# Patient Record
Sex: FEMALE | Race: WHITE | NOT HISPANIC OR LATINO | Employment: OTHER | URBAN - NONMETROPOLITAN AREA
[De-identification: names, ages, dates, MRNs, and addresses within clinical notes are randomized per-mention and may not be internally consistent; named-entity substitution may affect disease eponyms.]

---

## 2017-07-07 ENCOUNTER — HOSPITAL ENCOUNTER (OUTPATIENT)
Dept: MAMMOGRAPHY | Facility: HOSPITAL | Age: 57
Discharge: HOME | End: 2017-07-07
Admitting: PHYSICIAN ASSISTANT

## 2017-07-07 DIAGNOSIS — Z13.9 SCREENING: ICD-10-CM

## 2017-07-07 PROCEDURE — 77067 SCR MAMMO BI INCL CAD: CPT | Performed by: RADIOLOGY

## 2017-07-07 PROCEDURE — 77063 BREAST TOMOSYNTHESIS BI: CPT | Performed by: RADIOLOGY

## 2017-07-07 PROCEDURE — 77063 BREAST TOMOSYNTHESIS BI: CPT

## 2017-07-07 PROCEDURE — G0202 SCR MAMMO BI INCL CAD: HCPCS

## 2017-08-23 ENCOUNTER — TRANSCRIBE ORDERS (OUTPATIENT)
Dept: ADMINISTRATIVE | Facility: HOSPITAL | Age: 57
End: 2017-08-23

## 2017-08-23 ENCOUNTER — APPOINTMENT (OUTPATIENT)
Dept: LAB | Facility: HOSPITAL | Age: 57
End: 2017-08-23

## 2017-08-23 DIAGNOSIS — E78.5 HYPERLIPIDEMIA, UNSPECIFIED HYPERLIPIDEMIA TYPE: ICD-10-CM

## 2017-08-23 DIAGNOSIS — R53.83 TIRED: Primary | ICD-10-CM

## 2017-08-23 LAB
25(OH)D3 SERPL-MCNC: 23 NG/ML
ALBUMIN SERPL-MCNC: 4.3 G/DL (ref 3.5–5)
ALBUMIN/GLOB SERPL: 1.3 G/DL (ref 1.5–2.5)
ALP SERPL-CCNC: 94 U/L (ref 35–104)
ALT SERPL W P-5'-P-CCNC: 16 U/L (ref 10–36)
ANION GAP SERPL CALCULATED.3IONS-SCNC: 5.2 MMOL/L (ref 3.6–11.2)
AST SERPL-CCNC: 19 U/L (ref 10–30)
BASOPHILS # BLD AUTO: 0.02 10*3/MM3 (ref 0–0.3)
BASOPHILS NFR BLD AUTO: 0.3 % (ref 0–2)
BILIRUB SERPL-MCNC: 0.4 MG/DL (ref 0.2–1.8)
BUN BLD-MCNC: 8 MG/DL (ref 7–21)
BUN/CREAT SERPL: 11.9 (ref 7–25)
CALCIUM SPEC-SCNC: 9.4 MG/DL (ref 7.7–10)
CHLORIDE SERPL-SCNC: 108 MMOL/L (ref 99–112)
CHOLEST SERPL-MCNC: 204 MG/DL (ref 0–200)
CO2 SERPL-SCNC: 26.8 MMOL/L (ref 24.3–31.9)
CREAT BLD-MCNC: 0.67 MG/DL (ref 0.43–1.29)
DEPRECATED RDW RBC AUTO: 40.9 FL (ref 37–54)
EOSINOPHIL # BLD AUTO: 0.29 10*3/MM3 (ref 0–0.7)
EOSINOPHIL NFR BLD AUTO: 4.2 % (ref 0–5)
ERYTHROCYTE [DISTWIDTH] IN BLOOD BY AUTOMATED COUNT: 12.7 % (ref 11.5–14.5)
FOLATE SERPL-MCNC: 15.1 NG/ML (ref 5.4–20)
GFR SERPL CREATININE-BSD FRML MDRD: 90 ML/MIN/1.73
GLOBULIN UR ELPH-MCNC: 3.4 GM/DL
GLUCOSE BLD-MCNC: 105 MG/DL (ref 70–110)
HCT VFR BLD AUTO: 39.9 % (ref 37–47)
HDLC SERPL-MCNC: 60 MG/DL (ref 60–100)
HGB BLD-MCNC: 13 G/DL (ref 12–16)
IMM GRANULOCYTES # BLD: 0.01 10*3/MM3 (ref 0–0.03)
IMM GRANULOCYTES NFR BLD: 0.1 % (ref 0–0.5)
LDLC SERPL CALC-MCNC: 126 MG/DL (ref 0–100)
LDLC/HDLC SERPL: 2.1 {RATIO}
LYMPHOCYTES # BLD AUTO: 2.67 10*3/MM3 (ref 1–3)
LYMPHOCYTES NFR BLD AUTO: 38.8 % (ref 21–51)
MCH RBC QN AUTO: 29.1 PG (ref 27–33)
MCHC RBC AUTO-ENTMCNC: 32.6 G/DL (ref 33–37)
MCV RBC AUTO: 89.3 FL (ref 80–94)
MONOCYTES # BLD AUTO: 0.57 10*3/MM3 (ref 0.1–0.9)
MONOCYTES NFR BLD AUTO: 8.3 % (ref 0–10)
NEUTROPHILS # BLD AUTO: 3.33 10*3/MM3 (ref 1.4–6.5)
NEUTROPHILS NFR BLD AUTO: 48.3 % (ref 30–70)
OSMOLALITY SERPL CALC.SUM OF ELEC: 278.1 MOSM/KG (ref 273–305)
PLATELET # BLD AUTO: 211 10*3/MM3 (ref 130–400)
PMV BLD AUTO: 10.7 FL (ref 6–10)
POTASSIUM BLD-SCNC: 4 MMOL/L (ref 3.5–5.3)
PROT SERPL-MCNC: 7.7 G/DL (ref 6–8)
RBC # BLD AUTO: 4.47 10*6/MM3 (ref 4.2–5.4)
SODIUM BLD-SCNC: 140 MMOL/L (ref 135–153)
TRIGL SERPL-MCNC: 90 MG/DL (ref 0–150)
TSH SERPL DL<=0.05 MIU/L-ACNC: 2.75 MIU/ML (ref 0.55–4.78)
VIT B12 BLD-MCNC: 472 PG/ML (ref 211–911)
VLDLC SERPL-MCNC: 18 MG/DL
WBC NRBC COR # BLD: 6.89 10*3/MM3 (ref 4.5–12.5)

## 2017-08-23 PROCEDURE — 82607 VITAMIN B-12: CPT | Performed by: PHYSICIAN ASSISTANT

## 2017-08-23 PROCEDURE — 82306 VITAMIN D 25 HYDROXY: CPT | Performed by: PHYSICIAN ASSISTANT

## 2017-08-23 PROCEDURE — 36415 COLL VENOUS BLD VENIPUNCTURE: CPT | Performed by: PHYSICIAN ASSISTANT

## 2017-08-23 PROCEDURE — 82746 ASSAY OF FOLIC ACID SERUM: CPT | Performed by: PHYSICIAN ASSISTANT

## 2017-08-23 PROCEDURE — 84443 ASSAY THYROID STIM HORMONE: CPT | Performed by: PHYSICIAN ASSISTANT

## 2017-08-23 PROCEDURE — 80053 COMPREHEN METABOLIC PANEL: CPT | Performed by: PHYSICIAN ASSISTANT

## 2017-08-23 PROCEDURE — 80061 LIPID PANEL: CPT | Performed by: PHYSICIAN ASSISTANT

## 2017-08-23 PROCEDURE — 85025 COMPLETE CBC W/AUTO DIFF WBC: CPT | Performed by: PHYSICIAN ASSISTANT

## 2017-11-14 ENCOUNTER — OFFICE VISIT (OUTPATIENT)
Dept: PSYCHIATRY | Facility: CLINIC | Age: 57
End: 2017-11-14

## 2017-11-14 VITALS
SYSTOLIC BLOOD PRESSURE: 135 MMHG | DIASTOLIC BLOOD PRESSURE: 62 MMHG | HEART RATE: 64 BPM | BODY MASS INDEX: 31.76 KG/M2 | HEIGHT: 64 IN | WEIGHT: 186 LBS

## 2017-11-14 DIAGNOSIS — F41.3 OTHER MIXED ANXIETY DISORDERS: Primary | ICD-10-CM

## 2017-11-14 PROCEDURE — 99214 OFFICE O/P EST MOD 30 MIN: CPT | Performed by: NURSE PRACTITIONER

## 2017-11-14 RX ORDER — HYDROXYZINE HYDROCHLORIDE 10 MG/1
10 TABLET, FILM COATED ORAL 3 TIMES DAILY PRN
Qty: 90 TABLET | Refills: 0 | Status: SHIPPED | OUTPATIENT
Start: 2017-11-14 | End: 2018-01-11 | Stop reason: SDUPTHER

## 2017-11-14 RX ORDER — FLUOXETINE HYDROCHLORIDE 20 MG/1
CAPSULE ORAL
Qty: 45 CAPSULE | Refills: 0 | Status: SHIPPED | OUTPATIENT
Start: 2017-11-14 | End: 2018-01-11 | Stop reason: SDUPTHER

## 2017-11-14 RX ORDER — LORAZEPAM 0.5 MG/1
0.5 TABLET ORAL 2 TIMES DAILY
COMMUNITY
End: 2018-02-15 | Stop reason: SDUPTHER

## 2017-11-14 NOTE — PROGRESS NOTES
Lalita Valdez is a 57 y.o. female who is here today for initial appointment.     Chief Complaint:  No chief complaint on file.      HPI:  History of Present Illness    Past Psych History: ***    Substance Abuse: ***    Past Social History: ***    Family History:  family history includes Alcohol abuse in her father; Alzheimer's disease in her father; COPD in her mother; Panic disorder in her mother.    Medical/Surgical History:  Past Medical History:   Diagnosis Date   • Anxiety    • Arthritis    • Depression      Past Surgical History:   Procedure Laterality Date   • HYSTERECTOMY      age 37       Allergies   Allergen Reactions   • Codeine    • Sulfa Antibiotics        Current Medications:   Current Outpatient Prescriptions   Medication Sig Dispense Refill   • citalopram (CeleXA) 20 MG tablet Take  by mouth Daily.     • hydrOXYzine (ATARAX) 10 MG tablet Take 1 tablet by mouth 3 (Three) Times a Day As Needed for itching. 90 tablet 0   • LORazepam (ATIVAN) 0.5 MG tablet Take 0.5 mg by mouth 2 (Two) Times a Day.     • Multiple Vitamins-Minerals (PX MENS MULTIVITAMINS) tablet Take  by mouth Daily.     • phentermine (ADIPEX-P) 37.5 MG tablet Take 1 tablet by mouth Daily. 30 tablet 0     No current facility-administered medications for this visit.        Review of Systems    Review of Systems - General ROS: negative for - chills, fever or malaise  Ophthalmic ROS: negative for - loss of vision  ENT ROS: negative for - hearing change  Allergy and Immunology ROS: negative for - hives  Hematological and Lymphatic ROS: negative for - bleeding problems  Endocrine ROS: negative for - skin changes  Respiratory ROS: no cough, shortness of breath, or wheezing  Cardiovascular ROS: no chest pain or dyspnea on exertion  Gastrointestinal ROS: no abdominal pain, change in bowel habits, or black or bloody stools  Genito-Urinary ROS: no dysuria, trouble voiding, or hematuria  Musculoskeletal ROS: negative for - gait  "disturbance  Neurological ROS: no TIA or stroke symptoms  Dermatological ROS: negative for rash    Objective   Physical Exam  Blood pressure 135/62, pulse 64, height 64\" (162.6 cm), weight 186 lb (84.4 kg).    Mental Status Exam:   Hygiene:   {good/fair/poor:115413186}  Cooperation:  {Cooperation:562682928}  Eye Contact:  {Eye Contact:157252768}  Psychomotor Behavior:  {Psychomotor Behavior:80299}  Affect:  {Affect:212968166}  Hopelessness: {Hoplessness:413602326}  Speech:  {Speech:128966831}  Thought Process:  {Thought Process:653293204}  Thought Content:  {Thought Content:523326300}  Suicidal:  {Suicidal:179537013}  Homicidal:  {Homidical:358004403}  Hallucinations:  {Hallucinations:884396632}  Delusion:  {Delusion:489306150}  Memory:  {Memory:105596247}  Orientation:  {Orientation:213203815}  Reliability:  {good/fair/poor:025125730}  Insight:  {good/fair/poor/none:760273420}  Judgement:  {good/fair/poor/impaired:520342296}  Impulse Control:  {good/fair/poor/impaired:744755596}  Physical/Medical Issues:  {No/Yes:558233193}        Assessment/Plan   {Assess/PlanSmartLinks:57381}        We discussed risks, benefits, and side effects of the above medication and the patient was agreeable with the plan.     No Follow-up on file.       Problem List:    Short Term Goals:      Long Term Goals:    "

## 2017-11-14 NOTE — PROGRESS NOTES
"Lalita Valdez is a 57 y.o. female who is here today for medication management follow up.    Chief Complaint:  No chief complaint on file.      History of Present Illness   Has been seeing primary physician for pat year on Celexa. Says the Celexa was not working.  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 Believes more anxiety vs depression. Mainly more agitation both at work and at home.  Rates anxiety a 7/10. Denies panic attacks.  Pt cannot really explain what makes her anxious.  Denies excessive worry.  Feels sad at times but denies crying daily.  No SI, HI, No A/V hallucinations.  Rates depression a 4/10.  Sleeping about 8 hours nightly but it is interrupted mainly due to having to urinate.  Feels like work was the largest area of stress and pt retired this coming Friday is her last day.  No OCD behaviors.  No periods of excessive energy and insomnia.  C/O fatigue for past 4 months.  Says she thinks her PCP has checked thyroid and other blood work which was normal. No agoraphobia.  Just does not like dealing with the public, \"makes her nervous\".   The following portions of the patient's history were reviewed and updated as appropriate: current medications, past family history, past medical history, past social history, past surgical history and problem list.  Maximus    Review of Systems   Constitutional: Positive for fatigue.   Psychiatric/Behavioral: Positive for agitation.       Objective   Physical Exam   Constitutional: She appears well-developed and well-nourished. No distress.   Neurological: She is alert. Coordination and gait normal.   Vitals reviewed.    Blood pressure 135/62, pulse 64, height 64\" (162.6 cm), weight 186 lb (84.4 kg).    Allergies   Allergen Reactions   • Codeine    • Sulfa Antibiotics        Current Medications:   Current Outpatient Prescriptions   Medication Sig Dispense Refill   • hydrOXYzine (ATARAX) 10 MG tablet Take 1 tablet by mouth 3 (Three) Times a Day As " Needed for Itching. 90 tablet 0   • LORazepam (ATIVAN) 0.5 MG tablet Take 0.5 mg by mouth 2 (Two) Times a Day.     • Multiple Vitamins-Minerals (PX MENS MULTIVITAMINS) tablet Take  by mouth Daily.     • FLUoxetine (PROzac) 20 MG capsule Take one tablet daily for 2 weeks then increase to 2 tablets daily 45 capsule 0     No current facility-administered medications for this visit.        Mental Status Exam:   Hygiene:   good  Cooperation:  Cooperative  Eye Contact:  Good  Psychomotor Behavior:  Appropriate  Affect:  Full range  Hopelessness: Denies  Speech:  Normal  Thought Process:  Linear  Thought Content:  Normal  Suicidal:  None  Homicidal:  None  Hallucinations:  None  Delusion:  None  Memory:  Intact  Orientation:  Person, Place, Time and Situation  Reliability:  good  Insight:  Good  Judgement:  Good  Impulse Control:  Good  Physical/Medical Issues:  No     Assessment/Plan   Diagnoses and all orders for this visit:    Other mixed anxiety disorders    Other orders  -     FLUoxetine (PROzac) 20 MG capsule; Take one tablet daily for 2 weeks then increase to 2 tablets daily  -     hydrOXYzine (ATARAX) 10 MG tablet; Take 1 tablet by mouth 3 (Three) Times a Day As Needed for Itching.      Discused medications options.  Discussed with patient the importance of compliance with appointments.  Will set her up for psychotherapy.  Long discussion about Adipex and that it may be worsening her anxiety.  It is not helping her with weight loss anyway so she is to discontinue that.  Will change to Prozac and get the dose up to 40mg.    Discussed the risks, beneefits, and side effects of the medications; patient ackowledged and verbally consentedd.  Patient is aware to call the [facility] with any worsening of symptoms.  Patient is agreeable to call 911 or go to the nearest ER should he/she begin having SI/HI. Patient is being prescribed a controlled substance as part of treatment plan. Patient has been educated of  appropriate use of the medications, including risk of somnolence, limited ability to drive and/or work safely, and potential for dependence, respiratory depression and overdose. Patient is also informed that the medication are to be used by the patient only- avoid any combined use of ETOH or other substances unless prescribed. Pt was advised to discontinue Lorazepam over the next 2 months and to utilize Ativan as a rescue antianxiety only. Pt will return to clinic in 4 weeks or sooner if needed        Errors in dictation may reflect use of voice recognition software and not all errors in transcription may have been detected prior to signing.

## 2017-11-20 ENCOUNTER — OFFICE VISIT (OUTPATIENT)
Dept: PSYCHIATRY | Facility: CLINIC | Age: 57
End: 2017-11-20

## 2017-11-20 DIAGNOSIS — F43.25 ADJUSTMENT DISORDER WITH MIXED DISTURBANCE OF EMOTIONS AND CONDUCT: ICD-10-CM

## 2017-11-20 DIAGNOSIS — F41.3 OTHER MIXED ANXIETY DISORDERS: Primary | ICD-10-CM

## 2017-11-20 PROCEDURE — 90834 PSYTX W PT 45 MINUTES: CPT | Performed by: COUNSELOR

## 2017-11-20 NOTE — PROGRESS NOTES
Date of Service: November 20, 2017  Time In: 8:45 AM  Time Out: 9:30 AM      PROGRESS NOTE  Data:  Lexie Valdez is a 57 y.o. female who met with the undersigned for a regularly scheduled individual outpatient psychotherapy session at the [facility].  She rated her anxiety and depression levels as a 6 on a scale from 1-10 where 10 is the most.  She started taking Prozac last week and hopes that it helps with anxiety and irritability.     HPI: Patient reports that she retired from her job last Friday.  She plans to help her mother-in-law all raise her 5-year-old great nephew because his parents are on drugs.  She also hopes to spend more time with her 5 grandchildren.  She thinks that her anxiety level will be better now that she is no longer working in a stressful job.  She still reports some frustration with her 's procrastination about home repairs.    Clinical Maneuvering/Intervention:  Assisted patient in processing above session content; acknowledged and normalized patient’s thoughts, feelings, and concerns.  The importance of communication with her  was stressed.  Instead of stuffing her feelings until she is about to explode, she could talk to him calmly about what she would like done.  If he is too busy or not willing to do the household repairs in a timely manner she could hire someone or do it herself.  It was suggested that she add some social activities with friends to structure her time in [employer] so she won't isolate and become depressed.    Allowed patient to freely discuss issues without interruption or judgment. Provided safe, confidential environment to facilitate the development of positive therapeutic relationship and encourage open, honest communication. Assisted patient in identifying risk factors which would indicate the need for higher level of care including thoughts to harm self or others and/or self-harming behavior and encouraged patient to contact this office, call  911, or present to the nearest emergency room should any of these events occur. Discussed crisis intervention services and means to access.  Patient adamantly and convincingly denies current suicidal or homicidal ideation or perceptual disturbance.    Assessment     Diagnoses and all orders for this visit:    Other mixed anxiety disorders    Adjustment disorder with mixed disturbance of emotions and conduct         Mental Status Exam  Hygiene:  good  Dress:  casual  Attitude:  Cooperative  Motor Activity:  Appropriate  Speech:  Normal  Mood:  within normal limits  Affect:  calm and pleasant  Thought Processes:  Goal directed  Thought Content:  normal  Suicidal Thoughts:  denies  Homicidal Thoughts:  denies  Crisis Safety Plan: yes, to come to the emergency room.  Hallucinations:  denies    Patient's Support Network Includes:  , son and extended family    Progress toward goal: At goal    Functional Status: Mild impairment     Prognosis: Good with Ongoing Treatment     Plan     Patient will adhere to medication regimen as prescribed and report any side effects. Patient will contact this office, call 911 or present to the nearest emergency room should suicidal or homicidal ideations occur. Provide Cognitive Behavioral Therapy and Integrative Therapy to improve functioning, maintain stability, and avoid decompensation and the need for higher level of care.          Return in about 3 months (around 02/20/2018).      This document signed by Angela Chisholm [facility], November 20, 2017 9:42 AM

## 2018-01-11 RX ORDER — HYDROXYZINE HYDROCHLORIDE 10 MG/1
10 TABLET, FILM COATED ORAL 3 TIMES DAILY PRN
Qty: 90 TABLET | Refills: 0 | Status: SHIPPED | OUTPATIENT
Start: 2018-01-11 | End: 2018-02-15

## 2018-01-11 RX ORDER — FLUOXETINE HYDROCHLORIDE 20 MG/1
CAPSULE ORAL
Qty: 45 CAPSULE | Refills: 0 | Status: SHIPPED | OUTPATIENT
Start: 2018-01-11 | End: 2018-02-15

## 2018-02-15 ENCOUNTER — OFFICE VISIT (OUTPATIENT)
Dept: PSYCHIATRY | Facility: CLINIC | Age: 58
End: 2018-02-15

## 2018-02-15 VITALS
HEART RATE: 62 BPM | WEIGHT: 194 LBS | SYSTOLIC BLOOD PRESSURE: 136 MMHG | DIASTOLIC BLOOD PRESSURE: 81 MMHG | HEIGHT: 64 IN | BODY MASS INDEX: 33.12 KG/M2

## 2018-02-15 DIAGNOSIS — F41.3 OTHER MIXED ANXIETY DISORDERS: Primary | ICD-10-CM

## 2018-02-15 PROCEDURE — 99214 OFFICE O/P EST MOD 30 MIN: CPT | Performed by: NURSE PRACTITIONER

## 2018-02-15 RX ORDER — LORAZEPAM 0.5 MG/1
0.5 TABLET ORAL 2 TIMES DAILY PRN
Qty: 60 TABLET | Refills: 0 | Status: SHIPPED | OUTPATIENT
Start: 2018-02-15 | End: 2018-03-27 | Stop reason: SDUPTHER

## 2018-02-15 NOTE — PROGRESS NOTES
"Lalita Valdez is a 57 y.o. female who is here today for medication management follow up.    Chief Complaint:  No chief complaint on file.      History of Present Illness   Patient was here after a long hiatus due to lack of insurance.  Patient reports that she has been unable to take her medication due to running out.  She reports that prozac didn't help really.   more agitation both at work and at home.  Rates anxiety a 7/10. Denies panic attacks.  She reports that yesterday was \"anniversary of her daughters death and it was a bad day\" Feels sad at times but denies crying daily.  No SI, HI, No A/V hallucinations.  Rates depression a 4/10.  Sleeping about 8 hours nightly but it is interrupted mainly due to having to urinate.  .  No OCD behaviors.  No periods of excessive energy and insomnia.  C/O fatigue for past 4 months.        The following portions of the patient's history were reviewed and updated as appropriate: current medications, past family history, past medical history, past social history, past surgical history and problem list.  Maximus    Review of Systems   Constitutional: Positive for fatigue.   Psychiatric/Behavioral: Positive for agitation.       Objective   Physical Exam   Constitutional: She appears well-developed and well-nourished. No distress.   Neurological: She is alert. Coordination and gait normal.   Vitals reviewed.    Blood pressure 136/81, pulse 62, height 162.6 cm (64.02\"), weight 88 kg (194 lb).    Allergies   Allergen Reactions   • Codeine    • Sulfa Antibiotics        Current Medications:   Current Outpatient Prescriptions   Medication Sig Dispense Refill   • FLUoxetine (PROzac) 20 MG capsule Take one tablet daily for 2 weeks then increase to 2 tablets daily 45 capsule 0   • hydrOXYzine (ATARAX) 10 MG tablet Take 1 tablet by mouth 3 (Three) Times a Day As Needed for Itching. 90 tablet 0   • LORazepam (ATIVAN) 0.5 MG tablet Take 0.5 mg by mouth 2 (Two) Times a Day.     • " Multiple Vitamins-Minerals (PX MENS MULTIVITAMINS) tablet Take  by mouth Daily.       No current facility-administered medications for this visit.        Mental Status Exam:   Hygiene:   good  Cooperation:  Cooperative  Eye Contact:  Good  Psychomotor Behavior:  Appropriate  Affect:  Full range  Hopelessness: Denies  Speech:  Normal  Thought Process:  Linear  Thought Content:  Normal  Suicidal:  None  Homicidal:  None  Hallucinations:  None  Delusion:  None  Memory:  Intact  Orientation:  Person, Place, Time and Situation  Reliability:  good  Insight:  Good  Judgement:  Good  Impulse Control:  Good  Physical/Medical Issues:  No     Assessment/Plan   Diagnoses and all orders for this visit:    Other mixed anxiety disorders  -     Discontinue: sertraline (ZOLOFT) 50 MG tablet; Take 1 tablet by mouth Daily. Take 1/2 tablet for 1 week then increase to whole tablet for 1 week then increase to 2 tablets.  -     LORazepam (ATIVAN) 0.5 MG tablet; Take 1 tablet by mouth 2 (Two) Times a Day As Needed for Anxiety.  -     sertraline (ZOLOFT) 50 MG tablet; Take 1 tablet by mouth Every Night. Take 1/2 tablet for 1 week then increase to whole tablet for 1 week then increase to 2 tablets.        Discused medications options.  Discussed with patient the importance of compliance with appointments.  Will set her up for psychotherapy.     Discussed the risks, beneefits, and side effects of the medications; patient ackowledged and verbally consentedd.  Patient is aware to call the [facility] with any worsening of symptoms.  Patient is agreeable to call 911 or go to the nearest ER should he/she begin having SI/HI. Patient is being prescribed a controlled substance as part of treatment plan. Patient has been educated of appropriate use of the medications, including risk of somnolence, limited ability to drive and/or work safely, and potential for dependence, respiratory depression and overdose. Patient is also informed that the  medication are to be used by the patient only- avoid any combined use of ETOH or other substances unless prescribed. Pt was advised to discontinue Lorazepam over the next 2 months and to utilize Ativan as a rescue antianxiety only. Pt will return to clinic in 4 weeks or sooner if needed        Errors in dictation may reflect use of voice recognition software and not all errors in transcription may have been detected prior to signing.

## 2018-02-22 ENCOUNTER — OFFICE VISIT (OUTPATIENT)
Dept: PSYCHIATRY | Facility: CLINIC | Age: 58
End: 2018-02-22

## 2018-02-22 DIAGNOSIS — F41.3 OTHER MIXED ANXIETY DISORDERS: Primary | ICD-10-CM

## 2018-02-22 DIAGNOSIS — F34.1 DYSTHYMIA: ICD-10-CM

## 2018-02-22 PROCEDURE — 90832 PSYTX W PT 30 MINUTES: CPT | Performed by: COUNSELOR

## 2018-02-22 NOTE — PROGRESS NOTES
Date of Service: February 22, 2018  Time In: 8:00 AM  Time Out: 8:30 AM      PROGRESS NOTE  Data:  Lexie Valdez is a 57 y.o. female who met with the undersigned for a regularly scheduled individual outpatient psychotherapy session at the [facility].  She rated her anxiety level as a 6 and depression as a 5 on a scale from 1-10 where 10 is the most.  She displayed constant tremors in both hands and had difficulty holding her coffee cup.      HPI: Patient reports that she is enjoying prison but she is a little stressed about trying to apply for Medicaid and SS benefits.  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  His mother and her 5-year-old great grandson were living with them until recently.  She feels relieved since they moved out because the 5-year-old is disrespectful and poorly behaved.  Having him around made her feel anxious.  She enjoys spending time with his mother on the weekends.  She visits her 4 grandchildren frequently.  She prefers solitude and tends to avoid crowds due to anxiety.  Her tearful breakdowns are less frequent.    Clinical Maneuvering/Intervention:  Assisted patient in processing above session content; acknowledged and normalized patient’s thoughts, feelings, and concerns.  It was suggested that she speak to a hospital financial staff member for assistance with getting the medical card.  She was praised for meeting with the nurse practitioner in restarting psychotropic medications.    Allowed patient to freely discuss issues without interruption or judgment. Provided safe, confidential environment to facilitate the development of positive therapeutic relationship and encourage open, honest communication. Assisted patient in identifying risk factors which would indicate the need for higher level of care including thoughts to harm self or others and/or self-harming behavior and encouraged patient to contact this  office, call 911, or present to the nearest emergency room should any of these events occur. Discussed crisis intervention services and means to access.  Patient adamantly and convincingly denies current suicidal or homicidal ideation or perceptual disturbance.    Assessment     Diagnoses and all orders for this visit:    Other mixed anxiety disorders    Dysthymia           Mental Status Exam  Hygiene:  good  Dress:  casual  Attitude:  Cooperative  Motor Activity:  Appropriate  Speech:  Normal  Mood:  within normal limits  Affect:  anxious  Thought Processes:  Goal directed  Thought Content:  normal  Suicidal Thoughts:  denies  Homicidal Thoughts:  denies  Crisis Safety Plan: yes, to come to the emergency room.  Hallucinations:  denies    Patient's Support Network Includes:  , children and extended family    Progress toward goal: At goal    Functional Status: Mild impairment     Prognosis: Good with Ongoing Treatment     Plan     Patient will adhere to medication regimen as prescribed and report any side effects. Patient will contact this office, call 911 or present to the nearest emergency room should suicidal or homicidal ideations occur. Provide Cognitive Behavioral Therapy and Integrative Therapy to improve functioning, maintain stability, and avoid decompensation and the need for higher level of care.          Return in about 1 month (around 03/22/2018).      This document signed by PABLO Burnett, [facility] February 22, 2018 8:57 AM

## 2018-03-08 ENCOUNTER — HOSPITAL ENCOUNTER (EMERGENCY)
Facility: HOSPITAL | Age: 58
Discharge: HOME | End: 2018-03-08
Admitting: EMERGENCY MEDICINE

## 2018-03-08 VITALS
DIASTOLIC BLOOD PRESSURE: 77 MMHG | SYSTOLIC BLOOD PRESSURE: 147 MMHG | TEMPERATURE: 98 F | HEART RATE: 68 BPM | HEIGHT: 64 IN | BODY MASS INDEX: 33.29 KG/M2 | RESPIRATION RATE: 17 BRPM | WEIGHT: 195 LBS | OXYGEN SATURATION: 99 %

## 2018-03-08 DIAGNOSIS — R03.0 TRANSIENT ELEVATED BLOOD PRESSURE: Primary | ICD-10-CM

## 2018-03-08 LAB
ALBUMIN SERPL-MCNC: 4.2 G/DL (ref 3.5–5)
ALBUMIN/GLOB SERPL: 1.4 G/DL (ref 1.5–2.5)
ALP SERPL-CCNC: 95 U/L (ref 35–104)
ALT SERPL W P-5'-P-CCNC: 16 U/L (ref 10–36)
ANION GAP SERPL CALCULATED.3IONS-SCNC: 6.4 MMOL/L (ref 3.6–11.2)
AST SERPL-CCNC: 19 U/L (ref 10–30)
BACTERIA UR QL AUTO: ABNORMAL /HPF
BASOPHILS # BLD AUTO: 0.03 10*3/MM3 (ref 0–0.3)
BASOPHILS NFR BLD AUTO: 0.3 % (ref 0–2)
BILIRUB SERPL-MCNC: 0.3 MG/DL (ref 0.2–1.8)
BILIRUB UR QL STRIP: NEGATIVE
BUN BLD-MCNC: 8 MG/DL (ref 7–21)
BUN/CREAT SERPL: 12.9 (ref 7–25)
CALCIUM SPEC-SCNC: 8.6 MG/DL (ref 7.7–10)
CHLORIDE SERPL-SCNC: 109 MMOL/L (ref 99–112)
CLARITY UR: CLEAR
CO2 SERPL-SCNC: 24.6 MMOL/L (ref 24.3–31.9)
COLOR UR: YELLOW
CREAT BLD-MCNC: 0.62 MG/DL (ref 0.43–1.29)
DEPRECATED RDW RBC AUTO: 40.3 FL (ref 37–54)
EOSINOPHIL # BLD AUTO: 0.29 10*3/MM3 (ref 0–0.7)
EOSINOPHIL NFR BLD AUTO: 3.1 % (ref 0–5)
ERYTHROCYTE [DISTWIDTH] IN BLOOD BY AUTOMATED COUNT: 13 % (ref 11.5–14.5)
GFR SERPL CREATININE-BSD FRML MDRD: 99 ML/MIN/1.73
GLOBULIN UR ELPH-MCNC: 3.1 GM/DL
GLUCOSE BLD-MCNC: 111 MG/DL (ref 70–110)
GLUCOSE UR STRIP-MCNC: NEGATIVE MG/DL
HCT VFR BLD AUTO: 38.5 % (ref 37–47)
HGB BLD-MCNC: 12.5 G/DL (ref 12–16)
HGB UR QL STRIP.AUTO: NEGATIVE
HYALINE CASTS UR QL AUTO: ABNORMAL /LPF
IMM GRANULOCYTES # BLD: 0.04 10*3/MM3 (ref 0–0.03)
IMM GRANULOCYTES NFR BLD: 0.4 % (ref 0–0.5)
KETONES UR QL STRIP: NEGATIVE
LEUKOCYTE ESTERASE UR QL STRIP.AUTO: ABNORMAL
LYMPHOCYTES # BLD AUTO: 3.13 10*3/MM3 (ref 1–3)
LYMPHOCYTES NFR BLD AUTO: 33.8 % (ref 21–51)
MCH RBC QN AUTO: 28.1 PG (ref 27–33)
MCHC RBC AUTO-ENTMCNC: 32.5 G/DL (ref 33–37)
MCV RBC AUTO: 86.5 FL (ref 80–94)
MONOCYTES # BLD AUTO: 0.45 10*3/MM3 (ref 0.1–0.9)
MONOCYTES NFR BLD AUTO: 4.9 % (ref 0–10)
NEUTROPHILS # BLD AUTO: 5.31 10*3/MM3 (ref 1.4–6.5)
NEUTROPHILS NFR BLD AUTO: 57.5 % (ref 30–70)
NITRITE UR QL STRIP: POSITIVE
OSMOLALITY SERPL CALC.SUM OF ELEC: 278.4 MOSM/KG (ref 273–305)
PH UR STRIP.AUTO: 7 [PH] (ref 5–8)
PLATELET # BLD AUTO: 230 10*3/MM3 (ref 130–400)
PMV BLD AUTO: 10.5 FL (ref 6–10)
POTASSIUM BLD-SCNC: 3.7 MMOL/L (ref 3.5–5.3)
PROT SERPL-MCNC: 7.3 G/DL (ref 6–8)
PROT UR QL STRIP: NEGATIVE
RBC # BLD AUTO: 4.45 10*6/MM3 (ref 4.2–5.4)
RBC # UR: ABNORMAL /HPF
REF LAB TEST METHOD: ABNORMAL
SODIUM BLD-SCNC: 140 MMOL/L (ref 135–153)
SP GR UR STRIP: 1.01 (ref 1–1.03)
SQUAMOUS #/AREA URNS HPF: ABNORMAL /HPF
TROPONIN I SERPL-MCNC: <0.006 NG/ML
UROBILINOGEN UR QL STRIP: ABNORMAL
WBC NRBC COR # BLD: 9.25 10*3/MM3 (ref 4.5–12.5)
WBC UR QL AUTO: ABNORMAL /HPF

## 2018-03-08 PROCEDURE — 87086 URINE CULTURE/COLONY COUNT: CPT | Performed by: NURSE PRACTITIONER

## 2018-03-08 PROCEDURE — 99283 EMERGENCY DEPT VISIT LOW MDM: CPT

## 2018-03-08 PROCEDURE — 93010 ELECTROCARDIOGRAM REPORT: CPT | Performed by: INTERNAL MEDICINE

## 2018-03-08 PROCEDURE — 84484 ASSAY OF TROPONIN QUANT: CPT | Performed by: NURSE PRACTITIONER

## 2018-03-08 PROCEDURE — 87186 SC STD MICRODIL/AGAR DIL: CPT | Performed by: NURSE PRACTITIONER

## 2018-03-08 PROCEDURE — 80053 COMPREHEN METABOLIC PANEL: CPT | Performed by: NURSE PRACTITIONER

## 2018-03-08 PROCEDURE — 87077 CULTURE AEROBIC IDENTIFY: CPT | Performed by: NURSE PRACTITIONER

## 2018-03-08 PROCEDURE — 81003 URINALYSIS AUTO W/O SCOPE: CPT | Performed by: NURSE PRACTITIONER

## 2018-03-08 PROCEDURE — 81001 URINALYSIS AUTO W/SCOPE: CPT | Performed by: NURSE PRACTITIONER

## 2018-03-08 PROCEDURE — 85025 COMPLETE CBC W/AUTO DIFF WBC: CPT | Performed by: NURSE PRACTITIONER

## 2018-03-08 PROCEDURE — 93005 ELECTROCARDIOGRAM TRACING: CPT | Performed by: NURSE PRACTITIONER

## 2018-03-08 NOTE — ED PROVIDER NOTES
Subjective   Patient is a 57 y.o. female presenting with hypertension.   History provided by:  Patient   used: No    Hypertension   Severity:  Moderate  Onset quality:  Sudden  Chronicity:  New  Notable PTA blood pressures:  Patient reports 180s over 90s (unsure of exact measurement)  Context: normal sodium, not caffeine, not drug abuse, not medication change, not noncompliance and not OTC medications used    Relieved by:  None tried  Worsened by:  Nothing  Ineffective treatments:  None tried  Associated symptoms: anxiety    Associated symptoms: no abdominal pain, no chest pain, no confusion, no dizziness, no fatigue, no fever, no hematuria, no palpitations, no peripheral edema, no syncope, no tinnitus and not vomiting    Risk factors: family hx of HTN    Risk factors: no alcohol use, no cardiac disease, no cocaine use, no diabetes, no kidney disease, no prior aneurysm, no prior stroke and no tobacco use        Review of Systems   Constitutional: Negative.  Negative for fatigue and fever.   HENT: Negative.  Negative for tinnitus.    Eyes: Negative.    Respiratory: Negative.    Cardiovascular: Negative.  Negative for chest pain, palpitations and syncope.   Gastrointestinal: Negative.  Negative for abdominal pain and vomiting.   Endocrine: Negative.    Genitourinary: Negative.  Negative for hematuria.   Musculoskeletal: Negative.    Skin: Negative.    Allergic/Immunologic: Negative.    Neurological: Negative.  Negative for dizziness.   Hematological: Negative.    Psychiatric/Behavioral: Negative for confusion. The patient is nervous/anxious.        Past Medical History:   Diagnosis Date   • Anxiety    • Arthritis    • Depression        Allergies   Allergen Reactions   • Codeine    • Sulfa Antibiotics        Past Surgical History:   Procedure Laterality Date   • HYSTERECTOMY      age 37       Family History   Problem Relation Age of Onset   • Panic disorder Mother    • COPD Mother    • Alcohol abuse  Father    • Alzheimer's disease Father        Social History     Social History   • Marital status:      Occupational History   •       Social History Main Topics   • Smoking status: Former Smoker     Start date: 6/18/1971     Quit date: 10/18/2005   • Smokeless tobacco: Never Used   • Alcohol use No   • Drug use: No   • Sexual activity: Yes     Partners: Male           Objective   Physical Exam   Constitutional: She is oriented to person, place, and time. She appears well-developed and well-nourished.   HENT:   Head: Normocephalic.   Right Ear: External ear normal.   Left Ear: External ear normal.   Mouth/Throat: Oropharynx is clear and moist.   Eyes: EOM are normal. Pupils are equal, round, and reactive to light.   Neck: Normal range of motion. Neck supple.   Cardiovascular: Normal rate and regular rhythm.    Pulmonary/Chest: Effort normal and breath sounds normal.   Abdominal: Soft. Bowel sounds are normal.   Musculoskeletal: Normal range of motion.   Neurological: She is alert and oriented to person, place, and time.   Skin: Skin is warm and dry.   Psychiatric: She has a normal mood and affect. Her behavior is normal.   Nursing note and vitals reviewed.      Procedures         ED Course  ED Course                  MDM    Final diagnoses:   Transient elevated blood pressure            Blake Banuelos, APRN  03/08/18 [id number]

## 2018-03-08 NOTE — DISCHARGE INSTRUCTIONS
Hypertension  Hypertension is another name for high blood pressure. High blood pressure forces your heart to work harder to pump blood. This can cause problems over time.  There are two numbers in a blood pressure reading. There is a top number (systolic) over a bottom number (diastolic). It is best to have a blood pressure below 120/80. Healthy choices can help lower your blood pressure. You may need medicine to help lower your blood pressure if:  · Your blood pressure cannot be lowered with healthy choices.  · Your blood pressure is higher than 130/80.  Follow these instructions at home:  Eating and drinking   · If directed, follow the DASH eating plan. This diet includes:  ¨ Filling half of your plate at each meal with fruits and vegetables.  ¨ Filling one quarter of your plate at each meal with whole grains. Whole grains include whole wheat pasta, brown rice, and whole grain bread.  ¨ Eating or drinking low-fat dairy products, such as skim milk or low-fat yogurt.  ¨ Filling one quarter of your plate at each meal with low-fat (lean) proteins. Low-fat proteins include fish, skinless chicken, eggs, beans, and tofu.  ¨ Avoiding fatty meat, cured and processed meat, or chicken with skin.  ¨ Avoiding premade or processed food.  · Eat less than 1,500 mg of salt (sodium) a day.  · Limit alcohol use to no more than 1 drink a day for nonpregnant women and 2 drinks a day for men. One drink equals 12 oz of beer, 5 oz of wine, or 1½ oz of hard liquor.  Lifestyle   · Work with your doctor to stay at a healthy weight or to lose weight. Ask your doctor what the best weight is for you.  · Get at least 30 minutes of exercise that causes your heart to beat faster (aerobic exercise) most days of the week. This may include walking, swimming, or biking.  · Get at least 30 minutes of exercise that strengthens your muscles (resistance exercise) at least 3 days a week. This may include lifting weights or pilates.  · Do not use any  products that contain nicotine or tobacco. This includes cigarettes and e-cigarettes. If you need help quitting, ask your doctor.  · Check your blood pressure at home as told by your doctor.  · Keep all follow-up visits as told by your doctor. This is important.  Medicines   · Take over-the-counter and prescription medicines only as told by your doctor. Follow directions carefully.  · Do not skip doses of blood pressure medicine. The medicine does not work as well if you skip doses. Skipping doses also puts you at risk for problems.  · Ask your doctor about side effects or reactions to medicines that you should watch for.  Contact a doctor if:  · You think you are having a reaction to the medicine you are taking.  · You have headaches that keep coming back (recurring).  · You feel dizzy.  · You have swelling in your ankles.  · You have trouble with your vision.  Get help right away if:  · You get a very bad headache.  · You start to feel confused.  · You feel weak or numb.  · You feel faint.  · You get very bad pain in your:  ¨ Chest.  ¨ Belly (abdomen).  · You throw up (vomit) more than once.  · You have trouble breathing.  Summary  · Hypertension is another name for high blood pressure.  · Making healthy choices can help lower blood pressure. If your blood pressure cannot be controlled with healthy choices, you may need to take medicine.  This information is not intended to replace advice given to you by your health care provider. Make sure you discuss any questions you have with your health care provider.  Document Released: 06/05/2009 Document Revised: 11/15/2017 Document Reviewed: 11/15/2017  [employer] Interactive Patient Education © 2017 [employer]

## 2018-03-10 ENCOUNTER — TELEPHONE (OUTPATIENT)
Dept: EMERGENCY DEPT | Facility: HOSPITAL | Age: 58
End: 2018-03-10

## 2018-03-10 LAB
BACTERIA SPEC AEROBE CULT: ABNORMAL
BACTERIA SPEC AEROBE CULT: ABNORMAL

## 2018-03-22 ENCOUNTER — OFFICE VISIT (OUTPATIENT)
Dept: PSYCHIATRY | Facility: CLINIC | Age: 58
End: 2018-03-22

## 2018-03-22 DIAGNOSIS — F41.3 OTHER MIXED ANXIETY DISORDERS: ICD-10-CM

## 2018-03-22 DIAGNOSIS — F34.1 DYSTHYMIA: Primary | ICD-10-CM

## 2018-03-22 PROCEDURE — 90832 PSYTX W PT 30 MINUTES: CPT | Performed by: COUNSELOR

## 2018-03-22 NOTE — PROGRESS NOTES
Date of Service: March 22, 2018  Time In: 9:05 AM  Time Out: 9:35 AM      PROGRESS NOTE  Data:  Lexie Valdez is a 57 y.o. female who met with the undersigned for a regularly scheduled individual outpatient psychotherapy session at the [facility].  She rated her anxiety depression levels as a 5 on a scale from 1-10 where 10 is the most.  She sleeps about 7 hours a night but it is interrupted and she doesn't feel rested.      HPI: She reports feeling a little bored and jail.  He is looking forward to working on her flowers when the weather warms up.  She also feels bad that her  has to work so hard to support them financially when she is not bringing in income.  She met with the medical doctor and psychiatrist from [employer] and is hopeful that her disability will be approved so she can help out.  She stays busy with housework, cooking, computer games, television and playing with the dog.  She joined a gym but hasn't been going because of the weather.    Clinical Maneuvering/Intervention:  Assisted patient in processing above session content; acknowledged and normalized patient’s thoughts, feelings, and concerns.  Cognitive behavioral techniques were used to reframe distorted thoughts contributing to depressive and anxious symptoms.  She was encouraged to spend more time outside and to exercise at least 1 time weekly.    Allowed patient to freely discuss issues without interruption or judgment. Provided safe, confidential environment to facilitate the development of positive therapeutic relationship and encourage open, honest communication. Assisted patient in identifying risk factors which would indicate the need for higher level of care including thoughts to harm self or others and/or self-harming behavior and encouraged patient to contact this office, call 911, or present to the nearest emergency room should any of these events occur. Discussed crisis intervention services and means to access.   Patient adamantly and convincingly denies current suicidal or homicidal ideation or perceptual disturbance.    Assessment     Diagnoses and all orders for this visit:    Dysthymia    Other mixed anxiety disorders             Mental Status Exam  Hygiene:  good  Dress:  casual  Attitude:  Cooperative  Motor Activity:  Appropriate  Speech:  Normal  Mood:  decreased range  Affect:  calm and pleasant  Thought Processes:  Circum  Thought Content:  normal  Suicidal Thoughts:  denies  Homicidal Thoughts:  denies  Crisis Safety Plan: yes, to come to the emergency room.  Hallucinations:  denies    Patient's Support Network Includes:  , children and extended family    Progress toward goal: At goal    Functional Status: Mild impairment     Prognosis: Good with Ongoing Treatment     Plan     Patient will adhere to medication regimen as prescribed and report any side effects. Patient will contact this office, call 911 or present to the nearest emergency room should suicidal or homicidal ideations occur. Provide Cognitive Behavioral Therapy and Integrative Therapy to improve functioning, maintain stability, and avoid decompensation and the need for higher level of care.          Return in about 2 months (around 5/22/2018).      This document signed by Angela Chisholm, PABLO, [facility] March 22, 2018 10:47 AM

## 2018-03-27 DIAGNOSIS — F41.3 OTHER MIXED ANXIETY DISORDERS: ICD-10-CM

## 2018-03-27 RX ORDER — LORAZEPAM 0.5 MG/1
0.5 TABLET ORAL 2 TIMES DAILY PRN
Qty: 60 TABLET | Refills: 0 | Status: SHIPPED | OUTPATIENT
Start: 2018-03-27 | End: 2018-04-03 | Stop reason: SDUPTHER

## 2018-04-03 ENCOUNTER — OFFICE VISIT (OUTPATIENT)
Dept: PSYCHIATRY | Facility: CLINIC | Age: 58
End: 2018-04-03

## 2018-04-03 VITALS
BODY MASS INDEX: 34.31 KG/M2 | WEIGHT: 201 LBS | SYSTOLIC BLOOD PRESSURE: 132 MMHG | HEART RATE: 63 BPM | HEIGHT: 64 IN | DIASTOLIC BLOOD PRESSURE: 75 MMHG

## 2018-04-03 DIAGNOSIS — F41.3 OTHER MIXED ANXIETY DISORDERS: Primary | ICD-10-CM

## 2018-04-03 PROCEDURE — 99213 OFFICE O/P EST LOW 20 MIN: CPT | Performed by: NURSE PRACTITIONER

## 2018-04-03 RX ORDER — CITALOPRAM 20 MG/1
20 TABLET ORAL 2 TIMES DAILY
COMMUNITY
End: 2018-04-03

## 2018-04-03 RX ORDER — LORAZEPAM 0.5 MG/1
0.5 TABLET ORAL 2 TIMES DAILY PRN
Qty: 60 TABLET | Refills: 0 | Status: SHIPPED | OUTPATIENT
Start: 2018-04-03 | End: 2018-06-11 | Stop reason: SDUPTHER

## 2018-04-03 RX ORDER — FLUOXETINE 20 MG/1
20 TABLET, FILM COATED ORAL 2 TIMES DAILY
COMMUNITY
End: 2018-04-03

## 2018-04-03 NOTE — PROGRESS NOTES
"Lalita Valdez is a 57 y.o. female who is here today for medication management follow up.    Chief Complaint:  No chief complaint on file.      History of Present Illness   Patient presents appropriately dressed/groomed.  Patient has been medication compliant-denies any medication related side effects.  She reports lessening agitation and anger. Rates anxiety a 0/10. Denies panic attacks.  No SI, HI, No A/V hallucinations.  Rates depression a 0/10.  Sleeping about 8 hours nightly but it is interrupted mainly due to having to urinate.  .  No OCD behaviors.  No periods of excessive energy and insomnia.  C/O fatigue for past 4 months.        The following portions of the patient's history were reviewed and updated as appropriate: current medications, past family history, past medical history, past social history, past surgical history and problem list.  Maximus    Review of Systems   Constitutional: Positive for fatigue.   Psychiatric/Behavioral: Positive for agitation.       Objective   Physical Exam   Constitutional: She appears well-developed and well-nourished. No distress.   Neurological: She is alert. Coordination and gait normal.   Vitals reviewed.    Blood pressure 132/75, pulse 63, height 162.6 cm (64\"), weight 91.2 kg (201 lb).    Allergies   Allergen Reactions   • Codeine    • Sulfa Antibiotics        Current Medications:   Current Outpatient Prescriptions   Medication Sig Dispense Refill   • citalopram (CeleXA) 20 MG tablet Take 20 mg by mouth 2 (Two) Times a Day.     • FLUoxetine (PROzac) 20 MG tablet Take 20 mg by mouth 2 (Two) Times a Day.     • LORazepam (ATIVAN) 0.5 MG tablet Take 1 tablet by mouth 2 (Two) Times a Day As Needed for Anxiety. 60 tablet 0   • Multiple Vitamins-Minerals (PX MENS MULTIVITAMINS) tablet Take  by mouth Daily.     • sertraline (ZOLOFT) 50 MG tablet Take 1 tablet by mouth Every Night. Take 1/2 tablet for 1 week then increase to whole tablet for 1 week then increase to 2 " tablets. 30 tablet 2     No current facility-administered medications for this visit.        Mental Status Exam:   Hygiene:   good  Cooperation:  Cooperative  Eye Contact:  Good  Psychomotor Behavior:  Appropriate  Affect:  Full range  Hopelessness: Denies  Speech:  Normal  Thought Process:  Linear  Thought Content:  Normal  Suicidal:  None  Homicidal:  None  Hallucinations:  None  Delusion:  None  Memory:  Intact  Orientation:  Person, Place, Time and Situation  Reliability:  good  Insight:  Good  Judgement:  Good  Impulse Control:  Good  Physical/Medical Issues:  No     Assessment/Plan   Diagnoses and all orders for this visit:    Other mixed anxiety disorders  -     LORazepam (ATIVAN) 0.5 MG tablet; Take 1 tablet by mouth 2 (Two) Times a Day As Needed for Anxiety.  -     sertraline (ZOLOFT) 50 MG tablet; Take 2 tablets by mouth Every Night.        Discused medications options.  Discussed with patient increasing weight-encouraged healthly eating/exercise.     Discussed the risks, beneefits, and side effects of the medications; patient ackowledged and verbally consentedd.  Patient is aware to call the [facility] with any worsening of symptoms.  Patient is agreeable to call 911 or go to the nearest ER should he/she begin having SI/HI. Patient is being prescribed a controlled substance as part of treatment plan. Patient has been educated of appropriate use of the medications, including risk of somnolence, limited ability to drive and/or work safely, and potential for dependence, respiratory depression and overdose. Patient is also informed that the medication are to be used by the patient only- avoid any combined use of ETOH or other substances unless prescribed. Continues to use Ativan as a rescue antianxiety only. Pt will return to clinic i [id number] sooner if needed        Errors in dictation may reflect use of voice recognition software and not all errors in transcription may have been detected prior to  signing.

## 2018-05-07 RX ORDER — HYDROXYZINE HYDROCHLORIDE 10 MG/1
TABLET, FILM COATED ORAL
Qty: 90 TABLET | Refills: 0 | OUTPATIENT
Start: 2018-05-07

## 2018-05-21 RX ORDER — HYDROXYZINE HYDROCHLORIDE 10 MG/1
TABLET, FILM COATED ORAL
Qty: 90 TABLET | Refills: 0 | OUTPATIENT
Start: 2018-05-21

## 2018-05-22 ENCOUNTER — OFFICE VISIT (OUTPATIENT)
Dept: PSYCHIATRY | Facility: CLINIC | Age: 58
End: 2018-05-22

## 2018-05-22 DIAGNOSIS — F34.1 DYSTHYMIA: Primary | ICD-10-CM

## 2018-05-22 DIAGNOSIS — F41.3 OTHER MIXED ANXIETY DISORDERS: ICD-10-CM

## 2018-05-22 PROCEDURE — 90834 PSYTX W PT 45 MINUTES: CPT | Performed by: COUNSELOR

## 2018-05-22 RX ORDER — HYDROXYZINE HYDROCHLORIDE 10 MG/1
10 TABLET, FILM COATED ORAL 3 TIMES DAILY PRN
Qty: 45 TABLET | Refills: 1 | Status: SHIPPED | OUTPATIENT
Start: 2018-05-22 | End: 2018-07-16 | Stop reason: SDUPTHER

## 2018-05-22 RX ORDER — HYDROXYZINE HYDROCHLORIDE 10 MG/1
TABLET, FILM COATED ORAL EVERY 8 HOURS SCHEDULED
COMMUNITY
End: 2018-05-22

## 2018-05-22 NOTE — PROGRESS NOTES
Date of Service: May 22, 2018  Time In: 9:35 AM  Time Out: 10:10 AM      PROGRESS NOTE  Data:  Lexie Valdez is a 58 y.o. female who met with the undersigned for a regularly scheduled individual outpatient therapy session at [facility].  She rated her anxiety and depression levels as an 8 on a scale from 1-10 where 10 is the most.  She states that she has no energy or interest in things.  She was often tearful during the session.  She requested a prescription refill of hydroxyzine.     HPI: Patient reports that May is a difficult month because her parents were born in May and are both .  She was born on her mother's 25th birthday.  Her daughter Chip was born on May 20[birth year] 1985 and  when she was 1 years old.  She also feels guilty because her  is working 7 days a week to support them since she has no income.  She was turned down for SSI benefits but has hired someone to appeal.  She has been going with her  on jobs to help out.  He is afraid to leave her alone because she is depressed but she would enjoy some time alone.  She struggles with back, shoulder hand and right hip pain due to arthritis.  Even with medication the pain level remains at 5 on 1-10 scale where 10 is the most.      Clinical Maneuvering/Intervention:  Assisted patient in processing above session content; acknowledged and normalized patient’s thoughts, feelings, and concerns.  Therapist notified SG Syed about patient's medication request.  Cognitive behavioral techniques were used to reframe distorted thoughts contribute to her distress.  She was encouraged to ask her  to allow her to have some time alone to do things she enjoys.  It was also suggested that she ask him to schedule his jobs so that they have one day off a week to spend time together fishing.    Allowed patient to freely discuss issues without interruption or judgment. Provided safe, confidential environment to facilitate the development  of positive therapeutic relationship and encourage open, honest communication. Assisted patient in identifying risk factors which would indicate the need for higher level of care including thoughts to harm self or others and/or self-harming behavior and encouraged patient to contact this office, call 911, or present to the nearest emergency room should any of these events occur. Discussed crisis intervention services and means to access.  Patient adamantly and convincingly denies current suicidal or homicidal ideation or perceptual disturbance.    Assessment     Diagnoses and all orders for this visit:    Dysthymia    Other mixed anxiety disorders             Mental Status Exam  Hygiene:  good  Dress:  casual  Attitude:  Cooperative  Motor Activity:  Slow  Speech:  Monotone  Mood:  dysthymic  Affect:  flat  Thought Processes:  Circum  Thought Content:  normal  Suicidal Thoughts:  denies  Homicidal Thoughts:  denies  Crisis Safety Plan: yes, to come to the emergency room.  Hallucinations:  denies    Patient's Support Network Includes:  , son and extended family    Progress toward goal: Not at goal    Functional Status: Moderate impairment     Prognosis: Good with Ongoing Treatment     Plan         Patient will adhere to medication regimen as prescribed and report any side effects. Patient will contact this office, call 911 or present to the nearest emergency room should suicidal or homicidal ideations occur. Provide Cognitive Behavioral Therapy and Integrative Therapy to improve functioning, maintain stability, and avoid decompensation and the need for higher level of care.          Return in about 1 month (around 6/22/2018).      This document signed by PABLO Burnett, [facility] May 22, 2018 1:35 PM

## 2018-05-24 ENCOUNTER — HOSPITAL ENCOUNTER (OUTPATIENT)
Dept: BONE DENSITY | Facility: HOSPITAL | Age: 58
Discharge: HOME | End: 2018-05-24
Admitting: PHYSICIAN ASSISTANT

## 2018-05-24 DIAGNOSIS — E89.41 SYMPTOMATIC STATES ASSOCIATED WITH ARTIFICIAL MENOPAUSE: ICD-10-CM

## 2018-05-24 PROCEDURE — 77080 DXA BONE DENSITY AXIAL: CPT

## 2018-05-24 PROCEDURE — 77080 DXA BONE DENSITY AXIAL: CPT | Performed by: RADIOLOGY

## 2018-06-08 ENCOUNTER — TRANSCRIBE ORDERS (OUTPATIENT)
Dept: ADMINISTRATIVE | Facility: HOSPITAL | Age: 58
End: 2018-06-08

## 2018-06-08 DIAGNOSIS — M54.5 LOW BACK PAIN, UNSPECIFIED BACK PAIN LATERALITY, UNSPECIFIED CHRONICITY, WITH SCIATICA PRESENCE UNSPECIFIED: Primary | ICD-10-CM

## 2018-06-09 ENCOUNTER — HOSPITAL ENCOUNTER (OUTPATIENT)
Dept: MRI IMAGING | Facility: HOSPITAL | Age: 58
Discharge: HOME | End: 2018-06-09
Admitting: PHYSICIAN ASSISTANT

## 2018-06-09 DIAGNOSIS — M54.5 LOW BACK PAIN, UNSPECIFIED BACK PAIN LATERALITY, UNSPECIFIED CHRONICITY, WITH SCIATICA PRESENCE UNSPECIFIED: ICD-10-CM

## 2018-06-09 PROCEDURE — 72148 MRI LUMBAR SPINE W/O DYE: CPT

## 2018-06-09 PROCEDURE — 72148 MRI LUMBAR SPINE W/O DYE: CPT | Performed by: RADIOLOGY

## 2018-06-11 DIAGNOSIS — F41.3 OTHER MIXED ANXIETY DISORDERS: ICD-10-CM

## 2018-06-11 RX ORDER — LORAZEPAM 0.5 MG/1
0.5 TABLET ORAL 2 TIMES DAILY PRN
Qty: 60 TABLET | Refills: 0 | Status: SHIPPED | OUTPATIENT
Start: 2018-06-11 | End: 2018-07-16 | Stop reason: SDUPTHER

## 2018-06-26 ENCOUNTER — OFFICE VISIT (OUTPATIENT)
Dept: PSYCHIATRY | Facility: CLINIC | Age: 58
End: 2018-06-26

## 2018-06-26 DIAGNOSIS — F34.1 DYSTHYMIA: ICD-10-CM

## 2018-06-26 DIAGNOSIS — F41.3 OTHER MIXED ANXIETY DISORDERS: Primary | ICD-10-CM

## 2018-06-26 PROCEDURE — 90834 PSYTX W PT 45 MINUTES: CPT | Performed by: COUNSELOR

## 2018-06-26 NOTE — PROGRESS NOTES
"Date of Service: June 26, 2018  Time In: 10:20 AM  Time Out: 11:00 AM      PROGRESS NOTE  Data:  Lexie Valdez is a 58 y.o. female who met with the undersigned for a regularly scheduled individual outpatient therapy session at the [facility].  She rated her anxiety and depression levels as a 6 on a scale from 1-10 where 10 is the most.  She reports medication compliance but wonders if her medications need to be adjusted.  She was out of her hormone medication for one month because of problems with getting an insurance [place].     HPI: Patient reports that she still feels depressed \"for no reason\".  She is getting along well with her family and they are financially stable even though she is still waiting for her SSI to be approved.  She can't find anything that she is interested in doing.  She frequently becomes tearful, has problems with concentration and wonders whether she is \"going crazy\".  Her sister would like her to exercise with her but she feels guilty for \"wasting gas\" doing something for herself.  She has been busy going to several doctors appointments.  She verbalized pride in her grandson's archery accomplishments.    Clinical Maneuvering/Intervention:  Assisted patient in processing above session content; acknowledged and normalized patient’s thoughts, feelings, and concerns.  Cognitive behavioral techniques were used to reframe distorted thoughts contributing to her distress.  The importance of self-care was stressed and the therapist insisted on a commitment to do one enjoyable thing each week.  She was told to discuss her medication concerns with the nurse practitioner during their next session.    Allowed patient to freely discuss issues without interruption or judgment. Provided safe, confidential environment to facilitate the development of positive therapeutic relationship and encourage open, honest communication. Assisted patient in identifying risk factors which would indicate the need for higher " level of care including thoughts to harm self or others and/or self-harming behavior and encouraged patient to contact this office, call 911, or present to the nearest emergency room should any of these events occur. Discussed crisis intervention services and means to access.  Patient adamantly and convincingly denies current suicidal or homicidal ideation or perceptual disturbance.    Assessment     Diagnoses and all orders for this visit:    Other mixed anxiety disorders    Dysthymia               Mental Status Exam  Hygiene:  good  Dress:  casual  Attitude:  Cooperative  Motor Activity:  Appropriate  Speech:  Normal  Mood:  within normal limits  Affect:  calm and pleasant  Thought Processes:  Goal directed  Thought Content:  normal  Suicidal Thoughts:  denies  Homicidal Thoughts:  denies  Crisis Safety Plan: yes, to come to the emergency room.  Hallucinations:  denies    Patient's Support Network Includes:  , son and extended family    Progress toward goal: Not at goal    Functional Status: Moderate impairment     Prognosis: Good with Ongoing Treatment     Plan         Patient will adhere to medication regimen as prescribed and report any side effects. Patient will contact this office, call 911 or present to the nearest emergency room should suicidal or homicidal ideations occur. Provide Cognitive Behavioral Therapy and Integrative Therapy to improve functioning, maintain stability, and avoid decompensation and the need for higher level of care.          Return in about 1 month (around 7/26/2018).      This document signed by PABLO Burnett, [facility] June 26, 2018 11:26 AM

## 2018-07-09 ENCOUNTER — HOSPITAL ENCOUNTER (OUTPATIENT)
Dept: MAMMOGRAPHY | Facility: HOSPITAL | Age: 58
Discharge: HOME | End: 2018-07-09
Admitting: PHYSICIAN ASSISTANT

## 2018-07-09 DIAGNOSIS — Z12.39 BREAST CANCER SCREENING: ICD-10-CM

## 2018-07-09 PROCEDURE — 77067 SCR MAMMO BI INCL CAD: CPT

## 2018-07-09 PROCEDURE — 77067 SCR MAMMO BI INCL CAD: CPT | Performed by: RADIOLOGY

## 2018-07-09 PROCEDURE — 77063 BREAST TOMOSYNTHESIS BI: CPT | Performed by: RADIOLOGY

## 2018-07-09 PROCEDURE — 77063 BREAST TOMOSYNTHESIS BI: CPT

## 2018-07-16 DIAGNOSIS — F41.3 OTHER MIXED ANXIETY DISORDERS: ICD-10-CM

## 2018-07-17 RX ORDER — LORAZEPAM 0.5 MG/1
0.5 TABLET ORAL 2 TIMES DAILY PRN
Qty: 60 TABLET | Refills: 0 | Status: SHIPPED | OUTPATIENT
Start: 2018-07-17 | End: 2018-08-20 | Stop reason: SDUPTHER

## 2018-07-17 RX ORDER — HYDROXYZINE HYDROCHLORIDE 10 MG/1
10 TABLET, FILM COATED ORAL 3 TIMES DAILY PRN
Qty: 45 TABLET | Refills: 1 | Status: SHIPPED | OUTPATIENT
Start: 2018-07-17 | End: 2018-08-20 | Stop reason: SDUPTHER

## 2018-07-23 ENCOUNTER — APPOINTMENT (OUTPATIENT)
Dept: PHYSICAL THERAPY | Facility: HOSPITAL | Age: 58
End: 2018-07-23

## 2018-07-23 ENCOUNTER — HOSPITAL ENCOUNTER (EMERGENCY)
Facility: HOSPITAL | Age: 58
Discharge: HOME | End: 2018-07-23
Attending: INTERNAL MEDICINE | Admitting: INTERNAL MEDICINE

## 2018-07-23 ENCOUNTER — APPOINTMENT (OUTPATIENT)
Dept: GENERAL RADIOLOGY | Facility: HOSPITAL | Age: 58
End: 2018-07-23

## 2018-07-23 VITALS
BODY MASS INDEX: 33.63 KG/M2 | DIASTOLIC BLOOD PRESSURE: 74 MMHG | HEIGHT: 64 IN | RESPIRATION RATE: 18 BRPM | TEMPERATURE: 98 F | SYSTOLIC BLOOD PRESSURE: 128 MMHG | HEART RATE: 90 BPM | WEIGHT: 197 LBS | OXYGEN SATURATION: 99 %

## 2018-07-23 DIAGNOSIS — S20.212A RIB CONTUSION, LEFT, INITIAL ENCOUNTER: Primary | ICD-10-CM

## 2018-07-23 PROCEDURE — 71101 X-RAY EXAM UNILAT RIBS/CHEST: CPT

## 2018-07-23 PROCEDURE — 99283 EMERGENCY DEPT VISIT LOW MDM: CPT

## 2018-07-23 PROCEDURE — 71101 X-RAY EXAM UNILAT RIBS/CHEST: CPT | Performed by: RADIOLOGY

## 2018-07-23 RX ORDER — HYDROCODONE BITARTRATE AND ACETAMINOPHEN 5; 325 MG/1; MG/1
1 TABLET ORAL ONCE
Status: COMPLETED | OUTPATIENT
Start: 2018-07-23 | End: 2018-07-23

## 2018-07-23 RX ORDER — IBUPROFEN 600 MG/1
600 TABLET ORAL ONCE
Status: COMPLETED | OUTPATIENT
Start: 2018-07-23 | End: 2018-07-23

## 2018-07-23 RX ORDER — HYDROCODONE BITARTRATE AND ACETAMINOPHEN 5; 325 MG/1; MG/1
1 TABLET ORAL 4 TIMES DAILY PRN
Qty: 12 TABLET | Refills: 0 | Status: SHIPPED | OUTPATIENT
Start: 2018-07-23 | End: 2018-08-20

## 2018-07-23 RX ADMIN — HYDROCODONE BITARTRATE AND ACETAMINOPHEN 1 TABLET: 5; 325 TABLET ORAL at 13:28

## 2018-07-23 RX ADMIN — IBUPROFEN 600 MG: 600 TABLET ORAL at 13:28

## 2018-07-31 ENCOUNTER — TRANSCRIBE ORDERS (OUTPATIENT)
Dept: PHYSICAL THERAPY | Facility: HOSPITAL | Age: 58
End: 2018-07-31

## 2018-07-31 ENCOUNTER — HOSPITAL ENCOUNTER (OUTPATIENT)
Dept: PHYSICAL THERAPY | Facility: HOSPITAL | Age: 58
Setting detail: THERAPIES SERIES
Discharge: HOME | End: 2018-07-31

## 2018-07-31 DIAGNOSIS — M54.5 LOW BACK PAIN, UNSPECIFIED BACK PAIN LATERALITY, UNSPECIFIED CHRONICITY, WITH SCIATICA PRESENCE UNSPECIFIED: Primary | ICD-10-CM

## 2018-07-31 DIAGNOSIS — M79.18 RIGHT BUTTOCK PAIN: ICD-10-CM

## 2018-07-31 PROCEDURE — 97161 PT EVAL LOW COMPLEX 20 MIN: CPT | Performed by: PHYSICAL THERAPIST

## 2018-08-02 ENCOUNTER — HOSPITAL ENCOUNTER (OUTPATIENT)
Dept: PHYSICAL THERAPY | Facility: HOSPITAL | Age: 58
Setting detail: THERAPIES SERIES
Discharge: HOME | End: 2018-08-02

## 2018-08-02 DIAGNOSIS — M79.18 RIGHT BUTTOCK PAIN: Primary | ICD-10-CM

## 2018-08-02 DIAGNOSIS — M54.5 LOW BACK PAIN, UNSPECIFIED BACK PAIN LATERALITY, UNSPECIFIED CHRONICITY, WITH SCIATICA PRESENCE UNSPECIFIED: ICD-10-CM

## 2018-08-02 PROCEDURE — G0283 ELEC STIM OTHER THAN WOUND: HCPCS | Performed by: PHYSICAL THERAPIST

## 2018-08-02 PROCEDURE — 97110 THERAPEUTIC EXERCISES: CPT | Performed by: PHYSICAL THERAPIST

## 2018-08-02 PROCEDURE — 97035 APP MDLTY 1+ULTRASOUND EA 15: CPT | Performed by: PHYSICAL THERAPIST

## 2018-08-02 NOTE — THERAPY TREATMENT NOTE
Outpatient Physical Therapy Ortho Treatment Note   Alex     Patient Name: Lexie Valdez  : 1960  MRN: [id number]  Today's Date: 2018      Visit Date: 2018    Visit Dx:    ICD-10-CM ICD-9-CM   1. Right buttock pain M79.1 729.1   2. Low back pain, unspecified back pain laterality, unspecified chronicity, with sciatica presence unspecified M54.5 724.2       There is no problem list on file for this patient.       Past Medical History:   Diagnosis Date   • Anxiety    • Arthritis    • Depression         Past Surgical History:   Procedure Laterality Date   • GALLBLADDER SURGERY     • HYSTERECTOMY      age 37             PT Ortho     Row Name 18 0900       Subjective Comments    Subjective Comments The patient reported moderate low back and right leg pain prior to today's treatment session.  -AD       Subjective Pain    Able to rate subjective pain? yes  -AD    Pre-Treatment Pain Level 5  -AD    Post-Treatment Pain Level 0  -AD    Row Name 18 1300       Subjective Pain    Subjective Pain Comment Pt denies any recent weight loss/gain and denies any changes with bowel or bladder.  Pt reports last Thursday had steriod injections in both hips at her doctor's appt.  Reports believes it is helping.   -CC       Posture/Observations    Posture/Observations Comments In standing demonstrates forward head, rounded shoulder posture, lists to her left side.   -CC       Quarter Clearing    Quarter Clearing Lower Quarter Clearing  -CC       DTR- Lower Quarter Clearing    Patellar tendon (L2-4) Bilateral:;3- Slightly hyperactive response  -CC    Achilles tendon (S1-2) Bilateral:;2- Normal response  -CC       Neural Tension Signs- Lower Quarter Clearing    Slump Bilateral:;Positive  -CC    Well Slump Bilateral:;Negative  -CC    SLR Bilateral:;Negative  -CC       Sensory Screen for Light Touch- Lower Quarter Clearing    L3 (distal anterior thigh) Bilateral:;Intact  -CC    L4 (medial lower leg/foot)  Bilateral:;Intact  -CC    L5 (lateral lower leg/great toe) Bilateral:;Intact  -CC    S1 (bottom of foot) Bilateral:;Intact  -CC       Lumbar ROM Screen- Lower Quarter Clearing    Lumbar Flexion Impaired   mild L) rib hump,  limited by grossly 50%  -CC    Lumbar Extension Impaired   c/o LBP, limited by 50%  -CC    Lumbar Lateral Flexion Impaired   c/o LBP,  limited grossly by 75%  -CC    Lumbar Rotation Impaired   increase c/o LBP, limited grossly by 50%  -CC       SI/Hip Screen- Lower Quarter Clearing    Conor's/Storm's test Bilateral:;Positive  -CC       Special Tests/Palpation    Special Tests/Palpation Lumbar/SI;Hip  -CC       Lumbosacral Palpation    SI Bilateral:;Tender  -CC    Spinous Process Tender   2+ palpable tenderness L-spine spinous processes  -CC    Erector Spinae (Paraspinals) Bilateral:;Tender;Guarded/taut  -CC    Lumbosacral Palpation? Yes   2+ palpable tenderness B) greater trochanter  -CC       Hip/Thigh Palpation    Greater Trochanter Bilateral:;Tender  -CC    ITB Bilateral:;Tender  -CC    Hip/Thigh Palpation? Yes  -CC       Hip Special Tests    CONOR (hip vs SI pathology) Bilateral:;Positive   increase c/o pain with special tests  -CC    Hip scour test (labral vs hip pathology) Bilateral:;Positive  -CC       General ROM    GENERAL ROM COMMENTS B) LE knee and ankle WNL,  Hip flexion AROM  R) 0 to 75 degrees c/o pain  L) 0 to 80 degrees with c/o pain.   -CC       MMT (Manual Muscle Testing)    Additional Documentation General Assessment (Manual Muscle Testing) (Group)  -       General Assessment (Manual Muscle Testing)    Comment, General Manual Muscle Testing (MMT) Assessment B) hip flexors: 4-/5 with c/o LBP with testing   B) hip adduction and abduction (sitting) 4+/5 with c/o pain,    Quad: R) 4+/5   L) 4-/5 with c/o pain  B) hamstrings: 4/5 with c/o pain, B) ankle DF/PF: 5/5   B) Great toe ext: 5/5  -      User Key  (r) = Recorded By, (t) = Taken By, (c) = Cosigned By    Initials Name  Provider Type    Tabitha Maya, PT Physical Therapist    AD Dalton, Ashley Claudene, PT Physical Therapist                            PT Assessment/Plan     Row Name 08/02/18 0944          PT Assessment    Assessment Comments Today's session was initiated with moist heat combined with IFC to the lumbar region in the seated position. Therapeutic exercises addressed lumbar ROM, strength, and core stability. Tactile and verbal cues were required for proper form. The session concluded with therapeutic ultrasound to the right paraspinals. No skin irritation was observed following modalities. The patient tolerated the session well, with reports of 0/10 low back pain following the session. She will be progressed as tolerated.  -AD        PT Plan    PT Plan Comments Progress as tolerated per POC.  -AD       User Key  (r) = Recorded By, (t) = Taken By, (c) = Cosigned By    Initials Name Provider Type    AD Dalton, Ashley Claudene, PT Physical Therapist                Modalities     Row Name 08/02/18 0900             Moist Heat    MH Applied Yes   With IFC, no skin irritation observed.  -AD      Location lumbar  -AD      Rx Minutes 15 mins  -AD      MH Prior to Rx Yes  -AD         Ultrasound 20861    Location Right lumbar paraspinals   No skin irritation observed.  -AD      Duty Cycle 50  -AD      Frequency --   3.3 MHz  -AD      Intensity - Wts/cm 1.2  -AD      60200 - PT Ultrasound Minutes 8  -AD         ELECTRICAL STIMULATION    Attended/Unattended Unattended  -AD      Stimulation Type IFC  -AD      Max mAmp --   Per pt tolerance.  -AD      Location/Electrode Placement/Other Lumbar  -AD      08855 - PT Electrical Stimulation (Manual) Minutes --   Unattended with moist heat for 15 minutes.  -AD        User Key  (r) = Recorded By, (t) = Taken By, (c) = Cosigned By    Initials Name Provider Type    AD Dalton, Ashley Claudene, PT Physical Therapist                Exercises     Row Name 08/02/18 0906              Subjective Comments    Subjective Comments The patient reported moderate low back and right leg pain prior to today's treatment session.  -AD         Subjective Pain    Able to rate subjective pain? yes  -AD      Pre-Treatment Pain Level 5  -AD      Post-Treatment Pain Level 0  -AD         Total Minutes    44449 - PT Therapeutic Exercise Minutes 31  -AD         Exercise 1    Exercise Name 1 PPT, GS, SKTC, LTR, ball squeeze, SAQ, LAQ, seated march, RTB knee flexion, RTB hip abduction.  -AD      Cueing 1 Verbal;Tactile;Demo  -AD      Sets 1 2  -AD      Reps 1 10  -AD      Time 1 31 minutes  -AD        User Key  (r) = Recorded By, (t) = Taken By, (c) = Cosigned By    Initials Name Provider Type    AD Dalton, Ashley Claudene, PT Physical Therapist                             Therapy Education  Given: HEP  Program: Reinforced  How Provided: Verbal, Demonstration  Provided to: Patient  Level of Understanding: Verbalized              Time Calculation:   Start Time: 0854  Stop Time: 0952  Time Calculation (min): 58 min  Therapy Suggested Charges     Code   Minutes Charges    03091 (CPT®) Hc Pt Neuromusc Re Education Ea 15 Min      75020 (CPT®) Hc Pt Ther Proc Ea 15 Min 31 2    62462 (CPT®) Hc Gait Training Ea 15 Min      74959 (CPT®) Hc Pt Therapeutic Act Ea 15 Min      01929 (CPT®) Hc Pt Manual Therapy Ea 15 Min      90143 (CPT®) Hc Pt Ther Massage- Per 15 Min      92342 (CPT®) Hc Pt Iontophoresis Ea 15 Min      50704 (CPT®) Hc Pt Elec Stim Ea-Per 15 Min      68776 (CPT®) Hc Pt Ultrasound Ea 15 Min 8 1    31235 (CPT®) Hc Pt Self Care/Mgmt/Train Ea 15 Min      Total  39 3        Therapy Charges for Today     Code Description Service Date Service Provider Modifiers Qty    07831268585 HC PT THER PROC EA 15 MIN 8/2/2018 Dalton, Ashley Claudene, PT GP 2    36995389454 HC PT ULTRASOUND EA 15 MIN 8/2/2018 Dalton, Ashley Claudene, PT GP 1    14068489936 HC PT ELECTRICAL STIM UNATTENDED 8/2/2018 Dalton, Ashley Claudene, PT  1                     Ashley Claudene Dalton, PT  8/2/2018

## 2018-08-07 ENCOUNTER — HOSPITAL ENCOUNTER (OUTPATIENT)
Dept: PHYSICAL THERAPY | Facility: HOSPITAL | Age: 58
Setting detail: THERAPIES SERIES
Discharge: HOME | End: 2018-08-07

## 2018-08-07 DIAGNOSIS — M79.18 RIGHT BUTTOCK PAIN: Primary | ICD-10-CM

## 2018-08-07 DIAGNOSIS — M54.5 LOW BACK PAIN, UNSPECIFIED BACK PAIN LATERALITY, UNSPECIFIED CHRONICITY, WITH SCIATICA PRESENCE UNSPECIFIED: ICD-10-CM

## 2018-08-07 PROCEDURE — 97110 THERAPEUTIC EXERCISES: CPT

## 2018-08-07 PROCEDURE — G0283 ELEC STIM OTHER THAN WOUND: HCPCS

## 2018-08-07 PROCEDURE — 97035 APP MDLTY 1+ULTRASOUND EA 15: CPT

## 2018-08-07 NOTE — THERAPY TREATMENT NOTE
Outpatient Physical Therapy Ortho Treatment Note   Alex     Patient Name: Lexie Valdez  : 1960  MRN: [id number]  Today's Date: 2018      Visit Date: 2018    Visit Dx:    ICD-10-CM ICD-9-CM   1. Right buttock pain M79.1 729.1   2. Low back pain, unspecified back pain laterality, unspecified chronicity, with sciatica presence unspecified M54.5 724.2       There is no problem list on file for this patient.       Past Medical History:   Diagnosis Date   • Anxiety    • Arthritis    • Depression         Past Surgical History:   Procedure Laterality Date   • GALLBLADDER SURGERY     • HYSTERECTOMY      age 37             PT Ortho     Row Name 18 1000       Subjective Comments    Subjective Comments Patient states that she is having soreness on both side of her back. Patient reports of having weakness in both of her legs. Patient states that she has a f/u appt with her referring MD on .  -AC       Subjective Pain    Able to rate subjective pain? yes  -AC    Pre-Treatment Pain Level 4  -AC      User Key  (r) = Recorded By, (t) = Taken By, (c) = Cosigned By    Initials Name Provider Type    Brianna Amaya PTA Physical Therapy Assistant                            PT Assessment/Plan     Row Name 18 1034          PT Assessment    Assessment Comments Patient tolerated treatment session well with rest breaks taken as needed by the patient. Educated patient to perform ther-ex per her tolerance, patient verbalized understanding. No adverse reactions with modalities or treatment session. Decrease in pain norted following treatment session. Ultrasound performed on the B) lumbar musculature to help decrease tightness and pain.  -AC        PT Plan    PT Plan Comments Continue per PT's POC, progress per the patient's tolerance.  -AC       User Key  (r) = Recorded By, (t) = Taken By, (c) = Cosigned By    Initials Name Provider Type    Brianna Amaya PTA Physical Therapy  Assistant                Modalities     Row Name 08/07/18 1000             Moist Heat    MH Applied Yes   No redness noted following moist heat  -AC      Location lumbar  -AC      Rx Minutes 15 mins  -AC      MH Prior to Rx Yes  -AC         Ultrasound 48344    Location B) lumbar paraspinals  -AC      Duty Cycle 50  -AC      Frequency --   3.3MHz  -AC      Intensity - Wts/cm 1.2  -AC      07627 - PT Ultrasound Minutes 8  -AC         ELECTRICAL STIMULATION    Attended/Unattended Unattended   No irritation noted following estim  -AC      Stimulation Type IFC  -AC      Max mAmp --   per the patient's tolerance, 15 minutes with MH  -AC      Location/Electrode Placement/Other Lumbar  -AC        User Key  (r) = Recorded By, (t) = Taken By, (c) = Cosigned By    Initials Name Provider Type    Brianna Amaya PTA Physical Therapy Assistant                Exercises     Row Name 08/07/18 1000             Subjective Comments    Subjective Comments Patient states that she is having soreness on both side of her back. Patient reports of having weakness in both of her legs. Patient states that she has a f/u appt with her referring MD on 8/23.  -AC         Subjective Pain    Able to rate subjective pain? yes  -AC      Pre-Treatment Pain Level 4  -AC         Total Minutes    03987 - PT Therapeutic Exercise Minutes 30  -AC         Exercise 1    Exercise Name 1 PPT 10x2, GS 20x2, SKTC 20 sec hold x3, LTR 15x2, supine ball squeeze 10x2, SAQ 10x2, supine clams 10x2, seated march 10x2, piriformis stretch 20 sec hold x3,   -AC      Cueing 1 Verbal;Tactile;Demo  -AC      Time 1 30 minutes  -AC        User Key  (r) = Recorded By, (t) = Taken By, (c) = Cosigned By    Initials Name Provider Type    Brianna Amaya PTA Physical Therapy Assistant                             Therapy Education  Given: HEP, Symptoms/condition management, Pain management, Posture/body mechanics  Program: New  How Provided: Demonstration,  Verbal  Provided to: Patient  Level of Understanding: Verbalized, Demonstrated              Time Calculation:   Start Time: 0945  Stop Time: 1040  Time Calculation (min): 55 min  Therapy Suggested Charges     Code   Minutes Charges    88504 (CPT®) Hc Pt Neuromusc Re Education Ea 15 Min      93820 (CPT®) Hc Pt Ther Proc Ea 15 Min 30 2    97718 (CPT®) Hc Gait Training Ea 15 Min      20535 (CPT®) Hc Pt Therapeutic Act Ea 15 Min      61172 (CPT®) Hc Pt Manual Therapy Ea 15 Min      97661 (CPT®) Hc Pt Ther Massage- Per 15 Min      18181 (CPT®) Hc Pt Iontophoresis Ea 15 Min      66918 (CPT®) Hc Pt Elec Stim Ea-Per 15 Min      21067 (CPT®) Hc Pt Ultrasound Ea 15 Min 8 1    89079 (CPT®) Hc Pt Self Care/Mgmt/Train Ea 15 Min      Total  38 3        Therapy Charges for Today     Code Description Service Date Service Provider Modifiers Qty    93557128579 HC PT THER PROC EA 15 MIN 8/7/2018 Brianna Cevallos, PTA GP 2    92370810730 HC PT ULTRASOUND EA 15 MIN 8/7/2018 Brianna Cevallos, PTA GP 1    63443838060 HC PT ELECTRICAL STIM UNATTENDED 8/7/2018 Brianna Cevallos, PTA  1                    Brianna Cevallos PTA  8/7/2018

## 2018-08-09 ENCOUNTER — HOSPITAL ENCOUNTER (OUTPATIENT)
Dept: PHYSICAL THERAPY | Facility: HOSPITAL | Age: 58
Setting detail: THERAPIES SERIES
Discharge: HOME | End: 2018-08-09

## 2018-08-09 DIAGNOSIS — M79.18 RIGHT BUTTOCK PAIN: Primary | ICD-10-CM

## 2018-08-09 DIAGNOSIS — M54.5 LOW BACK PAIN, UNSPECIFIED BACK PAIN LATERALITY, UNSPECIFIED CHRONICITY, WITH SCIATICA PRESENCE UNSPECIFIED: ICD-10-CM

## 2018-08-09 PROCEDURE — 97140 MANUAL THERAPY 1/> REGIONS: CPT

## 2018-08-09 PROCEDURE — 97110 THERAPEUTIC EXERCISES: CPT

## 2018-08-09 PROCEDURE — G0283 ELEC STIM OTHER THAN WOUND: HCPCS

## 2018-08-13 ENCOUNTER — OFFICE VISIT (OUTPATIENT)
Dept: PSYCHIATRY | Facility: CLINIC | Age: 58
End: 2018-08-13

## 2018-08-13 DIAGNOSIS — F41.1 GENERALIZED ANXIETY DISORDER: ICD-10-CM

## 2018-08-13 DIAGNOSIS — F34.1 DYSTHYMIA: Primary | ICD-10-CM

## 2018-08-13 PROCEDURE — 90832 PSYTX W PT 30 MINUTES: CPT | Performed by: COUNSELOR

## 2018-08-13 NOTE — TREATMENT PLAN
Multi-Disciplinary Problems (from Behavioral Health Treatment Plan)    Active Problems     Problem: Anxiety  Start Date: 08/13/18    Problem Details:  The patient self-scales this problem as a 10 with 10 being the worst.       Goal Priority Start Date Expected End Date End Date    Patient will develop and implement behavioral and cognitive strategies to reduce anxiety and irrational fears. -- 08/13/18 08/13/19 --    Goal Details:  Progress toward goal:  The patient self-scales their progress related to this goal as a 6 with 10 being the worst.       Goal Intervention Frequency Start Date End Date    Help patient explore past emotional issues in relation to present anxiety. PRN 08/13/18 08/13/19    Intervention Details:  Duration of treatment until until remission of symptoms.       Goal Intervention Frequency Start Date End Date    Help patient develop an awareness of their cognitive and physical responses to anxiety. PRN 08/13/18 08/13/19    Intervention Details:  Duration of treatment until until remission of symptoms.             Problem: Depression  Start Date: 08/13/18    Problem Details:  The patient self-scales this problem as a 8 with 10 being the worst.       Goal Priority Start Date Expected End Date End Date    Patient will demonstrate the ability to initiate new constructive life skills outside of sessions on a consistent basis. -- 08/13/18 08/13/19 --    Goal Details:  Progress toward goal:  The patient self-scales their progress related to this goal as a 8 with 10 being the worst.       Goal Intervention Frequency Start Date End Date    Assist patient in setting attainable activities of daily living goals. PRN 08/13/18 08/13/19    Goal Intervention Frequency Start Date End Date    Provide education about depression PRN 08/13/18 08/13/19    Intervention Details:  Duration of treatment until until discharged.       Goal Intervention Frequency Start Date End Date    Assist patient in developing healthy  coping strategies. PRN 08/13/18 08/13/19    Intervention Details:  Duration of treatment until until discharged.             Problem: Medical Issue  Start Date: 08/13/18    Problem Details:  The patient self-scales this problem as a 8 with 10 being the worst.       Goal Priority Start Date Expected End Date End Date    Patient will verbalize emotional, cognitive and behavioral changes needed to address health issues. -- 08/13/18 08/13/19 --    Goal Details:  Progress toward goal:  The patient self-scales their progress related to this goal as a 6 with 10 being the worst.       Goal Intervention Frequency Start Date End Date    Reinforce emotional stability, behavioral responsibility and positive self talk that reduces risk to health. PRN 08/13/18 08/13/19    Intervention Details:  Duration of treatment until until discharged.                   Reviewed By     Angela Chisholm, [facility] 08/13/18 1003                 I have discussed and reviewed this treatment plan with the patient.  It has been printed for signatures.

## 2018-08-13 NOTE — PROGRESS NOTES
Date of Service: August 13, 2018  Time In: 9:15 AM  Time Out: 9:45 AM      PROGRESS NOTE  Data:  Lexie Valdez is a 58 y.o. female who met with the undersigned for a regularly scheduled individual outpatient therapy session at the [facility].  She rated her anxiety, depression and pain levels as a 5 on a scale from 1-10 where 10 is most.  She reports medication compliance.     HPI: Patient reports that she is feeling much better.  The medications are working well to treat her anxiety and depression.  They are still experiencing financial stress and her SSI has been denied 2 times.  She is excited about getting a new grandchild in April, 2019.  She has been going to physical therapy 2 times a week for her back and there has been some improvement.  She socializes with her sister and mother and has been busy at home cleaning out closets.  Her  schedule has slowed down so they have had more time to spend together.  She verbalized concern about a 50 pound weight gain since bariatric surgery but states that she can't get motivated to watch her diet or exercise.    Clinical Maneuvering/Intervention:  Assisted patient in processing above session content; acknowledged and normalized patient’s thoughts, feelings, and concerns.  She was praised for medication compliance and for using positive coping skills to manage her mood.  She was encouraged to begin exercising with a focus on relaxation and health instead of weight loss.    Allowed patient to freely discuss issues without interruption or judgment. Provided safe, confidential environment to facilitate the development of positive therapeutic relationship and encourage open, honest communication. Assisted patient in identifying risk factors which would indicate the need for higher level of care including thoughts to harm self or others and/or self-harming behavior and encouraged patient to contact this office, call 911, or present to the nearest emergency room  should any of these events occur. Discussed crisis intervention services and means to access.  Patient adamantly and convincingly denies current suicidal or homicidal ideation or perceptual disturbance.    Assessment     Diagnoses and all orders for this visit:    Dysthymia    Generalized anxiety disorder               Mental Status Exam  Hygiene:  good  Dress:  casual  Attitude:  Cooperative  Motor Activity:  Appropriate  Speech:  Normal  Mood:  within normal limits  Affect:  calm and pleasant  Thought Processes:  Goal directed  Thought Content:  normal  Suicidal Thoughts:  denies  Homicidal Thoughts:  denies  Crisis Safety Plan: yes, to come to the emergency room.  Hallucinations:  denies    Patient's Support Network Includes:  , son and extended family    Progress toward goal: At goal    Functional Status: Mild impairment     Prognosis: Good with Ongoing Treatment     Plan     Patient will adhere to medication regimen as prescribed and report any side effects. Patient will contact this office, call 911 or present to the nearest emergency room should suicidal or homicidal ideations occur. Provide Cognitive Behavioral Therapy and Integrative Therapy to improve functioning, maintain stability, and avoid decompensation and the need for higher level of care.          Return in about 3 months (around 11/13/2018).      This document signed by PABLO Burnett, [facility] August 13, 2018 9:55 AM

## 2018-08-14 ENCOUNTER — HOSPITAL ENCOUNTER (OUTPATIENT)
Dept: PHYSICAL THERAPY | Facility: HOSPITAL | Age: 58
Setting detail: THERAPIES SERIES
Discharge: HOME | End: 2018-08-14

## 2018-08-14 DIAGNOSIS — M79.18 RIGHT BUTTOCK PAIN: Primary | ICD-10-CM

## 2018-08-14 DIAGNOSIS — M54.5 LOW BACK PAIN, UNSPECIFIED BACK PAIN LATERALITY, UNSPECIFIED CHRONICITY, WITH SCIATICA PRESENCE UNSPECIFIED: ICD-10-CM

## 2018-08-14 PROCEDURE — 97140 MANUAL THERAPY 1/> REGIONS: CPT | Performed by: PHYSICAL THERAPIST

## 2018-08-14 PROCEDURE — G0283 ELEC STIM OTHER THAN WOUND: HCPCS | Performed by: PHYSICAL THERAPIST

## 2018-08-14 PROCEDURE — 97110 THERAPEUTIC EXERCISES: CPT | Performed by: PHYSICAL THERAPIST

## 2018-08-14 NOTE — THERAPY TREATMENT NOTE
Outpatient Physical Therapy Ortho Treatment Note   Alex     Patient Name: Lexie Valdez  : 1960  MRN: [id number]  Today's Date: 2018      Visit Date: 2018    Visit Dx:    ICD-10-CM ICD-9-CM   1. Right buttock pain M79.1 729.1   2. Low back pain, unspecified back pain laterality, unspecified chronicity, with sciatica presence unspecified M54.5 724.2       There is no problem list on file for this patient.       Past Medical History:   Diagnosis Date   • Anxiety    • Arthritis    • Depression         Past Surgical History:   Procedure Laterality Date   • GALLBLADDER SURGERY     • HYSTERECTOMY      age 37             PT Ortho     Row Name 18 0800       Subjective Comments    Subjective Comments Patient reports 7/10 pain today.  Patient reports that she has started experiencing tingling in the right LE, which extends to the ankle.  -BE       Subjective Pain    Able to rate subjective pain? yes  -BE    Pre-Treatment Pain Level 7  -BE    Post-Treatment Pain Level 4  -BE      User Key  (r) = Recorded By, (t) = Taken By, (c) = Cosigned By    Initials Name Provider Type    BE Bonita Browne, PT Physical Therapist                            PT Assessment/Plan     Row Name 18 0958          PT Assessment    Assessment Comments Patient session initiated with MH/ESTIM to assist with pain control; no adverse reactions noted with modalities at today's session.  Ther ex performed per flow sheet, with exercises focusing on stretching, core strengthening, and LE strengthening.  Soft tissue mobilization performed to the lumbar and hip musculature, with patient reporting tenderness at right lumbar paraspinals and right piriformis.  Patient reported decreased pain following therapy session, noting 7/10 pain pre-tx and 4/10 pain post-tx.  She will continue to be progressed per her tolerance and POC.  -BE        PT Plan    PT Plan Comments Progress per patient's tolerance and POC.  -BE       User  "Key  (r) = Recorded By, (t) = Taken By, (c) = Cosigned By    Initials Name Provider Type    BE Bonita Browne PT Physical Therapist                Modalities     Row Name 08/14/18 0800             Moist Heat    MH Applied Yes   no redness following MH  -BE      Location lumbar  -BE      Rx Minutes 15 mins  -BE      MH Prior to Rx Yes   w/ estim in seated position  -BE         ELECTRICAL STIMULATION    Attended/Unattended Unattended   no skin irritation observed following  -BE      Stimulation Type IFC  -BE      Max mAmp --   as to pt's tolerance  -BE      Location/Electrode Placement/Other Lumbar   15 min  -BE        User Key  (r) = Recorded By, (t) = Taken By, (c) = Cosigned By    Initials Name Provider Type    BE Bonita Browne PT Physical Therapist                Exercises     Row Name 08/14/18 0900 08/14/18 0800          Subjective Comments    Subjective Comments  -- Patient reports 7/10 pain today.  Patient reports that she has started experiencing tingling in the right LE, which extends to the ankle.  -BE        Subjective Pain    Able to rate subjective pain?  -- yes  -BE     Pre-Treatment Pain Level  -- 7  -BE     Post-Treatment Pain Level  -- 4  -BE        Total Minutes    92018 - PT Therapeutic Exercise Minutes  -- 26  -BE     12326 - PT Manual Therapy Minutes 15  -BE  --        Exercise 1    Exercise Name 1  -- LTR 15x2, SKTC 3x20\", piriformis stretch (not crossing leg over) 3x20\", PPT 10x2, GS 15x2, supine clams 2x10, SAQ 10x3, ball squeeze 2x10  -BE     Cueing 1  -- Verbal;Tactile;Demo  -BE     Time 1  -- 26 min  -BE       User Key  (r) = Recorded By, (t) = Taken By, (c) = Cosigned By    Initials Name Provider Type    BE Bonita Browne PT Physical Therapist                        Manual Rx (last 36 hours)      Manual Treatments     Row Name 08/14/18 0900             Total Minutes    74793 - PT Manual Therapy Minutes 15  -BE         Manual Rx 1    Manual Rx 1 Location Lumbar, hip  -BE   "    Manual Rx 1 Type STM  -BE      Manual Rx 1 Grade gentle, as to pt's tolerance  -BE      Manual Rx 1 Duration 15 min  -BE        User Key  (r) = Recorded By, (t) = Taken By, (c) = Cosigned By    Initials Name Provider Type    BE Bonita Browne PT Physical Therapist              Therapy Education  Given: HEP, Symptoms/condition management, Pain management, Posture/body mechanics  Program: Reinforced  How Provided: Verbal, Demonstration  Provided to: Patient  Level of Understanding: Verbalized, Demonstrated              Time Calculation:   Start Time: 0852  Stop Time: 0951  Time Calculation (min): 59 min  Therapy Suggested Charges     Code   Minutes Charges    89143 (CPT®) Hc Pt Neuromusc Re Education Ea 15 Min      65375 (CPT®) Hc Pt Ther Proc Ea 15 Min 26 2    91950 (CPT®) Hc Gait Training Ea 15 Min      47659 (CPT®) Hc Pt Therapeutic Act Ea 15 Min      46163 (CPT®) Hc Pt Manual Therapy Ea 15 Min 15 1    99968 (CPT®) Hc Pt Ther Massage- Per 15 Min      03335 (CPT®) Hc Pt Iontophoresis Ea 15 Min      13468 (CPT®) Hc Pt Elec Stim Ea-Per 15 Min      45518 (CPT®) Hc Pt Ultrasound Ea 15 Min      64227 (CPT®) Hc Pt Self Care/Mgmt/Train Ea 15 Min      89603 (CPT®) Hc Pt Prosthetic (S) Train Initial Encounter, Each 15 Min      09619 (CPT®) Hc Orthotic(S) Mgmt/Train Initial Encounter, Each 15min      36981 (CPT®) Hc Pt Aquatic Therapy Ea 15 Min      85300 (CPT®) Hc Pt Orthotic(S)/Prosthetic(S) Encounter, Each 15 Min      Total  41 3        Therapy Charges for Today     Code Description Service Date Service Provider Modifiers Qty    45574777805 HC PT THER PROC EA 15 MIN 8/14/2018 Bonita Browne, PT GP 2    13592897825 HC PT MANUAL THERAPY EA 15 MIN 8/14/2018 Bonita Browne, PT GP 1    52045833188 HC PT ELECTRICAL STIM UNATTENDED 8/14/2018 Bonita Browne, PT  1                    Bonita Browne PT  8/14/2018

## 2018-08-16 ENCOUNTER — HOSPITAL ENCOUNTER (OUTPATIENT)
Dept: PHYSICAL THERAPY | Facility: HOSPITAL | Age: 58
Setting detail: THERAPIES SERIES
Discharge: HOME | End: 2018-08-16

## 2018-08-16 DIAGNOSIS — M79.18 RIGHT BUTTOCK PAIN: Primary | ICD-10-CM

## 2018-08-16 DIAGNOSIS — M54.5 LOW BACK PAIN, UNSPECIFIED BACK PAIN LATERALITY, UNSPECIFIED CHRONICITY, WITH SCIATICA PRESENCE UNSPECIFIED: ICD-10-CM

## 2018-08-16 PROCEDURE — G0283 ELEC STIM OTHER THAN WOUND: HCPCS | Performed by: PHYSICAL THERAPIST

## 2018-08-16 PROCEDURE — 97110 THERAPEUTIC EXERCISES: CPT | Performed by: PHYSICAL THERAPIST

## 2018-08-16 PROCEDURE — 97140 MANUAL THERAPY 1/> REGIONS: CPT | Performed by: PHYSICAL THERAPIST

## 2018-08-16 NOTE — THERAPY TREATMENT NOTE
Outpatient Physical Therapy Ortho Treatment Note   Alex     Patient Name: Lexie Valdez  : 1960  MRN: [id number]  Today's Date: 2018      Visit Date: 2018    Visit Dx:    ICD-10-CM ICD-9-CM   1. Right buttock pain M79.1 729.1   2. Low back pain, unspecified back pain laterality, unspecified chronicity, with sciatica presence unspecified M54.5 724.2       There is no problem list on file for this patient.       Past Medical History:   Diagnosis Date   • Anxiety    • Arthritis    • Depression         Past Surgical History:   Procedure Laterality Date   • GALLBLADDER SURGERY     • HYSTERECTOMY      age 37             PT Ortho     Row Name 18 0900       Subjective Comments    Subjective Comments Patient reports that she does not feel well today.  She notes 5/10 pain today.  -BE       Subjective Pain    Able to rate subjective pain? yes  -BE    Pre-Treatment Pain Level 5  -BE    Post-Treatment Pain Level 4  -BE    Row Name 18 0800       Subjective Comments    Subjective Comments Patient reports 7/10 pain today.  Patient reports that she has started experiencing tingling in the right LE, which extends to the ankle.  -BE       Subjective Pain    Able to rate subjective pain? yes  -BE    Pre-Treatment Pain Level 7  -BE    Post-Treatment Pain Level 4  -BE      User Key  (r) = Recorded By, (t) = Taken By, (c) = Cosigned By    Initials Name Provider Type    BE Bonita Browne, PT Physical Therapist                            PT Assessment/Plan     Row Name 18 1140          PT Assessment    Assessment Comments Patient tolerated today's session well, with reports of decreased pain following therapy.  Patient reported 5/10 pain pre-tx and 4/10 post-tx.  Session consisted of MH, ESTIM, ther ex, and manual therapy.  No adverse reactions were noted with modalities at today's session.  Patient progressed in ther ex to include increased  repetitions of ball squeeze and supine clams;  "supine march also added to ther ex program.  Patient will continue to be progressed per her tolerance and POC.  -BE        PT Plan    PT Plan Comments Progress per patient's tolerance and POC.  -BE       User Key  (r) = Recorded By, (t) = Taken By, (c) = Cosigned By    Initials Name Provider Type    BE Bonita Browne, PT Physical Therapist                Modalities     Row Name 08/16/18 0900             Moist Heat    MH Applied Yes   no redness following MH  -BE      Location lumbar  -BE      Rx Minutes 15 mins  -BE      MH Prior to Rx Yes   w/ estim in seated position  -BE         ELECTRICAL STIMULATION    Attended/Unattended Unattended   no skin irritation observed following  -BE      Stimulation Type IFC  -BE      Max mAmp --   as to pt's tolerance  -BE      Location/Electrode Placement/Other Lumbar   15 min  -BE        User Key  (r) = Recorded By, (t) = Taken By, (c) = Cosigned By    Initials Name Provider Type    BE Bonita Browne, PT Physical Therapist                Exercises     Row Name 08/16/18 1100 08/16/18 0900          Subjective Comments    Subjective Comments  -- Patient reports that she does not feel well today.  She notes 5/10 pain today.  -BE        Subjective Pain    Able to rate subjective pain?  -- yes  -BE     Pre-Treatment Pain Level  -- 5  -BE     Post-Treatment Pain Level  -- 4  -BE        Total Minutes    25245 - PT Therapeutic Exercise Minutes  -- 30  -BE     51026 - PT Manual Therapy Minutes 12  -BE  --        Exercise 1    Exercise Name 1  -- LTR 15x2, SKTC 3x20\", piriformis stretch (not crossing leg over) 3x20\", PPT 10x2, GS 15x2, supine clams 2x15, SAQ 10x3, ball squeeze 25x, LAQ 2x10, Supine march 2x10  -BE     Cueing 1  -- Verbal;Tactile;Demo  -BE     Time 1  -- 30 min  -BE       User Key  (r) = Recorded By, (t) = Taken By, (c) = Cosigned By    Initials Name Provider Type    BE Bonita Browne, PT Physical Therapist                        Manual Rx (last 36 hours)    "   Manual Treatments     Row Name 08/16/18 1100             Total Minutes    49784 - PT Manual Therapy Minutes 12  -BE         Manual Rx 1    Manual Rx 1 Location Lumbar, hip  -BE      Manual Rx 1 Type STM  -BE      Manual Rx 1 Grade gentle, as to pt's tolerance  -BE      Manual Rx 1 Duration 12 min  -BE        User Key  (r) = Recorded By, (t) = Taken By, (c) = Cosigned By    Initials Name Provider Type    BE Bonita Browne PT Physical Therapist              Therapy Education  Given: HEP, Symptoms/condition management, Pain management, Posture/body mechanics  Program: Reinforced  How Provided: Verbal, Demonstration  Provided to: Patient  Level of Understanding: Verbalized, Demonstrated              Time Calculation:   Start Time: 0857  Stop Time: 0957  Time Calculation (min): 60 min  Therapy Suggested Charges     Code   Minutes Charges    33948 (CPT®) Hc Pt Neuromusc Re Education Ea 15 Min      62039 (CPT®) Hc Pt Ther Proc Ea 15 Min 30 2    96614 (CPT®) Hc Gait Training Ea 15 Min      76807 (CPT®) Hc Pt Therapeutic Act Ea 15 Min      80009 (CPT®) Hc Pt Manual Therapy Ea 15 Min 12 1    88026 (CPT®) Hc Pt Ther Massage- Per 15 Min      15483 (CPT®) Hc Pt Iontophoresis Ea 15 Min      45863 (CPT®) Hc Pt Elec Stim Ea-Per 15 Min      54704 (CPT®) Hc Pt Ultrasound Ea 15 Min      55934 (CPT®) Hc Pt Self Care/Mgmt/Train Ea 15 Min      62663 (CPT®) Hc Pt Prosthetic (S) Train Initial Encounter, Each 15 Min      87852 (CPT®) Hc Orthotic(S) Mgmt/Train Initial Encounter, Each 15min      98009 (CPT®) Hc Pt Aquatic Therapy Ea 15 Min      23988 (CPT®) Hc Pt Orthotic(S)/Prosthetic(S) Encounter, Each 15 Min      Total  42 3        Therapy Charges for Today     Code Description Service Date Service Provider Modifiers Qty    68532485705 HC PT THER PROC EA 15 MIN 8/16/2018 Bonita Browne, PT GP 2    55279261382 HC PT MANUAL THERAPY EA 15 MIN 8/16/2018 Bonita Browne, PT GP 1    11149389344 HC PT ELECTRICAL STIM UNATTENDED  8/16/2018 Bonita Browne, PT  1                    Bonita Browne, PT  8/16/2018

## 2018-08-20 ENCOUNTER — OFFICE VISIT (OUTPATIENT)
Dept: PSYCHIATRY | Facility: CLINIC | Age: 58
End: 2018-08-20

## 2018-08-20 VITALS
DIASTOLIC BLOOD PRESSURE: 78 MMHG | WEIGHT: 202 LBS | HEIGHT: 64 IN | BODY MASS INDEX: 34.49 KG/M2 | SYSTOLIC BLOOD PRESSURE: 124 MMHG | HEART RATE: 66 BPM

## 2018-08-20 DIAGNOSIS — F34.1 DYSTHYMIA: ICD-10-CM

## 2018-08-20 DIAGNOSIS — E66.9 OBESITY (BMI 30.0-34.9): ICD-10-CM

## 2018-08-20 DIAGNOSIS — F41.1 GENERALIZED ANXIETY DISORDER: Primary | ICD-10-CM

## 2018-08-20 PROCEDURE — 99213 OFFICE O/P EST LOW 20 MIN: CPT | Performed by: NURSE PRACTITIONER

## 2018-08-20 RX ORDER — HYDROXYZINE HYDROCHLORIDE 10 MG/1
10 TABLET, FILM COATED ORAL 3 TIMES DAILY PRN
Qty: 45 TABLET | Refills: 2 | Status: SHIPPED | OUTPATIENT
Start: 2018-08-20 | End: 2018-11-19

## 2018-08-20 RX ORDER — CITALOPRAM 20 MG/1
20 TABLET ORAL DAILY
COMMUNITY
End: 2018-08-20

## 2018-08-20 RX ORDER — LORAZEPAM 0.5 MG/1
0.5 TABLET ORAL 2 TIMES DAILY PRN
Qty: 60 TABLET | Refills: 0 | Status: SHIPPED | OUTPATIENT
Start: 2018-08-20 | End: 2018-09-08 | Stop reason: SDUPTHER

## 2018-08-20 RX ORDER — AMLODIPINE BESYLATE 5 MG/1
5 TABLET ORAL DAILY
COMMUNITY
End: 2023-03-21

## 2018-08-20 NOTE — PROGRESS NOTES
"Lalita Valdez is a 58 y.o. female who is here today for medication management follow up.    Chief Complaint:  \"I'm doing good\"    History of Present Illness   Patient presents appropriately dressed/groomed.  Patient has been medication compliant-denies any medication related side effects.  She reports mood has been stable - \"happy\".  She reports she is expecting a new grandchild she is very excited about that.  She denies any significant anxiety or stress.Rates anxiety a 0/10. Denies panic attacks.  No SI, HI, No A/V hallucinations.  Rates depression a 0/10.  Sleeping about 8 hours nightly but it is interrupted mainly due to having to urinate.  .  No OCD behaviors.  No periods of excessive energy and insomnia.    The following portions of the patient's history were reviewed and updated as appropriate: current medications, past family history, past medical history, past social history, past surgical history and problem list.  Maximus    Review of Systems   Constitutional: Positive for appetite change. Negative for fatigue.   Psychiatric/Behavioral: Negative for agitation. The patient is nervous/anxious.        Objective   Physical Exam   Constitutional: She appears well-developed and well-nourished. No distress.   Neurological: She is alert. Coordination and gait normal.   Vitals reviewed.    Blood pressure 124/78, pulse 66, height 162.6 cm (64.02\"), weight 91.6 kg (202 lb).    Allergies   Allergen Reactions   • Codeine    • Sulfa Antibiotics        Current Medications:   Current Outpatient Prescriptions   Medication Sig Dispense Refill   • amLODIPine (NORVASC) 5 MG tablet Take 5 mg by mouth Daily.     • citalopram (CeleXA) 20 MG tablet Take 20 mg by mouth Daily.     • diclofenac (VOLTAREN) 50 MG EC tablet Take 50 mg by mouth 2 (Two) Times a Day As Needed.     • hydrOXYzine (ATARAX) 10 MG tablet Take 1 tablet by mouth 3 (Three) Times a Day As Needed for Anxiety. 45 tablet 1   • LORazepam (ATIVAN) 0.5 MG " tablet Take 1 tablet by mouth 2 (Two) Times a Day As Needed for Anxiety. 60 tablet 0   • Multiple Vitamins-Minerals (PX MENS MULTIVITAMINS) tablet Take  by mouth Daily.     • sertraline (ZOLOFT) 50 MG tablet Take 2 tablets by mouth Every Night. 30 tablet 2     No current facility-administered medications for this visit.        Mental Status Exam:   Hygiene:   good  Cooperation:  Cooperative  Eye Contact:  Good  Psychomotor Behavior:  Appropriate  Affect:  Full range  Hopelessness: Denies  Speech:  Normal  Thought Process:  Linear  Thought Content:  Normal  Suicidal:  None  Homicidal:  None  Hallucinations:  None  Delusion:  None  Memory:  Intact  Orientation:  Person, Place, Time and Situation  Reliability:  good  Insight:  Good  Judgement:  Good  Impulse Control:  Good  Physical/Medical Issues:  No     Assessment/Plan   Diagnoses and all orders for this visit:    Generalized anxiety disorder  -     sertraline (ZOLOFT) 50 MG tablet; Take 2 tablets by mouth Every Night.  -     LORazepam (ATIVAN) 0.5 MG tablet; Take 1 tablet by mouth 2 (Two) Times a Day As Needed for Anxiety.  -     hydrOXYzine (ATARAX) 10 MG tablet; Take 1 tablet by mouth 3 (Three) Times a Day As Needed for Anxiety.    Obesity (BMI 30.0-34.9)    Dysthymia      SUPPORTIVE PSYCHOTHERAPY: continuing efforts to promote the therapeutic alliance, address the patient’s issues, and strengthen self awareness, insights, and coping skills.    Discused medications options.Again encouraged patient to monitor weighd intake.Discussed with patient increasing weight-encouraged healthly eating/exercise.     Discussed the risks, beneefits, and side effects of the medications; patient ackowledged and verbally consentedd.  Patient is aware to call the [facility] with any worsening of symptoms.  Patient is agreeable to call 911 or go to the nearest ER should he/she begin having SI/HI. Patient is being prescribed a controlled substance as part of treatment plan.  Patient has been educated of appropriate use of the medications, including risk of somnolence, limited ability to drive and/or work safely, and potential for dependence, respiratory depression and overdose. Patient is also informed that the medication are to be used by the patient only- avoid any combined use of ETOH or other substances unless prescribed. Continues to use Ativan as a rescue antianxiety only. Pt will return to clinic i [id number] sooner if needed        Errors in dictation may reflect use of voice recognition software and not all errors in transcription may have been detected prior to signing.

## 2018-08-21 ENCOUNTER — HOSPITAL ENCOUNTER (OUTPATIENT)
Dept: PHYSICAL THERAPY | Facility: HOSPITAL | Age: 58
Setting detail: THERAPIES SERIES
Discharge: HOME | End: 2018-08-21

## 2018-08-21 DIAGNOSIS — M79.18 RIGHT BUTTOCK PAIN: Primary | ICD-10-CM

## 2018-08-21 DIAGNOSIS — M54.5 LOW BACK PAIN, UNSPECIFIED BACK PAIN LATERALITY, UNSPECIFIED CHRONICITY, WITH SCIATICA PRESENCE UNSPECIFIED: ICD-10-CM

## 2018-08-21 PROCEDURE — 97110 THERAPEUTIC EXERCISES: CPT | Performed by: PHYSICAL THERAPIST

## 2018-08-21 PROCEDURE — G0283 ELEC STIM OTHER THAN WOUND: HCPCS | Performed by: PHYSICAL THERAPIST

## 2018-08-21 PROCEDURE — 97140 MANUAL THERAPY 1/> REGIONS: CPT | Performed by: PHYSICAL THERAPIST

## 2018-08-21 NOTE — THERAPY TREATMENT NOTE
Outpatient Physical Therapy Ortho Treatment Note   Alex     Patient Name: Lexie Valdez  : 1960  MRN: [id number]  Today's Date: 2018      Visit Date: 2018    Visit Dx:    ICD-10-CM ICD-9-CM   1. Right buttock pain M79.1 729.1   2. Low back pain, unspecified back pain laterality, unspecified chronicity, with sciatica presence unspecified M54.5 724.2       There is no problem list on file for this patient.       Past Medical History:   Diagnosis Date   • Anxiety    • Arthritis    • Depression         Past Surgical History:   Procedure Laterality Date   • GALLBLADDER SURGERY     • HYSTERECTOMY      age 37             PT Ortho     Row Name 18 0900       Subjective Comments    Subjective Comments Patient reports that she is going to MD on Thursday.  She notes that she has 6/10 pain today.  -BE       Subjective Pain    Able to rate subjective pain? yes  -BE    Pre-Treatment Pain Level 6  -BE    Post-Treatment Pain Level 4  -BE      User Key  (r) = Recorded By, (t) = Taken By, (c) = Cosigned By    Initials Name Provider Type    BE Bonita Browne, PT Physical Therapist                            PT Assessment/Plan     Row Name 18 0956          PT Assessment    Assessment Comments Therapy session consisted of MH, ESTIM, ther ex, and manual therapy; no adverse reactions were noted with modalities at today's session.  Patient progressed in ther ex to include increased repetitions; verbal and tactile cues provided to ensure correct form with exercises.  Patient continues to report tenderness at right piriformis during soft tissue mobilization.  Patient will continue to be progressed per her tolerance and POC.  -BE        PT Plan    PT Plan Comments Progress per patient's tolerance and POC.  -BE       User Key  (r) = Recorded By, (t) = Taken By, (c) = Cosigned By    Initials Name Provider Type    BE Bonita Browne, PT Physical Therapist                Modalities     Row Name  "08/21/18 0900             Moist Heat    MH Applied Yes   no redness following MH  -BE      Location lumbar  -BE      Rx Minutes 15 mins  -BE      MH Prior to Rx Yes   w/ estim in seated position  -BE         ELECTRICAL STIMULATION    Attended/Unattended Unattended   no skin irritation observed following  -BE      Stimulation Type IFC  -BE      Max mAmp --   as to pt's tolerance  -BE      Location/Electrode Placement/Other Lumbar   15 min  -BE        User Key  (r) = Recorded By, (t) = Taken By, (c) = Cosigned By    Initials Name Provider Type    BE Bonita Browne PT Physical Therapist                Exercises     Row Name 08/21/18 0900             Subjective Comments    Subjective Comments Patient reports that she is going to MD on Thursday.  She notes that she has 6/10 pain today.  -BE         Subjective Pain    Able to rate subjective pain? yes  -BE      Pre-Treatment Pain Level 6  -BE      Post-Treatment Pain Level 4  -BE         Total Minutes    69492 - PT Therapeutic Exercise Minutes 30  -BE      12230 - PT Manual Therapy Minutes 10  -BE         Exercise 1    Exercise Name 1 LTR 15x2, SKTC 3x20\", piriformis stretch (not crossing leg over) 3x20\", PPT 15x2, GS 15x2, supine clams 2x15, SAQ 10x3, ball squeeze 15x2, LAQ 3x10, Supine march 2x15, scap squeeze  -BE      Cueing 1 Verbal;Tactile;Demo  -BE      Time 1 30 min  -BE        User Key  (r) = Recorded By, (t) = Taken By, (c) = Cosigned By    Initials Name Provider Type    BE Bonita Browne PT Physical Therapist                        Manual Rx (last 36 hours)      Manual Treatments     Row Name 08/21/18 0900             Total Minutes    56743 - PT Manual Therapy Minutes 10  -BE         Manual Rx 1    Manual Rx 1 Location Lumbar, hip  -BE      Manual Rx 1 Type STM  -BE      Manual Rx 1 Grade gentle, as to pt's tolerance  -BE      Manual Rx 1 Duration 10 min  -BE        User Key  (r) = Recorded By, (t) = Taken By, (c) = Cosigned By    Initials Name " Provider Type    BE Bonita Browne, PT Physical Therapist              Therapy Education  Given: HEP, Symptoms/condition management, Pain management, Posture/body mechanics  Program: Reinforced  How Provided: Verbal, Demonstration  Provided to: Patient  Level of Understanding: Verbalized, Demonstrated              Time Calculation:   Start Time: 0853  Stop Time: 0951  Time Calculation (min): 58 min  Therapy Suggested Charges     Code   Minutes Charges    08307 (CPT®) Hc Pt Neuromusc Re Education Ea 15 Min      59765 (CPT®) Hc Pt Ther Proc Ea 15 Min 30 2    72523 (CPT®) Hc Gait Training Ea 15 Min      42047 (CPT®) Hc Pt Therapeutic Act Ea 15 Min      58755 (CPT®) Hc Pt Manual Therapy Ea 15 Min 10 1    34210 (CPT®) Hc Pt Ther Massage- Per 15 Min      32748 (CPT®) Hc Pt Iontophoresis Ea 15 Min      83430 (CPT®) Hc Pt Elec Stim Ea-Per 15 Min      81536 (CPT®) Hc Pt Ultrasound Ea 15 Min      31464 (CPT®) Hc Pt Self Care/Mgmt/Train Ea 15 Min      06805 (CPT®) Hc Pt Prosthetic (S) Train Initial Encounter, Each 15 Min      19400 (CPT®) Hc Orthotic(S) Mgmt/Train Initial Encounter, Each 15min      23087 (CPT®) Hc Pt Aquatic Therapy Ea 15 Min      02434 (CPT®) Hc Pt Orthotic(S)/Prosthetic(S) Encounter, Each 15 Min      Total  40 3        Therapy Charges for Today     Code Description Service Date Service Provider Modifiers Qty    40429339483 HC PT THER PROC EA 15 MIN 8/21/2018 Bonita Browne, PT GP 2    31430340803 HC PT MANUAL THERAPY EA 15 MIN 8/21/2018 Bonita Browne, PT GP 1    08467534659 HC PT ELECTRICAL STIM UNATTENDED 8/21/2018 Bonita Browne, PT  1                    Bonita Browne, PT  8/21/2018

## 2018-08-23 ENCOUNTER — APPOINTMENT (OUTPATIENT)
Dept: PHYSICAL THERAPY | Facility: HOSPITAL | Age: 58
End: 2018-08-23

## 2018-08-24 ENCOUNTER — HOSPITAL ENCOUNTER (OUTPATIENT)
Dept: PHYSICAL THERAPY | Facility: HOSPITAL | Age: 58
Setting detail: THERAPIES SERIES
Discharge: HOME | End: 2018-08-24

## 2018-08-24 DIAGNOSIS — M79.18 RIGHT BUTTOCK PAIN: Primary | ICD-10-CM

## 2018-08-24 DIAGNOSIS — M54.5 LOW BACK PAIN, UNSPECIFIED BACK PAIN LATERALITY, UNSPECIFIED CHRONICITY, WITH SCIATICA PRESENCE UNSPECIFIED: ICD-10-CM

## 2018-08-24 PROCEDURE — 97110 THERAPEUTIC EXERCISES: CPT

## 2018-08-24 PROCEDURE — 97140 MANUAL THERAPY 1/> REGIONS: CPT

## 2018-08-24 PROCEDURE — G0283 ELEC STIM OTHER THAN WOUND: HCPCS

## 2018-08-24 NOTE — THERAPY RE-EVALUATION
Outpatient Physical Therapy Ortho Re-Evaluation   Alex     Patient Name: Lexie Valdez  : 1960  MRN: [id number]  Today's Date: 2018      Visit Date: 2018    There is no problem list on file for this patient.       Past Medical History:   Diagnosis Date   • Anxiety    • Arthritis    • Depression         Past Surgical History:   Procedure Laterality Date   • GALLBLADDER SURGERY     • HYSTERECTOMY      age 37       Visit Dx:     ICD-10-CM ICD-9-CM   1. Right buttock pain M79.1 729.1   2. Low back pain, unspecified back pain laterality, unspecified chronicity, with sciatica presence unspecified M54.5 724.2                 PT Ortho     Row Name 18 0900       Subjective Comments    Subjective Comments Pt reports her MD has ordered six additional weeks of therapy.  -RT       Subjective Pain    Able to rate subjective pain? yes  -RT    Pre-Treatment Pain Level 6  -RT    Post-Treatment Pain Level 2  -RT       Sensory Screen for Light Touch- Lower Quarter Clearing    L3 (distal anterior thigh) Bilateral:;Intact  -RT    L4 (medial lower leg/foot) Bilateral:;Intact  -RT    L5 (lateral lower leg/great toe) Bilateral:;Intact  -RT    S1 (bottom of foot) Bilateral:;Intact  -RT       Myotomal Screen- Lower Quarter Clearing    Hip flexion (L2) Bilateral:;4 (Good)  -RT    Knee extension (L3) Bilateral:;4+ (Good +)  -RT    Knee flexion (S2) Bilateral:;4 (Good)  -RT       Lumbar ROM Screen- Lower Quarter Clearing    Lumbar Flexion Impaired   50%  -RT    Lumbar Extension Impaired  -RT    Lumbar Rotation Impaired   25% each  -RT      User Key  (r) = Recorded By, (t) = Taken By, (c) = Cosigned By    Initials Name Provider Type    RT Teodoro Clemons, PT Physical Therapist                      Therapy Education  Given: HEP, Symptoms/condition management, Pain management, Posture/body mechanics  Program: Reinforced  How Provided: Verbal, Demonstration  Provided to: Patient  Level of Understanding: Verbalized,  Demonstrated           PT OP Goals     Row Name 08/24/18 1200          PT Short Term Goals    STG Date to Achieve 09/07/18  -RT     STG 1 Pt will report a decrease in c/o pain at worse to 3/10 with standing/functional activities of 30 mins or greater.  -RT     STG 1 Progress Not Met  -RT     STG 2 Pt will demonstrate improved lumbar AROM by 25% to promote improved functional mobility with daily activities.  -RT     STG 2 Progress Partially Met  -RT     STG 3 Pt will demonstrate improved B) hip flexion AROM to 0 to 90 degrees with no c/o pain.  -RT     STG 3 Progress Progressing  -RT     STG 4 Pt will be instructed with HEP and report daily performance of HEP.   -RT     STG 4 Progress Met  -RT        Long Term Goals    LTG Date to Achieve 09/21/18  -RT     LTG 1 Pt will report a decrease in c/o pain at worse to 1/10 with standing/functional activities of 60 mins or greater.  -RT     LTG 1 Progress Not Met  -RT     LTG 2 Pt will demonstrate normalized lumbar AROM to promote ablility to participated with household work and daily activities.   -RT     LTG 2 Progress Progressing  -RT     LTG 3 Pt will demosntrates B) LE strength of 5/5 via MMT with no c/o pain with testing.   -RT     LTG 3 Progress Progressing  -RT     LTG 4 Pt will demonstrate trace to no palpable tenderness to R) hip area and lumbar area.   -RT     LTG 4 Progress Progressing  -RT     LTG 5 Pt will demonstrate improved score on the Modified Oswestry LBP Disability Questionaire to 30%.  -RT     LTG 5 Progress Not Met  -RT        Time Calculation    PT Goal Re-Cert Due Date 09/21/18  -RT       User Key  (r) = Recorded By, (t) = Taken By, (c) = Cosigned By    Initials Name Provider Type    RT Teodoro Clemons, PT Physical Therapist                PT Assessment/Plan     Row Name 08/24/18 1214          PT Assessment    Functional Limitations Performance in self-care ADL;Performance in leisure activities;Limitation in home management  -RT     Impairments  Pain;Range of motion;Muscle strength;Posture;Poor body mechanics;Other (comment)  -RT     Assessment Comments Pt is a 59 y/o female referredt to therapy for treatment of back pain.  Pt has demonstrated good effort with therapy and reports having decreased pain and improved endurance.  Pt is motivated to cont and would benefit from continued tx for improved functional mobility.  -RT     Please refer to paper survey for additional self-reported information Yes  -RT     Rehab Potential Good  -RT     Patient/caregiver participated in establishment of treatment plan and goals Yes  -RT     Patient would benefit from skilled therapy intervention Yes  -RT        PT Plan    PT Frequency 2x/week  -RT     Predicted Duration of Therapy Intervention (Therapy Eval) 4 weeks  -RT     Planned CPT's? PT RE-EVAL: 94927;PT THER PROC EA 15 MIN: 84480;PT THER ACT EA 15 MIN: 18787;PT MANUAL THERAPY EA 15 MIN: 25050;PT NEUROMUSC RE-EDUCATION EA 15 MIN: 42239;PT ELECTRICAL STIM UNATTEND: ;PT ULTRASOUND EA 15 MIN: 63027;PT HOT/COLD PACK WC NONMCARE: 45858;PT EVAL HIGH COMPLEXITY: 87655  -RT     Physical Therapy Interventions (Optional Details) gross motor skills;home exercise program;joint mobilization;lumbar stabilization;manual therapy techniques;modalities;motor coordination training;neuromuscular re-education;patient/family education;postural re-education;ROM (Range of Motion);strengthening;stretching  -RT     PT Plan Comments Will folow for optimal gains.  -RT       User Key  (r) = Recorded By, (t) = Taken By, (c) = Cosigned By    Initials Name Provider Type    RT Teodoro Clemons, PT Physical Therapist                  Exercises     Row Name 08/24/18 1100 08/24/18 0900          Subjective Comments    Subjective Comments  -- Pt reports her MD has ordered six additional weeks of therapy.  -RT        Subjective Pain    Able to rate subjective pain?  -- yes  -RT     Pre-Treatment Pain Level  -- 6  -RT     Post-Treatment Pain Level  --  "2  -RT        Total Minutes    87576 - PT Therapeutic Exercise Minutes  -- 30  -RT     06097 - PT Manual Therapy Minutes 10  -RT  --        Exercise 1    Exercise Name 1  -- LTR 15x2, SKTC 3x20\", piriformis stretch (not crossing leg over) 3x20\", PPT 15x2, GS 15x2, supine clams 2x15, SAQ 10x3, ball squeeze 15x2, LAQ 3x10, Supine march 2x15, scap squeeze  -RT     Cueing 1  -- Verbal;Tactile;Demo  -RT     Time 1  -- 30 min  -RT       User Key  (r) = Recorded By, (t) = Taken By, (c) = Cosigned By    Initials Name Provider Type    RT Teodoro Clemons, PT Physical Therapist           Manual Rx (last 36 hours)      Manual Treatments     Row Name 08/24/18 1100             Total Minutes    37259 - PT Manual Therapy Minutes 10  -RT         Manual Rx 1    Manual Rx 1 Location Lumbar, hip  -RT      Manual Rx 1 Type STM  -RT      Manual Rx 1 Grade gentle, as to pt's tolerance  -RT      Manual Rx 1 Duration 10 min  -RT        User Key  (r) = Recorded By, (t) = Taken By, (c) = Cosigned By    Initials Name Provider Type    RT Teodoro Clemons, PT Physical Therapist                      Outcome Measure Options: Modifed Owestry  Modified Oswestry  Modified Oswestry Score/Comments: 42%      Time Calculation:     Therapy Suggested Charges     Code   Minutes Charges    03006 (CPT®) Hc Pt Neuromusc Re Education Ea 15 Min      29540 (CPT®) Hc Pt Ther Proc Ea 15 Min 30 2    11038 (CPT®) Hc Gait Training Ea 15 Min      72012 (CPT®) Hc Pt Therapeutic Act Ea 15 Min      82464 (CPT®) Hc Pt Manual Therapy Ea 15 Min 10 1    18010 (CPT®) Hc Pt Ther Massage- Per 15 Min      71285 (CPT®) Hc Pt Iontophoresis Ea 15 Min      68415 (CPT®) Hc Pt Elec Stim Ea-Per 15 Min      51052 (CPT®) Hc Pt Ultrasound Ea 15 Min      84346 (CPT®) Hc Pt Self Care/Mgmt/Train Ea 15 Min      46172 (CPT®) Hc Pt Prosthetic (S) Train Initial Encounter, Each 15 Min      51811 (CPT®) Hc Orthotic(S) Mgmt/Train Initial Encounter, Each 15min      54012 (CPT®) Hc Pt Aquatic Therapy " Ea 15 Min      36525 (CPT®) Hc Pt Orthotic(S)/Prosthetic(S) Encounter, Each 15 Min      Total  40 3          Start Time: 0900  Stop Time: 1005  Time Calculation (min): 65 min     Therapy Charges for Today     Code Description Service Date Service Provider Modifiers Qty    47550690445 HC PT MANUAL THERAPY EA 15 MIN 8/24/2018 Teodoro Clemons, PT GP 1    10587013981 HC PT ELECTRICAL STIM UNATTENDED 8/24/2018 Teodoro Clemons, PT  1    12168235022 HC PT THER PROC EA 15 MIN 8/24/2018 Teodoro Clemons, PT GP 2          PT G-Codes  Outcome Measure Options: Carmel Clemons, PT  8/24/2018

## 2018-08-28 ENCOUNTER — HOSPITAL ENCOUNTER (OUTPATIENT)
Dept: PHYSICAL THERAPY | Facility: HOSPITAL | Age: 58
Setting detail: THERAPIES SERIES
Discharge: HOME | End: 2018-08-28

## 2018-08-28 DIAGNOSIS — M54.5 LOW BACK PAIN, UNSPECIFIED BACK PAIN LATERALITY, UNSPECIFIED CHRONICITY, WITH SCIATICA PRESENCE UNSPECIFIED: ICD-10-CM

## 2018-08-28 DIAGNOSIS — M79.18 RIGHT BUTTOCK PAIN: Primary | ICD-10-CM

## 2018-08-28 PROCEDURE — G0283 ELEC STIM OTHER THAN WOUND: HCPCS

## 2018-08-28 PROCEDURE — 97010 HOT OR COLD PACKS THERAPY: CPT

## 2018-08-28 PROCEDURE — 97140 MANUAL THERAPY 1/> REGIONS: CPT

## 2018-08-28 PROCEDURE — 97110 THERAPEUTIC EXERCISES: CPT

## 2018-08-28 NOTE — THERAPY TREATMENT NOTE
Outpatient Physical Therapy Ortho Treatment Note   Alex     Patient Name: Lexie Valdez  : 1960  MRN: [id number]  Today's Date: 2018      Visit Date: 2018    Visit Dx:    ICD-10-CM ICD-9-CM   1. Right buttock pain M79.1 729.1   2. Low back pain, unspecified back pain laterality, unspecified chronicity, with sciatica presence unspecified M54.5 724.2       There is no problem list on file for this patient.       Past Medical History:   Diagnosis Date   • Anxiety    • Arthritis    • Depression         Past Surgical History:   Procedure Laterality Date   • GALLBLADDER SURGERY     • HYSTERECTOMY      age 37             PT Ortho     Row Name 18 1000       Subjective Comments    Subjective Comments Patient arrives to therapy w/ reports of 6/10 low back pain.  Otherwise pt states of no new complaints.   -LONNY       Subjective Pain    Able to rate subjective pain? yes  -LONNY    Pre-Treatment Pain Level 6  -LONNY    Post-Treatment Pain Level 2  -LONNY      User Key  (r) = Recorded By, (t) = Taken By, (c) = Cosigned By    Initials Name Provider Type    Rose Mcmahon PTA Physical Therapy Assistant                            PT Assessment/Plan     Row Name 18 1100          PT Assessment    Assessment Comments Patient tolerated tx well today w/ reports of decreased following, 2/10.  Pt received MH and Estim to low back region in seated position f/b therex as listed.  Pt received verbal and tactile cues throughout session for improved feedback and for max benefit w/ activities.  Treatment concluded with manual STM.  Pt continues to prgress w/ therapy as tolerated.  No adverse reactions observed following modalities.   -LONNY        PT Plan    PT Plan Comments Continue with PT's POC and progress tx as tolerated by patient.   -LONNY       User Key  (r) = Recorded By, (t) = Taken By, (c) = Cosigned By    Initials Name Provider Type    Rose Mcmahon PTA Physical Therapy Assistant     "            Modalities     Row Name 08/28/18 1000             Moist Heat    MH Applied Yes   no redness observed following MH  -LONNY      Location lumbar  -LONNY      Rx Minutes 15 mins  -LONNY      MH Prior to Rx Yes   w/ estim in seated position  -LONNY         ELECTRICAL STIMULATION    Attended/Unattended Unattended   no skin irritation observed following  -LONNY      Stimulation Type IFC  -LONNY      Max mAmp --   as to pt's tolerance  -LONNY      Location/Electrode Placement/Other Lumbar  -LONNY        User Key  (r) = Recorded By, (t) = Taken By, (c) = Cosigned By    Initials Name Provider Type    Rose Mcmahon PTA Physical Therapy Assistant                Exercises     Row Name 08/28/18 1000 08/28/18 0900          Subjective Comments    Subjective Comments Patient arrives to therapy w/ reports of 6/10 low back pain.  Otherwise pt states of no new complaints.   -LONNY  --        Subjective Pain    Able to rate subjective pain? yes  -LONNY  --     Pre-Treatment Pain Level 6  -LONNY  --     Post-Treatment Pain Level 2  -LONNY  --        Total Minutes    98477 - PT Therapeutic Exercise Minutes 30  -LONNY  --     29400 - PT Manual Therapy Minutes  -- 10  -LONNY        Exercise 1    Exercise Name 1 LTR 15x2, PPT 15x2, SKTC 3x20\", piriformis stretch 3x20\", supine clams 15x2, supine march 15x2, SAQ 15x2, hip add isometric 15x2, scap squeeze 15x2  -LONNY  --     Cueing 1 Verbal;Tactile;Demo  -LONNY  --     Time 1 30 min  -LONNY  --       User Key  (r) = Recorded By, (t) = Taken By, (c) = Cosigned By    Initials Name Provider Type    Rose Mcmahon PTA Physical Therapy Assistant                        Manual Rx (last 36 hours)      Manual Treatments     Row Name 08/28/18 0900             Total Minutes    76373 - PT Manual Therapy Minutes 10  -LONNY         Manual Rx 1    Manual Rx 1 Location Lumbar, hip   pt in prone position  -LONNY      Manual Rx 1 Type STM  -LONNY      Manual Rx 1 Grade gentle, as to pt's tolerance  -LONNY      Manual Rx 1 Duration 10 " min  -LONNY        User Key  (r) = Recorded By, (t) = Taken By, (c) = Cosigned By    Initials Name Provider Type    Rose Mcmahon PTA Physical Therapy Assistant                             Time Calculation:   Start Time: 0857  Stop Time: 1000  Time Calculation (min): 63 min  Therapy Suggested Charges     Code   Minutes Charges    96121 (CPT®) Hc Pt Neuromusc Re Education Ea 15 Min      42021 (CPT®) Hc Pt Ther Proc Ea 15 Min 30 2    66511 (CPT®) Hc Gait Training Ea 15 Min      02741 (CPT®) Hc Pt Therapeutic Act Ea 15 Min      04863 (CPT®) Hc Pt Manual Therapy Ea 15 Min 10 1    16956 (CPT®) Hc Pt Ther Massage- Per 15 Min      75390 (CPT®) Hc Pt Iontophoresis Ea 15 Min      98607 (CPT®) Hc Pt Elec Stim Ea-Per 15 Min      01630 (CPT®) Hc Pt Ultrasound Ea 15 Min      28863 (CPT®) Hc Pt Self Care/Mgmt/Train Ea 15 Min      61632 (CPT®) Hc Pt Prosthetic (S) Train Initial Encounter, Each 15 Min      21873 (CPT®) Hc Orthotic(S) Mgmt/Train Initial Encounter, Each 15min      27902 (CPT®) Hc Pt Aquatic Therapy Ea 15 Min      91823 (CPT®) Hc Pt Orthotic(S)/Prosthetic(S) Encounter, Each 15 Min      Total  40 3        Therapy Charges for Today     Code Description Service Date Service Provider Modifiers Qty    95947270874 HC PT THER PROC EA 15 MIN 8/28/2018 Rose Valenzuela, HANNA GP 2    85034751973 HC PT MANUAL THERAPY EA 15 MIN 8/28/2018 Rsoe Valenzuela, PTA GP 1    32837147244 HC PT HOT/COLD PACK WC NONMCARE 8/28/2018 Rose Valenzuela, HANNA GP 1    81628091037 HC PT ELECTRICAL STIM UNATTENDED 8/28/2018 Rose Valenzuela, HANNA  1                    Rose Mcdowell. HANNA Valenzuela  8/28/2018

## 2018-08-30 ENCOUNTER — HOSPITAL ENCOUNTER (OUTPATIENT)
Dept: PHYSICAL THERAPY | Facility: HOSPITAL | Age: 58
Setting detail: THERAPIES SERIES
Discharge: HOME | End: 2018-08-30

## 2018-08-30 DIAGNOSIS — M79.18 RIGHT BUTTOCK PAIN: Primary | ICD-10-CM

## 2018-08-30 DIAGNOSIS — M54.5 LOW BACK PAIN, UNSPECIFIED BACK PAIN LATERALITY, UNSPECIFIED CHRONICITY, WITH SCIATICA PRESENCE UNSPECIFIED: ICD-10-CM

## 2018-08-30 PROCEDURE — 97110 THERAPEUTIC EXERCISES: CPT

## 2018-08-30 PROCEDURE — 97140 MANUAL THERAPY 1/> REGIONS: CPT

## 2018-08-30 PROCEDURE — G0283 ELEC STIM OTHER THAN WOUND: HCPCS

## 2018-09-04 ENCOUNTER — HOSPITAL ENCOUNTER (OUTPATIENT)
Dept: PHYSICAL THERAPY | Facility: HOSPITAL | Age: 58
Setting detail: THERAPIES SERIES
Discharge: HOME | End: 2018-09-04

## 2018-09-04 DIAGNOSIS — M54.5 LOW BACK PAIN, UNSPECIFIED BACK PAIN LATERALITY, UNSPECIFIED CHRONICITY, WITH SCIATICA PRESENCE UNSPECIFIED: ICD-10-CM

## 2018-09-04 DIAGNOSIS — M79.18 RIGHT BUTTOCK PAIN: Primary | ICD-10-CM

## 2018-09-04 PROCEDURE — G0283 ELEC STIM OTHER THAN WOUND: HCPCS

## 2018-09-04 PROCEDURE — 97110 THERAPEUTIC EXERCISES: CPT

## 2018-09-04 PROCEDURE — 97140 MANUAL THERAPY 1/> REGIONS: CPT

## 2018-09-04 NOTE — THERAPY TREATMENT NOTE
Outpatient Physical Therapy Ortho Treatment Note   Alex     Patient Name: Lexie Valdez  : 1960  MRN: [id number]  Today's Date: 2018      Visit Date: 2018    Visit Dx:    ICD-10-CM ICD-9-CM   1. Right buttock pain M79.1 729.1   2. Low back pain, unspecified back pain laterality, unspecified chronicity, with sciatica presence unspecified M54.5 724.2       There is no problem list on file for this patient.       Past Medical History:   Diagnosis Date   • Anxiety    • Arthritis    • Depression         Past Surgical History:   Procedure Laterality Date   • GALLBLADDER SURGERY     • HYSTERECTOMY      age 37             PT Ortho     Row Name 18 0900       Subjective Comments    Subjective Comments Patient states of increased back pain this morning, however states of no change in activity.  Pt reports 8/10 pre tx.   -LONNY       Subjective Pain    Able to rate subjective pain? yes  -LONNY    Pre-Treatment Pain Level 8  -LONNY    Post-Treatment Pain Level 5  -LONNY      User Key  (r) = Recorded By, (t) = Taken By, (c) = Cosigned By    Initials Name Provider Type    Rose Mcmahon, PTA Physical Therapy Assistant                            PT Assessment/Plan     Row Name 18 1015          PT Assessment    Assessment Comments Patient tolerated tx well today w/ reports of decreased pain following session, 5/10.  Repetitions of LE strengthening activities increased w/ good response.  Pt received MH and Estim to low back for pain control f/b therex as listed.  Treatment concluded with manual STM to lumbar paraspinals to address muscular tightness noted.  Pt continues to require cues throughout session for improved feedback and for max benefit w/ activities.  No adverse reactions observed following modalities.   -LONNY        PT Plan    PT Plan Comments Continue wiht PT's POC and progress tx as tolerated by patient.   -LONNY       User Key  (r) = Recorded By, (t) = Taken By, (c) = Cosigned By     "Initials Name Provider Type    Rose Mcmahon PTA Physical Therapy Assistant                Modalities     Row Name 09/04/18 0900             Moist Heat    MH Applied Yes   no redness observed following MH  -LONNY      Location lumbar  -LONNY      Rx Minutes 15 mins  -LONNY      MH Prior to Rx Yes   w/ estim in seated position  -LONNY         ELECTRICAL STIMULATION    Attended/Unattended Unattended   no skin irritation observed following  -LONNY      Stimulation Type IFC  -LONNY      Max mAmp --   as to pt's tolerance  -LONNY      Location/Electrode Placement/Other Lumbar  -LONNY        User Key  (r) = Recorded By, (t) = Taken By, (c) = Cosigned By    Initials Name Provider Type    Rose Mcmahon PTA Physical Therapy Assistant                Exercises     Row Name 09/04/18 0900             Subjective Comments    Subjective Comments Patient states of increased back pain this morning, however states of no change in activity.  Pt reports 8/10 pre tx.   -LONNY         Subjective Pain    Able to rate subjective pain? yes  -LONNY      Pre-Treatment Pain Level 8  -LONNY      Post-Treatment Pain Level 5  -LONNY         Total Minutes    91655 - PT Therapeutic Exercise Minutes 30  -LONNY      60314 - PT Manual Therapy Minutes 15  -LONNY         Exercise 1    Exercise Name 1 LTR 15x2, PPT 15x2, SKTC 3x20\", piriformis stretch 3x20\", GS/QS 15x2, clams (sidelying) 10x2, SAQ 15x2 (2#), ball squeeze 15x2, LAQ 10x2  -LONNY      Cueing 1 Verbal;Tactile;Demo  -LONNY      Time 1 30 min  -LONNY        User Key  (r) = Recorded By, (t) = Taken By, (c) = Cosigned By    Initials Name Provider Type    Rose Mcmahon PTA Physical Therapy Assistant                        Manual Rx (last 36 hours)      Manual Treatments     Row Name 09/04/18 0900             Total Minutes    50960 - PT Manual Therapy Minutes 15  -LONNY         Manual Rx 1    Manual Rx 1 Location Lumbar, hip   pt L) sidelying w/ pillow between knees   -LONNY      Manual Rx 1 Type STM  -LONNY      " Manual Rx 1 Grade gentle, as to pt's tolerance  -LONNY      Manual Rx 1 Duration 15 min  -LONNY        User Key  (r) = Recorded By, (t) = Taken By, (c) = Cosigned By    Initials Name Provider Type    Roes Mcmahon PTA Physical Therapy Assistant              Therapy Education  Given: HEP, Symptoms/condition management, Pain management, Posture/body mechanics  Program: Reinforced  How Provided: Verbal, Demonstration  Provided to: Patient  Level of Understanding: Verbalized, Demonstrated              Time Calculation:   Start Time: 0855  Stop Time: 1003  Time Calculation (min): 68 min  Therapy Suggested Charges     Code   Minutes Charges    44694 (CPT®) Hc Pt Neuromusc Re Education Ea 15 Min      47380 (CPT®) Hc Pt Ther Proc Ea 15 Min 30 2    69111 (CPT®) Hc Gait Training Ea 15 Min      52016 (CPT®) Hc Pt Therapeutic Act Ea 15 Min      56949 (CPT®) Hc Pt Manual Therapy Ea 15 Min 15 1    71986 (CPT®) Hc Pt Ther Massage- Per 15 Min      90680 (CPT®) Hc Pt Iontophoresis Ea 15 Min      33903 (CPT®) Hc Pt Elec Stim Ea-Per 15 Min      49517 (CPT®) Hc Pt Ultrasound Ea 15 Min      17383 (CPT®) Hc Pt Self Care/Mgmt/Train Ea 15 Min      87693 (CPT®) Hc Pt Prosthetic (S) Train Initial Encounter, Each 15 Min      47021 (CPT®) Hc Orthotic(S) Mgmt/Train Initial Encounter, Each 15min      13746 (CPT®) Hc Pt Aquatic Therapy Ea 15 Min      21294 (CPT®) Hc Pt Orthotic(S)/Prosthetic(S) Encounter, Each 15 Min      Total  45 3        Therapy Charges for Today     Code Description Service Date Service Provider Modifiers Qty    68521858650 HC PT THER PROC EA 15 MIN 9/4/2018 Rsoe Valenzuela, HANNA GP 2    44339306594 HC PT MANUAL THERAPY EA 15 MIN 9/4/2018 Rose Valenzuela, HANNA GP 1    32635352475 HC PT ELECTRICAL STIM UNATTENDED 9/4/2018 Rose Valenzuela, PTA  1                    Rose Mcdowell. HANNA Valenzuela  9/4/2018

## 2018-09-07 ENCOUNTER — HOSPITAL ENCOUNTER (OUTPATIENT)
Dept: PHYSICAL THERAPY | Facility: HOSPITAL | Age: 58
Setting detail: THERAPIES SERIES
Discharge: HOME | End: 2018-09-07

## 2018-09-07 DIAGNOSIS — M54.5 LOW BACK PAIN, UNSPECIFIED BACK PAIN LATERALITY, UNSPECIFIED CHRONICITY, WITH SCIATICA PRESENCE UNSPECIFIED: ICD-10-CM

## 2018-09-07 DIAGNOSIS — M79.18 RIGHT BUTTOCK PAIN: Primary | ICD-10-CM

## 2018-09-07 PROCEDURE — 97110 THERAPEUTIC EXERCISES: CPT

## 2018-09-07 PROCEDURE — G0283 ELEC STIM OTHER THAN WOUND: HCPCS

## 2018-09-07 PROCEDURE — 97140 MANUAL THERAPY 1/> REGIONS: CPT

## 2018-09-07 NOTE — THERAPY TREATMENT NOTE
Outpatient Physical Therapy Ortho Treatment Note   Alex     Patient Name: Lexie Valdez  : 1960  MRN: [id number]  Today's Date: 2018      Visit Date: 2018    Visit Dx:    ICD-10-CM ICD-9-CM   1. Right buttock pain M79.1 729.1   2. Low back pain, unspecified back pain laterality, unspecified chronicity, with sciatica presence unspecified M54.5 724.2       There is no problem list on file for this patient.       Past Medical History:   Diagnosis Date   • Anxiety    • Arthritis    • Depression         Past Surgical History:   Procedure Laterality Date   • GALLBLADDER SURGERY     • HYSTERECTOMY      age 37             PT Ortho     Row Name 18 0800       Subjective Comments    Subjective Comments Patient reports feeling some better today, w/ reports of 7/10 pain.   -LONNY       Subjective Pain    Able to rate subjective pain? yes  -LONNY    Pre-Treatment Pain Level 7  -LONNY    Post-Treatment Pain Level 4  -LONNY      User Key  (r) = Recorded By, (t) = Taken By, (c) = Cosigned By    Initials Name Provider Type    Rose Mcmahon PTA Physical Therapy Assistant                            PT Assessment/Plan     Row Name 18 0908          PT Assessment    Assessment Comments Patient completed today's session w/ decreased pain following, 4/10.  Pt continues to progress w/ therex to address lumbar ROM, lumbar stability, and strength deficits.  Lumbar stabilization activities progressed today w/ good response.  Pt received verbal and tactile cues as needed for improved feedback and for max benefit w/activities.  No complaints or adverse reactions observed during and/ or following session.   -LONNY        PT Plan    PT Plan Comments Continue with PT's POC and will progress tx as tolerated by patient.   -LONNY       User Key  (r) = Recorded By, (t) = Taken By, (c) = Cosigned By    Initials Name Provider Type    Rose Mcmahon PTA Physical Therapy Assistant                [facility]     Row  "Name 09/07/18 0800             Moist Heat    MH Applied Yes   no redness observed following MH  -LONNY      Location lumbar  -LONNY      Rx Minutes 15 mins  -LONNY      MH Prior to Rx Yes  -LONNY         ELECTRICAL STIMULATION    Attended/Unattended Unattended   no skin irritation observed following  -LONNY      Stimulation Type IFC  -LONNY      Max mAmp --   as to pt's tolerance  -LONNY      Location/Electrode Placement/Other Lumbar  -LONNY        User Key  (r) = Recorded By, (t) = Taken By, (c) = Cosigned By    Initials Name Provider Type    Rose Mcmahon PTA Physical Therapy Assistant                Exercises     Row Name 09/07/18 0900 09/07/18 0800          Subjective Comments    Subjective Comments  -- Patient reports feeling some better today, w/ reports of 7/10 pain.   -LONNY        Subjective Pain    Able to rate subjective pain?  -- yes  -LONNY     Pre-Treatment Pain Level  -- 7  -LONNY     Post-Treatment Pain Level  -- 4  -LONNY        Total Minutes    62397 - PT Therapeutic Exercise Minutes  -- 30  -LONNY     52532 - PT Manual Therapy Minutes 10  -LONNY  --        Exercise 1    Exercise Name 1  -- PPT w/ LE march 10x2, LTR 15x2, SKTC 3x20\", piriformis stretch 3x20\", GS x30, clams (sidelying) 10x2, SAQ 15x2 (2#), ball squeeze 15x2, scap squeeze 10x2, LAQ 10x2  -LONNY     Cueing 1  -- Verbal;Demo;Tactile  -LONNY     Time 1  -- 30 min  -LONNY       User Key  (r) = Recorded By, (t) = Taken By, (c) = Cosigned By    Initials Name Provider Type    Rose Mcmahon PTA Physical Therapy Assistant                        Manual Rx (last 36 hours)      Manual Treatments     Row Name 09/07/18 0900             Total Minutes    49292 - PT Manual Therapy Minutes 10  -LONNY         Manual Rx 1    Manual Rx 1 Location Lumbar, hip  -LONNY      Manual Rx 1 Type STM  -LONNY      Manual Rx 1 Grade as to pt's tolerance  -LONNY      Manual Rx 1 Duration 10 min  -LONNY        User Key  (r) = Recorded By, (t) = Taken By, (c) = Cosigned By    Initials Name Provider Type    " Rose Mcmahon PTA Physical Therapy Assistant              Therapy Education  Given: HEP, Symptoms/condition management, Pain management, Posture/body mechanics  Program: Reinforced  How Provided: Verbal, Demonstration  Provided to: Patient  Level of Understanding: Verbalized, Demonstrated              Time Calculation:   Start Time: 0800  Stop Time: 0900  Time Calculation (min): 60 min  Therapy Suggested Charges     Code   Minutes Charges    47615 (CPT®) Hc Pt Neuromusc Re Education Ea 15 Min      96594 (CPT®) Hc Pt Ther Proc Ea 15 Min 30 2    00675 (CPT®) Hc Gait Training Ea 15 Min      71640 (CPT®) Hc Pt Therapeutic Act Ea 15 Min      20157 (CPT®) Hc Pt Manual Therapy Ea 15 Min 10 1    28873 (CPT®) Hc Pt Ther Massage- Per 15 Min      82854 (CPT®) Hc Pt Iontophoresis Ea 15 Min      71498 (CPT®) Hc Pt Elec Stim Ea-Per 15 Min      84177 (CPT®) Hc Pt Ultrasound Ea 15 Min      94301 (CPT®) Hc Pt Self Care/Mgmt/Train Ea 15 Min      77427 (CPT®) Hc Pt Prosthetic (S) Train Initial Encounter, Each 15 Min      74798 (CPT®) Hc Orthotic(S) Mgmt/Train Initial Encounter, Each 15min      44045 (CPT®) Hc Pt Aquatic Therapy Ea 15 Min      19537 (CPT®) Hc Pt Orthotic(S)/Prosthetic(S) Encounter, Each 15 Min      Total  40 3        Therapy Charges for Today     Code Description Service Date Service Provider Modifiers Qty    27945692982 HC PT THER PROC EA 15 MIN 9/7/2018 Rose Valenzuela PTA GP 2    37659678941 HC PT MANUAL THERAPY EA 15 MIN 9/7/2018 Rose Valenzuela PTA GP 1    88069327346 HC PT ELECTRICAL STIM UNATTENDED 9/7/2018 Rose Valenzuela, HANNA  1                    Rose Mcdowell. HANNA Valenzuela  9/7/2018

## 2018-09-08 DIAGNOSIS — F41.1 GENERALIZED ANXIETY DISORDER: ICD-10-CM

## 2018-09-08 RX ORDER — LORAZEPAM 0.5 MG/1
TABLET ORAL
Qty: 60 TABLET | Refills: 0 | Status: SHIPPED | OUTPATIENT
Start: 2018-09-08 | End: 2018-10-17 | Stop reason: SDUPTHER

## 2018-09-11 ENCOUNTER — APPOINTMENT (OUTPATIENT)
Dept: PHYSICAL THERAPY | Facility: HOSPITAL | Age: 58
End: 2018-09-11

## 2018-09-13 ENCOUNTER — HOSPITAL ENCOUNTER (OUTPATIENT)
Dept: PHYSICAL THERAPY | Facility: HOSPITAL | Age: 58
Setting detail: THERAPIES SERIES
Discharge: HOME | End: 2018-09-13

## 2018-09-13 DIAGNOSIS — M79.18 RIGHT BUTTOCK PAIN: Primary | ICD-10-CM

## 2018-09-13 DIAGNOSIS — M54.5 LOW BACK PAIN, UNSPECIFIED BACK PAIN LATERALITY, UNSPECIFIED CHRONICITY, WITH SCIATICA PRESENCE UNSPECIFIED: ICD-10-CM

## 2018-09-13 PROCEDURE — 97110 THERAPEUTIC EXERCISES: CPT

## 2018-09-13 PROCEDURE — 97140 MANUAL THERAPY 1/> REGIONS: CPT

## 2018-09-13 PROCEDURE — G0283 ELEC STIM OTHER THAN WOUND: HCPCS

## 2018-09-13 NOTE — THERAPY TREATMENT NOTE
Outpatient Physical Therapy Ortho Treatment Note   Alex     Patient Name: Lexie Valdez  : 1960  MRN: [id number]  Today's Date: 2018      Visit Date: 2018    Visit Dx:    ICD-10-CM ICD-9-CM   1. Right buttock pain M79.1 729.1   2. Low back pain, unspecified back pain laterality, unspecified chronicity, with sciatica presence unspecified M54.5 724.2       There is no problem list on file for this patient.       Past Medical History:   Diagnosis Date   • Anxiety    • Arthritis    • Depression         Past Surgical History:   Procedure Laterality Date   • GALLBLADDER SURGERY     • HYSTERECTOMY      age 37             PT Ortho     Row Name 18 1000       Subjective Comments    Subjective Comments Patient arrives to therapy w/ reports of increased back pain; 7/10 pre tx.  Pt states she belives her pain is increased due to riding in the vehicle to [place] yesterday.  Pt also notes she plans to contact referring MD to question possibilty of discharging therapy to conserve remaining visits.   -LONNY       Subjective Pain    Able to rate subjective pain? yes  -LONNY    Pre-Treatment Pain Level 7  -LONNY    Post-Treatment Pain Level 4  -LONNY      User Key  (r) = Recorded By, (t) = Taken By, (c) = Cosigned By    Initials Name Provider Type    Rose Mcmahon, PTA Physical Therapy Assistant                            PT Assessment/Plan     Row Name 18 1152          PT Assessment    Assessment Comments Patient arrived to therapy visit w/ reports of increased back pain, possibly secondary to riding in vehicle to [place] yesterday.  Pt received MH and Estim to involved area to assist w/ pain control f/b therex as listed.  Treatment concluded w/ manual STM.  Pt displayed good mechanics throughout session w/ minimal cueing required.  Pt continues to progress w/ therapy as tolerated.  No adverse reactions observed following modalities.   -LONNY        PT Plan    PT Plan Comments Continue with PT's  "POC and progress tx as tolerated by patient.   -LONNY       User Key  (r) = Recorded By, (t) = Taken By, (c) = Cosigned By    Initials Name Provider Type    Rose Mcmahon PTA Physical Therapy Assistant                Modalities     Row Name 09/13/18 1000             Moist Heat    MH Applied Yes   no redness observed following MH  -LONNY      Location lumbar  -LONNY      Rx Minutes 15 mins  -LONNY      MH Prior to Rx Yes  -LONNY         ELECTRICAL STIMULATION    Attended/Unattended Unattended   no skin irritation observed following  -LONNY      Stimulation Type IFC  -LONNY      Max mAmp --   as to pt's tolerance  -LONNY      Location/Electrode Placement/Other Lumbar  -LONNY        User Key  (r) = Recorded By, (t) = Taken By, (c) = Cosigned By    Initials Name Provider Type    Rose Mcmahon PTA Physical Therapy Assistant                Exercises     Row Name 09/13/18 1100 09/13/18 1000          Subjective Comments    Subjective Comments  -- Patient arrives to therapy w/ reports of increased back pain; 7/10 pre tx.  Pt states she belives her pain is increased due to riding in the vehicle to [employer] yesterday.  Pt also notes she plans to contact referring MD to question possibilty of discharging therapy to conserve remaining visits.   -LONNY        Subjective Pain    Able to rate subjective pain?  -- yes  -LONNY     Pre-Treatment Pain Level  -- 7  -LONNY     Post-Treatment Pain Level  -- 4  -LONNY        Total Minutes    78847 - PT Therapeutic Exercise Minutes  -- 30  -LONNY     89427 - PT Manual Therapy Minutes 10  -LONNY  --        Exercise 1    Exercise Name 1  -- LTR 15x2, SKTC 3x20\", piriformis stretch 3x20\", PPT 15x2, SAQ 10x2 (2.5#), clams (sidelying) 10x2, LAQ 15x2  -LONNY     Cueing 1  -- Verbal;Tactile;Demo  -LONNY     Time 1  -- 30 min  -LONNY       User Key  (r) = Recorded By, (t) = Taken By, (c) = Cosigned By    Initials Name Provider Type    Rose Mcmahon PTA Physical Therapy Assistant                        Manual Rx " (last 36 hours)      Manual Treatments     Row Name 09/13/18 1100             Total Minutes    13048 - PT Manual Therapy Minutes 10  -LONNY         Manual Rx 1    Manual Rx 1 Location Lumbar, hip  -LONNY      Manual Rx 1 Type STM  -LONNY      Manual Rx 1 Grade as to pt's tolerance  -LONNY      Manual Rx 1 Duration 10 min  -LONNY        User Key  (r) = Recorded By, (t) = Taken By, (c) = Cosigned By    Initials Name Provider Type    Rose Mcmahon PTA Physical Therapy Assistant              Therapy Education  Given: HEP, Symptoms/condition management, Pain management, Posture/body mechanics  Program: Reinforced  How Provided: Verbal, Demonstration  Provided to: Patient  Level of Understanding: Verbalized, Demonstrated              Time Calculation:   Start Time: 0845  Stop Time: 1000  Time Calculation (min): 75 min  Therapy Suggested Charges     Code   Minutes Charges    08236 (CPT®) Hc Pt Neuromusc Re Education Ea 15 Min      85834 (CPT®) Hc Pt Ther Proc Ea 15 Min 30 2    91730 (CPT®) Hc Gait Training Ea 15 Min      29511 (CPT®) Hc Pt Therapeutic Act Ea 15 Min      98480 (CPT®) Hc Pt Manual Therapy Ea 15 Min 10 1    77153 (CPT®) Hc Pt Ther Massage- Per 15 Min      35163 (CPT®) Hc Pt Iontophoresis Ea 15 Min      14157 (CPT®) Hc Pt Elec Stim Ea-Per 15 Min      25294 (CPT®) Hc Pt Ultrasound Ea 15 Min      56507 (CPT®) Hc Pt Self Care/Mgmt/Train Ea 15 Min      38573 (CPT®) Hc Pt Prosthetic (S) Train Initial Encounter, Each 15 Min      82170 (CPT®) Hc Orthotic(S) Mgmt/Train Initial Encounter, Each 15min      78289 (CPT®) Hc Pt Aquatic Therapy Ea 15 Min      80666 (CPT®) Hc Pt Orthotic(S)/Prosthetic(S) Encounter, Each 15 Min      Total  40 3        Therapy Charges for Today     Code Description Service Date Service Provider Modifiers Qty    38853887414 HC PT THER PROC EA 15 MIN 9/13/2018 Rose Valenzuela PTA GP 2    45696806355 HC PT MANUAL THERAPY EA 15 MIN 9/13/2018 Rose Valenzuela PTA GP 1    72903952580 HC  PT ELECTRICAL STIM UNATTENDED 9/13/2018 Rose Valenzuela, PTA  1                    Rose Mcdowell. Bianca, HANNA  9/13/2018

## 2018-09-14 ENCOUNTER — APPOINTMENT (OUTPATIENT)
Dept: PHYSICAL THERAPY | Facility: HOSPITAL | Age: 58
End: 2018-09-14

## 2018-09-18 ENCOUNTER — HOSPITAL ENCOUNTER (OUTPATIENT)
Dept: PHYSICAL THERAPY | Facility: HOSPITAL | Age: 58
Setting detail: THERAPIES SERIES
Discharge: HOME | End: 2018-09-18

## 2018-09-18 DIAGNOSIS — M54.5 LOW BACK PAIN, UNSPECIFIED BACK PAIN LATERALITY, UNSPECIFIED CHRONICITY, WITH SCIATICA PRESENCE UNSPECIFIED: ICD-10-CM

## 2018-09-18 DIAGNOSIS — M79.18 RIGHT BUTTOCK PAIN: Primary | ICD-10-CM

## 2018-09-18 PROCEDURE — 97140 MANUAL THERAPY 1/> REGIONS: CPT

## 2018-09-18 PROCEDURE — 97110 THERAPEUTIC EXERCISES: CPT

## 2018-09-18 PROCEDURE — G0283 ELEC STIM OTHER THAN WOUND: HCPCS

## 2018-09-18 NOTE — THERAPY TREATMENT NOTE
Outpatient Physical Therapy Ortho Treatment Note   Alex     Patient Name: Lexie Valdez  : 1960  MRN: [id number]  Today's Date: 2018      Visit Date: 2018    Visit Dx:    ICD-10-CM ICD-9-CM   1. Right buttock pain M79.1 729.1   2. Low back pain, unspecified back pain laterality, unspecified chronicity, with sciatica presence unspecified M54.5 724.2       There is no problem list on file for this patient.       Past Medical History:   Diagnosis Date   • Anxiety    • Arthritis    • Depression         Past Surgical History:   Procedure Laterality Date   • GALLBLADDER SURGERY     • HYSTERECTOMY      age 37             PT Ortho     Row Name 18 0800       Subjective Comments    Subjective Comments Patient states she is scheduled to f/u with referring MD on 10/04/2018.  Pt states she wishes to continue w/ therapy until f/u appointment.  Pt reports 8/10 pain prior to tx.   -LONNY       Subjective Pain    Able to rate subjective pain? yes  -LONNY    Pre-Treatment Pain Level 8  -LONNY      User Key  (r) = Recorded By, (t) = Taken By, (c) = Cosigned By    Initials Name Provider Type    Rose Mcmahon PTA Physical Therapy Assistant                            PT Assessment/Plan     Row Name 18 0918          PT Assessment    Assessment Comments Patient tolerated treatment well today w/ additional LE strengthening activities added to program.  Pt initiated LE bike w/ good response.  Pt received MH and Estim to low back region for pain control f/b therex w/ focus on improved lumbar ROM, lumbar stability, and LE strength.  Treatment concluded with manual STM.  No adverse reactions observed following modalities.   -LONNY        PT Plan    PT Plan Comments Continue with PT's POC and will progress tx as tolerated by patient.   -LONNY       User Key  (r) = Recorded By, (t) = Taken By, (c) = Cosigned By    Initials Name Provider Type    Rose Mcmahon PTA Physical Therapy Assistant            "     Modalities     Row Name 09/18/18 0800             Subjective Pain    Post-Treatment Pain Level 4  -LONNY         Moist Heat    MH Applied Yes   no redness observed following MH  -LONNY      Location lumbar  -LONNY      Rx Minutes 15 mins  -LONNY      MH Prior to Rx Yes   w/ estim in seated position  -LONNY         ELECTRICAL STIMULATION    Attended/Unattended Unattended   no skin irritation observed following  -LONNY      Stimulation Type IFC  -LONNY      Max mAmp --   as to pt's tolerance  -LONNY      Location/Electrode Placement/Other Lumbar  -LONNY        User Key  (r) = Recorded By, (t) = Taken By, (c) = Cosigned By    Initials Name Provider Type    Rose Mcmahon PTA Physical Therapy Assistant                Exercises     Row Name 09/18/18 0900 09/18/18 0800          Subjective Comments    Subjective Comments  -- Patient states she is scheduled to f/u with referring MD on 10/04/2018.  Pt states she wishes to continue w/ therapy until f/u appointment.  Pt reports 8/10 pain prior to tx.   -LONNY        Subjective Pain    Able to rate subjective pain?  -- yes  -LONNY     Pre-Treatment Pain Level  -- 8  -LONNY     Post-Treatment Pain Level  -- 4  -LONNY        Total Minutes    73663 - PT Therapeutic Exercise Minutes  -- 35  -LONNY     64993 - PT Manual Therapy Minutes 10  -LONNY  --        Exercise 1    Exercise Name 1  -- LTR 15x2, SKTC 3x20\", piriformis stretch 3x20\", PPT 15x2, clams (sidelying) 15x2, St. hip ext x10 B), SLR x10 w/assist, LE bike LV 1 x 3min  -LONNY     Cueing 1  -- Verbal;Tactile;Demo  -LONNY     Time 1  -- 35 min  -LONNY       User Key  (r) = Recorded By, (t) = Taken By, (c) = Cosigned By    Initials Name Provider Type    Rose Mcmahon, HANNA Physical Therapy Assistant                        Manual Rx (last 36 hours)      Manual Treatments     Row Name 09/18/18 0900             Total Minutes    05093 - PT Manual Therapy Minutes 10  -LONNY         Manual Rx 1    Manual Rx 1 Location Lumbar, hip  -LONNY      Manual Rx 1 Type STM "  -LONNY      Manual Rx 1 Grade as to pt's tolerance  -LONNY      Manual Rx 1 Duration 10 min  -LONNY        User Key  (r) = Recorded By, (t) = Taken By, (c) = Cosigned By    Initials Name Provider Type    Rose Mcmahon PTA Physical Therapy Assistant              Therapy Education  Given: HEP, Symptoms/condition management, Pain management, Posture/body mechanics  Program: Reinforced  How Provided: Verbal, Demonstration  Provided to: Patient  Level of Understanding: Verbalized, Demonstrated              Time Calculation:   Start Time: 0845  Stop Time: 0947  Time Calculation (min): 62 min  Therapy Suggested Charges     Code   Minutes Charges    48686 (CPT®) Hc Pt Neuromusc Re Education Ea 15 Min      27599 (CPT®) Hc Pt Ther Proc Ea 15 Min 35 2    66194 (CPT®) Hc Gait Training Ea 15 Min      41953 (CPT®) Hc Pt Therapeutic Act Ea 15 Min      75010 (CPT®) Hc Pt Manual Therapy Ea 15 Min 10 1    12512 (CPT®) Hc Pt Ther Massage- Per 15 Min      04504 (CPT®) Hc Pt Iontophoresis Ea 15 Min      76570 (CPT®) Hc Pt Elec Stim Ea-Per 15 Min      00138 (CPT®) Hc Pt Ultrasound Ea 15 Min      76425 (CPT®) Hc Pt Self Care/Mgmt/Train Ea 15 Min      31797 (CPT®) Hc Pt Prosthetic (S) Train Initial Encounter, Each 15 Min      69229 (CPT®) Hc Orthotic(S) Mgmt/Train Initial Encounter, Each 15min      44249 (CPT®) Hc Pt Aquatic Therapy Ea 15 Min      25705 (CPT®) Hc Pt Orthotic(S)/Prosthetic(S) Encounter, Each 15 Min      Total  45 3        Therapy Charges for Today     Code Description Service Date Service Provider Modifiers Qty    00827805797 HC PT THER PROC EA 15 MIN 9/18/2018 Rose Valenzuela PTA GP 2    27738618427 HC PT MANUAL THERAPY EA 15 MIN 9/18/2018 Rose Valenzeula, HANNA GP 1    98418748011 HC PT ELECTRICAL STIM UNATTENDED 9/18/2018 Rose Valenzuela, HANNA  1                    Rose Mcdowell. HANNA Valenzuela  9/18/2018

## 2018-09-28 ENCOUNTER — HOSPITAL ENCOUNTER (OUTPATIENT)
Dept: PHYSICAL THERAPY | Facility: HOSPITAL | Age: 58
Setting detail: THERAPIES SERIES
Discharge: HOME | End: 2018-09-28

## 2018-09-28 DIAGNOSIS — M54.5 LOW BACK PAIN, UNSPECIFIED BACK PAIN LATERALITY, UNSPECIFIED CHRONICITY, WITH SCIATICA PRESENCE UNSPECIFIED: ICD-10-CM

## 2018-09-28 DIAGNOSIS — M79.18 RIGHT BUTTOCK PAIN: Primary | ICD-10-CM

## 2018-09-28 PROCEDURE — G0283 ELEC STIM OTHER THAN WOUND: HCPCS | Performed by: PHYSICAL THERAPIST

## 2018-10-17 DIAGNOSIS — F41.1 GENERALIZED ANXIETY DISORDER: ICD-10-CM

## 2018-10-17 NOTE — TELEPHONE ENCOUNTER
RX REQUEST NEXT APPT 11/19/18 PLEASE COVER FOR SAMRA      Called and let patient know med refill was sent in

## 2018-10-18 RX ORDER — LORAZEPAM 0.5 MG/1
0.5 TABLET ORAL 2 TIMES DAILY PRN
Qty: 60 TABLET | Refills: 0 | Status: SHIPPED | OUTPATIENT
Start: 2018-10-18 | End: 2018-11-19

## 2018-10-31 ENCOUNTER — TRANSCRIBE ORDERS (OUTPATIENT)
Dept: ADMINISTRATIVE | Facility: HOSPITAL | Age: 58
End: 2018-10-31

## 2018-10-31 DIAGNOSIS — J44.9 OBSTRUCTIVE CHRONIC BRONCHITIS WITHOUT EXACERBATION (HCC): Primary | ICD-10-CM

## 2018-11-05 ENCOUNTER — HOSPITAL ENCOUNTER (OUTPATIENT)
Dept: RESPIRATORY THERAPY | Facility: HOSPITAL | Age: 58
Discharge: HOME | End: 2018-11-05
Admitting: PHYSICIAN ASSISTANT

## 2018-11-05 ENCOUNTER — HOSPITAL ENCOUNTER (OUTPATIENT)
Dept: CT IMAGING | Facility: HOSPITAL | Age: 58
Discharge: HOME | End: 2018-11-05

## 2018-11-05 DIAGNOSIS — J44.9 OBSTRUCTIVE CHRONIC BRONCHITIS WITHOUT EXACERBATION (HCC): ICD-10-CM

## 2018-11-05 PROCEDURE — 94010 BREATHING CAPACITY TEST: CPT | Performed by: INTERNAL MEDICINE

## 2018-11-05 PROCEDURE — 94729 DIFFUSING CAPACITY: CPT | Performed by: INTERNAL MEDICINE

## 2018-11-05 PROCEDURE — 71250 CT THORAX DX C-: CPT | Performed by: RADIOLOGY

## 2018-11-05 PROCEDURE — G0297 LDCT FOR LUNG CA SCREEN: HCPCS

## 2018-11-05 PROCEDURE — 94010 BREATHING CAPACITY TEST: CPT

## 2018-11-05 PROCEDURE — 94727 GAS DIL/WSHOT DETER LNG VOL: CPT

## 2018-11-05 PROCEDURE — 94727 GAS DIL/WSHOT DETER LNG VOL: CPT | Performed by: INTERNAL MEDICINE

## 2018-11-05 PROCEDURE — 94729 DIFFUSING CAPACITY: CPT

## 2018-11-06 ENCOUNTER — OFFICE VISIT (OUTPATIENT)
Dept: PSYCHIATRY | Facility: CLINIC | Age: 58
End: 2018-11-06

## 2018-11-06 DIAGNOSIS — F41.1 GENERALIZED ANXIETY DISORDER: Primary | ICD-10-CM

## 2018-11-06 DIAGNOSIS — F34.1 DYSTHYMIA: ICD-10-CM

## 2018-11-06 PROCEDURE — 90832 PSYTX W PT 30 MINUTES: CPT | Performed by: COUNSELOR

## 2018-11-06 NOTE — PROGRESS NOTES
Date of Service: November 6, 2018  Time In: 9:30 AM  Time Out: 10:00 AM      PROGRESS NOTE  Data:  Lexie Valdez is a 58 y.o. female who met with the undersigned for a regularly scheduled individual outpatient therapy session at the [facility].  She rated her anxiety and depression levels as a 2 on a scale from 1-10 where 10 is the most.  She reports medication compliance and denies side effects.  She has been sleeping well.     HPI: She said that she has been struggling with health problems.  She was diagnosed with pneumonia 6 weeks ago.  She was referred for a CT scan and a pulmonary function test yesterday and is awaiting results.  Her  has been having chest pain but his stress test was normal.  She went to physical therapy for her back and shoulder and is now receiving injections which are helping with chronic pain.  Things are going well with her family and she will find out the gender of her new grandchild today.  She is not as excited about the holidays as in past years.  Her disability claim was denied and she is awaiting a court date with .    Clinical Maneuvering/Intervention:  Assisted patient in processing above session content; acknowledged and normalized patient’s thoughts, feelings, and concerns. Allowed patient to freely discuss issues without interruption or judgment. Provided safe, confidential environment to facilitate the development of positive therapeutic relationship and encourage open, honest communication. Assisted patient in identifying risk factors which would indicate the need for higher level of care including thoughts to harm self or others and/or self-harming behavior and encouraged patient to contact this office, call 911, or present to the nearest emergency room should any of these events occur. Discussed crisis intervention services and means to access.  Patient adamantly and convincingly denies current suicidal or homicidal ideation or perceptual  disturbance.    Assessment     Diagnoses and all orders for this visit:    Generalized anxiety disorder    Dysthymia             Mental Status Exam  Hygiene:  good  Dress:  casual  Attitude:  Cooperative  Motor Activity:  Appropriate  Speech:  Normal  Mood:  within normal limits  Affect:  calm and pleasant  Thought Processes:  Goal directed  Thought Content:  normal  Suicidal Thoughts:  denies  Homicidal Thoughts:  denies  Crisis Safety Plan: yes, to come to the emergency room.  Hallucinations:  denies    Patient's Support Network Includes:  , son and extended family    Progress toward goal: At goal    Functional Status: Mild impairment     Prognosis: Good with Ongoing Treatment       Plan     Patient will adhere to medication regimen as prescribed and report any side effects. Patient will contact this office, call 911 or present to the nearest emergency room should suicidal or homicidal ideations occur. Provide Cognitive Behavioral Therapy and Integrative Therapy to improve functioning, maintain stability, and avoid decompensation and the need for higher level of care.          Return in about 2 months (around 1/6/2019).      This document signed by PABLO Burnett, [facility] Center[facility]Y November 6, 2018 10:40 AM

## 2018-11-19 ENCOUNTER — OFFICE VISIT (OUTPATIENT)
Dept: PSYCHIATRY | Facility: CLINIC | Age: 58
End: 2018-11-19

## 2018-11-19 VITALS
BODY MASS INDEX: 35.17 KG/M2 | WEIGHT: 206 LBS | DIASTOLIC BLOOD PRESSURE: 73 MMHG | HEIGHT: 64 IN | SYSTOLIC BLOOD PRESSURE: 125 MMHG | HEART RATE: 69 BPM

## 2018-11-19 DIAGNOSIS — E66.9 OBESITY (BMI 30.0-34.9): ICD-10-CM

## 2018-11-19 DIAGNOSIS — F34.1 DYSTHYMIA: Primary | ICD-10-CM

## 2018-11-19 DIAGNOSIS — F41.1 GENERALIZED ANXIETY DISORDER: ICD-10-CM

## 2018-11-19 PROCEDURE — 99214 OFFICE O/P EST MOD 30 MIN: CPT | Performed by: NURSE PRACTITIONER

## 2018-11-19 RX ORDER — HYDROXYZINE HYDROCHLORIDE 10 MG/1
10 TABLET, FILM COATED ORAL 3 TIMES DAILY PRN
Qty: 45 TABLET | Refills: 2 | Status: SHIPPED | OUTPATIENT
Start: 2018-11-19 | End: 2019-02-11 | Stop reason: SDUPTHER

## 2018-11-19 RX ORDER — LORAZEPAM 0.5 MG/1
0.5 TABLET ORAL 2 TIMES DAILY PRN
Qty: 60 TABLET | Refills: 0 | Status: SHIPPED | OUTPATIENT
Start: 2018-11-19 | End: 2018-12-17 | Stop reason: SDUPTHER

## 2018-11-19 RX ORDER — ALBUTEROL SULFATE 90 UG/1
AEROSOL, METERED RESPIRATORY (INHALATION)
COMMUNITY
Start: 2018-10-02 | End: 2020-11-10 | Stop reason: SDUPTHER

## 2018-11-19 NOTE — PROGRESS NOTES
"Lalita Valdez is a 58 y.o. female who is here today for medication management follow up.    Chief Complaint: Depression anxiety      History of Present Illness   Patient presents appropriately dressed/groomed. Patient has been doing 'well'.   Patient has been medication compliant-denies any medication related side effects.  She reports mood has been stable\".  She reports she is expecting a new grandchild she is very excited about that.  She denies any significant anxiety or stress.Rates anxiety a 5/10. Denies panic attacks.  No SI, HI, No A/V hallucinations.  Rates depression a 4/10.  Sleeping is not as good as it was because of pnuemonia.    she reports that this has slightly worsened her sleep patient adamantly consistently denied any thoughts to harm herself or others.  Patient reports that she has reapplied for disability \"this is a long process\".      .The following portions of the patient's history were reviewed and updated as appropriate: current medications, past family history, past medical history, past social history, past surgical history and problem list.  Maximus    Review of Systems   Constitutional: Positive for appetite change and fatigue.   HENT: Positive for congestion.    Respiratory: Positive for cough, chest tightness and shortness of breath.    Musculoskeletal: Positive for arthralgias, back pain, gait problem, joint swelling and myalgias.   Neurological: Positive for dizziness.   Psychiatric/Behavioral: Negative for agitation. The patient is nervous/anxious.        Objective   Physical Exam   Constitutional: She appears well-developed and well-nourished. No distress.   Neurological: She is alert. Coordination and gait normal.   Vitals reviewed.    Blood pressure 125/73, pulse 69, height 162.6 cm (64.02\"), weight 93.4 kg (206 lb).    Allergies   Allergen Reactions   • Codeine    • Sulfa Antibiotics        Current Medications:   Current Outpatient Medications   Medication Sig Dispense " Refill   • amLODIPine (NORVASC) 5 MG tablet Take 5 mg by mouth Daily.     • BREO ELLIPTA 100-25 MCG/INH aerosol powder       • diclofenac (VOLTAREN) 50 MG EC tablet Take 50 mg by mouth 2 (Two) Times a Day As Needed.     • hydrOXYzine (ATARAX) 10 MG tablet Take 1 tablet by mouth 3 (Three) Times a Day As Needed for Anxiety. 45 tablet 2   • LORazepam (ATIVAN) 0.5 MG tablet Take 1 tablet by mouth 2 (Two) Times a Day As Needed for Anxiety. 60 tablet 0   • Multiple Vitamins-Minerals (PX MENS MULTIVITAMINS) tablet Take  by mouth Daily.     • sertraline (ZOLOFT) 50 MG tablet Take 2 tablets by mouth Every Night. 30 tablet 2   • VENTOLIN  (90 Base) MCG/ACT inhaler        No current facility-administered medications for this visit.        Mental Status Exam:   Hygiene:   good  Cooperation:  Cooperative  Eye Contact:  Good  Psychomotor Behavior:  Appropriate  Affect:  Full range  Hopelessness: Denies  Speech:  Normal  Thought Process:  Linear  Thought Content:  Normal  Suicidal:  None  Homicidal:  None  Hallucinations:  None  Delusion:  None  Memory:  Intact  Orientation:  Person, Place, Time and Situation  Reliability:  good  Insight:  Good  Judgement:  Good  Impulse Control:  Good  Physical/Medical Issues:  No     Assessment/Plan   Diagnoses and all orders for this visit:    Generalized anxiety disorder  -     sertraline (ZOLOFT) 50 MG tablet; Take 2 tablets by mouth Every Night.  -     LORazepam (ATIVAN) 0.5 MG tablet; Take 1 tablet by mouth 2 (Two) Times a Day As Needed for Anxiety.  -     hydrOXYzine (ATARAX) 10 MG tablet; Take 1 tablet by mouth 3 (Three) Times a Day As Needed for Anxiety.      SUPPORTIVE PSYCHOTHERAPY: continuing efforts to promote the therapeutic alliance, address the patient’s issues, and strengthen self awareness, insights, and coping skills.    Discused medications options.Again encouraged patient to monitor weighd intake.Discussed with patient increasing weight-encouraged healthly  eating/exercise.     Discussed the risks, beneefits, and side effects of the medications; patient ackowledged and verbally consentedd.  Patient is aware to call the [facility] with any worsening of symptoms.  Patient is agreeable to call 911 or go to the nearest ER should he/she begin having SI/HI. Patient is being prescribed a controlled substance as part of treatment plan. Patient has been educated of appropriate use of the medications, including risk of somnolence, limited ability to drive and/or work safely, and potential for dependence, respiratory depression and overdose. Patient is also informed that the medication are to be used by the patient only- avoid any combined use of ETOH or other substances unless prescribed. Continues to use Ativan as a rescue antianxiety only. Pt will return to clinic i [id number] sooner if needed        Errors in dictation may reflect use of voice recognition software and not all errors in transcription may have been detected prior to signing.

## 2018-12-17 DIAGNOSIS — F41.1 GENERALIZED ANXIETY DISORDER: ICD-10-CM

## 2018-12-17 RX ORDER — LORAZEPAM 0.5 MG/1
0.5 TABLET ORAL 2 TIMES DAILY PRN
Qty: 60 TABLET | Refills: 0 | Status: SHIPPED | OUTPATIENT
Start: 2018-12-17 | End: 2019-01-28 | Stop reason: SDUPTHER

## 2019-01-07 ENCOUNTER — OFFICE VISIT (OUTPATIENT)
Dept: PSYCHIATRY | Facility: CLINIC | Age: 59
End: 2019-01-07

## 2019-01-07 DIAGNOSIS — F34.1 DYSTHYMIA: ICD-10-CM

## 2019-01-07 DIAGNOSIS — F41.1 GENERALIZED ANXIETY DISORDER: Primary | ICD-10-CM

## 2019-01-07 PROCEDURE — 90832 PSYTX W PT 30 MINUTES: CPT | Performed by: COUNSELOR

## 2019-01-07 NOTE — PROGRESS NOTES
Date of Service: January 7, 2019  Time In: 9:45 AM  Time Out: 10:15 AM      PROGRESS NOTE  Data:  Lexie Valdez is a 58 y.o. female who met with the undersigned for a regularly scheduled individual outpatient therapy session at the [facility].  She rated her anxiety depression as a 4 on a scale from 1-10 where 10 is the most.  She verbalized concern about an increase in hand tremors.  She reported medication compliance and denied side effects.     HPI: She said that the CT scan of her chest ruled out cancer and that her pneumonia finally cleared.  She reports continued problems with breathlessness.  It is difficult to complete household chores without taking several breaks.  She is looking forward to the birth of her new granddaughter on April 5, 2019.  The holidays went well and she enjoyed spending time with family.  She verbalized concern about her brother, Lanny estevez.  She and her  are barely making it financially while they are waiting for her Social Security disability benefits.  She is still waiting for a court date.  She experiences chronic pain related to arthritis in her shoulders, back and hips.  She has been using an adult coloring application on her iPad for relaxation.    Clinical Maneuvering/Intervention:  Assisted patient in processing above session content; acknowledged and normalized patient’s thoughts, feelings, and concerns.  It was suggested that she speak to her PCP about the hand tremors.  She was praised for using positive coping skills to manage anxiety and depression.    Allowed patient to freely discuss issues without interruption or judgment. Provided safe, confidential environment to facilitate the development of positive therapeutic relationship and encourage open, honest communication. Assisted patient in identifying risk factors which would indicate the need for higher level of care including thoughts to harm self or others and/or self-harming behavior and encouraged  patient to contact this office, call 911, or present to the nearest emergency room should any of these events occur. Discussed crisis intervention services and means to access.  Patient adamantly and convincingly denies current suicidal or homicidal ideation or perceptual disturbance.    Assessment     Diagnoses and all orders for this visit:    Generalized anxiety disorder    Dysthymia             Mental Status Exam  Hygiene:  good  Dress:  casual  Attitude:  Cooperative  Motor Activity:  Appropriate  Speech:  Normal  Mood:  within normal limits  Affect:  calm and pleasant  Thought Processes:  Goal directed  Thought Content:  normal  Suicidal Thoughts:  denies  Homicidal Thoughts:  denies  Crisis Safety Plan: yes, to come to the emergency room.  Hallucinations:  denies    Patient's Support Network Includes:  , son and extended family    Progress toward goal: At goal    Functional Status: Moderate impairment     Prognosis: Good with Ongoing Treatment       Plan         Patient will adhere to medication regimen as prescribed and report any side effects. Patient will contact this office, call 911 or present to the nearest emergency room should suicidal or homicidal ideations occur. Provide Cognitive Behavioral Therapy and Integrative Therapy to improve functioning, maintain stability, and avoid decompensation and the need for higher level of care.          Return in about 3 months (around 4/7/2019).      This document signed by Angela Chisholm, PABLO, [facility] January 7, 2019 10:34 AM

## 2019-01-28 DIAGNOSIS — F41.1 GENERALIZED ANXIETY DISORDER: ICD-10-CM

## 2019-01-28 RX ORDER — LORAZEPAM 0.5 MG/1
0.5 TABLET ORAL 2 TIMES DAILY PRN
Qty: 60 TABLET | Refills: 0 | Status: SHIPPED | OUTPATIENT
Start: 2019-01-28 | End: 2023-03-21

## 2019-02-09 DIAGNOSIS — F41.1 GENERALIZED ANXIETY DISORDER: ICD-10-CM

## 2019-02-09 RX ORDER — HYDROXYZINE HYDROCHLORIDE 10 MG/1
TABLET, FILM COATED ORAL
Qty: 45 TABLET | Refills: 1 | Status: CANCELLED | OUTPATIENT
Start: 2019-02-09

## 2019-02-11 DIAGNOSIS — F41.1 GENERALIZED ANXIETY DISORDER: ICD-10-CM

## 2019-02-11 RX ORDER — HYDROXYZINE HYDROCHLORIDE 10 MG/1
10 TABLET, FILM COATED ORAL 3 TIMES DAILY PRN
Qty: 45 TABLET | Refills: 2 | Status: SHIPPED | OUTPATIENT
Start: 2019-02-11 | End: 2019-05-08 | Stop reason: SDUPTHER

## 2019-04-01 ENCOUNTER — TRANSCRIBE ORDERS (OUTPATIENT)
Dept: ADMINISTRATIVE | Facility: HOSPITAL | Age: 59
End: 2019-04-01

## 2019-04-01 ENCOUNTER — LAB (OUTPATIENT)
Dept: LAB | Facility: HOSPITAL | Age: 59
End: 2019-04-01

## 2019-04-01 DIAGNOSIS — I10 BENIGN HYPERTENSION: ICD-10-CM

## 2019-04-01 DIAGNOSIS — E78.5 HYPERLIPIDEMIA, UNSPECIFIED HYPERLIPIDEMIA TYPE: ICD-10-CM

## 2019-04-01 DIAGNOSIS — R73.09 IMPAIRED GLUCOSE TOLERANCE TEST: ICD-10-CM

## 2019-04-01 DIAGNOSIS — I10 BENIGN HYPERTENSION: Primary | ICD-10-CM

## 2019-04-01 LAB
25(OH)D3 SERPL-MCNC: 17.9 NG/ML (ref 30–100)
ALBUMIN SERPL-MCNC: 4.2 G/DL (ref 3.5–5.2)
ALBUMIN/GLOB SERPL: 1.1 G/DL
ALP SERPL-CCNC: 94 U/L (ref 39–117)
ALT SERPL W P-5'-P-CCNC: 14 U/L (ref 1–33)
ANION GAP SERPL CALCULATED.3IONS-SCNC: 14.5 MMOL/L
AST SERPL-CCNC: 18 U/L (ref 1–32)
BASOPHILS # BLD AUTO: 0.04 10*3/MM3 (ref 0–0.2)
BASOPHILS NFR BLD AUTO: 0.4 % (ref 0–1.5)
BILIRUB SERPL-MCNC: 0.3 MG/DL (ref 0.2–1.2)
BUN BLD-MCNC: 10 MG/DL (ref 6–20)
BUN/CREAT SERPL: 14.7 (ref 7–25)
CALCIUM SPEC-SCNC: 9.4 MG/DL (ref 8.6–10.5)
CHLORIDE SERPL-SCNC: 106 MMOL/L (ref 98–107)
CHOLEST SERPL-MCNC: 184 MG/DL (ref 0–200)
CO2 SERPL-SCNC: 24.5 MMOL/L (ref 22–29)
CREAT BLD-MCNC: 0.68 MG/DL (ref 0.57–1)
DEPRECATED RDW RBC AUTO: 47.2 FL (ref 37–54)
EOSINOPHIL # BLD AUTO: 0.2 10*3/MM3 (ref 0–0.4)
EOSINOPHIL NFR BLD AUTO: 2.2 % (ref 0.3–6.2)
ERYTHROCYTE [DISTWIDTH] IN BLOOD BY AUTOMATED COUNT: 15.8 % (ref 12.3–15.4)
FOLATE SERPL-MCNC: >20 NG/ML (ref 4.78–24.2)
GFR SERPL CREATININE-BSD FRML MDRD: 89 ML/MIN/1.73
GLOBULIN UR ELPH-MCNC: 3.7 GM/DL
GLUCOSE BLD-MCNC: 112 MG/DL (ref 65–99)
HBA1C MFR BLD: 5.74 % (ref 4.8–5.6)
HCT VFR BLD AUTO: 42.1 % (ref 34–46.6)
HDLC SERPL-MCNC: 44 MG/DL (ref 40–60)
HGB BLD-MCNC: 13.1 G/DL (ref 12–15.9)
IMM GRANULOCYTES # BLD AUTO: 0.02 10*3/MM3 (ref 0–0.05)
IMM GRANULOCYTES NFR BLD AUTO: 0.2 % (ref 0–0.5)
LDLC SERPL CALC-MCNC: 115 MG/DL (ref 0–100)
LDLC/HDLC SERPL: 2.62 {RATIO}
LYMPHOCYTES # BLD AUTO: 3.32 10*3/MM3 (ref 0.7–3.1)
LYMPHOCYTES NFR BLD AUTO: 36.4 % (ref 19.6–45.3)
MCH RBC QN AUTO: 25.5 PG (ref 26.6–33)
MCHC RBC AUTO-ENTMCNC: 31.1 G/DL (ref 31.5–35.7)
MCV RBC AUTO: 82.1 FL (ref 79–97)
MONOCYTES # BLD AUTO: 0.55 10*3/MM3 (ref 0.1–0.9)
MONOCYTES NFR BLD AUTO: 6 % (ref 5–12)
NEUTROPHILS # BLD AUTO: 5 10*3/MM3 (ref 1.4–7)
NEUTROPHILS NFR BLD AUTO: 54.8 % (ref 42.7–76)
PLATELET # BLD AUTO: 272 10*3/MM3 (ref 140–450)
PMV BLD AUTO: 10.6 FL (ref 6–12)
POTASSIUM BLD-SCNC: 4.3 MMOL/L (ref 3.5–5.2)
PROT SERPL-MCNC: 7.9 G/DL (ref 6–8.5)
RBC # BLD AUTO: 5.13 10*6/MM3 (ref 3.77–5.28)
SODIUM BLD-SCNC: 145 MMOL/L (ref 136–145)
TRIGL SERPL-MCNC: 123 MG/DL (ref 0–150)
VIT B12 BLD-MCNC: 356 PG/ML (ref 211–946)
VLDLC SERPL-MCNC: 24.6 MG/DL (ref 5–40)
WBC NRBC COR # BLD: 9.13 10*3/MM3 (ref 3.4–10.8)

## 2019-04-01 PROCEDURE — 80061 LIPID PANEL: CPT

## 2019-04-01 PROCEDURE — 83036 HEMOGLOBIN GLYCOSYLATED A1C: CPT

## 2019-04-01 PROCEDURE — 82746 ASSAY OF FOLIC ACID SERUM: CPT

## 2019-04-01 PROCEDURE — 80053 COMPREHEN METABOLIC PANEL: CPT

## 2019-04-01 PROCEDURE — 85025 COMPLETE CBC W/AUTO DIFF WBC: CPT

## 2019-04-01 PROCEDURE — 36415 COLL VENOUS BLD VENIPUNCTURE: CPT

## 2019-04-01 PROCEDURE — 82306 VITAMIN D 25 HYDROXY: CPT

## 2019-04-01 PROCEDURE — 82607 VITAMIN B-12: CPT

## 2019-04-11 ENCOUNTER — OFFICE VISIT (OUTPATIENT)
Dept: PSYCHIATRY | Facility: CLINIC | Age: 59
End: 2019-04-11

## 2019-04-11 DIAGNOSIS — F41.1 GENERALIZED ANXIETY DISORDER: Primary | ICD-10-CM

## 2019-04-11 DIAGNOSIS — F43.21 GRIEF: ICD-10-CM

## 2019-04-11 PROCEDURE — 90832 PSYTX W PT 30 MINUTES: CPT | Performed by: COUNSELOR

## 2019-04-11 NOTE — PROGRESS NOTES
Date of Service: 2019  Time In: 9:00 AM  Time Out: 9:30 AM      PROGRESS NOTE  Data:  Lexie Valdez is a 58 y.o. female who met with the undersigned for a regularly scheduled individual outpatient therapy session at the [facility].  She rated her anxiety and depression levels as a 5 on a scale from 1-10 where 10 is the most.     HPI: She said that it has been a difficult month because her brother, Nadir  suddenly from a massive heart attack on 3/14/2019.  She was tearful throughout the session as she discussed her grief.  There is some animosity between her family and his  and her son.  They are still having financial stress because her Social Security disability benefits have not been approved yet.  They cashed in her 401K and her  has been working a lot of hours.  She is very proud of her baby granddaughter who was born on 2019.  She shared pictures of the therapist and said that she plans to babysit a couple of days each week.  She and her  are getting along well and she interacts socially with her sister each week.  She has a strong spiritual foundation which is helping her cope.    Clinical Maneuvering/Intervention:  Assisted patient in processing above session content; acknowledged and normalized patient’s thoughts, feelings, and concerns.  Therapist provided emotional support and reminded her about the importance of self-care during this difficult time.    Allowed patient to freely discuss issues without interruption or judgment. Provided safe, confidential environment to facilitate the development of positive therapeutic relationship and encourage open, honest communication. Assisted patient in identifying risk factors which would indicate the need for higher level of care including thoughts to harm self or others and/or self-harming behavior and encouraged patient to contact this office, call 911, or present to the nearest emergency room should any of these events  occur. Discussed crisis intervention services and means to access.  Patient adamantly and convincingly denies current suicidal or homicidal ideation or perceptual disturbance.    Assessment     Diagnoses and all orders for this visit:    Generalized anxiety disorder    Grief               Mental Status Exam  Hygiene:  good  Dress:  casual  Attitude:  Cooperative  Motor Activity:  Appropriate  Speech:  Normal  Mood:  within normal limits  Affect:  labile  Thought Processes:  Circum  Thought Content:  normal  Suicidal Thoughts:  denies  Homicidal Thoughts:  denies  Crisis Safety Plan: yes, to come to the emergency room.  Hallucinations:  denies    Patient's Support Network Includes:  , son and extended family    Progress toward goal: At goal    Functional Status: No impairment    Prognosis: Good with Ongoing Treatment       Plan         Patient will adhere to medication regimen as prescribed and report any side effects. Patient will contact this office, call 911 or present to the nearest emergency room should suicidal or homicidal ideations occur. Provide Cognitive Behavioral Therapy and Integrative Therapy to improve functioning, maintain stability, and avoid decompensation and the need for higher level of care.          Return in about 1 month (around 5/11/2019).      This document signed by PABLO Burnett, [facility] April 11, 2019 9:52 AM

## 2019-05-08 DIAGNOSIS — F41.1 GENERALIZED ANXIETY DISORDER: ICD-10-CM

## 2019-05-09 ENCOUNTER — OFFICE VISIT (OUTPATIENT)
Dept: PSYCHIATRY | Facility: CLINIC | Age: 59
End: 2019-05-09

## 2019-05-09 DIAGNOSIS — F41.1 GENERALIZED ANXIETY DISORDER: ICD-10-CM

## 2019-05-09 DIAGNOSIS — F43.21 GRIEF: ICD-10-CM

## 2019-05-09 DIAGNOSIS — F33.1 MAJOR DEPRESSIVE DISORDER, RECURRENT EPISODE, MODERATE (HCC): Primary | ICD-10-CM

## 2019-05-09 PROCEDURE — 90834 PSYTX W PT 45 MINUTES: CPT | Performed by: COUNSELOR

## 2019-05-09 NOTE — TREATMENT PLAN
Multi-Disciplinary Problems (from Behavioral Health Treatment Plan)    Active Problems     Problem: Anxiety  Start Date: 05/09/19    Problem Details:  The patient self-scales this problem as a 10 with 10 being the worst.       Goal Priority Start Date Expected End Date End Date    Patient will develop and implement behavioral and cognitive strategies to reduce anxiety and irrational fears. -- 05/09/19 05/09/20 --    Goal Details:  Progress toward goal:  The patient self-scales their progress related to this goal as a 10 with 10 being the worst.       Goal Intervention Frequency Start Date End Date    Help patient explore past emotional issues in relation to present anxiety. PRN 05/09/19 05/09/20    Intervention Details:  Duration of treatment until until remission of symptoms.       Goal Intervention Frequency Start Date End Date    Help patient develop an awareness of their cognitive and physical responses to anxiety. PRN 05/09/19 05/09/20    Intervention Details:  Duration of treatment until until discharged.             Problem: Depression  Start Date: 05/09/19    Problem Details:  The patient self-scales this problem as a 10 with 10 being the worst.       Goal Priority Start Date Expected End Date End Date    Patient will demonstrate the ability to initiate new constructive life skills outside of sessions on a consistent basis. -- 05/09/19 05/09/20 --    Goal Details:  Progress toward goal:  The patient self-scales their progress related to this goal as a 10 with 10 being the worst.       Goal Intervention Frequency Start Date End Date    Assist patient in setting attainable activities of daily living goals. PRN 05/09/19 05/09/20    Goal Intervention Frequency Start Date End Date    Provide education about depression PRN 05/09/19 05/09/20    Intervention Details:  Duration of treatment until until discharged.       Goal Intervention Frequency Start Date End Date    Assist patient in developing healthy coping  strategies. PRN 05/09/19 05/09/20    Intervention Details:  Duration of treatment until until discharged.             Problem: Grief and Loss  Start Date: 05/09/19    Problem Details:  The patient self-scales this problem as a 10 with 10 being the worst.       Goal Priority Start Date Expected End Date End Date    Patient will process feelings and identify and implement specific ways to facilitate the grieving process. -- 05/09/19 05/09/20 --    Goal Details:  Progress toward goal:  The patient self-scales their progress related to this goal as a 10 with 10 being the worst.       Goal Intervention Frequency Start Date End Date    Assist patient in identifying and processing feelings about losses. PRN 05/09/19 05/09/20    Intervention Details:  Duration of treatment until until discharged.       Goal Intervention Frequency Start Date End Date    Identify ways to grieve effectively and utilize healthy support systems PRN 05/09/19 05/09/20    Intervention Details:  Duration of treatment until until remission of symptoms.             Problem: Medical Issue  Start Date: 05/09/19    Problem Details:  The patient self-scales this problem as a 9 with 10 being the worst.       Goal Priority Start Date Expected End Date End Date    Patient will verbalize emotional, cognitive and behavioral changes needed to address health issues. -- 05/09/19 05/09/20 --    Goal Details:  Progress toward goal:  The patient self-scales their progress related to this goal as a 9 with 10 being the worst.       Goal Intervention Frequency Start Date End Date    Reinforce emotional stability, behavioral responsibility and positive self talk that reduces risk to health. PRN 05/09/19 05/09/20    Intervention Details:  Duration of treatment until until discharged.                   Reviewed By     Angela Chisholm, [facility] 05/09/19 1045                 I have discussed and reviewed this treatment plan with the patient.  It has been printed for  signatures.

## 2019-05-09 NOTE — PROGRESS NOTES
"Date of Service: May 9, 2019  Time In: 9:35 AM  Time Out: 10:20 AM      PROGRESS NOTE  Data:  Lexie Valdez is a 58 y.o. female who met with the undersigned for a regularly scheduled individual outpatient therapy session at the [facility].  She rated her anxiety and depression levels as a 10 on a scale from 1-10 where 10 is the most.  She denied suicidal ideation but was tearful throughout the session and verbalized feelings of hopelessness and helplessness.  She said, \"I do not know how much more I can take.\"  She is out of hydroxyzine and lorazepam.     HPI: She said that her 39-year-old nephew  yesterday.  She is extremely worried about her brother, Jake because this is the second son he has lost and she is afraid he will commit suicide.  It has only been 2 months since their brother  and they are still grieving for him.  It rekindled her own grief from the death of her 3-year-old daughter several years ago and she is concerned that will something will happen to her son, Faith.  She said that she could not go on if she lost him.  She has been calling him up to 5 times daily to reassure herself that he is okay.  She feels exhausted and has been struggling with a cough for couple of months.  She aches all over from arthritis and has severe burning pain in her back due to degenerative disc disease.  She reports a worsening of her depressive symptoms since .  She said she feels like she wants to go into a deep cave and lock the door.    Clinical Maneuvering/Intervention:  Assisted patient in processing above session content; acknowledged and normalized patient’s thoughts, feelings, and concerns.  Therapist provided emotional support and allowed her to share her distress.  Cognitive behavioral techniques were used to reframe distorted thoughts contributing to her distress.  She was encouraged to focus on her 1-month-old granddaughter and to enjoy spending time with her son on Mother's Day which " is also her birthday.  Since SG Syed is no longer meeting with adult patients in the clinic she was scheduled with a new APRN for medication management.    Allowed patient to freely discuss issues without interruption or judgment. Provided safe, confidential environment to facilitate the development of positive therapeutic relationship and encourage open, honest communication. Assisted patient in identifying risk factors which would indicate the need for higher level of care including thoughts to harm self or others and/or self-harming behavior and encouraged patient to contact this office, call 911, or present to the nearest emergency room should any of these events occur. Discussed crisis intervention services and means to access.  Patient adamantly and convincingly denies current suicidal or homicidal ideation or perceptual disturbance.    Assessment     Diagnoses and all orders for this visit:    Major depressive disorder, recurrent episode, moderate (CMS/HCC)    Generalized anxiety disorder    Grief               Mental Status Exam  Hygiene:  good  Dress:  casual  Attitude:  Cooperative  Motor Activity:  Appropriate  Speech:  Normal  Mood:  depressed  Affect:  depressed and tearful  Thought Processes:  Circum  Thought Content: Grieving  Suicidal Thoughts:  denies  Homicidal Thoughts:  denies  Crisis Safety Plan: yes, to come to the emergency room.  Hallucinations:  denies    Patient's Support Network Includes:  , son and extended family    Progress toward goal: Not at goal    Functional Status: Severe impairment    Prognosis: Good with Ongoing Treatment       Plan         Patient will adhere to medication regimen as prescribed and report any side effects. Patient will contact this office, call 911 or present to the nearest emergency room should suicidal or homicidal ideations occur. Provide Cognitive Behavioral Therapy and Integrative Therapy to improve functioning, maintain stability, and  avoid decompensation and the need for higher level of care.          Return in about 2 weeks (around 5/23/2019).      This document signed by PABLO Burnett, [facility] May 9, 2019 10:43 AM

## 2019-05-23 ENCOUNTER — OFFICE VISIT (OUTPATIENT)
Dept: PSYCHIATRY | Facility: CLINIC | Age: 59
End: 2019-05-23

## 2019-05-23 DIAGNOSIS — F34.1 DYSTHYMIA: Primary | ICD-10-CM

## 2019-05-23 DIAGNOSIS — F41.1 GENERALIZED ANXIETY DISORDER: ICD-10-CM

## 2019-05-23 PROCEDURE — 90832 PSYTX W PT 30 MINUTES: CPT | Performed by: COUNSELOR

## 2019-05-23 NOTE — PROGRESS NOTES
Date of Service: May 23, 2019  Time In: 10:00 AM  Time Out: 10:30 AM      PROGRESS NOTE  Data:  Lexie Valdez is a 59 y.o. female who met with the undersigned for a regularly scheduled individual outpatient therapy session at the [facility].  She rated her anxiety and depression levels as a 5 on a scale from 1-10 where 10 is the most.     HPI: She said that she has been sick with COPD, bronchitis and a sinus infection for the past 10 days.  She has been unable to sleep due to coughing and feels exhausted.  She had several coughing spells during our session.  She is scheduled to meet with the pulmonologist on 6/28/2019.  She is coping much better with grief and is focusing on her son and 2-month-old granddaughter.  Is looking forward to helping with babysitting when her daughter-in-law returns to work.  Her back doctor wanted to burn the nerves in her back but the procedure was refused by the insurance company and is on appeal.  She received a court date for her Social Security hearing on 8/27/2019.    Clinical Maneuvering/Intervention:  Assisted patient in processing above session content; acknowledged and normalized patient’s thoughts, feelings, and concerns.  She was praised for using positive coping skills to manage grief and anxiety.  She was reminded about the therapist's upcoming [facility] and the need to transition to a new therapist.    Allowed patient to freely discuss issues without interruption or judgment. Provided safe, confidential environment to facilitate the development of positive therapeutic relationship and encourage open, honest communication. Assisted patient in identifying risk factors which would indicate the need for higher level of care including thoughts to harm self or others and/or self-harming behavior and encouraged patient to contact this office, call 911, or present to the nearest emergency room should any of these events occur. Discussed crisis intervention services and means to  access.  Patient adamantly and convincingly denies current suicidal or homicidal ideation or perceptual disturbance.    Assessment     Diagnoses and all orders for this visit:    Dysthymia    Generalized anxiety disorder               Mental Status Exam  Hygiene:  good  Dress:  casual  Attitude:  Cooperative  Motor Activity:  Appropriate  Speech:  Normal  Mood:  within normal limits  Affect:  calm and pleasant  Thought Processes:  Goal directed  Thought Content:  normal  Suicidal Thoughts:  denies  Homicidal Thoughts:  denies  Crisis Safety Plan: yes, to come to the emergency room.  Hallucinations:  denies    Patient's Support Network Includes:  , son and extended family    Progress toward goal: At goal    Functional Status: Mild impairment     Prognosis: Good with Ongoing Treatment       Plan         Patient will adhere to medication regimen as prescribed and report any side effects. Patient will contact this office, call 911 or present to the nearest emergency room should suicidal or homicidal ideations occur. Provide Cognitive Behavioral Therapy and Integrative Therapy to improve functioning, maintain stability, and avoid decompensation and the need for higher level of care.          Return if symptoms worsen or fail to improve.      This document signed by PABLO Burnett, [facility] May 23, 2019 10:43 AM

## 2019-06-24 RX ORDER — HYDROXYZINE HYDROCHLORIDE 10 MG/1
TABLET, FILM COATED ORAL
Qty: 45 TABLET | Refills: 1 | Status: SHIPPED | OUTPATIENT
Start: 2019-06-24 | End: 2023-03-21

## 2019-06-25 ENCOUNTER — OFFICE VISIT (OUTPATIENT)
Dept: PSYCHIATRY | Facility: CLINIC | Age: 59
End: 2019-06-25

## 2019-06-25 DIAGNOSIS — F43.25 ADJUSTMENT DISORDER WITH MIXED DISTURBANCE OF EMOTIONS AND CONDUCT: Primary | ICD-10-CM

## 2019-06-25 DIAGNOSIS — F43.21 GRIEF: ICD-10-CM

## 2019-06-25 PROCEDURE — 90834 PSYTX W PT 45 MINUTES: CPT | Performed by: COUNSELOR

## 2019-06-26 ENCOUNTER — OFFICE VISIT (OUTPATIENT)
Dept: PULMONOLOGY | Facility: CLINIC | Age: 59
End: 2019-06-26

## 2019-06-26 ENCOUNTER — HOSPITAL ENCOUNTER (OUTPATIENT)
Dept: GENERAL RADIOLOGY | Facility: HOSPITAL | Age: 59
Discharge: HOME | End: 2019-06-26
Admitting: INTERNAL MEDICINE

## 2019-06-26 VITALS
DIASTOLIC BLOOD PRESSURE: 92 MMHG | HEART RATE: 67 BPM | WEIGHT: 199.8 LBS | OXYGEN SATURATION: 95 % | BODY MASS INDEX: 34.11 KG/M2 | HEIGHT: 64 IN | SYSTOLIC BLOOD PRESSURE: 151 MMHG | TEMPERATURE: 98 F

## 2019-06-26 DIAGNOSIS — J84.10 PULMONARY FIBROSIS (HCC): Primary | ICD-10-CM

## 2019-06-26 DIAGNOSIS — J44.9 CHRONIC OBSTRUCTIVE PULMONARY DISEASE, UNSPECIFIED COPD TYPE (HCC): ICD-10-CM

## 2019-06-26 DIAGNOSIS — J44.9 CHRONIC OBSTRUCTIVE PULMONARY DISEASE, UNSPECIFIED COPD TYPE (HCC): Primary | ICD-10-CM

## 2019-06-26 LAB
FEV1: 1.38 LITERS
FVC VOL RESPIRATORY: 1.48 LITERS

## 2019-06-26 PROCEDURE — 99203 OFFICE O/P NEW LOW 30 MIN: CPT | Performed by: INTERNAL MEDICINE

## 2019-06-26 PROCEDURE — 71046 X-RAY EXAM CHEST 2 VIEWS: CPT

## 2019-06-26 PROCEDURE — 71046 X-RAY EXAM CHEST 2 VIEWS: CPT | Performed by: RADIOLOGY

## 2019-06-26 PROCEDURE — 94010 BREATHING CAPACITY TEST: CPT | Performed by: INTERNAL MEDICINE

## 2019-06-26 ASSESSMENT — PULMONARY FUNCTION TESTS
FEV1: 1.38
FVC: 1.48

## 2019-06-26 NOTE — PROGRESS NOTES
Subjective   Chief Complaint   Patient presents with   • COPD       Lexie Valdez is a 59 y.o. 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 and has hypertension for which she is taking Norvasc 5 mg daily cough has been worse for the last six 8-month it is nonproductive with minimal shortness of breath on exertion rare wheezing no history of asthma in herself or the family however she has arthritis in the fingers and her back has some bulging disc that required some attention she has had sinusitis and had sinus x-rays in the past has been on lisinopril was discontinued because of a cough of her cough is continuing    Review of Systems dry hacking cough mostly during the day no expectoration chest pain or weight loss no difficulty swallowing choking    Family History   Problem Relation Age of Onset   • Panic disorder Mother    • COPD Mother    • Alcohol abuse Father    • Alzheimer's disease Father    • Breast cancer Neg Hx        Past Medical History:   Diagnosis Date   • Anxiety    • Arthritis    • Depression        Past Surgical History:   Procedure Laterality Date   • GALLBLADDER SURGERY     • HYSTERECTOMY      age 37       Social History     Socioeconomic History   • Marital status:      Spouse name: Not on file   • Number of children: Not on file   • Years of education: Not on file   • Highest education level: Not on file   Occupational History   • Occupation:    Tobacco Use   • Smoking status: Former Smoker     Start date: 1971     Last attempt to quit: 10/18/2005     Years since quittin.6   • Smokeless tobacco: Never Used   Substance and Sexual Activity   • Alcohol use: No   • Drug use: No   • Sexual activity: Yes     Partners: Male        Physical Exam: No acute distress vital signs are  stable no cough during the interview joint deformity to suggest rheumatoid arthritis lungs clear heart regular no clubbing cyanosis or edema    PFT: Question of full effort with FVC of 32 FEV1 of 40% suggesting some restrictive impairment    Imaging: Chest x-ray of today shows no cardiomegaly with increased lung marking CT chest November 5, 2018 that definitely showed some interstitial infiltrate bilaterally    Other Labs:       ASSESSMENT1-COPD seemingly mild2-question of interstitial lung disease question of pulmonary fibrosis this may be idiopathic or related to rheumatoid arthritis that she may have        Recommendations: We will get some labs including CBC sed rate rheumatoid factor and MARZENA and get a high-resolution CT chest and complete PFT with diffusion capacity    Follow up: Return in 2 weeks to review the results she may need a lung biopsy for definite diagnosis and appropriate treatment

## 2019-06-27 NOTE — PROGRESS NOTES
"    PROGRESS NOTE  Data:  Lexie Valdez came in 2019 for her regularly scheduled therapy session starting at 3:15 pm and ending at 4:00 pm, with Leanne Espinal [facility].     (Scales based on 0 - 10 with 10 being the worst)  Depression: 5 Anxiety: 8     HPI:   Patient reported wanting to sit and cry frequently.  She realizes that she may be grieving as her brother's son  recently and she is very close to her brother.  She said she cries even when she is holding her 1 y/o granddaughter.  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  She was  in  after her  disclosed on New Year's [place] that he was having a baby with another woman.  She experienced much anger in her grieving.  She reported having two children but their daughter  at age 3.  She suffered spina bifida and was paralyzed from the chest down.  She grieved heavily for 3 months after her death but kept going knowing that her 6 y/o son needed her. She has asked God why He took her. She was  for 20 years before marrying Khris 3 years ago, who she describes as trustworthy and \"treats me good.\"  She admits that he sometimes uses sharp works that hurt her feelings but she believes he doesn't mean to hurt her yet he never apologizes.  Patient loves Khris's mother, who moved out of her home to live with her daughter and they live in her house and Khris has a large variety of farm animals.  After completing her house work and caring for her dogs and helping Khris she spends her time doing paint by number with an loly on her phone for 5-6 hours/day.  She is very close to her 4 surviving siblings and they are planning a family cookout in July. Her son and daughter-in-law adopted a boy who is 15 y/o and have their own 2 mo old baby girl. \"I need to go back to Confucianist.\"  She shared how her father had been transformed into a loving " man.    Clinical Maneuvering/Intervention:  Assisted patient in processing above session content; acknowledged and normalized patient’s thoughts, feelings, and concerns.  Discussed the grief process. Patient was encouraged to reach out to reconnect with family and friends, return to [employer] to enlarge her support network and search for additional ways to find purpose in life.    Allowed patient to freely discuss issues without interruption or judgment. Provided safe, confidential environment to facilitate the development of positive therapeutic relationship and encourage open, honest communication. Assisted patient in identifying risk factors which would indicate the need for higher level of care including thoughts to harm self or others and/or self-harming behavior and encouraged patient to contact this office, call 911, or present to the nearest emergency room should any of these events occur. Discussed crisis intervention services and means to access.  Patient adamantly and convincingly denies current suicidal or homicidal ideation or perceptual disturbance.        Assessment   Patient is grieving and experiencing depression due to recent death in the family.  She has good family connections but is still lonely and bored and would benefit from finding a way to volunteer to create more meaning in her life.    Diagnosis:   Encounter Diagnoses   Name Primary?   • Adjustment disorder with mixed disturbance of emotions and conduct Yes   • Grief        Adjustment disorder with mixed disturbance of emotions and conduct [F43.25]    Mental Status Exam  Hygiene:  good  Dress:  casual  Attitude:  Cooperative  Motor Activity:  Appropriate  Speech:  Normal  Mood:  sad  Affect:  depressed and anxious  Thought Processes:  Linear  Thought Content:  normal  Suicidal Thoughts:  denies  Homicidal Thoughts:  denies  Crisis Safety Plan: yes, to come to the emergency room.  Hallucinations:  denies          Patient's Support Network  Includes:  , son and extended family    Progress toward goal: Not at goal    Functional Status: Mild impairment     Prognosis: Good with Ongoing Treatment       Plan         Patient will adhere to medication regimen as prescribed and report any side effects. Patient will contact this office, call 911 or present to the nearest emergency room should suicidal or homicidal ideations occur. Provide Cognitive Behavioral Therapy and Integrative Therapy to improve functioning, maintain stability, and avoid decompensation and the need for higher level of care.            Return in about 2 weeks (around 7/9/2019).            This document electronically signed by Leanne Espinal, PABLO, NCC, CSAT  June 27, 2019 2:53 PM    Plan

## 2019-07-11 ENCOUNTER — LAB (OUTPATIENT)
Dept: LAB | Facility: HOSPITAL | Age: 59
End: 2019-07-11

## 2019-07-11 ENCOUNTER — TRANSCRIBE ORDERS (OUTPATIENT)
Dept: ADMINISTRATIVE | Facility: HOSPITAL | Age: 59
End: 2019-07-11

## 2019-07-11 DIAGNOSIS — J84.10 PULMONARY FIBROSIS (HCC): ICD-10-CM

## 2019-07-11 DIAGNOSIS — M25.50 PAIN IN JOINT, MULTIPLE SITES: ICD-10-CM

## 2019-07-11 DIAGNOSIS — M25.50 PAIN IN JOINT, MULTIPLE SITES: Primary | ICD-10-CM

## 2019-07-11 LAB
BASOPHILS # BLD AUTO: 0.04 10*3/MM3 (ref 0–0.2)
BASOPHILS NFR BLD AUTO: 0.4 % (ref 0–1.5)
CHROMATIN AB SERPL-ACNC: 33.1 IU/ML (ref 0–14)
CRP SERPL-MCNC: 0.73 MG/DL (ref 0–0.5)
DEPRECATED RDW RBC AUTO: 50.6 FL (ref 37–54)
EOSINOPHIL # BLD AUTO: 0.48 10*3/MM3 (ref 0–0.4)
EOSINOPHIL NFR BLD AUTO: 4.7 % (ref 0.3–6.2)
ERYTHROCYTE [DISTWIDTH] IN BLOOD BY AUTOMATED COUNT: 15.9 % (ref 12.3–15.4)
ERYTHROCYTE [SEDIMENTATION RATE] IN BLOOD: 24 MM/HR (ref 0–30)
HCT VFR BLD AUTO: 42.1 % (ref 34–46.6)
HGB BLD-MCNC: 12.2 G/DL (ref 12–15.9)
IMM GRANULOCYTES # BLD AUTO: 0.02 10*3/MM3 (ref 0–0.05)
IMM GRANULOCYTES NFR BLD AUTO: 0.2 % (ref 0–0.5)
LYMPHOCYTES # BLD AUTO: 4.21 10*3/MM3 (ref 0.7–3.1)
LYMPHOCYTES NFR BLD AUTO: 41.1 % (ref 19.6–45.3)
MCH RBC QN AUTO: 25.3 PG (ref 26.6–33)
MCHC RBC AUTO-ENTMCNC: 29 G/DL (ref 31.5–35.7)
MCV RBC AUTO: 87.2 FL (ref 79–97)
MONOCYTES # BLD AUTO: 0.6 10*3/MM3 (ref 0.1–0.9)
MONOCYTES NFR BLD AUTO: 5.9 % (ref 5–12)
NEUTROPHILS # BLD AUTO: 4.9 10*3/MM3 (ref 1.7–7)
NEUTROPHILS NFR BLD AUTO: 47.7 % (ref 42.7–76)
NRBC BLD AUTO-RTO: 0 /100 WBC (ref 0–0.2)
PLATELET # BLD AUTO: 301 10*3/MM3 (ref 140–450)
PMV BLD AUTO: 10.9 FL (ref 6–12)
RBC # BLD AUTO: 4.83 10*6/MM3 (ref 3.77–5.28)
WBC NRBC COR # BLD: 10.25 10*3/MM3 (ref 3.4–10.8)

## 2019-07-11 PROCEDURE — 85025 COMPLETE CBC W/AUTO DIFF WBC: CPT

## 2019-07-11 PROCEDURE — 86038 ANTINUCLEAR ANTIBODIES: CPT

## 2019-07-11 PROCEDURE — 86140 C-REACTIVE PROTEIN: CPT

## 2019-07-11 PROCEDURE — 36415 COLL VENOUS BLD VENIPUNCTURE: CPT

## 2019-07-11 PROCEDURE — 86431 RHEUMATOID FACTOR QUANT: CPT

## 2019-07-11 PROCEDURE — 85652 RBC SED RATE AUTOMATED: CPT

## 2019-07-12 ENCOUNTER — HOSPITAL ENCOUNTER (OUTPATIENT)
Dept: CT IMAGING | Facility: HOSPITAL | Age: 59
Discharge: HOME | End: 2019-07-12
Admitting: INTERNAL MEDICINE

## 2019-07-12 DIAGNOSIS — J84.10 PULMONARY FIBROSIS (HCC): ICD-10-CM

## 2019-07-12 LAB — ANA SER QL: NEGATIVE

## 2019-07-12 PROCEDURE — 71250 CT THORAX DX C-: CPT | Performed by: RADIOLOGY

## 2019-07-12 PROCEDURE — 71250 CT THORAX DX C-: CPT

## 2019-07-15 ENCOUNTER — HOSPITAL ENCOUNTER (OUTPATIENT)
Dept: MAMMOGRAPHY | Facility: HOSPITAL | Age: 59
Discharge: HOME | End: 2019-07-15
Admitting: NURSE PRACTITIONER

## 2019-07-15 DIAGNOSIS — Z12.39 SCREENING BREAST EXAMINATION: ICD-10-CM

## 2019-07-15 PROCEDURE — 77067 SCR MAMMO BI INCL CAD: CPT

## 2019-07-15 PROCEDURE — 77067 SCR MAMMO BI INCL CAD: CPT | Performed by: RADIOLOGY

## 2019-07-15 PROCEDURE — 77063 BREAST TOMOSYNTHESIS BI: CPT | Performed by: RADIOLOGY

## 2019-07-15 PROCEDURE — 77063 BREAST TOMOSYNTHESIS BI: CPT

## 2019-07-17 DIAGNOSIS — R06.02 SOB (SHORTNESS OF BREATH): Primary | ICD-10-CM

## 2019-07-23 ENCOUNTER — OFFICE VISIT (OUTPATIENT)
Dept: PSYCHIATRY | Facility: CLINIC | Age: 59
End: 2019-07-23

## 2019-07-23 DIAGNOSIS — F43.21 GRIEF: ICD-10-CM

## 2019-07-23 DIAGNOSIS — F43.25 ADJUSTMENT DISORDER WITH MIXED DISTURBANCE OF EMOTIONS AND CONDUCT: Primary | ICD-10-CM

## 2019-07-23 DIAGNOSIS — E66.9 OBESITY (BMI 30.0-34.9): ICD-10-CM

## 2019-07-23 DIAGNOSIS — F41.1 GENERALIZED ANXIETY DISORDER: ICD-10-CM

## 2019-07-23 DIAGNOSIS — F33.1 MAJOR DEPRESSIVE DISORDER, RECURRENT EPISODE, MODERATE (HCC): ICD-10-CM

## 2019-07-23 PROCEDURE — 90837 PSYTX W PT 60 MINUTES: CPT | Performed by: COUNSELOR

## 2019-07-23 NOTE — PROGRESS NOTES
"    PROGRESS NOTE  Data:  Lexie Valdez came in 2019 for her regularly scheduled therapy session starting at 9:00 am and ending at 10:00 am, with Leanne Espinal [facility].     (Scales based on 0 - 10 with 10 being the worst)  Depression: 5 Anxiety: 5     HPI:   Patient is worried about her brother, the one who lost 2 sons recently.  She saw him last weekend at a family gathering and he was suffering so much he could barely sit in a chair.  He just had his nerves burnt.  She admitted she is still grieving the loss of her parents and the way things used to be in the family.  Another brother  4 months ago.  In two days Patient will  Undergo the same nerve burning procedure that her brother had, and she has had.  She is dreading it because the procedure was painful but she has benefited from the last one so it is worth it. Patient reported being able to do more housework without having to take so many breaks after the last procedure.  Next month she will have her disability hearing due to severe back pain.  She has hoped having some disability income would make it easier for Khris and he could work less but he has assured her that he will continue to \"work 8 days/week.\"  She is being referred to a rheumatologist for her arthritis pain.  She will take another breathing test, failed the first one, has difficulty getting out of breath if she walks a distance. She used to smoke but quit 12-13 years ago.  Patient admitted she has gained 50 pounds since her lap-band surgery and her doctor is telling her she cannot eat any fruit at all and to eat her protein first to fill up on that and eat less carbs. She reported eating the wrong things and eating too much.  Patient compared her first marriage where she was \"miserable for 20 years, the only thing I got out of it was my two kids,\" with having a , Khris, \"who loves me and will do anything for me.\"  She listed many things she is grateful for, Her son and his wife and " "granddaughter, Khris is always with her, a wonderful  and her son and family \"love him to death.\"  She respects Khris for being honest and doing quality repair work, \"I'm proud of him.\"  Patient hasn't gotten around to returning to Confucianism since she lives a distance from her Confucianism family. \"I thank God every day for how He spared my life many times, and for my family.\"  Patient does spend quality time with her family and enjoys doing what she can to be helpful.  \"I have been very blessed.\"    Clinical Maneuvering/Intervention:  Assisted patient in processing above session content; acknowledged and normalized patient’s thoughts, feelings, and concerns. Discussed the concepts of the Serenity Prayer, accepting what we cannot change, that we sometimes believe we need to help when we also know we are powerless.  Discussed admitting powerlessness and focusing on what we are able to accomplish and not believing we are supposed to manage the unmanageable.  Discussed re-framing grief over the loss of family members to her hope that she will be reunited with them for eternity. Patient was able to recognize that she has been too focused on the past.  She was praised for all she does to find purpose and meaning in life despite losing her former level of functioning.  She was encouraged to search for a Confucianism family and get involved as much as possible in the ministry of the Confucianism.    Allowed patient to freely discuss issues without interruption or judgment. Provided safe, confidential environment to facilitate the development of positive therapeutic relationship and encourage open, honest communication. Assisted patient in identifying risk factors which would indicate the need for higher level of care including thoughts to harm self or others and/or self-harming behavior and encouraged patient to contact this office, call 911, or present to the nearest emergency room should any of these events occur. Discussed crisis " intervention services and means to access.  Patient adamantly and convincingly denies current suicidal or homicidal ideation or perceptual disturbance.    Assessment   Patient is still grieving the loss of family members and her former level of functioning.  She is adjusting to a new way of living and searching to find meaning and purpose.    Diagnosis:   Encounter Diagnoses   Name Primary?   • Adjustment disorder with mixed disturbance of emotions and conduct Yes   • Grief    • Major depressive disorder, recurrent episode, moderate (CMS/HCC)    • Generalized anxiety disorder    • Obesity (BMI 30.0-34.9)        Adjustment disorder with mixed disturbance of emotions and conduct [F43.25]    Mental Status Exam  Hygiene:  good  Dress:  casual  Attitude:  Cooperative  Motor Activity:  Appropriate  Speech:  Normal  Mood:  within normal limits  Affect:  calm and pleasant  Thought Processes:  Goal directed  Thought Content:  normal  Suicidal Thoughts:  denies  Homicidal Thoughts:  denies  Crisis Safety Plan: yes, to come to the emergency room.  Hallucinations:  denies          Patient's Support Network Includes:  , children, siblings    Progress toward goal: Not at goal    Functional Status: Moderate impairment     Prognosis: Good with Ongoing Treatment       Plan         Patient will adhere to medication regimen as prescribed and report any side effects. Patient will contact this office, call 911 or present to the nearest emergency room should suicidal or homicidal ideations occur. Provide Cognitive Behavioral Therapy and Integrative Therapy to improve functioning, maintain stability, and avoid decompensation and the need for higher level of care.            Return in about 1 month (around 8/23/2019).            This document electronically signed by Leanne Espinal, PABLO, NCC, CSAT  July 23, 2019 11:13 AM    Plan

## 2019-07-25 ENCOUNTER — HOSPITAL ENCOUNTER (OUTPATIENT)
Dept: RESPIRATORY THERAPY | Facility: HOSPITAL | Age: 59
Discharge: HOME | End: 2019-07-25
Admitting: INTERNAL MEDICINE

## 2019-07-25 DIAGNOSIS — R06.02 SOB (SHORTNESS OF BREATH): ICD-10-CM

## 2019-07-25 PROCEDURE — 94060 EVALUATION OF WHEEZING: CPT

## 2019-07-25 PROCEDURE — 94729 DIFFUSING CAPACITY: CPT

## 2019-07-25 PROCEDURE — 94729 DIFFUSING CAPACITY: CPT | Performed by: INTERNAL MEDICINE

## 2019-07-25 PROCEDURE — 94727 GAS DIL/WSHOT DETER LNG VOL: CPT

## 2019-07-25 PROCEDURE — 94727 GAS DIL/WSHOT DETER LNG VOL: CPT | Performed by: INTERNAL MEDICINE

## 2019-07-25 PROCEDURE — 94010 BREATHING CAPACITY TEST: CPT | Performed by: INTERNAL MEDICINE

## 2019-08-08 ENCOUNTER — OFFICE VISIT (OUTPATIENT)
Dept: PULMONOLOGY | Facility: CLINIC | Age: 59
End: 2019-08-08

## 2019-08-08 VITALS
BODY MASS INDEX: 34.31 KG/M2 | DIASTOLIC BLOOD PRESSURE: 97 MMHG | HEIGHT: 64 IN | HEART RATE: 58 BPM | WEIGHT: 201 LBS | SYSTOLIC BLOOD PRESSURE: 155 MMHG | OXYGEN SATURATION: 94 %

## 2019-08-08 DIAGNOSIS — J84.10 PULMONARY FIBROSIS (HCC): Primary | ICD-10-CM

## 2019-08-08 DIAGNOSIS — J44.9 CHRONIC OBSTRUCTIVE PULMONARY DISEASE, UNSPECIFIED COPD TYPE (HCC): ICD-10-CM

## 2019-08-08 PROCEDURE — 99213 OFFICE O/P EST LOW 20 MIN: CPT | Performed by: INTERNAL MEDICINE

## 2019-08-08 NOTE — PROGRESS NOTES
Subjective   Chief Complaint   Patient presents with   • COPD       Lexie Valdez is a 59 y.o. female     History of Present Illness 2-year-old female returning for follow-up of interstitial lung disease question of rheumatoid lung disease she was seen  for evaluation of COPD as she gives history of smoking all her adult life until about 13 years ago cough and shortness of breath also gave history of having rheumatoid arthritis for over 10 years not seen by rheumatologist was has an appointment to see a rheumatologist by the name of signs were in [place] in 2 weeks.  She worked in [employer] for 7 years otherwise no chemical exposure    Review of Systems minimal cough and shortness of breath taking Breo and albuterol rescue inhaler continued hurting all the joints for which has been taking Voltaren only    Family History   Problem Relation Age of Onset   • Panic disorder Mother    • COPD Mother    • Alcohol abuse Father    • Alzheimer's disease Father    • Breast cancer Neg Hx        Past Medical History:   Diagnosis Date   • Anxiety    • Arthritis    • Depression        Past Surgical History:   Procedure Laterality Date   • BREAST BIOPSY Left     benign   • GALLBLADDER SURGERY     • HYSTERECTOMY      age 37       Social History     Socioeconomic History   • Marital status:      Spouse name: Not on file   • Number of children: Not on file   • Years of education: Not on file   • Highest education level: Not on file   Occupational History   • Occupation:    Tobacco Use   • Smoking status: Former Smoker     Start date: 1971     Last attempt to quit: 10/18/2005     Years since quittin.8   • Smokeless tobacco: Never Used   Substance and Sexual Activity   • Alcohol use: No   • Drug use: No   • Sexual activity: Yes     Partners: Male        Physical Exam: No acute distress vital signs are stable O2 sat 94% on room air no joint deformity of rheumatoid arthritis lungs clear  heart regular no clubbing cyanosis or edema    PFT: Complete PFT in the department last week showed FVC of 51 FEV1 of 59 total lung capacity of 50 and diffusion capacity of 49% corrected to lung volume at 104 indicating moderate restrictive impairment     Imaging: High-resolution CT shows diffuse interstitial disease in both lungs similar to what she had on her previous CT of November 2018    Other Labs:       ASSESSMENT1-interstitial lung disease may be idiopathic pulmonary fibrosis or could be rheumatoid as she has rheumatoid arthritis with positive RA factor2-COPD mild        Recommendations: Gave her a copy of her PFT and report of CT to take to her rheumatologist explained to her that she may have rheumatoid lung disease or something else that seems to be relatively stable may need a lung biopsy for definite diagnosis and appropriate treatment is not urgent reminded her of getting her flu vaccine annually    Follow up: Return in 3 months that is November 12 chest x-ray PFT or earlier as needed in case of deterioration of PFT or progress of the interstitial lung disease on chest x-ray she may need further intervention

## 2019-08-12 ENCOUNTER — TRANSCRIBE ORDERS (OUTPATIENT)
Dept: ADMINISTRATIVE | Facility: HOSPITAL | Age: 59
End: 2019-08-12

## 2019-08-12 ENCOUNTER — LAB (OUTPATIENT)
Dept: LAB | Facility: HOSPITAL | Age: 59
End: 2019-08-12

## 2019-08-12 DIAGNOSIS — N39.0 RECURRENT URINARY TRACT INFECTION: Primary | ICD-10-CM

## 2019-08-12 DIAGNOSIS — N39.0 RECURRENT URINARY TRACT INFECTION: ICD-10-CM

## 2019-08-12 PROCEDURE — 87088 URINE BACTERIA CULTURE: CPT

## 2019-08-12 PROCEDURE — 81003 URINALYSIS AUTO W/O SCOPE: CPT

## 2019-08-12 PROCEDURE — 87186 SC STD MICRODIL/AGAR DIL: CPT

## 2019-08-12 PROCEDURE — 87086 URINE CULTURE/COLONY COUNT: CPT

## 2019-08-13 LAB
BILIRUB UR QL STRIP: NEGATIVE
CLARITY UR: ABNORMAL
COLOR UR: YELLOW
GLUCOSE UR STRIP-MCNC: NEGATIVE MG/DL
HGB UR QL STRIP.AUTO: ABNORMAL
KETONES UR QL STRIP: ABNORMAL
LEUKOCYTE ESTERASE UR QL STRIP.AUTO: ABNORMAL
NITRITE UR QL STRIP: POSITIVE
PH UR STRIP.AUTO: 6 [PH] (ref 5–8)
PROT UR QL STRIP: ABNORMAL
SP GR UR STRIP: 1.02 (ref 1–1.03)
UROBILINOGEN UR QL STRIP: ABNORMAL

## 2019-08-15 LAB — BACTERIA SPEC AEROBE CULT: ABNORMAL

## 2019-08-19 ENCOUNTER — APPOINTMENT (OUTPATIENT)
Dept: RESPIRATORY THERAPY | Facility: HOSPITAL | Age: 59
End: 2019-08-19

## 2019-09-02 ENCOUNTER — TRANSCRIBE ORDERS (OUTPATIENT)
Dept: ADMINISTRATIVE | Facility: HOSPITAL | Age: 59
End: 2019-09-02

## 2019-09-02 ENCOUNTER — APPOINTMENT (OUTPATIENT)
Dept: LAB | Facility: HOSPITAL | Age: 59
End: 2019-09-02

## 2019-09-02 ENCOUNTER — HOSPITAL ENCOUNTER (OUTPATIENT)
Dept: GENERAL RADIOLOGY | Facility: HOSPITAL | Age: 59
Discharge: HOME | End: 2019-09-02
Admitting: INTERNAL MEDICINE

## 2019-09-02 DIAGNOSIS — M79.642 BILATERAL HAND PAIN: Primary | ICD-10-CM

## 2019-09-02 DIAGNOSIS — M79.641 BILATERAL HAND PAIN: Primary | ICD-10-CM

## 2019-09-02 DIAGNOSIS — G89.29 CHRONIC HAND PAIN, UNSPECIFIED LATERALITY: Primary | ICD-10-CM

## 2019-09-02 DIAGNOSIS — Z79.899 HIGH RISK MEDICATION USE: Primary | ICD-10-CM

## 2019-09-02 DIAGNOSIS — M79.643 CHRONIC HAND PAIN, UNSPECIFIED LATERALITY: Primary | ICD-10-CM

## 2019-09-02 LAB
C3 SERPL-MCNC: 157 MG/DL (ref 82–167)
C4 SERPL-MCNC: 20 MG/DL (ref 14–44)
HBV SURFACE AG SERPL QL IA: NORMAL

## 2019-09-02 PROCEDURE — 86160 COMPLEMENT ANTIGEN: CPT | Performed by: INTERNAL MEDICINE

## 2019-09-02 PROCEDURE — 86038 ANTINUCLEAR ANTIBODIES: CPT | Performed by: INTERNAL MEDICINE

## 2019-09-02 PROCEDURE — 86235 NUCLEAR ANTIGEN ANTIBODY: CPT | Performed by: INTERNAL MEDICINE

## 2019-09-02 PROCEDURE — 86200 CCP ANTIBODY: CPT | Performed by: INTERNAL MEDICINE

## 2019-09-02 PROCEDURE — 86480 TB TEST CELL IMMUN MEASURE: CPT | Performed by: INTERNAL MEDICINE

## 2019-09-02 PROCEDURE — 36415 COLL VENOUS BLD VENIPUNCTURE: CPT | Performed by: INTERNAL MEDICINE

## 2019-09-02 PROCEDURE — 87521 HEPATITIS C PROBE&RVRS TRNSC: CPT | Performed by: INTERNAL MEDICINE

## 2019-09-02 PROCEDURE — 87340 HEPATITIS B SURFACE AG IA: CPT | Performed by: INTERNAL MEDICINE

## 2019-09-02 PROCEDURE — 73130 X-RAY EXAM OF HAND: CPT | Performed by: RADIOLOGY

## 2019-09-02 PROCEDURE — 86704 HEP B CORE ANTIBODY TOTAL: CPT | Performed by: INTERNAL MEDICINE

## 2019-09-02 PROCEDURE — 73130 X-RAY EXAM OF HAND: CPT

## 2019-09-03 LAB
ANA SER QL: NEGATIVE
CENTROMERE B AB SER-ACNC: <0.2 AI (ref 0–0.9)
ENA SCL70 AB SER-ACNC: <0.2 AI (ref 0–0.9)
ENA SM AB SER-ACNC: <0.2 AI (ref 0–0.9)
ENA SS-A AB SER-ACNC: <0.2 AI (ref 0–0.9)
ENA SS-B AB SER-ACNC: <0.2 AI (ref 0–0.9)

## 2019-09-04 LAB
CCP IGA+IGG SERPL IA-ACNC: 5 UNITS (ref 0–19)
HBV CORE AB SER DONR QL IA: NEGATIVE

## 2019-09-05 LAB
HCV RNA SERPL QL NAA+PROBE: NEGATIVE
QUANTIFERON CRITERIA: NORMAL
QUANTIFERON MITOGEN VALUE: >10 IU/ML
QUANTIFERON NIL VALUE: 0.16 IU/ML
QUANTIFERON TB1 AG VALUE: 0.1 IU/ML
QUANTIFERON TB2 AG VALUE: 0.09 IU/ML
QUANTIFERON-TB GOLD PLUS: NEGATIVE

## 2019-09-09 ENCOUNTER — OFFICE VISIT (OUTPATIENT)
Dept: UROLOGY | Facility: CLINIC | Age: 59
End: 2019-09-09

## 2019-09-09 VITALS
HEIGHT: 64 IN | DIASTOLIC BLOOD PRESSURE: 82 MMHG | BODY MASS INDEX: 35.58 KG/M2 | WEIGHT: 208.4 LBS | SYSTOLIC BLOOD PRESSURE: 160 MMHG

## 2019-09-09 DIAGNOSIS — R35.0 FREQUENCY OF URINATION: Primary | ICD-10-CM

## 2019-09-09 DIAGNOSIS — N39.3 SUI (STRESS URINARY INCONTINENCE, FEMALE): ICD-10-CM

## 2019-09-09 DIAGNOSIS — N30.20 CHRONIC CYSTITIS: ICD-10-CM

## 2019-09-09 LAB
BILIRUB BLD-MCNC: NEGATIVE MG/DL
CLARITY, POC: CLEAR
COLOR UR: YELLOW
GLUCOSE UR STRIP-MCNC: NEGATIVE MG/DL
KETONES UR QL: NEGATIVE
LEUKOCYTE EST, POC: NEGATIVE
NITRITE UR-MCNC: NEGATIVE MG/ML
PH UR: 6 [PH] (ref 5–8)
PROT UR STRIP-MCNC: NEGATIVE MG/DL
RBC # UR STRIP: NEGATIVE /UL
SP GR UR: 1.02 (ref 1–1.03)
UROBILINOGEN UR QL: NORMAL

## 2019-09-09 PROCEDURE — 99203 OFFICE O/P NEW LOW 30 MIN: CPT | Performed by: UROLOGY

## 2019-09-09 RX ORDER — NITROFURANTOIN 25; 75 MG/1; MG/1
100 CAPSULE ORAL DAILY
Qty: 56 CAPSULE | Refills: 3 | Status: SHIPPED | OUTPATIENT
Start: 2019-09-09 | End: 2020-02-17

## 2019-09-09 NOTE — PROGRESS NOTES
Chief Complaint:          Chief Complaint   Patient presents with   • Recurrent Uti     New Patient        HPI:   59 y.o. female referred with recurrent urinary tract infections.  She has dysuria, nocturia in the range of 4 times, hesitancy, incontinence.  She is had a cholecystectomy hysterectomy.  She has a negligible residual negative urinalysis she is obese with stress incontinence.  I will set her up for cystoscopy in preparation for a work-up      Past Medical History:        Past Medical History:   Diagnosis Date   • Anxiety    • Arthritis    • Depression          Current Meds:     Current Outpatient Medications   Medication Sig Dispense Refill   • amLODIPine (NORVASC) 5 MG tablet Take 5 mg by mouth Daily.     • BREO ELLIPTA 100-25 MCG/INH aerosol powder       • diclofenac (VOLTAREN) 50 MG EC tablet Take 50 mg by mouth 2 (Two) Times a Day As Needed.     • hydrOXYzine (ATARAX) 10 MG tablet TAKE ONE TABLET BY MOUTH THREE TIMES A DAY AS NEEDED FOR ANXIETY 45 tablet 1   • LORazepam (ATIVAN) 0.5 MG tablet Take 1 tablet by mouth 2 (Two) Times a Day As Needed for Anxiety. 60 tablet 0   • Multiple Vitamins-Minerals (PX MENS MULTIVITAMINS) tablet Take  by mouth Daily.     • sertraline (ZOLOFT) 50 MG tablet Take 2 tablets by mouth Every Night. 30 tablet 2   • VENTOLIN  (90 Base) MCG/ACT inhaler        No current facility-administered medications for this visit.         Allergies:      Allergies   Allergen Reactions   • Codeine    • Sulfa Antibiotics         Past Surgical History:     Past Surgical History:   Procedure Laterality Date   • BREAST BIOPSY Left 2000    benign   • GALLBLADDER SURGERY     • GASTRIC BANDING     • HYSTERECTOMY      age 37         Social History:     Social History     Socioeconomic History   • Marital status:      Spouse name: Not on file   • Number of children: Not on file   • Years of education: Not on file   • Highest education level: Not on file   Occupational History   •  Occupation: [employer]   Tobacco Use   • Smoking status: Former Smoker     Start date: 1971     Last attempt to quit: 10/18/2005     Years since quittin.9   • Smokeless tobacco: Never Used   Substance and Sexual Activity   • Alcohol use: No   • Drug use: No   • Sexual activity: Yes     Partners: Male       Family History:     Family History   Problem Relation Age of Onset   • Panic disorder Mother    • COPD Mother    • Alcohol abuse Father    • Alzheimer's disease Father    • Breast cancer Neg Hx        Review of Systems:     Review of Systems   Constitutional: Negative.  Negative for activity change, appetite change, chills, diaphoresis, fatigue and unexpected weight change.   HENT: Negative for congestion, dental problem, drooling, ear discharge, ear pain, facial swelling, hearing loss, mouth sores, nosebleeds, postnasal drip, rhinorrhea, sinus pressure, sneezing, sore throat, tinnitus, trouble swallowing and voice change.    Eyes: Negative.  Negative for photophobia, pain, discharge, redness, itching and visual disturbance.   Respiratory: Negative.  Negative for apnea, cough, choking, chest tightness, shortness of breath, wheezing and stridor.    Cardiovascular: Negative.  Negative for chest pain, palpitations and leg swelling.   Gastrointestinal: Negative.  Negative for abdominal distention, abdominal pain, anal bleeding, blood in stool, constipation, diarrhea, nausea, rectal pain and vomiting.   Endocrine: Negative.  Negative for cold intolerance, heat intolerance, polydipsia, polyphagia and polyuria.   Genitourinary: Positive for difficulty urinating.   Musculoskeletal: Negative.  Negative for arthralgias, back pain, gait problem, joint swelling, myalgias, neck pain and neck stiffness.   Skin: Negative.  Negative for color change, pallor, rash and wound.   Allergic/Immunologic: Negative.  Negative for environmental allergies, food allergies and immunocompromised state.   Neurological: Negative.   Negative for dizziness, tremors, seizures, syncope, facial asymmetry, speech difficulty, weakness, light-headedness, numbness and headaches.   Hematological: Negative.  Negative for adenopathy. Does not bruise/bleed easily.   Psychiatric/Behavioral: Negative for agitation, behavioral problems, confusion, decreased concentration, dysphoric mood, hallucinations, self-injury, sleep disturbance and suicidal ideas. The patient is not nervous/anxious and is not hyperactive.    All other systems reviewed and are negative.      Physical Exam:     Physical Exam   Constitutional: She appears well-developed and well-nourished.   HENT:   Head: Normocephalic and atraumatic.   Right Ear: External ear normal.   Left Ear: External ear normal.   Mouth/Throat: Oropharynx is clear and moist.   Eyes: Conjunctivae are normal. Pupils are equal, round, and reactive to light.   Cardiovascular: Normal rate, regular rhythm, normal heart sounds and intact distal pulses.   Pulmonary/Chest: Effort normal and breath sounds normal.   Abdominal: Soft. Bowel sounds are normal. She exhibits no distension and no mass. There is no tenderness. There is no rebound and no guarding.   Genitourinary: No vaginal discharge found.   Musculoskeletal: Normal range of motion.   Neurological: She is alert. She has normal reflexes.   Skin: Skin is warm and dry.   Psychiatric: She has a normal mood and affect. Her behavior is normal. Judgment and thought content normal.       I have reviewed the following portions of the patient's history: allergies, current medications, past family history, past medical history, past social history, past surgical history, problem list and ROS and confirm it's accurate.      Procedure:       Assessment/Plan:   Urinary incontinence:  Patient was diagnosed with urinary incontinence.  We discussed treatable and non-treatable causes of both stress and urge urinary incontinence.  With regards to stress urinary incontinence we discussed  its relationship to childbirth and pelvic health.  We discussed the grading of stress incontinence with trying to quantitate the number of pads used.  We talked about leaking urine with laughing, lifting, coughing, and sexual intercourse.  Talked about the urge component and the concept of mixed incontinence where upon the stress treatable at the urge may exist and that 50% of the time the urge will resolve with treatment of the stress incontinence.  We talked with the diagnostic workup including a postvoid residual urine, urine and even a simple cystometrogram.  I discussed the findings that may be neurologically related including commonly seen with multiple sclerosis, Parkinson's disease, and stroke.  I talked about the various therapeutic options including anticholinergics, beta 3 agonists, and alpha blockade if there is a component of obstruction.  I discussed the side effects of anti-cholinergic including dry mouth, double vision etc.  Recurrent urinary tract infections-patient has been referred and diagnosed with recurrent urinary tract infections.  We discussed the types of organisms that are found in the urinary tract indicating that the vast majority are results of the patient's own gastrointestinal ammon.  We discussed how many of the antibiotics that are utilized can actually exacerbate these infections by creating resistant organisms and there is only a very few antibiotics that are concentrated in the urine and do not affect the rectal reservoir nor cause recurrent yeast vaginitis.  We discussed the risk factors for recurrent infections being intercourse in younger patients and atrophic changes in older patients.  We discussed the symptoms that are found including pain, pressure, burning, frequency, urgency suprapubic pain and painful intercourse.  I discussed upper tract symptoms including fevers, chills, and indicated the workup would be much more aggressive if the patient were to present with  recurrent infections in the face of upper tract symptomatology such as fever.  I discussed the history of vesicoureteral reflux in young patients and finally chronic renal scarring as a result of such.  I recommend concomitant probiotics with treatment with antibiotics to protect the rectal reservoir including over-the-counter yogurt preparations to veronique oral pills containing the appropriate probiotics.            Patient's Body mass index is 35.77 kg/m². BMI is above normal parameters. Recommendations include: educational material.              This document has been electronically signed by WILBER COPELAND MD September 9, 2019 10:01 AM

## 2019-09-11 PROBLEM — N39.3 SUI (STRESS URINARY INCONTINENCE, FEMALE): Status: ACTIVE | Noted: 2019-09-11

## 2019-09-11 PROBLEM — N30.20 CHRONIC CYSTITIS: Status: ACTIVE | Noted: 2019-09-11

## 2019-09-24 ENCOUNTER — OFFICE VISIT (OUTPATIENT)
Dept: PSYCHIATRY | Facility: CLINIC | Age: 59
End: 2019-09-24

## 2019-09-24 DIAGNOSIS — F33.1 MAJOR DEPRESSIVE DISORDER, RECURRENT EPISODE, MODERATE (HCC): Primary | ICD-10-CM

## 2019-09-24 DIAGNOSIS — F41.1 GENERALIZED ANXIETY DISORDER: ICD-10-CM

## 2019-09-24 DIAGNOSIS — F43.21 GRIEF: ICD-10-CM

## 2019-09-24 PROCEDURE — 90837 PSYTX W PT 60 MINUTES: CPT | Performed by: COUNSELOR

## 2019-09-24 NOTE — PROGRESS NOTES
"    PROGRESS NOTE  Data:  Lexie Valdez came in 2019 for her regularly scheduled therapy session starting at 9:15 am and ending at 10:15 am, with Leanne Espinal [facility].  \"Life is good.\"     (Scales based on 0 - 10 with 10 being the worst)  Depression: 1 Anxiety: 1     HPI:   Patient reported her biggest relief was to get approved for her disability. \"It took a big load off my mind not to have to worry about my bills.\"  She also received 14 months back pay.  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  He is planning to go fishing this weekend, she is unsure if she will go with him but has enjoyed fishing in the past. Patient also had the nerve burning procedure which was painful but she has had good results and enjoys relief from constant pain which could last anywhere from 9 - 15 months or longer.  Patient joins Khris when he goes out to feed all his animals and was thrilled to see the new bull calf.  Patient is also relieved that her brother appears to be stable after both sons and his brother  within the past 3-4 years.  She enjoys spending time with her granddaughter every 2 weeks.  Her friends tease her that she is \"a spoiled brat\" because Khris is so good to her, \"You get whatever you want.\"  Patient rated the quality of their relationship as 9 /10 (with 10 being the closest connection).  Patient is spending time with her sister and mother-in-law, enjoys shopping and playing bingo.  \"I hope I make it to 69.\" Patient has a number of serious health concerns and her doctor fears she may have Rheumatoid Arthritis as well as COPD. Patient processed the experience of having a daughter with Spina Bifida, being paralyzed and dying from pneumonia just before she turne age 2.  She has attended Baptism with her son's family and admitted that she has made excuses not to attend regularly.    Clinical Maneuvering/Intervention:  Assisted patient in " processing above session content; acknowledged and normalized patient’s thoughts, feelings, and concerns. Patient was affirmed for her progress and celebrated getting her disability and positive family connections.  Processed her grief at the death of her daughter in 1888.  Patient was encouraged to continue to socialize and keep moving for the best quality of life.    Allowed patient to freely discuss issues without interruption or judgment. Provided safe, confidential environment to facilitate the development of positive therapeutic relationship and encourage open, honest communication. Assisted patient in identifying risk factors which would indicate the need for higher level of care including thoughts to harm self or others and/or self-harming behavior and encouraged patient to contact this office, call 911, or present to the nearest emergency room should any of these events occur. Discussed crisis intervention services and means to access.  Patient adamantly and convincingly denies current suicidal or homicidal ideation or perceptual disturbance.    Assessment     Patient has made excellent progress and relief now that she has been approved for disability.    Diagnosis:   Encounter Diagnoses   Name Primary?   • Major depressive disorder, recurrent episode, moderate (CMS/HCC) Yes   • Generalized anxiety disorder    • Grief        Major depressive disorder, recurrent episode, moderate (CMS/HCC) [F33.1]    Mental Status Exam  Hygiene:  good  Dress:  casual  Attitude:  Cooperative  Motor Activity:  Appropriate  Speech:  Normal  Mood:  within normal limits  Affect:  calm and pleasant  Thought Processes:  Goal directed  Thought Content:  normal  Suicidal Thoughts:  denies  Homicidal Thoughts:  denies  Crisis Safety Plan: yes, to come to the emergency room.  Hallucinations:  denies          Patient's Support Network Includes:  , extended family and friends    Progress toward goal: At goal    Functional Status:  Mild impairment     Prognosis: Patient is stablized and will return as needed      Plan         Patient will adhere to medication regimen as prescribed and report any side effects. Patient will contact this office, call 911 or present to the nearest emergency room should suicidal or homicidal ideations occur. Provide Cognitive Behavioral Therapy and Integrative Therapy to improve functioning, maintain stability, and avoid decompensation and the need for higher level of care.            Return if symptoms worsen or fail to improve.            This document electronically signed by Leanne Espinal, PABLO, NCC, CSAT  September 24, 2019 12:18 PM    Plan

## 2019-11-01 ENCOUNTER — PROCEDURE VISIT (OUTPATIENT)
Dept: UROLOGY | Facility: CLINIC | Age: 59
End: 2019-11-01

## 2019-11-01 VITALS — WEIGHT: 215 LBS | HEIGHT: 64 IN | BODY MASS INDEX: 36.7 KG/M2

## 2019-11-01 DIAGNOSIS — Z48.816 AFTERCARE FOLLOWING SURGERY OF THE GENITOURINARY SYSTEM: Primary | ICD-10-CM

## 2019-11-01 DIAGNOSIS — N30.20 CHRONIC CYSTITIS: ICD-10-CM

## 2019-11-01 DIAGNOSIS — N39.3 SUI (STRESS URINARY INCONTINENCE, FEMALE): ICD-10-CM

## 2019-11-01 PROCEDURE — 52000 CYSTOURETHROSCOPY: CPT | Performed by: UROLOGY

## 2019-11-01 PROCEDURE — 51725 SIMPLE CYSTOMETROGRAM: CPT | Performed by: UROLOGY

## 2019-11-01 PROCEDURE — 96372 THER/PROPH/DIAG INJ SC/IM: CPT | Performed by: UROLOGY

## 2019-11-01 RX ORDER — GENTAMICIN SULFATE 40 MG/ML
80 INJECTION, SOLUTION INTRAMUSCULAR; INTRAVENOUS ONCE
Status: COMPLETED | OUTPATIENT
Start: 2019-11-01 | End: 2019-11-01

## 2019-11-01 RX ORDER — CLOBETASOL PROPIONATE 0.5 MG/G
OINTMENT TOPICAL 2 TIMES DAILY
Qty: 30 G | Refills: 2 | Status: SHIPPED | OUTPATIENT
Start: 2019-11-01 | End: 2021-11-24

## 2019-11-01 RX ORDER — DEXTROMETHORPHAN HYDROBROMIDE AND PROMETHAZINE HYDROCHLORIDE 15; 6.25 MG/5ML; MG/5ML
SYRUP ORAL NIGHTLY
COMMUNITY
Start: 2019-10-28 | End: 2023-03-21

## 2019-11-01 RX ORDER — OXYBUTYNIN CHLORIDE 5 MG/1
5 TABLET ORAL DAILY
Qty: 30 TABLET | Refills: 3 | Status: SHIPPED | OUTPATIENT
Start: 2019-11-01 | End: 2019-12-01

## 2019-11-01 RX ORDER — HYDROXYCHLOROQUINE SULFATE 200 MG/1
1 TABLET, FILM COATED ORAL 2 TIMES DAILY
COMMUNITY
Start: 2019-10-22

## 2019-11-01 RX ORDER — ATORVASTATIN CALCIUM 20 MG/1
1 TABLET, FILM COATED ORAL DAILY
COMMUNITY
Start: 2019-10-22 | End: 2023-03-21

## 2019-11-01 RX ORDER — MELOXICAM 15 MG/1
1 TABLET ORAL DAILY
COMMUNITY
Start: 2019-10-20 | End: 2021-11-24

## 2019-11-01 RX ORDER — SERTRALINE HYDROCHLORIDE 100 MG/1
1 TABLET, FILM COATED ORAL DAILY
COMMUNITY
Start: 2019-10-27 | End: 2021-11-24

## 2019-11-01 RX ADMIN — GENTAMICIN SULFATE 80 MG: 40 INJECTION, SOLUTION INTRAMUSCULAR; INTRAVENOUS at 14:38

## 2019-11-01 NOTE — PROGRESS NOTES
Chief Complaint:          Chief Complaint   Patient presents with   • Cystoscopy     Frequency       HPI:   59 y.o. female here for evaluation of recurrent infections and stress urinary incontinence.  She has a negative urine she is obese with stress incontinence she is here for a work-up today.  She is seeing Dr. Alfredo and was given Osphena and that is helped somewhat she has had a collagen injection as evidenced on her cystoscopy.  She has severe lichen sclerosis at atrophicus.      Past Medical History:        Past Medical History:   Diagnosis Date   • Anxiety    • Arthritis    • Depression    • MARU (stress urinary incontinence, female) 9/11/2019         Current Meds:     Current Outpatient Medications   Medication Sig Dispense Refill   • amLODIPine (NORVASC) 5 MG tablet Take 5 mg by mouth Daily.     • atorvastatin (LIPITOR) 20 MG tablet Take 1 tablet by mouth Daily.     • BREO ELLIPTA 100-25 MCG/INH aerosol powder       • diclofenac (VOLTAREN) 50 MG EC tablet Take 50 mg by mouth 2 (Two) Times a Day As Needed.     • hydroxychloroquine (PLAQUENIL) 200 MG tablet Take 1 tablet by mouth 2 (Two) Times a Day.     • hydrOXYzine (ATARAX) 10 MG tablet TAKE ONE TABLET BY MOUTH THREE TIMES A DAY AS NEEDED FOR ANXIETY 45 tablet 1   • LORazepam (ATIVAN) 0.5 MG tablet Take 1 tablet by mouth 2 (Two) Times a Day As Needed for Anxiety. 60 tablet 0   • meloxicam (MOBIC) 15 MG tablet Take 1 tablet by mouth Daily.     • Multiple Vitamins-Minerals (PX MENS MULTIVITAMINS) tablet Take  by mouth Daily.     • nitrofurantoin, macrocrystal-monohydrate, (MACROBID) 100 MG capsule Take 1 capsule by mouth Daily. 56 capsule 3   • promethazine-dextromethorphan (PROMETHAZINE-DM) 6.25-15 MG/5ML syrup Every Night.     • sertraline (ZOLOFT) 100 MG tablet Take 1 tablet by mouth Daily.     • VENTOLIN  (90 Base) MCG/ACT inhaler        No current facility-administered medications for this visit.         Allergies:      Allergies   Allergen  Reactions   • Codeine    • Sulfa Antibiotics         Past Surgical History:     Past Surgical History:   Procedure Laterality Date   • BREAST BIOPSY Left     benign   • GALLBLADDER SURGERY     • GASTRIC BANDING     • HYSTERECTOMY      age 37         Social History:     Social History     Socioeconomic History   • Marital status:      Spouse name: Not on file   • Number of children: Not on file   • Years of education: Not on file   • Highest education level: Not on file   Occupational History   • Occupation:    Tobacco Use   • Smoking status: Former Smoker     Start date: 1971     Last attempt to quit: 10/18/2005     Years since quittin.0   • Smokeless tobacco: Never Used   Substance and Sexual Activity   • Alcohol use: No   • Drug use: No   • Sexual activity: Yes     Partners: Male       Family History:     Family History   Problem Relation Age of Onset   • Panic disorder Mother    • COPD Mother    • Alcohol abuse Father    • Alzheimer's disease Father    • Breast cancer Neg Hx        Review of Systems:     Review of Systems   Constitutional: Negative.  Negative for activity change, appetite change, chills, diaphoresis, fatigue and unexpected weight change.   HENT: Negative for congestion, dental problem, drooling, ear discharge, ear pain, facial swelling, hearing loss, mouth sores, nosebleeds, postnasal drip, rhinorrhea, sinus pressure, sneezing, sore throat, tinnitus, trouble swallowing and voice change.    Eyes: Negative.  Negative for photophobia, pain, discharge, redness, itching and visual disturbance.   Respiratory: Negative.  Negative for apnea, cough, choking, chest tightness, shortness of breath, wheezing and stridor.    Cardiovascular: Negative.  Negative for chest pain, palpitations and leg swelling.   Gastrointestinal: Negative.  Negative for abdominal distention, abdominal pain, anal bleeding, blood in stool, constipation, diarrhea, nausea, rectal pain and vomiting.    Endocrine: Negative.  Negative for cold intolerance, heat intolerance, polydipsia, polyphagia and polyuria.   Genitourinary: Positive for difficulty urinating, dyspareunia, frequency, urgency and vaginal pain.   Musculoskeletal: Negative.  Negative for arthralgias, back pain, gait problem, joint swelling, myalgias, neck pain and neck stiffness.   Skin: Negative.  Negative for color change, pallor, rash and wound.   Allergic/Immunologic: Negative.  Negative for environmental allergies, food allergies and immunocompromised state.   Neurological: Negative.  Negative for dizziness, tremors, seizures, syncope, facial asymmetry, speech difficulty, weakness, light-headedness, numbness and headaches.   Hematological: Negative.  Negative for adenopathy. Does not bruise/bleed easily.   Psychiatric/Behavioral: Negative for agitation, behavioral problems, confusion, decreased concentration, dysphoric mood, hallucinations, self-injury, sleep disturbance and suicidal ideas. The patient is not nervous/anxious and is not hyperactive.    All other systems reviewed and are negative.      Physical Exam:     Physical Exam   Constitutional: She appears well-developed and well-nourished.   HENT:   Head: Normocephalic and atraumatic.   Right Ear: External ear normal.   Left Ear: External ear normal.   Mouth/Throat: Oropharynx is clear and moist.   Eyes: Conjunctivae are normal. Pupils are equal, round, and reactive to light.   Cardiovascular: Normal rate, regular rhythm, normal heart sounds and intact distal pulses.   Pulmonary/Chest: Effort normal and breath sounds normal.   Abdominal: Soft. Bowel sounds are normal. She exhibits no distension and no mass. There is no tenderness. There is no rebound and no guarding.   Genitourinary: No vaginal discharge found.   Genitourinary Comments: Soft abdomen.  Severe lichen sclerosus.  Hypermobile urethra with minimal stress incontinence.   Musculoskeletal: Normal range of motion.   Neurological:  She is alert. She has normal reflexes.   Skin: Skin is warm and dry.   Psychiatric: She has a normal mood and affect. Her behavior is normal. Judgment and thought content normal.       I have reviewed the following portions of the patient's history: allergies, current medications, past family history, past medical history, past social history, past surgical history, problem list and ROS and confirm it's accurate.      Procedure:   Cystoscopy:  Patient presents today for cystourethroscopy.  I went ahead and obtained an informed consent including the risk of anesthesia, bleeding, infection, etc.  After prep and drape in a sterile fashion in the low dorsal lithotomy position the urethra was gently anesthetized with 10 cc of 2% viscous Xylocaine jelly.  After an appropriate period of topical anesthesia I used the Olympus digital 14 Syriac flexible cystoscope to examine the anterior urethra which was completely normal, the ureteral orifices were visualized and normal in position and configuration there were no stones, tumors or foreign bodies.  There is evidence of bilateral bladder neck collagen injection with some inclusion cyst formation. The patient was given 80 mg of gentamicin in an intramuscular fashion  as prophylaxis for the cystoscopy and released from the clinic.  Simple urodynamics-following a careful discussion of the risks and benefits of simple urodynamics.  This includes a 2% risk of a urinary tract infection.  The patient's brought the operative suite after appropriate endoscopy and prep and drape in a sterile fashion using the spinal manometric technique for simple cystometrogram at 50 cc/m first sensation was noted at 25  cc.  Patient developed fullness at 250  cc.  There were no  detrusor contractions.  There was minimal leakage with Valsalva most of her problem was pain there is no evidence of detrusor hyperreflexia or any other abnormalities.    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  The mainstay of treatment has been tight cholinergics which basically caused the bladder to have decreased contractility.  We have discussed the side effects of these treatments including dry mouth, double vision, and increasing constipation.  Severe lichen sclerosis at atrophicus-start topical clobetasol propionate in ointment form.  Anticholinergic medication-we are recommending the use of an anticholinergic.  We discussed the class of drugs.  We discussed the older drug such as oxybutynin with his increased spectrum of side effects such as dry mouth, dry eyes constipation versus the newer medications with lower side effects but more difficult to obtain via insurance and much more expensive finally the newest class of drugs which is Myrbetriq which has a very favorable side effect profile but unfortunately is prohibitively expensive and oftentimes requires precertification of which may be unsuccessful in many other cases to start her on Ditropan generically.  I will see him back in 2 months      Patient reports that she is currently experiencing any symptoms of urinary incontinence.      Patient's Body mass index is 35.77 kg/m². BMI is above normal parameters. Recommendations include: educational material.              This document has been electronically signed by WILBER COPELAND MD November 1, 2019 2:24 PM

## 2019-11-13 ENCOUNTER — OFFICE VISIT (OUTPATIENT)
Dept: PULMONOLOGY | Facility: CLINIC | Age: 59
End: 2019-11-13

## 2019-11-13 ENCOUNTER — HOSPITAL ENCOUNTER (OUTPATIENT)
Dept: GENERAL RADIOLOGY | Facility: HOSPITAL | Age: 59
Discharge: HOME | End: 2019-11-13
Admitting: INTERNAL MEDICINE

## 2019-11-13 VITALS
OXYGEN SATURATION: 99 % | WEIGHT: 209 LBS | BODY MASS INDEX: 35.68 KG/M2 | DIASTOLIC BLOOD PRESSURE: 73 MMHG | SYSTOLIC BLOOD PRESSURE: 161 MMHG | HEIGHT: 64 IN | HEART RATE: 69 BPM

## 2019-11-13 DIAGNOSIS — J44.9 CHRONIC OBSTRUCTIVE PULMONARY DISEASE, UNSPECIFIED COPD TYPE (HCC): ICD-10-CM

## 2019-11-13 DIAGNOSIS — R06.02 SOB (SHORTNESS OF BREATH): Primary | ICD-10-CM

## 2019-11-13 DIAGNOSIS — J84.10 PULMONARY FIBROSIS (HCC): Primary | ICD-10-CM

## 2019-11-13 PROCEDURE — 71046 X-RAY EXAM CHEST 2 VIEWS: CPT

## 2019-11-13 PROCEDURE — 99212 OFFICE O/P EST SF 10 MIN: CPT | Performed by: INTERNAL MEDICINE

## 2019-11-13 PROCEDURE — 71046 X-RAY EXAM CHEST 2 VIEWS: CPT | Performed by: RADIOLOGY

## 2019-11-13 PROCEDURE — 94010 BREATHING CAPACITY TEST: CPT | Performed by: INTERNAL MEDICINE

## 2019-11-13 NOTE — PROGRESS NOTES
Subjective   Chief Complaint   Patient presents with   • pulmonary fibrosis       Lexie Valdez is a 59 y.o. female     History of Present Illness 59-year-old female returning for follow-up of interstitial lung disease question of pulmonary fibrosis versus rheumatoid lung disease past history being significant for smoking off-and-on for 20years none for 12 years with rheumatoid arthritis of about 10 years currently on meloxicam and hydroxychloroquine followed by a rheumatologist and not aware of taking methotrexate in the past she had some cough and some dyspnea on exertion and some restrictive pulmonary impairment decided to observe and has seen her rheumatologist a month ago and gave a copy of CT results from high-resolution CT in July and copy of her PFT however did not receive any feedback from rheumatology    Review of Systems minimal cough mild shortness of breath and told her arthritis is relatively under control    Family History   Problem Relation Age of Onset   • Panic disorder Mother    • COPD Mother    • Alcohol abuse Father    • Alzheimer's disease Father    • Breast cancer Neg Hx        Past Medical History:   Diagnosis Date   • Anxiety    • Arthritis    • Depression    • MARU (stress urinary incontinence, female) 2019       Past Surgical History:   Procedure Laterality Date   • BREAST BIOPSY Left     benign   • GALLBLADDER SURGERY     • GASTRIC BANDING     • HYSTERECTOMY      age 37       Social History     Socioeconomic History   • Marital status:      Spouse name: Not on file   • Number of children: Not on file   • Years of education: Not on file   • Highest education level: Not on file   Occupational History   • Occupation:    Tobacco Use   • Smoking status: Former Smoker     Start date: 1971     Last attempt to quit: 10/18/2005     Years since quittin.0   • Smokeless tobacco: Never Used   Substance and Sexual Activity   • Alcohol use: No   • Drug use: No   • Sexual  activity: Yes     Partners: Male        Physical Exam: No acute distress no joint deformity of rheumatoid arthritis lungs clear heart regular no clubbing cyanosis or edema    PFT: FVC 43 FEV1 52% moderate restrictive impairment for complete PFT August 19 FVC of 51 FEV1 of 59% total lung capacity was 50% and diffusion capacity was 100% correct    Imaging: Repeat chest x-ray shows minimal increase in lung marking unchanged compared with her chest x-ray of July and high-resolution CT of July 12 showed some diffuse interstitial infiltrate in both lungs    Other Labs:       ASSESSMENT interstitial lung disease seemingly mild and stable explained to the patient again that it may be rheumatoid or something else and in case of deterioration such as more symptoms increase involvement on x-ray or CT or deterioration of PFT and may need a lung biopsy for definite diagnosis and appropriate treatment        Recommendations:     Follow up: Return December 11 with a complete PFT and bring the  so we can discuss it further

## 2019-12-02 ENCOUNTER — HOSPITAL ENCOUNTER (OUTPATIENT)
Dept: RESPIRATORY THERAPY | Facility: HOSPITAL | Age: 59
Discharge: HOME | End: 2019-12-02
Admitting: INTERNAL MEDICINE

## 2019-12-02 VITALS — OXYGEN SATURATION: 97 % | HEART RATE: 81 BPM | RESPIRATION RATE: 18 BRPM

## 2019-12-02 DIAGNOSIS — R06.02 SOB (SHORTNESS OF BREATH): ICD-10-CM

## 2019-12-02 DIAGNOSIS — J44.9 CHRONIC OBSTRUCTIVE PULMONARY DISEASE, UNSPECIFIED COPD TYPE (HCC): ICD-10-CM

## 2019-12-02 PROCEDURE — 94060 EVALUATION OF WHEEZING: CPT

## 2019-12-02 PROCEDURE — 94727 GAS DIL/WSHOT DETER LNG VOL: CPT | Performed by: INTERNAL MEDICINE

## 2019-12-02 PROCEDURE — 94799 UNLISTED PULMONARY SVC/PX: CPT

## 2019-12-02 PROCEDURE — 94729 DIFFUSING CAPACITY: CPT

## 2019-12-02 PROCEDURE — 94060 EVALUATION OF WHEEZING: CPT | Performed by: INTERNAL MEDICINE

## 2019-12-02 PROCEDURE — 94640 AIRWAY INHALATION TREATMENT: CPT

## 2019-12-02 PROCEDURE — 94727 GAS DIL/WSHOT DETER LNG VOL: CPT

## 2019-12-02 PROCEDURE — 94729 DIFFUSING CAPACITY: CPT | Performed by: INTERNAL MEDICINE

## 2019-12-02 RX ORDER — ALBUTEROL SULFATE 2.5 MG/3ML
2.5 SOLUTION RESPIRATORY (INHALATION) ONCE
Status: COMPLETED | OUTPATIENT
Start: 2019-12-02 | End: 2019-12-02

## 2019-12-02 RX ADMIN — ALBUTEROL SULFATE 2.5 MG: 2.5 SOLUTION RESPIRATORY (INHALATION) at 09:53

## 2019-12-19 ENCOUNTER — OFFICE VISIT (OUTPATIENT)
Dept: PULMONOLOGY | Facility: CLINIC | Age: 59
End: 2019-12-19

## 2019-12-19 VITALS
SYSTOLIC BLOOD PRESSURE: 86 MMHG | BODY MASS INDEX: 35.68 KG/M2 | OXYGEN SATURATION: 98 % | WEIGHT: 209 LBS | DIASTOLIC BLOOD PRESSURE: 59 MMHG | TEMPERATURE: 98 F | HEIGHT: 64 IN | HEART RATE: 66 BPM

## 2019-12-19 DIAGNOSIS — J84.10 PULMONARY FIBROSIS (HCC): ICD-10-CM

## 2019-12-19 DIAGNOSIS — J44.9 CHRONIC OBSTRUCTIVE PULMONARY DISEASE, UNSPECIFIED COPD TYPE (HCC): Primary | ICD-10-CM

## 2019-12-19 PROCEDURE — 99212 OFFICE O/P EST SF 10 MIN: CPT | Performed by: INTERNAL MEDICINE

## 2019-12-19 NOTE — PROGRESS NOTES
Subjective   Chief Complaint   Patient presents with   • PULMONARY FIBROSIS       Lexie Valdez is a 59 y.o. female     History of Present Illness 59-year-old female returning for follow-up of COPD question of interstitial lung disease question of rheumatoid lung disease etc. she was seen for the first time in  as a referral for COPD with history of smoking for 20 years but none for 20 years she also had a chest x-ray that showed some increased interstitial marking and CT chest in 2018 that showed definite interstitial lung disease.  She was complaining of some dry hacking cough and some shortness of breath and was taking Breo Symbicort and occasional albuterol inhaler and had a complete PFT in August that showed some mild restrictive impairment and decreased diffusion capacity and decided to follow her clinically arthritis is under control by rheumatologist    Review of Systems minimal cough no expectoration mild shortness of breath on exertion taking Plaquenil and Mobic for arthritis        for arthritis    Family History   Problem Relation Age of Onset   • Panic disorder Mother    • COPD Mother    • Alcohol abuse Father    • Alzheimer's disease Father    • Breast cancer Neg Hx        Past Medical History:   Diagnosis Date   • Anxiety    • Arthritis    • Depression    • MARU (stress urinary incontinence, female) 2019       Past Surgical History:   Procedure Laterality Date   • BREAST BIOPSY Left     benign   • GALLBLADDER SURGERY     • GASTRIC BANDING     • HYSTERECTOMY      age 37       Social History     Socioeconomic History   • Marital status:      Spouse name: Not on file   • Number of children: Not on file   • Years of education: Not on file   • Highest education level: Not on file   Occupational History   • Occupation:    Tobacco Use   • Smoking status: Former Smoker     Start date: 1971     Last attempt to quit: 10/18/2005     Years since quittin.1   • Smokeless  tobacco: Never Used   Substance and Sexual Activity   • Alcohol use: No   • Drug use: No   • Sexual activity: Yes     Partners: Male        Physical Exam: No acute distress vital signs are stable no rheumatoid changes of the fingers    PFT: Complete PFT in the hospital December 2 FVC 50 FEV1 61% total lung capacity 56 and diffusion capacity 49% similar to what she had August 1, 2019    Imaging: Chest x-ray of November unremarkable slightly increased basilar lung marking    Other Labs:       ASSESSMENT1-COPD mild and stable2-interstitial infiltrate minimal on chest x-ray documented in CT with restrictive impairment and low diffusion capacity that has remained unchanged since August and relatively asymptomatic does not seem to be related to rheumatoid arthritis that she has and being followed by rheumatologist        Recommendations: Continue current medications and follow-up with primary care and rheumatologist    Follow up: In case of increase in cough or shortness of breath may call the office to make a follow-up appointment in a couple of months as I may not be working any longer and my partner may see her for appropriate follow-up

## 2020-01-14 ENCOUNTER — TRANSCRIBE ORDERS (OUTPATIENT)
Dept: ADMINISTRATIVE | Facility: HOSPITAL | Age: 60
End: 2020-01-14

## 2020-01-14 DIAGNOSIS — R42 DIZZINESS AND GIDDINESS: Primary | ICD-10-CM

## 2020-01-16 ENCOUNTER — TRANSCRIBE ORDERS (OUTPATIENT)
Dept: ADMINISTRATIVE | Facility: HOSPITAL | Age: 60
End: 2020-01-16

## 2020-01-16 ENCOUNTER — LAB (OUTPATIENT)
Dept: LAB | Facility: HOSPITAL | Age: 60
End: 2020-01-16

## 2020-01-16 ENCOUNTER — HOSPITAL ENCOUNTER (OUTPATIENT)
Dept: CARDIOLOGY | Facility: HOSPITAL | Age: 60
Discharge: HOME | End: 2020-01-16
Admitting: PHYSICIAN ASSISTANT

## 2020-01-16 DIAGNOSIS — R73.03 PREDIABETES: ICD-10-CM

## 2020-01-16 DIAGNOSIS — R42 DIZZINESS AND GIDDINESS: ICD-10-CM

## 2020-01-16 DIAGNOSIS — R53.83 OTHER FATIGUE: Primary | ICD-10-CM

## 2020-01-16 DIAGNOSIS — E55.9 VITAMIN D DEFICIENCY: ICD-10-CM

## 2020-01-16 DIAGNOSIS — R53.83 OTHER FATIGUE: ICD-10-CM

## 2020-01-16 LAB
25(OH)D3 SERPL-MCNC: 19.9 NG/ML (ref 30–100)
ALBUMIN SERPL-MCNC: 4 G/DL (ref 3.5–5.2)
ALBUMIN/GLOB SERPL: 1.2 G/DL
ALP SERPL-CCNC: 80 U/L (ref 39–117)
ALT SERPL W P-5'-P-CCNC: 12 U/L (ref 1–33)
ANION GAP SERPL CALCULATED.3IONS-SCNC: 13.9 MMOL/L (ref 5–15)
AST SERPL-CCNC: 19 U/L (ref 1–32)
BILIRUB SERPL-MCNC: 0.3 MG/DL (ref 0.2–1.2)
BUN BLD-MCNC: 9 MG/DL (ref 8–23)
BUN/CREAT SERPL: 16.7 (ref 7–25)
BURR CELLS BLD QL SMEAR: ABNORMAL
CALCIUM SPEC-SCNC: 9.3 MG/DL (ref 8.6–10.5)
CHLORIDE SERPL-SCNC: 103 MMOL/L (ref 98–107)
CHOLEST SERPL-MCNC: 216 MG/DL (ref 0–200)
CO2 SERPL-SCNC: 24.1 MMOL/L (ref 22–29)
CREAT BLD-MCNC: 0.54 MG/DL (ref 0.57–1)
DEPRECATED RDW RBC AUTO: 40.8 FL (ref 37–54)
EOSINOPHIL # BLD MANUAL: 0.33 10*3/MM3 (ref 0–0.4)
EOSINOPHIL NFR BLD MANUAL: 3 % (ref 0.3–6.2)
ERYTHROCYTE [DISTWIDTH] IN BLOOD BY AUTOMATED COUNT: 14.2 % (ref 12.3–15.4)
FOLATE SERPL-MCNC: 16.1 NG/ML (ref 4.78–24.2)
GFR SERPL CREATININE-BSD FRML MDRD: 115 ML/MIN/1.73
GLOBULIN UR ELPH-MCNC: 3.3 GM/DL
GLUCOSE BLD-MCNC: 93 MG/DL (ref 65–99)
HBA1C MFR BLD: 5.6 % (ref 4.8–5.6)
HCT VFR BLD AUTO: 37 % (ref 34–46.6)
HDLC SERPL-MCNC: 60 MG/DL (ref 40–60)
HGB BLD-MCNC: 12.2 G/DL (ref 12–15.9)
LDLC SERPL CALC-MCNC: 138 MG/DL (ref 0–100)
LDLC/HDLC SERPL: 2.3 {RATIO}
LYMPHOCYTES # BLD MANUAL: 3.86 10*3/MM3 (ref 0.7–3.1)
LYMPHOCYTES NFR BLD MANUAL: 3 % (ref 5–12)
LYMPHOCYTES NFR BLD MANUAL: 35.4 % (ref 19.6–45.3)
MCH RBC QN AUTO: 26.4 PG (ref 26.6–33)
MCHC RBC AUTO-ENTMCNC: 33 G/DL (ref 31.5–35.7)
MCV RBC AUTO: 80.1 FL (ref 79–97)
MONOCYTES # BLD AUTO: 0.33 10*3/MM3 (ref 0.1–0.9)
NEUTROPHILS # BLD AUTO: 6.18 10*3/MM3 (ref 1.7–7)
NEUTROPHILS NFR BLD MANUAL: 56.6 % (ref 42.7–76)
PLAT MORPH BLD: NORMAL
PLATELET # BLD AUTO: 272 10*3/MM3 (ref 140–450)
PMV BLD AUTO: 10.8 FL (ref 6–12)
POIKILOCYTOSIS BLD QL SMEAR: ABNORMAL
POTASSIUM BLD-SCNC: 4.3 MMOL/L (ref 3.5–5.2)
PROT SERPL-MCNC: 7.3 G/DL (ref 6–8.5)
RBC # BLD AUTO: 4.62 10*6/MM3 (ref 3.77–5.28)
SODIUM BLD-SCNC: 141 MMOL/L (ref 136–145)
TRIGL SERPL-MCNC: 91 MG/DL (ref 0–150)
TSH SERPL DL<=0.05 MIU/L-ACNC: 4.25 UIU/ML (ref 0.27–4.2)
VARIANT LYMPHS NFR BLD MANUAL: 2 % (ref 0–5)
VIT B12 BLD-MCNC: 264 PG/ML (ref 211–946)
VLDLC SERPL-MCNC: 18.2 MG/DL (ref 5–40)
WBC MORPH BLD: NORMAL
WBC NRBC COR # BLD: 10.91 10*3/MM3 (ref 3.4–10.8)

## 2020-01-16 PROCEDURE — 85007 BL SMEAR W/DIFF WBC COUNT: CPT

## 2020-01-16 PROCEDURE — 80061 LIPID PANEL: CPT

## 2020-01-16 PROCEDURE — 80053 COMPREHEN METABOLIC PANEL: CPT

## 2020-01-16 PROCEDURE — 82306 VITAMIN D 25 HYDROXY: CPT

## 2020-01-16 PROCEDURE — 85025 COMPLETE CBC W/AUTO DIFF WBC: CPT

## 2020-01-16 PROCEDURE — 36415 COLL VENOUS BLD VENIPUNCTURE: CPT

## 2020-01-16 PROCEDURE — 93880 EXTRACRANIAL BILAT STUDY: CPT

## 2020-01-16 PROCEDURE — 84443 ASSAY THYROID STIM HORMONE: CPT

## 2020-01-16 PROCEDURE — 83036 HEMOGLOBIN GLYCOSYLATED A1C: CPT

## 2020-01-16 PROCEDURE — 82607 VITAMIN B-12: CPT

## 2020-01-16 PROCEDURE — 82746 ASSAY OF FOLIC ACID SERUM: CPT

## 2020-01-16 PROCEDURE — 93880 EXTRACRANIAL BILAT STUDY: CPT | Performed by: RADIOLOGY

## 2020-02-17 ENCOUNTER — OFFICE VISIT (OUTPATIENT)
Dept: UROLOGY | Facility: CLINIC | Age: 60
End: 2020-02-17

## 2020-02-17 VITALS — BODY MASS INDEX: 35.51 KG/M2 | HEIGHT: 64 IN | WEIGHT: 208 LBS

## 2020-02-17 DIAGNOSIS — N32.81 DETRUSOR INSTABILITY: Primary | ICD-10-CM

## 2020-02-17 PROCEDURE — 99213 OFFICE O/P EST LOW 20 MIN: CPT | Performed by: UROLOGY

## 2020-02-17 NOTE — PROGRESS NOTES
Chief Complaint:          Chief Complaint   Patient presents with   • Urinary Incontinence     3 month fu        HPI:   59 y.o. female who has a history of recurrent infections and stress incontinence.  She has lichen sclerosis.  She is doing much better.  She will continue on the topical clobetasol.  I will see her back in 6 months.      Past Medical History:        Past Medical History:   Diagnosis Date   • Anxiety    • Arthritis    • Depression    • MARU (stress urinary incontinence, female) 9/11/2019         Current Meds:     Current Outpatient Medications   Medication Sig Dispense Refill   • amLODIPine (NORVASC) 5 MG tablet Take 5 mg by mouth Daily.     • atorvastatin (LIPITOR) 20 MG tablet Take 1 tablet by mouth Daily.     • BREO ELLIPTA 100-25 MCG/INH aerosol powder       • clobetasol (TEMOVATE) 0.05 % ointment Apply  topically to the appropriate area as directed 2 (Two) Times a Day. 30 g 2   • diclofenac (VOLTAREN) 50 MG EC tablet Take 50 mg by mouth 2 (Two) Times a Day As Needed.     • hydroxychloroquine (PLAQUENIL) 200 MG tablet Take 1 tablet by mouth 2 (Two) Times a Day.     • hydrOXYzine (ATARAX) 10 MG tablet TAKE ONE TABLET BY MOUTH THREE TIMES A DAY AS NEEDED FOR ANXIETY 45 tablet 1   • LORazepam (ATIVAN) 0.5 MG tablet Take 1 tablet by mouth 2 (Two) Times a Day As Needed for Anxiety. 60 tablet 0   • meloxicam (MOBIC) 15 MG tablet Take 1 tablet by mouth Daily.     • Multiple Vitamins-Minerals (PX MENS MULTIVITAMINS) tablet Take  by mouth Daily.     • promethazine-dextromethorphan (PROMETHAZINE-DM) 6.25-15 MG/5ML syrup Every Night.     • sertraline (ZOLOFT) 100 MG tablet Take 1 tablet by mouth Daily.     • VENTOLIN  (90 Base) MCG/ACT inhaler        No current facility-administered medications for this visit.         Allergies:      Allergies   Allergen Reactions   • Codeine    • Sulfa Antibiotics         Past Surgical History:     Past Surgical History:   Procedure Laterality Date   • BREAST BIOPSY  Left 2000    benign   • GALLBLADDER SURGERY     • GASTRIC BANDING     • HYSTERECTOMY      age 37         Social History:     Social History     Socioeconomic History   • Marital status:      Spouse name: Not on file   • Number of children: Not on file   • Years of education: Not on file   • Highest education level: Not on file   Occupational History   • Occupation:    Tobacco Use   • Smoking status: Former Smoker     Start date: 1971     Last attempt to quit: 10/18/2005     Years since quittin.3   • Smokeless tobacco: Never Used   Substance and Sexual Activity   • Alcohol use: No   • Drug use: No   • Sexual activity: Yes     Partners: Male       Family History:     Family History   Problem Relation Age of Onset   • Panic disorder Mother    • COPD Mother    • Alcohol abuse Father    • Alzheimer's disease Father    • Breast cancer Neg Hx        Review of Systems:     Review of Systems   Constitutional: Negative.  Negative for activity change, appetite change, chills, diaphoresis, fatigue and unexpected weight change.   HENT: Negative for congestion, dental problem, drooling, ear discharge, ear pain, facial swelling, hearing loss, mouth sores, nosebleeds, postnasal drip, rhinorrhea, sinus pressure, sneezing, sore throat, tinnitus, trouble swallowing and voice change.    Eyes: Negative.  Negative for photophobia, pain, discharge, redness, itching and visual disturbance.   Respiratory: Negative.  Negative for apnea, cough, choking, chest tightness, shortness of breath, wheezing and stridor.    Cardiovascular: Negative.  Negative for chest pain, palpitations and leg swelling.   Gastrointestinal: Negative.  Negative for abdominal distention, abdominal pain, anal bleeding, blood in stool, constipation, diarrhea, nausea, rectal pain and vomiting.   Endocrine: Negative.  Negative for cold intolerance, heat intolerance, polydipsia, polyphagia and polyuria.   Musculoskeletal: Negative.  Negative for  arthralgias, back pain, gait problem, joint swelling, myalgias, neck pain and neck stiffness.   Skin: Negative.  Negative for color change, pallor, rash and wound.   Allergic/Immunologic: Negative.  Negative for environmental allergies, food allergies and immunocompromised state.   Neurological: Negative.  Negative for dizziness, tremors, seizures, syncope, facial asymmetry, speech difficulty, weakness, light-headedness, numbness and headaches.   Hematological: Negative.  Negative for adenopathy. Does not bruise/bleed easily.   Psychiatric/Behavioral: Negative for agitation, behavioral problems, confusion, decreased concentration, dysphoric mood, hallucinations, self-injury, sleep disturbance and suicidal ideas. The patient is not nervous/anxious and is not hyperactive.    All other systems reviewed and are negative.      Physical Exam:     Physical Exam   Constitutional: She appears well-developed and well-nourished.   HENT:   Head: Normocephalic and atraumatic.   Right Ear: External ear normal.   Left Ear: External ear normal.   Mouth/Throat: Oropharynx is clear and moist.   Eyes: Pupils are equal, round, and reactive to light. Conjunctivae are normal.   Cardiovascular: Normal rate, regular rhythm, normal heart sounds and intact distal pulses.   Pulmonary/Chest: Effort normal and breath sounds normal.   Abdominal: Soft. Bowel sounds are normal. She exhibits no distension and no mass. There is no tenderness. There is no rebound and no guarding.   Genitourinary: No vaginal discharge found.   Musculoskeletal: Normal range of motion.   Neurological: She is alert. She has normal reflexes.   Skin: Skin is warm and dry.   Psychiatric: She has a normal mood and affect. Her behavior is normal. Judgment and thought content normal.       I have reviewed the following portions of the patient's history: allergies, current medications, past family history, past medical history, past social history, past surgical history,  problem list and ROS and confirm it's accurate.      Procedure:       Assessment/Plan:   Cystoscopy and urethral dilation-after an appropriate informed consent the patient was brought to the procedure suite.  The urethra was gently anesthetized with 10 cc of 2% viscous Xylocaine jelly.  After an adequate period of topical anesthesia I went ahead and advanced the cystoscope.  There was an obvious stenosis present.  The bladder was unremarkable there were no stones, tumors, foreign bodies or abnormalities.  The ureteric orifice easily normal in position and configuration.  The scope was removed and the urethra was gently anesthetized and dilated with Tompkins sounds from 16-30 Pakistani sequentially without complication the patient was given gentamicin as prophylaxis with 80 mg        Patient reports that she is not currently experiencing any symptoms of urinary incontinence.      Patient's Body mass index is 35.7 kg/m². BMI is above normal parameters. Recommendations include: educational material.              This document has been electronically signed by WILBER COPELAND MD February 17, 2020 2:10 PM

## 2020-02-24 PROBLEM — N32.81 DETRUSOR INSTABILITY: Status: ACTIVE | Noted: 2020-02-24

## 2020-02-25 ENCOUNTER — TRANSCRIBE ORDERS (OUTPATIENT)
Dept: ADMINISTRATIVE | Facility: HOSPITAL | Age: 60
End: 2020-02-25

## 2020-02-25 DIAGNOSIS — M79.605 PAIN OF LEFT LEG: Primary | ICD-10-CM

## 2020-02-25 DIAGNOSIS — M79.605 LEFT LEG PAIN: Primary | ICD-10-CM

## 2020-02-27 ENCOUNTER — HOSPITAL ENCOUNTER (OUTPATIENT)
Dept: CARDIOLOGY | Facility: HOSPITAL | Age: 60
Discharge: HOME | End: 2020-02-27
Admitting: PHYSICIAN ASSISTANT

## 2020-02-27 ENCOUNTER — HOSPITAL ENCOUNTER (OUTPATIENT)
Dept: ULTRASOUND IMAGING | Facility: HOSPITAL | Age: 60
Discharge: HOME | End: 2020-02-27

## 2020-02-27 DIAGNOSIS — M79.605 PAIN OF LEFT LEG: ICD-10-CM

## 2020-02-27 DIAGNOSIS — M79.605 LEFT LEG PAIN: ICD-10-CM

## 2020-02-27 PROCEDURE — 93922 UPR/L XTREMITY ART 2 LEVELS: CPT

## 2020-02-27 PROCEDURE — 93970 EXTREMITY STUDY: CPT

## 2020-02-27 PROCEDURE — 93922 UPR/L XTREMITY ART 2 LEVELS: CPT | Performed by: RADIOLOGY

## 2020-02-27 PROCEDURE — 93970 EXTREMITY STUDY: CPT | Performed by: RADIOLOGY

## 2020-02-28 ENCOUNTER — APPOINTMENT (OUTPATIENT)
Dept: CARDIOLOGY | Facility: HOSPITAL | Age: 60
End: 2020-02-28

## 2020-05-04 ENCOUNTER — LAB (OUTPATIENT)
Dept: LAB | Facility: HOSPITAL | Age: 60
End: 2020-05-04

## 2020-05-04 ENCOUNTER — TRANSCRIBE ORDERS (OUTPATIENT)
Dept: ADMINISTRATIVE | Facility: HOSPITAL | Age: 60
End: 2020-05-04

## 2020-05-04 DIAGNOSIS — F11.20 OPIOID TYPE DEPENDENCE, CONTINUOUS (HCC): ICD-10-CM

## 2020-05-04 DIAGNOSIS — F11.20 OPIOID TYPE DEPENDENCE, CONTINUOUS (HCC): Primary | ICD-10-CM

## 2020-05-04 DIAGNOSIS — Z79.899 ENCOUNTER FOR LONG-TERM (CURRENT) USE OF OTHER MEDICATIONS: ICD-10-CM

## 2020-05-04 DIAGNOSIS — Z79.891 ENCOUNTER FOR LONG-TERM METHADONE USE: ICD-10-CM

## 2020-05-04 LAB
ALBUMIN SERPL-MCNC: 4 G/DL (ref 3.5–5.2)
ALBUMIN/GLOB SERPL: 1.2 G/DL
ALP SERPL-CCNC: 79 U/L (ref 39–117)
ALT SERPL W P-5'-P-CCNC: 9 U/L (ref 1–33)
ANION GAP SERPL CALCULATED.3IONS-SCNC: 14.7 MMOL/L (ref 5–15)
AST SERPL-CCNC: 19 U/L (ref 1–32)
BASOPHILS # BLD AUTO: 0.06 10*3/MM3 (ref 0–0.2)
BASOPHILS NFR BLD AUTO: 0.8 % (ref 0–1.5)
BILIRUB SERPL-MCNC: 0.4 MG/DL (ref 0.2–1.2)
BUN BLD-MCNC: 9 MG/DL (ref 8–23)
BUN/CREAT SERPL: 11.5 (ref 7–25)
CALCIUM SPEC-SCNC: 9.1 MG/DL (ref 8.6–10.5)
CHLORIDE SERPL-SCNC: 107 MMOL/L (ref 98–107)
CO2 SERPL-SCNC: 21.3 MMOL/L (ref 22–29)
CREAT BLD-MCNC: 0.78 MG/DL (ref 0.57–1)
DEPRECATED RDW RBC AUTO: 43.9 FL (ref 37–54)
EOSINOPHIL # BLD AUTO: 0.35 10*3/MM3 (ref 0–0.4)
EOSINOPHIL NFR BLD AUTO: 4.4 % (ref 0.3–6.2)
ERYTHROCYTE [DISTWIDTH] IN BLOOD BY AUTOMATED COUNT: 14.2 % (ref 12.3–15.4)
GFR SERPL CREATININE-BSD FRML MDRD: 75 ML/MIN/1.73
GGT SERPL-CCNC: 20 U/L (ref 5–36)
GLOBULIN UR ELPH-MCNC: 3.4 GM/DL
GLUCOSE BLD-MCNC: 112 MG/DL (ref 65–99)
HCT VFR BLD AUTO: 36.5 % (ref 34–46.6)
HGB BLD-MCNC: 12.1 G/DL (ref 12–15.9)
IMM GRANULOCYTES # BLD AUTO: 0.02 10*3/MM3 (ref 0–0.05)
IMM GRANULOCYTES NFR BLD AUTO: 0.3 % (ref 0–0.5)
LYMPHOCYTES # BLD AUTO: 3.74 10*3/MM3 (ref 0.7–3.1)
LYMPHOCYTES NFR BLD AUTO: 47.2 % (ref 19.6–45.3)
MCH RBC QN AUTO: 27.8 PG (ref 26.6–33)
MCHC RBC AUTO-ENTMCNC: 33.2 G/DL (ref 31.5–35.7)
MCV RBC AUTO: 83.9 FL (ref 79–97)
MONOCYTES # BLD AUTO: 0.54 10*3/MM3 (ref 0.1–0.9)
MONOCYTES NFR BLD AUTO: 6.8 % (ref 5–12)
NEUTROPHILS # BLD AUTO: 3.22 10*3/MM3 (ref 1.7–7)
NEUTROPHILS NFR BLD AUTO: 40.5 % (ref 42.7–76)
NRBC BLD AUTO-RTO: 0.1 /100 WBC (ref 0–0.2)
PLATELET # BLD AUTO: 193 10*3/MM3 (ref 140–450)
PMV BLD AUTO: 11.6 FL (ref 6–12)
POTASSIUM BLD-SCNC: 4.2 MMOL/L (ref 3.5–5.2)
PROT SERPL-MCNC: 7.4 G/DL (ref 6–8.5)
RBC # BLD AUTO: 4.35 10*6/MM3 (ref 3.77–5.28)
SODIUM BLD-SCNC: 143 MMOL/L (ref 136–145)
WBC NRBC COR # BLD: 7.93 10*3/MM3 (ref 3.4–10.8)

## 2020-05-04 PROCEDURE — 36415 COLL VENOUS BLD VENIPUNCTURE: CPT

## 2020-05-04 PROCEDURE — 80053 COMPREHEN METABOLIC PANEL: CPT

## 2020-05-04 PROCEDURE — 82977 ASSAY OF GGT: CPT

## 2020-05-04 PROCEDURE — 85025 COMPLETE CBC W/AUTO DIFF WBC: CPT

## 2020-05-09 DIAGNOSIS — N32.81 DETRUSOR INSTABILITY: Primary | ICD-10-CM

## 2020-05-11 RX ORDER — NITROFURANTOIN 25; 75 MG/1; MG/1
CAPSULE ORAL
Qty: 30 CAPSULE | Refills: 2 | Status: SHIPPED | OUTPATIENT
Start: 2020-05-11 | End: 2021-08-17 | Stop reason: SDUPTHER

## 2020-05-11 RX ORDER — OXYBUTYNIN CHLORIDE 5 MG/1
TABLET ORAL
Qty: 30 TABLET | Refills: 2 | Status: SHIPPED | OUTPATIENT
Start: 2020-05-11 | End: 2020-09-25 | Stop reason: SDUPTHER

## 2020-07-08 ENCOUNTER — OFFICE VISIT (OUTPATIENT)
Dept: PULMONOLOGY | Facility: CLINIC | Age: 60
End: 2020-07-08

## 2020-07-08 VITALS — WEIGHT: 211 LBS | BODY MASS INDEX: 36.02 KG/M2 | HEIGHT: 64 IN

## 2020-07-08 DIAGNOSIS — J44.9 CHRONIC OBSTRUCTIVE PULMONARY DISEASE, UNSPECIFIED COPD TYPE (HCC): Primary | ICD-10-CM

## 2020-07-08 DIAGNOSIS — J84.10 PULMONARY FIBROSIS (HCC): ICD-10-CM

## 2020-07-08 PROCEDURE — 99441 PR PHYS/QHP TELEPHONE EVALUATION 5-10 MIN: CPT | Performed by: INTERNAL MEDICINE

## 2020-07-08 NOTE — PROGRESS NOTES
Subjective   Chief Complaint   Patient presents with   • COPD       Lexie Valdez is a 60 y.o. female Mrs. Lexie Valdez gave consent for telephone contact    History of Present Illness 60-year-old female seen in the office in December for what seemed to be mild COPD and question of some interstitial lung disease question of rheumatoid the past year was smoking for 20 years but none for 20 years having rheumatoid arthritis for which  is being followed by Dr. Rahman in [facility] currently taking hydroxychloroquine and has taken Mobic in the past but did not methotrexate she has mild shortness of breath for which she is taking care of Breo and rarely needs to use the rescue inhaler    Review of Systems mild shortness of breath on exertion but no chest pain or palpitation arthritis is bothersome being followed by rheumatologist system otherwise noncontributory    Family History   Problem Relation Age of Onset   • Panic disorder Mother    • COPD Mother    • Alcohol abuse Father    • Alzheimer's disease Father    • Breast cancer Neg Hx        Past Medical History:   Diagnosis Date   • Anxiety    • Arthritis    • Depression    • MARU (stress urinary incontinence, female) 2019       Past Surgical History:   Procedure Laterality Date   • BREAST BIOPSY Left     benign   • GALLBLADDER SURGERY     • GASTRIC BANDING     • HYSTERECTOMY      age 37       Social History     Socioeconomic History   • Marital status:      Spouse name: Not on file   • Number of children: Not on file   • Years of education: Not on file   • Highest education level: Not on file   Occupational History   • Occupation:    Tobacco Use   • Smoking status: Former Smoker     Start date: 1971     Last attempt to quit: 10/18/2005     Years since quittin.7   • Smokeless tobacco: Never Used   Substance and Sexual Activity   • Alcohol use: No   • Drug use: No   • Sexual activity: Yes     Partners: Male        Physical Exam: No  physical exam as it was telephone conversation but did not seem to be any distress on the phone    PFT: PFT December showed FVC of 50% total lung capacity 56% and diffusion capacity 49% compatible moderate restrictive impairment    Imaging: Last chest x-ray was done in November that showed some increased lung marking CT chest of November 18, 2019 was suggestive of some interstitial lung disease    Other Labs:       ASSESSMENT interstitial lung disease some restrictive pattern on the PFT clinically mild and stable question of rheumatoid or idiopathic        Recommendations: Continue current medications call us in case of worsening avoid the crowd and use a mask for COVID infection get flu vaccine as well as available and possible COVID vaccine    Follow up: Follow-up in the office in 4 months with chest x-ray and PFT explained to her that the office may not be fully open because of cough with infection.  May call earlier for possible follow-up by my partner in the office       Talk to her for 10 minutes and seemed to understand the instructions

## 2020-07-24 ENCOUNTER — HOSPITAL ENCOUNTER (OUTPATIENT)
Dept: MAMMOGRAPHY | Facility: HOSPITAL | Age: 60
Discharge: HOME | End: 2020-07-24
Admitting: PHYSICIAN ASSISTANT

## 2020-07-24 DIAGNOSIS — Z12.31 VISIT FOR SCREENING MAMMOGRAM: ICD-10-CM

## 2020-07-24 PROCEDURE — 77063 BREAST TOMOSYNTHESIS BI: CPT | Performed by: RADIOLOGY

## 2020-07-24 PROCEDURE — 77063 BREAST TOMOSYNTHESIS BI: CPT

## 2020-07-24 PROCEDURE — 77067 SCR MAMMO BI INCL CAD: CPT

## 2020-07-24 PROCEDURE — 77067 SCR MAMMO BI INCL CAD: CPT | Performed by: RADIOLOGY

## 2020-08-17 ENCOUNTER — OFFICE VISIT (OUTPATIENT)
Dept: UROLOGY | Facility: CLINIC | Age: 60
End: 2020-08-17

## 2020-08-17 VITALS — HEIGHT: 64 IN | BODY MASS INDEX: 34.25 KG/M2 | WEIGHT: 200.6 LBS | TEMPERATURE: 98.2 F

## 2020-08-17 DIAGNOSIS — N30.20 CHRONIC CYSTITIS: ICD-10-CM

## 2020-08-17 DIAGNOSIS — N32.81 DETRUSOR INSTABILITY: ICD-10-CM

## 2020-08-17 DIAGNOSIS — N39.3 SUI (STRESS URINARY INCONTINENCE, FEMALE): Primary | ICD-10-CM

## 2020-08-17 PROCEDURE — 99213 OFFICE O/P EST LOW 20 MIN: CPT | Performed by: UROLOGY

## 2020-08-17 RX ORDER — NITROFURANTOIN 25; 75 MG/1; MG/1
100 CAPSULE ORAL DAILY
Qty: 56 CAPSULE | Refills: 3 | Status: SHIPPED | OUTPATIENT
Start: 2020-08-17 | End: 2021-04-07

## 2020-08-17 NOTE — PROGRESS NOTES
Chief Complaint:          Chief Complaint   Patient presents with   • Urinary Incontinence     follow up        HPI:   60 y.o. female here for follow-up of mixed urinary incontinence.  She had a history of recurrent infections and this present doing much better I refilled her Macrobid because of her lichen sclerosis atrophicus there is not much I can offer and I will think she would do well with a sling and therefore I recommended only observation she is comfortable with this.  I will see her back on an as-needed basis if she needs further refills      Past Medical History:        Past Medical History:   Diagnosis Date   • Anxiety    • Arthritis    • Depression    • MARU (stress urinary incontinence, female) 9/11/2019         Current Meds:     Current Outpatient Medications   Medication Sig Dispense Refill   • amLODIPine (NORVASC) 5 MG tablet Take 5 mg by mouth Daily.     • atorvastatin (LIPITOR) 20 MG tablet Take 1 tablet by mouth Daily.     • BREO ELLIPTA 100-25 MCG/INH aerosol powder       • clobetasol (TEMOVATE) 0.05 % ointment Apply  topically to the appropriate area as directed 2 (Two) Times a Day. 30 g 2   • diclofenac (VOLTAREN) 50 MG EC tablet Take 50 mg by mouth 2 (Two) Times a Day As Needed.     • hydroxychloroquine (PLAQUENIL) 200 MG tablet Take 1 tablet by mouth 2 (Two) Times a Day.     • hydrOXYzine (ATARAX) 10 MG tablet TAKE ONE TABLET BY MOUTH THREE TIMES A DAY AS NEEDED FOR ANXIETY 45 tablet 1   • LORazepam (ATIVAN) 0.5 MG tablet Take 1 tablet by mouth 2 (Two) Times a Day As Needed for Anxiety. 60 tablet 0   • meloxicam (MOBIC) 15 MG tablet Take 1 tablet by mouth Daily.     • Multiple Vitamins-Minerals (PX MENS MULTIVITAMINS) tablet Take  by mouth Daily.     • nitrofurantoin, macrocrystal-monohydrate, (MACROBID) 100 MG capsule TAKE ONE CAPSULE BY MOUTH DAILY 30 capsule 2   • oxybutynin (DITROPAN) 5 MG tablet TAKE ONE TABLET BY MOUTH DAILY 30 tablet 2   • promethazine-dextromethorphan  (PROMETHAZINE-DM) 6.25-15 MG/5ML syrup Every Night.     • sertraline (ZOLOFT) 100 MG tablet Take 1 tablet by mouth Daily.     • VENTOLIN  (90 Base) MCG/ACT inhaler        No current facility-administered medications for this visit.         Allergies:      Allergies   Allergen Reactions   • Codeine    • Sulfa Antibiotics         Past Surgical History:     Past Surgical History:   Procedure Laterality Date   • BREAST BIOPSY Left     benign   • GALLBLADDER SURGERY     • GASTRIC BANDING     • HYSTERECTOMY      age 37         Social History:     Social History     Socioeconomic History   • Marital status:      Spouse name: Not on file   • Number of children: Not on file   • Years of education: Not on file   • Highest education level: Not on file   Occupational History   • Occupation:    Tobacco Use   • Smoking status: Former Smoker     Start date: 1971     Last attempt to quit: 10/18/2005     Years since quittin.8   • Smokeless tobacco: Never Used   Substance and Sexual Activity   • Alcohol use: No   • Drug use: No   • Sexual activity: Yes     Partners: Male       Family History:     Family History   Problem Relation Age of Onset   • Panic disorder Mother    • COPD Mother    • Alcohol abuse Father    • Alzheimer's disease Father    • Breast cancer Neg Hx        Review of Systems:     Review of Systems   Constitutional: Negative.  Negative for activity change, appetite change, chills, diaphoresis, fatigue and unexpected weight change.   HENT: Negative for congestion, dental problem, drooling, ear discharge, ear pain, facial swelling, hearing loss, mouth sores, nosebleeds, postnasal drip, rhinorrhea, sinus pressure, sneezing, sore throat, tinnitus, trouble swallowing and voice change.    Eyes: Negative.  Negative for photophobia, pain, discharge, redness, itching and visual disturbance.   Respiratory: Negative.  Negative for apnea, cough, choking, chest tightness, shortness of breath,  wheezing and stridor.    Cardiovascular: Negative.  Negative for chest pain, palpitations and leg swelling.   Gastrointestinal: Negative.  Negative for abdominal distention, abdominal pain, anal bleeding, blood in stool, constipation, diarrhea, nausea, rectal pain and vomiting.   Endocrine: Negative.  Negative for cold intolerance, heat intolerance, polydipsia, polyphagia and polyuria.   Musculoskeletal: Negative.  Negative for arthralgias, back pain, gait problem, joint swelling, myalgias, neck pain and neck stiffness.   Skin: Negative.  Negative for color change, pallor, rash and wound.   Allergic/Immunologic: Negative.  Negative for environmental allergies, food allergies and immunocompromised state.   Neurological: Negative.  Negative for dizziness, tremors, seizures, syncope, facial asymmetry, speech difficulty, weakness, light-headedness, numbness and headaches.   Hematological: Negative.  Negative for adenopathy. Does not bruise/bleed easily.   Psychiatric/Behavioral: Negative for agitation, behavioral problems, confusion, decreased concentration, dysphoric mood, hallucinations, self-injury, sleep disturbance and suicidal ideas. The patient is not nervous/anxious and is not hyperactive.    All other systems reviewed and are negative.      Physical Exam:     Physical Exam   Constitutional: She appears well-developed and well-nourished.   HENT:   Head: Normocephalic and atraumatic.   Right Ear: External ear normal.   Left Ear: External ear normal.   Mouth/Throat: Oropharynx is clear and moist.   Eyes: Pupils are equal, round, and reactive to light. Conjunctivae are normal.   Cardiovascular: Normal rate, regular rhythm, normal heart sounds and intact distal pulses.   Pulmonary/Chest: Effort normal and breath sounds normal.   Abdominal: Soft. Bowel sounds are normal. She exhibits no distension and no mass. There is no tenderness. There is no rebound and no guarding.   Genitourinary: No vaginal discharge found.    Musculoskeletal: Normal range of motion.   Neurological: She is alert. She has normal reflexes.   Skin: Skin is warm and dry.   Psychiatric: She has a normal mood and affect. Her behavior is normal. Judgment and thought content normal.       I have reviewed the following portions of the patient's history: allergies, current medications, past family history, past medical history, past social history, past surgical history, problem list and ROS and confirm it's accurate.      Procedure:       Assessment/Plan:   Urinary incontinence:  Patient was diagnosed with urinary incontinence.  We discussed treatable and non-treatable causes of both stress and urge urinary incontinence.  With regards to stress urinary incontinence we discussed its relationship to childbirth and pelvic health.  We discussed the grading of stress incontinence with trying to quantitate the number of pads used.  We talked about leaking urine with laughing, lifting, coughing, and sexual intercourse.  Talked about the urge component and the concept of mixed incontinence where upon the stress treatable at the urge may exist and that 50% of the time the urge will resolve with treatment of the stress incontinence.  We talked with the diagnostic workup including a postvoid residual urine, urine and even a simple cystometrogram.  I discussed the findings that may be neurologically related including commonly seen with multiple sclerosis, Parkinson's disease, and stroke.  I talked about the various therapeutic options including anticholinergics, beta 3 agonists, and alpha blockade if there is a component of obstruction.  I discussed the side effects of anti-cholinergic including dry mouth, double vision etc. because of her lichen sclerosis et atrophicus I am not sure she is a candidate for a sling.  Recurrent urinary tract infections-patient has been referred and diagnosed with recurrent urinary tract infections.  We discussed the types of organisms that  are found in the urinary tract indicating that the vast majority are results of the patient's own gastrointestinal ammon.  We discussed how many of the antibiotics that are utilized can actually exacerbate these infections by creating resistant organisms and there is only a very few antibiotics that are concentrated in the urine and do not affect the rectal reservoir nor cause recurrent yeast vaginitis.  We discussed the risk factors for recurrent infections being intercourse in younger patients and atrophic changes in older patients.  We discussed the symptoms that are found including pain, pressure, burning, frequency, urgency suprapubic pain and painful intercourse.  I discussed upper tract symptoms including fevers, chills, and indicated the workup would be much more aggressive if the patient were to present with recurrent infections in the face of upper tract symptomatology such as fever.  I discussed the history of vesicoureteral reflux in young patients and finally chronic renal scarring as a result of such.  I recommend concomitant probiotics with treatment with antibiotics to protect the rectal reservoir including over-the-counter yogurt preparations to veronique oral pills containing the appropriate probiotics.  She is done quite well with her current regimen      Patient reports that she is  currently experiencing any symptoms of urinary incontinence.      Patient's Body mass index is 34.43 kg/m². BMI is above normal parameters. Recommendations include: educational material.              This document has been electronically signed by WILBER COPELAND MD August 17, 2020 13:11

## 2020-09-25 DIAGNOSIS — N32.81 DETRUSOR INSTABILITY: ICD-10-CM

## 2020-09-25 RX ORDER — OXYBUTYNIN CHLORIDE 5 MG/1
5 TABLET ORAL DAILY
Qty: 30 TABLET | Refills: 2 | Status: SHIPPED | OUTPATIENT
Start: 2020-09-25 | End: 2021-01-11

## 2020-11-10 ENCOUNTER — OFFICE VISIT (OUTPATIENT)
Dept: PULMONOLOGY | Facility: CLINIC | Age: 60
End: 2020-11-10

## 2020-11-10 VITALS
DIASTOLIC BLOOD PRESSURE: 86 MMHG | WEIGHT: 203 LBS | TEMPERATURE: 96.9 F | SYSTOLIC BLOOD PRESSURE: 140 MMHG | HEIGHT: 64 IN | OXYGEN SATURATION: 94 % | HEART RATE: 74 BPM | BODY MASS INDEX: 34.66 KG/M2

## 2020-11-10 DIAGNOSIS — Z87.891 FORMER SMOKER: ICD-10-CM

## 2020-11-10 DIAGNOSIS — J44.9 CHRONIC OBSTRUCTIVE PULMONARY DISEASE, UNSPECIFIED COPD TYPE (HCC): Primary | ICD-10-CM

## 2020-11-10 DIAGNOSIS — J84.10 PULMONARY FIBROSIS (HCC): ICD-10-CM

## 2020-11-10 PROCEDURE — 99214 OFFICE O/P EST MOD 30 MIN: CPT | Performed by: PHYSICIAN ASSISTANT

## 2020-11-10 RX ORDER — ALBUTEROL SULFATE 90 UG/1
2 AEROSOL, METERED RESPIRATORY (INHALATION) EVERY 4 HOURS PRN
Qty: 18 G | Refills: 8 | Status: SHIPPED | OUTPATIENT
Start: 2020-11-10 | End: 2021-05-26 | Stop reason: SDUPTHER

## 2020-11-10 NOTE — PROGRESS NOTES
Have you had the Influenza Vaccine? yes    Would you like to receive this Vaccine today? no    Have you had the Pneumonia Vaccine?  yes   Would you like to receive this Vaccine today? no    Are you a current smoker? no  How much?  34 years with 1 pack/day average.  Quit date? 2005    Subjective    Lexie Valdez presents for the following COPD      History of Present Illness     Patient presents today for follow-up of COPD concern of interstitial lung disease in the setting of rheumatoid arthritis, and former smoking history.  She states that she has not qualified for supplemental oxygen.  She states that overall symptoms are stable at this time.  She notes shortness of breath with significant exertion, and avoids overexerting herself.  Thus, daily activities are well-tolerated at this time.  She denies significant cough, wheezing, phlegm production.  No recent fever, chills.  She continues to follow with rheumatology services.  She continues to take Breo Ellipta low-dose once daily, and feels like this is doing well at this time.  Former smoking history includes approximately 34-year use with 1 pack/day average, and cessation achieved in 2005.      Review of Systems   Constitutional: Negative for activity change, fatigue and unexpected weight change.   HENT: Negative for congestion, postnasal drip and rhinorrhea.    Respiratory: Positive for shortness of breath (Only with severe exertion.). Negative for apnea, cough, chest tightness and wheezing.    Cardiovascular: Negative for chest pain and palpitations.   Gastrointestinal: Negative for nausea.   Allergic/Immunologic: Negative for environmental allergies.   Psychiatric/Behavioral: Negative for agitation and confusion.       Active Problems:  Problem List Items Addressed This Visit     None          Past Medical History:  Past Medical History:   Diagnosis Date   • Anxiety    • Arthritis    • Depression    • MARU (stress urinary incontinence, female) 9/11/2019        Family History:  Family History   Problem Relation Age of Onset   • Panic disorder Mother    • COPD Mother    • Alcohol abuse Father    • Alzheimer's disease Father    • Breast cancer Neg Hx        Social History:  Social History     Tobacco Use   • Smoking status: Former Smoker     Years: 34.00     Start date: 6/18/1971     Quit date: 10/18/2005     Years since quitting: 15.0   • Smokeless tobacco: Never Used   Substance Use Topics   • Alcohol use: No       Current Medications:  Current Outpatient Medications   Medication Sig Dispense Refill   • amLODIPine (NORVASC) 5 MG tablet Take 5 mg by mouth Daily.     • atorvastatin (LIPITOR) 20 MG tablet Take 1 tablet by mouth Daily.     • Breo Ellipta 100-25 MCG/INH inhaler Inhale 1 puff Daily. 1 inhaler 8   • clobetasol (TEMOVATE) 0.05 % ointment Apply  topically to the appropriate area as directed 2 (Two) Times a Day. 30 g 2   • diclofenac (VOLTAREN) 50 MG EC tablet Take 50 mg by mouth 2 (Two) Times a Day As Needed.     • hydroxychloroquine (PLAQUENIL) 200 MG tablet Take 1 tablet by mouth 2 (Two) Times a Day.     • hydrOXYzine (ATARAX) 10 MG tablet TAKE ONE TABLET BY MOUTH THREE TIMES A DAY AS NEEDED FOR ANXIETY 45 tablet 1   • LORazepam (ATIVAN) 0.5 MG tablet Take 1 tablet by mouth 2 (Two) Times a Day As Needed for Anxiety. 60 tablet 0   • Multiple Vitamins-Minerals (PX MENS MULTIVITAMINS) tablet Take  by mouth Daily.     • nitrofurantoin, macrocrystal-monohydrate, (MACROBID) 100 MG capsule TAKE ONE CAPSULE BY MOUTH DAILY 30 capsule 2   • nitrofurantoin, macrocrystal-monohydrate, (Macrobid) 100 MG capsule Take 1 capsule by mouth Daily. 56 capsule 3   • oxybutynin (DITROPAN) 5 MG tablet Take 1 tablet by mouth Daily. 30 tablet 2   • promethazine-dextromethorphan (PROMETHAZINE-DM) 6.25-15 MG/5ML syrup Every Night.     • sertraline (ZOLOFT) 100 MG tablet Take 1 tablet by mouth Daily.     • Ventolin  (90 Base) MCG/ACT inhaler Inhale 2 puffs Every 4 (Four) Hours  "As Needed for Wheezing. 18 g 8   • meloxicam (MOBIC) 15 MG tablet Take 1 tablet by mouth Daily.       No current facility-administered medications for this visit.        Allergies:  Allergies   Allergen Reactions   • Codeine    • Sulfa Antibiotics        Vitals:  /86   Pulse 74   Temp 96.9 °F (36.1 °C) (Temporal)   Ht 162.6 cm (64\")   Wt 92.1 kg (203 lb)   SpO2 94%   BMI 34.84 kg/m²     Imaging:    Imaging Results (Most Recent)     None          Pulmonary Functions Testing Results:    FEV1   Date Value Ref Range Status   06/26/2019 1.38 liters Final     FVC   Date Value Ref Range Status   06/26/2019 1.48 liters Final       Results for orders placed or performed in visit on 05/04/20   Comprehensive Metabolic Panel    Specimen: Blood   Result Value Ref Range    Glucose 112 (H) 65 - 99 mg/dL    BUN 9 8 - 23 mg/dL    Creatinine 0.78 0.57 - 1.00 mg/dL    Sodium 143 136 - 145 mmol/L    Potassium 4.2 3.5 - 5.2 mmol/L    Chloride 107 98 - 107 mmol/L    CO2 21.3 (L) 22.0 - 29.0 mmol/L    Calcium 9.1 8.6 - 10.5 mg/dL    Total Protein 7.4 6.0 - 8.5 g/dL    Albumin 4.00 3.50 - 5.20 g/dL    ALT (SGPT) 9 1 - 33 U/L    AST (SGOT) 19 1 - 32 U/L    Alkaline Phosphatase 79 39 - 117 U/L    Total Bilirubin 0.4 0.2 - 1.2 mg/dL    eGFR Non African Amer 75 >60 mL/min/1.73    Globulin 3.4 gm/dL    A/G Ratio 1.2 g/dL    BUN/Creatinine Ratio 11.5 7.0 - 25.0    Anion Gap 14.7 5.0 - 15.0 mmol/L   Gamma GT    Specimen: Blood   Result Value Ref Range    GGT 20 5 - 36 U/L   CBC Auto Differential    Specimen: Blood   Result Value Ref Range    WBC 7.93 3.40 - 10.80 10*3/mm3    RBC 4.35 3.77 - 5.28 10*6/mm3    Hemoglobin 12.1 12.0 - 15.9 g/dL    Hematocrit 36.5 34.0 - 46.6 %    MCV 83.9 79.0 - 97.0 fL    MCH 27.8 26.6 - 33.0 pg    MCHC 33.2 31.5 - 35.7 g/dL    RDW 14.2 12.3 - 15.4 %    RDW-SD 43.9 37.0 - 54.0 fl    MPV 11.6 6.0 - 12.0 fL    Platelets 193 140 - 450 10*3/mm3    Neutrophil % 40.5 (L) 42.7 - 76.0 %    Lymphocyte % 47.2 (H) " 19.6 - 45.3 %    Monocyte % 6.8 5.0 - 12.0 %    Eosinophil % 4.4 0.3 - 6.2 %    Basophil % 0.8 0.0 - 1.5 %    Immature Grans % 0.3 0.0 - 0.5 %    Neutrophils, Absolute 3.22 1.70 - 7.00 10*3/mm3    Lymphocytes, Absolute 3.74 (H) 0.70 - 3.10 10*3/mm3    Monocytes, Absolute 0.54 0.10 - 0.90 10*3/mm3    Eosinophils, Absolute 0.35 0.00 - 0.40 10*3/mm3    Basophils, Absolute 0.06 0.00 - 0.20 10*3/mm3    Immature Grans, Absolute 0.02 0.00 - 0.05 10*3/mm3    nRBC 0.1 0.0 - 0.2 /100 WBC       Objective   Physical Exam  Vitals signs reviewed.   Constitutional:       General: She is not in acute distress.     Appearance: She is well-developed. She is not diaphoretic.   HENT:      Head: Normocephalic and atraumatic.   Eyes:      Pupils: Pupils are equal, round, and reactive to light.   Neck:      Musculoskeletal: Normal range of motion.      Thyroid: No thyromegaly.   Cardiovascular:      Rate and Rhythm: Normal rate and regular rhythm.      Heart sounds: Normal heart sounds, S1 normal and S2 normal.   Pulmonary:      Effort: Pulmonary effort is normal.      Breath sounds: No wheezing, rhonchi or rales.   Musculoskeletal: Normal range of motion.         General: No swelling.   Skin:     General: Skin is warm and dry.   Neurological:      Mental Status: She is alert and oriented to person, place, and time.   Psychiatric:         Behavior: Behavior normal.          Assessment/Plan      I have reviewed the past medical history, family history, social history, surgical history, and allergies.     Reviewed the CT chest imaging and report from 7/12/2019.  Notable for coarse interstitial markings and small nodule of the left breast.    Chest x-ray from 11/13/2019 suggested diffuse interstitial lung disease.    PFT from December 2019 suggested moderate-severe restriction with moderately reduced DLCO.  Expiration phase was limited.  No true obstruction.        ICD-10-CM ICD-9-CM   1. Chronic obstructive pulmonary disease, unspecified  COPD type (CMS/[facility])  J44.9 496   2. Pulmonary fibrosis (CMS/[facility])  J84.10 515   3. Former smoker  Z87.891 V15.82          COPD/ILD   Known history of rheumatoid arthritis.   · Continuing with albuterol inhaler as needed  · Continuing with low-dose Breo Ellipta once daily  · Preferred to await walk oximetry test today.  Agreed that she will contact her office as needed if any worsening shortness of breath is noted  · We will repeat the PFT upon any symptomatic changes. Reports symptomatic stability at this time.      Former smoker:   Cessation achieved in 2005. Notable for history of at least 34 years with average 1-1.5 ppd. Occasional periods of cessation noted within the 34 years.   Previous CT scan was completed in July 2019.  Notable for coarse interstitial lung markings diffusely.  No mention pulmonary nodules.  · Ordered low-dose CT chest        Vaccinations: Influenza vaccination is up-to-date.  Pneumonia vaccinations up-to-date.    Patient's Body mass index is 34.84 kg/m². BMI is above normal parameters. Recommendations include: nutrition counseling.    Return in about 6 months (around 5/10/2021), or as needed.

## 2020-11-23 ENCOUNTER — HOSPITAL ENCOUNTER (OUTPATIENT)
Dept: CT IMAGING | Facility: HOSPITAL | Age: 60
Discharge: HOME | End: 2020-11-23
Admitting: PHYSICIAN ASSISTANT

## 2020-11-23 DIAGNOSIS — J44.9 CHRONIC OBSTRUCTIVE PULMONARY DISEASE, UNSPECIFIED COPD TYPE (HCC): ICD-10-CM

## 2020-11-23 DIAGNOSIS — Z87.891 FORMER SMOKER: ICD-10-CM

## 2020-11-23 DIAGNOSIS — J84.10 PULMONARY FIBROSIS (HCC): ICD-10-CM

## 2020-11-23 PROCEDURE — G0297 LDCT FOR LUNG CA SCREEN: HCPCS | Performed by: RADIOLOGY

## 2020-11-23 PROCEDURE — G0297 LDCT FOR LUNG CA SCREEN: HCPCS

## 2020-12-04 ENCOUNTER — TELEPHONE (OUTPATIENT)
Dept: PULMONOLOGY | Facility: CLINIC | Age: 60
End: 2020-12-04

## 2020-12-05 NOTE — TELEPHONE ENCOUNTER
Called and updated with low dose CT results.     · Showed moderate COPD and fibrosis changes.   Suggested moderate COPD and mild fibrosis, however similar appearing when the imaging was compared to the scan from 2018.   · No nodules.     Discussed that we will continue with current therapy recommendations, and will discuss repeat low dose CT closer to December 2021.   She conveyed understanding.   Will follow up as scheduled and as needed.

## 2021-01-08 DIAGNOSIS — N32.81 DETRUSOR INSTABILITY: ICD-10-CM

## 2021-01-11 RX ORDER — OXYBUTYNIN CHLORIDE 5 MG/1
TABLET ORAL
Qty: 30 TABLET | Refills: 1 | Status: SHIPPED | OUTPATIENT
Start: 2021-01-11 | End: 2021-03-15

## 2021-02-25 DIAGNOSIS — Z23 IMMUNIZATION DUE: ICD-10-CM

## 2021-03-01 ENCOUNTER — TRANSCRIBE ORDERS (OUTPATIENT)
Dept: ADMINISTRATIVE | Facility: HOSPITAL | Age: 61
End: 2021-03-01

## 2021-03-01 ENCOUNTER — LAB (OUTPATIENT)
Dept: LAB | Facility: HOSPITAL | Age: 61
End: 2021-03-01

## 2021-03-01 DIAGNOSIS — F11.20 OPIOID TYPE DEPENDENCE, CONTINUOUS (HCC): Primary | ICD-10-CM

## 2021-03-01 DIAGNOSIS — Z79.891 ENCOUNTER FOR LONG-TERM METHADONE USE: ICD-10-CM

## 2021-03-01 DIAGNOSIS — F11.20 OPIOID TYPE DEPENDENCE, CONTINUOUS (HCC): ICD-10-CM

## 2021-03-01 DIAGNOSIS — Z79.899 ENCOUNTER FOR LONG-TERM (CURRENT) USE OF OTHER MEDICATIONS: ICD-10-CM

## 2021-03-01 LAB
ALBUMIN SERPL-MCNC: 4.2 G/DL (ref 3.5–5.2)
ALBUMIN/GLOB SERPL: 1.4 G/DL
ALP SERPL-CCNC: 77 U/L (ref 39–117)
ALT SERPL W P-5'-P-CCNC: 13 U/L (ref 1–33)
ANION GAP SERPL CALCULATED.3IONS-SCNC: 11.2 MMOL/L (ref 5–15)
AST SERPL-CCNC: 26 U/L (ref 1–32)
BASOPHILS # BLD AUTO: 0.06 10*3/MM3 (ref 0–0.2)
BASOPHILS NFR BLD AUTO: 0.7 % (ref 0–1.5)
BILIRUB SERPL-MCNC: 0.3 MG/DL (ref 0–1.2)
BUN SERPL-MCNC: 10 MG/DL (ref 8–23)
BUN/CREAT SERPL: 12.2 (ref 7–25)
CALCIUM SPEC-SCNC: 8.6 MG/DL (ref 8.6–10.5)
CHLORIDE SERPL-SCNC: 107 MMOL/L (ref 98–107)
CO2 SERPL-SCNC: 24.8 MMOL/L (ref 22–29)
CREAT SERPL-MCNC: 0.82 MG/DL (ref 0.57–1)
DEPRECATED RDW RBC AUTO: 39.7 FL (ref 37–54)
EOSINOPHIL # BLD AUTO: 0.47 10*3/MM3 (ref 0–0.4)
EOSINOPHIL NFR BLD AUTO: 5.9 % (ref 0.3–6.2)
ERYTHROCYTE [DISTWIDTH] IN BLOOD BY AUTOMATED COUNT: 13 % (ref 12.3–15.4)
GFR SERPL CREATININE-BSD FRML MDRD: 71 ML/MIN/1.73
GGT SERPL-CCNC: 25 U/L (ref 5–36)
GLOBULIN UR ELPH-MCNC: 2.9 GM/DL
GLUCOSE SERPL-MCNC: 86 MG/DL (ref 65–99)
HCT VFR BLD AUTO: 39.7 % (ref 34–46.6)
HGB BLD-MCNC: 13.1 G/DL (ref 12–15.9)
IMM GRANULOCYTES # BLD AUTO: 0.02 10*3/MM3 (ref 0–0.05)
IMM GRANULOCYTES NFR BLD AUTO: 0.2 % (ref 0–0.5)
LYMPHOCYTES # BLD AUTO: 3.48 10*3/MM3 (ref 0.7–3.1)
LYMPHOCYTES NFR BLD AUTO: 43.4 % (ref 19.6–45.3)
MCH RBC QN AUTO: 28.2 PG (ref 26.6–33)
MCHC RBC AUTO-ENTMCNC: 33 G/DL (ref 31.5–35.7)
MCV RBC AUTO: 85.4 FL (ref 79–97)
MONOCYTES # BLD AUTO: 0.63 10*3/MM3 (ref 0.1–0.9)
MONOCYTES NFR BLD AUTO: 7.9 % (ref 5–12)
NEUTROPHILS NFR BLD AUTO: 3.35 10*3/MM3 (ref 1.7–7)
NEUTROPHILS NFR BLD AUTO: 41.9 % (ref 42.7–76)
NRBC BLD AUTO-RTO: 0 /100 WBC (ref 0–0.2)
PLATELET # BLD AUTO: 198 10*3/MM3 (ref 140–450)
PMV BLD AUTO: 11.1 FL (ref 6–12)
POTASSIUM SERPL-SCNC: 3.8 MMOL/L (ref 3.5–5.2)
PROT SERPL-MCNC: 7.1 G/DL (ref 6–8.5)
RBC # BLD AUTO: 4.65 10*6/MM3 (ref 3.77–5.28)
SODIUM SERPL-SCNC: 143 MMOL/L (ref 136–145)
WBC # BLD AUTO: 8.01 10*3/MM3 (ref 3.4–10.8)

## 2021-03-01 PROCEDURE — 82977 ASSAY OF GGT: CPT

## 2021-03-01 PROCEDURE — 36415 COLL VENOUS BLD VENIPUNCTURE: CPT

## 2021-03-01 PROCEDURE — 80053 COMPREHEN METABOLIC PANEL: CPT

## 2021-03-01 PROCEDURE — 85025 COMPLETE CBC W/AUTO DIFF WBC: CPT

## 2021-03-14 DIAGNOSIS — N32.81 DETRUSOR INSTABILITY: ICD-10-CM

## 2021-03-15 RX ORDER — OXYBUTYNIN CHLORIDE 5 MG/1
TABLET ORAL
Qty: 30 TABLET | Refills: 0 | Status: SHIPPED | OUTPATIENT
Start: 2021-03-15 | End: 2023-03-21

## 2021-03-29 ENCOUNTER — IMMUNIZATION (OUTPATIENT)
Dept: VACCINE CLINIC | Facility: HOSPITAL | Age: 61
End: 2021-03-29

## 2021-03-29 PROCEDURE — 91300 HC SARSCOV02 VAC 30MCG/0.3ML IM: CPT | Performed by: INTERNAL MEDICINE

## 2021-03-29 PROCEDURE — 0001A: CPT | Performed by: INTERNAL MEDICINE

## 2021-04-07 DIAGNOSIS — N32.81 DETRUSOR INSTABILITY: ICD-10-CM

## 2021-04-07 RX ORDER — NITROFURANTOIN 25; 75 MG/1; MG/1
CAPSULE ORAL
Qty: 30 CAPSULE | Refills: 2 | Status: SHIPPED | OUTPATIENT
Start: 2021-04-07 | End: 2021-08-17

## 2021-04-13 ENCOUNTER — TRANSCRIBE ORDERS (OUTPATIENT)
Dept: ADMINISTRATIVE | Facility: HOSPITAL | Age: 61
End: 2021-04-13

## 2021-04-13 DIAGNOSIS — Z01.818 PRE-OPERATIVE CLEARANCE: Primary | ICD-10-CM

## 2021-04-16 ENCOUNTER — LAB (OUTPATIENT)
Dept: LAB | Facility: HOSPITAL | Age: 61
End: 2021-04-16

## 2021-04-16 DIAGNOSIS — Z01.818 PRE-OPERATIVE CLEARANCE: ICD-10-CM

## 2021-04-16 PROCEDURE — C9803 HOPD COVID-19 SPEC COLLECT: HCPCS

## 2021-04-16 PROCEDURE — U0004 COV-19 TEST NON-CDC HGH THRU: HCPCS

## 2021-04-16 PROCEDURE — U0005 INFEC AGEN DETEC AMPLI PROBE: HCPCS

## 2021-04-17 LAB — SARS-COV-2 RNA NOSE QL NAA+PROBE: NOT DETECTED

## 2021-04-20 ENCOUNTER — IMMUNIZATION (OUTPATIENT)
Dept: VACCINE CLINIC | Facility: HOSPITAL | Age: 61
End: 2021-04-20

## 2021-04-20 PROCEDURE — 0002A: CPT | Performed by: INTERNAL MEDICINE

## 2021-04-20 PROCEDURE — 91300 HC SARSCOV02 VAC 30MCG/0.3ML IM: CPT | Performed by: INTERNAL MEDICINE

## 2021-05-12 ENCOUNTER — TRANSCRIBE ORDERS (OUTPATIENT)
Dept: ADMINISTRATIVE | Facility: HOSPITAL | Age: 61
End: 2021-05-12

## 2021-05-12 DIAGNOSIS — Z01.818 PRE-OPERATIVE CLEARANCE: Primary | ICD-10-CM

## 2021-05-26 ENCOUNTER — OFFICE VISIT (OUTPATIENT)
Dept: PULMONOLOGY | Facility: CLINIC | Age: 61
End: 2021-05-26

## 2021-05-26 VITALS
OXYGEN SATURATION: 98 % | TEMPERATURE: 98.2 F | DIASTOLIC BLOOD PRESSURE: 72 MMHG | WEIGHT: 206 LBS | HEIGHT: 64 IN | SYSTOLIC BLOOD PRESSURE: 138 MMHG | HEART RATE: 63 BPM | BODY MASS INDEX: 35.17 KG/M2

## 2021-05-26 DIAGNOSIS — J84.9 ILD (INTERSTITIAL LUNG DISEASE) (HCC): ICD-10-CM

## 2021-05-26 DIAGNOSIS — J44.9 CHRONIC OBSTRUCTIVE PULMONARY DISEASE, UNSPECIFIED COPD TYPE (HCC): Primary | ICD-10-CM

## 2021-05-26 PROCEDURE — 99214 OFFICE O/P EST MOD 30 MIN: CPT | Performed by: NURSE PRACTITIONER

## 2021-05-26 RX ORDER — ALBUTEROL SULFATE 90 UG/1
2 AEROSOL, METERED RESPIRATORY (INHALATION) EVERY 4 HOURS PRN
Qty: 18 G | Refills: 8 | Status: SHIPPED | OUTPATIENT
Start: 2021-05-26

## 2021-05-26 NOTE — PROGRESS NOTES
"Chief Complaint  COPD    Subjective          Lexie Valdez presents to [facility]  History of Present Illness     Ms. Valdez is a 61 year old female with a medical history significant for depression, anxiety and arthritis.    She presents today to follow up on COPD.  She states that since her last visit she has been doing well.  She states that her shortness of breath is at baseline.  She is currently on Breo once daily and albuterol PRN.  She recently had CT Chest and PFT completed.      Objective   Vital Signs:   /72   Pulse 63   Temp 98.2 °F (36.8 °C) (Temporal)   Ht 162.6 cm (64\")   Wt 93.4 kg (206 lb)   SpO2 98%   BMI 35.36 kg/m²     Physical Exam     GENERAL APPEARANCE: Well developed, well nourished, alert and cooperative, and appears to be in no acute distress.    HEAD: normocephalic. Atraumatic.    EYES: PERRL, EOMI. Vision is grossly intact.    THROAT: Oral cavity and pharynx normal. No inflammation, swelling, exudate, or lesions.     NECK: Neck supple.  No thyromegaly.    CARDIAC: Normal S1 and S2. No S3, S4 or murmurs. Rhythm is regular.     RESPIRATORY:Bilateral air entry positive. Bilateral diminished breath sounds. No wheezing, crackles or rhonchi noted.    GI: Positive bowel sounds. Soft, nondistended, nontender.     MUSCULOSKELETAL: No significant deformity or joint abnormality. No edema. Peripheral pulses intact. No varicosities.    NEUROLOGICAL: Strength and sensation symmetric and intact throughout.     PSYCHIATRIC: The mental examination revealed the patient was oriented to person, place, and time.       Result Review :   The following data was reviewed by: SG Schmidt on 05/26/2021:  Common labs    Common Labsle 3/1/21 3/1/21    0742 0742   Glucose  86   BUN  10   Creatinine  0.82   eGFR Non African Am  71   Sodium  143   Potassium  3.8   Chloride  107   Calcium  8.6   Albumin  4.20   Total Bilirubin  0.3   Alkaline " Phosphatase  77   AST (SGOT)  26   ALT (SGPT)  13   WBC 8.01    Hemoglobin 13.1    Hematocrit 39.7    Platelets 198            Data reviewed: PFT and CT Chest          Assessment and Plan    Diagnoses and all orders for this visit:    1. Chronic obstructive pulmonary disease, unspecified COPD type (CMS/[facility]) (Primary)    2. ILD (interstitial lung disease) (CMS/[facility])    Other orders  -     Ventolin  (90 Base) MCG/ACT inhaler; Inhale 2 puffs Every 4 (Four) Hours As Needed for Wheezing.  Dispense: 18 g; Refill: 8  -     Breo Ellipta 100-25 MCG/INH inhaler; Inhale 1 puff Daily.  Dispense: 180 each; Refill: 5        COPD/ILD:  -Continue albuterol every 4 hours as needed.  -Continue Breo once daily.  -PFT was discussed with the patient in detail.    Refills were sent to her pharmacy on all medications.    Former smoker:   Low dose CT chest was reviewed.  RADs category 1.  We will repeat CT chest in November 2021.    Patient's Body mass index is 35.36 kg/m². indicating that she is morbidly obese (BMI > 40 or > 35 with obesity - related health condition). Obesity-related health conditions include the following: none. Obesity is unchanged. BMI is is above average; BMI management plan is completed. We discussed portion control and increasing exercise..        Follow Up   Return in about 6 months (around 11/26/2021).  Patient was given instructions and counseling regarding her condition or for health maintenance advice. Please see specific information pulled into the AVS if appropriate.

## 2021-07-28 ENCOUNTER — HOSPITAL ENCOUNTER (OUTPATIENT)
Dept: MAMMOGRAPHY | Facility: HOSPITAL | Age: 61
Discharge: HOME | End: 2021-07-28
Admitting: NURSE PRACTITIONER

## 2021-07-28 DIAGNOSIS — Z12.31 VISIT FOR SCREENING MAMMOGRAM: ICD-10-CM

## 2021-07-28 PROCEDURE — 77063 BREAST TOMOSYNTHESIS BI: CPT

## 2021-07-28 PROCEDURE — 77063 BREAST TOMOSYNTHESIS BI: CPT | Performed by: RADIOLOGY

## 2021-07-28 PROCEDURE — 77067 SCR MAMMO BI INCL CAD: CPT | Performed by: RADIOLOGY

## 2021-07-28 PROCEDURE — 77067 SCR MAMMO BI INCL CAD: CPT

## 2021-08-17 DIAGNOSIS — N32.81 DETRUSOR INSTABILITY: ICD-10-CM

## 2021-08-17 RX ORDER — NITROFURANTOIN 25; 75 MG/1; MG/1
CAPSULE ORAL
Qty: 30 CAPSULE | Refills: 2 | Status: SHIPPED | OUTPATIENT
Start: 2021-08-17 | End: 2021-11-16

## 2021-10-19 ENCOUNTER — TRANSCRIBE ORDERS (OUTPATIENT)
Dept: ADMINISTRATIVE | Facility: HOSPITAL | Age: 61
End: 2021-10-19

## 2021-10-19 DIAGNOSIS — M79.605 LEFT LEG PAIN: Primary | ICD-10-CM

## 2021-10-21 ENCOUNTER — HOSPITAL ENCOUNTER (OUTPATIENT)
Dept: CARDIOLOGY | Facility: HOSPITAL | Age: 61
Discharge: HOME | End: 2021-10-21
Admitting: NURSE PRACTITIONER

## 2021-10-21 DIAGNOSIS — M79.605 LEFT LEG PAIN: ICD-10-CM

## 2021-10-21 PROCEDURE — 93971 EXTREMITY STUDY: CPT

## 2021-10-21 PROCEDURE — 93971 EXTREMITY STUDY: CPT | Performed by: RADIOLOGY

## 2021-11-16 DIAGNOSIS — N32.81 DETRUSOR INSTABILITY: ICD-10-CM

## 2021-11-16 RX ORDER — NITROFURANTOIN 25; 75 MG/1; MG/1
CAPSULE ORAL
Qty: 30 CAPSULE | Refills: 2 | Status: SHIPPED | OUTPATIENT
Start: 2021-11-16 | End: 2022-02-14

## 2021-11-24 ENCOUNTER — OFFICE VISIT (OUTPATIENT)
Dept: PULMONOLOGY | Facility: CLINIC | Age: 61
End: 2021-11-24

## 2021-11-24 VITALS
HEART RATE: 73 BPM | DIASTOLIC BLOOD PRESSURE: 82 MMHG | OXYGEN SATURATION: 99 % | TEMPERATURE: 96.8 F | SYSTOLIC BLOOD PRESSURE: 120 MMHG | HEIGHT: 64 IN | WEIGHT: 210 LBS | BODY MASS INDEX: 35.85 KG/M2

## 2021-11-24 DIAGNOSIS — F17.210 CIGARETTE NICOTINE DEPENDENCE WITHOUT COMPLICATION: ICD-10-CM

## 2021-11-24 DIAGNOSIS — J84.9 ILD (INTERSTITIAL LUNG DISEASE) (HCC): ICD-10-CM

## 2021-11-24 DIAGNOSIS — Z12.2 ENCOUNTER FOR SCREENING FOR LUNG CANCER: ICD-10-CM

## 2021-11-24 DIAGNOSIS — J44.9 CHRONIC OBSTRUCTIVE PULMONARY DISEASE, UNSPECIFIED COPD TYPE (HCC): Primary | ICD-10-CM

## 2021-11-24 PROCEDURE — 99214 OFFICE O/P EST MOD 30 MIN: CPT | Performed by: NURSE PRACTITIONER

## 2021-11-24 RX ORDER — PREDNISONE 1 MG/1
TABLET ORAL
COMMUNITY
Start: 2021-11-01 | End: 2023-03-21

## 2021-11-24 RX ORDER — NABUMETONE 750 MG/1
750 TABLET, FILM COATED ORAL 2 TIMES DAILY PRN
COMMUNITY

## 2021-11-24 RX ORDER — OSPEMIFENE 60 MG/1
TABLET, FILM COATED ORAL
COMMUNITY
End: 2023-03-21

## 2021-11-24 RX ORDER — HYDROCODONE BITARTRATE AND ACETAMINOPHEN 5; 325 MG/1; MG/1
1 TABLET ORAL 2 TIMES DAILY PRN
COMMUNITY

## 2021-11-24 RX ORDER — CITALOPRAM 40 MG/1
40 TABLET ORAL DAILY
COMMUNITY

## 2021-11-24 RX ORDER — AZATHIOPRINE 50 MG/1
TABLET ORAL
COMMUNITY
Start: 2021-11-01 | End: 2023-03-21

## 2021-11-24 NOTE — PROGRESS NOTES
"Chief Complaint  COPD    Subjective          Lexie Valdez presents to [facility] PULMONARY & CRITICAL CARE MEDICINE  History of Present Illness     Ms. Valdez is a 61 year old female with a medical history significant for COPD, anxiety, arthritis, and depression.    She presents today for a routine follow up on COPD.  She states that she has been doing well since her last visit.  She states that starting on azathioprine has helped her breathing.  She states that she is currently using Breo once daily.  She also uses albuterol every 4 hours as needed.    Objective   Vital Signs:   /82 (BP Location: Left arm, Patient Position: Sitting)   Pulse 73   Temp 96.8 °F (36 °C)   Ht 162.6 cm (64\")   Wt 95.3 kg (210 lb)   SpO2 99%   BMI 36.05 kg/m²     Physical Exam     GENERAL APPEARANCE: Well developed, well nourished, alert and cooperative, and appears to be in no acute distress.    HEAD: normocephalic. Atraumatic.    EYES: PERRL, EOMI. Vision is grossly intact.    THROAT: Oral cavity and pharynx normal. No inflammation, swelling, exudate, or lesions.     NECK: Neck supple.  No thyromegaly.    CARDIAC: Normal S1 and S2. No S3, S4 or murmurs. Rhythm is regular.     RESPIRATORY:Bilateral air entry positive. Bilateral diminished breath sounds. No wheezing, crackles or rhonchi noted.    GI: Positive bowel sounds. Soft, nondistended, nontender.     MUSCULOSKELETAL: No significant deformity or joint abnormality. No edema. Peripheral pulses intact. No varicosities.    NEUROLOGICAL: Strength and sensation symmetric and intact throughout.     PSYCHIATRIC: The mental examination revealed the patient was oriented to person, place, and time.     Result Review :   The following data was reviewed by: SG Schmidt on 11/24/2021:  Common labs    Common Labsle 3/1/21 3/1/21    0742 0742   Glucose  86   BUN  10   Creatinine  0.82   eGFR Non African Am  71   Sodium  143   Potassium  3.8   Chloride  107 "   Calcium  8.6   Albumin  4.20   Total Bilirubin  0.3   Alkaline Phosphatase  77   AST (SGOT)  26   ALT (SGPT)  13   WBC 8.01    Hemoglobin 13.1    Hematocrit 39.7    Platelets 198            Data reviewed: PFT and CT chest          Assessment and Plan    Diagnoses and all orders for this visit:    1. Chronic obstructive pulmonary disease, unspecified COPD type (HCC) (Primary)    2. ILD (interstitial lung disease) (HCC)    3. Cigarette nicotine dependence without complication  -     CT chest low dose wo; Future    4. Encounter for screening for lung cancer  -     CT chest low dose wo; Future           COPD/ILD:  -Continue albuterol every 4 hours as needed.   -Continue Breo once daily.        Former smoker:   Will order low dose CT Chest for lung cancer screening.    Patient's Body mass index is 36.05 kg/m². indicating that she is morbidly obese (BMI > 40 or > 35 with obesity - related health condition). Obesity-related health conditions include the following: none. Obesity is unchanged. BMI is is above average; BMI management plan is completed. We discussed portion control and increasing exercise..        Follow Up   Return in about 6 months (around 5/24/2022).  Patient was given instructions and counseling regarding her condition or for health maintenance advice. Please see specific information pulled into the AVS if appropriate.

## 2021-12-23 ENCOUNTER — APPOINTMENT (OUTPATIENT)
Dept: CT IMAGING | Facility: HOSPITAL | Age: 61
End: 2021-12-23

## 2022-01-19 ENCOUNTER — HOSPITAL ENCOUNTER (OUTPATIENT)
Dept: CT IMAGING | Facility: HOSPITAL | Age: 62
Discharge: HOME | End: 2022-01-19
Admitting: NURSE PRACTITIONER

## 2022-01-19 DIAGNOSIS — Z12.2 ENCOUNTER FOR SCREENING FOR LUNG CANCER: ICD-10-CM

## 2022-01-19 DIAGNOSIS — F17.210 CIGARETTE NICOTINE DEPENDENCE WITHOUT COMPLICATION: ICD-10-CM

## 2022-01-19 PROCEDURE — 71271 CT THORAX LUNG CANCER SCR C-: CPT

## 2022-01-19 PROCEDURE — 71271 CT THORAX LUNG CANCER SCR C-: CPT | Performed by: RADIOLOGY

## 2022-01-28 ENCOUNTER — DOCUMENTATION (OUTPATIENT)
Dept: PULMONOLOGY | Facility: CLINIC | Age: 62
End: 2022-01-28

## 2022-01-28 NOTE — PROGRESS NOTES
Low dose CT Chest showed no pulmonary nodules.  RADS category 1.  We will repeat CT Chest in one year per the screening protocol.

## 2022-01-31 ENCOUNTER — TELEPHONE (OUTPATIENT)
Dept: PULMONOLOGY | Facility: CLINIC | Age: 62
End: 2022-01-31

## 2022-01-31 NOTE — TELEPHONE ENCOUNTER
----- Message from SG Schmidt sent at 1/28/2022  2:21 PM EST -----  Will you let her know that her CT Chest showed no pulmonary nodules.  We will repeat CT chest in one year per the screening protocol.  ----- Message -----  From: Interface, Rad Results [place] In  Sent: 1/19/2022   4:42 PM EST  To: SG Schmidt

## 2022-02-14 DIAGNOSIS — N32.81 DETRUSOR INSTABILITY: ICD-10-CM

## 2022-02-14 RX ORDER — NITROFURANTOIN 25; 75 MG/1; MG/1
CAPSULE ORAL
Qty: 30 CAPSULE | Refills: 2 | Status: SHIPPED | OUTPATIENT
Start: 2022-02-14 | End: 2023-03-21

## 2022-04-26 ENCOUNTER — HOSPITAL ENCOUNTER (OUTPATIENT)
Dept: GENERAL RADIOLOGY | Facility: HOSPITAL | Age: 62
Discharge: HOME | End: 2022-04-26
Admitting: NURSE PRACTITIONER

## 2022-04-26 ENCOUNTER — TRANSCRIBE ORDERS (OUTPATIENT)
Dept: ADMINISTRATIVE | Facility: HOSPITAL | Age: 62
End: 2022-04-26

## 2022-04-26 DIAGNOSIS — R06.02 SHORTNESS OF BREATH: Primary | ICD-10-CM

## 2022-04-26 DIAGNOSIS — R06.02 SHORTNESS OF BREATH: ICD-10-CM

## 2022-04-26 PROCEDURE — 71046 X-RAY EXAM CHEST 2 VIEWS: CPT

## 2022-04-26 PROCEDURE — 71046 X-RAY EXAM CHEST 2 VIEWS: CPT | Performed by: RADIOLOGY

## 2022-05-19 ENCOUNTER — LAB (OUTPATIENT)
Dept: LAB | Facility: HOSPITAL | Age: 62
End: 2022-05-19

## 2022-05-19 ENCOUNTER — TRANSCRIBE ORDERS (OUTPATIENT)
Dept: ADMINISTRATIVE | Facility: HOSPITAL | Age: 62
End: 2022-05-19

## 2022-05-19 ENCOUNTER — OFFICE VISIT (OUTPATIENT)
Dept: PULMONOLOGY | Facility: CLINIC | Age: 62
End: 2022-05-19

## 2022-05-19 VITALS
SYSTOLIC BLOOD PRESSURE: 142 MMHG | TEMPERATURE: 97.1 F | HEART RATE: 78 BPM | WEIGHT: 206 LBS | BODY MASS INDEX: 35.17 KG/M2 | DIASTOLIC BLOOD PRESSURE: 86 MMHG | HEIGHT: 64 IN | OXYGEN SATURATION: 98 %

## 2022-05-19 DIAGNOSIS — J84.9 ILD (INTERSTITIAL LUNG DISEASE): ICD-10-CM

## 2022-05-19 DIAGNOSIS — E66.9 OBESITY (BMI 30-39.9): ICD-10-CM

## 2022-05-19 DIAGNOSIS — Z87.891 FORMER SMOKER: ICD-10-CM

## 2022-05-19 DIAGNOSIS — R06.02 SOB (SHORTNESS OF BREATH) ON EXERTION: ICD-10-CM

## 2022-05-19 DIAGNOSIS — R06.02 SOB (SHORTNESS OF BREATH) ON EXERTION: Primary | ICD-10-CM

## 2022-05-19 DIAGNOSIS — J44.9 CHRONIC OBSTRUCTIVE PULMONARY DISEASE, UNSPECIFIED COPD TYPE: Primary | ICD-10-CM

## 2022-05-19 LAB — NT-PROBNP SERPL-MCNC: 153 PG/ML (ref 0–900)

## 2022-05-19 PROCEDURE — 83880 ASSAY OF NATRIURETIC PEPTIDE: CPT

## 2022-05-19 PROCEDURE — 36415 COLL VENOUS BLD VENIPUNCTURE: CPT

## 2022-05-19 PROCEDURE — 99214 OFFICE O/P EST MOD 30 MIN: CPT | Performed by: NURSE PRACTITIONER

## 2022-05-19 RX ORDER — ASPIRIN 81 MG/1
81 TABLET, CHEWABLE ORAL DAILY
COMMUNITY

## 2022-05-19 NOTE — PROGRESS NOTES
"Chief Complaint  COPD    Subjective          Lexie Valdez presents to [facility] PULMONARY & CRITICAL CARE MEDICINE  History of Present Illness     Ms. Valdez is a 62-year-old female with a medical history significant for anxiety, depression, hyperlipidemia and COPD.    She presents today for routine follow-up on COPD.  She reports that she has been doing well since her last visit.  She tells me that her shortness of breath has been at baseline.  She is currently taking Breo once daily and albuterol every 4 hours as needed.  Low-dose CT chest for lung cancer screening was completed in January 2022.    Objective   Vital Signs:  /86   Pulse 78   Temp 97.1 °F (36.2 °C) (Temporal)   Ht 162.6 cm (64\")   Wt 93.4 kg (206 lb)   SpO2 98%   BMI 35.36 kg/m²   Class 2 Severe Obesity (BMI >=35 and <=39.9). Obesity-related health conditions include the following: dyslipidemias. Obesity is unchanged. BMI is is above average; BMI management plan is completed. We discussed portion control and increasing exercise.      Physical Exam     GENERAL APPEARANCE: Well developed, well nourished, alert and cooperative, and appears to be in no acute distress.    HEAD: normocephalic. Atraumatic.    EYES: PERRL, EOMI. Vision is grossly intact.    THROAT: Oral cavity and pharynx normal. No inflammation, swelling, exudate, or lesions.     NECK: Neck supple.  No thyromegaly.    CARDIAC: Normal S1 and S2. No S3, S4 or murmurs. Rhythm is regular.     RESPIRATORY:Bilateral air entry positive. Bilateral diminished breath sounds. No wheezing, crackles or rhonchi noted.    GI: Positive bowel sounds. Soft, nondistended, nontender.     MUSCULOSKELETAL: No significant deformity or joint abnormality. No edema. Peripheral pulses intact. No varicosities.    NEUROLOGICAL: Strength and sensation symmetric and intact throughout.     PSYCHIATRIC: The mental examination revealed the patient was oriented to person, place, and time. "     Result Review :   The following data was reviewed by: SG Schmidt on 05/19/2022:               Assessment and Plan    Diagnoses and all orders for this visit:    1. Chronic obstructive pulmonary disease, unspecified COPD type (HCC) (Primary)  -     Overnight Sleep Oximetry Study; Future    2. ILD (interstitial lung disease) (HCC)    3. Former smoker    4. Obesity (BMI 30-39.9)            COPD/ILD:  -Continue Breo once daily.  -Continue albuterol every 4 hours as needed.  -ordered overnight pulse oximetry study.      Former smoker:   LDCT was completed in January 2022.  RADS category 1.  We will repeat CT chest in January 2023.      Follow Up   Return in about 6 months (around 11/19/2022).  Patient was given instructions and counseling regarding her condition or for health maintenance advice. Please see specific information pulled into the AVS if appropriate.

## 2022-06-01 DIAGNOSIS — J44.9 CHRONIC OBSTRUCTIVE PULMONARY DISEASE, UNSPECIFIED COPD TYPE: Primary | ICD-10-CM

## 2022-06-01 NOTE — PROGRESS NOTES
Overnight pulse oximetry study was reviewed.  Will start her on 3 L of supplemental oxygen at night due to oxygen desaturations.

## 2022-06-21 ENCOUNTER — LAB (OUTPATIENT)
Dept: LAB | Facility: HOSPITAL | Age: 62
End: 2022-06-21

## 2022-06-21 ENCOUNTER — TRANSCRIBE ORDERS (OUTPATIENT)
Dept: ADMINISTRATIVE | Facility: HOSPITAL | Age: 62
End: 2022-06-21

## 2022-06-21 DIAGNOSIS — M06.09 RHEUMATOID ARTHRITIS OF MULTIPLE SITES WITHOUT RHEUMATOID FACTOR: ICD-10-CM

## 2022-06-21 DIAGNOSIS — M13.0 POLYARTHROPATHY: ICD-10-CM

## 2022-06-21 DIAGNOSIS — M79.10 MYALGIA: ICD-10-CM

## 2022-06-21 DIAGNOSIS — M15.0 PRIMARY GENERALIZED HYPERTROPHIC OSTEOARTHROSIS: Primary | ICD-10-CM

## 2022-06-21 DIAGNOSIS — D89.89 SECONDARY IMMUNE DEFICIENCY DISORDER: ICD-10-CM

## 2022-06-21 DIAGNOSIS — M15.0 PRIMARY GENERALIZED HYPERTROPHIC OSTEOARTHROSIS: ICD-10-CM

## 2022-06-21 PROCEDURE — 84550 ASSAY OF BLOOD/URIC ACID: CPT

## 2022-06-21 PROCEDURE — 86140 C-REACTIVE PROTEIN: CPT

## 2022-06-21 PROCEDURE — 85652 RBC SED RATE AUTOMATED: CPT

## 2022-06-21 PROCEDURE — 86200 CCP ANTIBODY: CPT

## 2022-06-21 PROCEDURE — 36415 COLL VENOUS BLD VENIPUNCTURE: CPT

## 2022-06-21 PROCEDURE — 86431 RHEUMATOID FACTOR QUANT: CPT

## 2022-06-21 PROCEDURE — 82550 ASSAY OF CK (CPK): CPT

## 2022-06-22 LAB
CK SERPL-CCNC: 72 U/L (ref 20–180)
CRP SERPL-MCNC: 0.99 MG/DL (ref 0–0.5)
ERYTHROCYTE [SEDIMENTATION RATE] IN BLOOD: 21 MM/HR (ref 0–30)
URATE SERPL-MCNC: 5.3 MG/DL (ref 2.4–5.7)

## 2022-06-23 LAB
CCP IGA+IGG SERPL IA-ACNC: 2 UNITS (ref 0–19)
RHEUMATOID FACT SERPL-ACNC: 26.1 IU/ML

## 2022-07-07 ENCOUNTER — HOSPITAL ENCOUNTER (OUTPATIENT)
Dept: GENERAL RADIOLOGY | Facility: HOSPITAL | Age: 62
Discharge: HOME | End: 2022-07-07

## 2022-07-07 ENCOUNTER — TRANSCRIBE ORDERS (OUTPATIENT)
Dept: ADMINISTRATIVE | Facility: HOSPITAL | Age: 62
End: 2022-07-07

## 2022-07-07 DIAGNOSIS — M79.672 LEFT FOOT PAIN: ICD-10-CM

## 2022-07-07 DIAGNOSIS — M25.572 PAIN IN JOINT, FOOT, LEFT: ICD-10-CM

## 2022-07-07 DIAGNOSIS — M79.605 LEFT LEG PAIN: ICD-10-CM

## 2022-07-07 DIAGNOSIS — M79.601 RIGHT UPPER LIMB PAIN: ICD-10-CM

## 2022-07-07 DIAGNOSIS — M79.672 LEFT FOOT PAIN: Primary | ICD-10-CM

## 2022-07-07 PROCEDURE — 73630 X-RAY EXAM OF FOOT: CPT

## 2022-07-07 PROCEDURE — 73610 X-RAY EXAM OF ANKLE: CPT | Performed by: RADIOLOGY

## 2022-07-07 PROCEDURE — 73590 X-RAY EXAM OF LOWER LEG: CPT

## 2022-07-07 PROCEDURE — 73090 X-RAY EXAM OF FOREARM: CPT

## 2022-07-07 PROCEDURE — 73090 X-RAY EXAM OF FOREARM: CPT | Performed by: RADIOLOGY

## 2022-07-07 PROCEDURE — 73610 X-RAY EXAM OF ANKLE: CPT

## 2022-07-07 PROCEDURE — 73630 X-RAY EXAM OF FOOT: CPT | Performed by: RADIOLOGY

## 2022-07-07 PROCEDURE — 73590 X-RAY EXAM OF LOWER LEG: CPT | Performed by: RADIOLOGY

## 2022-07-11 ENCOUNTER — TRANSCRIBE ORDERS (OUTPATIENT)
Dept: ADMINISTRATIVE | Facility: HOSPITAL | Age: 62
End: 2022-07-11

## 2022-07-11 DIAGNOSIS — I73.9 PAD (PERIPHERAL ARTERY DISEASE): ICD-10-CM

## 2022-07-11 DIAGNOSIS — R60.0 LOCALIZED EDEMA: ICD-10-CM

## 2022-07-11 DIAGNOSIS — M79.605 LEFT LEG PAIN: Primary | ICD-10-CM

## 2022-07-29 ENCOUNTER — HOSPITAL ENCOUNTER (OUTPATIENT)
Dept: MAMMOGRAPHY | Facility: HOSPITAL | Age: 62
Discharge: HOME | End: 2022-07-29

## 2022-07-29 ENCOUNTER — HOSPITAL ENCOUNTER (OUTPATIENT)
Dept: BONE DENSITY | Facility: HOSPITAL | Age: 62
Discharge: HOME | End: 2022-07-29

## 2022-07-29 DIAGNOSIS — Z78.0 POSTMENOPAUSAL: ICD-10-CM

## 2022-07-29 DIAGNOSIS — Z12.31 VISIT FOR SCREENING MAMMOGRAM: ICD-10-CM

## 2022-07-29 PROCEDURE — 77080 DXA BONE DENSITY AXIAL: CPT | Performed by: RADIOLOGY

## 2022-07-29 PROCEDURE — 77067 SCR MAMMO BI INCL CAD: CPT | Performed by: RADIOLOGY

## 2022-07-29 PROCEDURE — 77067 SCR MAMMO BI INCL CAD: CPT

## 2022-07-29 PROCEDURE — 77080 DXA BONE DENSITY AXIAL: CPT

## 2022-07-29 PROCEDURE — 77063 BREAST TOMOSYNTHESIS BI: CPT | Performed by: RADIOLOGY

## 2022-07-29 PROCEDURE — 77063 BREAST TOMOSYNTHESIS BI: CPT

## 2022-08-03 ENCOUNTER — HOSPITAL ENCOUNTER (OUTPATIENT)
Dept: CARDIOLOGY | Facility: HOSPITAL | Age: 62
Discharge: HOME | End: 2022-08-03
Admitting: NURSE PRACTITIONER

## 2022-08-03 DIAGNOSIS — I73.9 PAD (PERIPHERAL ARTERY DISEASE): ICD-10-CM

## 2022-08-03 PROCEDURE — 93926 LOWER EXTREMITY STUDY: CPT | Performed by: RADIOLOGY

## 2022-08-03 PROCEDURE — 93926 LOWER EXTREMITY STUDY: CPT

## 2022-10-26 ENCOUNTER — HOSPITAL ENCOUNTER (OUTPATIENT)
Dept: GENERAL RADIOLOGY | Facility: HOSPITAL | Age: 62
Discharge: HOME | End: 2022-10-26

## 2022-10-26 ENCOUNTER — TRANSCRIBE ORDERS (OUTPATIENT)
Dept: ADMINISTRATIVE | Facility: HOSPITAL | Age: 62
End: 2022-10-26

## 2022-10-26 DIAGNOSIS — M25.511 RIGHT SHOULDER PAIN, UNSPECIFIED CHRONICITY: ICD-10-CM

## 2022-10-26 DIAGNOSIS — M25.511 RIGHT SHOULDER PAIN, UNSPECIFIED CHRONICITY: Primary | ICD-10-CM

## 2022-10-26 PROCEDURE — 73030 X-RAY EXAM OF SHOULDER: CPT

## 2022-10-26 PROCEDURE — 73030 X-RAY EXAM OF SHOULDER: CPT | Performed by: RADIOLOGY

## 2023-01-18 ENCOUNTER — OFFICE VISIT (OUTPATIENT)
Dept: PULMONOLOGY | Facility: CLINIC | Age: 63
End: 2023-01-18
Payer: MEDICARE

## 2023-01-18 VITALS
WEIGHT: 197 LBS | SYSTOLIC BLOOD PRESSURE: 117 MMHG | HEART RATE: 75 BPM | BODY MASS INDEX: 33.63 KG/M2 | OXYGEN SATURATION: 88 % | TEMPERATURE: 97.1 F | HEIGHT: 64 IN | DIASTOLIC BLOOD PRESSURE: 88 MMHG

## 2023-01-18 DIAGNOSIS — Z12.2 ENCOUNTER FOR SCREENING FOR LUNG CANCER: ICD-10-CM

## 2023-01-18 DIAGNOSIS — F17.210 CIGARETTE NICOTINE DEPENDENCE WITHOUT COMPLICATION: ICD-10-CM

## 2023-01-18 DIAGNOSIS — J84.9 ILD (INTERSTITIAL LUNG DISEASE): ICD-10-CM

## 2023-01-18 DIAGNOSIS — E66.9 OBESITY (BMI 30-39.9): ICD-10-CM

## 2023-01-18 DIAGNOSIS — J44.9 CHRONIC OBSTRUCTIVE PULMONARY DISEASE, UNSPECIFIED COPD TYPE: Primary | ICD-10-CM

## 2023-01-18 PROCEDURE — 99214 OFFICE O/P EST MOD 30 MIN: CPT | Performed by: NURSE PRACTITIONER

## 2023-01-18 NOTE — PROGRESS NOTES
"Chief Complaint  COPD    Lalita Valdez presents to [facility] PULMONARY & CRITICAL CARE MEDICINE  History of Present Illness     Ms. Valdez is a 62 year old female with a medical history significant for anxiety, depression, arthritis, COPD, ILD,  hypertension, and hyperlipidemia.     She presents today for follow up on COPD.   She continues to use Breo once daily and albuterol as needed.   She is suppose to be using supplemental oxygen at night but states that she does not use it because it drys her nose out.      Objective   Vital Signs:  /88   Pulse 75   Temp 97.1 °F (36.2 °C) (Temporal)   Ht 162.6 cm (64\")   Wt 89.4 kg (197 lb)   SpO2 (!) 88%   BMI 33.81 kg/m²   Estimated body mass index is 33.81 kg/m² as calculated from the following:    Height as of this encounter: 162.6 cm (64\").    Weight as of this encounter: 89.4 kg (197 lb).       BMI is >= 30 and <35. (Class 1 Obesity). The following options were offered after discussion;: exercise counseling/recommendations and nutrition counseling/recommendations      Physical Exam     GENERAL APPEARANCE: Well developed, well nourished, alert and cooperative, and appears to be in no acute distress.    HEAD: normocephalic. Atraumatic.    EYES: PERRL, EOMI. Vision is grossly intact.    THROAT: Oral cavity and pharynx normal. No inflammation, swelling, exudate, or lesions.     NECK: Neck supple.  No thyromegaly.    CARDIAC: Normal S1 and S2. No S3, S4 or murmurs. Rhythm is regular.     RESPIRATORY:Bilateral air entry positive. Bilateral diminished breath sounds. No wheezing, crackles or rhonchi noted.    GI: Positive bowel sounds. Soft, nondistended, nontender.     MUSCULOSKELETAL: No significant deformity or joint abnormality. No edema. Peripheral pulses intact. No varicosities.    NEUROLOGICAL: Strength and sensation symmetric and intact throughout.     PSYCHIATRIC: The mental examination revealed the patient was oriented to " person, place, and time.     Result Review :  The following data was reviewed by: SG Schmidt on 01/18/2023:  Common labs    Common Labs 6/21/22   Uric Acid 5.3                        Assessment and Plan   Diagnoses and all orders for this visit:    1. Chronic obstructive pulmonary disease, unspecified COPD type (HCC) (Primary)  -     Home Nebulizer    2. ILD (interstitial lung disease) ([facility])    3. Cigarette nicotine dependence without complication  -     CT chest low dose wo; Future    4. Encounter for screening for lung cancer  -     CT chest low dose wo; Future    5. Obesity (BMI 30-39.9)    Other orders  -     ipratropium-albuterol (DUO-NEB) 0.5-2.5 mg/3 ml nebulizer; Take 3 mL by nebulization 4 (Four) Times a Day As Needed for Wheezing.  Dispense: 360 mL; Refill: 3             Continue Breo once daily.  Continue albtuerol inhaler as needed.  Ordered nebulizer machine and duones to use as needed.    Encouraged her to wear her oxygen nightly.  Recommended use of saline nasal spray for her dry nasal passages.  Will also contact her DME to make her sure that she has humidification with her oxygen.      LDCT ordered for lung cancer screening.              Follow Up   Return in about 6 months (around 7/18/2023).  Patient was given instructions and counseling regarding her condition or for health maintenance advice. Please see specific information pulled into the AVS if appropriate.

## 2023-01-19 PROBLEM — M79.7 FIBROMYALGIA: Status: ACTIVE | Noted: 2019-02-26

## 2023-01-19 PROBLEM — I10 HYPERTENSIVE DISORDER: Status: ACTIVE | Noted: 2018-03-14

## 2023-01-19 PROBLEM — E03.9 HYPOTHYROIDISM: Status: ACTIVE | Noted: 2021-08-03

## 2023-01-19 PROBLEM — J44.9 CHRONIC OBSTRUCTIVE LUNG DISEASE (HCC): Status: ACTIVE | Noted: 2018-10-25

## 2023-01-19 RX ORDER — IPRATROPIUM BROMIDE AND ALBUTEROL SULFATE 2.5; .5 MG/3ML; MG/3ML
3 SOLUTION RESPIRATORY (INHALATION) 4 TIMES DAILY PRN
Qty: 360 ML | Refills: 3 | Status: SHIPPED | OUTPATIENT
Start: 2023-01-19

## 2023-02-21 ENCOUNTER — HOSPITAL ENCOUNTER (OUTPATIENT)
Dept: CT IMAGING | Facility: HOSPITAL | Age: 63
Discharge: HOME | End: 2023-02-21
Admitting: NURSE PRACTITIONER
Payer: MEDICARE

## 2023-02-21 DIAGNOSIS — Z12.2 ENCOUNTER FOR SCREENING FOR LUNG CANCER: ICD-10-CM

## 2023-02-21 DIAGNOSIS — F17.210 CIGARETTE NICOTINE DEPENDENCE WITHOUT COMPLICATION: ICD-10-CM

## 2023-02-21 PROCEDURE — 71271 CT THORAX LUNG CANCER SCR C-: CPT

## 2023-02-21 PROCEDURE — 71271 CT THORAX LUNG CANCER SCR C-: CPT | Performed by: RADIOLOGY

## 2023-02-28 ENCOUNTER — DOCUMENTATION (OUTPATIENT)
Dept: PULMONOLOGY | Facility: CLINIC | Age: 63
End: 2023-02-28
Payer: MEDICARE

## 2023-02-28 NOTE — PROGRESS NOTES
LDCT was reviewed.  Lung RADs category 1.  We will repeat in one year per the screening protocol.

## 2023-03-06 ENCOUNTER — TELEPHONE (OUTPATIENT)
Dept: PULMONOLOGY | Facility: CLINIC | Age: 63
End: 2023-03-06
Payer: MEDICARE

## 2023-03-06 NOTE — TELEPHONE ENCOUNTER
----- Message from SG Schmidt sent at 2/28/2023 10:26 AM EST -----  Will you let her know that her CT Chest showed no pulmonary nodules.  We will repeat CT in one year per screening process.  ----- Message -----  From: Interface, Rad Results [place] In  Sent: 2/21/2023   2:30 PM EST  To: SG Schmidt

## 2023-03-21 ENCOUNTER — HOSPITAL ENCOUNTER (OUTPATIENT)
Facility: HOSPITAL | Age: 63
Setting detail: OBSERVATION
Discharge: HOME HEALTH | End: 2023-03-23
Attending: STUDENT IN AN ORGANIZED HEALTH CARE EDUCATION/TRAINING PROGRAM | Admitting: INTERNAL MEDICINE
Payer: MEDICARE

## 2023-03-21 ENCOUNTER — APPOINTMENT (OUTPATIENT)
Dept: CT IMAGING | Facility: HOSPITAL | Age: 63
End: 2023-03-21
Payer: MEDICARE

## 2023-03-21 ENCOUNTER — APPOINTMENT (OUTPATIENT)
Dept: GENERAL RADIOLOGY | Facility: HOSPITAL | Age: 63
End: 2023-03-21
Payer: MEDICARE

## 2023-03-21 DIAGNOSIS — J84.10 PULMONARY FIBROSIS: ICD-10-CM

## 2023-03-21 DIAGNOSIS — R53.81 DEBILITY: ICD-10-CM

## 2023-03-21 DIAGNOSIS — N39.0 ACUTE UTI: ICD-10-CM

## 2023-03-21 DIAGNOSIS — J44.1 COPD WITH ACUTE EXACERBATION: Primary | ICD-10-CM

## 2023-03-21 LAB
A-A DO2: 83 MMHG (ref 0–300)
ALBUMIN SERPL-MCNC: 3.4 G/DL (ref 3.5–5.2)
ALBUMIN/GLOB SERPL: 1.2 G/DL
ALP SERPL-CCNC: 62 U/L (ref 39–117)
ALT SERPL W P-5'-P-CCNC: 10 U/L (ref 1–33)
ANION GAP SERPL CALCULATED.3IONS-SCNC: 10.3 MMOL/L (ref 5–15)
APTT PPP: 23.1 SECONDS (ref 26.5–34.5)
ARTERIAL PATENCY WRIST A: ABNORMAL
AST SERPL-CCNC: 22 U/L (ref 1–32)
ATMOSPHERIC PRESS: 735 MMHG
BACTERIA UR QL AUTO: ABNORMAL /HPF
BASE EXCESS BLDA CALC-SCNC: 2.9 MMOL/L (ref 0–2)
BASOPHILS # BLD AUTO: 0.06 10*3/MM3 (ref 0–0.2)
BASOPHILS NFR BLD AUTO: 0.7 % (ref 0–1.5)
BDY SITE: ABNORMAL
BILIRUB SERPL-MCNC: 0.5 MG/DL (ref 0–1.2)
BILIRUB UR QL STRIP: NEGATIVE
BUN SERPL-MCNC: 5 MG/DL (ref 8–23)
BUN/CREAT SERPL: 7 (ref 7–25)
CALCIUM SPEC-SCNC: 8.9 MG/DL (ref 8.6–10.5)
CHLORIDE SERPL-SCNC: 107 MMOL/L (ref 98–107)
CLARITY UR: ABNORMAL
CO2 BLDA-SCNC: 28.3 MMOL/L (ref 22–33)
CO2 SERPL-SCNC: 26.7 MMOL/L (ref 22–29)
COHGB MFR BLD: 1.2 % (ref 0–5)
COLOR UR: YELLOW
CREAT SERPL-MCNC: 0.71 MG/DL (ref 0.57–1)
CRP SERPL-MCNC: 0.9 MG/DL (ref 0–0.5)
D DIMER PPP FEU-MCNC: 3.66 MCGFEU/ML (ref 0–0.63)
D-LACTATE SERPL-SCNC: 1.1 MMOL/L (ref 0.5–2)
D-LACTATE SERPL-SCNC: 2.2 MMOL/L (ref 0.5–2)
D-LACTATE SERPL-SCNC: 2.5 MMOL/L (ref 0.5–2)
DEPRECATED RDW RBC AUTO: 47.6 FL (ref 37–54)
EGFRCR SERPLBLD CKD-EPI 2021: 95.7 ML/MIN/1.73
EOSINOPHIL # BLD AUTO: 0.05 10*3/MM3 (ref 0–0.4)
EOSINOPHIL NFR BLD AUTO: 0.6 % (ref 0.3–6.2)
ERYTHROCYTE [DISTWIDTH] IN BLOOD BY AUTOMATED COUNT: 13.9 % (ref 12.3–15.4)
ERYTHROCYTE [SEDIMENTATION RATE] IN BLOOD: 11 MM/HR (ref 0–30)
FLUAV RNA RESP QL NAA+PROBE: NOT DETECTED
FLUBV RNA RESP QL NAA+PROBE: NOT DETECTED
GAS FLOW AIRWAY: 2 LPM
GEN 5 2HR TROPONIN T REFLEX: 10 NG/L
GLOBULIN UR ELPH-MCNC: 2.9 GM/DL
GLUCOSE SERPL-MCNC: 101 MG/DL (ref 65–99)
GLUCOSE UR STRIP-MCNC: NEGATIVE MG/DL
HCO3 BLDA-SCNC: 27.1 MMOL/L (ref 20–26)
HCT VFR BLD AUTO: 38.7 % (ref 34–46.6)
HCT VFR BLD CALC: 37 % (ref 38–51)
HGB BLD-MCNC: 12.4 G/DL (ref 12–15.9)
HGB BLDA-MCNC: 12.1 G/DL (ref 13.5–17.5)
HGB UR QL STRIP.AUTO: NEGATIVE
HOLD SPECIMEN: NORMAL
HOLD SPECIMEN: NORMAL
HYALINE CASTS UR QL AUTO: ABNORMAL /LPF
IMM GRANULOCYTES # BLD AUTO: 0.05 10*3/MM3 (ref 0–0.05)
IMM GRANULOCYTES NFR BLD AUTO: 0.6 % (ref 0–0.5)
INHALED O2 CONCENTRATION: 28 %
INR PPP: 1 (ref 0.9–1.1)
KETONES UR QL STRIP: ABNORMAL
LEUKOCYTE ESTERASE UR QL STRIP.AUTO: ABNORMAL
LYMPHOCYTES # BLD AUTO: 1.3 10*3/MM3 (ref 0.7–3.1)
LYMPHOCYTES NFR BLD AUTO: 15.5 % (ref 19.6–45.3)
Lab: ABNORMAL
MAGNESIUM SERPL-MCNC: 2 MG/DL (ref 1.6–2.4)
MCH RBC QN AUTO: 29.8 PG (ref 26.6–33)
MCHC RBC AUTO-ENTMCNC: 32 G/DL (ref 31.5–35.7)
MCV RBC AUTO: 93 FL (ref 79–97)
METHGB BLD QL: 0 % (ref 0–3)
MODALITY: ABNORMAL
MONOCYTES # BLD AUTO: 0.59 10*3/MM3 (ref 0.1–0.9)
MONOCYTES NFR BLD AUTO: 7 % (ref 5–12)
NEUTROPHILS NFR BLD AUTO: 6.34 10*3/MM3 (ref 1.7–7)
NEUTROPHILS NFR BLD AUTO: 75.6 % (ref 42.7–76)
NITRITE UR QL STRIP: POSITIVE
NOTE: ABNORMAL
NOTIFIED BY: ABNORMAL
NOTIFIED WHO: ABNORMAL
NRBC BLD AUTO-RTO: 0 /100 WBC (ref 0–0.2)
NT-PROBNP SERPL-MCNC: 394 PG/ML (ref 0–900)
OXYHGB MFR BLDV: 93.4 % (ref 94–99)
PCO2 BLDA: 39.4 MM HG (ref 35–45)
PCO2 TEMP ADJ BLD: ABNORMAL MM[HG]
PH BLDA: 7.45 PH UNITS (ref 7.35–7.45)
PH UR STRIP.AUTO: 6.5 [PH] (ref 5–8)
PH, TEMP CORRECTED: ABNORMAL
PLATELET # BLD AUTO: 174 10*3/MM3 (ref 140–450)
PMV BLD AUTO: 10.6 FL (ref 6–12)
PO2 BLDA: 65.5 MM HG (ref 83–108)
PO2 TEMP ADJ BLD: ABNORMAL MM[HG]
POTASSIUM SERPL-SCNC: 4 MMOL/L (ref 3.5–5.2)
PROCALCITONIN SERPL-MCNC: 0.04 NG/ML (ref 0–0.25)
PROT SERPL-MCNC: 6.3 G/DL (ref 6–8.5)
PROT UR QL STRIP: ABNORMAL
PROTHROMBIN TIME: 13.4 SECONDS (ref 12.1–14.7)
RBC # BLD AUTO: 4.16 10*6/MM3 (ref 3.77–5.28)
RBC # UR STRIP: ABNORMAL /HPF
REF LAB TEST METHOD: ABNORMAL
SAO2 % BLDCOA: 94.5 % (ref 94–99)
SARS-COV-2 RNA RESP QL NAA+PROBE: NOT DETECTED
SODIUM SERPL-SCNC: 144 MMOL/L (ref 136–145)
SP GR UR STRIP: 1.01 (ref 1–1.03)
SQUAMOUS #/AREA URNS HPF: ABNORMAL /HPF
TROPONIN T DELTA: -1 NG/L
TROPONIN T SERPL HS-MCNC: 11 NG/L
UROBILINOGEN UR QL STRIP: ABNORMAL
VENTILATOR MODE: ABNORMAL
WBC # UR STRIP: ABNORMAL /HPF
WBC NRBC COR # BLD: 8.39 10*3/MM3 (ref 3.4–10.8)
WHOLE BLOOD HOLD COAG: NORMAL
WHOLE BLOOD HOLD SPECIMEN: NORMAL

## 2023-03-21 PROCEDURE — 94799 UNLISTED PULMONARY SVC/PX: CPT

## 2023-03-21 PROCEDURE — 94762 N-INVAS EAR/PLS OXIMTRY CONT: CPT

## 2023-03-21 PROCEDURE — 96376 TX/PRO/DX INJ SAME DRUG ADON: CPT

## 2023-03-21 PROCEDURE — 84484 ASSAY OF TROPONIN QUANT: CPT | Performed by: PHYSICIAN ASSISTANT

## 2023-03-21 PROCEDURE — 96365 THER/PROPH/DIAG IV INF INIT: CPT

## 2023-03-21 PROCEDURE — 86140 C-REACTIVE PROTEIN: CPT | Performed by: PHYSICIAN ASSISTANT

## 2023-03-21 PROCEDURE — G0378 HOSPITAL OBSERVATION PER HR: HCPCS

## 2023-03-21 PROCEDURE — 85730 THROMBOPLASTIN TIME PARTIAL: CPT | Performed by: PHYSICIAN ASSISTANT

## 2023-03-21 PROCEDURE — 87077 CULTURE AEROBIC IDENTIFY: CPT | Performed by: PHYSICIAN ASSISTANT

## 2023-03-21 PROCEDURE — 71275 CT ANGIOGRAPHY CHEST: CPT | Performed by: RADIOLOGY

## 2023-03-21 PROCEDURE — 25010000002 CEFEPIME PER 500 MG: Performed by: STUDENT IN AN ORGANIZED HEALTH CARE EDUCATION/TRAINING PROGRAM

## 2023-03-21 PROCEDURE — 25010000002 METHYLPREDNISOLONE PER 125 MG: Performed by: PHYSICIAN ASSISTANT

## 2023-03-21 PROCEDURE — 96367 TX/PROPH/DG ADDL SEQ IV INF: CPT

## 2023-03-21 PROCEDURE — 83735 ASSAY OF MAGNESIUM: CPT | Performed by: PHYSICIAN ASSISTANT

## 2023-03-21 PROCEDURE — 36415 COLL VENOUS BLD VENIPUNCTURE: CPT

## 2023-03-21 PROCEDURE — 36600 WITHDRAWAL OF ARTERIAL BLOOD: CPT

## 2023-03-21 PROCEDURE — 99285 EMERGENCY DEPT VISIT HI MDM: CPT

## 2023-03-21 PROCEDURE — 85652 RBC SED RATE AUTOMATED: CPT | Performed by: PHYSICIAN ASSISTANT

## 2023-03-21 PROCEDURE — 71045 X-RAY EXAM CHEST 1 VIEW: CPT

## 2023-03-21 PROCEDURE — 82805 BLOOD GASES W/O2 SATURATION: CPT

## 2023-03-21 PROCEDURE — 71275 CT ANGIOGRAPHY CHEST: CPT

## 2023-03-21 PROCEDURE — 82375 ASSAY CARBOXYHB QUANT: CPT

## 2023-03-21 PROCEDURE — 96372 THER/PROPH/DIAG INJ SC/IM: CPT

## 2023-03-21 PROCEDURE — 94640 AIRWAY INHALATION TREATMENT: CPT

## 2023-03-21 PROCEDURE — 99223 1ST HOSP IP/OBS HIGH 75: CPT

## 2023-03-21 PROCEDURE — 87186 SC STD MICRODIL/AGAR DIL: CPT | Performed by: PHYSICIAN ASSISTANT

## 2023-03-21 PROCEDURE — 83605 ASSAY OF LACTIC ACID: CPT | Performed by: PHYSICIAN ASSISTANT

## 2023-03-21 PROCEDURE — 93005 ELECTROCARDIOGRAM TRACING: CPT | Performed by: STUDENT IN AN ORGANIZED HEALTH CARE EDUCATION/TRAINING PROGRAM

## 2023-03-21 PROCEDURE — 25010000002 METHYLPREDNISOLONE PER 40 MG: Performed by: STUDENT IN AN ORGANIZED HEALTH CARE EDUCATION/TRAINING PROGRAM

## 2023-03-21 PROCEDURE — 80053 COMPREHEN METABOLIC PANEL: CPT | Performed by: PHYSICIAN ASSISTANT

## 2023-03-21 PROCEDURE — 87086 URINE CULTURE/COLONY COUNT: CPT | Performed by: PHYSICIAN ASSISTANT

## 2023-03-21 PROCEDURE — 85610 PROTHROMBIN TIME: CPT | Performed by: PHYSICIAN ASSISTANT

## 2023-03-21 PROCEDURE — 87040 BLOOD CULTURE FOR BACTERIA: CPT | Performed by: PHYSICIAN ASSISTANT

## 2023-03-21 PROCEDURE — 94761 N-INVAS EAR/PLS OXIMETRY MLT: CPT

## 2023-03-21 PROCEDURE — 25510000001 IOPAMIDOL PER 1 ML: Performed by: STUDENT IN AN ORGANIZED HEALTH CARE EDUCATION/TRAINING PROGRAM

## 2023-03-21 PROCEDURE — 71045 X-RAY EXAM CHEST 1 VIEW: CPT | Performed by: RADIOLOGY

## 2023-03-21 PROCEDURE — 87636 SARSCOV2 & INF A&B AMP PRB: CPT | Performed by: PHYSICIAN ASSISTANT

## 2023-03-21 PROCEDURE — 83880 ASSAY OF NATRIURETIC PEPTIDE: CPT | Performed by: PHYSICIAN ASSISTANT

## 2023-03-21 PROCEDURE — 96375 TX/PRO/DX INJ NEW DRUG ADDON: CPT

## 2023-03-21 PROCEDURE — 93010 ELECTROCARDIOGRAM REPORT: CPT | Performed by: INTERNAL MEDICINE

## 2023-03-21 PROCEDURE — 84145 PROCALCITONIN (PCT): CPT | Performed by: PHYSICIAN ASSISTANT

## 2023-03-21 PROCEDURE — 25010000002 ENOXAPARIN PER 10 MG: Performed by: STUDENT IN AN ORGANIZED HEALTH CARE EDUCATION/TRAINING PROGRAM

## 2023-03-21 PROCEDURE — 81001 URINALYSIS AUTO W/SCOPE: CPT | Performed by: PHYSICIAN ASSISTANT

## 2023-03-21 PROCEDURE — 85379 FIBRIN DEGRADATION QUANT: CPT | Performed by: PHYSICIAN ASSISTANT

## 2023-03-21 PROCEDURE — 25010000002 CEFTRIAXONE PER 250 MG: Performed by: PHYSICIAN ASSISTANT

## 2023-03-21 PROCEDURE — 83050 HGB METHEMOGLOBIN QUAN: CPT

## 2023-03-21 PROCEDURE — 85025 COMPLETE CBC W/AUTO DIFF WBC: CPT | Performed by: PHYSICIAN ASSISTANT

## 2023-03-21 RX ORDER — ALENDRONATE SODIUM 70 MG/1
70 TABLET ORAL
COMMUNITY

## 2023-03-21 RX ORDER — OXYBUTYNIN CHLORIDE 5 MG/1
5 TABLET ORAL DAILY
COMMUNITY

## 2023-03-21 RX ORDER — NITROFURANTOIN 25; 75 MG/1; MG/1
100 CAPSULE ORAL DAILY
COMMUNITY
End: 2023-03-23 | Stop reason: HOSPADM

## 2023-03-21 RX ORDER — ENOXAPARIN SODIUM 100 MG/ML
40 INJECTION SUBCUTANEOUS NIGHTLY
Status: DISCONTINUED | OUTPATIENT
Start: 2023-03-21 | End: 2023-03-23 | Stop reason: HOSPADM

## 2023-03-21 RX ORDER — ALBUTEROL SULFATE 90 UG/1
2 AEROSOL, METERED RESPIRATORY (INHALATION) EVERY 4 HOURS PRN
Status: CANCELLED | OUTPATIENT
Start: 2023-03-21

## 2023-03-21 RX ORDER — ATORVASTATIN CALCIUM 40 MG/1
40 TABLET, FILM COATED ORAL DAILY
Status: CANCELLED | OUTPATIENT
Start: 2023-03-21

## 2023-03-21 RX ORDER — HYDROXYZINE HYDROCHLORIDE 10 MG/1
10 TABLET, FILM COATED ORAL 3 TIMES DAILY PRN
Status: CANCELLED | OUTPATIENT
Start: 2023-03-21

## 2023-03-21 RX ORDER — DOCUSATE SODIUM 100 MG/1
100 CAPSULE, LIQUID FILLED ORAL 2 TIMES DAILY PRN
COMMUNITY

## 2023-03-21 RX ORDER — METHYLPREDNISOLONE SODIUM SUCCINATE 125 MG/2ML
125 INJECTION, POWDER, LYOPHILIZED, FOR SOLUTION INTRAMUSCULAR; INTRAVENOUS ONCE
Status: COMPLETED | OUTPATIENT
Start: 2023-03-21 | End: 2023-03-21

## 2023-03-21 RX ORDER — DOCUSATE SODIUM 100 MG/1
100 CAPSULE, LIQUID FILLED ORAL 2 TIMES DAILY PRN
Status: CANCELLED | OUTPATIENT
Start: 2023-03-21

## 2023-03-21 RX ORDER — HYDROCHLOROTHIAZIDE 25 MG/1
12.5 TABLET ORAL DAILY PRN
Status: CANCELLED | OUTPATIENT
Start: 2023-03-21

## 2023-03-21 RX ORDER — HYDROCODONE BITARTRATE AND ACETAMINOPHEN 5; 325 MG/1; MG/1
1 TABLET ORAL 2 TIMES DAILY PRN
Status: CANCELLED | OUTPATIENT
Start: 2023-03-21

## 2023-03-21 RX ORDER — LANOLIN ALCOHOL/MO/W.PET/CERES
2000 CREAM (GRAM) TOPICAL DAILY
Status: CANCELLED | OUTPATIENT
Start: 2023-03-21

## 2023-03-21 RX ORDER — ATORVASTATIN CALCIUM 40 MG/1
40 TABLET, FILM COATED ORAL DAILY
COMMUNITY

## 2023-03-21 RX ORDER — METHYLPREDNISOLONE SODIUM SUCCINATE 40 MG/ML
40 INJECTION, POWDER, LYOPHILIZED, FOR SOLUTION INTRAMUSCULAR; INTRAVENOUS EVERY 12 HOURS
Status: DISCONTINUED | OUTPATIENT
Start: 2023-03-21 | End: 2023-03-23 | Stop reason: HOSPADM

## 2023-03-21 RX ORDER — BISACODYL 5 MG/1
5 TABLET, DELAYED RELEASE ORAL DAILY PRN
Status: DISCONTINUED | OUTPATIENT
Start: 2023-03-21 | End: 2023-03-23 | Stop reason: HOSPADM

## 2023-03-21 RX ORDER — HYDROCHLOROTHIAZIDE 12.5 MG/1
12.5 TABLET ORAL DAILY PRN
COMMUNITY
End: 2023-03-23 | Stop reason: HOSPADM

## 2023-03-21 RX ORDER — SODIUM CHLORIDE 0.9 % (FLUSH) 0.9 %
10 SYRINGE (ML) INJECTION EVERY 12 HOURS SCHEDULED
Status: DISCONTINUED | OUTPATIENT
Start: 2023-03-21 | End: 2023-03-23 | Stop reason: HOSPADM

## 2023-03-21 RX ORDER — BUSPIRONE HYDROCHLORIDE 10 MG/1
10 TABLET ORAL 2 TIMES DAILY
Status: CANCELLED | OUTPATIENT
Start: 2023-03-21

## 2023-03-21 RX ORDER — IPRATROPIUM BROMIDE AND ALBUTEROL SULFATE 2.5; .5 MG/3ML; MG/3ML
3 SOLUTION RESPIRATORY (INHALATION) ONCE
Status: COMPLETED | OUTPATIENT
Start: 2023-03-21 | End: 2023-03-21

## 2023-03-21 RX ORDER — SODIUM CHLORIDE 0.9 % (FLUSH) 0.9 %
10 SYRINGE (ML) INJECTION AS NEEDED
Status: DISCONTINUED | OUTPATIENT
Start: 2023-03-21 | End: 2023-03-23 | Stop reason: HOSPADM

## 2023-03-21 RX ORDER — SODIUM CHLORIDE 9 MG/ML
40 INJECTION, SOLUTION INTRAVENOUS AS NEEDED
Status: DISCONTINUED | OUTPATIENT
Start: 2023-03-21 | End: 2023-03-23 | Stop reason: HOSPADM

## 2023-03-21 RX ORDER — ONDANSETRON 2 MG/ML
4 INJECTION INTRAMUSCULAR; INTRAVENOUS EVERY 6 HOURS PRN
Status: DISCONTINUED | OUTPATIENT
Start: 2023-03-21 | End: 2023-03-23 | Stop reason: HOSPADM

## 2023-03-21 RX ORDER — ERGOCALCIFEROL 1.25 MG/1
50000 CAPSULE ORAL WEEKLY
COMMUNITY

## 2023-03-21 RX ORDER — OXYBUTYNIN CHLORIDE 5 MG/1
5 TABLET ORAL DAILY
Status: CANCELLED | OUTPATIENT
Start: 2023-03-21

## 2023-03-21 RX ORDER — LEVOTHYROXINE SODIUM 0.03 MG/1
25 TABLET ORAL
COMMUNITY

## 2023-03-21 RX ORDER — IPRATROPIUM BROMIDE AND ALBUTEROL SULFATE 2.5; .5 MG/3ML; MG/3ML
3 SOLUTION RESPIRATORY (INHALATION) 4 TIMES DAILY PRN
Status: CANCELLED | OUTPATIENT
Start: 2023-03-21

## 2023-03-21 RX ORDER — ASPIRIN 81 MG/1
81 TABLET, CHEWABLE ORAL DAILY
Status: CANCELLED | OUTPATIENT
Start: 2023-03-21

## 2023-03-21 RX ORDER — POLYETHYLENE GLYCOL 3350 17 G/17G
17 POWDER, FOR SOLUTION ORAL DAILY PRN
Status: DISCONTINUED | OUTPATIENT
Start: 2023-03-21 | End: 2023-03-23 | Stop reason: HOSPADM

## 2023-03-21 RX ORDER — AMOXICILLIN 250 MG
2 CAPSULE ORAL 2 TIMES DAILY
Status: DISCONTINUED | OUTPATIENT
Start: 2023-03-21 | End: 2023-03-23 | Stop reason: HOSPADM

## 2023-03-21 RX ORDER — GUAIFENESIN 600 MG/1
1200 TABLET, EXTENDED RELEASE ORAL EVERY 12 HOURS SCHEDULED
Status: DISCONTINUED | OUTPATIENT
Start: 2023-03-21 | End: 2023-03-23 | Stop reason: HOSPADM

## 2023-03-21 RX ORDER — HYDROXYZINE HYDROCHLORIDE 10 MG/1
10 TABLET, FILM COATED ORAL 3 TIMES DAILY PRN
COMMUNITY

## 2023-03-21 RX ORDER — LEVOTHYROXINE SODIUM 0.03 MG/1
25 TABLET ORAL
Status: CANCELLED | OUTPATIENT
Start: 2023-03-22

## 2023-03-21 RX ORDER — NITROFURANTOIN 25; 75 MG/1; MG/1
100 CAPSULE ORAL DAILY
Status: CANCELLED | OUTPATIENT
Start: 2023-03-21 | End: 2023-04-20

## 2023-03-21 RX ORDER — BUDESONIDE AND FORMOTEROL FUMARATE DIHYDRATE 160; 4.5 UG/1; UG/1
1 AEROSOL RESPIRATORY (INHALATION)
Status: CANCELLED | OUTPATIENT
Start: 2023-03-21

## 2023-03-21 RX ORDER — NITROGLYCERIN 0.4 MG/1
0.4 TABLET SUBLINGUAL
Status: DISCONTINUED | OUTPATIENT
Start: 2023-03-21 | End: 2023-03-23 | Stop reason: HOSPADM

## 2023-03-21 RX ORDER — THIAMINE HCL 100 MG
2500 TABLET ORAL DAILY
COMMUNITY

## 2023-03-21 RX ORDER — DOXYCYCLINE 100 MG/1
100 CAPSULE ORAL EVERY 12 HOURS SCHEDULED
Status: DISCONTINUED | OUTPATIENT
Start: 2023-03-21 | End: 2023-03-23 | Stop reason: HOSPADM

## 2023-03-21 RX ORDER — NAPROXEN 250 MG/1
500 TABLET ORAL 2 TIMES DAILY WITH MEALS
Status: CANCELLED | OUTPATIENT
Start: 2023-03-21

## 2023-03-21 RX ORDER — LOSARTAN POTASSIUM 25 MG/1
50 TABLET ORAL DAILY
Status: CANCELLED | OUTPATIENT
Start: 2023-03-21

## 2023-03-21 RX ORDER — LOSARTAN POTASSIUM 50 MG/1
50 TABLET ORAL DAILY
COMMUNITY

## 2023-03-21 RX ORDER — HYDROXYCHLOROQUINE SULFATE 200 MG/1
200 TABLET, FILM COATED ORAL 2 TIMES DAILY
Status: CANCELLED | OUTPATIENT
Start: 2023-03-21

## 2023-03-21 RX ORDER — CITALOPRAM 20 MG/1
40 TABLET ORAL DAILY
Status: CANCELLED | OUTPATIENT
Start: 2023-03-21

## 2023-03-21 RX ORDER — BUSPIRONE HYDROCHLORIDE 10 MG/1
10 TABLET ORAL 2 TIMES DAILY
COMMUNITY

## 2023-03-21 RX ORDER — BISACODYL 10 MG
10 SUPPOSITORY, RECTAL RECTAL DAILY PRN
Status: DISCONTINUED | OUTPATIENT
Start: 2023-03-21 | End: 2023-03-23 | Stop reason: HOSPADM

## 2023-03-21 RX ORDER — BUDESONIDE AND FORMOTEROL FUMARATE DIHYDRATE 160; 4.5 UG/1; UG/1
2 AEROSOL RESPIRATORY (INHALATION)
Status: DISCONTINUED | OUTPATIENT
Start: 2023-03-21 | End: 2023-03-23 | Stop reason: HOSPADM

## 2023-03-21 RX ORDER — ACETAMINOPHEN 325 MG/1
650 TABLET ORAL EVERY 4 HOURS PRN
Status: DISCONTINUED | OUTPATIENT
Start: 2023-03-21 | End: 2023-03-23 | Stop reason: HOSPADM

## 2023-03-21 RX ORDER — IPRATROPIUM BROMIDE AND ALBUTEROL SULFATE 2.5; .5 MG/3ML; MG/3ML
3 SOLUTION RESPIRATORY (INHALATION)
Status: DISCONTINUED | OUTPATIENT
Start: 2023-03-21 | End: 2023-03-23 | Stop reason: HOSPADM

## 2023-03-21 RX ADMIN — DOXYCYCLINE 100 MG: 100 INJECTION, POWDER, LYOPHILIZED, FOR SOLUTION INTRAVENOUS at 16:20

## 2023-03-21 RX ADMIN — METHYLPREDNISOLONE SODIUM SUCCINATE 125 MG: 125 INJECTION, POWDER, FOR SOLUTION INTRAMUSCULAR; INTRAVENOUS at 11:14

## 2023-03-21 RX ADMIN — GUAIFENESIN 1200 MG: 600 TABLET, EXTENDED RELEASE ORAL at 20:53

## 2023-03-21 RX ADMIN — SODIUM CHLORIDE 500 ML: 9 INJECTION, SOLUTION INTRAVENOUS at 11:52

## 2023-03-21 RX ADMIN — IOPAMIDOL 90 ML: 755 INJECTION, SOLUTION INTRAVENOUS at 14:15

## 2023-03-21 RX ADMIN — CEFTRIAXONE 1 G: 1 INJECTION, POWDER, FOR SOLUTION INTRAMUSCULAR; INTRAVENOUS at 14:44

## 2023-03-21 RX ADMIN — DOCUSATE SODIUM 50 MG AND SENNOSIDES 8.6 MG 2 TABLET: 8.6; 5 TABLET, FILM COATED ORAL at 20:53

## 2023-03-21 RX ADMIN — CEFEPIME 2 G: 2 INJECTION, POWDER, FOR SOLUTION INTRAVENOUS at 20:52

## 2023-03-21 RX ADMIN — IPRATROPIUM BROMIDE AND ALBUTEROL SULFATE 3 ML: 2.5; .5 SOLUTION RESPIRATORY (INHALATION) at 14:39

## 2023-03-21 RX ADMIN — BUDESONIDE AND FORMOTEROL FUMARATE DIHYDRATE 2 PUFF: 160; 4.5 AEROSOL RESPIRATORY (INHALATION) at 19:40

## 2023-03-21 RX ADMIN — IPRATROPIUM BROMIDE AND ALBUTEROL SULFATE 3 ML: 2.5; .5 SOLUTION RESPIRATORY (INHALATION) at 10:58

## 2023-03-21 RX ADMIN — SODIUM CHLORIDE 500 ML: 9 INJECTION, SOLUTION INTRAVENOUS at 14:44

## 2023-03-21 RX ADMIN — ACETAMINOPHEN 650 MG: 325 TABLET ORAL at 20:53

## 2023-03-21 RX ADMIN — IPRATROPIUM BROMIDE AND ALBUTEROL SULFATE 3 ML: 2.5; .5 SOLUTION RESPIRATORY (INHALATION) at 19:40

## 2023-03-21 RX ADMIN — DOXYCYCLINE 100 MG: 100 CAPSULE ORAL at 20:53

## 2023-03-21 RX ADMIN — METHYLPREDNISOLONE SODIUM SUCCINATE 40 MG: 40 INJECTION, POWDER, FOR SOLUTION INTRAMUSCULAR; INTRAVENOUS at 20:52

## 2023-03-21 RX ADMIN — ENOXAPARIN SODIUM 40 MG: 40 INJECTION SUBCUTANEOUS at 20:53

## 2023-03-21 RX ADMIN — Medication 10 ML: at 20:53

## 2023-03-21 NOTE — NURSING NOTE
Patient to room on 3N at this time. No acute distress noted. Report given to nightshift JESSIKA Carreon.

## 2023-03-21 NOTE — ED PROVIDER NOTES
Subjective   History of Present Illness  62-year-old female who presents to the ED today for shortness of breath.  She does have a history of COPD and interstitial lung disease.  She reports that she wears 2 L of oxygen at night or if she needs to lay down.  She reports that she has had worsening shortness of breath for the last 2 or 3 weeks.  She reports that she has had a cough that has been productive of sputum.  She is unsure if the sputum has had a color.  She denies any known fever.  She denies any chest pain.  She states the shortness of breath is worse with exertion.  Patient also reports a history of rheumatoid arthritis, fibromyalgia, hypertension, hyperlipidemia, hypothyroidism and anxiety.    History provided by:  Patient  Shortness of Breath  Severity:  Moderate  Onset quality:  Gradual  Duration:  3 weeks  Timing:  Constant  Progression:  Worsening  Chronicity:  New  Relieved by:  Nothing  Worsened by:  Exertion and activity  Associated symptoms: cough, sputum production and wheezing    Associated symptoms: no abdominal pain, no chest pain, no diaphoresis, no fever, no headaches, no hemoptysis, no rash, no sore throat, no syncope, no swollen glands and no vomiting        Review of Systems   Constitutional: Negative.  Negative for diaphoresis and fever.   HENT: Negative.  Negative for sore throat.    Eyes: Negative.    Respiratory: Positive for cough, sputum production, shortness of breath and wheezing. Negative for hemoptysis.    Cardiovascular: Negative for chest pain, palpitations, leg swelling and syncope.   Gastrointestinal: Negative for abdominal pain and vomiting.   Genitourinary: Negative.    Musculoskeletal: Negative.    Skin: Negative for rash.   Neurological: Negative for headaches.   Psychiatric/Behavioral: Negative.    All other systems reviewed and are negative.      Past Medical History:   Diagnosis Date   • Anxiety    • Arthritis    • Depression    • Hypertensive disorder 3/14/2018   •  Hypothyroidism 8/3/2021   • MARU (stress urinary incontinence, female) 2019       Allergies   Allergen Reactions   • Codeine    • Sulfa Antibiotics        Past Surgical History:   Procedure Laterality Date   • BREAST BIOPSY Left     benign   • GALLBLADDER SURGERY     • GASTRIC BANDING     • HYSTERECTOMY      age 37       Family History   Problem Relation Age of Onset   • Panic disorder Mother    • COPD Mother    • Alcohol abuse Father    • Alzheimer's disease Father    • Breast cancer Neg Hx        Social History     Socioeconomic History   • Marital status:    Tobacco Use   • Smoking status: Former     Types: Cigarettes     Start date: 1971     Quit date: 10/18/2005     Years since quittin.4   • Smokeless tobacco: Never   Vaping Use   • Vaping Use: Never used   Substance and Sexual Activity   • Alcohol use: No   • Drug use: No   • Sexual activity: Yes     Partners: Male           Objective   Physical Exam  Vitals and nursing note reviewed.   Constitutional:       General: She is not in acute distress.     Appearance: She is well-developed. She is ill-appearing. She is not diaphoretic.   HENT:      Head: Normocephalic and atraumatic.   Eyes:      Extraocular Movements: Extraocular movements intact.      Pupils: Pupils are equal, round, and reactive to light.   Neck:      Vascular: No JVD.      Trachea: No tracheal deviation.   Cardiovascular:      Rate and Rhythm: Regular rhythm. Tachycardia present.      Pulses: Normal pulses.      Heart sounds: Normal heart sounds.   Pulmonary:      Effort: Tachypnea and accessory muscle usage present.      Breath sounds: Examination of the right-upper field reveals wheezing. Examination of the left-upper field reveals wheezing. Wheezing present.   Chest:      Chest wall: No tenderness.   Abdominal:      General: Bowel sounds are normal.      Palpations: Abdomen is soft.      Tenderness: There is no abdominal tenderness.   Musculoskeletal:         General:  Normal range of motion.      Cervical back: Normal range of motion and neck supple.      Comments: Mild edema to bilateral ankles   Lymphadenopathy:      Cervical: No cervical adenopathy.   Skin:     General: Skin is warm and dry.      Capillary Refill: Capillary refill takes less than 2 seconds.   Neurological:      General: No focal deficit present.      Mental Status: She is alert and oriented to person, place, and time.   Psychiatric:         Mood and Affect: Mood normal.         Procedures        Results for orders placed or performed during the hospital encounter of 03/21/23   COVID-19 and FLU A/B PCR - Swab, Nasopharynx    Specimen: Nasopharynx; Swab   Result Value Ref Range    COVID19 Not Detected Not Detected - Ref. Range    Influenza A PCR Not Detected Not Detected    Influenza B PCR Not Detected Not Detected   Comprehensive Metabolic Panel    Specimen: Blood   Result Value Ref Range    Glucose 101 (H) 65 - 99 mg/dL    BUN 5 (L) 8 - 23 mg/dL    Creatinine 0.71 0.57 - 1.00 mg/dL    Sodium 144 136 - 145 mmol/L    Potassium 4.0 3.5 - 5.2 mmol/L    Chloride 107 98 - 107 mmol/L    CO2 26.7 22.0 - 29.0 mmol/L    Calcium 8.9 8.6 - 10.5 mg/dL    Total Protein 6.3 6.0 - 8.5 g/dL    Albumin 3.4 (L) 3.5 - 5.2 g/dL    ALT (SGPT) 10 1 - 33 U/L    AST (SGOT) 22 1 - 32 U/L    Alkaline Phosphatase 62 39 - 117 U/L    Total Bilirubin 0.5 0.0 - 1.2 mg/dL    Globulin 2.9 gm/dL    A/G Ratio 1.2 g/dL    BUN/Creatinine Ratio 7.0 7.0 - 25.0    Anion Gap 10.3 5.0 - 15.0 mmol/L    eGFR 95.7 >60.0 mL/min/1.73   Protime-INR    Specimen: Blood   Result Value Ref Range    Protime 13.4 12.1 - 14.7 Seconds    INR 1.00 0.90 - 1.10   aPTT    Specimen: Blood   Result Value Ref Range    PTT 23.1 (L) 26.5 - 34.5 seconds   Urinalysis With Microscopic If Indicated (No Culture) - Urine, Clean Catch    Specimen: Urine, Clean Catch   Result Value Ref Range    Color, UA Yellow Yellow, Straw    Appearance, UA Cloudy (A) Clear    pH, UA 6.5 5.0 - 8.0     Specific Gravity, UA 1.013 1.005 - 1.030    Glucose, UA Negative Negative    Ketones, UA Trace (A) Negative    Bilirubin, UA Negative Negative    Blood, UA Negative Negative    Protein, UA Trace (A) Negative    Leuk Esterase, UA Large (3+) (A) Negative    Nitrite, UA Positive (A) Negative    Urobilinogen, UA 1.0 E.U./dL 0.2 - 1.0 E.U./dL   BNP    Specimen: Blood   Result Value Ref Range    proBNP 394.0 0.0 - 900.0 pg/mL   D-dimer, Quantitative    Specimen: Blood   Result Value Ref Range    D-Dimer, Quantitative 3.66 (H) 0.00 - 0.63 MCGFEU/mL   High Sensitivity Troponin T    Specimen: Blood   Result Value Ref Range    HS Troponin T 11 (H) <10 ng/L   Lactic Acid, Plasma    Specimen: Blood   Result Value Ref Range    Lactate 2.2 (C) 0.5 - 2.0 mmol/L   Procalcitonin    Specimen: Blood   Result Value Ref Range    Procalcitonin 0.04 0.00 - 0.25 ng/mL   Sedimentation Rate    Specimen: Blood   Result Value Ref Range    Sed Rate 11 0 - 30 mm/hr   C-reactive Protein    Specimen: Blood   Result Value Ref Range    C-Reactive Protein 0.90 (H) 0.00 - 0.50 mg/dL   Magnesium    Specimen: Blood   Result Value Ref Range    Magnesium 2.0 1.6 - 2.4 mg/dL   CBC Auto Differential    Specimen: Blood   Result Value Ref Range    WBC 8.39 3.40 - 10.80 10*3/mm3    RBC 4.16 3.77 - 5.28 10*6/mm3    Hemoglobin 12.4 12.0 - 15.9 g/dL    Hematocrit 38.7 34.0 - 46.6 %    MCV 93.0 79.0 - 97.0 fL    MCH 29.8 26.6 - 33.0 pg    MCHC 32.0 31.5 - 35.7 g/dL    RDW 13.9 12.3 - 15.4 %    RDW-SD 47.6 37.0 - 54.0 fl    MPV 10.6 6.0 - 12.0 fL    Platelets 174 140 - 450 10*3/mm3    Neutrophil % 75.6 42.7 - 76.0 %    Lymphocyte % 15.5 (L) 19.6 - 45.3 %    Monocyte % 7.0 5.0 - 12.0 %    Eosinophil % 0.6 0.3 - 6.2 %    Basophil % 0.7 0.0 - 1.5 %    Immature Grans % 0.6 (H) 0.0 - 0.5 %    Neutrophils, Absolute 6.34 1.70 - 7.00 10*3/mm3    Lymphocytes, Absolute 1.30 0.70 - 3.10 10*3/mm3    Monocytes, Absolute 0.59 0.10 - 0.90 10*3/mm3    Eosinophils, Absolute 0.05  0.00 - 0.40 10*3/mm3    Basophils, Absolute 0.06 0.00 - 0.20 10*3/mm3    Immature Grans, Absolute 0.05 0.00 - 0.05 10*3/mm3    nRBC 0.0 0.0 - 0.2 /100 WBC   Blood Gas, Arterial With Co-Ox    Specimen: Arterial Blood   Result Value Ref Range    Site Right Brachial     Andrés's Test N/A     pH, Arterial 7.446 7.350 - 7.450 pH units    pCO2, Arterial 39.4 35.0 - 45.0 mm Hg    pO2, Arterial 65.5 (L) 83.0 - 108.0 mm Hg    HCO3, Arterial 27.1 (H) 20.0 - 26.0 mmol/L    Base Excess, Arterial 2.9 (H) 0.0 - 2.0 mmol/L    O2 Saturation, Arterial 94.5 94.0 - 99.0 %    Hemoglobin, Blood Gas 12.1 (L) 13.5 - 17.5 g/dL    Hematocrit, Blood Gas 37.0 (L) 38.0 - 51.0 %    Oxyhemoglobin 93.4 (L) 94 - 99 %    Methemoglobin 0.00 0.00 - 3.00 %    Carboxyhemoglobin 1.2 0 - 5 %    A-a DO2 83.0 0.0 - 300.0 mmHg    CO2 Content 28.3 22 - 33 mmol/L    Barometric Pressure for Blood Gas 735 mmHg    Modality Nasal Cannula     FIO2 28 %    Flow Rate 2.0 lpm    Ventilator Mode NA     Note      Notified Who RAS GOODWIN P A AND R N     Notified By [id number]     Collected by [id number]     pH, Temp Corrected      pCO2, Temperature Corrected      pO2, Temperature Corrected     High Sensitivity Troponin T 2Hr    Specimen: Blood   Result Value Ref Range    HS Troponin T 10 (H) <10 ng/L    Troponin T Delta -1 >=-4 - <+4 ng/L   STAT Lactic Acid, Reflex    Specimen: Blood   Result Value Ref Range    Lactate 2.5 (C) 0.5 - 2.0 mmol/L   Urinalysis, Microscopic Only - Urine, Clean Catch    Specimen: Urine, Clean Catch   Result Value Ref Range    RBC, UA 0-2 None Seen, 0-2 /HPF    WBC, UA Too Numerous to Count (A) None Seen, 0-2 /HPF    Bacteria, UA 4+ (A) None Seen /HPF    Squamous Epithelial Cells, UA 13-20 (A) None Seen, 0-2 /HPF    Hyaline Casts, UA 0-2 None Seen /LPF    Methodology Automated Microscopy    Green Top (Gel)   Result Value Ref Range    Extra Tube Hold for add-ons.    Lavender Top   Result Value Ref Range    Extra Tube hold for add-on    Gold Top - SST    Result Value Ref Range    Extra Tube Hold for add-ons.    Light Blue Top   Result Value Ref Range    Extra Tube Hold for add-ons.          ED Course  ED Course as of 03/21/23 1748   Tue Mar 21, 2023   1043 EKG notes sinus rhythm.  75 bpm.  T wave inversions in the anterior leads.  No acute ST elevation    Electronically signed by Scout Phillips DO, 03/21/23, 10:43 AM EDT.   [SF]   1142 HS Troponin T(!): 11 [AH]   1142 D-Dimer, Quant(!): 3.66 [AH]   1223 XR Chest 1 View  FINDINGS:    LUNGS:  Coarsened interstitial markings and patchy subpleural airspace  opacity is again noted.    PLEURAL SPACE:  Unremarkable.  No pneumothorax.    HEART:  Heart size is stable.    MEDIASTINUM:  Unremarkable.    BONES/JOINTS:  Unremarkable.     IMPRESSION:    Coarsened interstitial markings and patchy subpleural airspace opacity  is again noted. [AH]   1424 HS Troponin T(!): 10 []   1445 CT Angiogram Chest Pulmonary Embolism  FINDINGS:    Pulmonary arteries:  Unremarkable.  No pulmonary embolism.    Aorta:  No acute findings.  No thoracic aortic aneurysm.    Lungs:  Subpleural fibrotic change and interstitial prominence is  again noted.  No mass.    Pleural space:  Unremarkable.  No significant effusion.  No  pneumothorax.    Heart:  Unremarkable.  No cardiomegaly.  No significant pericardial  effusion.  No evidence of RV dysfunction.    Bones/joints:  No acute fracture.  No dislocation.    Soft tissues:  Unremarkable.    Lymph nodes:  Unremarkable.  No enlarged lymph nodes.     IMPRESSION:  1.  No pulmonary embolism.  2.  Subpleural fibrotic change and interstitial prominence is again  noted. []   7765 Dr. Back to admit []      ED Course User Index  [AH] Rabia Quiñonez PA  [SF] Scout Phillips DO                                           Medical Decision Making  62-year-old female presents to the ED today with a 2 to 3-week history of worsening shortness of breath.  She does have a history of COPD and interstitial lung  disease.  She also has a history of rheumatoid arthritis.  She wears oxygen at night.  She was found to be hypoxic on room air upon arrival to triage.  She was 88%.  She was immediately started on 2 L of oxygen.  She has been tolerating that well.  She has had 2 breathing treatments and IV Solu-Medrol.  IV antibiotics were initiated as well.  CT of the chest showed no evidence of PE or dense consolidation.  No evidence of any infiltrate.  She was also found to have a urinary tract infection.  She has received IV Rocephin and doxycycline.  Hospitalist service was consulted who will admit.    Acute UTI: acute illness or injury  COPD with acute exacerbation (HCC): acute illness or injury  Amount and/or Complexity of Data Reviewed  Labs: ordered. Decision-making details documented in ED Course.  Radiology: ordered. Decision-making details documented in ED Course.  ECG/medicine tests: ordered.  Discussion of management or test interpretation with external provider(s): Dr. Back - hospitalist    Risk  Prescription drug management.  Decision regarding hospitalization.          Final diagnoses:   COPD with acute exacerbation (HCC)   Acute UTI       ED Disposition  ED Disposition     ED Disposition   Decision to Admit    Condition   --    Comment   Level of Care: Remote Telemetry [26]   Diagnosis: COPD exacerbation (HCC) [[id number]]               No follow-up provider specified.       Medication List      ASK your doctor about these medications    atorvastatin 40 MG tablet  Commonly known as: LIPITOR  Ask about: Which instructions should I use?             Rabia Quiñonez PA  03/21/23 0355

## 2023-03-21 NOTE — H&P
[facility]  History & Physical    Patient Identification:  Name:  Lexie Valdez  Age:  62 y.o.  Sex:  female  :  1960  MRN:  [id number]   Visit Number:  [id number]  Admit Date: 3/21/2023   Primary Care Physician:  Violet Pop APRN    Subjective     Chief complaint: Cough, SOB    History of presenting illness:      Lexie Valdez is a 62 y.o. female with past medical history significant for chronic obstructive lung disease, hypertension, hyperlipidemia, hypothyroidism, fibromyalgia, anxiety, depression    Upon arrival to the ED, vital signs were temp 97.7, heart rate 84, respirations 22, /84, SPO2 88% on room air.  ABG in the ED with pH WNL, PO2 65.5, bicarb 27.1.  Initial high-sensitivity troponin 11 with repeat at 10.  Chemistry panel significant for glucose 101, albumin 3.4.  CRP elevated at 0.90 with lactate 2.2, DD 3.66.  UA with cloudy urine, trace ketones, nitrite and leukocyte positive with trace protein, many WBCs, 4+ bacteria and 13-20 squamous epis.  Blood cultures, urine culture pending.  CXR with coarsened interstitial markings and subpleural airspace opacity.  CTA PE protocol without PE, subpleural fibrotic change and interstitial prominence.    On my exam patient is pleasant and cooperative accompanied by her son and sister at the bedside.  Patient reports increased shortness of breath times last 2 to 3 weeks.  Patient states is more noticeable with exertion stating she has to put on her as needed oxygen to ambulate back and forth to the bathroom at home.  She denies recent illness but does states she has increased cough and increased cough production of white and clear sputum.  Patient has a history of COPD though notably stopped smoking 13 to 15 years ago following a 22-year history.  Patient's family states there is still a smoker living in the home and they also recently introduced a dog though as patient had increased respiratory symptoms  removed to the dog secondary to fear that it was exacerbating. She does endorse increased nasal congestion recently.  Patient endorsed denies recent fever or chills.  Denies chest pain or palpitations.  Does endorse some occasional leg swelling, no worse than usual.  No other pertinent review of systems positive, see full review below.    Known Emergency Department medications received prior to my evaluation included Rocephin, Isovue-370, DuoNeb, Solu-Medrol, normal saline bolus x2.   Emergency Department Room location at the time of my evaluation was 404.     ---------------------------------------------------------------------------------------------------------------------   Review of Systems   Constitutional: Negative for chills and fever.   HENT: Positive for congestion and postnasal drip. Negative for rhinorrhea and sore throat.    Respiratory: Positive for cough and shortness of breath.    Cardiovascular: Positive for leg swelling. Negative for chest pain and palpitations.   Gastrointestinal: Negative for abdominal pain, diarrhea, nausea and vomiting.   Genitourinary: Positive for decreased urine volume. Negative for difficulty urinating, dysuria, flank pain and pelvic pain.   Musculoskeletal: Negative for arthralgias and myalgias.   Skin: Negative for rash and wound.   Hematological: Bruises/bleeds easily.        ---------------------------------------------------------------------------------------------------------------------   Past Medical History:   Diagnosis Date   • Anxiety    • Arthritis    • Depression    • Hypertensive disorder 3/14/2018   • Hypothyroidism 8/3/2021   • MARU (stress urinary incontinence, female) 9/11/2019     Past Surgical History:   Procedure Laterality Date   • BREAST BIOPSY Left 2000    benign   • GALLBLADDER SURGERY     • GASTRIC BANDING     • HYSTERECTOMY      age 37     Family History   Problem Relation Age of Onset   • Panic disorder Mother    • COPD Mother    • Alcohol abuse  Father    • Alzheimer's disease Father    • Breast cancer Neg Hx      Social History     Socioeconomic History   • Marital status:    Tobacco Use   • Smoking status: Former     Types: Cigarettes     Start date: 1971     Quit date: 10/18/2005     Years since quittin.4   • Smokeless tobacco: Never   Vaping Use   • Vaping Use: Never used   Substance and Sexual Activity   • Alcohol use: No   • Drug use: No   • Sexual activity: Yes     Partners: Male     ---------------------------------------------------------------------------------------------------------------------   Allergies:  Codeine and Sulfa antibiotics  ---------------------------------------------------------------------------------------------------------------------   Home medications:    Medications below are reported home medications pulling from within the system; at this time, these medications have not been reconciled unless otherwise specified and are in the verification process for further verifcation as current home medications.  (Not in a hospital admission)      Hospital Scheduled Meds:  doxycycline, 100 mg, Intravenous, Once           Current listed hospital scheduled medications may not yet reflect those currently placed in orders that are signed and held awaiting patient's arrival to floor.   ---------------------------------------------------------------------------------------------------------------------     Objective     Vital Signs:  Temp:  [97.7 °F (36.5 °C)] 97.7 °F (36.5 °C)  Heart Rate:  [] 86  Resp:  [-22] 21  BP: (100-209)/(78-99) 156/87      23  1016   Weight: 83.9 kg (185 lb)     Body mass index is 31.76 kg/m².  ---------------------------------------------------------------------------------------------------------------------       Physical Exam  Vitals and nursing note reviewed.   Constitutional:       General: She is not in acute distress.  HENT:      Head: Normocephalic and atraumatic.       Nose:      Comments: Flaking skin around the nose and eyes  Eyes:      Extraocular Movements: Extraocular movements intact.      Conjunctiva/sclera: Conjunctivae normal.   Cardiovascular:      Rate and Rhythm: Normal rate and regular rhythm.   Pulmonary:      Effort: Pulmonary effort is normal.      Comments: Coarse/crackles at the bases.  Diminished bilaterally  Abdominal:      Palpations: Abdomen is soft.      Tenderness: There is no abdominal tenderness.   Musculoskeletal:      Right lower leg: Edema present.      Left lower leg: Edema present.   Skin:     General: Skin is warm and dry.   Neurological:      General: No focal deficit present.      Mental Status: She is alert and oriented to person, place, and time.      Comments: Tremulous   Psychiatric:         Mood and Affect: Mood normal.         Behavior: Behavior normal.               ---------------------------------------------------------------------------------------------------------------------  EKG:      ---------------------------------------------------------------------------------------------------------------------   Results from last 7 days   Lab Units 03/21/23  1051   CRP mg/dL 0.90*   LACTATE mmol/L 2.2*   WBC 10*3/mm3 8.39   HEMOGLOBIN g/dL 12.4   HEMATOCRIT % 38.7   MCV fL 93.0   MCHC g/dL 32.0   PLATELETS 10*3/mm3 174   INR  1.00     Results from last 7 days   Lab Units 03/21/23  1107   PH, ARTERIAL pH units 7.446   PO2 ART mm Hg 65.5*   PCO2, ARTERIAL mm Hg 39.4   HCO3 ART mmol/L 27.1*     Results from last 7 days   Lab Units 03/21/23  1051   SODIUM mmol/L 144   POTASSIUM mmol/L 4.0   MAGNESIUM mg/dL 2.0   CHLORIDE mmol/L 107   CO2 mmol/L 26.7   BUN mg/dL 5*   CREATININE mg/dL 0.71   CALCIUM mg/dL 8.9   GLUCOSE mg/dL 101*   ALBUMIN g/dL 3.4*   BILIRUBIN mg/dL 0.5   ALK PHOS U/L 62   AST (SGOT) U/L 22   ALT (SGPT) U/L 10   Estimated Creatinine Clearance: 85 mL/min (by C-G formula based on SCr of 0.71 mg/dL).  No results found for:  AMMONIA  Results from last 7 days   Lab Units 03/21/23  1343 03/21/23  1051   HSTROP T ng/L 10* 11*     Results from last 7 days   Lab Units 03/21/23  1051   PROBNP pg/mL 394.0     Lab Results   Component Value Date    HGBA1C 5.60 01/16/2020     Lab Results   Component Value Date    TSH 4.250 (H) 01/16/2020     No results found for: PREGTESTUR, PREGSERUM, HCG, HCGQUANT  Pain Management Panel    There is no flowsheet data to display.       No results found for: BLOODCX  No results found for: URINECX  No results found for: WOUNDCX  No results found for: STOOLCX      ---------------------------------------------------------------------------------------------------------------------  Imaging Results (Last 7 Days)     Procedure Component Value Units Date/Time    CT Angiogram Chest Pulmonary Embolism [[id number]] Collected: 03/21/23 1441     Updated: 03/21/23 1444    Narrative:      EXAM:    CT Angiography Chest With Intravenous Contrast     EXAM DATE:    3/21/2023 1:56 PM     CLINICAL HISTORY:    Pulmonary embolism (PE) suspected, positive D-dimer     TECHNIQUE:    Axial computed tomographic angiography images of the chest with  intravenous contrast.  This CT exam was performed using one or more of  the following dose reduction techniques:  automated exposure control,  adjustment of the mA and/or kV according to patient size, and/or use of  iterative reconstruction technique.    MIP reconstructed images were created and reviewed.     COMPARISON:    02/21/2023     FINDINGS:    Pulmonary arteries:  Unremarkable.  No pulmonary embolism.    Aorta:  No acute findings.  No thoracic aortic aneurysm.    Lungs:  Subpleural fibrotic change and interstitial prominence is  again noted.  No mass.    Pleural space:  Unremarkable.  No significant effusion.  No  pneumothorax.    Heart:  Unremarkable.  No cardiomegaly.  No significant pericardial  effusion.  No evidence of RV dysfunction.    Bones/joints:  No acute fracture.  No  dislocation.    Soft tissues:  Unremarkable.    Lymph nodes:  Unremarkable.  No enlarged lymph nodes.       Impression:      1.  No pulmonary embolism.  2.  Subpleural fibrotic change and interstitial prominence is again  noted.     This report was finalized on 3/21/2023 2:41 PM by Dr. Dominguez Sims MD.       XR Chest 1 View [[id number]] Collected: 03/21/23 1207     Updated: 03/21/23 1209    Narrative:      EXAM:    XR Chest, 1 View     EXAM DATE:    3/21/2023 10:57 AM     CLINICAL HISTORY:    sob, cough     TECHNIQUE:    Frontal view of the chest.     COMPARISON:    04/26/2022     FINDINGS:    LUNGS:  Coarsened interstitial markings and patchy subpleural airspace  opacity is again noted.    PLEURAL SPACE:  Unremarkable.  No pneumothorax.    HEART:  Heart size is stable.    MEDIASTINUM:  Unremarkable.    BONES/JOINTS:  Unremarkable.       Impression:        Coarsened interstitial markings and patchy subpleural airspace opacity  is again noted.     This report was finalized on 3/21/2023 12:07 PM by Dr. Dominguez Sims MD.             Cultures:  No results found for: BLOODCX, URINECX, WOUNDCX, MRSACX, RESPCX, STOOLCX    Last echocardiogram:          I have personally reviewed the above radiology images and read the final radiology report on 03/21/23  ---------------------------------------------------------------------------------------------------------------------  Assessment / Plan     Active Hospital Problems    Diagnosis  POA   • **COPD exacerbation (HCC) [J44.1]  Yes       ASSESSMENT/PLAN:    Acute on chronic hypoxic respiratory failure suspect 2/2 to Community-acquired pneumonia, POA  In the setting of known interstitial lung disease  COPD in acute exacerbation, POA  Patient presents with increased shortness of breath x2 to 3 weeks.  Also with more productive cough along the same timeframe.  Patient wears 2 L as needed and at night at baseline.  Currently requiring 2 L to maintain SPO2 following arrival at 88% on  room air.  Patient was empirically covered with Rocephin and doxycycline in the ED  Admit to remote telemetry with continuous cardiac monitoring/pulse oximetry for further observation and management  Obtain sputum culture, MRSA swab  Continue antimicrobial coverage with cefepime, doxycycline  Solu-Medrol 40 mg twice daily  Supportive care with Mucinex, DuoNebs  Continue supplemental O2 and wean as able back to baseline  Consult inpatient pulmonology, assistance appreciated  Consult PT, OT, SLP assistance appreciated  Obtain TTE    Chronic:  HTN  HLD  Hypothyroidism  Fibromyalgia  Anxiety/depression  Restart home meds as indicated per med rec  Monitor vital signs per hospital protocol  Supportive care    ----------  -DVT prophylaxis: Lovenox  -Activity: Ad laila.  -Expected length of stay: Less than 2 midnights  -Disposition likely home pending improvement, resolution of respiratory failure    High risk secondary to acute on chronic respiratory failure secondary to community-acquired pneumonia in the setting of COPD with acute exacerbation    Code Status and Medical Interventions:   Ordered at: 03/21/23 1553     Code Status (Patient has no pulse and is not breathing):    CPR (Attempt to Resuscitate)     Medical Interventions (Patient has pulse or is breathing):    Full Support       Usama Boggs PA-C   03/21/23  15:59 EDT

## 2023-03-22 ENCOUNTER — APPOINTMENT (OUTPATIENT)
Dept: CARDIOLOGY | Facility: HOSPITAL | Age: 63
End: 2023-03-22
Payer: MEDICARE

## 2023-03-22 LAB
ANION GAP SERPL CALCULATED.3IONS-SCNC: 9.2 MMOL/L (ref 5–15)
BASOPHILS # BLD AUTO: 0 10*3/MM3 (ref 0–0.2)
BASOPHILS NFR BLD AUTO: 0 % (ref 0–1.5)
BH CV ECHO MEAS - ACS: 1.7 CM
BH CV ECHO MEAS - AO MAX PG: 14.2 MMHG
BH CV ECHO MEAS - AO MEAN PG: 7 MMHG
BH CV ECHO MEAS - AO ROOT DIAM: 2.8 CM
BH CV ECHO MEAS - AO V2 MAX: 188.3 CM/SEC
BH CV ECHO MEAS - AO V2 VTI: 37.1 CM
BH CV ECHO MEAS - AVA(I,D): 2.12 CM2
BH CV ECHO MEAS - EDV(CUBED): 110.6 ML
BH CV ECHO MEAS - EDV(MOD-SP4): 60.4 ML
BH CV ECHO MEAS - EF(MOD-SP4): 64.6 %
BH CV ECHO MEAS - ESV(CUBED): 39.3 ML
BH CV ECHO MEAS - ESV(MOD-SP4): 21.4 ML
BH CV ECHO MEAS - FS: 29.2 %
BH CV ECHO MEAS - IVS/LVPW: 1.25 CM
BH CV ECHO MEAS - IVSD: 1 CM
BH CV ECHO MEAS - LA DIMENSION: 3.8 CM
BH CV ECHO MEAS - LAT PEAK E' VEL: 9.8 CM/SEC
BH CV ECHO MEAS - LV DIASTOLIC VOL/BSA (35-75): 31.7 CM2
BH CV ECHO MEAS - LV MASS(C)D: 147.8 GRAMS
BH CV ECHO MEAS - LV MAX PG: 6.4 MMHG
BH CV ECHO MEAS - LV MEAN PG: 3 MMHG
BH CV ECHO MEAS - LV SYSTOLIC VOL/BSA (12-30): 11.2 CM2
BH CV ECHO MEAS - LV V1 MAX: 126 CM/SEC
BH CV ECHO MEAS - LV V1 VTI: 25 CM
BH CV ECHO MEAS - LVIDD: 4.8 CM
BH CV ECHO MEAS - LVIDS: 3.4 CM
BH CV ECHO MEAS - LVOT AREA: 3.1 CM2
BH CV ECHO MEAS - LVOT DIAM: 2 CM
BH CV ECHO MEAS - LVPWD: 0.8 CM
BH CV ECHO MEAS - MED PEAK E' VEL: 5.6 CM/SEC
BH CV ECHO MEAS - MV A MAX VEL: 125 CM/SEC
BH CV ECHO MEAS - MV E MAX VEL: 96.1 CM/SEC
BH CV ECHO MEAS - MV E/A: 0.77
BH CV ECHO MEAS - PA ACC TIME: 0.08 SEC
BH CV ECHO MEAS - PA PR(ACCEL): 44.1 MMHG
BH CV ECHO MEAS - RAP SYSTOLE: 10 MMHG
BH CV ECHO MEAS - RVSP: 34.4 MMHG
BH CV ECHO MEAS - SI(MOD-SP4): 20.5 ML/M2
BH CV ECHO MEAS - SV(LVOT): 78.5 ML
BH CV ECHO MEAS - SV(MOD-SP4): 39 ML
BH CV ECHO MEAS - TAPSE (>1.6): 1.34 CM
BH CV ECHO MEAS - TR MAX PG: 24.4 MMHG
BH CV ECHO MEAS - TR MAX VEL: 247 CM/SEC
BH CV ECHO MEASUREMENTS AVERAGE E/E' RATIO: 12.48
BUN SERPL-MCNC: 8 MG/DL (ref 8–23)
BUN/CREAT SERPL: 12.3 (ref 7–25)
CALCIUM SPEC-SCNC: 8.1 MG/DL (ref 8.6–10.5)
CHLORIDE SERPL-SCNC: 108 MMOL/L (ref 98–107)
CO2 SERPL-SCNC: 23.8 MMOL/L (ref 22–29)
CREAT SERPL-MCNC: 0.65 MG/DL (ref 0.57–1)
DEPRECATED RDW RBC AUTO: 47.8 FL (ref 37–54)
EGFRCR SERPLBLD CKD-EPI 2021: 99.1 ML/MIN/1.73
EOSINOPHIL # BLD AUTO: 0 10*3/MM3 (ref 0–0.4)
EOSINOPHIL NFR BLD AUTO: 0 % (ref 0.3–6.2)
ERYTHROCYTE [DISTWIDTH] IN BLOOD BY AUTOMATED COUNT: 13.9 % (ref 12.3–15.4)
GLUCOSE SERPL-MCNC: 165 MG/DL (ref 65–99)
HCT VFR BLD AUTO: 35.7 % (ref 34–46.6)
HGB BLD-MCNC: 11.1 G/DL (ref 12–15.9)
IMM GRANULOCYTES # BLD AUTO: 0.07 10*3/MM3 (ref 0–0.05)
IMM GRANULOCYTES NFR BLD AUTO: 1.3 % (ref 0–0.5)
LEFT ATRIUM VOLUME INDEX: 19.8 ML/M2
LYMPHOCYTES # BLD AUTO: 0.73 10*3/MM3 (ref 0.7–3.1)
LYMPHOCYTES NFR BLD AUTO: 13.2 % (ref 19.6–45.3)
MAXIMAL PREDICTED HEART RATE: 157 BPM
MCH RBC QN AUTO: 29.4 PG (ref 26.6–33)
MCHC RBC AUTO-ENTMCNC: 31.1 G/DL (ref 31.5–35.7)
MCV RBC AUTO: 94.7 FL (ref 79–97)
MONOCYTES # BLD AUTO: 0.25 10*3/MM3 (ref 0.1–0.9)
MONOCYTES NFR BLD AUTO: 4.5 % (ref 5–12)
MRSA DNA SPEC QL NAA+PROBE: NORMAL
NEUTROPHILS NFR BLD AUTO: 4.47 10*3/MM3 (ref 1.7–7)
NEUTROPHILS NFR BLD AUTO: 81 % (ref 42.7–76)
NRBC BLD AUTO-RTO: 0 /100 WBC (ref 0–0.2)
PLATELET # BLD AUTO: 156 10*3/MM3 (ref 140–450)
PMV BLD AUTO: 11.1 FL (ref 6–12)
POTASSIUM SERPL-SCNC: 3.8 MMOL/L (ref 3.5–5.2)
QT INTERVAL: 380 MS
QTC INTERVAL: 443 MS
RBC # BLD AUTO: 3.77 10*6/MM3 (ref 3.77–5.28)
SODIUM SERPL-SCNC: 141 MMOL/L (ref 136–145)
STRESS TARGET HR: 133 BPM
WBC NRBC COR # BLD: 5.52 10*3/MM3 (ref 3.4–10.8)

## 2023-03-22 PROCEDURE — 93010 ELECTROCARDIOGRAM REPORT: CPT | Performed by: INTERNAL MEDICINE

## 2023-03-22 PROCEDURE — 87641 MR-STAPH DNA AMP PROBE: CPT | Performed by: STUDENT IN AN ORGANIZED HEALTH CARE EDUCATION/TRAINING PROGRAM

## 2023-03-22 PROCEDURE — 94799 UNLISTED PULMONARY SVC/PX: CPT

## 2023-03-22 PROCEDURE — 97161 PT EVAL LOW COMPLEX 20 MIN: CPT

## 2023-03-22 PROCEDURE — 99233 SBSQ HOSP IP/OBS HIGH 50: CPT

## 2023-03-22 PROCEDURE — 96372 THER/PROPH/DIAG INJ SC/IM: CPT

## 2023-03-22 PROCEDURE — 93306 TTE W/DOPPLER COMPLETE: CPT

## 2023-03-22 PROCEDURE — 25010000002 CEFEPIME PER 500 MG: Performed by: STUDENT IN AN ORGANIZED HEALTH CARE EDUCATION/TRAINING PROGRAM

## 2023-03-22 PROCEDURE — 94762 N-INVAS EAR/PLS OXIMTRY CONT: CPT

## 2023-03-22 PROCEDURE — 93306 TTE W/DOPPLER COMPLETE: CPT | Performed by: INTERNAL MEDICINE

## 2023-03-22 PROCEDURE — 94664 DEMO&/EVAL PT USE INHALER: CPT

## 2023-03-22 PROCEDURE — 96375 TX/PRO/DX INJ NEW DRUG ADDON: CPT

## 2023-03-22 PROCEDURE — 96366 THER/PROPH/DIAG IV INF ADDON: CPT

## 2023-03-22 PROCEDURE — 80048 BASIC METABOLIC PNL TOTAL CA: CPT | Performed by: STUDENT IN AN ORGANIZED HEALTH CARE EDUCATION/TRAINING PROGRAM

## 2023-03-22 PROCEDURE — 97166 OT EVAL MOD COMPLEX 45 MIN: CPT

## 2023-03-22 PROCEDURE — G0378 HOSPITAL OBSERVATION PER HR: HCPCS

## 2023-03-22 PROCEDURE — 94660 CPAP INITIATION&MGMT: CPT

## 2023-03-22 PROCEDURE — 93005 ELECTROCARDIOGRAM TRACING: CPT

## 2023-03-22 PROCEDURE — 94761 N-INVAS EAR/PLS OXIMETRY MLT: CPT

## 2023-03-22 PROCEDURE — 96376 TX/PRO/DX INJ SAME DRUG ADON: CPT

## 2023-03-22 PROCEDURE — 25010000002 FUROSEMIDE PER 20 MG: Performed by: INTERNAL MEDICINE

## 2023-03-22 PROCEDURE — 25010000002 METHYLPREDNISOLONE PER 40 MG: Performed by: STUDENT IN AN ORGANIZED HEALTH CARE EDUCATION/TRAINING PROGRAM

## 2023-03-22 PROCEDURE — 85025 COMPLETE CBC W/AUTO DIFF WBC: CPT | Performed by: STUDENT IN AN ORGANIZED HEALTH CARE EDUCATION/TRAINING PROGRAM

## 2023-03-22 PROCEDURE — 25010000002 ENOXAPARIN PER 10 MG: Performed by: STUDENT IN AN ORGANIZED HEALTH CARE EDUCATION/TRAINING PROGRAM

## 2023-03-22 PROCEDURE — 99222 1ST HOSP IP/OBS MODERATE 55: CPT | Performed by: INTERNAL MEDICINE

## 2023-03-22 RX ORDER — NITROFURANTOIN 25; 75 MG/1; MG/1
100 CAPSULE ORAL DAILY
Status: CANCELLED | OUTPATIENT
Start: 2023-03-22 | End: 2023-04-21

## 2023-03-22 RX ORDER — BUSPIRONE HYDROCHLORIDE 10 MG/1
10 TABLET ORAL 2 TIMES DAILY
Status: DISCONTINUED | OUTPATIENT
Start: 2023-03-22 | End: 2023-03-23 | Stop reason: HOSPADM

## 2023-03-22 RX ORDER — NAPROXEN 250 MG/1
500 TABLET ORAL 2 TIMES DAILY PRN
Status: DISCONTINUED | OUTPATIENT
Start: 2023-03-22 | End: 2023-03-23 | Stop reason: HOSPADM

## 2023-03-22 RX ORDER — FUROSEMIDE 10 MG/ML
20 INJECTION INTRAMUSCULAR; INTRAVENOUS ONCE
Status: COMPLETED | OUTPATIENT
Start: 2023-03-22 | End: 2023-03-22

## 2023-03-22 RX ORDER — IPRATROPIUM BROMIDE AND ALBUTEROL SULFATE 2.5; .5 MG/3ML; MG/3ML
3 SOLUTION RESPIRATORY (INHALATION) 4 TIMES DAILY PRN
Status: DISCONTINUED | OUTPATIENT
Start: 2023-03-22 | End: 2023-03-23 | Stop reason: HOSPADM

## 2023-03-22 RX ORDER — LANOLIN ALCOHOL/MO/W.PET/CERES
2500 CREAM (GRAM) TOPICAL DAILY
Status: DISCONTINUED | OUTPATIENT
Start: 2023-03-22 | End: 2023-03-23 | Stop reason: HOSPADM

## 2023-03-22 RX ORDER — ATORVASTATIN CALCIUM 40 MG/1
40 TABLET, FILM COATED ORAL DAILY
Status: DISCONTINUED | OUTPATIENT
Start: 2023-03-22 | End: 2023-03-23 | Stop reason: HOSPADM

## 2023-03-22 RX ORDER — POTASSIUM CHLORIDE 1.5 G/1.77G
40 POWDER, FOR SOLUTION ORAL ONCE
Status: COMPLETED | OUTPATIENT
Start: 2023-03-22 | End: 2023-03-22

## 2023-03-22 RX ORDER — HYDROCODONE BITARTRATE AND ACETAMINOPHEN 5; 325 MG/1; MG/1
1 TABLET ORAL 2 TIMES DAILY PRN
Status: DISCONTINUED | OUTPATIENT
Start: 2023-03-22 | End: 2023-03-23 | Stop reason: HOSPADM

## 2023-03-22 RX ORDER — LEVOTHYROXINE SODIUM 0.03 MG/1
25 TABLET ORAL
Status: DISCONTINUED | OUTPATIENT
Start: 2023-03-22 | End: 2023-03-23 | Stop reason: HOSPADM

## 2023-03-22 RX ORDER — OXYBUTYNIN CHLORIDE 5 MG/1
5 TABLET ORAL DAILY
Status: DISCONTINUED | OUTPATIENT
Start: 2023-03-22 | End: 2023-03-23 | Stop reason: HOSPADM

## 2023-03-22 RX ORDER — HYDROXYCHLOROQUINE SULFATE 200 MG/1
200 TABLET, FILM COATED ORAL 2 TIMES DAILY
Status: CANCELLED | OUTPATIENT
Start: 2023-03-22

## 2023-03-22 RX ORDER — HYDROXYZINE HYDROCHLORIDE 10 MG/1
10 TABLET, FILM COATED ORAL 3 TIMES DAILY PRN
Status: DISCONTINUED | OUTPATIENT
Start: 2023-03-22 | End: 2023-03-23 | Stop reason: HOSPADM

## 2023-03-22 RX ORDER — LOSARTAN POTASSIUM 50 MG/1
50 TABLET ORAL DAILY
Status: DISCONTINUED | OUTPATIENT
Start: 2023-03-22 | End: 2023-03-23 | Stop reason: HOSPADM

## 2023-03-22 RX ORDER — CITALOPRAM 20 MG/1
20 TABLET ORAL DAILY
Status: DISCONTINUED | OUTPATIENT
Start: 2023-03-22 | End: 2023-03-23 | Stop reason: HOSPADM

## 2023-03-22 RX ORDER — ASPIRIN 81 MG/1
81 TABLET, CHEWABLE ORAL DAILY
Status: DISCONTINUED | OUTPATIENT
Start: 2023-03-22 | End: 2023-03-23 | Stop reason: HOSPADM

## 2023-03-22 RX ADMIN — POTASSIUM CHLORIDE 40 MEQ: 1.5 POWDER, FOR SOLUTION ORAL at 16:28

## 2023-03-22 RX ADMIN — CEFEPIME 2 G: 2 INJECTION, POWDER, FOR SOLUTION INTRAVENOUS at 09:22

## 2023-03-22 RX ADMIN — IPRATROPIUM BROMIDE AND ALBUTEROL SULFATE 3 ML: 2.5; .5 SOLUTION RESPIRATORY (INHALATION) at 13:15

## 2023-03-22 RX ADMIN — IPRATROPIUM BROMIDE AND ALBUTEROL SULFATE 3 ML: 2.5; .5 SOLUTION RESPIRATORY (INHALATION) at 06:39

## 2023-03-22 RX ADMIN — CEFEPIME 2 G: 2 INJECTION, POWDER, FOR SOLUTION INTRAVENOUS at 01:17

## 2023-03-22 RX ADMIN — CEFEPIME 2 G: 2 INJECTION, POWDER, FOR SOLUTION INTRAVENOUS at 18:02

## 2023-03-22 RX ADMIN — DOXYCYCLINE 100 MG: 100 CAPSULE ORAL at 21:14

## 2023-03-22 RX ADMIN — DOCUSATE SODIUM 50 MG AND SENNOSIDES 8.6 MG 2 TABLET: 8.6; 5 TABLET, FILM COATED ORAL at 08:15

## 2023-03-22 RX ADMIN — METHYLPREDNISOLONE SODIUM SUCCINATE 40 MG: 40 INJECTION, POWDER, FOR SOLUTION INTRAMUSCULAR; INTRAVENOUS at 08:15

## 2023-03-22 RX ADMIN — GUAIFENESIN 1200 MG: 600 TABLET, EXTENDED RELEASE ORAL at 08:15

## 2023-03-22 RX ADMIN — HYDROCODONE BITARTRATE AND ACETAMINOPHEN 1 TABLET: 5; 325 TABLET ORAL at 10:59

## 2023-03-22 RX ADMIN — ASPIRIN 81 MG: 81 TABLET, CHEWABLE ORAL at 10:59

## 2023-03-22 RX ADMIN — BUSPIRONE HYDROCHLORIDE 10 MG: 10 TABLET ORAL at 11:00

## 2023-03-22 RX ADMIN — DOCUSATE SODIUM 50 MG AND SENNOSIDES 8.6 MG 2 TABLET: 8.6; 5 TABLET, FILM COATED ORAL at 21:14

## 2023-03-22 RX ADMIN — GUAIFENESIN 1200 MG: 600 TABLET, EXTENDED RELEASE ORAL at 21:15

## 2023-03-22 RX ADMIN — METHYLPREDNISOLONE SODIUM SUCCINATE 40 MG: 40 INJECTION, POWDER, FOR SOLUTION INTRAMUSCULAR; INTRAVENOUS at 21:14

## 2023-03-22 RX ADMIN — BREXPIPRAZOLE 0.5 MG: 0.5 TABLET ORAL at 16:28

## 2023-03-22 RX ADMIN — IPRATROPIUM BROMIDE AND ALBUTEROL SULFATE 3 ML: 2.5; .5 SOLUTION RESPIRATORY (INHALATION) at 01:31

## 2023-03-22 RX ADMIN — Medication 10 ML: at 21:15

## 2023-03-22 RX ADMIN — LOSARTAN POTASSIUM 50 MG: 50 TABLET, FILM COATED ORAL at 11:00

## 2023-03-22 RX ADMIN — BUDESONIDE AND FORMOTEROL FUMARATE DIHYDRATE 2 PUFF: 160; 4.5 AEROSOL RESPIRATORY (INHALATION) at 06:39

## 2023-03-22 RX ADMIN — CITALOPRAM HYDROBROMIDE 20 MG: 20 TABLET ORAL at 11:00

## 2023-03-22 RX ADMIN — ATORVASTATIN CALCIUM 40 MG: 40 TABLET ORAL at 10:59

## 2023-03-22 RX ADMIN — BUSPIRONE HYDROCHLORIDE 10 MG: 10 TABLET ORAL at 21:15

## 2023-03-22 RX ADMIN — Medication 2500 MCG: at 10:59

## 2023-03-22 RX ADMIN — IPRATROPIUM BROMIDE AND ALBUTEROL SULFATE 3 ML: 2.5; .5 SOLUTION RESPIRATORY (INHALATION) at 19:35

## 2023-03-22 RX ADMIN — BUDESONIDE AND FORMOTEROL FUMARATE DIHYDRATE 2 PUFF: 160; 4.5 AEROSOL RESPIRATORY (INHALATION) at 19:35

## 2023-03-22 RX ADMIN — OXYBUTYNIN CHLORIDE 5 MG: 5 TABLET ORAL at 11:00

## 2023-03-22 RX ADMIN — DOXYCYCLINE 100 MG: 100 CAPSULE ORAL at 08:15

## 2023-03-22 RX ADMIN — ENOXAPARIN SODIUM 40 MG: 40 INJECTION SUBCUTANEOUS at 21:14

## 2023-03-22 RX ADMIN — LEVOTHYROXINE SODIUM 25 MCG: 25 TABLET ORAL at 11:00

## 2023-03-22 RX ADMIN — FUROSEMIDE 20 MG: 10 INJECTION, SOLUTION INTRAMUSCULAR; INTRAVENOUS at 16:28

## 2023-03-22 NOTE — PLAN OF CARE
Goal Outcome Evaluation:              Outcome Evaluation: Pt seen for evaluation this date with candidate for therapeutic benefit working on balance and endurance for functional mobility. D/C recommendation for home with supervision, assist, home health

## 2023-03-22 NOTE — PLAN OF CARE
Goal Outcome Evaluation:               Pt resting in bed at this time. Prn pain meds given this shift. No other complaints at this time.

## 2023-03-22 NOTE — PROGRESS NOTES
Patient Identification:  Name:  Lexie Valdez  Age:  62 y.o.  Sex:  female  :  1960  MRN:  [id number]  Visit Number:  [id number]  Primary Care Provider:  Violet Pop APRN    Length of stay:  0    Subjective/Interval History/Consultants/Procedures     Chief complaint: Follow-up, COPD exacerbation and acute on chronic hypoxic respiratory failure in setting of known ILD    Subjective/Interval History:    Lexie WOLF Valdez is our 62 y.o. female patient admitted on 3/21/2023 due to acute on chronic hypoxic respiratory failure and COPDe. She has a pMH significant for interstitial lung disease, hypertension, hyperlipidemia, hypothyroidism, fibromyalgia, anxiety, and depression. For further and complete admitting information, please see admission H&P.    She was admitted to [facility] for further monitoring, evaluation, and treatment. SpO2 saturation was found to be 88% on arrival to the ED. Placed on 2L and tolerating. Utilizes 2L nightly & PRN at home. CT angiogram of the chest revealed no evidence of PE; noted subpleural fibrotic change and interstitial prominence. Cefepime and Doxycycline initiated empirically for possible underlying pneumonia. She was also placed on scheduled IV steroids, nebs, and mucolytics. Transthoracic echocardiogram ordered and pending. Inpatient pulmonology consult placed for further assistance, greatly appreciate their input.     Procedures/Imaging:  · CT angiogram chest  · Chest xray    Consults:  · Pulmonology    On today's exam, patient was resting in bed with no s/s acute distress noted. Remaining on 2L NC and tolerating. She states that she is breathing easier today. Denies any chest pain or other new/acute symptoms. Continue current management pending further recs from Pulm. Discussed plan with patient; voiced agreement with no further questions or concerns at this time.     Discussed with AM JESSIKA Zaragoza with no acute events overnight. Room location at the time of evaluation was  [address]B. Discussed with attending physician, Ab Mcnamara DO.   ----------------------------------------------------------------------------------------------------------------------  Current Hospital Meds:  budesonide-formoterol, 2 puff, Inhalation, BID - RT  cefepime, 2 g, Intravenous, Q8H  doxycycline, 100 mg, Oral, Q12H  enoxaparin, 40 mg, Subcutaneous, Nightly  guaiFENesin, 1,200 mg, Oral, Q12H  ipratropium-albuterol, 3 mL, Nebulization, 4x Daily - RT  methylPREDNISolone sodium succinate, 40 mg, Intravenous, Q12H  senna-docusate sodium, 2 tablet, Oral, BID  sodium chloride, 10 mL, Intravenous, Q12H         ----------------------------------------------------------------------------------------------------------------------      Objective     Vital Signs:  Temp:  [97.7 °F (36.5 °C)-99.6 °F (37.6 °C)] 98.5 °F (36.9 °C)  Heart Rate:  [] 79  Resp:  [16-22] 18  BP: (100-209)/(48-99) 149/86      03/21/23  1016 03/21/23 1955   Weight: 83.9 kg (185 lb) 85.4 kg (188 lb 4.4 oz)     Body mass index is 32.3 kg/m².    Intake/Output Summary (Last 24 hours) at 3/22/2023 0954  Last data filed at 3/22/2023 0400  Gross per 24 hour   Intake 1440 ml   Output 275 ml   Net 1165 ml     No intake/output data recorded.  Diet: Diabetic Diets; Consistent Carbohydrate; Texture: Regular Texture (IDDSI 7); Fluid Consistency: Thin (IDDSI 0)  ----------------------------------------------------------------------------------------------------------------------    Physical Exam  Vitals and nursing note reviewed.   Constitutional:       General: She is awake. She is not in acute distress.     Appearance: She is obese. She is ill-appearing (chronically). She is not diaphoretic.      Interventions: Nasal cannula in place.      Comments: Currently on 2L.    HENT:      Head: Normocephalic and atraumatic.      Mouth/Throat:      Mouth: Mucous membranes are moist.      Pharynx: Oropharynx is clear.   Eyes:      Extraocular Movements:  Extraocular movements intact.      Pupils: Pupils are equal, round, and reactive to light.   Cardiovascular:      Rate and Rhythm: Normal rate and regular rhythm.      Pulses: Normal pulses.           Dorsalis pedis pulses are 2+ on the right side and 2+ on the left side.      Heart sounds: Normal heart sounds. No murmur heard.    No friction rub.   Pulmonary:      Effort: Pulmonary effort is normal. No accessory muscle usage, respiratory distress or retractions.      Breath sounds: Decreased breath sounds (bibasilar) present. No wheezing or rhonchi.      Comments: Lungs coarse bilaterally; no significant wheezing, rhonchi or rales appreciated.  Abdominal:      General: Bowel sounds are normal. There is no distension.      Palpations: Abdomen is soft.      Tenderness: There is no abdominal tenderness. There is no guarding.   Musculoskeletal:      Cervical back: Neck supple. No rigidity.      Right lower leg: No edema.      Left lower leg: No edema.   Skin:     General: Skin is warm and dry.      Capillary Refill: Capillary refill takes 2 to 3 seconds.      Coloration: Skin is pale.   Neurological:      Mental Status: She is alert and oriented to person, place, and time. Mental status is at baseline.      Cranial Nerves: No dysarthria or facial asymmetry.      Sensory: Sensation is intact. No sensory deficit.      Motor: No tremor.   Psychiatric:         Attention and Perception: Attention normal.         Mood and Affect: Mood normal.         Speech: Speech normal.         Behavior: Behavior normal. Behavior is cooperative.         Thought Content: Thought content normal.         Cognition and Memory: Cognition normal.         Judgment: Judgment normal.         ----------------------------------------------------------------------------------------------------------------------  Results from last 7 days   Lab Units 03/21/23  1343 03/21/23  1051   HSTROP T ng/L 10* 11*   PROBNP pg/mL  --  394.0     Results from last 7  days   Lab Units 03/22/23 0417 03/21/23 2005 03/21/23 1510 03/21/23  1051   CRP mg/dL  --   --   --  0.90*   LACTATE mmol/L  --  1.1 2.5* 2.2*   WBC 10*3/mm3 5.52  --   --  8.39   HEMOGLOBIN g/dL 11.1*  --   --  12.4   HEMATOCRIT % 35.7  --   --  38.7   MCV fL 94.7  --   --  93.0   MCHC g/dL 31.1*  --   --  32.0   PLATELETS 10*3/mm3 156  --   --  174   INR   --   --   --  1.00     Results from last 7 days   Lab Units 03/21/23  1107   PH, ARTERIAL pH units 7.446   PO2 ART mm Hg 65.5*   PCO2, ARTERIAL mm Hg 39.4   HCO3 ART mmol/L 27.1*     Results from last 7 days   Lab Units 03/22/23 0417 03/21/23  1051   SODIUM mmol/L 141 144   POTASSIUM mmol/L 3.8 4.0   MAGNESIUM mg/dL  --  2.0   CHLORIDE mmol/L 108* 107   CO2 mmol/L 23.8 26.7   BUN mg/dL 8 5*   CREATININE mg/dL 0.65 0.71   CALCIUM mg/dL 8.1* 8.9   GLUCOSE mg/dL 165* 101*   ALBUMIN g/dL  --  3.4*   BILIRUBIN mg/dL  --  0.5   ALK PHOS U/L  --  62   AST (SGOT) U/L  --  22   ALT (SGPT) U/L  --  10   Estimated Creatinine Clearance: 93.7 mL/min (by C-G formula based on SCr of 0.65 mg/dL).  No results found for: AMMONIA    ----------------------------------------------------------------------------------------------------------------------  Imaging Results (Last 24 Hours)     Procedure Component Value Units Date/Time    CT Angiogram Chest Pulmonary Embolism [[id number]] Collected: 03/21/23 1441     Updated: 03/21/23 1444    Narrative:      EXAM:    CT Angiography Chest With Intravenous Contrast     EXAM DATE:    3/21/2023 1:56 PM     CLINICAL HISTORY:    Pulmonary embolism (PE) suspected, positive D-dimer     TECHNIQUE:    Axial computed tomographic angiography images of the chest with  intravenous contrast.  This CT exam was performed using one or more of  the following dose reduction techniques:  automated exposure control,  adjustment of the mA and/or kV according to patient size, and/or use of  iterative reconstruction technique.    MIP reconstructed images were  created and reviewed.     COMPARISON:    02/21/2023     FINDINGS:    Pulmonary arteries:  Unremarkable.  No pulmonary embolism.    Aorta:  No acute findings.  No thoracic aortic aneurysm.    Lungs:  Subpleural fibrotic change and interstitial prominence is  again noted.  No mass.    Pleural space:  Unremarkable.  No significant effusion.  No  pneumothorax.    Heart:  Unremarkable.  No cardiomegaly.  No significant pericardial  effusion.  No evidence of RV dysfunction.    Bones/joints:  No acute fracture.  No dislocation.    Soft tissues:  Unremarkable.    Lymph nodes:  Unremarkable.  No enlarged lymph nodes.       Impression:      1.  No pulmonary embolism.  2.  Subpleural fibrotic change and interstitial prominence is again  noted.     This report was finalized on 3/21/2023 2:41 PM by Dr. Dominguez Sims MD.       XR Chest 1 View [[id number]] Collected: 03/21/23 1207     Updated: 03/21/23 1209    Narrative:      EXAM:    XR Chest, 1 View     EXAM DATE:    3/21/2023 10:57 AM     CLINICAL HISTORY:    sob, cough     TECHNIQUE:    Frontal view of the chest.     COMPARISON:    04/26/2022     FINDINGS:    LUNGS:  Coarsened interstitial markings and patchy subpleural airspace  opacity is again noted.    PLEURAL SPACE:  Unremarkable.  No pneumothorax.    HEART:  Heart size is stable.    MEDIASTINUM:  Unremarkable.    BONES/JOINTS:  Unremarkable.       Impression:        Coarsened interstitial markings and patchy subpleural airspace opacity  is again noted.     This report was finalized on 3/21/2023 12:07 PM by Dr. Dominguez Sims MD.           ----------------------------------------------------------------------------------------------------------------------   I have reviewed the above laboratory values for 03/22/23    Assessment/Plan     Active Hospital Problems    Diagnosis  POA   • **COPD exacerbation (HCC) [J44.1]  Yes         ASSESSMENT/PLAN:  Acute on chronic hypoxic respiratory failure suspect 2/2 to acute COPD  exacerbation with possible underlying pnuemonia  In the setting of known interstitial lung disease  Patient presented to ED with increased shortness of breath and increased cough frequency/sputum production x2 to 3 weeks.  Patient wears 2 L as needed and at night at baseline.  Currently requiring 2 L to maintain SPO2 saturations. Wean as tolerated. Continuous pulse oximetry ordered.  Cefepime and Doxycycline for empiric coverage of possible underlying pneumonia  Obtain sputum culture if able  MRSA PCR negative  Continue Solu-Medrol 40 mg twice daily  Supportive care with Mucinex, DuoNebs  Inpatient pulmonology consult placed, assistance is much appreciated  PT, OT, and SLP consults, pending recs   TTE ordered and pending     Chronic:  HTN; stable. Continue to monitor VS per hospital policy.   HLD; continue statin.   Hypothyroidism; continue levothyroxine. Check TSH/Free T4.   Fibromyalgia; supportive care. Norco 5 available PRN per home med rec.   Anxiety/depression; continue Celexa and Buspar.     -----------  -DVT prophylaxis: Lovenox  -Activity: Up with assistance as tolerated. Fall precautions. PT/OT consult placed.   -Disposition plans/anticipated needs: Pending clinical course and PT recommendations. Plans to return home.   -Diet: Regular CC  -Home supplemental O2: Utilizes 2L nightly/PRN at home. Currently requiring 2L continuously.       The patient is high risk due to the following diagnoses/reasons:  Acute on chronic hypoxic respiratory failure secondary to COPD exacerbation in setting of known ILD.         Rukhsana Owens, SG  03/22/23  09:47 EDT  Pager #[phone]

## 2023-03-22 NOTE — CASE MANAGEMENT/SOCIAL WORK
Discharge Planning Assessment  [facility]     Patient Name: Lexie KEITH Valdez  MRN: [id number]  Today's Date: 3/22/2023    Admit Date: 3/21/2023    Plan: Patient is an independent retired cafateria worker from [employer] who lives at home with her , where she plans to return at discharge.  Patient's PCP is Violet Pop and she uses [employer] for her prescription needs.  Patient denies insurance or financial issues at this time.  Patient has O2 @ 2L qhs only, Walker, Nebulizer & Cane that she obtained from [employer] are family too.  Patient does not utilize Home Health and denies the need at this time.  Patient's family will provide transportation at discharge.  No other issues or concerns are noted at this time.  CM will continue to follow and assist with any discharge needs.   Discharge Needs Assessment     Row Name 03/22/23 1320       Living Environment    People in Home spouse    Name(s) of People in Home -Khris    Current Living Arrangements other (see comments)  Trailer    Duration at Residence 5-6 yrs.    Primary Care Provided by self    Provides Primary Care For no one    Family Caregiver if Needed none    Quality of Family Relationships helpful;involved;supportive    Able to Return to Prior Arrangements yes       Resource/Environmental Concerns    Resource/Environmental Concerns none    Transportation Concerns no car       Transition Planning    Patient/Family Anticipates Transition to home with family    Patient/Family Anticipated Services at Transition none    Transportation Anticipated family or friend will provide       Discharge Needs Assessment    Readmission Within the Last 30 Days no previous admission in last 30 days    Equipment Currently Used at Home oxygen;nebulizer;walker, standard;cane, straight  O2 @ 2L qhs from    Concerns to be Addressed no discharge needs identified;denies needs/concerns at this time    Anticipated Changes Related to Illness none    Equipment  Needed After Discharge none               Discharge Plan     Row Name 03/22/23 1323       Plan    Plan 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  Patient's PCP is Violet Pop and she uses [facility] for her prescription needs.  Patient denies insurance or financial issues at this time.  Patient has O2 @ 2L qhs only, Walker, Nebulizer & Cane that she obtained from [employer].  Patient does not utilize Home Health and denies the need at this time.  Patient's family will provide transportation at discharge.  No other issues or concerns are noted at this time.  CM will continue to follow and assist with any discharge needs.    Patient/Family in Agreement with Plan yes    Row Name 03/22/23 1312       Plan    Plan Comments 3/22:  SpO2 , 2L N/C, IV Cefepime, Solu-Medrol, HST 10, CRP 0.90, D-Dimer 3.66, UA + Gram - Bacilli, CT Chest=Diffuse lung disease, RADS category 1, Subpleural fibrotic change & interstitial prominence noted, To ED for worsening SOB 2-3 wks with white/clear sputum, Speech recommendations noted, PT/OT/SLP & Pulm consulted, Home @ [facility]              Continued Care and Services - Admitted Since 3/21/2023    Coordination has not been started for this encounter.       Expected Discharge Date and Time     Expected Discharge Date Expected Discharge Time    Mar 25, 2023          Demographic Summary     Row Name 03/22/23 1310       General Information    Admission Type inpatient    Arrived From home;physician office - external;emergency department  KELLY Lazo sent patient to ER    Referral Source admission list;high risk screening    Reason for Consult discharge planning;other (see comments)  CM Trigger    Preferred Language English               Functional Status     Row Name 03/22/23 1317       Functional Status    Usual Activity Tolerance good    Current Activity  Tolerance good       Functional Status, IADL    Medications independent    Meal Preparation independent    Housekeeping independent    Laundry independent    Shopping independent       Mental Status Summary    Recent Changes in Mental Status/Cognitive Functioning no changes       Employment/    Employment Status retired    Current or Previous Occupation healthcare    Employment/ Comments Cafateria worker               Psychosocial    No documentation.                Abuse/Neglect    No documentation.                Legal    No documentation.                Substance Abuse    No documentation.                Patient Forms    No documentation.                   Tracy Suggs RN

## 2023-03-22 NOTE — THERAPY EVALUATION
Patient Name: Lexie Valdez  : 1960    MRN: [id number]                              Today's Date: 3/22/2023       Admit Date: 3/21/2023    Visit Dx:     ICD-10-CM ICD-9-CM   1. COPD with acute exacerbation (HCC)  J44.1 491.21   2. Acute UTI  N39.0 599.0     Patient Active Problem List   Diagnosis   • MARU (stress urinary incontinence, female)   • Chronic cystitis   • Detrusor instability   • Chronic obstructive lung disease (HCC)   • Arthritis   • Bursitis   • Mixed anxiety depressive disorder   • Fibromyalgia   • Hyperlipidemia   • Hypertensive disorder   • Hypothyroidism   • COPD exacerbation (HCC)     Past Medical History:   Diagnosis Date   • Anxiety    • Arthritis    • Depression    • Hypertensive disorder 3/14/2018   • Hypothyroidism 8/3/2021   • MARU (stress urinary incontinence, female) 2019     Past Surgical History:   Procedure Laterality Date   • BREAST BIOPSY Left     benign   • GALLBLADDER SURGERY     • GASTRIC BANDING     • HYSTERECTOMY      age 37      General Information     Row Name 23 1435          OT Time and Intention    Document Type evaluation  -LA     Mode of Treatment occupational therapy  -LA     Row Name 23 1435          General Information    Patient Profile Reviewed yes  -LA     Prior Level of Function independent:;ADL's  Patient reports she has rollator and straight cane. Patient reports she uses cane for community mobility but doesn't really use anything in house  -LA     Existing Precautions/Restrictions fall  -LA     Barriers to Rehab none identified  -LA     Row Name 23 1435          Occupational Profile    Reason for Services/Referral (Occupational Profile) OT to assess for changes in level of independence/safety with ADLs.  -LA     Patient Goals (Occupational Profile) Return home  -LA     Row Name 23 1435          Living Environment    People in Home spouse  -LA     Row Name 23 1435          Cognition    Orientation Status (Cognition)  oriented x 4  -LA           User Key  (r) = Recorded By, (t) = Taken By, (c) = Cosigned By    Initials Name Provider Type    Maty Davidson OT Occupational Therapist                 Mobility/ADL's     Row Name 03/22/23 1437          Bed Mobility    Supine-Sit Divide (Bed Mobility) independent;modified independence  -LA     Sit-Supine Divide (Bed Mobility) independent;modified independence  -LA     Row Name 03/22/23 1437          Transfers    Transfers sit-stand transfer;stand-sit transfer  -LA     Row Name 03/22/23 1437          Sit-Stand Transfer    Sit-Stand Divide (Transfers) contact guard  -LA     Row Name 03/22/23 1437          Stand-Sit Transfer    Stand-Sit Divide (Transfers) contact guard  -LA     Row Name 03/22/23 1437          Functional Mobility    Functional Mobility- Ind. Level contact guard assist  -LA     Row Name 03/22/23 1437          Activities of Daily Living    BADL Assessment/Intervention bathing;upper body dressing;lower body dressing;grooming;feeding;toileting  -LA     Row Name 03/22/23 1437          Bathing Assessment/Intervention    Divide Level (Bathing) set up  -LA     Row Name 03/22/23 1437          Upper Body Dressing Assessment/Training    Divide Level (Upper Body Dressing) set up  -LA     Row Name 03/22/23 1437          Lower Body Dressing Assessment/Training    Divide Level (Lower Body Dressing) set up  -LA     Row Name 03/22/23 1437          Grooming Assessment/Training    Divide Level (Grooming) set up;independent  -LA     Row Name 03/22/23 1437          Self-Feeding Assessment/Training    Divide Level (Feeding) independent;set up  -LA     Row Name 03/22/23 1437          Toileting Assessment/Training    Divide Level (Toileting) contact guard assist  -LA           User Key  (r) = Recorded By, (t) = Taken By, (c) = Cosigned By    Initials Name Provider Type    Maty Davidson OT Occupational Therapist                Obj/Interventions     Row Name 03/22/23 1438          Sensory Assessment (Somatosensory)    Sensory Assessment (Somatosensory) sensation intact  -LA     Row Name 03/22/23 1438          Vision Assessment/Intervention    Visual Impairment/Limitations WFL  -[facility] Name 03/22/23 1438          Range of Motion Comprehensive    General Range of Motion no range of motion deficits identified  -LA     Comment, General Range of Motion UE AROM grossly WFL  -LA     Row Name 03/22/23 1438          Strength Comprehensive (MMT)    General Manual Muscle Testing (MMT) Assessment no strength deficits identified  -LA     Comment, General Manual Muscle Testing (MMT) Assessment UE strength grossly 4+/5  -LA     Row Name 03/22/23 1438          Motor Skills    Motor Skills coordination;functional endurance  -LA     Coordination fine motor deficit;bilateral;upper extremity  tremors noted in hands; mostly right UE at time of evaluation  -LA     Functional Endurance good  -[facility] Name 03/22/23 1438          Balance    Balance Assessment sitting static balance;sitting dynamic balance  -LA     Static Sitting Balance independent  -LA     Dynamic Sitting Balance independent  -LA           User Key  (r) = Recorded By, (t) = Taken By, (c) = Cosigned By    Initials Name Provider Type    Maty Davidson OT Occupational Therapist               Goals/Plan    No documentation.                Clinical Impression     Row Name 03/22/23 1440          Plan of Care Review    Plan of Care Reviewed With patient  -LA     Outcome Evaluation Patient seen for OT evaluation this date. Patient at baseline for level of independence/ safety with ADLs. Patient with noted tremors of hands however patient reports that this is baseline. No further OT services in house indicated due to patient at baseline. D/C recommendation is home with assist/ home with home health.  -LA     Row Name 03/22/23 1440          Therapy Assessment/Plan (OT)    Criteria for Skilled  Therapeutic Interventions Met (OT) no problems identified which require skilled intervention;other (see comments)  Patient at baseline  -LA     Therapy Frequency (OT) evaluation only  -LA     Row Name 03/22/23 1440          Therapy Plan Review/Discharge Plan (OT)    Equipment Needs Upon Discharge (OT) --  no equimpment needs identified during eval  -LA     Anticipated Discharge Disposition (OT) home with assist;home with home health  -LA     Row Name 03/22/23 1440          Positioning and Restraints    Pre-Treatment Position in bed  -LA     Post Treatment Position bed  -LA     In Bed call light within reach;encouraged to call for assist;exit alarm on;fowlers;with other staff  respiratory therapist present at OT departure  -LA           User Key  (r) = Recorded By, (t) = Taken By, (c) = Cosigned By    Initials Name Provider Type    Maty Davidson OT Occupational Therapist               Outcome Measures     Row Name 03/22/23 0805          How much help from another person do you currently need...    Turning from your back to your side while in flat bed without using bedrails? 3  -VM     Moving from lying on back to sitting on the side of a flat bed without bedrails? 3  -VM     Moving to and from a bed to a chair (including a wheelchair)? 2  -VM     Standing up from a chair using your arms (e.g., wheelchair, bedside chair)? 2  -VM     Climbing 3-5 steps with a railing? 2  -VM     To walk in hospital room? 2  -VM     AM-PAC 6 Clicks Score (PT) 14  -VM     Highest level of mobility 4 --> Transferred to chair/commode  -           User Key  (r) = Recorded By, (t) = Taken By, (c) = Cosigned By    Initials Name Provider Type    Mila Smith, RN Registered Nurse                  OT Recommendation and Plan  Therapy Frequency (OT): evaluation only  Plan of Care Review  Plan of Care Reviewed With: patient  Outcome Evaluation: Patient seen for OT evaluation this date. Patient at baseline for level of  independence/ safety with ADLs. Patient with noted tremors of hands however patient reports that this is baseline. No further OT services in house indicated due to patient at baseline. D/C recommendation is home with assist/ home with home health.     Time Calculation:     Therapy Charges for Today     Code Description Service Date Service Provider Modifiers Qty    [id number] HC OT EVAL MOD COMPLEXITY 4 3/22/2023 Mtay Bowen OT GO 1               Maty Bowen OT  3/22/2023

## 2023-03-22 NOTE — CONSULTS
Referring Provider: dr day   Reason for Consultation: respiratory failure       Chief complaint - sob      History of present illness: 62-year-old female with extensive past medical history positive for hypertension, hyperlipidemia, fibromyalgia, anxiety, depression and obstructive lung disease.  CT chest also suggest patient has pulmonary fibrosis.  Presented to the ER with increased shortness of breath which has been progressively getting worse since last 2 to 3 weeks.  Denies any sick contacts denies any cough or wheezing.  Patient stopped smoking around 13 years back.  Has 22+ pack year smoking history.  CT chest showed pulmonary fibrosis follows up with Deirdre-nurse practitioner in our pulmonary clinic.  Review of Systems  Review of Systems -     Review of Systems - History obtained from chart review and the patient  General ROS: negative for - chills, fatigue or fever  Psychological ROS: negative for - anxiety or depression  ENT ROS: negative for - headaches, visual changes or vocal changes  Respiratory ROS: positive for - cough and shortness of breath  Cardiovascular ROS: no chest pain or dyspnea on exertion  Gastrointestinal ROS: no abdominal pain, change in bowel habits, or black or bloody stools  Musculoskeletal ROS: negative for - joint pain, joint stiffness or joint swelling  Neurological ROS: no TIA or stroke symptoms  Heme- no bleeding  Skin- no bruises, no rash        History  Past Medical History:   Diagnosis Date   • Anxiety    • Arthritis    • Depression    • Hypertensive disorder 3/14/2018   • Hypothyroidism 8/3/2021   • MARU (stress urinary incontinence, female) 9/11/2019   ,   Past Surgical History:   Procedure Laterality Date   • BREAST BIOPSY Left 2000    benign   • GALLBLADDER SURGERY     • GASTRIC BANDING     • HYSTERECTOMY      age 37   ,   Family History   Problem Relation Age of Onset   • Panic disorder Mother    • COPD Mother    • Alcohol abuse Father    • Alzheimer's disease  Father    • Breast cancer Neg Hx    ,   Social History     Tobacco Use   • Smoking status: Former     Types: Cigarettes     Start date: 1971     Quit date: 10/18/2005     Years since quittin.4   • Smokeless tobacco: Never   Vaping Use   • Vaping Use: Never used   Substance Use Topics   • Alcohol use: No   • Drug use: No   ,   Medications Prior to Admission   Medication Sig Dispense Refill Last Dose   • alendronate (FOSAMAX) 70 MG tablet Take 1 tablet by mouth Every 7 (Seven) Days.   3/14/2023   • aspirin 81 MG chewable tablet Chew 1 tablet Daily.   3/20/2023   • atorvastatin (LIPITOR) 40 MG tablet Take 1 tablet by mouth Daily.   3/20/2023   • Breo Ellipta 100-25 MCG/INH inhaler Inhale 1 puff Daily. 180 each 5 3/20/2023   • Brexpiprazole 0.5 MG tablet Take 0.5 mg by mouth Daily.   3/20/2023   • busPIRone (BUSPAR) 10 MG tablet Take 1 tablet by mouth 2 (Two) Times a Day.   3/21/2023   • citalopram (CeleXA) 40 MG tablet Take 1 tablet by mouth Daily.   3/20/2023   • hydroCHLOROthiazide (HYDRODIURIL) 12.5 MG tablet Take 1 tablet by mouth Daily As Needed (swelling).   Past Month   • HYDROcodone-acetaminophen (NORCO) 5-325 MG per tablet Take 1 tablet by mouth 2 (Two) Times a Day As Needed.   3/20/2023   • hydroxychloroquine (PLAQUENIL) 200 MG tablet Take 1 tablet by mouth 2 (Two) Times a Day.   Past Week   • hydrOXYzine (ATARAX) 10 MG tablet Take 1 tablet by mouth 3 (Three) Times a Day As Needed for Anxiety.   Past Week   • ipratropium-albuterol (DUO-NEB) 0.5-2.5 mg/3 ml nebulizer Take 3 mL by nebulization 4 (Four) Times a Day As Needed for Wheezing. 360 mL 3 3/21/2023   • levothyroxine (SYNTHROID, LEVOTHROID) 25 MCG tablet Take 1 tablet by mouth Every Morning.   3/20/2023   • losartan (COZAAR) 50 MG tablet Take 1 tablet by mouth Daily.   3/20/2023   • nabumetone (RELAFEN) 750 MG tablet Take 1 tablet by mouth 2 (Two) Times a Day As Needed.   3/20/2023   • nitrofurantoin, macrocrystal-monohydrate, (MACROBID) 100 MG  capsule Take 1 capsule by mouth Daily.   3/20/2023   • oxybutynin (DITROPAN) 5 MG tablet Take 1 tablet by mouth Daily.   3/20/2023   • Ventolin  (90 Base) MCG/ACT inhaler Inhale 2 puffs Every 4 (Four) Hours As Needed for Wheezing. 18 g 8 3/20/2023   • vitamin B-12 (CYANOCOBALAMIN) 2500 MCG sublingual tablet tablet Place 2,500 mcg under the tongue Daily.   3/20/2023   • vitamin D (ERGOCALCIFEROL) 1.25 MG (33626 UT) capsule capsule Take 1 capsule by mouth 1 (One) Time Per Week.   3/20/2023   • docusate sodium (COLACE) 100 MG capsule Take 1 capsule by mouth 2 (Two) Times a Day As Needed for Constipation.   Unknown   , Scheduled Meds:  aspirin, 81 mg, Oral, Daily  atorvastatin, 40 mg, Oral, Daily  Brexpiprazole, 1 tablet, Oral, Daily  budesonide-formoterol, 2 puff, Inhalation, BID - RT  busPIRone, 10 mg, Oral, BID  cefepime, 2 g, Intravenous, Q8H  [START ON 3/27/2023] cholecalciferol, 50,000 Units, Oral, Weekly  citalopram, 20 mg, Oral, Daily  doxycycline, 100 mg, Oral, Q12H  enoxaparin, 40 mg, Subcutaneous, Nightly  guaiFENesin, 1,200 mg, Oral, Q12H  ipratropium-albuterol, 3 mL, Nebulization, 4x Daily - RT  levothyroxine, 25 mcg, Oral, Q AM  losartan, 50 mg, Oral, Daily  methylPREDNISolone sodium succinate, 40 mg, Intravenous, Q12H  oxybutynin, 5 mg, Oral, Daily  senna-docusate sodium, 2 tablet, Oral, BID  sodium chloride, 10 mL, Intravenous, Q12H  vitamin B-12, 2,500 mcg, Oral, Daily    , Continuous Infusions:    and Allergies:  Codeine and Sulfa antibiotics    Objective     Vital Signs   Temp:  [98.2 °F (36.8 °C)-99.6 °F (37.6 °C)] 98.5 °F (36.9 °C)  Heart Rate:  [] 84  Resp:  [16-22] 18  BP: (100-167)/(48-99) 144/68    Physical Exam:             General-elderly chronically ill appearance, not in any acute distress    HEENT- pupils equally reactive to light, normal in size, no scleral icterus    Neck-supple    Respiratory-respirations normal-on auscultation no wheezing no crackles, wearing nasal cannula  oxygen.  Crackles at bases especially at the posterior basal part of the lungs    Cardiovascular-  Normal S1 and S2. No S3, S4 or murmurs. No JVD, no carotid bruit and no edema, pulses normal bilaterally     GI-nontender nondistended bowel sounds positive    CNS-nonfocal    Musculoskeletal -no edema  Extremities- no obvious deformity noticed     Psychiatric-mood good, good eye contact, alert awake oriented  Skin- no visible rash                                                                   Results Review:    LABS:    Lab Results   Component Value Date    GLUCOSE 165 (H) 03/22/2023    BUN 8 03/22/2023    CREATININE 0.65 03/22/2023    EGFRIFNONA 71 03/01/2021    BCR 12.3 03/22/2023    CO2 23.8 03/22/2023    CALCIUM 8.1 (L) 03/22/2023    ALBUMIN 3.4 (L) 03/21/2023    LABIL2 1.8 12/29/2014    AST 22 03/21/2023    ALT 10 03/21/2023    WBC 5.52 03/22/2023    HGB 11.1 (L) 03/22/2023    HCT 35.7 03/22/2023    MCV 94.7 03/22/2023     03/22/2023     03/22/2023    K 3.8 03/22/2023     (H) 03/22/2023    ANIONGAP 9.2 03/22/2023       Lab Results   Component Value Date    INR 1.00 03/21/2023    PROTIME 13.4 03/21/2023       Results from last 7 days   Lab Units 03/21/23  1051   INR  1.00   APTT seconds 23.1*          I reviewed the patient's new clinical results.  I reviewed the patient's new imaging results and agree with the interpretation.  CT Angiogram Chest Pulmonary Embolism    Result Date: 3/21/2023  EXAM:   CT Angiography Chest With Intravenous Contrast  EXAM DATE:   3/21/2023 1:56 PM  CLINICAL HISTORY:   Pulmonary embolism (PE) suspected, positive D-dimer  TECHNIQUE:   Axial computed tomographic angiography images of the chest with intravenous contrast.  This CT exam was performed using one or more of the following dose reduction techniques:  automated exposure control, adjustment of the mA and/or kV according to patient size, and/or use of iterative reconstruction technique.   MIP reconstructed  images were created and reviewed.  COMPARISON:   02/21/2023  FINDINGS:   Pulmonary arteries:  Unremarkable.  No pulmonary embolism.   Aorta:  No acute findings.  No thoracic aortic aneurysm.   Lungs:  Subpleural fibrotic change and interstitial prominence is again noted.  No mass.   Pleural space:  Unremarkable.  No significant effusion.  No pneumothorax.   Heart:  Unremarkable.  No cardiomegaly.  No significant pericardial effusion.  No evidence of RV dysfunction.   Bones/joints:  No acute fracture.  No dislocation.   Soft tissues:  Unremarkable.   Lymph nodes:  Unremarkable.  No enlarged lymph nodes.      Impression: 1.  No pulmonary embolism. 2.  Subpleural fibrotic change and interstitial prominence is again noted.  This report was finalized on 3/21/2023 2:41 PM by Dr. Dominguez Sims MD.      Procalitonin Results:      Lab 03/21/23  1051   PROCALCITONIN 0.04     Microbiology Results (last 10 days)     Procedure Component Value - Date/Time    MRSA Screen, PCR (Inpatient) - Swab, Nares [[id number]]  (Normal) Collected: 03/22/23 0118    Lab Status: Final result Specimen: Swab from Nares Updated: 03/22/23 0236     MRSA PCR No MRSA Detected    Narrative:      The negative predictive value of this diagnostic test is high and should only be used to consider de-escalating anti-MRSA therapy. A positive result may indicate colonization with MRSA and must be correlated clinically.    Urine Culture - Urine, Urine, Clean Catch [[id number]]  (Abnormal) Collected: 03/21/23 1333    Lab Status: Preliminary result Specimen: Urine, Clean Catch Updated: 03/22/23 1033     Urine Culture >100,000 CFU/mL Gram Negative Bacilli    Narrative:      Colonization of the urinary tract without infection is common. Treatment is discouraged unless the patient is symptomatic, pregnant, or undergoing an invasive urologic procedure.    Blood Culture - Blood, Arm, Right [[id number]]  (Normal) Collected: 03/21/23 1052    Lab Status: Preliminary result  Specimen: Blood from Arm, Right Updated: 03/22/23 1200     Blood Culture No growth at 24 hours    COVID-19 and FLU A/B PCR - Swab, Nasopharynx [[id number]]  (Normal) Collected: 03/21/23 1051    Lab Status: Final result Specimen: Swab from Nasopharynx Updated: 03/21/23 1121     COVID19 Not Detected     Influenza A PCR Not Detected     Influenza B PCR Not Detected    Narrative:      Fact sheet for providers: https://www.fda.gov/media/134580/download    Fact sheet for patients: https://www.fda.gov/media/400819/download    Test performed by PCR.    Blood Culture - Blood, Wrist, Left [[id number]]  (Normal) Collected: 03/21/23 1051    Lab Status: Preliminary result Specimen: Blood from Wrist, Left Updated: 03/22/23 1200     Blood Culture No growth at 24 hours        Assessment & Plan   COPD exacerbation-continue steroids continue oxygen continue nebs.  Will give diuretics to improve lung compliance and diffusion capacity.  Will start on BiPAP.  Patient agrees to use it.  CT chest x-ray reviewed  Site   Date Value Ref Range Status   03/21/2023 Right Brachial  Final     Andrés's Test   Date Value Ref Range Status   03/21/2023 N/A  Final     pH, Arterial   Date Value Ref Range Status   03/21/2023 7.446 7.350 - 7.450 pH units Final     pCO2, Arterial   Date Value Ref Range Status   03/21/2023 39.4 35.0 - 45.0 mm Hg Final     pO2, Arterial   Date Value Ref Range Status   03/21/2023 65.5 (L) 83.0 - 108.0 mm Hg Final     Comment:     84 Value below reference range     HCO3, Arterial   Date Value Ref Range Status   03/21/2023 27.1 (H) 20.0 - 26.0 mmol/L Final     Comment:     83 Value above reference range     Base Excess, Arterial   Date Value Ref Range Status   03/21/2023 2.9 (H) 0.0 - 2.0 mmol/L Final     O2 Saturation, Arterial   Date Value Ref Range Status   03/21/2023 94.5 94.0 - 99.0 % Final     Hemoglobin, Blood Gas   Date Value Ref Range Status   03/21/2023 12.1 (L) 13.5 - 17.5 g/dL Final     Comment:     84 Value below  reference range     Hematocrit, Blood Gas   Date Value Ref Range Status   03/21/2023 37.0 (L) 38.0 - 51.0 % Final     Comment:     84 Value below reference range     Oxyhemoglobin   Date Value Ref Range Status   03/21/2023 93.4 (L) 94 - 99 % Final     Comment:     84 Value below reference range     Methemoglobin   Date Value Ref Range Status   03/21/2023 0.00 0.00 - 3.00 % Final     Carboxyhemoglobin   Date Value Ref Range Status   03/21/2023 1.2 0 - 5 % Final     CO2 Content   Date Value Ref Range Status   03/21/2023 28.3 22 - 33 mmol/L Final     Barometric Pressure for Blood Gas   Date Value Ref Range Status   03/21/2023 735 mmHg Final     Modality   Date Value Ref Range Status   03/21/2023 Nasal Cannula  Final     FIO2   Date Value Ref Range Status   03/21/2023 28 % Final         Reviewed.  Will give Lasix 20 mg IV once.  We will give potassium.    Continue steroids    Urine culture positive.  Continue current antibiotics.  Pro-Roberto reviewed    Pulmonary fibrosis- continue steroids.  Will need further work-up on the outpatient basis.  Will review all records and see if autoimmune work-up has been done.    Endo- Maintain Blood sugar 140 -180  TSH Ft4      Electrolytes-   Mag phos replace as per protocol      DVT prophylaxis-continue current prophylaxis    Bedside rounds were done with RT and patients nurse. All the Lab and clinical findings were discussed with them and plan was also discussed in great detail.    Family member present-none.    Hypothyroidism-continue current treatment    Hypertension as vitals reviewed continue current treatment.    Hyperlipidemia-continue current treatment    Anxiety/depression-continue home medications    Recommend PT OT    Echo-    Ordered and is pending           COPD exacerbation (HCC)          John Mcmahon MD  03/22/23  11:30 EDT       medical eval no

## 2023-03-22 NOTE — THERAPY EVALUATION
Acute Care - Speech Language Pathology   Swallow Initial Evaluation [facility]   CLINICAL DYSPHAGIA ASSESSMENT     Patient Name: Lexie Valdez  : 1960  MRN: [id number]  Today's Date: 3/22/2023             Admit Date: 3/21/2023    Lexie Valdez  is seen at bedside this am on 3N  to assess safety/efficacy of swallowing fnx, determine safest/least restrictive diet tolerance.     She presented to [facility] ED with shortness of breath. Evaluation in ED revealed acute on chronic hypoxic respiratory failure with COPD exacerbation. She was admitted for further evaluation and management.     PMH is significant for interstitial lung disease, hypertension, hyperlipidemia, hypothyroidism, fibromyalgia, anxiety, and depression.    She is referred to rule out dysphagia.      Social History     Socioeconomic History   • Marital status:    Tobacco Use   • Smoking status: Former     Types: Cigarettes     Start date: 1971     Quit date: 10/18/2005     Years since quittin.4   • Smokeless tobacco: Never   Vaping Use   • Vaping Use: Never used   Substance and Sexual Activity   • Alcohol use: No   • Drug use: No   • Sexual activity: Yes     Partners: Male     Diet Orders (active) (From admission, onward)     Start     Ordered    23  Diet: Diabetic Diets; Consistent Carbohydrate; Texture: Regular Texture (IDDSI 7); Fluid Consistency: Thin (IDDSI 0)  Diet Effective Now         23              She is observed on O2 via NC 2L.     She is positioned upright and centered in bed to accept multiple po presentations of ice chips, solid cracker, puree, and thin liquids via spoon, cup and straw.  She is able to self feed.     Facial/oral structures are symmetrical upon observation. Lingual protrusion reveals no deviation. Oral mucosa are moist, pink, and clean. Secretions are clear, thin, and well controlled. OROM/ARIA is wfl to imitate oral postures. Gag is not assessed. Volitional cough is intact w/ adequate   intensity, clear in quality, non-productive. Voice is adequate in intensity, clear in quality w/ intelligible speech. She is a/a and interactive, oriented, follows directives, participates in conversational exchanges.     Upon po presentations, adequate bolus anticipation and acceptance w/ good labial seal for bolus clearance via spoon bowl, cup rim stability and suction via straw. Bolus formation, manipulation and control are wfl w/ rotary mastication pattern. A-p transit is timely w/o oral residue. No overt s/s aspiration before the swallow.     Pharyngeal swallow is timely w/ adequate hyolaryngeal elevation per palpation. No overt s/s aspiration evidenced across this evaluation. No silent aspiration suspected as pt is w/o changes in vocal quality, respirations or secretions post po presentations. She denies odynophagia.    Visit Dx:     ICD-10-CM ICD-9-CM   1. COPD with acute exacerbation (HCC)  J44.1 491.21   2. Acute UTI  N39.0 599.0     Patient Active Problem List   Diagnosis   • MARU (stress urinary incontinence, female)   • Chronic cystitis   • Detrusor instability   • Chronic obstructive lung disease (HCC)   • Arthritis   • Bursitis   • Mixed anxiety depressive disorder   • Fibromyalgia   • Hyperlipidemia   • Hypertensive disorder   • Hypothyroidism   • COPD exacerbation ([facility])     Past Medical History:   Diagnosis Date   • Anxiety    • Arthritis    • Depression    • Hypertensive disorder 3/14/2018   • Hypothyroidism 8/3/2021   • MARU (stress urinary incontinence, female) 9/11/2019     Past Surgical History:   Procedure Laterality Date   • BREAST BIOPSY Left 2000    benign   • GALLBLADDER SURGERY     • GASTRIC BANDING     • HYSTERECTOMY      age 37     EDUCATION  The patient has been educated in the following areas:   Dysphagia (Swallowing Impairment) Oral Care/Hydration.     Impression: ms. Valdez presents w/ wfl oropharyngeal swallow w/o s/s aspiration.No s/s indicative of silent aspiration.  No odynophagia  reported.  She denies any history of or concerns of dysphagia or odynophagia.    She is felt to most benefit from continued least restrictive regular consistency po diet, thin liquids. Medications whole in puree. Upright and centered for all po intake.     SLP Recommendation and Plan    1. Continue least restrictive regular consistency po diet, thin liquids.    2. Medications whole in puree/thins.   3. Upright and centered for all po intake  4. JUAN ANTONIO precautions.  5. Oral care protocol.  No further formal SLP f/u warranted at this time.    D/w Ms. Valdez results and recommendations w/ verbal agreement.    Thank you for allowing me to participate in the care of your patient-  LUCIANO BonnreS,. CCC/SLP                                                                                               Time Calculation:                Elma Hernandez, MS CCC-SLP  3/22/2023

## 2023-03-22 NOTE — THERAPY EVALUATION
Acute Care - Physical Therapy Initial Evaluation   [place]     Patient Name: Lexie KEITH Valdez  : 1960  MRN: [id number]  Today's Date: 3/22/2023   Onset of Illness/Injury or Date of Surgery: 23 (admission date)  Visit Dx:     ICD-10-CM ICD-9-CM   1. COPD with acute exacerbation (HCC)  J44.1 491.21   2. Acute UTI  N39.0 599.0     Patient Active Problem List   Diagnosis   • MARU (stress urinary incontinence, female)   • Chronic cystitis   • Detrusor instability   • Chronic obstructive lung disease (HCC)   • Arthritis   • Bursitis   • Mixed anxiety depressive disorder   • Fibromyalgia   • Hyperlipidemia   • Hypertensive disorder   • Hypothyroidism   • COPD exacerbation ([facility])     Past Medical History:   Diagnosis Date   • Anxiety    • Arthritis    • Depression    • Hypertensive disorder 3/14/2018   • Hypothyroidism 8/3/2021   • MARU (stress urinary incontinence, female) 2019     Past Surgical History:   Procedure Laterality Date   • BREAST BIOPSY Left     benign   • GALLBLADDER SURGERY     • GASTRIC BANDING     • HYSTERECTOMY      age 37     PT Assessment (last 12 hours)     PT Evaluation and Treatment     Row Name 23 0912          Physical Therapy Time and Intention    Document Type evaluation  -     Mode of Treatment physical therapy  -     Comment Pt seen for evaluation this date. Pt wears O2 2L at night; pt lives at home with , uses quad cane sometimes inside house but always when out of home. 3/4 steps to enter home with BL HR. Hx of falls occasionally. 24/7 husb can assist  -     Row Name 23 0912          General Information    Patient Profile Reviewed yes  -     Onset of Illness/Injury or Date of Surgery 23  admission date  -     Patient Observations alert;cooperative;agree to therapy  -     General Observations of Patient Pt seen supine in hospital bed with LE/UE mild tremors  -     Equipment Currently Used at Home cane, quad  -KH     Equipment Issued to  Patient gait belt  -     Row Name 03/22/23 0912          Living Environment    Current Living Arrangements home  -     People in Home spouse  -[facility] Name 03/22/23 0912          Cognition    Orientation Status (Cognition) oriented x 3;oriented to;place;time;person  -[facility] Name 03/22/23 0912          Range of Motion Comprehensive    Comment, General Range of Motion AROM grossly WFL  -[facility] Name 03/22/23 0912          Strength Comprehensive (MMT)    Comment, General Manual Muscle Testing (MMT) Assessment DF/PF 5/5; hip add 5/5; hip abd 4+ to 5/5; hip flex 3/5, knee ext 5/5, knee flex 5/5 (4+ to 5/5 on R LE)  -[facility] Name 03/22/23 0912          Bed Mobility    Bed Mobility supine-sit;sit-supine  -     Supine-Sit [place] (Bed Mobility) independent;modified independence  -     Sit-Supine [place] (Bed Mobility) independent;modified independence  -[facility] Name 03/22/23 0912          Transfers    Transfers sit-stand transfer;stand-sit transfer  -[facility] Name 03/22/23 0912          Sit-Stand Transfer    Sit-Stand Spencer (Transfers) standby assist;contact guard  -[facility] Name 03/22/23 0912          Stand-Sit Transfer    Stand-Sit [place] (Transfers) standby assist;contact guard  -[facility] Name 03/22/23 0912          Gait/Stairs (Locomotion)    [place] Level (Gait) contact guard;set up  -     Distance in Feet (Gait) Grossly 10-15'  -[facility] Name 03/22/23 0912          Balance    Comment, Balance Pt demonstrates some posterior lean without perturbation, demonstrates some instability  -[facility] Name 03/22/23 0912          Plan of Care Review    Outcome Evaluation Pt seen for evaluation this date with candidate for therapeutic benefit working on balance and endurance for functional mobility. D/C recommendation for home with supervision, assist, home health  -[facility] Name 03/22/23 0912          Positioning and Restraints    Pre-Treatment Position in bed  -      Post Treatment Position bed  -     In Bed supine;call light within reach;exit alarm on  -     Row Name 03/22/23 0912          Therapy Assessment/Plan (PT)    Functional Level at Time of Evaluation (PT) SBA/CGA for safety  -     PT Diagnosis (PT) Mild balance deficit  -     Rehab Potential (PT) good, to achieve stated therapy goals  -     Criteria for Skilled Interventions Met (PT) yes  -     Therapy Frequency (PT) 2 times/wk  2-5x/wk  -     Predicted Duration of Therapy Intervention (PT) length of stay  -     Row Name 03/22/23 0912          Physical Therapy Goals    Gait Training Goal Selection (PT) gait training, PT goal 1  -     Balance Goal Selection (PT) balance, PT goal 1  -     Row Name 03/22/23 0912          Gait Training Goal 1 (PT)    Activity/Assistive Device (Gait Training Goal 1, PT) gait (walking locomotion);assistive device use;decrease fall risk;increase endurance/gait distance  -     [place] Level (Gait Training Goal 1, PT) modified independence;independent  -     Distance (Gait Training Goal 1, PT) 100'  -     Time Frame (Gait Training Goal 1, PT) long term goal (LTG)  -     Row Name 03/22/23 0912          Balance Goal 1 (PT)    Activity/Assistive Device (Balance Goal 1, PT) standing, static;standing, dynamic;assistive device use;cane, quad;cane, straight  -     [place] Level/Cues Needed (Balance Goal 1, PT) independent;conditional independence  -     Time Frame (Balance Goal 1, PT) long term goal (LTG)  -           User Key  (r) = Recorded By, (t) = Taken By, (c) = Cosigned By    Initials Name Provider Type    Pat Reagan, PT Physical Therapist                  PT Recommendation and Plan  Anticipated Discharge Disposition (PT): adult foster care/group home, home with assist, home with home health  Planned Therapy Interventions (PT): balance training, gait training, home exercise program, strengthening, transfer training, patient/family  education, other (see comments) (endurance/activity toelrance)  Therapy Frequency (PT): 2 times/wk (2-5x/wk)  Outcome Evaluation: Pt seen for evaluation this date with candidate for therapeutic benefit working on balance and endurance for functional mobility. D/C recommendation for home with supervision, assist, home health       Time Calculation:    PT Charges     Row Name 03/22/23 1439             Time Calculation    Start Time 0915 -KH      PT Received On 03/22/23  -      PT Goal Re-Cert Due Date 04/05/23  -            User Key  (r) = Recorded By, (t) = Taken By, (c) = Cosigned By    Initials Name Provider Type     Pat Daugherty, PT Physical Therapist              Therapy Charges for Today     Code Description Service Date Service Provider Modifiers Qty    82530157497 HC PT EVAL LOW COMPLEXITY 4 3/22/2023 Pat Daugherty, PT GP 1          PT G-Codes  AM-PAC 6 Clicks Score (PT): 14    Pat Daugherty PT  3/22/2023

## 2023-03-22 NOTE — PLAN OF CARE
Goal Outcome Evaluation:  Plan of Care Reviewed With: patient        Progress: no change   Pt has had no acute changes during shift. Will continue to follow current plan of care an monitor.

## 2023-03-23 VITALS
TEMPERATURE: 98.9 F | RESPIRATION RATE: 18 BRPM | SYSTOLIC BLOOD PRESSURE: 146 MMHG | HEART RATE: 91 BPM | BODY MASS INDEX: 32.14 KG/M2 | DIASTOLIC BLOOD PRESSURE: 84 MMHG | OXYGEN SATURATION: 94 % | HEIGHT: 64 IN | WEIGHT: 188.27 LBS

## 2023-03-23 LAB
ANION GAP SERPL CALCULATED.3IONS-SCNC: 8.5 MMOL/L (ref 5–15)
BACTERIA SPEC AEROBE CULT: ABNORMAL
BASOPHILS # BLD AUTO: 0.01 10*3/MM3 (ref 0–0.2)
BASOPHILS NFR BLD AUTO: 0.1 % (ref 0–1.5)
BUN SERPL-MCNC: 12 MG/DL (ref 8–23)
BUN/CREAT SERPL: 18.2 (ref 7–25)
CALCIUM SPEC-SCNC: 8.6 MG/DL (ref 8.6–10.5)
CHLORIDE SERPL-SCNC: 108 MMOL/L (ref 98–107)
CO2 SERPL-SCNC: 22.5 MMOL/L (ref 22–29)
CREAT SERPL-MCNC: 0.66 MG/DL (ref 0.57–1)
DEPRECATED RDW RBC AUTO: 49.7 FL (ref 37–54)
EGFRCR SERPLBLD CKD-EPI 2021: 98.7 ML/MIN/1.73
EOSINOPHIL # BLD AUTO: 0 10*3/MM3 (ref 0–0.4)
EOSINOPHIL NFR BLD AUTO: 0 % (ref 0.3–6.2)
ERYTHROCYTE [DISTWIDTH] IN BLOOD BY AUTOMATED COUNT: 14.1 % (ref 12.3–15.4)
GLUCOSE SERPL-MCNC: 166 MG/DL (ref 65–99)
HCT VFR BLD AUTO: 37.5 % (ref 34–46.6)
HGB BLD-MCNC: 11.4 G/DL (ref 12–15.9)
IMM GRANULOCYTES # BLD AUTO: 0.12 10*3/MM3 (ref 0–0.05)
IMM GRANULOCYTES NFR BLD AUTO: 1.1 % (ref 0–0.5)
LYMPHOCYTES # BLD AUTO: 1.17 10*3/MM3 (ref 0.7–3.1)
LYMPHOCYTES NFR BLD AUTO: 10.9 % (ref 19.6–45.3)
MCH RBC QN AUTO: 29.1 PG (ref 26.6–33)
MCHC RBC AUTO-ENTMCNC: 30.4 G/DL (ref 31.5–35.7)
MCV RBC AUTO: 95.7 FL (ref 79–97)
MONOCYTES # BLD AUTO: 0.41 10*3/MM3 (ref 0.1–0.9)
MONOCYTES NFR BLD AUTO: 3.8 % (ref 5–12)
NEUTROPHILS NFR BLD AUTO: 84.1 % (ref 42.7–76)
NEUTROPHILS NFR BLD AUTO: 9.01 10*3/MM3 (ref 1.7–7)
NRBC BLD AUTO-RTO: 0 /100 WBC (ref 0–0.2)
PLATELET # BLD AUTO: 174 10*3/MM3 (ref 140–450)
PMV BLD AUTO: 11.2 FL (ref 6–12)
POTASSIUM SERPL-SCNC: 4.1 MMOL/L (ref 3.5–5.2)
RBC # BLD AUTO: 3.92 10*6/MM3 (ref 3.77–5.28)
SODIUM SERPL-SCNC: 139 MMOL/L (ref 136–145)
WBC NRBC COR # BLD: 10.72 10*3/MM3 (ref 3.4–10.8)

## 2023-03-23 PROCEDURE — 25010000002 METHYLPREDNISOLONE PER 40 MG: Performed by: STUDENT IN AN ORGANIZED HEALTH CARE EDUCATION/TRAINING PROGRAM

## 2023-03-23 PROCEDURE — 94799 UNLISTED PULMONARY SVC/PX: CPT

## 2023-03-23 PROCEDURE — 96366 THER/PROPH/DIAG IV INF ADDON: CPT

## 2023-03-23 PROCEDURE — G0378 HOSPITAL OBSERVATION PER HR: HCPCS

## 2023-03-23 PROCEDURE — 80048 BASIC METABOLIC PNL TOTAL CA: CPT | Performed by: STUDENT IN AN ORGANIZED HEALTH CARE EDUCATION/TRAINING PROGRAM

## 2023-03-23 PROCEDURE — 99232 SBSQ HOSP IP/OBS MODERATE 35: CPT | Performed by: INTERNAL MEDICINE

## 2023-03-23 PROCEDURE — 25010000002 CEFEPIME PER 500 MG: Performed by: STUDENT IN AN ORGANIZED HEALTH CARE EDUCATION/TRAINING PROGRAM

## 2023-03-23 PROCEDURE — 96376 TX/PRO/DX INJ SAME DRUG ADON: CPT

## 2023-03-23 PROCEDURE — 99239 HOSP IP/OBS DSCHRG MGMT >30: CPT

## 2023-03-23 PROCEDURE — 85025 COMPLETE CBC W/AUTO DIFF WBC: CPT | Performed by: STUDENT IN AN ORGANIZED HEALTH CARE EDUCATION/TRAINING PROGRAM

## 2023-03-23 PROCEDURE — 94761 N-INVAS EAR/PLS OXIMETRY MLT: CPT

## 2023-03-23 RX ORDER — DOXYCYCLINE 100 MG/1
100 CAPSULE ORAL EVERY 12 HOURS SCHEDULED
Qty: 10 CAPSULE | Refills: 0 | Status: SHIPPED | OUTPATIENT
Start: 2023-03-23 | End: 2023-03-28

## 2023-03-23 RX ORDER — POTASSIUM CHLORIDE 1.5 G/1.77G
40 POWDER, FOR SOLUTION ORAL ONCE
Status: DISCONTINUED | OUTPATIENT
Start: 2023-03-23 | End: 2023-03-23 | Stop reason: HOSPADM

## 2023-03-23 RX ORDER — FUROSEMIDE 10 MG/ML
40 INJECTION INTRAMUSCULAR; INTRAVENOUS ONCE
Status: DISCONTINUED | OUTPATIENT
Start: 2023-03-23 | End: 2023-03-23 | Stop reason: HOSPADM

## 2023-03-23 RX ORDER — CEFDINIR 300 MG/1
300 CAPSULE ORAL 2 TIMES DAILY
Qty: 10 CAPSULE | Refills: 0 | Status: SHIPPED | OUTPATIENT
Start: 2023-03-23 | End: 2023-03-28

## 2023-03-23 RX ORDER — PREDNISONE 20 MG/1
40 TABLET ORAL DAILY
Qty: 10 TABLET | Refills: 0 | Status: SHIPPED | OUTPATIENT
Start: 2023-03-23

## 2023-03-23 RX ORDER — PREDNISONE 20 MG/1
40 TABLET ORAL
Qty: 6 TABLET | Refills: 0 | Status: SHIPPED | OUTPATIENT
Start: 2023-03-23 | End: 2023-03-26

## 2023-03-23 RX ORDER — IPRATROPIUM BROMIDE AND ALBUTEROL SULFATE 2.5; .5 MG/3ML; MG/3ML
SOLUTION RESPIRATORY (INHALATION)
Qty: 18 ML | Refills: 0 | Status: SHIPPED | OUTPATIENT
Start: 2023-03-23

## 2023-03-23 RX ORDER — DOXYCYCLINE 100 MG/1
CAPSULE ORAL
Qty: 10 CAPSULE | Refills: 0 | Status: SHIPPED | OUTPATIENT
Start: 2023-03-23

## 2023-03-23 RX ADMIN — ATORVASTATIN CALCIUM 40 MG: 40 TABLET ORAL at 08:59

## 2023-03-23 RX ADMIN — CITALOPRAM HYDROBROMIDE 20 MG: 20 TABLET ORAL at 09:00

## 2023-03-23 RX ADMIN — LOSARTAN POTASSIUM 50 MG: 50 TABLET, FILM COATED ORAL at 09:00

## 2023-03-23 RX ADMIN — LEVOTHYROXINE SODIUM 25 MCG: 25 TABLET ORAL at 06:04

## 2023-03-23 RX ADMIN — CEFEPIME 2 G: 2 INJECTION, POWDER, FOR SOLUTION INTRAVENOUS at 01:32

## 2023-03-23 RX ADMIN — DOCUSATE SODIUM 50 MG AND SENNOSIDES 8.6 MG 2 TABLET: 8.6; 5 TABLET, FILM COATED ORAL at 09:00

## 2023-03-23 RX ADMIN — Medication 2500 MCG: at 08:59

## 2023-03-23 RX ADMIN — DOXYCYCLINE 100 MG: 100 CAPSULE ORAL at 09:00

## 2023-03-23 RX ADMIN — IPRATROPIUM BROMIDE AND ALBUTEROL SULFATE 3 ML: 2.5; .5 SOLUTION RESPIRATORY (INHALATION) at 06:52

## 2023-03-23 RX ADMIN — Medication 10 ML: at 09:00

## 2023-03-23 RX ADMIN — OXYBUTYNIN CHLORIDE 5 MG: 5 TABLET ORAL at 08:59

## 2023-03-23 RX ADMIN — BUSPIRONE HYDROCHLORIDE 10 MG: 10 TABLET ORAL at 09:00

## 2023-03-23 RX ADMIN — CEFEPIME 2 G: 2 INJECTION, POWDER, FOR SOLUTION INTRAVENOUS at 10:18

## 2023-03-23 RX ADMIN — BUDESONIDE AND FORMOTEROL FUMARATE DIHYDRATE 2 PUFF: 160; 4.5 AEROSOL RESPIRATORY (INHALATION) at 06:52

## 2023-03-23 RX ADMIN — GUAIFENESIN 1200 MG: 600 TABLET, EXTENDED RELEASE ORAL at 08:59

## 2023-03-23 RX ADMIN — METHYLPREDNISOLONE SODIUM SUCCINATE 40 MG: 40 INJECTION, POWDER, FOR SOLUTION INTRAMUSCULAR; INTRAVENOUS at 08:58

## 2023-03-23 RX ADMIN — ASPIRIN 81 MG: 81 TABLET, CHEWABLE ORAL at 08:59

## 2023-03-23 RX ADMIN — BREXPIPRAZOLE 0.5 MG: 0.5 TABLET ORAL at 09:08

## 2023-03-23 RX ADMIN — HYDROCODONE BITARTRATE AND ACETAMINOPHEN 1 TABLET: 5; 325 TABLET ORAL at 00:21

## 2023-03-23 RX ADMIN — IPRATROPIUM BROMIDE AND ALBUTEROL SULFATE 3 ML: 2.5; .5 SOLUTION RESPIRATORY (INHALATION) at 00:56

## 2023-03-23 NOTE — CASE MANAGEMENT/SOCIAL WORK
Discharge Planning Assessment   [place]     Patient Name: Lexie Valdez  MRN: [id number]  Today's Date: 3/23/2023    Admit Date: 3/21/2023     Discharge Plan     Row Name 03/23/23 0957       Plan    Final Note CM received order for O2@2L, pt uses [employer]. CM contacted [facility] per Gissel and faxed order. Portable tank to be delivered to pt's room. Lead RN Madalyn notified.                       Expected Discharge Date and Time     Expected Discharge Date Expected Discharge Time    Mar 23, 2023         Nadine Motta RN

## 2023-03-23 NOTE — DISCHARGE PLACEMENT REQUEST
"Dave Valdez (62 y.o. Female)     Date of Birth   1960    Social Security Number       Address   [address] [address]    Home Phone       MRN   [id number]       [employer]   Congregation    Marital Status                               Admission Date   3/21/23    Admission Type   Emergency    Admitting Provider   Nilesh Back DO    Attending Provider   Ab Schwartz DO    Department, Room/Bed   [address], 3340/2S       Discharge Date       Discharge Disposition   Home-Health Care [facility]    Discharge Destination                               Attending Provider: Ab Schwartz DO    Allergies: Codeine, Sulfa Antibiotics    Isolation: None   Infection: None   Code Status: CPR    Ht: 162.6 cm (64.02\")   Wt: 85.4 kg (188 lb 4.4 oz)    Admission Cmt: None   Principal Problem: COPD exacerbation (HCC) [J44.1]                 Active Insurance as of 3/21/2023     Primary Coverage     Payor Plan Insurance Group Employer/Plan Group    ANTHEM MEDICARE REPLACEMENT ANTHEM MEDICARE ADVANTAGE [id number]     Payor Plan Address Payor Plan Phone Number Payor Plan Fax Number Effective Dates    [address] [phone]  1/1/2022 - None Entered    [facility] [address]       Subscriber Name Subscriber Birth Date Member ID       DAVE VALDEZ 1960 [id number]           Secondary Coverage     Payor Plan Insurance Group Employer/Plan Group    [place]      Payor Plan Address Payor Plan Phone Number Payor Plan Fax Number Effective Dates    [address] [phone]  5/1/2021 - None Entered    [facility] [id number]       Subscriber Name Subscriber Birth Date Member ID       DAVE VALDEZ 1960 [id number]                 Emergency Contacts      (Rel.) Home Phone Work Phone Mobile Phone    Khris Valdez (Spouse) -- -- [phone]    Faith Davila (Son) [phone] -- --        [address]  [address]  [facility] " "[id number]  Dept. Phone:  [phone]  Dept. Fax:  [phone] Date Ordered: Mar 23, 2023         Patient:  Lexie Valdez MRN:  [id number]   [address]  [place] [address] :  1960  SSN:    Phone: [phone] Sex:  F     Weight: 85.4 kg (188 lb 4.4 oz)         Ht Readings from Last 1 Encounters:   23 162.6 cm (64.02\")         Home Oxygen Therapy          (Order ID: [id number])    Diagnosis:  COPD with acute exacerbation (HCC) (J44.1 [ICD-10-CM] 491.21 [ICD-9-CM])  Pulmonary fibrosis (HCC) (J84.10 [ICD-10-CM] 515 [ICD-9-CM])   Quantity:  1     Delivery Modality: Nasal Cannula  Liters Per Minute: 2  Duration: Continuous  Equipment:  Oxygen Concentrator &  &  Portable Gaseous Oxygen System & Portable Oxygen Contents Gaseous &  Conserving Regulator  The face to face evaluation was performed on: 3/23/2023  Length of Need (99 Months = Lifetime): 99 Months = Lifetime        Authorizing Provider's Phone: [phone]  Authorizing Provider:Rukhsana Owens APRN  Authorizing Provider's NPI: [id number]  Order Entered By: Rukhsana Owens APRN 3/23/2023  8:48 AM     Electronically signed by: Rukhsana Owens APRN 3/23/2023  8:48 AM         23 0940----------room air --81 Abnormal  Device (Oxygen Therapy): Simultaneous filing. User may be unaware of other data. at 23 0940 SpO2: standing at bedside at 23 0940          History & Physical      Usama Boggs PA-C at 23 1557     Attestation signed by Nilesh Back DO at 23 5352    I have evaluated the patient independently, I have reviewed H&P, all labs and images from today and evaluated the patient at bedside.  I have discussed w/ PARIS Farley and agree.  Patient presented to the ER with complaints of shortness of breath, SPO2 88% on room air on arrival.  Known history of COPD/interstitial lung disease was noted to be initially tachypneic with  muscle use on arrival.  Will " empirically cover with cefepime (pseudomonal coverage) and Doxy given structural lung disease, steroids, nebs and mucolytic's.  Infectious work-up ordered, echo ordered to rule out comorbid heart failure and pulmonology consulted as she follows with local pulm clinic for management of ILD.                         [facility] Medicine Services  History & Physical    Patient Identification:  Name:  Lexie Valdez  Age:  62 y.o.  Sex:  female  :  1960  MRN:  [id number]   Visit Number:  [id number]  Admit Date: 3/21/2023   Primary Care Physician:  Violet Pop APRN    Subjective     Chief complaint: Cough, SOB    History of presenting illness:      Lexie Valdez is a 62 y.o. female with past medical history significant for chronic obstructive lung disease, hypertension, hyperlipidemia, hypothyroidism, fibromyalgia, anxiety, depression    Upon arrival to the ED, vital signs were temp 97.7, heart rate 84, respirations 22, /84, SPO2 88% on room air.  ABG in the ED with pH WNL, PO2 65.5, bicarb 27.1.  Initial high-sensitivity troponin 11 with repeat at 10.  Chemistry panel significant for glucose 101, albumin 3.4.  CRP elevated at 0.90 with lactate 2.2, DD 3.66.  UA with cloudy urine, trace ketones, nitrite and leukocyte positive with trace protein, many WBCs, 4+ bacteria and 13-20 squamous epis.  Blood cultures, urine culture pending.  CXR with coarsened interstitial markings and subpleural airspace opacity.  CTA PE protocol without PE, subpleural fibrotic change and interstitial prominence.    On my exam patient is pleasant and cooperative accompanied by her son and sister at the bedside.  Patient reports increased shortness of breath times last 2 to 3 weeks.  Patient states is more noticeable with exertion stating she has to put on her as needed oxygen to ambulate back and forth to the bathroom at home.  She denies recent illness but does states she has increased cough and increased cough  production of white and clear sputum.  Patient has a history of COPD though notably stopped smoking 13 to 15 years ago following a 22-year history.  Patient's family states there is still a smoker living in the home and they also recently introduced a dog though as patient had increased respiratory symptoms removed to the dog secondary to fear that it was exacerbating. She does endorse increased nasal congestion recently.  Patient endorsed denies recent fever or chills.  Denies chest pain or palpitations.  Does endorse some occasional leg swelling, no worse than usual.  No other pertinent review of systems positive, see full review below.    Known Emergency Department medications received prior to my evaluation included Rocephin, Isovue-370, DuoNeb, Solu-Medrol, normal saline bolus x2.   Emergency Department Room location at the time of my evaluation was 404.     ---------------------------------------------------------------------------------------------------------------------   Review of Systems   Constitutional: Negative for chills and fever.   HENT: Positive for congestion and postnasal drip. Negative for rhinorrhea and sore throat.    Respiratory: Positive for cough and shortness of breath.    Cardiovascular: Positive for leg swelling. Negative for chest pain and palpitations.   Gastrointestinal: Negative for abdominal pain, diarrhea, nausea and vomiting.   Genitourinary: Positive for decreased urine volume. Negative for difficulty urinating, dysuria, flank pain and pelvic pain.   Musculoskeletal: Negative for arthralgias and myalgias.   Skin: Negative for rash and wound.   Hematological: Bruises/bleeds easily.        ---------------------------------------------------------------------------------------------------------------------   Past Medical History:   Diagnosis Date   • Anxiety    • Arthritis    • Depression    • Hypertensive disorder 3/14/2018   • Hypothyroidism 8/3/2021   • MARU (stress urinary  incontinence, female) 2019     Past Surgical History:   Procedure Laterality Date   • BREAST BIOPSY Left     benign   • GALLBLADDER SURGERY     • GASTRIC BANDING     • HYSTERECTOMY      age 37     Family History   Problem Relation Age of Onset   • Panic disorder Mother    • COPD Mother    • Alcohol abuse Father    • Alzheimer's disease Father    • Breast cancer Neg Hx      Social History     Socioeconomic History   • Marital status:    Tobacco Use   • Smoking status: Former     Types: Cigarettes     Start date: 1971     Quit date: 10/18/2005     Years since quittin.4   • Smokeless tobacco: Never   Vaping Use   • Vaping Use: Never used   Substance and Sexual Activity   • Alcohol use: No   • Drug use: No   • Sexual activity: Yes     Partners: Male     ---------------------------------------------------------------------------------------------------------------------   Allergies:  Codeine and Sulfa antibiotics  ---------------------------------------------------------------------------------------------------------------------   Home medications:    Medications below are reported home medications pulling from within the system; at this time, these medications have not been reconciled unless otherwise specified and are in the verification process for further verifcation as current home medications.  (Not in a hospital admission)      Hospital Scheduled Meds:  doxycycline, 100 mg, Intravenous, Once           Current listed hospital scheduled medications may not yet reflect those currently placed in orders that are signed and held awaiting patient's arrival to floor.   ---------------------------------------------------------------------------------------------------------------------     Objective     Vital Signs:  Temp:  [97.7 °F (36.5 °C)] 97.7 °F (36.5 °C)  Heart Rate:  [] 86  Resp:  [19-22] 21  BP: (100-209)/(78-99) 156/87      23  1016   Weight: 83.9 kg (185 lb)     Body mass  index is 31.76 kg/m².  ---------------------------------------------------------------------------------------------------------------------       Physical Exam  Vitals and nursing note reviewed.   Constitutional:       General: She is not in acute distress.  HENT:      Head: Normocephalic and atraumatic.      Nose:      Comments: Flaking skin around the nose and eyes  Eyes:      Extraocular Movements: Extraocular movements intact.      Conjunctiva/sclera: Conjunctivae normal.   Cardiovascular:      Rate and Rhythm: Normal rate and regular rhythm.   Pulmonary:      Effort: Pulmonary effort is normal.      Comments: Coarse/crackles at the bases.  Diminished bilaterally  Abdominal:      Palpations: Abdomen is soft.      Tenderness: There is no abdominal tenderness.   Musculoskeletal:      Right lower leg: Edema present.      Left lower leg: Edema present.   Skin:     General: Skin is warm and dry.   Neurological:      General: No focal deficit present.      Mental Status: She is alert and oriented to person, place, and time.      Comments: Tremulous   Psychiatric:         Mood and Affect: Mood normal.         Behavior: Behavior normal.               ---------------------------------------------------------------------------------------------------------------------  EKG:      ---------------------------------------------------------------------------------------------------------------------   Results from last 7 days   Lab Units 03/21/23  1051   CRP mg/dL 0.90*   LACTATE mmol/L 2.2*   WBC 10*3/mm3 8.39   HEMOGLOBIN g/dL 12.4   HEMATOCRIT % 38.7   MCV fL 93.0   MCHC g/dL 32.0   PLATELETS 10*3/mm3 174   INR  1.00     Results from last 7 days   Lab Units 03/21/23  1107   PH, ARTERIAL pH units 7.446   PO2 ART mm Hg 65.5*   PCO2, ARTERIAL mm Hg 39.4   HCO3 ART mmol/L 27.1*     Results from last 7 days   Lab Units 03/21/23  1051   SODIUM mmol/L 144   POTASSIUM mmol/L 4.0   MAGNESIUM mg/dL 2.0   CHLORIDE mmol/L 107   CO2  mmol/L 26.7   BUN mg/dL 5*   CREATININE mg/dL 0.71   CALCIUM mg/dL 8.9   GLUCOSE mg/dL 101*   ALBUMIN g/dL 3.4*   BILIRUBIN mg/dL 0.5   ALK PHOS U/L 62   AST (SGOT) U/L 22   ALT (SGPT) U/L 10   Estimated Creatinine Clearance: 85 mL/min (by C-G formula based on SCr of 0.71 mg/dL).  No results found for: AMMONIA  Results from last 7 days   Lab Units 03/21/23  1343 03/21/23  1051   HSTROP T ng/L 10* 11*     Results from last 7 days   Lab Units 03/21/23  1051   PROBNP pg/mL 394.0     Lab Results   Component Value Date    HGBA1C 5.60 01/16/2020     Lab Results   Component Value Date    TSH 4.250 (H) 01/16/2020     No results found for: PREGTESTUR, PREGSERUM, HCG, HCGQUANT  Pain Management Panel    There is no flowsheet data to display.       No results found for: BLOODCX  No results found for: URINECX  No results found for: WOUNDCX  No results found for: STOOLCX      ---------------------------------------------------------------------------------------------------------------------  Imaging Results (Last 7 Days)     Procedure Component Value Units Date/Time    CT Angiogram Chest Pulmonary Embolism [[id number]] Collected: 03/21/23 1441     Updated: 03/21/23 1444    Narrative:      EXAM:    CT Angiography Chest With Intravenous Contrast     EXAM DATE:    3/21/2023 1:56 PM     CLINICAL HISTORY:    Pulmonary embolism (PE) suspected, positive D-dimer     TECHNIQUE:    Axial computed tomographic angiography images of the chest with  intravenous contrast.  This CT exam was performed using one or more of  the following dose reduction techniques:  automated exposure control,  adjustment of the mA and/or kV according to patient size, and/or use of  iterative reconstruction technique.    MIP reconstructed images were created and reviewed.     COMPARISON:    02/21/2023     FINDINGS:    Pulmonary arteries:  Unremarkable.  No pulmonary embolism.    Aorta:  No acute findings.  No thoracic aortic aneurysm.    Lungs:  Subpleural fibrotic  change and interstitial prominence is  again noted.  No mass.    Pleural space:  Unremarkable.  No significant effusion.  No  pneumothorax.    Heart:  Unremarkable.  No cardiomegaly.  No significant pericardial  effusion.  No evidence of RV dysfunction.    Bones/joints:  No acute fracture.  No dislocation.    Soft tissues:  Unremarkable.    Lymph nodes:  Unremarkable.  No enlarged lymph nodes.       Impression:      1.  No pulmonary embolism.  2.  Subpleural fibrotic change and interstitial prominence is again  noted.     This report was finalized on 3/21/2023 2:41 PM by Dr. Dominguez Sims MD.       XR Chest 1 View [[id number]] Collected: 03/21/23 1207     Updated: 03/21/23 1209    Narrative:      EXAM:    XR Chest, 1 View     EXAM DATE:    3/21/2023 10:57 AM     CLINICAL HISTORY:    sob, cough     TECHNIQUE:    Frontal view of the chest.     COMPARISON:    04/26/2022     FINDINGS:    LUNGS:  Coarsened interstitial markings and patchy subpleural airspace  opacity is again noted.    PLEURAL SPACE:  Unremarkable.  No pneumothorax.    HEART:  Heart size is stable.    MEDIASTINUM:  Unremarkable.    BONES/JOINTS:  Unremarkable.       Impression:        Coarsened interstitial markings and patchy subpleural airspace opacity  is again noted.     This report was finalized on 3/21/2023 12:07 PM by Dr. Dominguez Sims MD.             Cultures:  No results found for: BLOODCX, URINECX, WOUNDCX, MRSACX, RESPCX, STOOLCX    Last echocardiogram:          I have personally reviewed the above radiology images and read the final radiology report on 03/21/23  ---------------------------------------------------------------------------------------------------------------------  Assessment / Plan     Active Hospital Problems    Diagnosis  POA   • **COPD exacerbation (HCC) [J44.1]  Yes       ASSESSMENT/PLAN:    Acute on chronic hypoxic respiratory failure suspect 2/2 to Community-acquired pneumonia, POA  In the setting of known interstitial  lung disease  COPD in acute exacerbation, POA  Patient presents with increased shortness of breath x2 to 3 weeks.  Also with more productive cough along the same timeframe.  Patient wears 2 L as needed and at night at baseline.  Currently requiring 2 L to maintain SPO2 following arrival at 88% on room air.  Patient was empirically covered with Rocephin and doxycycline in the ED  Admit to remote telemetry with continuous cardiac monitoring/pulse oximetry for further observation and management  Obtain sputum culture, MRSA swab  Continue antimicrobial coverage with cefepime, doxycycline  Solu-Medrol 40 mg twice daily  Supportive care with Mucinex, DuoNebs  Continue supplemental O2 and wean as able back to baseline  Consult inpatient pulmonology, assistance appreciated  Consult PT, OT, SLP assistance appreciated  Obtain TTE    Chronic:  HTN  HLD  Hypothyroidism  Fibromyalgia  Anxiety/depression  Restart home meds as indicated per med rec  Monitor vital signs per hospital protocol  Supportive care    ----------  -DVT prophylaxis: Lovenox  -Activity: Ad laila.  -Expected length of stay: Less than 2 midnights  -Disposition likely home pending improvement, resolution of respiratory failure    High risk secondary to acute on chronic respiratory failure secondary to community-acquired pneumonia in the setting of COPD with acute exacerbation    Code Status and Medical Interventions:   Ordered at: 03/21/23 1385     Code Status (Patient has no pulse and is not breathing):    CPR (Attempt to Resuscitate)     Medical Interventions (Patient has pulse or is breathing):    Full Support       Usama Boggs PA-C   03/21/23  15:59 EDT    Electronically signed by Nilesh Back DO at 03/21/23 9013

## 2023-03-23 NOTE — CONSULTS
COPD Education        Referring Provider: Ivania Suggs, RN, Case Management  Hospitalist: Dr. Schwartz  Reason for Consultation: COPD Exacerbation  Pulmonologist: SG Larkin  Last outpatient pulmonary visit: 2023  Length of Diagnosis: Several Years per patient recollection  Last Hospital stay for COPD? None    Subjective .     Age: 62 y.o.  Sex: female  What stage have you been told you are in? Unknown  Do you use a CPAP or BIPAP? no   Have you had any recent weight loss? Yes, 7lbs  How many pillows do you use? 2    Last PFT/when/where? 2019  FEV1: 62%-Post neb tx    Classification of Airflow Limitation Severity in COPD (Based on Post-Bronchodilator FEV1)  Gold 1: Mild FEV1 ? 80% predicted   Gold 2:  Moderate 50% ? FEV1 < 80% predicted   Gold 3: Severe 30% ? FEV1 < 50% predicted   Gold 4: Very Severe FEV1 < 30% predicted     FEV1/FVC: actual: 87. Post neb tx.    Do you have dyspnea? yes Dyspnea on exertion  Home O2: 2L NC, HS    Social History:  Social History     Socioeconomic History   • Marital status:    Tobacco Use   • Smoking status: Former, 30 pack years     Types: Cigarettes     Start date: 1971     Quit date: 10/18/2005     Years since quittin.4   • Smokeless tobacco: Never   Vaping Use   • Vaping Use: Never used   Substance and Sexual Activity   • Alcohol use: No   • Drug use: No   • Sexual activity: Yes     Partners: Male       Smoking Cessation: No    Airway Clearance methods utilized: no, flutter valve ordered for patient.    History of Sleep Apnea: no  Patient's Last ABG:  Site   Date Value Ref Range Status   2023 Right Brachial  Final     Andrés's Test   Date Value Ref Range Status   2023 N/A  Final     pH, Arterial   Date Value Ref Range Status   2023 7.446 7.350 - 7.450 pH units Final     pCO2, Arterial   Date Value Ref Range Status   2023 39.4 35.0 - 45.0 mm Hg Final     pO2, Arterial   Date Value Ref Range Status   2023 65.5 (L)  83.0 - 108.0 mm Hg Final     Comment:     84 Value below reference range     HCO3, Arterial   Date Value Ref Range Status   03/21/2023 27.1 (H) 20.0 - 26.0 mmol/L Final     Comment:     83 Value above reference range     Base Excess, Arterial   Date Value Ref Range Status   03/21/2023 2.9 (H) 0.0 - 2.0 mmol/L Final     O2 Saturation, Arterial   Date Value Ref Range Status   03/21/2023 94.5 94.0 - 99.0 % Final     Hemoglobin, Blood Gas   Date Value Ref Range Status   03/21/2023 12.1 (L) 13.5 - 17.5 g/dL Final     Comment:     84 Value below reference range     Hematocrit, Blood Gas   Date Value Ref Range Status   03/21/2023 37.0 (L) 38.0 - 51.0 % Final     Comment:     84 Value below reference range     Oxyhemoglobin   Date Value Ref Range Status   03/21/2023 93.4 (L) 94 - 99 % Final     Comment:     84 Value below reference range     Methemoglobin   Date Value Ref Range Status   03/21/2023 0.00 0.00 - 3.00 % Final     Carboxyhemoglobin   Date Value Ref Range Status   03/21/2023 1.2 0 - 5 % Final     CO2 Content   Date Value Ref Range Status   03/21/2023 28.3 22 - 33 mmol/L Final     Barometric Pressure for Blood Gas   Date Value Ref Range Status   03/21/2023 735 mmHg Final     Modality   Date Value Ref Range Status   03/21/2023 Nasal Cannula  Final     FIO2   Date Value Ref Range Status   03/21/2023 28 % Final        Objective     SpO2 SpO2: 97 % (03/23/23 0652)  Device Device (Oxygen Therapy): nasal cannula (03/23/23 0652)  Flow Flow (L/min): 2 (03/23/23 0652)  Home Equipment Used: O2, Nebulizer Provided by: [facility]  Breath Sounds: diminished breath sounds- throughout  CAT Assessment: (COPD Assessment Test)   I never cough. 0[] 1[] 2[] 3[] 4[] 5[x] I cough all the time.  I have no phlegm (mucus) in my chest. 0[x] 1[] 2[] 3[] 4[] 5[] My chest is completely full of phlegm (mucus).  My chest does not feel tight at all. 0[] 1[] 2[x] 3[] 4[] 5[] My chest feels very tight.  When I walk up a hill or one  flight of stairs I am not breathless. 0[] 1[] 2[] 3[] 4[] 5[x] When I walk up a hill or one flight of stairs I am very breathless.  I am not limited doing any activities at home 0[] 1[] 2[] 3[x] 4[] 5[] I am very limited doing activities at home.  I am confident leaving my home despite my lung condition. 0[] 1[] 2[] 3[] 4[x] 5[] I am not at all confident leaving my home because of my lung condition.  I sleep soundly. 0[] 1[] 2[] 3[] 4[] 5[x] I don't sleep soundly because of my lung condition.  I have lots of energy. 0[] 1[] 2[] 3[] 4[x] 5[]  I have no energy at all.  CAT Score(COPD Assessment Test): 28  Score  Impact Guidance    0-9 Low If smoke, encourage to stop smoking  Flu, Pneumonia, and Covid need to be up to date  Avoid COPD Triggers    10-20 Medium Encouraged all guidance for low impact score  Consider additional medications    21-40 High Encouraged all medium impact scores  Recommend referral to pulmonologist      STOP BANG Screening Questionnaire:   Snoring?   Do you snore loudly (loud enough to be heard through closed doors or that your bed partner elbows you for snoring at night)? {yes     Tired?   Do you often feel tired, fatigued, or sleepy during the daytime (such as falling asleep during driving or talking to someone)? yes               Observed?   Has anyone observed you stop breathing or choking/gasping during your sleep? no             Pressure?   Do you have or are you being treated for high blood pressure? yes             Body mass index (BMI) more than 35 kg/m2?  Body mass index is 32.3 kg/m². no    Age older than 50? yes       Neck size large (measured around Amadeo's apple)? Is your shirt collar 16 inches (40 cm) or larger? no  Gender (biologic sex): male? no    STOP BANG Score Total: 4    STOP BANG Interpretation    Risk category Risk factors   Low risk Yes to 0 to 2 of the questions   Intermediate risk Yes to 3 to 4 questions   High risk Yes to 5 to 8 questions   High risk Yes to 2 or more of  4 STOP questions and BMI >35 kg/m2   High risk Yes to 2 or more of 4 STOP questions and neck circumference ?16 inches (?40 cm)   High risk Yes to 2 or more of 4 STOP questions and male gender (biologic sex)              Home Medications:  Medications Prior to Admission   Medication Sig Dispense Refill Last Dose   • alendronate (FOSAMAX) 70 MG tablet Take 1 tablet by mouth Every 7 (Seven) Days.   3/14/2023   • aspirin 81 MG chewable tablet Chew 1 tablet Daily.   3/20/2023   • atorvastatin (LIPITOR) 40 MG tablet Take 1 tablet by mouth Daily.   3/20/2023   • Breo Ellipta 100-25 MCG/INH inhaler Inhale 1 puff Daily. 180 each 5 3/20/2023   • Brexpiprazole 0.5 MG tablet Take 0.5 mg by mouth Daily.   3/20/2023   • busPIRone (BUSPAR) 10 MG tablet Take 1 tablet by mouth 2 (Two) Times a Day.   3/21/2023   • citalopram (CeleXA) 40 MG tablet Take 1 tablet by mouth Daily.   3/20/2023   • hydroCHLOROthiazide (HYDRODIURIL) 12.5 MG tablet Take 1 tablet by mouth Daily As Needed (swelling).   Past Month   • HYDROcodone-acetaminophen (NORCO) 5-325 MG per tablet Take 1 tablet by mouth 2 (Two) Times a Day As Needed.   3/20/2023   • hydroxychloroquine (PLAQUENIL) 200 MG tablet Take 1 tablet by mouth 2 (Two) Times a Day.   Past Week   • hydrOXYzine (ATARAX) 10 MG tablet Take 1 tablet by mouth 3 (Three) Times a Day As Needed for Anxiety.   Past Week   • ipratropium-albuterol (DUO-NEB) 0.5-2.5 mg/3 ml nebulizer Take 3 mL by nebulization 4 (Four) Times a Day As Needed for Wheezing. 360 mL 3 3/21/2023   • levothyroxine (SYNTHROID, LEVOTHROID) 25 MCG tablet Take 1 tablet by mouth Every Morning.   3/20/2023   • losartan (COZAAR) 50 MG tablet Take 1 tablet by mouth Daily.   3/20/2023   • nabumetone (RELAFEN) 750 MG tablet Take 1 tablet by mouth 2 (Two) Times a Day As Needed.   3/20/2023   • nitrofurantoin, macrocrystal-monohydrate, (MACROBID) 100 MG capsule Take 1 capsule by mouth Daily.   3/20/2023   • oxybutynin (DITROPAN) 5 MG tablet Take 1  "tablet by mouth Daily.   3/20/2023   • Ventolin  (90 Base) MCG/ACT inhaler Inhale 2 puffs Every 4 (Four) Hours As Needed for Wheezing. 18 g 8 3/20/2023   • vitamin B-12 (CYANOCOBALAMIN) 2500 MCG sublingual tablet tablet Place 2,500 mcg under the tongue Daily.   3/20/2023   • vitamin D (ERGOCALCIFEROL) 1.25 MG (15902 UT) capsule capsule Take 1 capsule by mouth 1 (One) Time Per Week.   3/20/2023   • docusate sodium (COLACE) 100 MG capsule Take 1 capsule by mouth 2 (Two) Times a Day As Needed for Constipation.   Unknown     Barriers to Learning? No    Discussion: COPD education was given to Ms. Valdez via the booklet , \"A Patient's Guide to COPD\". Discussion of the COPD zones (Green/Yellow/Red) was competed with emphasizes on what to do in the yellow and red zones. She was in her room and pleasantly interested in COPD education.      Proper Inhaler technique was illustrated with and without the given Aero Chamber spacer I provided to the patient. Instruction on rescue and maintenance medications, the function of each and the importance of using them as prescribed.      Pursed Lip breathing was instructed as well as when to utilize the breathing technique.      Ms. Valdez gets her O2 supplies from [employer]. I made her aware of their COPD program and she was interested. I will notify [employer] and let them know of her interest in their program.      She stated she sometimes has copious secretions at home that are hard to mobilize. I placed a order for a flutter valve to help her excrete her secretions.      Ms. Valdez was aware she has a follow up visit scheduled with SG Larkin, in July. We discussed the importance of her attending this appointment.     Discussed with Dr. Lana Hutson BA, RRT  COPD Navigator  03/23/23  09:17 EDT        "

## 2023-03-23 NOTE — CASE MANAGEMENT/SOCIAL WORK
Discharge Planning Assessment   Alex     Patient Name: Lexie Valdez  MRN: [id number]  Today's Date: 3/23/2023    Admit Date: 3/21/2023     Discharge Plan     Row Name 03/23/23 0922       Plan    Final Discharge Disposition Code 06 - home with home health care    Final Note Pt is being discharged home today. SS was notified that pt has a home health order. Home Health order is for PT services. Pt has no preference for home health agency. SS contacted [facility] per Madalyn with referral information. SS faxed clinicals including order to [facility]. No other needs identified.              Continued Care and Services - Admitted Since 3/21/2023     Home Medical Care     Service Provider Request Status Selected Services Address Phone Fax Patient Preferred    [facility]  Selected Home Health Services [address] [place] [id number] [phone] --              Expected Discharge Date and Time     Expected Discharge Date Expected Discharge Time    Mar 23, 2023         NICOLE De Anda

## 2023-03-23 NOTE — DISCHARGE INSTR - APPOINTMENTS
You have a follow-up appointment with SG Sharma on 4-17-23 at 11:40. Office can be reached at [phone]

## 2023-03-23 NOTE — PROGRESS NOTES
Referring Provider: dr day   Reason for Consultation: respiratory failure   Pulmonary is following the patient for hypoxic respiratory failure.    Chief complaint - sob    Subjective- overnight events reviewed.  All the labs medications ins and outs vitals reviewed.  Patient did not use BiPAP and does not want to use it.  Does not like the pressure.  Overall shortness of breath improved.  Wearing nasal cannula oxygen resting in bed comfortably not in any distress  None of the family members at bedside.  Review of Systems  Positive for shortness of breath on exertion otherwise negative        History  Past Medical History:   Diagnosis Date   • Anxiety    • Arthritis    • Depression    • Hypertensive disorder 3/14/2018   • Hypothyroidism 8/3/2021   • MARU (stress urinary incontinence, female) 2019   ,   Past Surgical History:   Procedure Laterality Date   • BREAST BIOPSY Left     benign   • GALLBLADDER SURGERY     • GASTRIC BANDING     • HYSTERECTOMY      age 37   ,   Family History   Problem Relation Age of Onset   • Panic disorder Mother    • COPD Mother    • Alcohol abuse Father    • Alzheimer's disease Father    • Breast cancer Neg Hx    ,   Social History     Tobacco Use   • Smoking status: Former     Types: Cigarettes     Start date: 1971     Quit date: 10/18/2005     Years since quittin.4   • Smokeless tobacco: Never   Vaping Use   • Vaping Use: Never used   Substance Use Topics   • Alcohol use: No   • Drug use: No   ,   Medications Prior to Admission   Medication Sig Dispense Refill Last Dose   • alendronate (FOSAMAX) 70 MG tablet Take 1 tablet by mouth Every 7 (Seven) Days.   3/14/2023   • aspirin 81 MG chewable tablet Chew 1 tablet Daily.   3/20/2023   • atorvastatin (LIPITOR) 40 MG tablet Take 1 tablet by mouth Daily.   3/20/2023   • Breo Ellipta 100-25 MCG/INH inhaler Inhale 1 puff Daily. 180 each 5 3/20/2023   • Brexpiprazole 0.5 MG tablet Take 0.5 mg by mouth Daily.   3/20/2023    • busPIRone (BUSPAR) 10 MG tablet Take 1 tablet by mouth 2 (Two) Times a Day.   3/21/2023   • citalopram (CeleXA) 40 MG tablet Take 1 tablet by mouth Daily.   3/20/2023   • hydroCHLOROthiazide (HYDRODIURIL) 12.5 MG tablet Take 1 tablet by mouth Daily As Needed (swelling).   Past Month   • HYDROcodone-acetaminophen (NORCO) 5-325 MG per tablet Take 1 tablet by mouth 2 (Two) Times a Day As Needed.   3/20/2023   • hydroxychloroquine (PLAQUENIL) 200 MG tablet Take 1 tablet by mouth 2 (Two) Times a Day.   Past Week   • hydrOXYzine (ATARAX) 10 MG tablet Take 1 tablet by mouth 3 (Three) Times a Day As Needed for Anxiety.   Past Week   • ipratropium-albuterol (DUO-NEB) 0.5-2.5 mg/3 ml nebulizer Take 3 mL by nebulization 4 (Four) Times a Day As Needed for Wheezing. 360 mL 3 3/21/2023   • levothyroxine (SYNTHROID, LEVOTHROID) 25 MCG tablet Take 1 tablet by mouth Every Morning.   3/20/2023   • losartan (COZAAR) 50 MG tablet Take 1 tablet by mouth Daily.   3/20/2023   • nabumetone (RELAFEN) 750 MG tablet Take 1 tablet by mouth 2 (Two) Times a Day As Needed.   3/20/2023   • nitrofurantoin, macrocrystal-monohydrate, (MACROBID) 100 MG capsule Take 1 capsule by mouth Daily.   3/20/2023   • oxybutynin (DITROPAN) 5 MG tablet Take 1 tablet by mouth Daily.   3/20/2023   • Ventolin  (90 Base) MCG/ACT inhaler Inhale 2 puffs Every 4 (Four) Hours As Needed for Wheezing. 18 g 8 3/20/2023   • vitamin B-12 (CYANOCOBALAMIN) 2500 MCG sublingual tablet tablet Place 2,500 mcg under the tongue Daily.   3/20/2023   • vitamin D (ERGOCALCIFEROL) 1.25 MG (71586 UT) capsule capsule Take 1 capsule by mouth 1 (One) Time Per Week.   3/20/2023   • docusate sodium (COLACE) 100 MG capsule Take 1 capsule by mouth 2 (Two) Times a Day As Needed for Constipation.   Unknown   , Scheduled Meds:  aspirin, 81 mg, Oral, Daily  atorvastatin, 40 mg, Oral, Daily  Brexpiprazole, 1 tablet, Oral, Daily  budesonide-formoterol, 2 puff, Inhalation, BID -  RT  busPIRone, 10 mg, Oral, BID  cefepime, 2 g, Intravenous, Q8H  [START ON 3/27/2023] cholecalciferol, 50,000 Units, Oral, Weekly  citalopram, 20 mg, Oral, Daily  doxycycline, 100 mg, Oral, Q12H  enoxaparin, 40 mg, Subcutaneous, Nightly  guaiFENesin, 1,200 mg, Oral, Q12H  ipratropium-albuterol, 3 mL, Nebulization, 4x Daily - RT  levothyroxine, 25 mcg, Oral, Q AM  losartan, 50 mg, Oral, Daily  methylPREDNISolone sodium succinate, 40 mg, Intravenous, Q12H  oxybutynin, 5 mg, Oral, Daily  senna-docusate sodium, 2 tablet, Oral, BID  sodium chloride, 10 mL, Intravenous, Q12H  vitamin B-12, 2,500 mcg, Oral, Daily    , Continuous Infusions:    and Allergies:  Codeine and Sulfa antibiotics    Objective     Vital Signs   Temp:  [98 °F (36.7 °C)-98.9 °F (37.2 °C)] 98.9 °F (37.2 °C)  Heart Rate:  [84-95] 91  Resp:  [18-33] 18  BP: (115-147)/(66-84) 146/84    Physical Exam:             General-elderly chronically ill appearance, not in any acute distress, wearing nasal cannula oxygen    HEENT- pupils equally reactive to light, normal in size, no scleral icterus    Neck-supple    Respiratory-respirations normal-on auscultation no wheezing no crackles, wearing nasal cannula oxygen.  Crackles at bases especially at the posterior basal part of the lungs- posteriorly    Cardiovascular- nsr. No S3, S4 or murmurs. No JVD, no carotid bruit and no edema, pulses normal bilaterally     GI-nontender nondistended bowel sounds positive    CNS-nonfocal    Musculoskeletal -no edema  Extremities- no obvious deformity noticed     Psychiatric-mood good, good eye contact, alert awake oriented  Skin- no visible rash                                                                   Results Review:    LABS:    Lab Results   Component Value Date    GLUCOSE 166 (H) 03/23/2023    BUN 12 03/23/2023    CREATININE 0.66 03/23/2023    EGFRIFNONA 71 03/01/2021    BCR 18.2 03/23/2023    CO2 22.5 03/23/2023    CALCIUM 8.6 03/23/2023    ALBUMIN 3.4 (L)  03/21/2023    LABIL2 1.8 12/29/2014    AST 22 03/21/2023    ALT 10 03/21/2023    WBC 10.72 03/23/2023    HGB 11.4 (L) 03/23/2023    HCT 37.5 03/23/2023    MCV 95.7 03/23/2023     03/23/2023     03/23/2023    K 4.1 03/23/2023     (H) 03/23/2023    ANIONGAP 8.5 03/23/2023       Lab Results   Component Value Date    INR 1.00 03/21/2023    PROTIME 13.4 03/21/2023       Results from last 7 days   Lab Units 03/21/23  1051   INR  1.00   APTT seconds 23.1*          I reviewed the patient's new clinical results.  I reviewed the patient's new imaging results and agree with the interpretation.  CT Angiogram Chest Pulmonary Embolism    Result Date: 3/21/2023  EXAM:   CT Angiography Chest With Intravenous Contrast  EXAM DATE:   3/21/2023 1:56 PM  CLINICAL HISTORY:   Pulmonary embolism (PE) suspected, positive D-dimer  TECHNIQUE:   Axial computed tomographic angiography images of the chest with intravenous contrast.  This CT exam was performed using one or more of the following dose reduction techniques:  automated exposure control, adjustment of the mA and/or kV according to patient size, and/or use of iterative reconstruction technique.   MIP reconstructed images were created and reviewed.  COMPARISON:   02/21/2023  FINDINGS:   Pulmonary arteries:  Unremarkable.  No pulmonary embolism.   Aorta:  No acute findings.  No thoracic aortic aneurysm.   Lungs:  Subpleural fibrotic change and interstitial prominence is again noted.  No mass.   Pleural space:  Unremarkable.  No significant effusion.  No pneumothorax.   Heart:  Unremarkable.  No cardiomegaly.  No significant pericardial effusion.  No evidence of RV dysfunction.   Bones/joints:  No acute fracture.  No dislocation.   Soft tissues:  Unremarkable.   Lymph nodes:  Unremarkable.  No enlarged lymph nodes.      Impression: 1.  No pulmonary embolism. 2.  Subpleural fibrotic change and interstitial prominence is again noted.  This report was finalized on  3/21/2023 2:41 PM by Dr. Dominguez Sims MD.      Procalitonin Results:      Lab 03/21/23  1051   PROCALCITONIN 0.04     Microbiology Results (last 10 days)     Procedure Component Value - Date/Time    MRSA Screen, PCR (Inpatient) - Swab, Nares [[id number]]  (Normal) Collected: 03/22/23 0118    Lab Status: Final result Specimen: Swab from Nares Updated: 03/22/23 0236     MRSA PCR No MRSA Detected    Narrative:      The negative predictive value of this diagnostic test is high and should only be used to consider de-escalating anti-MRSA therapy. A positive result may indicate colonization with MRSA and must be correlated clinically.    Urine Culture - Urine, Urine, Clean Catch [[id number]]  (Abnormal)  (Susceptibility) Collected: 03/21/23 1333    Lab Status: Final result Specimen: Urine, Clean Catch Updated: 03/23/23 0950     Urine Culture >100,000 CFU/mL Klebsiella oxytoca    Narrative:      Colonization of the urinary tract without infection is common. Treatment is discouraged unless the patient is symptomatic, pregnant, or undergoing an invasive urologic procedure.    Susceptibility      Klebsiella oxytoca      MILES      Ampicillin Resistant     Ampicillin + Sulbactam Intermediate      Cefazolin Resistant     Cefepime Susceptible      Ceftazidime Susceptible      Ceftriaxone Susceptible      Gentamicin Susceptible      Levofloxacin Susceptible      Nitrofurantoin Resistant     Piperacillin + Tazobactam Susceptible      Trimethoprim + Sulfamethoxazole Susceptible                           Blood Culture - Blood, Arm, Right [[id number]]  (Normal) Collected: 03/21/23 1052    Lab Status: Preliminary result Specimen: Blood from Arm, Right Updated: 03/22/23 1200     Blood Culture No growth at 24 hours    COVID-19 and FLU A/B PCR - Swab, Nasopharynx [[id number]]  (Normal) Collected: 03/21/23 1051    Lab Status: Final result Specimen: Swab from Nasopharynx Updated: 03/21/23 1121     COVID19 Not Detected     Influenza A PCR Not  Detected     Influenza B PCR Not Detected    Narrative:      Fact sheet for providers: https://www.fda.gov/media/345019/download    Fact sheet for patients: https://www.fda.gov/media/865692/download    Test performed by PCR.    Blood Culture - Blood, Wrist, Left [[id number]]  (Normal) Collected: 03/21/23 1051    Lab Status: Preliminary result Specimen: Blood from Wrist, Left Updated: 03/22/23 1200     Blood Culture No growth at 24 hours        Assessment & Plan   COPD exacerbation- continue steroids.  We will decrease the dose.    We will give diuretics to improve lung compliance.  Ins and outs reviewed.  We will discontinue BiPAP as patient refused to use it.  Does not like pressure on her face.  CT chest x-ray reviewed  Recent ABG and images reviewed.      Urine culture positive.  Continue current antibiotics.  Pro-Roberto reviewed    Pulmonary fibrosis- continue steroids.  Follow-up with pulmonary on the outpatient basis.    Endo- Maintain Blood sugar 140 -180  TSH Ft4      Electrolytes-   Mag phos replace as per protocol      DVT prophylaxis-continue current prophylaxis    Bedside rounds were done with RT and patients nurse. All the Lab and clinical findings were discussed with them and plan was also discussed in great detail.    Family member present-none.    Hypothyroidism-continue current treatment    Hypertension as vitals reviewed continue current treatment.    Hyperlipidemia-continue current treatment    Anxiety/depression-continue home medications    Recommend PT OT    Echo-  Results for orders placed during the hospital encounter of 03/21/23    Adult Transthoracic Echo Complete w/ Color, Spectral and Contrast if necessary per protocol    Interpretation Summary  •  The right ventricular cavity is mild to moderately dilated.  •  The right atrial cavity is borderline dilated.  •  Estimated right ventricular systolic pressure from tricuspid regurgitation is normal (<35 mmHg).  •  Mild pulmonary hypertension is  present.            COPD exacerbation (HCC)          John Mcmahon MD  03/23/23  10:47 EDT

## 2023-03-23 NOTE — PLAN OF CARE
Goal Outcome Evaluation:  Plan of Care Reviewed With: patient        Progress: no change   Pt has had not acute changes during shift will continue to follow current plan of care and monitor.

## 2023-03-23 NOTE — CASE MANAGEMENT/SOCIAL WORK
Discharge Planning Assessment   Alex     Patient Name: Lexie KEITH Valdez  MRN: [id number]  Today's Date: 3/23/2023    Admit Date: 3/21/2023       Discharge Plan     Row Name 03/23/23 1213       Plan    Final Note CM was informed by lead RN Madalyn that pt was requesting a portable oxygen concentrator. CM contacted [facility] per Gissel and pt will be evaluated at home on portable tank with conserver and if she can tolerate it they will f/u with PCP for order.                                   Nadine Motta RN

## 2023-03-23 NOTE — DISCHARGE PLACEMENT REQUEST
"Dave Valdez (62 y.o. Female)     Date of Birth   1960    Social Security Number       Address   [address] [address]    Home Phone       MRN   [id number]       Advent   Anabaptism    Marital Status                               Admission Date   3/21/23    Admission Type   Emergency    Admitting Provider   Nilesh Back DO    Attending Provider   Ab Schwartz DO    Department, Room/Bed   [address], 3340/2S       Discharge Date       Discharge Disposition   Home-Health Care [facility]    Discharge Destination                               Attending Provider: Ab Schwartz DO    Allergies: Codeine, Sulfa Antibiotics    Isolation: None   Infection: None   Code Status: CPR    Ht: 162.6 cm (64.02\")   Wt: 85.4 kg (188 lb 4.4 oz)    Admission Cmt: None   Principal Problem: COPD exacerbation (HCC) [J44.1]                 Active Insurance as of 3/21/2023     Primary Coverage     Payor Plan Insurance Group Employer/Plan Group    ANTHEM MEDICARE REPLACEMENT ANTHEM MEDICARE ADVANTAGE [id number]     Payor Plan Address Payor Plan Phone Number Payor Plan Fax Number Effective Dates    [address] [phone]  1/1/2022 - None Entered    [facility] [address]       Subscriber Name Subscriber Birth Date Member ID       DAVE VALDEZ 1960 [id number]           Secondary Coverage     Payor Plan Insurance Group Employer/Plan Group    [place]      Payor Plan Address Payor Plan Phone Number Payor Plan Fax Number Effective Dates    [address] [phone]  5/1/2021 - None Entered    [facility] [address]       Subscriber Name Subscriber Birth Date Member ID       DAVE VALDEZ 1960 [id number]                 Emergency Contacts      (Rel.) Home Phone Work Phone Mobile Phone    Khris Valdez (Spouse) -- -- [phone]    Faith Davila (Son) [phone] -- --        [address]  [address]  [facility] " [address]  Phone:  [phone]  Fax:  [phone] Date: Mar 23, 2023      Ambulatory Referral to Home Health     Patient:  Lexie Valdez MRN:  [id number]   [address]  [place] [address] :  1960  SSN:    Phone: [phone] Sex:  F      INSURANCE PAYOR PLAN GROUP # SUBSCRIBER ID   Primary:  Secondary:    ANTHEM MEDICARE REPLACEMENT  [place] [id number]  [id number]    [id number]  [id number]      Referring Provider Information:  MERCY MANJARREZ Phone: [phone] Fax: [phone]       Referral Information:   # Visits:  999 Referral Type: Home Health [42]   Urgency:  Routine Referral Reason: Specialty Services Required   Start Date: Mar 23, 2023 End Date:  To be determined by Insurer   Diagnosis: Debility (R53.81 [ICD-10-CM] 799.3 [ICD-9-CM])      Refer to Dept:   Refer to Provider:   Refer to Provider Phone:   Refer to Facility:       Face to Face Visit Date: 3/23/2023  Follow-up provider for Plan of Care? I treated the patient in an acute care facility and will not continue treatment after discharge.  Follow-up provider: SOFIA HAILE [[id number]]  Reason/Clinical Findings: Debility, Increasing O2 requirement, COPD, pulmonary fibrosis, RA  Describe mobility limitations that make leaving home difficult: Debility, Increasing O2 requirement, COPD, pulmonary fibrosis, RA  Nursing/Therapeutic Services Requested: Physical Therapy  PT orders: Therapeutic exercise  PT orders: Gait Training  PT orders: Transfer training  PT orders: Strengthening  PT orders: Home safety assessment  Weight Bearing Status: As Tolerated  Frequency: 1 Week 1     This document serves as a request of services and does not constitute Insurance authorization or approval of services.  To determine eligibility, please contact the members Insurance carrier to verify and review coverage.     If you have medical questions regarding this request for services. Please contact [facility] [place] at [phone] during  normal business hours.        Authorizing Provider:Rukhsana Owens APRN  Authorizing Provider's NPI: [id number]  Order Entered By: Rukhsana Owens APRN 3/23/2023  8:48 AM     Electronically signed by: Rukhsana Owens APRN 3/23/2023  8:48 AM            History & Physical      Usama Boggs PA-C at 23 1557     Attestation signed by Nilesh Back DO at 23 9399    I have evaluated the patient independently, I have reviewed H&P, all labs and images from today and evaluated the patient at bedside.  I have discussed w/ PARIS Farley and agree.  Patient presented to the ER with complaints of shortness of breath, SPO2 88% on room air on arrival.  Known history of COPD/interstitial lung disease was noted to be initially tachypneic with  muscle use on arrival.  Will empirically cover with cefepime (pseudomonal coverage) and Doxy given structural lung disease, steroids, nebs and mucolytic's.  Infectious work-up ordered, echo ordered to rule out comorbid heart failure and pulmonology consulted as she follows with local pulm clinic for management of ILD.                         [facility]  History & Physical    Patient Identification:  Name:  Lexie Valdez  Age:  62 y.o.  Sex:  female  :  1960  MRN:  [id number]   Visit Number:  [id number]  Admit Date: 3/21/2023   Primary Care Physician:  Violet Pop APRN    Subjective     Chief complaint: Cough, SOB    History of presenting illness:      Lexie Valdez is a 62 y.o. female with past medical history significant for chronic obstructive lung disease, hypertension, hyperlipidemia, hypothyroidism, fibromyalgia, anxiety, depression    Upon arrival to the ED, vital signs were temp 97.7, heart rate 84, respirations 22, /84, SPO2 88% on room air.  ABG in the ED with pH WNL, PO2 65.5, bicarb 27.1.  Initial high-sensitivity troponin 11 with repeat at 10.  Chemistry panel significant for glucose  101, albumin 3.4.  CRP elevated at 0.90 with lactate 2.2, DD 3.66.  UA with cloudy urine, trace ketones, nitrite and leukocyte positive with trace protein, many WBCs, 4+ bacteria and 13-20 squamous epis.  Blood cultures, urine culture pending.  CXR with coarsened interstitial markings and subpleural airspace opacity.  CTA PE protocol without PE, subpleural fibrotic change and interstitial prominence.    On my exam patient is pleasant and cooperative accompanied by her son and sister at the bedside.  Patient reports increased shortness of breath times last 2 to 3 weeks.  Patient states is more noticeable with exertion stating she has to put on her as needed oxygen to ambulate back and forth to the bathroom at home.  She denies recent illness but does states she has increased cough and increased cough production of white and clear sputum.  Patient has a history of COPD though notably stopped smoking 13 to 15 years ago following a 22-year history.  Patient's family states there is still a smoker living in the home and they also recently introduced a dog though as patient had increased respiratory symptoms removed to the dog secondary to fear that it was exacerbating. She does endorse increased nasal congestion recently.  Patient endorsed denies recent fever or chills.  Denies chest pain or palpitations.  Does endorse some occasional leg swelling, no worse than usual.  No other pertinent review of systems positive, see full review below.    Known Emergency Department medications received prior to my evaluation included Rocephin, Isovue-370, DuoNeb, Solu-Medrol, normal saline bolus x2.   Emergency Department Room location at the time of my evaluation was [facility].     ---------------------------------------------------------------------------------------------------------------------   Review of Systems   Constitutional: Negative for chills and fever.   HENT: Positive for congestion and postnasal drip. Negative for rhinorrhea  and sore throat.    Respiratory: Positive for cough and shortness of breath.    Cardiovascular: Positive for leg swelling. Negative for chest pain and palpitations.   Gastrointestinal: Negative for abdominal pain, diarrhea, nausea and vomiting.   Genitourinary: Positive for decreased urine volume. Negative for difficulty urinating, dysuria, flank pain and pelvic pain.   Musculoskeletal: Negative for arthralgias and myalgias.   Skin: Negative for rash and wound.   Hematological: Bruises/bleeds easily.        ---------------------------------------------------------------------------------------------------------------------   Past Medical History:   Diagnosis Date   • Anxiety    • Arthritis    • Depression    • Hypertensive disorder 3/14/2018   • Hypothyroidism 8/3/2021   • MARU (stress urinary incontinence, female) 2019     Past Surgical History:   Procedure Laterality Date   • BREAST BIOPSY Left     benign   • GALLBLADDER SURGERY     • GASTRIC BANDING     • HYSTERECTOMY      age 37     Family History   Problem Relation Age of Onset   • Panic disorder Mother    • COPD Mother    • Alcohol abuse Father    • Alzheimer's disease Father    • Breast cancer Neg Hx      Social History     Socioeconomic History   • Marital status:    Tobacco Use   • Smoking status: Former     Types: Cigarettes     Start date: 1971     Quit date: 10/18/2005     Years since quittin.4   • Smokeless tobacco: Never   Vaping Use   • Vaping Use: Never used   Substance and Sexual Activity   • Alcohol use: No   • Drug use: No   • Sexual activity: Yes     Partners: Male     ---------------------------------------------------------------------------------------------------------------------   Allergies:  Codeine and Sulfa antibiotics  ---------------------------------------------------------------------------------------------------------------------   Home medications:    Medications below are reported home medications pulling  from within the system; at this time, these medications have not been reconciled unless otherwise specified and are in the verification process for further verifcation as current home medications.  (Not in a hospital admission)      Hospital Scheduled Meds:  doxycycline, 100 mg, Intravenous, Once           Current listed hospital scheduled medications may not yet reflect those currently placed in orders that are signed and held awaiting patient's arrival to floor.   ---------------------------------------------------------------------------------------------------------------------     Objective     Vital Signs:  Temp:  [97.7 °F (36.5 °C)] 97.7 °F (36.5 °C)  Heart Rate:  [] 86  Resp:  [19-22] 21  BP: (100-209)/(78-99) 156/87      03/21/23  1016   Weight: 83.9 kg (185 lb)     Body mass index is 31.76 kg/m².  ---------------------------------------------------------------------------------------------------------------------       Physical Exam  Vitals and nursing note reviewed.   Constitutional:       General: She is not in acute distress.  HENT:      Head: Normocephalic and atraumatic.      Nose:      Comments: Flaking skin around the nose and eyes  Eyes:      Extraocular Movements: Extraocular movements intact.      Conjunctiva/sclera: Conjunctivae normal.   Cardiovascular:      Rate and Rhythm: Normal rate and regular rhythm.   Pulmonary:      Effort: Pulmonary effort is normal.      Comments: Coarse/crackles at the bases.  Diminished bilaterally  Abdominal:      Palpations: Abdomen is soft.      Tenderness: There is no abdominal tenderness.   Musculoskeletal:      Right lower leg: Edema present.      Left lower leg: Edema present.   Skin:     General: Skin is warm and dry.   Neurological:      General: No focal deficit present.      Mental Status: She is alert and oriented to person, place, and time.      Comments: Tremulous   Psychiatric:         Mood and Affect: Mood normal.         Behavior: Behavior  normal.               ---------------------------------------------------------------------------------------------------------------------  EKG:      ---------------------------------------------------------------------------------------------------------------------   Results from last 7 days   Lab Units 03/21/23  1051   CRP mg/dL 0.90*   LACTATE mmol/L 2.2*   WBC 10*3/mm3 8.39   HEMOGLOBIN g/dL 12.4   HEMATOCRIT % 38.7   MCV fL 93.0   MCHC g/dL 32.0   PLATELETS 10*3/mm3 174   INR  1.00     Results from last 7 days   Lab Units 03/21/23  1107   PH, ARTERIAL pH units 7.446   PO2 ART mm Hg 65.5*   PCO2, ARTERIAL mm Hg 39.4   HCO3 ART mmol/L 27.1*     Results from last 7 days   Lab Units 03/21/23  1051   SODIUM mmol/L 144   POTASSIUM mmol/L 4.0   MAGNESIUM mg/dL 2.0   CHLORIDE mmol/L 107   CO2 mmol/L 26.7   BUN mg/dL 5*   CREATININE mg/dL 0.71   CALCIUM mg/dL 8.9   GLUCOSE mg/dL 101*   ALBUMIN g/dL 3.4*   BILIRUBIN mg/dL 0.5   ALK PHOS U/L 62   AST (SGOT) U/L 22   ALT (SGPT) U/L 10   Estimated Creatinine Clearance: 85 mL/min (by C-G formula based on SCr of 0.71 mg/dL).  No results found for: AMMONIA  Results from last 7 days   Lab Units 03/21/23  1343 03/21/23  1051   HSTROP T ng/L 10* 11*     Results from last 7 days   Lab Units 03/21/23  1051   PROBNP pg/mL 394.0     Lab Results   Component Value Date    HGBA1C 5.60 01/16/2020     Lab Results   Component Value Date    TSH 4.250 (H) 01/16/2020     No results found for: PREGTESTUR, PREGSERUM, HCG, HCGQUANT  Pain Management Panel    There is no flowsheet data to display.       No results found for: BLOODCX  No results found for: URINECX  No results found for: WOUNDCX  No results found for: STOOLCX      ---------------------------------------------------------------------------------------------------------------------  Imaging Results (Last 7 Days)     Procedure Component Value Units Date/Time    CT Angiogram Chest Pulmonary Embolism [[id number]] Collected: 03/21/23  1441     Updated: 03/21/23 1444    Narrative:      EXAM:    CT Angiography Chest With Intravenous Contrast     EXAM DATE:    3/21/2023 1:56 PM     CLINICAL HISTORY:    Pulmonary embolism (PE) suspected, positive D-dimer     TECHNIQUE:    Axial computed tomographic angiography images of the chest with  intravenous contrast.  This CT exam was performed using one or more of  the following dose reduction techniques:  automated exposure control,  adjustment of the mA and/or kV according to patient size, and/or use of  iterative reconstruction technique.    MIP reconstructed images were created and reviewed.     COMPARISON:    02/21/2023     FINDINGS:    Pulmonary arteries:  Unremarkable.  No pulmonary embolism.    Aorta:  No acute findings.  No thoracic aortic aneurysm.    Lungs:  Subpleural fibrotic change and interstitial prominence is  again noted.  No mass.    Pleural space:  Unremarkable.  No significant effusion.  No  pneumothorax.    Heart:  Unremarkable.  No cardiomegaly.  No significant pericardial  effusion.  No evidence of RV dysfunction.    Bones/joints:  No acute fracture.  No dislocation.    Soft tissues:  Unremarkable.    Lymph nodes:  Unremarkable.  No enlarged lymph nodes.       Impression:      1.  No pulmonary embolism.  2.  Subpleural fibrotic change and interstitial prominence is again  noted.     This report was finalized on 3/21/2023 2:41 PM by Dr. Dominguez Sims MD.       XR Chest 1 View [[id number]] Collected: 03/21/23 1207     Updated: 03/21/23 1209    Narrative:      EXAM:    XR Chest, 1 View     EXAM DATE:    3/21/2023 10:57 AM     CLINICAL HISTORY:    sob, cough     TECHNIQUE:    Frontal view of the chest.     COMPARISON:    04/26/2022     FINDINGS:    LUNGS:  Coarsened interstitial markings and patchy subpleural airspace  opacity is again noted.    PLEURAL SPACE:  Unremarkable.  No pneumothorax.    HEART:  Heart size is stable.    MEDIASTINUM:  Unremarkable.    BONES/JOINTS:  Unremarkable.        Impression:        Coarsened interstitial markings and patchy subpleural airspace opacity  is again noted.     This report was finalized on 3/21/2023 12:07 PM by Dr. Dominguez Sims MD.             Cultures:  No results found for: BLOODCX, URINECX, WOUNDCX, MRSACX, RESPCX, STOOLCX    Last echocardiogram:          I have personally reviewed the above radiology images and read the final radiology report on 03/21/23  ---------------------------------------------------------------------------------------------------------------------  Assessment / Plan     Active Hospital Problems    Diagnosis  POA   • **COPD exacerbation ([facility]) [J44.1]  Yes       ASSESSMENT/PLAN:    Acute on chronic hypoxic respiratory failure suspect 2/2 to Community-acquired pneumonia, POA  In the setting of known interstitial lung disease  COPD in acute exacerbation, POA  Patient presents with increased shortness of breath x2 to 3 weeks.  Also with more productive cough along the same timeframe.  Patient wears 2 L as needed and at night at baseline.  Currently requiring 2 L to maintain SPO2 following arrival at 88% on room air.  Patient was empirically covered with Rocephin and doxycycline in the ED  Admit to remote telemetry with continuous cardiac monitoring/pulse oximetry for further observation and management  Obtain sputum culture, MRSA swab  Continue antimicrobial coverage with cefepime, doxycycline  Solu-Medrol 40 mg twice daily  Supportive care with Mucinex, DuoNebs  Continue supplemental O2 and wean as able back to baseline  Consult inpatient pulmonology, assistance appreciated  Consult PT, OT, SLP assistance appreciated  Obtain TTE    Chronic:  HTN  HLD  Hypothyroidism  Fibromyalgia  Anxiety/depression  Restart home meds as indicated per med rec  Monitor vital signs per hospital protocol  Supportive care    ----------  -DVT prophylaxis: Lovenox  -Activity: Ad laila.  -Expected length of stay: Less than 2 midnights  -Disposition likely home  pending improvement, resolution of respiratory failure    High risk secondary to acute on chronic respiratory failure secondary to community-acquired pneumonia in the setting of COPD with acute exacerbation    Code Status and Medical Interventions:   Ordered at: 03/21/23 1553     Code Status (Patient has no pulse and is not breathing):    CPR (Attempt to Resuscitate)     Medical Interventions (Patient has pulse or is breathing):    Full Support       Usama Boggs PA-C   03/21/23  15:59 EDT    Electronically signed by Nilesh Back DO at 03/21/23 2132         Current Facility-Administered Medications   Medication Dose Route Frequency Provider Last Rate Last Admin   • acetaminophen (TYLENOL) tablet 650 mg  650 mg Oral Q4H PRN Nilesh Back DO   650 mg at 03/21/23 2053   • aspirin chewable tablet 81 mg  81 mg Oral Daily Ab Schwartz DO   81 mg at 03/23/23 0859   • atorvastatin (LIPITOR) tablet 40 mg  40 mg Oral Daily Ab Schwartz DO   40 mg at 03/23/23 0859   • sennosides-docusate (PERICOLACE) 8.6-50 MG per tablet 2 tablet  2 tablet Oral BID Nilesh Back DO   2 tablet at 03/23/23 0900    And   • polyethylene glycol (MIRALAX) packet 17 g  17 g Oral Daily PRN Nilesh Back DO        And   • bisacodyl (DULCOLAX) EC tablet 5 mg  5 mg Oral Daily PRN Nilesh Bcak DO        And   • bisacodyl (DULCOLAX) suppository 10 mg  10 mg Rectal Daily PRN Nilesh Back DO       • Brexpiprazole tablet 0.5 mg  1 tablet Oral Daily Ab Schwartz DO   0.5 mg at 03/22/23 1628   • budesonide-formoterol (SYMBICORT) 160-4.5 MCG/ACT inhaler 2 puff  2 puff Inhalation BID - RT Nilesh Back DO   2 puff at 03/23/23 0652   • busPIRone (BUSPAR) tablet 10 mg  10 mg Oral BID Ab Schwartz DO   10 mg at 03/23/23 0900   • cefepime 2 gm IVPB in 100 ml NS (VTB)  2 g Intravenous Q8H Nilesh Back, DO   Stopped at  03/23/23 0609   • [START ON 3/27/2023] cholecalciferol (VITAMIN D3) capsule 50,000 Units  50,000 Units Oral Weekly Ab Schwartz DO       • citalopram (CeleXA) tablet 20 mg  20 mg Oral Daily Ab Schwartz DO   20 mg at 03/23/23 0900   • doxycycline (MONODOX) capsule 100 mg  100 mg Oral Q12H Nilesh Back DO   100 mg at 03/23/23 0900   • Enoxaparin Sodium (LOVENOX) syringe 40 mg  40 mg Subcutaneous Nightly Nilesh Back DO   40 mg at 03/22/23 2114   • guaiFENesin (MUCINEX) 12 hr tablet 1,200 mg  1,200 mg Oral Q12H Nilesh Back DO   1,200 mg at 03/23/23 0859   • HYDROcodone-acetaminophen (NORCO) 5-325 MG per tablet 1 tablet  1 tablet Oral BID PRN Ab Schwartz DO   1 tablet at 03/23/23 0021   • hydrOXYzine (ATARAX) tablet 10 mg  10 mg Oral TID PRN Ab Schwartz DO       • ipratropium-albuterol (DUO-NEB) nebulizer solution 3 mL  3 mL Nebulization 4x Daily - RT Nilesh Back DO   3 mL at 03/23/23 0652   • ipratropium-albuterol (DUO-NEB) nebulizer solution 3 mL  3 mL Nebulization 4x Daily PRN Ab Schwartz DO       • levothyroxine (SYNTHROID, LEVOTHROID) tablet 25 mcg  25 mcg Oral Q AM Ab Schwartz DO   25 mcg at 03/23/23 0604   • losartan (COZAAR) tablet 50 mg  50 mg Oral Daily Ab Schwartz DO   50 mg at 03/23/23 0900   • methylPREDNISolone sodium succinate (SOLU-Medrol) injection 40 mg  40 mg Intravenous Q12H Nilesh Back DO   40 mg at 03/23/23 0858   • naproxen (NAPROSYN) tablet 500 mg  500 mg Oral BID PRN Ab Schwartz DO       • nitroglycerin (NITROSTAT) SL tablet 0.4 mg  0.4 mg Sublingual Q5 Min PRN Nilesh Back,        • ondansetron (ZOFRAN) injection 4 mg  4 mg Intravenous Q6H PRN Nilesh Back DO       • oxybutynin (DITROPAN) tablet 5 mg  5 mg Oral Daily Ab Schwartz DO   5 mg at 03/23/23 0859   • sodium chloride 0.9 % flush  10 mL  10 mL Intravenous PRN Nilesh Back DO       • sodium chloride 0.9 % flush 10 mL  10 mL Intravenous Q12H Nilesh Back DO   10 mL at 23 0900   • sodium chloride 0.9 % flush 10 mL  10 mL Intravenous PRN Nilesh Back DO       • sodium chloride 0.9 % infusion 40 mL  40 mL Intravenous PRN Nilesh Back DO       • vitamin B-12 (CYANOCOBALAMIN) tablet 2,500 mcg  2,500 mcg Oral Daily Ab Schwartz DO   2,500 mcg at 23 0859        Physician Progress Notes (most recent note)      Rukhsana Owens APRN at 23 0947     Attestation signed by Ab Schwartz DO at 23 1351    Patient seen and examined independently of SG Dixon.  I agree with interim history, physical exam, assessment and plan.                  Patient Identification:  Name:  Lexie Valdez  Age:  62 y.o.  Sex:  female  :  1960  MRN:  [id number]  Visit Number:  [id number]  Primary Care Provider:  Violet Pop APRN    Length of stay:  0    Subjective/Interval History/Consultants/Procedures     Chief complaint: Follow-up, COPD exacerbation and acute on chronic hypoxic respiratory failure in setting of known ILD    Subjective/Interval History:    Lexie Valdez is our 62 y.o. female patient admitted on 3/21/2023 due to acute on chronic hypoxic respiratory failure and COPDe. She has a pMH significant for interstitial lung disease, hypertension, hyperlipidemia, hypothyroidism, fibromyalgia, anxiety, and depression. For further and complete admitting information, please see admission H&P.    She was admitted to [facility] for further monitoring, evaluation, and treatment. SpO2 saturation was found to be 88% on arrival to the ED. Placed on 2L and tolerating. Utilizes 2L nightly & PRN at home. CT angiogram of the chest revealed no evidence of PE; noted subpleural fibrotic change and interstitial prominence. Cefepime and Doxycycline initiated empirically for  possible underlying pneumonia. She was also placed on scheduled IV steroids, nebs, and mucolytics. Transthoracic echocardiogram ordered and pending. Inpatient pulmonology consult placed for further assistance, greatly appreciate their input.     Procedures/Imaging:  · CT angiogram chest  · Chest xray    Consults:  · Pulmonology    On today's exam, patient was resting in bed with no s/s acute distress noted. Remaining on 2L NC and tolerating. She states that she is breathing easier today. Denies any chest pain or other new/acute symptoms. Continue current management pending further recs from Pulm. Discussed plan with patient; voiced agreement with no further questions or concerns at this time.     Discussed with AM JESSIKA Zaragoza with no acute events overnight. Room location at the time of evaluation was [address]. Discussed with attending physician, Ab Mcnamara DO.   ----------------------------------------------------------------------------------------------------------------------  Current Hospital Meds:  budesonide-formoterol, 2 puff, Inhalation, BID - RT  cefepime, 2 g, Intravenous, Q8H  doxycycline, 100 mg, Oral, Q12H  enoxaparin, 40 mg, Subcutaneous, Nightly  guaiFENesin, 1,200 mg, Oral, Q12H  ipratropium-albuterol, 3 mL, Nebulization, 4x Daily - RT  methylPREDNISolone sodium succinate, 40 mg, Intravenous, Q12H  senna-docusate sodium, 2 tablet, Oral, BID  sodium chloride, 10 mL, Intravenous, Q12H         ----------------------------------------------------------------------------------------------------------------------      Objective     Vital Signs:  Temp:  [97.7 °F (36.5 °C)-99.6 °F (37.6 °C)] 98.5 °F (36.9 °C)  Heart Rate:  [] 79  Resp:  [16-22] 18  BP: (100-209)/(48-99) 149/86      03/21/23  1016 03/21/23 1955   Weight: 83.9 kg (185 lb) 85.4 kg (188 lb 4.4 oz)     Body mass index is 32.3 kg/m².    Intake/Output Summary (Last 24 hours) at 3/22/2023 0996  Last data filed at 3/22/2023 0400  Gross  per 24 hour   Intake 1440 ml   Output 275 ml   Net 1165 ml     No intake/output data recorded.  Diet: Diabetic Diets; Consistent Carbohydrate; Texture: Regular Texture (IDDSI 7); Fluid Consistency: Thin (IDDSI 0)  ----------------------------------------------------------------------------------------------------------------------    Physical Exam  Vitals and nursing note reviewed.   Constitutional:       General: She is awake. She is not in acute distress.     Appearance: She is obese. She is ill-appearing (chronically). She is not diaphoretic.      Interventions: Nasal cannula in place.      Comments: Currently on 2L.    HENT:      Head: Normocephalic and atraumatic.      Mouth/Throat:      Mouth: Mucous membranes are moist.      Pharynx: Oropharynx is clear.   Eyes:      Extraocular Movements: Extraocular movements intact.      Pupils: Pupils are equal, round, and reactive to light.   Cardiovascular:      Rate and Rhythm: Normal rate and regular rhythm.      Pulses: Normal pulses.           Dorsalis pedis pulses are 2+ on the right side and 2+ on the left side.      Heart sounds: Normal heart sounds. No murmur heard.    No friction rub.   Pulmonary:      Effort: Pulmonary effort is normal. No accessory muscle usage, respiratory distress or retractions.      Breath sounds: Decreased breath sounds (bibasilar) present. No wheezing or rhonchi.      Comments: Lungs coarse bilaterally; no significant wheezing, rhonchi or rales appreciated.  Abdominal:      General: Bowel sounds are normal. There is no distension.      Palpations: Abdomen is soft.      Tenderness: There is no abdominal tenderness. There is no guarding.   Musculoskeletal:      Cervical back: Neck supple. No rigidity.      Right lower leg: No edema.      Left lower leg: No edema.   Skin:     General: Skin is warm and dry.      Capillary Refill: Capillary refill takes 2 to 3 seconds.      Coloration: Skin is pale.   Neurological:      Mental Status: She is  alert and oriented to person, place, and time. Mental status is at baseline.      Cranial Nerves: No dysarthria or facial asymmetry.      Sensory: Sensation is intact. No sensory deficit.      Motor: No tremor.   Psychiatric:         Attention and Perception: Attention normal.         Mood and Affect: Mood normal.         Speech: Speech normal.         Behavior: Behavior normal. Behavior is cooperative.         Thought Content: Thought content normal.         Cognition and Memory: Cognition normal.         Judgment: Judgment normal.         ----------------------------------------------------------------------------------------------------------------------  Results from last 7 days   Lab Units 03/21/23  1343 03/21/23  1051   HSTROP T ng/L 10* 11*   PROBNP pg/mL  --  394.0     Results from last 7 days   Lab Units 03/22/23 0417 03/21/23 2005 03/21/23  1510 03/21/23  1051   CRP mg/dL  --   --   --  0.90*   LACTATE mmol/L  --  1.1 2.5* 2.2*   WBC 10*3/mm3 5.52  --   --  8.39   HEMOGLOBIN g/dL 11.1*  --   --  12.4   HEMATOCRIT % 35.7  --   --  38.7   MCV fL 94.7  --   --  93.0   MCHC g/dL 31.1*  --   --  32.0   PLATELETS 10*3/mm3 156  --   --  174   INR   --   --   --  1.00     Results from last 7 days   Lab Units 03/21/23  1107   PH, ARTERIAL pH units 7.446   PO2 ART mm Hg 65.5*   PCO2, ARTERIAL mm Hg 39.4   HCO3 ART mmol/L 27.1*     Results from last 7 days   Lab Units 03/22/23 0417 03/21/23  1051   SODIUM mmol/L 141 144   POTASSIUM mmol/L 3.8 4.0   MAGNESIUM mg/dL  --  2.0   CHLORIDE mmol/L 108* 107   CO2 mmol/L 23.8 26.7   BUN mg/dL 8 5*   CREATININE mg/dL 0.65 0.71   CALCIUM mg/dL 8.1* 8.9   GLUCOSE mg/dL 165* 101*   ALBUMIN g/dL  --  3.4*   BILIRUBIN mg/dL  --  0.5   ALK PHOS U/L  --  62   AST (SGOT) U/L  --  22   ALT (SGPT) U/L  --  10   Estimated Creatinine Clearance: 93.7 mL/min (by C-G formula based on SCr of 0.65 mg/dL).  No results found for:  AMMONIA    ----------------------------------------------------------------------------------------------------------------------  Imaging Results (Last 24 Hours)     Procedure Component Value Units Date/Time    CT Angiogram Chest Pulmonary Embolism [[id number]] Collected: 03/21/23 1441     Updated: 03/21/23 1444    Narrative:      EXAM:    CT Angiography Chest With Intravenous Contrast     EXAM DATE:    3/21/2023 1:56 PM     CLINICAL HISTORY:    Pulmonary embolism (PE) suspected, positive D-dimer     TECHNIQUE:    Axial computed tomographic angiography images of the chest with  intravenous contrast.  This CT exam was performed using one or more of  the following dose reduction techniques:  automated exposure control,  adjustment of the mA and/or kV according to patient size, and/or use of  iterative reconstruction technique.    MIP reconstructed images were created and reviewed.     COMPARISON:    02/21/2023     FINDINGS:    Pulmonary arteries:  Unremarkable.  No pulmonary embolism.    Aorta:  No acute findings.  No thoracic aortic aneurysm.    Lungs:  Subpleural fibrotic change and interstitial prominence is  again noted.  No mass.    Pleural space:  Unremarkable.  No significant effusion.  No  pneumothorax.    Heart:  Unremarkable.  No cardiomegaly.  No significant pericardial  effusion.  No evidence of RV dysfunction.    Bones/joints:  No acute fracture.  No dislocation.    Soft tissues:  Unremarkable.    Lymph nodes:  Unremarkable.  No enlarged lymph nodes.       Impression:      1.  No pulmonary embolism.  2.  Subpleural fibrotic change and interstitial prominence is again  noted.     This report was finalized on 3/21/2023 2:41 PM by Dr. Dominguez Sims MD.       XR Chest 1 View [[id number]] Collected: 03/21/23 1207     Updated: 03/21/23 1209    Narrative:      EXAM:    XR Chest, 1 View     EXAM DATE:    3/21/2023 10:57 AM     CLINICAL HISTORY:    sob, cough     TECHNIQUE:    Frontal view of the chest.      COMPARISON:    04/26/2022     FINDINGS:    LUNGS:  Coarsened interstitial markings and patchy subpleural airspace  opacity is again noted.    PLEURAL SPACE:  Unremarkable.  No pneumothorax.    HEART:  Heart size is stable.    MEDIASTINUM:  Unremarkable.    BONES/JOINTS:  Unremarkable.       Impression:        Coarsened interstitial markings and patchy subpleural airspace opacity  is again noted.     This report was finalized on 3/21/2023 12:07 PM by Dr. Dominguez Sims MD.           ----------------------------------------------------------------------------------------------------------------------   I have reviewed the above laboratory values for 03/22/23    Assessment/Plan     Active Hospital Problems    Diagnosis  POA   • **COPD exacerbation (HCC) [J44.1]  Yes         ASSESSMENT/PLAN:  Acute on chronic hypoxic respiratory failure suspect 2/2 to acute COPD exacerbation with possible underlying pnuemonia  In the setting of known interstitial lung disease  Patient presented to ED with increased shortness of breath and increased cough frequency/sputum production x2 to 3 weeks.  Patient wears 2 L as needed and at night at baseline.  Currently requiring 2 L to maintain SPO2 saturations. Wean as tolerated. Continuous pulse oximetry ordered.  Cefepime and Doxycycline for empiric coverage of possible underlying pneumonia  Obtain sputum culture if able  MRSA PCR negative  Continue Solu-Medrol 40 mg twice daily  Supportive care with Mucinex, DuoNebs  Inpatient pulmonology consult placed, assistance is much appreciated  PT, OT, and SLP consults, pending recs   TTE ordered and pending     Chronic:  HTN; stable. Continue to monitor VS per hospital policy.   HLD; continue statin.   Hypothyroidism; continue levothyroxine. Check TSH/Free T4.   Fibromyalgia; supportive care. Norco 5 available PRN per home med rec.   Anxiety/depression; continue Celexa and Buspar.     -----------  -DVT prophylaxis: Lovenox  -Activity: Up with  assistance as tolerated. Fall precautions. PT/OT consult placed.   -Disposition plans/anticipated needs: Pending clinical course and PT recommendations. Plans to return home.   -Diet: Regular CC  -Home supplemental O2: Utilizes 2L nightly/PRN at home. Currently requiring 2L continuously.       The patient is high risk due to the following diagnoses/reasons:  Acute on chronic hypoxic respiratory failure secondary to COPD exacerbation in setting of known ILD.         SG Dixon  03/22/23  09:47 EDT  Pager #[phone]    Electronically signed by Ab Ty DO at 03/22/23 3722       ADL Documentation (most recent)    Flowsheet Row Most Recent Value   Transferring 2 - assistive person   Toileting 2 - assistive person   Bathing 2 - assistive person   Dressing 2 - assistive person   Eating 0 - independent   Communication 0 - understands/communicates without difficulty   Swallowing 0 - swallows foods/liquids without difficulty   Equipment Currently Used at Home oxygen, nebulizer, walker, standard, cane, straight  [O2 @ 2L qhs from]          Discharge Summary    No notes of this type exist for this encounter.         Discharge Order (From admission, onward)     Start     Ordered    03/23/23 0838  Discharge patient  Once        Expected Discharge Date: 03/23/23    Expected Discharge Time: Morning    Discharge Disposition: Home-Health Care [facility] – [place], [place]    Physician of Record for Attribution - Please select from Treatment Team: AB TY [[id number]]    Review needed by CMO to determine Physician of Record: No       Question Answer Comment   Physician of Record for Attribution - Please select from Treatment Team AB TY    Review needed by CMO to determine Physician of Record No        03/23/23 0848

## 2023-03-23 NOTE — DISCHARGE SUMMARY
[facility] Medicine Services  DISCHARGE SUMMARY    Date of Admission: 3/21/2023    Date of Discharge:  3/23/2023    PCP: Violet Pop APRN    Discharging Provider: SG Dixon  Attending Physician on day of DC: Ab Mcnamara DO    Admission Diagnosis:   Please see admission H&P    Discharge Diagnosis:   · Acute on chronic hypoxic respiratory failure due to acute COPD exacerbation with suspected underlying bacterial pneumonia  · History of interstitial lung disease with evidence of pulmonary fibrosis  · Acute urinary tract infection   · Essential hypertension  · Hyperlipidemia  · Hypothyroidism  · Fibromyalgia  · Anxiety  · Depression  · Physical debility     Procedures Performed:  • CT angiogram chest  • Chest xray     Consults:   Consults     Date and Time Order Name Status Description    3/21/2023  7:30 PM Inpatient Pulmonology Consult Completed     3/21/2023  3:48 PM Hospitalist (on-call MD unless specified)            HPI     History of Present Illness:  Lexie WOLF Valdez is our 62 y.o. female patient admitted on 3/21/2023 due to acute on chronic hypoxic respiratory failure and COPDe. She has a pMH significant for interstitial lung disease, hypertension, hyperlipidemia, hypothyroidism, fibromyalgia, anxiety, and depression. For further and complete admitting information, please see admission H&P.       Hospital Course     Hospital Course  Lexie Valdez was admitted as outlined in above HPI.     She was admitted to [facility] for further monitoring, evaluation, and treatment. SpO2 saturation was found to be 88% on arrival to the ED. Placed on 2L and has tolerated. Utilizes 2L nightly & PRN at home. CT angiogram of the chest revealed no evidence of PE; noted subpleural fibrotic change and interstitial prominence. Cefepime and Doxycycline initiated empirically for possible underlying pneumonia. She was also placed on scheduled IV steroids, nebs, and mucolytics. Transthoracic  echocardiogram obtained. Noted mild to moderate dilation of the right ventricle and borderline dilation of the right atrium. RVSP < 35 mmHg. Inpatient pulmonology consult placed for further assistance, greatly appreciate their input. Patient follows with SG Larkin and Dr. Mcmahon in the outpatient setting. Pulmonology agreed with current management and recommended continued pulmonary fibrosis workup in the outpatient setting. Respiratory status improved and patient was without significant dyspnea on today's exam. She was evaluated by PT. Per note, they feel patient would benefit from in-home skilled PT. VS currently stable. O2 saturations on room air 81% at rest. SpO2 saturation improved with re-application of supplemental oxygen. Therefore, patient will require continuous O2 at discharge. She was also found to have acute UTI on admission. Urine culture preliminarily growing > 100K gram negative bacilli. Reviewed prior urine cultures which were positive for E. Coli and Citrobacter. Patient denies dysuria today and has been urinating spontaneously without difficulty. She received 1x dose of IV Lasix per pulmonology orders on 3/22 in order to increase lung compliance. Had approximately 1,200 mL UOP overnight. Feel that patient has currently met maximum benefit of inpatient hospitalization and may be discharged home on this date. We will switch IV antibiotics to PO (Omnicef and Doxycycline) which should provide coverage for underlying pneumonia and acute UTI. She will complete total of 7 day course of antibiotics. IV steroids replaced with oral prednisone 40 mg daily x 3 more days to complete total of 5 days. COPD education was complete while inpatient and COPD rescue kit has been prescribed. Discussed medication changes, home PT, home O2, follow-up appointments, and COPD rescue kit with patient. She voiced agreement and understanding. Discussed with attending physician, Ab Mcnamara DO.      Discussed with AM JESSIKA Morales on day of discharge.     Oxygen saturation on the day of discharge was 94% on 2L NC.      Pertinent Laboratory and Radiology Results     Pertinent Test Results:      Results from last 7 days   Lab Units 03/21/23  1107   PH, ARTERIAL pH units 7.446   PO2 ART mm Hg 65.5*   PCO2, ARTERIAL mm Hg 39.4   HCO3 ART mmol/L 27.1*     Results from last 7 days   Lab Units 03/23/23  0151 03/22/23 0417 03/21/23  1051   WBC 10*3/mm3 10.72 5.52 8.39   HEMOGLOBIN g/dL 11.4* 11.1* 12.4   HEMATOCRIT % 37.5 35.7 38.7   PLATELETS 10*3/mm3 174 156 174   MCV fL 95.7 94.7 93.0   SODIUM mmol/L 139 141 144   POTASSIUM mmol/L 4.1 3.8 4.0   CHLORIDE mmol/L 108* 108* 107   CO2 mmol/L 22.5 23.8 26.7   BUN mg/dL 12 8 5*   CREATININE mg/dL 0.66 0.65 0.71   GLUCOSE mg/dL 166* 165* 101*   CALCIUM mg/dL 8.6 8.1* 8.9        Results from last 7 days   Lab Units 03/21/23  1343 03/21/23  1051   HSTROP T ng/L 10* 11*   PROBNP pg/mL  --  394.0     Results from last 7 days   Lab Units 03/23/23  0151 03/22/23  0417 03/21/23 2005 03/21/23  1510 03/21/23  1051   CRP mg/dL  --   --   --   --  0.90*   LACTATE mmol/L  --   --  1.1 2.5* 2.2*   WBC 10*3/mm3 10.72 5.52  --   --  8.39     Results from last 7 days   Lab Units 03/21/23  1051   BILIRUBIN mg/dL 0.5   ALK PHOS U/L 62   ALT (SGPT) U/L 10   AST (SGOT) U/L 22   PROTIME Seconds 13.4   INR  1.00   APTT seconds 23.1*           Invalid input(s): TG, LDLCALC, LDLREALC      Brief Urine Lab Results  (Last result in the past 365 days)      Color   Clarity   Blood   Leuk Est   Nitrite   Protein   CREAT   Urine HCG        03/21/23 1333 Yellow   Cloudy   Negative   Large (3+)   Positive   Trace                         Results for orders placed during the hospital encounter of 03/21/23    Adult Transthoracic Echo Complete w/ Color, Spectral and Contrast if necessary per protocol    Interpretation Summary  •  The right ventricular cavity is mild to moderately dilated.  •  The right atrial  cavity is borderline dilated.  •  Estimated right ventricular systolic pressure from tricuspid regurgitation is normal (<35 mmHg).  •  Mild pulmonary hypertension is present.      Pending Labs     Order Current Status    Blood Culture - Blood, Arm, Right Preliminary result    Blood Culture - Blood, Wrist, Left Preliminary result    Urine Culture - Urine, Urine, Clean Catch Preliminary result            ----------------------------------------------------------------------------------------------------------------------  XR Chest 1 View    Result Date: 3/21/2023    Coarsened interstitial markings and patchy subpleural airspace opacity is again noted.  This report was finalized on 3/21/2023 12:07 PM by Dr. Dominguez Sims MD.      CT Angiogram Chest Pulmonary Embolism    Result Date: 3/21/2023  1.  No pulmonary embolism. 2.  Subpleural fibrotic change and interstitial prominence is again noted.  This report was finalized on 3/21/2023 2:41 PM by Dr. Dominguez Sims MD.      CT Chest Low Dose Cancer Screening WO    Result Date: 2/21/2023   1. Diffuse coarse interstitial lung disease. 2. Lung RADS category 1, negative for parenchymal nodules. 3. Recommend one-year follow-up low-dose screening CT of the chest.     This report was finalized on 2/21/2023 2:27 PM by Dr. Devan Sahni MD.          Microbiology Results (last 10 days)     Procedure Component Value - Date/Time    MRSA Screen, PCR (Inpatient) - Swab, Nares [[id number]]  (Normal) Collected: 03/22/23 0118    Lab Status: Final result Specimen: Swab from Nares Updated: 03/22/23 0236     MRSA PCR No MRSA Detected    Narrative:      The negative predictive value of this diagnostic test is high and should only be used to consider de-escalating anti-MRSA therapy. A positive result may indicate colonization with MRSA and must be correlated clinically.    Urine Culture - Urine, Urine, Clean Catch [[id number]]  (Abnormal) Collected: 03/21/23 5913    Lab Status: Preliminary result  Specimen: Urine, Clean Catch Updated: 03/22/23 1033     Urine Culture >100,000 CFU/mL Gram Negative Bacilli    Narrative:      Colonization of the urinary tract without infection is common. Treatment is discouraged unless the patient is symptomatic, pregnant, or undergoing an invasive urologic procedure.    Blood Culture - Blood, Arm, Right [[id number]]  (Normal) Collected: 03/21/23 1052    Lab Status: Preliminary result Specimen: Blood from Arm, Right Updated: 03/22/23 1200     Blood Culture No growth at 24 hours    COVID-19 and FLU A/B PCR - Swab, Nasopharynx [[id number]]  (Normal) Collected: 03/21/23 1051    Lab Status: Final result Specimen: Swab from Nasopharynx Updated: 03/21/23 1121     COVID19 Not Detected     Influenza A PCR Not Detected     Influenza B PCR Not Detected    Narrative:      Fact sheet for providers: https://www.fda.gov/media/254685/download    Fact sheet for patients: https://www.fda.gov/media/547608/download    Test performed by PCR.    Blood Culture - Blood, Wrist, Left [[id number]]  (Normal) Collected: 03/21/23 1051    Lab Status: Preliminary result Specimen: Blood from Wrist, Left Updated: 03/22/23 1200     Blood Culture No growth at 24 hours          Labs above have been reviewed on the day of discharge.  Radiology images from prior 30 days were reviewed prior to discharge as incorporated into this document.     Discharge Vitals and Physical Examination       Vital Signs  Temp:  [98 °F (36.7 °C)-98.9 °F (37.2 °C)] 98.9 °F (37.2 °C)  Heart Rate:  [84-95] 91  Resp:  [18-33] 18  BP: (115-147)/(66-84) 146/84     PHYSICAL EXAMINATION:   Physical Exam  Vitals reviewed.   Constitutional:       General: She is awake. She is not in acute distress.     Appearance: She is obese. She is not diaphoretic.      Interventions: Nasal cannula in place.      Comments: Currently on 2L NC.   HENT:      Head: Normocephalic and atraumatic.      Mouth/Throat:      Mouth: Mucous membranes are moist.      Pharynx:  Oropharynx is clear.   Eyes:      Extraocular Movements: Extraocular movements intact.   Cardiovascular:      Rate and Rhythm: Normal rate and regular rhythm.      Pulses: Normal pulses.           Dorsalis pedis pulses are 2+ on the right side and 2+ on the left side.      Heart sounds: Normal heart sounds. No murmur heard.    No friction rub.   Pulmonary:      Effort: Pulmonary effort is normal. No accessory muscle usage, respiratory distress or retractions.      Breath sounds: Rales present. No wheezing.      Comments: Lungs coarse bilaterally with some scattered bibasilar inspiratory crackles.  Abdominal:      General: Bowel sounds are normal. There is no distension.      Palpations: Abdomen is soft.      Tenderness: There is no abdominal tenderness. There is no guarding.   Musculoskeletal:      Cervical back: Neck supple. No rigidity.      Right lower leg: No edema.      Left lower leg: No edema.   Skin:     General: Skin is warm and dry.      Capillary Refill: Capillary refill takes 2 to 3 seconds.      Coloration: Skin is pale.   Neurological:      Mental Status: She is alert and oriented to person, place, and time. Mental status is at baseline.      Cranial Nerves: No dysarthria or facial asymmetry.      Sensory: Sensation is intact.      Motor: Weakness (generalized) and tremor (baseline) present.   Psychiatric:         Attention and Perception: Attention normal.         Mood and Affect: Mood normal.         Speech: Speech normal.         Behavior: Behavior normal. Behavior is cooperative.         Thought Content: Thought content normal.         Cognition and Memory: Cognition normal.         Judgment: Judgment normal.         Discharge Disposition, Discharge Medications, and Discharge Appointments     Discharge Disposition: Home with Physical Therapy    Condition on Discharge: Stable    Discharge Medications:     Discharge Medications      New Medications      Instructions Start Date   cefdinir 300 MG  capsule  Commonly known as: OMNICEF   300 mg, Oral, 2 Times Daily      doxycycline 100 MG capsule  Commonly known as: MONODOX   100 mg, Oral, Every 12 Hours Scheduled      doxycycline 100 MG capsule  Commonly known as: MONODOX   Take 1 capsule by mouth every 12 hours for 5 days as part of COPD Rescue Kit. (Only Start if in YELLOW ZONE.)      predniSONE 20 MG tablet  Commonly known as: DELTASONE   40 mg, Oral, Daily With Breakfast      predniSONE 20 MG tablet  Commonly known as: DELTASONE   40 mg, Oral, Daily, Take 2 tablets daily for 5 days as part of COPD Rescue Kit. (Only Start if in YELLOW ZONE.)         Changes to Medications      Instructions Start Date   ipratropium-albuterol 0.5-2.5 mg/3 ml nebulizer  Commonly known as: DUO-NEB  What changed: Another medication with the same name was added. Make sure you understand how and when to take each.   3 mL, Nebulization, 4 Times Daily PRN      ipratropium-albuterol 0.5-2.5 mg/3 ml nebulizer  Commonly known as: DUO-NEB  What changed: You were already taking a medication with the same name, and this prescription was added. Make sure you understand how and when to take each.   Take 3 mL by neb every 30 minutes as needed for shortness of air for up to 6 doses. Part of COPD Rescue Kit. (Only Start if in YELLOW ZONE.)         Continue These Medications      Instructions Start Date   alendronate 70 MG tablet  Commonly known as: FOSAMAX   70 mg, Oral, Every 7 Days      aspirin 81 MG chewable tablet   81 mg, Oral, Daily      atorvastatin 40 MG tablet  Commonly known as: LIPITOR   40 mg, Oral, Daily      Breo Ellipta 100-25 MCG/ACT aerosol powder   Generic drug: Fluticasone Furoate-Vilanterol   1 puff, Inhalation, Daily - RT      Brexpiprazole 0.5 MG tablet   1 tablet, Oral, Daily      busPIRone 10 MG tablet  Commonly known as: BUSPAR   10 mg, Oral, 2 Times Daily      citalopram 40 MG tablet  Commonly known as: CeleXA   40 mg, Oral, Daily      docusate sodium 100 MG  capsule  Commonly known as: COLACE   100 mg, Oral, 2 Times Daily PRN      HYDROcodone-acetaminophen 5-325 MG per tablet  Commonly known as: NORCO   1 tablet, Oral, 2 Times Daily PRN      hydroxychloroquine 200 MG tablet  Commonly known as: PLAQUENIL   1 tablet, Oral, 2 Times Daily      hydrOXYzine 10 MG tablet  Commonly known as: ATARAX   10 mg, Oral, 3 Times Daily PRN      levothyroxine 25 MCG tablet  Commonly known as: SYNTHROID, LEVOTHROID   25 mcg, Oral, Every Early Morning      losartan 50 MG tablet  Commonly known as: COZAAR   50 mg, Oral, Daily      nabumetone 750 MG tablet  Commonly known as: RELAFEN   750 mg, Oral, 2 Times Daily PRN      oxybutynin 5 MG tablet  Commonly known as: DITROPAN   5 mg, Oral, Daily      Ventolin  (90 Base) MCG/ACT inhaler  Generic drug: albuterol sulfate HFA   2 puffs, Inhalation, Every 4 Hours PRN      vitamin B-12 2500 MCG sublingual tablet tablet  Commonly known as: CYANOCOBALAMIN   2,500 mcg, Sublingual, Daily      vitamin D 1.25 MG (45449 UT) capsule capsule  Commonly known as: ERGOCALCIFEROL   50,000 Units, Oral, Weekly         Stop These Medications    hydroCHLOROthiazide 12.5 MG tablet  Commonly known as: HYDRODIURIL     nitrofurantoin (macrocrystal-monohydrate) 100 MG capsule  Commonly known as: MACROBID            Discharged medication regimen discussed with attending physician prior to discharge.     Discharge Diet: Regular    Dietary Orders (From admission, onward)     Start     Ordered    03/21/23 1931  Diet: Diabetic Diets; Consistent Carbohydrate; Texture: Regular Texture (IDDSI 7); Fluid Consistency: Thin (IDDSI 0)  Diet Effective Now        References:    Diet Order [place]   Question Answer Comment   Diets: Diabetic Diets    Diabetic Diet: Consistent Carbohydrate    Texture: Regular Texture (IDDSI 7)    Fluid Consistency: Thin (IDDSI 0)        03/21/23 1930                Activity at Discharge: As tolerated. Home PT ordered.     Discharge  Disposition:    Home-Health Care [facility]        Follow-up Appointments:  Your Scheduled Appointments    Jul 19, 2023 11:00 AM  Follow Up with SG Schmidt  [facility] PULMONARY & CRITICAL CARE MEDICINE ([place]) [address]  [place] [id number] [phone]   Established: Please bring outside images or reports.            Additional Instructions for the Follow-ups that You Need to Schedule     Ambulatory Referral to Home Health   As directed      Face to Face Visit Date: 3/23/2023    Follow-up provider for Plan of Care?: I treated the patient in an acute care facility and will not continue treatment after discharge.    Follow-up provider: VIOLET POP [[id number]]    Reason/Clinical Findings: Debility, Increasing O2 requirement, COPD, pulmonary fibrosis, RA    Describe mobility limitations that make leaving home difficult: Debility, Increasing O2 requirement, COPD, pulmonary fibrosis, RA    Nursing/Therapeutic Services Requested: Physical Therapy    PT orders: Therapeutic exercise Gait Training Transfer training Strengthening Home safety assessment    Weight Bearing Status: As Tolerated    Frequency: 1 Week 1         Discharge Follow-up with PCP   As directed       Currently Documented PCP:    Violet Pop APRN    PCP Phone Number:    [phone]     Follow Up Details: 1-2 week post hospital discharge follow up         Discharge Follow-up with Specified Provider: Dr. Mcmahon/Deirdre BETTENCOURT (Pulmonology); 1 Week   As directed      To: Dr. Mcmahon/Deirdre BETTENCOURT (Pulmonology)    Follow Up: 1 Week    Follow Up Details: Continue to see pulmonology for COPD management and pulmonary fibrosis workup            Follow-up Information     Violet oPp APRN .    Specialty: Family Medicine  Why: 1-2 week post hospital discharge follow up  Contact information:  [address] [place] [address]  [phone]                         Additional Instructions for the Follow-ups  that You Need to Schedule     Ambulatory Referral to Home Health   As directed      Face to Face Visit Date: 3/23/2023    Follow-up provider for Plan of Care?: I treated the patient in an acute care facility and will not continue treatment after discharge.    Follow-up provider: VIOLET POP [[id number]]    Reason/Clinical Findings: Debility, Increasing O2 requirement, COPD, pulmonary fibrosis, RA    Describe mobility limitations that make leaving home difficult: Debility, Increasing O2 requirement, COPD, pulmonary fibrosis, RA    Nursing/Therapeutic Services Requested: Physical Therapy    PT orders: Therapeutic exercise Gait Training Transfer training Strengthening Home safety assessment    Weight Bearing Status: As Tolerated    Frequency: 1 Week 1         Discharge Follow-up with PCP   As directed       Currently Documented PCP:    Violet Pop APRN    PCP Phone Number:    [phone]     Follow Up Details: 1-2 week post hospital discharge follow up         Discharge Follow-up with Specified Provider: Dr. Mcmahon/Deirdre BETTENCOURT (Pulmonology); 1 Week   As directed      To: Dr. Mcmahon/Deirdre BETTENCOURT (Pulmonology)    Follow Up: 1 Week    Follow Up Details: Continue to see pulmonology for COPD management and pulmonary fibrosis workup               Test Results Pending at Discharge:    Pending Labs     Order Current Status    Blood Culture - Blood, Arm, Right Preliminary result    Blood Culture - Blood, Wrist, Left Preliminary result    Urine Culture - Urine, Urine, Clean Catch Preliminary result             SG Dixon   [facility] Medicine Team  03/23/23  08:49 EDT      Time: Greater than 30 minutes spent on this discharge.  I spent 50 minutes on this discharge activity which included:  Face-to-face encounter with the patient, discussing plan with attending physician, reviewing the data in the system, coordination of the care with the nursing staff as well as consultations, documentation, and  entering orders.

## 2023-03-26 LAB
BACTERIA SPEC AEROBE CULT: NORMAL
BACTERIA SPEC AEROBE CULT: NORMAL

## 2023-03-27 LAB
QT INTERVAL: 404 MS
QTC INTERVAL: 451 MS

## 2023-03-28 ENCOUNTER — OFFICE VISIT (OUTPATIENT)
Dept: PULMONOLOGY | Facility: CLINIC | Age: 63
End: 2023-03-28
Payer: MEDICARE

## 2023-03-28 VITALS
HEIGHT: 64 IN | BODY MASS INDEX: 31.58 KG/M2 | TEMPERATURE: 98 F | RESPIRATION RATE: 18 BRPM | DIASTOLIC BLOOD PRESSURE: 80 MMHG | OXYGEN SATURATION: 92 % | HEART RATE: 66 BPM | WEIGHT: 185 LBS | SYSTOLIC BLOOD PRESSURE: 130 MMHG

## 2023-03-28 DIAGNOSIS — E66.9 OBESITY (BMI 30-39.9): ICD-10-CM

## 2023-03-28 DIAGNOSIS — Z87.891 FORMER SMOKER: ICD-10-CM

## 2023-03-28 DIAGNOSIS — J84.9 ILD (INTERSTITIAL LUNG DISEASE): ICD-10-CM

## 2023-03-28 DIAGNOSIS — J44.9 CHRONIC OBSTRUCTIVE PULMONARY DISEASE, UNSPECIFIED COPD TYPE: Primary | ICD-10-CM

## 2023-03-28 DIAGNOSIS — J96.11 CHRONIC RESPIRATORY FAILURE WITH HYPOXIA: ICD-10-CM

## 2023-03-28 PROCEDURE — 3075F SYST BP GE 130 - 139MM HG: CPT | Performed by: NURSE PRACTITIONER

## 2023-03-28 PROCEDURE — 1160F RVW MEDS BY RX/DR IN RCRD: CPT | Performed by: NURSE PRACTITIONER

## 2023-03-28 PROCEDURE — 99214 OFFICE O/P EST MOD 30 MIN: CPT | Performed by: NURSE PRACTITIONER

## 2023-03-28 PROCEDURE — 1159F MED LIST DOCD IN RCRD: CPT | Performed by: NURSE PRACTITIONER

## 2023-03-28 PROCEDURE — 3079F DIAST BP 80-89 MM HG: CPT | Performed by: NURSE PRACTITIONER

## 2023-03-28 RX ORDER — FLUTICASONE FUROATE, UMECLIDINIUM BROMIDE AND VILANTEROL TRIFENATATE 200; 62.5; 25 UG/1; UG/1; UG/1
1 POWDER RESPIRATORY (INHALATION) DAILY
Qty: 180 EACH | Refills: 3 | Status: SHIPPED | OUTPATIENT
Start: 2023-03-28

## 2023-03-28 NOTE — PROGRESS NOTES
"Chief Complaint  COPD    Subjective        Lexie Valdez presents to [facility] PULMONARY & CRITICAL CARE MEDICINE  History of Present Illness     Ms. Valdez is a 62 year old female with a medical history significant for hypothyroidism, hypertension, anxiety, arthritis, depression, and COPD.     She presents today for a hospital follow up.  She was recently admitted to the hospital for COPD exacerbation.  She reports that she is feeling better since her discharge.  She tells me that she is using 1-2 L of supplemental oxygen all the time.  She is currently taking Breo once daily.  She also uses an albuterol inhaler and neb treatments as needed.  She states that she has finished with her steroids and has 2 days of antibiotics left that she was given on discharge.          Objective   Vital Signs:  /80   Pulse 66   Temp 98 °F (36.7 °C) (Temporal)   Resp 18   Ht 162.6 cm (64\")   Wt 83.9 kg (185 lb)   SpO2 92%   BMI 31.76 kg/m²   Estimated body mass index is 31.76 kg/m² as calculated from the following:    Height as of this encounter: 162.6 cm (64\").    Weight as of this encounter: 83.9 kg (185 lb).       BMI is >= 30 and <35. (Class 1 Obesity). The following options were offered after discussion;: exercise counseling/recommendations and nutrition counseling/recommendations      Physical Exam     GENERAL APPEARANCE: Well developed, well nourished, alert and cooperative, and appears to be in no acute distress.    HEAD: normocephalic. Atraumatic.    EYES: PERRL, EOMI. Vision is grossly intact.    THROAT: Oral cavity and pharynx normal. No inflammation, swelling, exudate, or lesions.     NECK: Neck supple.  No thyromegaly.    CARDIAC: Normal S1 and S2. No S3, S4 or murmurs. Rhythm is regular.     RESPIRATORY:Bilateral air entry positive. Bilateral diminished breath sounds. Bilateral wheezing.    GI: Positive bowel sounds. Soft, nondistended, nontender.     MUSCULOSKELETAL: No significant deformity " or joint abnormality. No edema. Peripheral pulses intact. No varicosities.    NEUROLOGICAL: Strength and sensation symmetric and intact throughout.     PSYCHIATRIC: The mental examination revealed the patient was oriented to person, place, and time.     Result Review :  The following data was reviewed by: SG Schmidt on 03/28/2023:  Common labs    Common Labs 3/21/23 3/21/23 3/22/23 3/22/23 3/23/23 3/23/23    1051 1051 0417 0417 0151 0151   Glucose  101 (A)  165 (A)  166 (A)   BUN  5 (A)  8  12   Creatinine  0.71  0.65  0.66   Sodium  144  141  139   Potassium  4.0  3.8  4.1   Chloride  107  108 (A)  108 (A)   Calcium  8.9  8.1 (A)  8.6   Albumin  3.4 (A)       Total Bilirubin  0.5       Alkaline Phosphatase  62       AST (SGOT)  22       ALT (SGPT)  10       WBC 8.39  5.52  10.72    Hemoglobin 12.4  11.1 (A)  11.4 (A)    Hematocrit 38.7  35.7  37.5    Platelets 174  156  174    (A) Abnormal value       Comments are available for some flowsheets but are not being displayed.                      Assessment and Plan   Diagnoses and all orders for this visit:    1. Chronic obstructive pulmonary disease, unspecified COPD type (HCC) (Primary)    2. ILD (interstitial lung disease) (HCC)    3. Obesity (BMI 30-39.9)    4. Former smoker    5. Chronic respiratory failure with hypoxia (HCC)    Other orders  -     Fluticasone-Umeclidin-Vilant (Trelegy Ellipta) 200-62.5-25 MCG/ACT aerosol powder ; Inhale 1 puff Daily.  Dispense: 180 each; Refill: 3           Will discontinue Breo and start her on Trelegy once daily.  Continue albuterol inhaler and neb treatments as needed.      She is currently using supplemental oxygen at 1-2 L continuously.    LDCT was reviewed.  Lung Rads category 1.  We will repeat in one year.        Follow Up   Return for Next scheduled follow up.  Patient was given instructions and counseling regarding her condition or for health maintenance advice. Please see specific information pulled  into the AVS if appropriate.

## 2023-04-14 DIAGNOSIS — J44.9 CHRONIC OBSTRUCTIVE PULMONARY DISEASE, UNSPECIFIED COPD TYPE: Primary | ICD-10-CM

## 2023-04-14 RX ORDER — ALBUTEROL SULFATE 90 UG/1
2 AEROSOL, METERED RESPIRATORY (INHALATION) EVERY 4 HOURS PRN
Qty: 18 G | Refills: 8 | Status: SHIPPED | OUTPATIENT
Start: 2023-04-14

## 2023-05-10 ENCOUNTER — DOCUMENTATION (OUTPATIENT)
Dept: PULMONOLOGY | Facility: CLINIC | Age: 63
End: 2023-05-10
Payer: MEDICARE

## 2023-05-31 DIAGNOSIS — J44.9 CHRONIC OBSTRUCTIVE PULMONARY DISEASE, UNSPECIFIED COPD TYPE: ICD-10-CM

## 2023-05-31 RX ORDER — ALBUTEROL SULFATE 90 UG/1
2 AEROSOL, METERED RESPIRATORY (INHALATION) EVERY 4 HOURS PRN
Qty: 18 G | Refills: 8 | Status: SHIPPED | OUTPATIENT
Start: 2023-05-31

## 2023-06-19 ENCOUNTER — TRANSCRIBE ORDERS (OUTPATIENT)
Dept: ADMINISTRATIVE | Facility: HOSPITAL | Age: 63
End: 2023-06-19
Payer: MEDICARE

## 2023-06-19 DIAGNOSIS — Z12.31 VISIT FOR SCREENING MAMMOGRAM: Primary | ICD-10-CM

## 2023-07-31 ENCOUNTER — HOSPITAL ENCOUNTER (OUTPATIENT)
Dept: MAMMOGRAPHY | Facility: HOSPITAL | Age: 63
Discharge: HOME | End: 2023-07-31
Admitting: NURSE PRACTITIONER
Payer: MEDICARE

## 2023-07-31 DIAGNOSIS — Z12.31 VISIT FOR SCREENING MAMMOGRAM: ICD-10-CM

## 2023-07-31 PROCEDURE — 77067 SCR MAMMO BI INCL CAD: CPT

## 2023-07-31 PROCEDURE — 77063 BREAST TOMOSYNTHESIS BI: CPT | Performed by: RADIOLOGY

## 2023-07-31 PROCEDURE — 77067 SCR MAMMO BI INCL CAD: CPT | Performed by: RADIOLOGY

## 2023-07-31 PROCEDURE — 77063 BREAST TOMOSYNTHESIS BI: CPT

## 2023-10-02 ENCOUNTER — HOSPITAL ENCOUNTER (OUTPATIENT)
Dept: GENERAL RADIOLOGY | Facility: HOSPITAL | Age: 63
Discharge: HOME | End: 2023-10-02
Payer: MEDICARE

## 2023-10-02 ENCOUNTER — TRANSCRIBE ORDERS (OUTPATIENT)
Dept: ADMINISTRATIVE | Facility: HOSPITAL | Age: 63
End: 2023-10-02
Payer: MEDICARE

## 2023-10-02 DIAGNOSIS — M53.9 BACK PROBLEM: ICD-10-CM

## 2023-10-02 DIAGNOSIS — M54.2 NECK PAIN: Primary | ICD-10-CM

## 2023-10-02 DIAGNOSIS — M54.2 NECK PAIN: ICD-10-CM

## 2023-10-02 PROCEDURE — 72050 X-RAY EXAM NECK SPINE 4/5VWS: CPT

## 2023-10-02 PROCEDURE — 72110 X-RAY EXAM L-2 SPINE 4/>VWS: CPT

## 2023-10-19 ENCOUNTER — OFFICE VISIT (OUTPATIENT)
Dept: PULMONOLOGY | Facility: CLINIC | Age: 63
End: 2023-10-19
Payer: MEDICARE

## 2023-10-19 VITALS
RESPIRATION RATE: 18 BRPM | BODY MASS INDEX: 29.71 KG/M2 | HEIGHT: 64 IN | HEART RATE: 69 BPM | WEIGHT: 174 LBS | DIASTOLIC BLOOD PRESSURE: 80 MMHG | OXYGEN SATURATION: 94 % | SYSTOLIC BLOOD PRESSURE: 130 MMHG | TEMPERATURE: 98 F

## 2023-10-19 DIAGNOSIS — Z87.891 FORMER SMOKER: ICD-10-CM

## 2023-10-19 DIAGNOSIS — J44.9 CHRONIC OBSTRUCTIVE PULMONARY DISEASE, UNSPECIFIED COPD TYPE: ICD-10-CM

## 2023-10-19 DIAGNOSIS — J84.9 ILD (INTERSTITIAL LUNG DISEASE): Primary | ICD-10-CM

## 2023-10-19 DIAGNOSIS — J96.11 CHRONIC RESPIRATORY FAILURE WITH HYPOXIA: ICD-10-CM

## 2023-10-19 PROCEDURE — 3079F DIAST BP 80-89 MM HG: CPT | Performed by: PHYSICIAN ASSISTANT

## 2023-10-19 PROCEDURE — 99213 OFFICE O/P EST LOW 20 MIN: CPT | Performed by: PHYSICIAN ASSISTANT

## 2023-10-19 PROCEDURE — 1160F RVW MEDS BY RX/DR IN RCRD: CPT | Performed by: PHYSICIAN ASSISTANT

## 2023-10-19 PROCEDURE — 3075F SYST BP GE 130 - 139MM HG: CPT | Performed by: PHYSICIAN ASSISTANT

## 2023-10-19 PROCEDURE — 1159F MED LIST DOCD IN RCRD: CPT | Performed by: PHYSICIAN ASSISTANT

## 2023-10-19 RX ORDER — FLUTICASONE FUROATE, UMECLIDINIUM BROMIDE AND VILANTEROL TRIFENATATE 200; 62.5; 25 UG/1; UG/1; UG/1
1 POWDER RESPIRATORY (INHALATION) DAILY
Qty: 180 EACH | Refills: 3 | Status: SHIPPED | OUTPATIENT
Start: 2023-10-19

## 2023-10-19 RX ORDER — ALBUTEROL SULFATE 90 UG/1
2 AEROSOL, METERED RESPIRATORY (INHALATION) EVERY 4 HOURS PRN
Qty: 18 G | Refills: 8 | Status: SHIPPED | OUTPATIENT
Start: 2023-10-19

## 2023-10-19 NOTE — PROGRESS NOTES
Subjective      Chief Complaint  COPD    Subjective      History of Present Illness  Lexie Valdez is a 63 y.o. female who presents today to [facility] with past medical history of hypothyoidism, essential hypertension, anxiety, arthritis, depression, and COPD who presents today for COPD. This visit is a follow up appointment.     COPD:  Patient reports that she is doing well. She states that her breathing is currently at baseline. She continues to use Trelegy and is compliant with using the inhaler 1 puff daily. She has an albuterol inhaler and nebulizer treatments to use as needed. She is compliant with using her supplemental oxygen continuously with baseline of 1-2 L.       Current Outpatient Medications:     alendronate (FOSAMAX) 70 MG tablet, Take 1 tablet by mouth Every 7 (Seven) Days., Disp: , Rfl:     aspirin 81 MG chewable tablet, Chew 1 tablet Daily., Disp: , Rfl:     atorvastatin (LIPITOR) 40 MG tablet, Take 1 tablet by mouth Daily., Disp: , Rfl:     Brexpiprazole 0.5 MG tablet, Take 0.5 mg by mouth Daily., Disp: , Rfl:     busPIRone (BUSPAR) 10 MG tablet, Take 1 tablet by mouth 2 (Two) Times a Day., Disp: , Rfl:     citalopram (CeleXA) 40 MG tablet, Take 1 tablet by mouth Daily., Disp: , Rfl:     docusate sodium (COLACE) 100 MG capsule, Take 1 capsule by mouth 2 (Two) Times a Day As Needed for Constipation., Disp: , Rfl:     doxycycline (MONODOX) 100 MG capsule, Take 1 capsule by mouth every 12 hours for 5 days as part of COPD Rescue Kit. (Only Start if in YELLOW ZONE.), Disp: 10 capsule, Rfl: 0    Fluticasone-Umeclidin-Vilant (Trelegy Ellipta) 200-62.5-25 MCG/ACT aerosol powder , Inhale 1 puff Daily., Disp: 180 each, Rfl: 3    HYDROcodone-acetaminophen (NORCO) 5-325 MG per tablet, Take 1 tablet by mouth 2 (Two) Times a Day As Needed., Disp: , Rfl:     hydroxychloroquine (PLAQUENIL) 200 MG tablet, Take 1 tablet by mouth 2 (Two) Times a Day., Disp: ,  "Rfl:     hydrOXYzine (ATARAX) 10 MG tablet, Take 1 tablet by mouth 3 (Three) Times a Day As Needed for Anxiety., Disp: , Rfl:     ipratropium-albuterol (DUO-NEB) 0.5-2.5 mg/3 ml nebulizer, Take 3 mL by nebulization 4 (Four) Times a Day As Needed for Wheezing., Disp: 360 mL, Rfl: 3    ipratropium-albuterol (DUO-NEB) 0.5-2.5 mg/3 ml nebulizer, Take 3 mL by neb every 30 minutes as needed for shortness of air for up to 6 doses. Part of COPD Rescue Kit. (Only Start if in YELLOW ZONE.), Disp: 18 mL, Rfl: 0    levothyroxine (SYNTHROID, LEVOTHROID) 25 MCG tablet, Take 1 tablet by mouth Every Morning., Disp: , Rfl:     losartan (COZAAR) 50 MG tablet, Take 1 tablet by mouth Daily., Disp: , Rfl:     nabumetone (RELAFEN) 750 MG tablet, Take 1 tablet by mouth 2 (Two) Times a Day As Needed., Disp: , Rfl:     oxybutynin (DITROPAN) 5 MG tablet, Take 1 tablet by mouth Daily., Disp: , Rfl:     predniSONE (DELTASONE) 20 MG tablet, Take 2 tablets by mouth daily for 5 days as part of COPD Rescue Kit. (Only Start if in YELLOW ZONE.), Disp: 10 tablet, Rfl: 0    Ventolin  (90 Base) MCG/ACT inhaler, Inhale 2 puffs by mouth Every 4 (Four) Hours As Needed for Wheezing., Disp: 18 g, Rfl: 8    vitamin B-12 (CYANOCOBALAMIN) 2500 MCG sublingual tablet tablet, Place 2,500 mcg under the tongue Daily., Disp: , Rfl:     vitamin D (ERGOCALCIFEROL) 1.25 MG (94761 UT) capsule capsule, Take 1 capsule by mouth 1 (One) Time Per Week., Disp: , Rfl:       Allergies   Allergen Reactions    Codeine     Sulfa Antibiotics        Objective     Objective   Vital Signs:  /80   Pulse 69   Temp 98 °F (36.7 °C) (Temporal)   Resp 18   Ht 162.6 cm (64.02\")   Wt 78.9 kg (174 lb)   SpO2 94%   BMI 29.85 kg/m²   Estimated body mass index is 29.85 kg/m² as calculated from the following:    Height as of this encounter: 162.6 cm (64.02\").    Weight as of this encounter: 78.9 kg (174 lb).    Past Medical History:   Diagnosis Date    Anxiety     Arthritis     " Depression     Hypertensive disorder 3/14/2018    Hypothyroidism 8/3/2021    MARU (stress urinary incontinence, female) 2019     Past Surgical History:   Procedure Laterality Date    BREAST BIOPSY Left 2000    benign    GALLBLADDER SURGERY      GASTRIC BANDING      HYSTERECTOMY      age 37     Social History     Socioeconomic History    Marital status:    Tobacco Use    Smoking status: Former     Types: Cigarettes     Start date: 1971     Quit date: 10/18/2005     Years since quittin.0    Smokeless tobacco: Never   Vaping Use    Vaping Use: Never used   Substance and Sexual Activity    Alcohol use: No    Drug use: No    Sexual activity: Yes     Partners: Male      Physical Exam  Constitutional:       General: She is awake.      Appearance: Normal appearance. She is overweight.   HENT:      Head: Normocephalic and atraumatic.      Nose: Nose normal.      Mouth/Throat:      Mouth: Mucous membranes are moist.      Pharynx: Oropharynx is clear.   Eyes:      Conjunctiva/sclera: Conjunctivae normal.      Pupils: Pupils are equal, round, and reactive to light.   Cardiovascular:      Rate and Rhythm: Normal rate and regular rhythm.      Pulses: Normal pulses.      Heart sounds: Normal heart sounds. No murmur heard.     No friction rub. No gallop.   Pulmonary:      Effort: Pulmonary effort is normal. No tachypnea, accessory muscle usage or respiratory distress.      Breath sounds: Decreased breath sounds present. No wheezing, rhonchi or rales.      Comments: Utilizing 1 L oxygen during visit today  Musculoskeletal:         General: Normal range of motion.      Cervical back: Full passive range of motion without pain and normal range of motion.   Skin:     General: Skin is warm and dry.   Neurological:      General: No focal deficit present.      Mental Status: She is alert. Mental status is at baseline.   Psychiatric:         Mood and Affect: Mood normal.         Behavior: Behavior normal. Behavior is  cooperative.         Thought Content: Thought content normal.        Result Review :  The following labs and radiology results have been reviewed.    Lab Review:   FEV1   Date Value Ref Range Status   06/26/2019 1.38 liters Final     FVC   Date Value Ref Range Status   06/26/2019 1.48 liters Final     No visits with results within 3 Month(s) from this visit.   Latest known visit with results is:   No results displayed because visit has over 200 results.         Reviewed previous CT chest angiogram from 03/21/2023    Narrative & Impression   EXAM:    CT Angiography Chest With Intravenous Contrast     EXAM DATE:    3/21/2023 1:56 PM     CLINICAL HISTORY:    Pulmonary embolism (PE) suspected, positive D-dimer     TECHNIQUE:    Axial computed tomographic angiography images of the chest with  intravenous contrast.  This CT exam was performed using one or more of  the following dose reduction techniques:  automated exposure control,  adjustment of the mA and/or kV according to patient size, and/or use of  iterative reconstruction technique.    MIP reconstructed images were created and reviewed.     COMPARISON:    02/21/2023     FINDINGS:    Pulmonary arteries:  Unremarkable.  No pulmonary embolism.    Aorta:  No acute findings.  No thoracic aortic aneurysm.    Lungs:  Subpleural fibrotic change and interstitial prominence is  again noted.  No mass.    Pleural space:  Unremarkable.  No significant effusion.  No  pneumothorax.    Heart:  Unremarkable.  No cardiomegaly.  No significant pericardial  effusion.  No evidence of RV dysfunction.    Bones/joints:  No acute fracture.  No dislocation.    Soft tissues:  Unremarkable.    Lymph nodes:  Unremarkable.  No enlarged lymph nodes.     IMPRESSION:  1.  No pulmonary embolism.  2.  Subpleural fibrotic change and interstitial prominence is again  noted.     This report was finalized on 3/21/2023 2:41 PM by Dr. Dominguez Sims MD.        Reviewed PFT from 12/2019      Assessment /  Plan         Assessment   Diagnoses and all orders for this visit:    1. ILD (interstitial lung disease) (Primary)  2. Chronic obstructive pulmonary disease, unspecified COPD type  Continue albuterol inhaler 2 puffs every 4-6 hours as needed.   Continue nebulizer treatments every 4-6 hours as needed.   Previously on Breo inhaler but did not feel that it was effective.   Continue Trelegy 200 mcg inhaler 1 puff daily.     -     Ventolin  (90 Base) MCG/ACT inhaler; Inhale 2 puffs by mouth Every 4 (Four) Hours As Needed for Wheezing.  Dispense: 18 g; Refill: 8  -     Fluticasone-Umeclidin-Vilant (Trelegy Ellipta) 200-62.5-25 MCG/ACT aerosol powder ; Inhale 1 puff Daily.  Dispense: 180 each; Refill: 3    3. Former smoker  Previous history of smoking cigarettes and achieved cessation in 2005.   Previous LDCT imaging reviewed with no evidence of pulmonary nodules.   Would no longer qualify for LDCT imaging since achieved cessation >/= 15 years.     4. Chronic respiratory failure with hypoxia  Compliant with continuous 1-2 L supplemental oxygen use.     I spent 20 minutes caring for Lexie on this date of service. This time includes time spent by me in the following activities:preparing for the visit, reviewing tests, obtaining and/or reviewing a separately obtained history, performing a medically appropriate examination and/or evaluation , counseling and educating the patient/family/caregiver, ordering medications, tests, or procedures, referring and communicating with other health care professionals , documenting information in the medical record, independently interpreting results and communicating that information with the patient/family/caregiver, and care coordination    Follow Up   Return in about 6 months (around 4/19/2024), or if symptoms worsen or fail to improve, for Next scheduled follow up.    Patient was given instructions and counseling regarding her condition or for health maintenance advice. Please see  specific information pulled into the AVS if appropriate.       This document has been electronically signed by Marika Ramsey PA-C   October 19, 2023 16:33 EDT    Dictated Utilizing Dragon Dictation: Part of this note may be an electronic transcription/translation of spoken language to printed text using the Dragon Dictation System.

## 2023-12-31 ENCOUNTER — HOSPITAL ENCOUNTER (INPATIENT)
Facility: HOSPITAL | Age: 63
LOS: 2 days | Discharge: HOME | End: 2024-01-02
Attending: STUDENT IN AN ORGANIZED HEALTH CARE EDUCATION/TRAINING PROGRAM | Admitting: INTERNAL MEDICINE
Payer: MEDICARE

## 2023-12-31 ENCOUNTER — APPOINTMENT (OUTPATIENT)
Dept: CT IMAGING | Facility: HOSPITAL | Age: 63
End: 2023-12-31
Payer: MEDICARE

## 2023-12-31 DIAGNOSIS — A41.9 SEPSIS, DUE TO UNSPECIFIED ORGANISM, UNSPECIFIED WHETHER ACUTE ORGAN DYSFUNCTION PRESENT: Primary | ICD-10-CM

## 2023-12-31 DIAGNOSIS — E87.6 HYPOKALEMIA: ICD-10-CM

## 2023-12-31 DIAGNOSIS — N39.0 UTI (URINARY TRACT INFECTION), UNCOMPLICATED: ICD-10-CM

## 2023-12-31 LAB
ALBUMIN SERPL-MCNC: 4.1 G/DL (ref 3.5–5.2)
ALBUMIN/GLOB SERPL: 1.3 G/DL
ALP SERPL-CCNC: 75 U/L (ref 39–117)
ALT SERPL W P-5'-P-CCNC: 10 U/L (ref 1–33)
ANION GAP SERPL CALCULATED.3IONS-SCNC: 12.1 MMOL/L (ref 5–15)
AST SERPL-CCNC: 22 U/L (ref 1–32)
BACTERIA UR QL AUTO: ABNORMAL /HPF
BASOPHILS # BLD AUTO: 0.08 10*3/MM3 (ref 0–0.2)
BASOPHILS NFR BLD AUTO: 0.5 % (ref 0–1.5)
BILIRUB SERPL-MCNC: 0.4 MG/DL (ref 0–1.2)
BILIRUB UR QL STRIP: ABNORMAL
BUN SERPL-MCNC: 15 MG/DL (ref 8–23)
BUN/CREAT SERPL: 19.5 (ref 7–25)
CALCIUM SPEC-SCNC: 9.4 MG/DL (ref 8.6–10.5)
CHLORIDE SERPL-SCNC: 100 MMOL/L (ref 98–107)
CLARITY UR: CLEAR
CO2 SERPL-SCNC: 25.9 MMOL/L (ref 22–29)
COLOR UR: ABNORMAL
CREAT SERPL-MCNC: 0.77 MG/DL (ref 0.57–1)
CRP SERPL-MCNC: 0.71 MG/DL (ref 0–0.5)
D-LACTATE SERPL-SCNC: 1.1 MMOL/L (ref 0.5–2)
D-LACTATE SERPL-SCNC: 2.9 MMOL/L (ref 0.5–2)
DEPRECATED RDW RBC AUTO: 39.9 FL (ref 37–54)
EGFRCR SERPLBLD CKD-EPI 2021: 86.3 ML/MIN/1.73
EOSINOPHIL # BLD AUTO: 0.1 10*3/MM3 (ref 0–0.4)
EOSINOPHIL NFR BLD AUTO: 0.7 % (ref 0.3–6.2)
ERYTHROCYTE [DISTWIDTH] IN BLOOD BY AUTOMATED COUNT: 11.4 % (ref 12.3–15.4)
FLUAV RNA RESP QL NAA+PROBE: NOT DETECTED
FLUBV RNA ISLT QL NAA+PROBE: NOT DETECTED
GLOBULIN UR ELPH-MCNC: 3.1 GM/DL
GLUCOSE SERPL-MCNC: 151 MG/DL (ref 65–99)
GLUCOSE UR STRIP-MCNC: NEGATIVE MG/DL
HCT VFR BLD AUTO: 41.3 % (ref 34–46.6)
HGB BLD-MCNC: 13.9 G/DL (ref 12–15.9)
HGB UR QL STRIP.AUTO: NEGATIVE
HOLD SPECIMEN: NORMAL
HYALINE CASTS UR QL AUTO: ABNORMAL /LPF
IMM GRANULOCYTES # BLD AUTO: 0.07 10*3/MM3 (ref 0–0.05)
IMM GRANULOCYTES NFR BLD AUTO: 0.5 % (ref 0–0.5)
KETONES UR QL STRIP: ABNORMAL
LEUKOCYTE ESTERASE UR QL STRIP.AUTO: ABNORMAL
LIPASE SERPL-CCNC: 9 U/L (ref 13–60)
LYMPHOCYTES # BLD AUTO: 1.62 10*3/MM3 (ref 0.7–3.1)
LYMPHOCYTES NFR BLD AUTO: 10.8 % (ref 19.6–45.3)
MAGNESIUM SERPL-MCNC: 2 MG/DL (ref 1.6–2.4)
MCH RBC QN AUTO: 32 PG (ref 26.6–33)
MCHC RBC AUTO-ENTMCNC: 33.7 G/DL (ref 31.5–35.7)
MCV RBC AUTO: 94.9 FL (ref 79–97)
MONOCYTES # BLD AUTO: 0.92 10*3/MM3 (ref 0.1–0.9)
MONOCYTES NFR BLD AUTO: 6.1 % (ref 5–12)
NEUTROPHILS NFR BLD AUTO: 12.26 10*3/MM3 (ref 1.7–7)
NEUTROPHILS NFR BLD AUTO: 81.4 % (ref 42.7–76)
NITRITE UR QL STRIP: POSITIVE
NRBC BLD AUTO-RTO: 0 /100 WBC (ref 0–0.2)
PH UR STRIP.AUTO: 5.5 [PH] (ref 5–8)
PLATELET # BLD AUTO: 234 10*3/MM3 (ref 140–450)
PMV BLD AUTO: 10.5 FL (ref 6–12)
POTASSIUM SERPL-SCNC: 2.9 MMOL/L (ref 3.5–5.2)
PROCALCITONIN SERPL-MCNC: 0.04 NG/ML (ref 0–0.25)
PROT SERPL-MCNC: 7.2 G/DL (ref 6–8.5)
PROT UR QL STRIP: ABNORMAL
RBC # BLD AUTO: 4.35 10*6/MM3 (ref 3.77–5.28)
RBC # UR STRIP: ABNORMAL /HPF
REF LAB TEST METHOD: ABNORMAL
SARS-COV-2 RNA RESP QL NAA+PROBE: NOT DETECTED
SODIUM SERPL-SCNC: 138 MMOL/L (ref 136–145)
SP GR UR STRIP: >1.03 (ref 1–1.03)
SQUAMOUS #/AREA URNS HPF: ABNORMAL /HPF
UROBILINOGEN UR QL STRIP: ABNORMAL
WAXY CASTS #/AREA URNS LPF: ABNORMAL /LPF
WBC # UR STRIP: ABNORMAL /HPF
WBC NRBC COR # BLD AUTO: 15.05 10*3/MM3 (ref 3.4–10.8)
WHOLE BLOOD HOLD COAG: NORMAL
WHOLE BLOOD HOLD SPECIMEN: NORMAL

## 2023-12-31 PROCEDURE — 74177 CT ABD & PELVIS W/CONTRAST: CPT | Performed by: RADIOLOGY

## 2023-12-31 PROCEDURE — 74177 CT ABD & PELVIS W/CONTRAST: CPT

## 2023-12-31 PROCEDURE — 93005 ELECTROCARDIOGRAM TRACING: CPT | Performed by: INTERNAL MEDICINE

## 2023-12-31 PROCEDURE — 99223 1ST HOSP IP/OBS HIGH 75: CPT | Performed by: PHYSICIAN ASSISTANT

## 2023-12-31 PROCEDURE — 87086 URINE CULTURE/COLONY COUNT: CPT | Performed by: PHYSICIAN ASSISTANT

## 2023-12-31 PROCEDURE — 83690 ASSAY OF LIPASE: CPT | Performed by: STUDENT IN AN ORGANIZED HEALTH CARE EDUCATION/TRAINING PROGRAM

## 2023-12-31 PROCEDURE — 87077 CULTURE AEROBIC IDENTIFY: CPT | Performed by: PHYSICIAN ASSISTANT

## 2023-12-31 PROCEDURE — 83735 ASSAY OF MAGNESIUM: CPT | Performed by: PHYSICIAN ASSISTANT

## 2023-12-31 PROCEDURE — 25510000001 IOPAMIDOL 61 % SOLUTION: Performed by: STUDENT IN AN ORGANIZED HEALTH CARE EDUCATION/TRAINING PROGRAM

## 2023-12-31 PROCEDURE — 99285 EMERGENCY DEPT VISIT HI MDM: CPT

## 2023-12-31 PROCEDURE — 87186 SC STD MICRODIL/AGAR DIL: CPT | Performed by: PHYSICIAN ASSISTANT

## 2023-12-31 PROCEDURE — 36415 COLL VENOUS BLD VENIPUNCTURE: CPT

## 2023-12-31 PROCEDURE — 25010000002 CEFTRIAXONE PER 250 MG: Performed by: PHYSICIAN ASSISTANT

## 2023-12-31 PROCEDURE — 87636 SARSCOV2 & INF A&B AMP PRB: CPT | Performed by: STUDENT IN AN ORGANIZED HEALTH CARE EDUCATION/TRAINING PROGRAM

## 2023-12-31 PROCEDURE — 83036 HEMOGLOBIN GLYCOSYLATED A1C: CPT | Performed by: PHYSICIAN ASSISTANT

## 2023-12-31 PROCEDURE — 83605 ASSAY OF LACTIC ACID: CPT | Performed by: STUDENT IN AN ORGANIZED HEALTH CARE EDUCATION/TRAINING PROGRAM

## 2023-12-31 PROCEDURE — 80053 COMPREHEN METABOLIC PANEL: CPT | Performed by: STUDENT IN AN ORGANIZED HEALTH CARE EDUCATION/TRAINING PROGRAM

## 2023-12-31 PROCEDURE — 85025 COMPLETE CBC W/AUTO DIFF WBC: CPT | Performed by: STUDENT IN AN ORGANIZED HEALTH CARE EDUCATION/TRAINING PROGRAM

## 2023-12-31 PROCEDURE — 84145 PROCALCITONIN (PCT): CPT | Performed by: PHYSICIAN ASSISTANT

## 2023-12-31 PROCEDURE — 25810000003 SODIUM CHLORIDE 0.9 % SOLUTION: Performed by: PHYSICIAN ASSISTANT

## 2023-12-31 PROCEDURE — 93010 ELECTROCARDIOGRAM REPORT: CPT | Performed by: INTERNAL MEDICINE

## 2023-12-31 PROCEDURE — 81001 URINALYSIS AUTO W/SCOPE: CPT | Performed by: STUDENT IN AN ORGANIZED HEALTH CARE EDUCATION/TRAINING PROGRAM

## 2023-12-31 PROCEDURE — 86140 C-REACTIVE PROTEIN: CPT | Performed by: PHYSICIAN ASSISTANT

## 2023-12-31 PROCEDURE — 84443 ASSAY THYROID STIM HORMONE: CPT | Performed by: PHYSICIAN ASSISTANT

## 2023-12-31 PROCEDURE — 87040 BLOOD CULTURE FOR BACTERIA: CPT | Performed by: STUDENT IN AN ORGANIZED HEALTH CARE EDUCATION/TRAINING PROGRAM

## 2023-12-31 RX ORDER — INSULIN LISPRO 100 [IU]/ML
2-7 INJECTION, SOLUTION INTRAVENOUS; SUBCUTANEOUS
Status: DISCONTINUED | OUTPATIENT
Start: 2024-01-01 | End: 2024-01-02 | Stop reason: HOSPADM

## 2023-12-31 RX ORDER — BISACODYL 5 MG/1
5 TABLET, DELAYED RELEASE ORAL DAILY PRN
Status: DISCONTINUED | OUTPATIENT
Start: 2023-12-31 | End: 2024-01-02 | Stop reason: HOSPADM

## 2023-12-31 RX ORDER — AMOXICILLIN 250 MG
2 CAPSULE ORAL 2 TIMES DAILY
Status: DISCONTINUED | OUTPATIENT
Start: 2023-12-31 | End: 2024-01-02 | Stop reason: HOSPADM

## 2023-12-31 RX ORDER — POLYETHYLENE GLYCOL 3350 17 G/17G
17 POWDER, FOR SOLUTION ORAL DAILY PRN
Status: DISCONTINUED | OUTPATIENT
Start: 2023-12-31 | End: 2024-01-02 | Stop reason: HOSPADM

## 2023-12-31 RX ORDER — SODIUM CHLORIDE 9 MG/ML
40 INJECTION, SOLUTION INTRAVENOUS AS NEEDED
Status: DISCONTINUED | OUTPATIENT
Start: 2023-12-31 | End: 2024-01-02 | Stop reason: HOSPADM

## 2023-12-31 RX ORDER — SODIUM CHLORIDE 0.9 % (FLUSH) 0.9 %
10 SYRINGE (ML) INJECTION EVERY 12 HOURS SCHEDULED
Status: DISCONTINUED | OUTPATIENT
Start: 2023-12-31 | End: 2024-01-02 | Stop reason: HOSPADM

## 2023-12-31 RX ORDER — BISACODYL 10 MG
10 SUPPOSITORY, RECTAL RECTAL DAILY PRN
Status: DISCONTINUED | OUTPATIENT
Start: 2023-12-31 | End: 2024-01-02 | Stop reason: HOSPADM

## 2023-12-31 RX ORDER — POTASSIUM CHLORIDE 20 MEQ/1
40 TABLET, EXTENDED RELEASE ORAL EVERY 4 HOURS
Status: COMPLETED | OUTPATIENT
Start: 2023-12-31 | End: 2024-01-01

## 2023-12-31 RX ORDER — DEXTROSE MONOHYDRATE 25 G/50ML
25 INJECTION, SOLUTION INTRAVENOUS
Status: DISCONTINUED | OUTPATIENT
Start: 2023-12-31 | End: 2024-01-02 | Stop reason: HOSPADM

## 2023-12-31 RX ORDER — SODIUM CHLORIDE 0.9 % (FLUSH) 0.9 %
10 SYRINGE (ML) INJECTION AS NEEDED
Status: DISCONTINUED | OUTPATIENT
Start: 2023-12-31 | End: 2024-01-02 | Stop reason: HOSPADM

## 2023-12-31 RX ORDER — GLUCAGON 1 MG/ML
1 KIT INJECTION
Status: DISCONTINUED | OUTPATIENT
Start: 2023-12-31 | End: 2024-01-02 | Stop reason: HOSPADM

## 2023-12-31 RX ORDER — SODIUM CHLORIDE 0.9 % (FLUSH) 0.9 %
10 SYRINGE (ML) INJECTION AS NEEDED
Status: DISCONTINUED | OUTPATIENT
Start: 2023-12-31 | End: 2023-12-31

## 2023-12-31 RX ORDER — NICOTINE POLACRILEX 4 MG
15 LOZENGE BUCCAL
Status: DISCONTINUED | OUTPATIENT
Start: 2023-12-31 | End: 2024-01-02 | Stop reason: HOSPADM

## 2023-12-31 RX ORDER — ENOXAPARIN SODIUM 100 MG/ML
40 INJECTION SUBCUTANEOUS NIGHTLY
Status: DISCONTINUED | OUTPATIENT
Start: 2023-12-31 | End: 2024-01-02 | Stop reason: HOSPADM

## 2023-12-31 RX ADMIN — IOPAMIDOL 80 ML: 612 INJECTION, SOLUTION INTRAVENOUS at 20:36

## 2023-12-31 RX ADMIN — SODIUM CHLORIDE 1000 ML: 9 INJECTION, SOLUTION INTRAVENOUS at 21:09

## 2023-12-31 RX ADMIN — POTASSIUM CHLORIDE 40 MEQ: 1500 TABLET, EXTENDED RELEASE ORAL at 21:09

## 2023-12-31 RX ADMIN — SODIUM CHLORIDE 2000 MG: 9 INJECTION, SOLUTION INTRAVENOUS at 20:51

## 2023-12-31 NOTE — ED NOTES
Pt provided urine collection supplies and advised the need for sample. Pt verbalized understanding. Pt unable to provide at this time      This document is complete and the patient is ready for discharge.

## 2023-12-31 NOTE — ED NOTES
Dr. Phillips aware of simple sepsis screening. Orders for cultures placed to complete sepsis orders

## 2023-12-31 NOTE — ED NOTES
MEDICAL SCREENING:    Reason for Visit: Vomiting    Patient initially seen in triage.  The patient was advised further evaluation and diagnostic testing will be needed, some of the treatment and testing will be initiated in the lobby in order to begin the process.  The patient will be returned to the waiting area for the time being and possibly be re-assessed by a subsequent ED provider.  The patient will be brought back to the treatment area in as timely manner as possible.'     Scout Phillips, DO  12/31/23 1816

## 2024-01-01 ENCOUNTER — APPOINTMENT (OUTPATIENT)
Dept: GENERAL RADIOLOGY | Facility: HOSPITAL | Age: 64
DRG: 207 | End: 2024-01-01
Payer: MEDICARE

## 2024-01-01 ENCOUNTER — APPOINTMENT (OUTPATIENT)
Dept: CT IMAGING | Facility: HOSPITAL | Age: 64
DRG: 207 | End: 2024-01-01
Payer: MEDICARE

## 2024-01-01 ENCOUNTER — APPOINTMENT (OUTPATIENT)
Dept: NUCLEAR MEDICINE | Facility: HOSPITAL | Age: 64
DRG: 207 | End: 2024-01-01
Payer: MEDICARE

## 2024-01-01 ENCOUNTER — APPOINTMENT (OUTPATIENT)
Dept: CARDIOLOGY | Facility: HOSPITAL | Age: 64
DRG: 207 | End: 2024-01-01
Payer: MEDICARE

## 2024-01-01 ENCOUNTER — HOSPITAL ENCOUNTER (INPATIENT)
Facility: HOSPITAL | Age: 64
LOS: 19 days | DRG: 207 | End: 2024-08-06
Attending: EMERGENCY MEDICINE | Admitting: HOSPITALIST
Payer: MEDICARE

## 2024-01-01 DIAGNOSIS — I21.4 NON-STEMI (NON-ST ELEVATED MYOCARDIAL INFARCTION): Primary | ICD-10-CM

## 2024-01-01 DIAGNOSIS — J93.9 PNEUMOTHORAX, UNSPECIFIED TYPE: ICD-10-CM

## 2024-01-01 LAB
027 TOXIN: NORMAL
25(OH)D3 SERPL-MCNC: 50 NG/ML (ref 30–100)
A FLAVUS AB SER QL ID: NEGATIVE
A FUMIGATUS AB SER QL ID: NEGATIVE
A NIGER AB SER QL ID: NEGATIVE
A-A DO2: 18.6 MMHG (ref 0–300)
A-A DO2: 208.9 MMHG (ref 0–300)
A-A DO2: 28.3 MMHG (ref 0–300)
A-A DO2: 292.9 MMHG (ref 0–300)
A-A DO2: 327.5 MMHG (ref 0–300)
A-A DO2: 43 MMHG (ref 0–300)
A-A DO2: 56 MMHG (ref 0–300)
A-A DO2: 82.1 MMHG (ref 0–300)
ADV 40+41 DNA STL QL NAA+NON-PROBE: NOT DETECTED
ALBUMIN SERPL-MCNC: 3 G/DL (ref 3.5–5.2)
ALBUMIN SERPL-MCNC: 3 G/DL (ref 3.5–5.2)
ALBUMIN SERPL-MCNC: 3.1 G/DL (ref 3.5–5.2)
ALBUMIN SERPL-MCNC: 3.2 G/DL (ref 3.5–5.2)
ALBUMIN SERPL-MCNC: 3.4 G/DL (ref 3.5–5.2)
ALBUMIN SERPL-MCNC: 3.5 G/DL (ref 3.5–5.2)
ALBUMIN SERPL-MCNC: 3.6 G/DL (ref 3.5–5.2)
ALBUMIN SERPL-MCNC: 4.1 G/DL (ref 3.5–5.2)
ALBUMIN/GLOB SERPL: 1.1 G/DL
ALBUMIN/GLOB SERPL: 1.2 G/DL
ALBUMIN/GLOB SERPL: 1.3 G/DL
ALBUMIN/GLOB SERPL: 1.4 G/DL
ALBUMIN/GLOB SERPL: 1.6 G/DL
ALBUMIN/GLOB SERPL: 1.6 G/DL
ALBUMIN/GLOB SERPL: 1.7 G/DL
ALBUMIN/GLOB SERPL: 1.8 G/DL
ALBUMIN/GLOB SERPL: 1.9 G/DL
ALBUMIN/GLOB SERPL: 2 G/DL
ALBUMIN/GLOB SERPL: 2.1 G/DL
ALP SERPL-CCNC: 103 U/L (ref 39–117)
ALP SERPL-CCNC: 104 U/L (ref 39–117)
ALP SERPL-CCNC: 106 U/L (ref 39–117)
ALP SERPL-CCNC: 142 U/L (ref 39–117)
ALP SERPL-CCNC: 58 U/L (ref 39–117)
ALP SERPL-CCNC: 70 U/L (ref 39–117)
ALP SERPL-CCNC: 78 U/L (ref 39–117)
ALP SERPL-CCNC: 83 U/L (ref 39–117)
ALP SERPL-CCNC: 89 U/L (ref 39–117)
ALP SERPL-CCNC: 90 U/L (ref 39–117)
ALP SERPL-CCNC: 94 U/L (ref 39–117)
ALT SERPL W P-5'-P-CCNC: 10 U/L (ref 1–33)
ALT SERPL W P-5'-P-CCNC: 12 U/L (ref 1–33)
ALT SERPL W P-5'-P-CCNC: 13 U/L (ref 1–33)
ALT SERPL W P-5'-P-CCNC: 14 U/L (ref 1–33)
ALT SERPL W P-5'-P-CCNC: 15 U/L (ref 1–33)
ALT SERPL W P-5'-P-CCNC: 17 U/L (ref 1–33)
ALT SERPL W P-5'-P-CCNC: 24 U/L (ref 1–33)
ALT SERPL W P-5'-P-CCNC: 26 U/L (ref 1–33)
ALT SERPL W P-5'-P-CCNC: 45 U/L (ref 1–33)
ALT SERPL W P-5'-P-CCNC: 58 U/L (ref 1–33)
ALT SERPL W P-5'-P-CCNC: 7 U/L (ref 1–33)
ANION GAP SERPL CALCULATED.3IONS-SCNC: 10.5 MMOL/L (ref 5–15)
ANION GAP SERPL CALCULATED.3IONS-SCNC: 10.8 MMOL/L (ref 5–15)
ANION GAP SERPL CALCULATED.3IONS-SCNC: 11.1 MMOL/L (ref 5–15)
ANION GAP SERPL CALCULATED.3IONS-SCNC: 11.5 MMOL/L (ref 5–15)
ANION GAP SERPL CALCULATED.3IONS-SCNC: 11.8 MMOL/L (ref 5–15)
ANION GAP SERPL CALCULATED.3IONS-SCNC: 4.4 MMOL/L (ref 5–15)
ANION GAP SERPL CALCULATED.3IONS-SCNC: 5 MMOL/L (ref 5–15)
ANION GAP SERPL CALCULATED.3IONS-SCNC: 5 MMOL/L (ref 5–15)
ANION GAP SERPL CALCULATED.3IONS-SCNC: 5.1 MMOL/L (ref 5–15)
ANION GAP SERPL CALCULATED.3IONS-SCNC: 5.3 MMOL/L (ref 5–15)
ANION GAP SERPL CALCULATED.3IONS-SCNC: 5.4 MMOL/L (ref 5–15)
ANION GAP SERPL CALCULATED.3IONS-SCNC: 6.8 MMOL/L (ref 5–15)
ANION GAP SERPL CALCULATED.3IONS-SCNC: 6.9 MMOL/L (ref 5–15)
ANION GAP SERPL CALCULATED.3IONS-SCNC: 8.8 MMOL/L (ref 5–15)
ANION GAP SERPL CALCULATED.3IONS-SCNC: 8.9 MMOL/L (ref 5–15)
ANION GAP SERPL CALCULATED.3IONS-SCNC: 9.1 MMOL/L (ref 5–15)
ANION GAP SERPL CALCULATED.3IONS-SCNC: 9.8 MMOL/L (ref 5–15)
APTT PPP: 22.6 SECONDS (ref 26.5–34.5)
APTT PPP: 24.9 SECONDS (ref 26.5–34.5)
APTT PPP: 26.5 SECONDS (ref 26.5–34.5)
ARTERIAL PATENCY WRIST A: ABNORMAL
ARTERIAL PATENCY WRIST A: POSITIVE
AST SERPL-CCNC: 16 U/L (ref 1–32)
AST SERPL-CCNC: 21 U/L (ref 1–32)
AST SERPL-CCNC: 22 U/L (ref 1–32)
AST SERPL-CCNC: 23 U/L (ref 1–32)
AST SERPL-CCNC: 23 U/L (ref 1–32)
AST SERPL-CCNC: 24 U/L (ref 1–32)
AST SERPL-CCNC: 26 U/L (ref 1–32)
AST SERPL-CCNC: 33 U/L (ref 1–32)
AST SERPL-CCNC: 37 U/L (ref 1–32)
AST SERPL-CCNC: 43 U/L (ref 1–32)
AST SERPL-CCNC: 52 U/L (ref 1–32)
ASTRO TYP 1-8 RNA STL QL NAA+NON-PROBE: NOT DETECTED
ATMOSPHERIC PRESS: 726 MMHG
ATMOSPHERIC PRESS: 727 MMHG
ATMOSPHERIC PRESS: 727 MMHG
ATMOSPHERIC PRESS: 728 MMHG
ATMOSPHERIC PRESS: 729 MMHG
ATMOSPHERIC PRESS: 730 MMHG
ATMOSPHERIC PRESS: 730 MMHG
B DERMAT AB TITR SER: NEGATIVE {TITER}
B PARAPERT DNA SPEC QL NAA+PROBE: NOT DETECTED
B PARAPERT DNA SPEC QL NAA+PROBE: NOT DETECTED
B PERT DNA SPEC QL NAA+PROBE: NOT DETECTED
B PERT DNA SPEC QL NAA+PROBE: NOT DETECTED
BACTERIA SPEC AEROBE CULT: ABNORMAL
BACTERIA SPEC AEROBE CULT: NORMAL
BACTERIA SPEC RESP CULT: NO GROWTH
BACTERIA SPEC RESP CULT: NORMAL
BACTERIA UR QL AUTO: ABNORMAL /HPF
BASE EXCESS BLDA CALC-SCNC: -0.5 MMOL/L (ref 0–2)
BASE EXCESS BLDA CALC-SCNC: -1.9 MMOL/L (ref 0–2)
BASE EXCESS BLDA CALC-SCNC: 0.3 MMOL/L (ref 0–2)
BASE EXCESS BLDA CALC-SCNC: 1 MMOL/L (ref 0–2)
BASE EXCESS BLDA CALC-SCNC: 2.1 MMOL/L (ref 0–2)
BASE EXCESS BLDA CALC-SCNC: 2.5 MMOL/L (ref 0–2)
BASE EXCESS BLDA CALC-SCNC: 4.2 MMOL/L (ref 0–2)
BASE EXCESS BLDA CALC-SCNC: 7 MMOL/L (ref 0–2)
BASE EXCESS BLDV CALC-SCNC: -1.1 MMOL/L (ref 0–2)
BASE EXCESS BLDV CALC-SCNC: -1.6 MMOL/L (ref 0–2)
BASE EXCESS BLDV CALC-SCNC: -1.9 MMOL/L (ref 0–2)
BASE EXCESS BLDV CALC-SCNC: 1.1 MMOL/L (ref 0–2)
BASE EXCESS BLDV CALC-SCNC: 1.6 MMOL/L (ref 0–2)
BASE EXCESS BLDV CALC-SCNC: 1.6 MMOL/L (ref 0–2)
BASE EXCESS BLDV CALC-SCNC: 8.4 MMOL/L (ref 0–2)
BASOPHILS # BLD AUTO: 0 10*3/MM3 (ref 0–0.2)
BASOPHILS # BLD AUTO: 0.01 10*3/MM3 (ref 0–0.2)
BASOPHILS # BLD AUTO: 0.01 10*3/MM3 (ref 0–0.2)
BASOPHILS # BLD AUTO: 0.02 10*3/MM3 (ref 0–0.2)
BASOPHILS # BLD AUTO: 0.02 10*3/MM3 (ref 0–0.2)
BASOPHILS # BLD AUTO: 0.03 10*3/MM3 (ref 0–0.2)
BASOPHILS # BLD AUTO: 0.03 10*3/MM3 (ref 0–0.2)
BASOPHILS # BLD AUTO: 0.04 10*3/MM3 (ref 0–0.2)
BASOPHILS # BLD AUTO: 0.04 10*3/MM3 (ref 0–0.2)
BASOPHILS # BLD AUTO: 0.06 10*3/MM3 (ref 0–0.2)
BASOPHILS # BLD AUTO: 0.08 10*3/MM3 (ref 0–0.2)
BASOPHILS NFR BLD AUTO: 0 % (ref 0–1.5)
BASOPHILS NFR BLD AUTO: 0.1 % (ref 0–1.5)
BASOPHILS NFR BLD AUTO: 0.2 % (ref 0–1.5)
BASOPHILS NFR BLD AUTO: 0.3 % (ref 0–1.5)
BASOPHILS NFR BLD AUTO: 0.3 % (ref 0–1.5)
BASOPHILS NFR BLD AUTO: 0.5 % (ref 0–1.5)
BDY SITE: ABNORMAL
BH CV ECHO MEAS - AO MAX PG: 7 MMHG
BH CV ECHO MEAS - AO MEAN PG: 4 MMHG
BH CV ECHO MEAS - AO ROOT DIAM: 2.5 CM
BH CV ECHO MEAS - AO V2 MAX: 132 CM/SEC
BH CV ECHO MEAS - AO V2 VTI: 23.3 CM
BH CV ECHO MEAS - EDV(CUBED): 110.6 ML
BH CV ECHO MEAS - EDV(MOD-SP4): 60.5 ML
BH CV ECHO MEAS - EF(MOD-SP4): 33.9 %
BH CV ECHO MEAS - ESV(CUBED): 42.9 ML
BH CV ECHO MEAS - ESV(MOD-SP4): 40 ML
BH CV ECHO MEAS - FS: 27.1 %
BH CV ECHO MEAS - IVS/LVPW: 1 CM
BH CV ECHO MEAS - IVSD: 1.2 CM
BH CV ECHO MEAS - LA DIMENSION: 4.5 CM
BH CV ECHO MEAS - LAT PEAK E' VEL: 5.8 CM/SEC
BH CV ECHO MEAS - LV DIASTOLIC VOL/BSA (35-75): 37.5 CM2
BH CV ECHO MEAS - LV MASS(C)D: 219.1 GRAMS
BH CV ECHO MEAS - LV SYSTOLIC VOL/BSA (12-30): 24.8 CM2
BH CV ECHO MEAS - LVIDD: 4.8 CM
BH CV ECHO MEAS - LVIDS: 3.5 CM
BH CV ECHO MEAS - LVOT AREA: 2.5 CM2
BH CV ECHO MEAS - LVOT DIAM: 1.8 CM
BH CV ECHO MEAS - LVPWD: 1.2 CM
BH CV ECHO MEAS - MED PEAK E' VEL: 8.6 CM/SEC
BH CV ECHO MEAS - MV A MAX VEL: 95.5 CM/SEC
BH CV ECHO MEAS - MV E MAX VEL: 82 CM/SEC
BH CV ECHO MEAS - MV E/A: 0.86
BH CV ECHO MEAS - PA ACC TIME: 0.09 SEC
BH CV ECHO MEAS - RAP SYSTOLE: 10 MMHG
BH CV ECHO MEAS - RVSP: 49.4 MMHG
BH CV ECHO MEAS - SV(MOD-SP4): 20.5 ML
BH CV ECHO MEAS - SVI(MOD-SP4): 12.7 ML/M2
BH CV ECHO MEAS - TAPSE (>1.6): 1.68 CM
BH CV ECHO MEAS - TR MAX PG: 39.4 MMHG
BH CV ECHO MEAS - TR MAX VEL: 314 CM/SEC
BH CV ECHO MEASUREMENTS AVERAGE E/E' RATIO: 11.39
BH CV NUCLEAR PRIOR STUDY: 3
BH CV REST NUCLEAR ISOTOPE DOSE: 8.9 MCI
BH CV STRESS BP STAGE 1: NORMAL
BH CV STRESS COMMENTS STAGE 1: NORMAL
BH CV STRESS DOSE REGADENOSON STAGE 1: 0.4
BH CV STRESS DURATION MIN STAGE 1: 0
BH CV STRESS DURATION SEC STAGE 1: 10
BH CV STRESS HR STAGE 1: 116
BH CV STRESS NUCLEAR ISOTOPE DOSE: 28.5 MCI
BH CV STRESS PROTOCOL 1: NORMAL
BH CV STRESS RECOVERY BP: NORMAL MMHG
BH CV STRESS RECOVERY HR: 113 BPM
BH CV STRESS STAGE 1: 1
BILIRUB SERPL-MCNC: 0.2 MG/DL (ref 0–1.2)
BILIRUB SERPL-MCNC: 0.3 MG/DL (ref 0–1.2)
BILIRUB SERPL-MCNC: 0.3 MG/DL (ref 0–1.2)
BILIRUB SERPL-MCNC: 0.4 MG/DL (ref 0–1.2)
BILIRUB SERPL-MCNC: <0.2 MG/DL (ref 0–1.2)
BILIRUB UR QL STRIP: NEGATIVE
BUN SERPL-MCNC: 10 MG/DL (ref 8–23)
BUN SERPL-MCNC: 15 MG/DL (ref 8–23)
BUN SERPL-MCNC: 15 MG/DL (ref 8–23)
BUN SERPL-MCNC: 16 MG/DL (ref 8–23)
BUN SERPL-MCNC: 16 MG/DL (ref 8–23)
BUN SERPL-MCNC: 17 MG/DL (ref 8–23)
BUN SERPL-MCNC: 19 MG/DL (ref 8–23)
BUN SERPL-MCNC: 20 MG/DL (ref 8–23)
BUN SERPL-MCNC: 20 MG/DL (ref 8–23)
BUN SERPL-MCNC: 23 MG/DL (ref 8–23)
BUN SERPL-MCNC: 25 MG/DL (ref 8–23)
BUN SERPL-MCNC: 29 MG/DL (ref 8–23)
BUN SERPL-MCNC: 29 MG/DL (ref 8–23)
BUN SERPL-MCNC: 7 MG/DL (ref 8–23)
BUN SERPL-MCNC: 8 MG/DL (ref 8–23)
BUN/CREAT SERPL: 100 (ref 7–25)
BUN/CREAT SERPL: 16.3 (ref 7–25)
BUN/CREAT SERPL: 18.9 (ref 7–25)
BUN/CREAT SERPL: 18.9 (ref 7–25)
BUN/CREAT SERPL: 34.1 (ref 7–25)
BUN/CREAT SERPL: 44.4 (ref 7–25)
BUN/CREAT SERPL: 44.7 (ref 7–25)
BUN/CREAT SERPL: 45.9 (ref 7–25)
BUN/CREAT SERPL: 53.2 (ref 7–25)
BUN/CREAT SERPL: 60.6 (ref 7–25)
BUN/CREAT SERPL: 65.2 (ref 7–25)
BUN/CREAT SERPL: 72.7 (ref 7–25)
BUN/CREAT SERPL: 77.3 (ref 7–25)
BUN/CREAT SERPL: 82.6 (ref 7–25)
BUN/CREAT SERPL: 84.2 (ref 7–25)
BUN/CREAT SERPL: 88.5 (ref 7–25)
BUN/CREAT SERPL: 93.5 (ref 7–25)
C CAYETANENSIS DNA STL QL NAA+NON-PROBE: NOT DETECTED
C COLI+JEJ+UPSA DNA STL QL NAA+NON-PROBE: NOT DETECTED
C DIFF TOX GENS STL QL NAA+PROBE: NEGATIVE
C PNEUM DNA NPH QL NAA+NON-PROBE: NOT DETECTED
C PNEUM DNA NPH QL NAA+NON-PROBE: NOT DETECTED
CA-I SERPL ISE-MCNC: 1.12 MMOL/L (ref 1.12–1.32)
CALCIUM SPEC-SCNC: 8 MG/DL (ref 8.6–10.5)
CALCIUM SPEC-SCNC: 8.1 MG/DL (ref 8.6–10.5)
CALCIUM SPEC-SCNC: 8.1 MG/DL (ref 8.6–10.5)
CALCIUM SPEC-SCNC: 8.2 MG/DL (ref 8.6–10.5)
CALCIUM SPEC-SCNC: 8.3 MG/DL (ref 8.6–10.5)
CALCIUM SPEC-SCNC: 8.3 MG/DL (ref 8.6–10.5)
CALCIUM SPEC-SCNC: 8.4 MG/DL (ref 8.6–10.5)
CALCIUM SPEC-SCNC: 8.8 MG/DL (ref 8.6–10.5)
CALCIUM SPEC-SCNC: 8.8 MG/DL (ref 8.6–10.5)
CALCIUM SPEC-SCNC: 9.2 MG/DL (ref 8.6–10.5)
CHLORIDE SERPL-SCNC: 100 MMOL/L (ref 98–107)
CHLORIDE SERPL-SCNC: 101 MMOL/L (ref 98–107)
CHLORIDE SERPL-SCNC: 102 MMOL/L (ref 98–107)
CHLORIDE SERPL-SCNC: 103 MMOL/L (ref 98–107)
CHLORIDE SERPL-SCNC: 103 MMOL/L (ref 98–107)
CHLORIDE SERPL-SCNC: 104 MMOL/L (ref 98–107)
CHLORIDE SERPL-SCNC: 106 MMOL/L (ref 98–107)
CHLORIDE SERPL-SCNC: 107 MMOL/L (ref 98–107)
CHLORIDE SERPL-SCNC: 108 MMOL/L (ref 98–107)
CHLORIDE SERPL-SCNC: 109 MMOL/L (ref 98–107)
CHLORIDE SERPL-SCNC: 112 MMOL/L (ref 98–107)
CHLORIDE SERPL-SCNC: 114 MMOL/L (ref 98–107)
CHLORIDE SERPL-SCNC: 93 MMOL/L (ref 98–107)
CHLORIDE SERPL-SCNC: 96 MMOL/L (ref 98–107)
CHLORIDE SERPL-SCNC: 99 MMOL/L (ref 98–107)
CHOLEST SERPL-MCNC: 136 MG/DL (ref 0–200)
CLARITY UR: ABNORMAL
CO2 BLDA-SCNC: 25.1 MMOL/L (ref 22–33)
CO2 BLDA-SCNC: 26 MMOL/L (ref 22–33)
CO2 BLDA-SCNC: 26.4 MMOL/L (ref 22–33)
CO2 BLDA-SCNC: 27.6 MMOL/L (ref 22–33)
CO2 BLDA-SCNC: 28.2 MMOL/L (ref 22–33)
CO2 BLDA-SCNC: 28.2 MMOL/L (ref 22–33)
CO2 BLDA-SCNC: 28.4 MMOL/L (ref 22–33)
CO2 BLDA-SCNC: 28.5 MMOL/L (ref 22–33)
CO2 BLDA-SCNC: 28.7 MMOL/L (ref 22–33)
CO2 BLDA-SCNC: 29.5 MMOL/L (ref 22–33)
CO2 BLDA-SCNC: 29.7 MMOL/L (ref 22–33)
CO2 BLDA-SCNC: 30 MMOL/L (ref 22–33)
CO2 BLDA-SCNC: 30.5 MMOL/L (ref 22–33)
CO2 BLDA-SCNC: 34.4 MMOL/L (ref 22–33)
CO2 BLDA-SCNC: 36.3 MMOL/L (ref 22–33)
CO2 SERPL-SCNC: 20.5 MMOL/L (ref 22–29)
CO2 SERPL-SCNC: 21.5 MMOL/L (ref 22–29)
CO2 SERPL-SCNC: 22.2 MMOL/L (ref 22–29)
CO2 SERPL-SCNC: 23.9 MMOL/L (ref 22–29)
CO2 SERPL-SCNC: 24.1 MMOL/L (ref 22–29)
CO2 SERPL-SCNC: 24.2 MMOL/L (ref 22–29)
CO2 SERPL-SCNC: 24.9 MMOL/L (ref 22–29)
CO2 SERPL-SCNC: 25 MMOL/L (ref 22–29)
CO2 SERPL-SCNC: 25.2 MMOL/L (ref 22–29)
CO2 SERPL-SCNC: 25.6 MMOL/L (ref 22–29)
CO2 SERPL-SCNC: 26.1 MMOL/L (ref 22–29)
CO2 SERPL-SCNC: 26.2 MMOL/L (ref 22–29)
CO2 SERPL-SCNC: 27.9 MMOL/L (ref 22–29)
CO2 SERPL-SCNC: 28.2 MMOL/L (ref 22–29)
CO2 SERPL-SCNC: 29.7 MMOL/L (ref 22–29)
CO2 SERPL-SCNC: 30.6 MMOL/L (ref 22–29)
CO2 SERPL-SCNC: 31 MMOL/L (ref 22–29)
COHGB MFR BLD: 1.5 % (ref 0–5)
COHGB MFR BLD: 1.6 % (ref 0–5)
COHGB MFR BLD: 1.7 % (ref 0–5)
COHGB MFR BLD: 1.8 % (ref 0–5)
COHGB MFR BLD: 1.9 % (ref 0–5)
COHGB MFR BLD: 1.9 % (ref 0–5)
COHGB MFR BLD: 2 % (ref 0–5)
COHGB MFR BLD: 2.2 % (ref 0–5)
COHGB MFR BLD: 2.2 % (ref 0–5)
COHGB MFR BLD: 2.4 % (ref 0–5)
COLOR UR: YELLOW
CORTIS AM PEAK SERPL-MCNC: 22.65 MCG/DL
CREAT SERPL-MCNC: 0.19 MG/DL (ref 0.57–1)
CREAT SERPL-MCNC: 0.22 MG/DL (ref 0.57–1)
CREAT SERPL-MCNC: 0.22 MG/DL (ref 0.57–1)
CREAT SERPL-MCNC: 0.23 MG/DL (ref 0.57–1)
CREAT SERPL-MCNC: 0.23 MG/DL (ref 0.57–1)
CREAT SERPL-MCNC: 0.26 MG/DL (ref 0.57–1)
CREAT SERPL-MCNC: 0.29 MG/DL (ref 0.57–1)
CREAT SERPL-MCNC: 0.31 MG/DL (ref 0.57–1)
CREAT SERPL-MCNC: 0.33 MG/DL (ref 0.57–1)
CREAT SERPL-MCNC: 0.37 MG/DL (ref 0.57–1)
CREAT SERPL-MCNC: 0.37 MG/DL (ref 0.57–1)
CREAT SERPL-MCNC: 0.38 MG/DL (ref 0.57–1)
CREAT SERPL-MCNC: 0.44 MG/DL (ref 0.57–1)
CREAT SERPL-MCNC: 0.45 MG/DL (ref 0.57–1)
CREAT SERPL-MCNC: 0.47 MG/DL (ref 0.57–1)
CREAT SERPL-MCNC: 0.49 MG/DL (ref 0.57–1)
CREAT SERPL-MCNC: 0.53 MG/DL (ref 0.57–1)
CRP SERPL-MCNC: 1.65 MG/DL (ref 0–0.5)
CRP SERPL-MCNC: 10.32 MG/DL (ref 0–0.5)
CRP SERPL-MCNC: 3.89 MG/DL (ref 0–0.5)
CRP SERPL-MCNC: 6.5 MG/DL (ref 0–0.5)
CRYPTOSP DNA STL QL NAA+NON-PROBE: NOT DETECTED
D DIMER PPP FEU-MCNC: 0.29 MCGFEU/ML (ref 0–0.65)
D-LACTATE SERPL-SCNC: 1.1 MMOL/L (ref 0.5–2)
D-LACTATE SERPL-SCNC: 1.3 MMOL/L (ref 0.5–2)
D-LACTATE SERPL-SCNC: 1.5 MMOL/L (ref 0.5–2)
D-LACTATE SERPL-SCNC: 1.5 MMOL/L (ref 0.5–2)
D-LACTATE SERPL-SCNC: 2 MMOL/L (ref 0.5–2)
DEPRECATED RDW RBC AUTO: 41.8 FL (ref 37–54)
DEPRECATED RDW RBC AUTO: 44.7 FL (ref 37–54)
DEPRECATED RDW RBC AUTO: 44.9 FL (ref 37–54)
DEPRECATED RDW RBC AUTO: 45.7 FL (ref 37–54)
DEPRECATED RDW RBC AUTO: 45.9 FL (ref 37–54)
DEPRECATED RDW RBC AUTO: 48.8 FL (ref 37–54)
DEPRECATED RDW RBC AUTO: 49.1 FL (ref 37–54)
DEPRECATED RDW RBC AUTO: 50.4 FL (ref 37–54)
DEPRECATED RDW RBC AUTO: 51.5 FL (ref 37–54)
DEPRECATED RDW RBC AUTO: 52.4 FL (ref 37–54)
DEPRECATED RDW RBC AUTO: 53.8 FL (ref 37–54)
DEPRECATED RDW RBC AUTO: 54.2 FL (ref 37–54)
DEPRECATED RDW RBC AUTO: 54.3 FL (ref 37–54)
DEPRECATED RDW RBC AUTO: 54.7 FL (ref 37–54)
DEPRECATED RDW RBC AUTO: 55.2 FL (ref 37–54)
DEPRECATED RDW RBC AUTO: 56.7 FL (ref 37–54)
DEPRECATED RDW RBC AUTO: 58.5 FL (ref 37–54)
DEPRECATED RDW RBC AUTO: 60.2 FL (ref 37–54)
DPYD GENE MUT ANL BLD/T: NEGATIVE
E HISTOLYT DNA STL QL NAA+NON-PROBE: NOT DETECTED
EAEC PAA PLAS AGGR+AATA ST NAA+NON-PRB: NOT DETECTED
EC STX1+STX2 GENES STL QL NAA+NON-PROBE: NOT DETECTED
EGFRCR SERPLBLD CKD-EPI 2021: 103.4 ML/MIN/1.73
EGFRCR SERPLBLD CKD-EPI 2021: 104.7 ML/MIN/1.73
EGFRCR SERPLBLD CKD-EPI 2021: 105.8 ML/MIN/1.73
EGFRCR SERPLBLD CKD-EPI 2021: 106.9 ML/MIN/1.73
EGFRCR SERPLBLD CKD-EPI 2021: 107.5 ML/MIN/1.73
EGFRCR SERPLBLD CKD-EPI 2021: 111.4 ML/MIN/1.73
EGFRCR SERPLBLD CKD-EPI 2021: 112.1 ML/MIN/1.73
EGFRCR SERPLBLD CKD-EPI 2021: 112.1 ML/MIN/1.73
EGFRCR SERPLBLD CKD-EPI 2021: 115.2 ML/MIN/1.73
EGFRCR SERPLBLD CKD-EPI 2021: 117 ML/MIN/1.73
EGFRCR SERPLBLD CKD-EPI 2021: 118.9 ML/MIN/1.73
EGFRCR SERPLBLD CKD-EPI 2021: 122 ML/MIN/1.73
EGFRCR SERPLBLD CKD-EPI 2021: 125.7 ML/MIN/1.73
EGFRCR SERPLBLD CKD-EPI 2021: 125.7 ML/MIN/1.73
EGFRCR SERPLBLD CKD-EPI 2021: 127 ML/MIN/1.73
EGFRCR SERPLBLD CKD-EPI 2021: 127 ML/MIN/1.73
EGFRCR SERPLBLD CKD-EPI 2021: 131.6 ML/MIN/1.73
EOSINOPHIL # BLD AUTO: 0 10*3/MM3 (ref 0–0.4)
EOSINOPHIL # BLD AUTO: 0.01 10*3/MM3 (ref 0–0.4)
EOSINOPHIL # BLD AUTO: 0.06 10*3/MM3 (ref 0–0.4)
EOSINOPHIL # BLD AUTO: 0.07 10*3/MM3 (ref 0–0.4)
EOSINOPHIL # BLD AUTO: 0.08 10*3/MM3 (ref 0–0.4)
EOSINOPHIL # BLD AUTO: 0.32 10*3/MM3 (ref 0–0.4)
EOSINOPHIL NFR BLD AUTO: 0 % (ref 0.3–6.2)
EOSINOPHIL NFR BLD AUTO: 0.1 % (ref 0.3–6.2)
EOSINOPHIL NFR BLD AUTO: 0.3 % (ref 0.3–6.2)
EOSINOPHIL NFR BLD AUTO: 0.3 % (ref 0.3–6.2)
EOSINOPHIL NFR BLD AUTO: 0.4 % (ref 0.3–6.2)
EOSINOPHIL NFR BLD AUTO: 2.5 % (ref 0.3–6.2)
EPEC EAE GENE STL QL NAA+NON-PROBE: NOT DETECTED
ERYTHROCYTE [DISTWIDTH] IN BLOOD BY AUTOMATED COUNT: 11.7 % (ref 12.3–15.4)
ERYTHROCYTE [DISTWIDTH] IN BLOOD BY AUTOMATED COUNT: 13.1 % (ref 12.3–15.4)
ERYTHROCYTE [DISTWIDTH] IN BLOOD BY AUTOMATED COUNT: 13.2 % (ref 12.3–15.4)
ERYTHROCYTE [DISTWIDTH] IN BLOOD BY AUTOMATED COUNT: 13.2 % (ref 12.3–15.4)
ERYTHROCYTE [DISTWIDTH] IN BLOOD BY AUTOMATED COUNT: 13.3 % (ref 12.3–15.4)
ERYTHROCYTE [DISTWIDTH] IN BLOOD BY AUTOMATED COUNT: 13.6 % (ref 12.3–15.4)
ERYTHROCYTE [DISTWIDTH] IN BLOOD BY AUTOMATED COUNT: 13.8 % (ref 12.3–15.4)
ERYTHROCYTE [DISTWIDTH] IN BLOOD BY AUTOMATED COUNT: 14 % (ref 12.3–15.4)
ERYTHROCYTE [DISTWIDTH] IN BLOOD BY AUTOMATED COUNT: 14.5 % (ref 12.3–15.4)
ERYTHROCYTE [DISTWIDTH] IN BLOOD BY AUTOMATED COUNT: 14.6 % (ref 12.3–15.4)
ERYTHROCYTE [DISTWIDTH] IN BLOOD BY AUTOMATED COUNT: 14.9 % (ref 12.3–15.4)
ERYTHROCYTE [DISTWIDTH] IN BLOOD BY AUTOMATED COUNT: 15 % (ref 12.3–15.4)
ERYTHROCYTE [DISTWIDTH] IN BLOOD BY AUTOMATED COUNT: 15.2 % (ref 12.3–15.4)
ERYTHROCYTE [DISTWIDTH] IN BLOOD BY AUTOMATED COUNT: 15.6 % (ref 12.3–15.4)
ERYTHROCYTE [DISTWIDTH] IN BLOOD BY AUTOMATED COUNT: 15.9 % (ref 12.3–15.4)
ERYTHROCYTE [DISTWIDTH] IN BLOOD BY AUTOMATED COUNT: 16.2 % (ref 12.3–15.4)
ETEC LTA+ST1A+ST1B TOX ST NAA+NON-PROBE: NOT DETECTED
FLUAV RNA RESP QL NAA+PROBE: NOT DETECTED
FLUAV SUBTYP SPEC NAA+PROBE: NOT DETECTED
FLUAV SUBTYP SPEC NAA+PROBE: NOT DETECTED
FLUBV RNA ISLT QL NAA+PROBE: NOT DETECTED
FLUBV RNA ISLT QL NAA+PROBE: NOT DETECTED
FLUBV RNA RESP QL NAA+PROBE: NOT DETECTED
FUNGITELL VALUE: <31.25 PG/ML
G LAMBLIA DNA STL QL NAA+NON-PROBE: NOT DETECTED
GALACTOMANNAN AG SPEC IA-ACNC: 0.04 INDEX (ref 0–0.49)
GAS FLOW AIRWAY: 3 LPM
GAS FLOW AIRWAY: 6 LPM
GEN 5 2HR TROPONIN T REFLEX: 188 NG/L
GEN 5 2HR TROPONIN T REFLEX: 31 NG/L
GEN 5 2HR TROPONIN T REFLEX: 66 NG/L
GLOBULIN UR ELPH-MCNC: 1.7 GM/DL
GLOBULIN UR ELPH-MCNC: 1.8 GM/DL
GLOBULIN UR ELPH-MCNC: 1.9 GM/DL
GLOBULIN UR ELPH-MCNC: 1.9 GM/DL
GLOBULIN UR ELPH-MCNC: 2 GM/DL
GLOBULIN UR ELPH-MCNC: 2.2 GM/DL
GLOBULIN UR ELPH-MCNC: 2.5 GM/DL
GLOBULIN UR ELPH-MCNC: 2.8 GM/DL
GLOBULIN UR ELPH-MCNC: 2.9 GM/DL
GLUCOSE BLDC GLUCOMTR-MCNC: 100 MG/DL (ref 70–130)
GLUCOSE BLDC GLUCOMTR-MCNC: 108 MG/DL (ref 70–130)
GLUCOSE BLDC GLUCOMTR-MCNC: 108 MG/DL (ref 70–130)
GLUCOSE BLDC GLUCOMTR-MCNC: 109 MG/DL (ref 70–130)
GLUCOSE BLDC GLUCOMTR-MCNC: 111 MG/DL (ref 70–130)
GLUCOSE BLDC GLUCOMTR-MCNC: 113 MG/DL (ref 70–130)
GLUCOSE BLDC GLUCOMTR-MCNC: 115 MG/DL (ref 70–130)
GLUCOSE BLDC GLUCOMTR-MCNC: 117 MG/DL (ref 70–130)
GLUCOSE BLDC GLUCOMTR-MCNC: 120 MG/DL (ref 70–130)
GLUCOSE BLDC GLUCOMTR-MCNC: 121 MG/DL (ref 70–130)
GLUCOSE BLDC GLUCOMTR-MCNC: 126 MG/DL (ref 70–130)
GLUCOSE BLDC GLUCOMTR-MCNC: 126 MG/DL (ref 70–130)
GLUCOSE BLDC GLUCOMTR-MCNC: 128 MG/DL (ref 70–130)
GLUCOSE BLDC GLUCOMTR-MCNC: 132 MG/DL (ref 70–130)
GLUCOSE BLDC GLUCOMTR-MCNC: 133 MG/DL (ref 70–130)
GLUCOSE BLDC GLUCOMTR-MCNC: 135 MG/DL (ref 70–130)
GLUCOSE BLDC GLUCOMTR-MCNC: 135 MG/DL (ref 70–130)
GLUCOSE BLDC GLUCOMTR-MCNC: 141 MG/DL (ref 70–130)
GLUCOSE BLDC GLUCOMTR-MCNC: 144 MG/DL (ref 70–130)
GLUCOSE BLDC GLUCOMTR-MCNC: 145 MG/DL (ref 70–130)
GLUCOSE BLDC GLUCOMTR-MCNC: 152 MG/DL (ref 70–130)
GLUCOSE BLDC GLUCOMTR-MCNC: 152 MG/DL (ref 70–130)
GLUCOSE BLDC GLUCOMTR-MCNC: 154 MG/DL (ref 70–130)
GLUCOSE BLDC GLUCOMTR-MCNC: 155 MG/DL (ref 70–130)
GLUCOSE BLDC GLUCOMTR-MCNC: 155 MG/DL (ref 70–130)
GLUCOSE BLDC GLUCOMTR-MCNC: 156 MG/DL (ref 70–130)
GLUCOSE BLDC GLUCOMTR-MCNC: 160 MG/DL (ref 70–130)
GLUCOSE BLDC GLUCOMTR-MCNC: 162 MG/DL (ref 70–130)
GLUCOSE BLDC GLUCOMTR-MCNC: 162 MG/DL (ref 70–130)
GLUCOSE BLDC GLUCOMTR-MCNC: 163 MG/DL (ref 70–130)
GLUCOSE BLDC GLUCOMTR-MCNC: 165 MG/DL (ref 70–130)
GLUCOSE BLDC GLUCOMTR-MCNC: 166 MG/DL (ref 70–130)
GLUCOSE BLDC GLUCOMTR-MCNC: 167 MG/DL (ref 70–130)
GLUCOSE BLDC GLUCOMTR-MCNC: 167 MG/DL (ref 70–130)
GLUCOSE BLDC GLUCOMTR-MCNC: 170 MG/DL (ref 70–130)
GLUCOSE BLDC GLUCOMTR-MCNC: 172 MG/DL (ref 70–130)
GLUCOSE BLDC GLUCOMTR-MCNC: 176 MG/DL (ref 70–130)
GLUCOSE BLDC GLUCOMTR-MCNC: 180 MG/DL (ref 70–130)
GLUCOSE BLDC GLUCOMTR-MCNC: 186 MG/DL (ref 70–130)
GLUCOSE BLDC GLUCOMTR-MCNC: 189 MG/DL (ref 70–130)
GLUCOSE BLDC GLUCOMTR-MCNC: 199 MG/DL (ref 70–130)
GLUCOSE BLDC GLUCOMTR-MCNC: 207 MG/DL (ref 70–130)
GLUCOSE BLDC GLUCOMTR-MCNC: 210 MG/DL (ref 70–130)
GLUCOSE BLDC GLUCOMTR-MCNC: 86 MG/DL (ref 70–130)
GLUCOSE BLDC GLUCOMTR-MCNC: 96 MG/DL (ref 70–130)
GLUCOSE SERPL-MCNC: 114 MG/DL (ref 65–99)
GLUCOSE SERPL-MCNC: 115 MG/DL (ref 65–99)
GLUCOSE SERPL-MCNC: 122 MG/DL (ref 65–99)
GLUCOSE SERPL-MCNC: 130 MG/DL (ref 65–99)
GLUCOSE SERPL-MCNC: 138 MG/DL (ref 65–99)
GLUCOSE SERPL-MCNC: 159 MG/DL (ref 65–99)
GLUCOSE SERPL-MCNC: 161 MG/DL (ref 65–99)
GLUCOSE SERPL-MCNC: 163 MG/DL (ref 65–99)
GLUCOSE SERPL-MCNC: 164 MG/DL (ref 65–99)
GLUCOSE SERPL-MCNC: 169 MG/DL (ref 65–99)
GLUCOSE SERPL-MCNC: 170 MG/DL (ref 65–99)
GLUCOSE SERPL-MCNC: 175 MG/DL (ref 65–99)
GLUCOSE SERPL-MCNC: 177 MG/DL (ref 65–99)
GLUCOSE SERPL-MCNC: 191 MG/DL (ref 65–99)
GLUCOSE SERPL-MCNC: 192 MG/DL (ref 65–99)
GLUCOSE SERPL-MCNC: 208 MG/DL (ref 65–99)
GLUCOSE SERPL-MCNC: 97 MG/DL (ref 65–99)
GLUCOSE UR STRIP-MCNC: NEGATIVE MG/DL
GRAM STN SPEC: NORMAL
HADV DNA SPEC NAA+PROBE: NOT DETECTED
HADV DNA SPEC NAA+PROBE: NOT DETECTED
HBA1C MFR BLD: 4.6 % (ref 4.8–5.6)
HBA1C MFR BLD: 5 % (ref 4.8–5.6)
HCO3 BLDA-SCNC: 24 MMOL/L (ref 20–26)
HCO3 BLDA-SCNC: 24.5 MMOL/L (ref 20–26)
HCO3 BLDA-SCNC: 25.1 MMOL/L (ref 20–26)
HCO3 BLDA-SCNC: 26.8 MMOL/L (ref 20–26)
HCO3 BLDA-SCNC: 28.1 MMOL/L (ref 20–26)
HCO3 BLDA-SCNC: 28.4 MMOL/L (ref 20–26)
HCO3 BLDA-SCNC: 28.4 MMOL/L (ref 20–26)
HCO3 BLDA-SCNC: 32.8 MMOL/L (ref 20–26)
HCO3 BLDV-SCNC: 25.9 MMOL/L (ref 22–28)
HCO3 BLDV-SCNC: 26.4 MMOL/L (ref 22–28)
HCO3 BLDV-SCNC: 26.6 MMOL/L (ref 22–28)
HCO3 BLDV-SCNC: 27 MMOL/L (ref 22–28)
HCO3 BLDV-SCNC: 27.2 MMOL/L (ref 22–28)
HCO3 BLDV-SCNC: 28.7 MMOL/L (ref 22–28)
HCO3 BLDV-SCNC: 34.6 MMOL/L (ref 22–28)
HCOV 229E RNA SPEC QL NAA+PROBE: NOT DETECTED
HCOV 229E RNA SPEC QL NAA+PROBE: NOT DETECTED
HCOV HKU1 RNA SPEC QL NAA+PROBE: NOT DETECTED
HCOV HKU1 RNA SPEC QL NAA+PROBE: NOT DETECTED
HCOV NL63 RNA SPEC QL NAA+PROBE: NOT DETECTED
HCOV NL63 RNA SPEC QL NAA+PROBE: NOT DETECTED
HCOV OC43 RNA SPEC QL NAA+PROBE: NOT DETECTED
HCOV OC43 RNA SPEC QL NAA+PROBE: NOT DETECTED
HCT VFR BLD AUTO: 29.4 % (ref 34–46.6)
HCT VFR BLD AUTO: 29.6 % (ref 34–46.6)
HCT VFR BLD AUTO: 30 % (ref 34–46.6)
HCT VFR BLD AUTO: 30.7 % (ref 34–46.6)
HCT VFR BLD AUTO: 30.8 % (ref 34–46.6)
HCT VFR BLD AUTO: 31.9 % (ref 34–46.6)
HCT VFR BLD AUTO: 32.8 % (ref 34–46.6)
HCT VFR BLD AUTO: 33.2 % (ref 34–46.6)
HCT VFR BLD AUTO: 33.6 % (ref 34–46.6)
HCT VFR BLD AUTO: 34.3 % (ref 34–46.6)
HCT VFR BLD AUTO: 34.8 % (ref 34–46.6)
HCT VFR BLD AUTO: 36.2 % (ref 34–46.6)
HCT VFR BLD AUTO: 36.3 % (ref 34–46.6)
HCT VFR BLD AUTO: 36.7 % (ref 34–46.6)
HCT VFR BLD AUTO: 36.8 % (ref 34–46.6)
HCT VFR BLD AUTO: 37.5 % (ref 34–46.6)
HCT VFR BLD AUTO: 37.6 % (ref 34–46.6)
HCT VFR BLD AUTO: 38.3 % (ref 34–46.6)
HCT VFR BLD CALC: 28.4 % (ref 38–51)
HCT VFR BLD CALC: 30.8 % (ref 38–51)
HCT VFR BLD CALC: 30.8 % (ref 38–51)
HCT VFR BLD CALC: 32.6 % (ref 38–51)
HCT VFR BLD CALC: 32.9 % (ref 38–51)
HCT VFR BLD CALC: 33.6 % (ref 38–51)
HCT VFR BLD CALC: 35.2 % (ref 38–51)
HCT VFR BLD CALC: 37.4 % (ref 38–51)
HDLC SERPL-MCNC: 67 MG/DL (ref 40–60)
HGB BLD-MCNC: 10 G/DL (ref 12–15.9)
HGB BLD-MCNC: 10.3 G/DL (ref 12–15.9)
HGB BLD-MCNC: 10.5 G/DL (ref 12–15.9)
HGB BLD-MCNC: 10.5 G/DL (ref 12–15.9)
HGB BLD-MCNC: 10.8 G/DL (ref 12–15.9)
HGB BLD-MCNC: 11.1 G/DL (ref 12–15.9)
HGB BLD-MCNC: 11.5 G/DL (ref 12–15.9)
HGB BLD-MCNC: 11.8 G/DL (ref 12–15.9)
HGB BLD-MCNC: 12.1 G/DL (ref 12–15.9)
HGB BLD-MCNC: 12.1 G/DL (ref 12–15.9)
HGB BLD-MCNC: 12.3 G/DL (ref 12–15.9)
HGB BLD-MCNC: 12.3 G/DL (ref 12–15.9)
HGB BLD-MCNC: 12.9 G/DL (ref 12–15.9)
HGB BLD-MCNC: 9.1 G/DL (ref 12–15.9)
HGB BLD-MCNC: 9.5 G/DL (ref 12–15.9)
HGB BLD-MCNC: 9.6 G/DL (ref 12–15.9)
HGB BLD-MCNC: 9.6 G/DL (ref 12–15.9)
HGB BLD-MCNC: 9.9 G/DL (ref 12–15.9)
HGB BLDA-MCNC: 10 G/DL (ref 13.5–17.5)
HGB BLDA-MCNC: 10.1 G/DL (ref 13.5–17.5)
HGB BLDA-MCNC: 10.6 G/DL (ref 13.5–17.5)
HGB BLDA-MCNC: 10.6 G/DL (ref 13.5–17.5)
HGB BLDA-MCNC: 10.7 G/DL (ref 13.5–17.5)
HGB BLDA-MCNC: 10.7 G/DL (ref 13.5–17.5)
HGB BLDA-MCNC: 10.9 G/DL (ref 13.5–17.5)
HGB BLDA-MCNC: 11 G/DL (ref 13.5–17.5)
HGB BLDA-MCNC: 11.5 G/DL (ref 13.5–17.5)
HGB BLDA-MCNC: 12.2 G/DL (ref 13.5–17.5)
HGB BLDA-MCNC: 9.3 G/DL (ref 13.5–17.5)
HGB BLDA-MCNC: 9.6 G/DL (ref 13.5–17.5)
HGB BLDA-MCNC: 9.7 G/DL (ref 13.5–17.5)
HGB BLDA-MCNC: 9.8 G/DL (ref 13.5–17.5)
HGB BLDA-MCNC: 9.8 G/DL (ref 13.5–17.5)
HGB UR QL STRIP.AUTO: NEGATIVE
HMPV RNA NPH QL NAA+NON-PROBE: NOT DETECTED
HMPV RNA NPH QL NAA+NON-PROBE: NOT DETECTED
HOLD SPECIMEN: NORMAL
HOLD SPECIMEN: NORMAL
HPIV1 RNA ISLT QL NAA+PROBE: NOT DETECTED
HPIV1 RNA ISLT QL NAA+PROBE: NOT DETECTED
HPIV2 RNA SPEC QL NAA+PROBE: NOT DETECTED
HPIV2 RNA SPEC QL NAA+PROBE: NOT DETECTED
HPIV3 RNA NPH QL NAA+PROBE: NOT DETECTED
HPIV3 RNA NPH QL NAA+PROBE: NOT DETECTED
HPIV4 P GENE NPH QL NAA+PROBE: NOT DETECTED
HPIV4 P GENE NPH QL NAA+PROBE: NOT DETECTED
HYALINE CASTS UR QL AUTO: ABNORMAL /LPF
HYPOCHROMIA BLD QL: ABNORMAL
IMM GRANULOCYTES # BLD AUTO: 0.02 10*3/MM3 (ref 0–0.05)
IMM GRANULOCYTES # BLD AUTO: 0.03 10*3/MM3 (ref 0–0.05)
IMM GRANULOCYTES # BLD AUTO: 0.04 10*3/MM3 (ref 0–0.05)
IMM GRANULOCYTES # BLD AUTO: 0.06 10*3/MM3 (ref 0–0.05)
IMM GRANULOCYTES # BLD AUTO: 0.08 10*3/MM3 (ref 0–0.05)
IMM GRANULOCYTES # BLD AUTO: 0.13 10*3/MM3 (ref 0–0.05)
IMM GRANULOCYTES # BLD AUTO: 0.21 10*3/MM3 (ref 0–0.05)
IMM GRANULOCYTES # BLD AUTO: 0.22 10*3/MM3 (ref 0–0.05)
IMM GRANULOCYTES # BLD AUTO: 0.36 10*3/MM3 (ref 0–0.05)
IMM GRANULOCYTES # BLD AUTO: 0.46 10*3/MM3 (ref 0–0.05)
IMM GRANULOCYTES # BLD AUTO: 0.49 10*3/MM3 (ref 0–0.05)
IMM GRANULOCYTES NFR BLD AUTO: 0.3 % (ref 0–0.5)
IMM GRANULOCYTES NFR BLD AUTO: 0.4 % (ref 0–0.5)
IMM GRANULOCYTES NFR BLD AUTO: 0.5 % (ref 0–0.5)
IMM GRANULOCYTES NFR BLD AUTO: 0.9 % (ref 0–0.5)
IMM GRANULOCYTES NFR BLD AUTO: 1 % (ref 0–0.5)
IMM GRANULOCYTES NFR BLD AUTO: 1.3 % (ref 0–0.5)
IMM GRANULOCYTES NFR BLD AUTO: 2 % (ref 0–0.5)
IMM GRANULOCYTES NFR BLD AUTO: 2.1 % (ref 0–0.5)
IMM GRANULOCYTES NFR BLD AUTO: 2.8 % (ref 0–0.5)
IMP & REVIEW OF LAB RESULTS: NORMAL
INHALED O2 CONCENTRATION: 28 %
INHALED O2 CONCENTRATION: 30 %
INHALED O2 CONCENTRATION: 30 %
INHALED O2 CONCENTRATION: 32 %
INHALED O2 CONCENTRATION: 40 %
INHALED O2 CONCENTRATION: 44 %
INHALED O2 CONCENTRATION: 65 %
INHALED O2 CONCENTRATION: 70 %
INR PPP: 0.93 (ref 0.9–1.1)
INR PPP: 1 (ref 0.9–1.1)
INR PPP: 1.09 (ref 0.9–1.1)
KETONES UR QL STRIP: NEGATIVE
L PNEUMO1 AG UR QL IA: NEGATIVE
LARGE PLATELETS: ABNORMAL
LDLC SERPL CALC-MCNC: 56 MG/DL (ref 0–100)
LDLC/HDLC SERPL: 0.85 {RATIO}
LEFT ATRIUM VOLUME INDEX: 25.8 ML/M2
LEUKOCYTE ESTERASE UR QL STRIP.AUTO: ABNORMAL
LV EF NUC BP: 43 %
LYMPHOCYTES # BLD AUTO: 0.55 10*3/MM3 (ref 0.7–3.1)
LYMPHOCYTES # BLD AUTO: 0.74 10*3/MM3 (ref 0.7–3.1)
LYMPHOCYTES # BLD AUTO: 0.86 10*3/MM3 (ref 0.7–3.1)
LYMPHOCYTES # BLD AUTO: 0.92 10*3/MM3 (ref 0.7–3.1)
LYMPHOCYTES # BLD AUTO: 1.03 10*3/MM3 (ref 0.7–3.1)
LYMPHOCYTES # BLD AUTO: 1.4 10*3/MM3 (ref 0.7–3.1)
LYMPHOCYTES # BLD AUTO: 1.53 10*3/MM3 (ref 0.7–3.1)
LYMPHOCYTES # BLD AUTO: 1.55 10*3/MM3 (ref 0.7–3.1)
LYMPHOCYTES # BLD AUTO: 1.97 10*3/MM3 (ref 0.7–3.1)
LYMPHOCYTES # BLD AUTO: 2.94 10*3/MM3 (ref 0.7–3.1)
LYMPHOCYTES # BLD AUTO: 3.4 10*3/MM3 (ref 0.7–3.1)
LYMPHOCYTES # BLD MANUAL: 2.27 10*3/MM3 (ref 0.7–3.1)
LYMPHOCYTES NFR BLD AUTO: 14.3 % (ref 19.6–45.3)
LYMPHOCYTES NFR BLD AUTO: 19.4 % (ref 19.6–45.3)
LYMPHOCYTES NFR BLD AUTO: 21.1 % (ref 19.6–45.3)
LYMPHOCYTES NFR BLD AUTO: 23.4 % (ref 19.6–45.3)
LYMPHOCYTES NFR BLD AUTO: 3.9 % (ref 19.6–45.3)
LYMPHOCYTES NFR BLD AUTO: 4.8 % (ref 19.6–45.3)
LYMPHOCYTES NFR BLD AUTO: 5.4 % (ref 19.6–45.3)
LYMPHOCYTES NFR BLD AUTO: 7.9 % (ref 19.6–45.3)
LYMPHOCYTES NFR BLD AUTO: 8.7 % (ref 19.6–45.3)
LYMPHOCYTES NFR BLD AUTO: 9.3 % (ref 19.6–45.3)
LYMPHOCYTES NFR BLD AUTO: 9.5 % (ref 19.6–45.3)
LYMPHOCYTES NFR BLD MANUAL: 3 % (ref 5–12)
Lab: ABNORMAL
Lab: NORMAL
Lab: NORMAL
M PNEUMO IGG SER IA-ACNC: NOT DETECTED
M PNEUMO IGG SER IA-ACNC: NOT DETECTED
M PNEUMO IGM SER QL: NEGATIVE
MAGNESIUM SERPL-MCNC: 1.7 MG/DL (ref 1.6–2.4)
MAGNESIUM SERPL-MCNC: 2.1 MG/DL (ref 1.6–2.4)
MAGNESIUM SERPL-MCNC: 2.4 MG/DL (ref 1.6–2.4)
MAGNESIUM SERPL-MCNC: 2.5 MG/DL (ref 1.6–2.4)
MAXIMAL PREDICTED HEART RATE: 155 BPM
MCH RBC QN AUTO: 30.8 PG (ref 26.6–33)
MCH RBC QN AUTO: 30.8 PG (ref 26.6–33)
MCH RBC QN AUTO: 31.1 PG (ref 26.6–33)
MCH RBC QN AUTO: 31.2 PG (ref 26.6–33)
MCH RBC QN AUTO: 31.3 PG (ref 26.6–33)
MCH RBC QN AUTO: 31.4 PG (ref 26.6–33)
MCH RBC QN AUTO: 31.5 PG (ref 26.6–33)
MCH RBC QN AUTO: 31.5 PG (ref 26.6–33)
MCH RBC QN AUTO: 31.7 PG (ref 26.6–33)
MCH RBC QN AUTO: 31.9 PG (ref 26.6–33)
MCH RBC QN AUTO: 32 PG (ref 26.6–33)
MCHC RBC AUTO-ENTMCNC: 31 G/DL (ref 31.5–35.7)
MCHC RBC AUTO-ENTMCNC: 31.2 G/DL (ref 31.5–35.7)
MCHC RBC AUTO-ENTMCNC: 31.3 G/DL (ref 31.5–35.7)
MCHC RBC AUTO-ENTMCNC: 31.3 G/DL (ref 31.5–35.7)
MCHC RBC AUTO-ENTMCNC: 31.4 G/DL (ref 31.5–35.7)
MCHC RBC AUTO-ENTMCNC: 31.5 G/DL (ref 31.5–35.7)
MCHC RBC AUTO-ENTMCNC: 31.6 G/DL (ref 31.5–35.7)
MCHC RBC AUTO-ENTMCNC: 31.8 G/DL (ref 31.5–35.7)
MCHC RBC AUTO-ENTMCNC: 31.9 G/DL (ref 31.5–35.7)
MCHC RBC AUTO-ENTMCNC: 32 G/DL (ref 31.5–35.7)
MCHC RBC AUTO-ENTMCNC: 32.1 G/DL (ref 31.5–35.7)
MCHC RBC AUTO-ENTMCNC: 32.2 G/DL (ref 31.5–35.7)
MCHC RBC AUTO-ENTMCNC: 32.3 G/DL (ref 31.5–35.7)
MCHC RBC AUTO-ENTMCNC: 32.5 G/DL (ref 31.5–35.7)
MCHC RBC AUTO-ENTMCNC: 32.7 G/DL (ref 31.5–35.7)
MCHC RBC AUTO-ENTMCNC: 32.9 G/DL (ref 31.5–35.7)
MCHC RBC AUTO-ENTMCNC: 33.5 G/DL (ref 31.5–35.7)
MCHC RBC AUTO-ENTMCNC: 33.7 G/DL (ref 31.5–35.7)
MCV RBC AUTO: 100.3 FL (ref 79–97)
MCV RBC AUTO: 101.2 FL (ref 79–97)
MCV RBC AUTO: 101.3 FL (ref 79–97)
MCV RBC AUTO: 101.7 FL (ref 79–97)
MCV RBC AUTO: 101.7 FL (ref 79–97)
MCV RBC AUTO: 101.9 FL (ref 79–97)
MCV RBC AUTO: 93.1 FL (ref 79–97)
MCV RBC AUTO: 93.2 FL (ref 79–97)
MCV RBC AUTO: 94 FL (ref 79–97)
MCV RBC AUTO: 95.1 FL (ref 79–97)
MCV RBC AUTO: 95.6 FL (ref 79–97)
MCV RBC AUTO: 96.3 FL (ref 79–97)
MCV RBC AUTO: 97.4 FL (ref 79–97)
MCV RBC AUTO: 98.2 FL (ref 79–97)
MCV RBC AUTO: 98.4 FL (ref 79–97)
MCV RBC AUTO: 99.4 FL (ref 79–97)
MCV RBC AUTO: 99.4 FL (ref 79–97)
MCV RBC AUTO: 99.7 FL (ref 79–97)
METHGB BLD QL: 0 % (ref 0–3)
METHGB BLD QL: 0.1 % (ref 0–3)
METHGB BLD QL: 0.2 % (ref 0–3)
METHGB BLD QL: 0.4 % (ref 0–3)
METHGB BLD QL: 0.5 % (ref 0–3)
METHGB BLD QL: <-0.1 % (ref 0–3)
METHGB BLD QL: <-0.1 % (ref 0–3)
MODALITY: ABNORMAL
MONOCYTES # BLD AUTO: 0.12 10*3/MM3 (ref 0.1–0.9)
MONOCYTES # BLD AUTO: 0.16 10*3/MM3 (ref 0.1–0.9)
MONOCYTES # BLD AUTO: 0.37 10*3/MM3 (ref 0.1–0.9)
MONOCYTES # BLD AUTO: 0.6 10*3/MM3 (ref 0.1–0.9)
MONOCYTES # BLD AUTO: 0.69 10*3/MM3 (ref 0.1–0.9)
MONOCYTES # BLD AUTO: 0.94 10*3/MM3 (ref 0.1–0.9)
MONOCYTES # BLD AUTO: 0.95 10*3/MM3 (ref 0.1–0.9)
MONOCYTES # BLD AUTO: 1.79 10*3/MM3 (ref 0.1–0.9)
MONOCYTES # BLD AUTO: 1.8 10*3/MM3 (ref 0.1–0.9)
MONOCYTES # BLD AUTO: 1.83 10*3/MM3 (ref 0.1–0.9)
MONOCYTES # BLD AUTO: 1.99 10*3/MM3 (ref 0.1–0.9)
MONOCYTES # BLD: 0.68 10*3/MM3 (ref 0.1–0.9)
MONOCYTES NFR BLD AUTO: 1.9 % (ref 5–12)
MONOCYTES NFR BLD AUTO: 1.9 % (ref 5–12)
MONOCYTES NFR BLD AUTO: 10.4 % (ref 5–12)
MONOCYTES NFR BLD AUTO: 4 % (ref 5–12)
MONOCYTES NFR BLD AUTO: 5.1 % (ref 5–12)
MONOCYTES NFR BLD AUTO: 5.8 % (ref 5–12)
MONOCYTES NFR BLD AUTO: 6.7 % (ref 5–12)
MONOCYTES NFR BLD AUTO: 7.2 % (ref 5–12)
MONOCYTES NFR BLD AUTO: 7.5 % (ref 5–12)
MONOCYTES NFR BLD AUTO: 8.1 % (ref 5–12)
MONOCYTES NFR BLD AUTO: 9.4 % (ref 5–12)
MRSA DNA SPEC QL NAA+PROBE: NORMAL
NEUTROPHILS # BLD AUTO: 19.74 10*3/MM3 (ref 1.7–7)
NEUTROPHILS NFR BLD AUTO: 11.86 10*3/MM3 (ref 1.7–7)
NEUTROPHILS NFR BLD AUTO: 12.94 10*3/MM3 (ref 1.7–7)
NEUTROPHILS NFR BLD AUTO: 13.32 10*3/MM3 (ref 1.7–7)
NEUTROPHILS NFR BLD AUTO: 18 10*3/MM3 (ref 1.7–7)
NEUTROPHILS NFR BLD AUTO: 19.26 10*3/MM3 (ref 1.7–7)
NEUTROPHILS NFR BLD AUTO: 20.47 10*3/MM3 (ref 1.7–7)
NEUTROPHILS NFR BLD AUTO: 5.31 10*3/MM3 (ref 1.7–7)
NEUTROPHILS NFR BLD AUTO: 5.36 10*3/MM3 (ref 1.7–7)
NEUTROPHILS NFR BLD AUTO: 65.6 % (ref 42.7–76)
NEUTROPHILS NFR BLD AUTO: 67.6 % (ref 42.7–76)
NEUTROPHILS NFR BLD AUTO: 7.53 10*3/MM3 (ref 1.7–7)
NEUTROPHILS NFR BLD AUTO: 73.3 % (ref 42.7–76)
NEUTROPHILS NFR BLD AUTO: 8.25 10*3/MM3 (ref 1.7–7)
NEUTROPHILS NFR BLD AUTO: 8.86 10*3/MM3 (ref 1.7–7)
NEUTROPHILS NFR BLD AUTO: 81.3 % (ref 42.7–76)
NEUTROPHILS NFR BLD AUTO: 82.3 % (ref 42.7–76)
NEUTROPHILS NFR BLD AUTO: 83.5 % (ref 42.7–76)
NEUTROPHILS NFR BLD AUTO: 84.7 % (ref 42.7–76)
NEUTROPHILS NFR BLD AUTO: 85.8 % (ref 42.7–76)
NEUTROPHILS NFR BLD AUTO: 86.5 % (ref 42.7–76)
NEUTROPHILS NFR BLD AUTO: 87 % (ref 42.7–76)
NEUTROPHILS NFR BLD AUTO: 88.7 % (ref 42.7–76)
NEUTROPHILS NFR BLD MANUAL: 85 % (ref 42.7–76)
NEUTS BAND NFR BLD MANUAL: 2 % (ref 0–5)
NITRITE UR QL STRIP: POSITIVE
NOROVIRUS GI+II RNA STL QL NAA+NON-PROBE: NOT DETECTED
NOTIFIED BY: ABNORMAL
NOTIFIED WHO: ABNORMAL
NRBC BLD AUTO-RTO: 0 /100 WBC (ref 0–0.2)
NRBC BLD AUTO-RTO: 0.1 /100 WBC (ref 0–0.2)
NRBC BLD AUTO-RTO: 0.1 /100 WBC (ref 0–0.2)
NRBC BLD AUTO-RTO: 0.2 /100 WBC (ref 0–0.2)
NRBC BLD AUTO-RTO: 0.2 /100 WBC (ref 0–0.2)
NRBC BLD AUTO-RTO: 0.3 /100 WBC (ref 0–0.2)
NT-PROBNP SERPL-MCNC: 3550 PG/ML (ref 0–900)
NT-PROBNP SERPL-MCNC: ABNORMAL PG/ML (ref 0–900)
OSMOLALITY SERPL: 309 MOSM/KG (ref 280–301)
OXYHGB MFR BLDV: 66.9 % (ref 45–75)
OXYHGB MFR BLDV: 67.8 % (ref 45–75)
OXYHGB MFR BLDV: 70.7 % (ref 45–75)
OXYHGB MFR BLDV: 71.1 % (ref 45–75)
OXYHGB MFR BLDV: 74.6 % (ref 45–75)
OXYHGB MFR BLDV: 79.4 % (ref 45–75)
OXYHGB MFR BLDV: 79.6 % (ref 45–75)
OXYHGB MFR BLDV: 85.1 % (ref 94–99)
OXYHGB MFR BLDV: 94.1 % (ref 94–99)
OXYHGB MFR BLDV: 96.2 % (ref 94–99)
OXYHGB MFR BLDV: 96.3 % (ref 94–99)
OXYHGB MFR BLDV: 97.1 % (ref 94–99)
OXYHGB MFR BLDV: 97.5 % (ref 94–99)
OXYHGB MFR BLDV: 97.6 % (ref 94–99)
OXYHGB MFR BLDV: >99 % (ref 94–99)
P SHIGELLOIDES DNA STL QL NAA+NON-PROBE: NOT DETECTED
PATHOLOGIST NAME: NORMAL
PCO2 BLDA: 37.9 MM HG (ref 35–45)
PCO2 BLDA: 40.2 MM HG (ref 35–45)
PCO2 BLDA: 40.3 MM HG (ref 35–45)
PCO2 BLDA: 46.6 MM HG (ref 35–45)
PCO2 BLDA: 47.5 MM HG (ref 35–45)
PCO2 BLDA: 48.2 MM HG (ref 35–45)
PCO2 BLDA: 51.8 MM HG (ref 35–45)
PCO2 BLDA: 52.1 MM HG (ref 35–45)
PCO2 BLDV: 45 MM HG (ref 41–51)
PCO2 BLDV: 46.6 MM HG (ref 41–51)
PCO2 BLDV: 55.6 MM HG (ref 41–51)
PCO2 BLDV: 56.8 MM HG (ref 41–51)
PCO2 BLDV: 58 MM HG (ref 41–51)
PCO2 BLDV: 59.5 MM HG (ref 41–51)
PCO2 BLDV: 63.7 MM HG (ref 41–51)
PCO2 TEMP ADJ BLD: ABNORMAL MM[HG]
PEEP RESPIRATORY: 4 CM[H2O]
PEEP RESPIRATORY: 6 CM[H2O]
PEEP RESPIRATORY: 8 CM[H2O]
PERCENT MAX PREDICTED HR: 74.84 %
PH BLDA: 7.32 PH UNITS (ref 7.35–7.45)
PH BLDA: 7.34 PH UNITS (ref 7.35–7.45)
PH BLDA: 7.37 PH UNITS (ref 7.35–7.45)
PH BLDA: 7.38 PH UNITS (ref 7.35–7.45)
PH BLDA: 7.4 PH UNITS (ref 7.35–7.45)
PH BLDA: 7.41 PH UNITS (ref 7.35–7.45)
PH BLDA: 7.41 PH UNITS (ref 7.35–7.45)
PH BLDA: 7.46 PH UNITS (ref 7.35–7.45)
PH BLDV: 7.23 PH UNITS (ref 7.32–7.42)
PH BLDV: 7.27 PH UNITS (ref 7.32–7.42)
PH BLDV: 7.27 PH UNITS (ref 7.32–7.42)
PH BLDV: 7.29 PH UNITS (ref 7.32–7.42)
PH BLDV: 7.38 PH UNITS (ref 7.32–7.42)
PH BLDV: 7.39 PH UNITS (ref 7.32–7.42)
PH BLDV: 7.4 PH UNITS (ref 7.32–7.42)
PH UR STRIP.AUTO: 6.5 [PH] (ref 5–8)
PH, TEMP CORRECTED: ABNORMAL
PHOSPHATE SERPL-MCNC: 1.2 MG/DL (ref 2.5–4.5)
PHOSPHATE SERPL-MCNC: 1.4 MG/DL (ref 2.5–4.5)
PHOSPHATE SERPL-MCNC: 2 MG/DL (ref 2.5–4.5)
PHOSPHATE SERPL-MCNC: 2.2 MG/DL (ref 2.5–4.5)
PHOSPHATE SERPL-MCNC: 2.5 MG/DL (ref 2.5–4.5)
PHOSPHATE SERPL-MCNC: 2.7 MG/DL (ref 2.5–4.5)
PHOSPHATE SERPL-MCNC: 3.2 MG/DL (ref 2.5–4.5)
PHOSPHATE SERPL-MCNC: 3.4 MG/DL (ref 2.5–4.5)
PHOSPHATE SERPL-MCNC: 3.5 MG/DL (ref 2.5–4.5)
PLATELET # BLD AUTO: 167 10*3/MM3 (ref 140–450)
PLATELET # BLD AUTO: 168 10*3/MM3 (ref 140–450)
PLATELET # BLD AUTO: 196 10*3/MM3 (ref 140–450)
PLATELET # BLD AUTO: 205 10*3/MM3 (ref 140–450)
PLATELET # BLD AUTO: 206 10*3/MM3 (ref 140–450)
PLATELET # BLD AUTO: 207 10*3/MM3 (ref 140–450)
PLATELET # BLD AUTO: 214 10*3/MM3 (ref 140–450)
PLATELET # BLD AUTO: 224 10*3/MM3 (ref 140–450)
PLATELET # BLD AUTO: 225 10*3/MM3 (ref 140–450)
PLATELET # BLD AUTO: 227 10*3/MM3 (ref 140–450)
PLATELET # BLD AUTO: 227 10*3/MM3 (ref 140–450)
PLATELET # BLD AUTO: 235 10*3/MM3 (ref 140–450)
PLATELET # BLD AUTO: 252 10*3/MM3 (ref 140–450)
PLATELET # BLD AUTO: 269 10*3/MM3 (ref 140–450)
PLATELET # BLD AUTO: 276 10*3/MM3 (ref 140–450)
PLATELET # BLD AUTO: 280 10*3/MM3 (ref 140–450)
PLATELET # BLD AUTO: 287 10*3/MM3 (ref 140–450)
PLATELET # BLD AUTO: 303 10*3/MM3 (ref 140–450)
PMV BLD AUTO: 10.5 FL (ref 6–12)
PMV BLD AUTO: 10.7 FL (ref 6–12)
PMV BLD AUTO: 10.8 FL (ref 6–12)
PMV BLD AUTO: 10.9 FL (ref 6–12)
PMV BLD AUTO: 11 FL (ref 6–12)
PMV BLD AUTO: 11.1 FL (ref 6–12)
PMV BLD AUTO: 11.2 FL (ref 6–12)
PMV BLD AUTO: 11.5 FL (ref 6–12)
PMV BLD AUTO: 11.5 FL (ref 6–12)
PMV BLD AUTO: 12 FL (ref 6–12)
PMV BLD AUTO: 12.1 FL (ref 6–12)
PMV BLD AUTO: 12.8 FL (ref 6–12)
PO2 BLDA: 101 MM HG (ref 83–108)
PO2 BLDA: 105 MM HG (ref 83–108)
PO2 BLDA: 107 MM HG (ref 83–108)
PO2 BLDA: 137 MM HG (ref 83–108)
PO2 BLDA: 154 MM HG (ref 83–108)
PO2 BLDA: 49.7 MM HG (ref 83–108)
PO2 BLDA: 76.7 MM HG (ref 83–108)
PO2 BLDA: 95 MM HG (ref 83–108)
PO2 BLDV: 34.8 MM HG (ref 27–53)
PO2 BLDV: 35.1 MM HG (ref 27–53)
PO2 BLDV: 35.3 MM HG (ref 27–53)
PO2 BLDV: 35.7 MM HG (ref 27–53)
PO2 BLDV: 44.1 MM HG (ref 27–53)
PO2 BLDV: 48.3 MM HG (ref 27–53)
PO2 BLDV: 51.9 MM HG (ref 27–53)
PO2 TEMP ADJ BLD: ABNORMAL MM[HG]
POTASSIUM SERPL-SCNC: 3.7 MMOL/L (ref 3.5–5.2)
POTASSIUM SERPL-SCNC: 4 MMOL/L (ref 3.5–5.2)
POTASSIUM SERPL-SCNC: 4 MMOL/L (ref 3.5–5.2)
POTASSIUM SERPL-SCNC: 4.1 MMOL/L (ref 3.5–5.2)
POTASSIUM SERPL-SCNC: 4.2 MMOL/L (ref 3.5–5.2)
POTASSIUM SERPL-SCNC: 4.5 MMOL/L (ref 3.5–5.2)
POTASSIUM SERPL-SCNC: 4.5 MMOL/L (ref 3.5–5.2)
POTASSIUM SERPL-SCNC: 4.7 MMOL/L (ref 3.5–5.2)
POTASSIUM SERPL-SCNC: 4.8 MMOL/L (ref 3.5–5.2)
POTASSIUM SERPL-SCNC: 4.9 MMOL/L (ref 3.5–5.2)
POTASSIUM SERPL-SCNC: 4.9 MMOL/L (ref 3.5–5.2)
POTASSIUM SERPL-SCNC: 5.1 MMOL/L (ref 3.5–5.2)
POTASSIUM SERPL-SCNC: 5.1 MMOL/L (ref 3.5–5.2)
POTASSIUM SERPL-SCNC: 5.2 MMOL/L (ref 3.5–5.2)
POTASSIUM SERPL-SCNC: 5.5 MMOL/L (ref 3.5–5.2)
PROCALCITONIN SERPL-MCNC: 0.06 NG/ML (ref 0–0.25)
PROCALCITONIN SERPL-MCNC: 0.07 NG/ML (ref 0–0.25)
PROCALCITONIN SERPL-MCNC: 0.07 NG/ML (ref 0–0.25)
PROCALCITONIN SERPL-MCNC: 0.21 NG/ML (ref 0–0.25)
PROCALCITONIN SERPL-MCNC: 0.24 NG/ML (ref 0–0.25)
PROT SERPL-MCNC: 4.8 G/DL (ref 6–8.5)
PROT SERPL-MCNC: 4.8 G/DL (ref 6–8.5)
PROT SERPL-MCNC: 4.9 G/DL (ref 6–8.5)
PROT SERPL-MCNC: 5 G/DL (ref 6–8.5)
PROT SERPL-MCNC: 5.3 G/DL (ref 6–8.5)
PROT SERPL-MCNC: 5.3 G/DL (ref 6–8.5)
PROT SERPL-MCNC: 5.4 G/DL (ref 6–8.5)
PROT SERPL-MCNC: 5.7 G/DL (ref 6–8.5)
PROT SERPL-MCNC: 6 G/DL (ref 6–8.5)
PROT SERPL-MCNC: 6.1 G/DL (ref 6–8.5)
PROT SERPL-MCNC: 6.2 G/DL (ref 6–8.5)
PROT UR QL STRIP: NEGATIVE
PROTHROMBIN TIME: 12.6 SECONDS (ref 12.1–14.7)
PROTHROMBIN TIME: 13.3 SECONDS (ref 12.1–14.7)
PROTHROMBIN TIME: 14.2 SECONDS (ref 12.1–14.7)
PTH-INTACT SERPL-MCNC: 114.8 PG/ML (ref 15–65)
QT INTERVAL: 364 MS
QT INTERVAL: 384 MS
QT INTERVAL: 406 MS
QT INTERVAL: 422 MS
QT INTERVAL: 430 MS
QT INTERVAL: 436 MS
QT INTERVAL: 436 MS
QT INTERVAL: 448 MS
QT INTERVAL: 566 MS
QTC INTERVAL: 442 MS
QTC INTERVAL: 457 MS
QTC INTERVAL: 471 MS
QTC INTERVAL: 485 MS
QTC INTERVAL: 499 MS
QTC INTERVAL: 504 MS
QTC INTERVAL: 516 MS
QTC INTERVAL: 547 MS
QTC INTERVAL: 568 MS
RBC # BLD AUTO: 2.89 10*6/MM3 (ref 3.77–5.28)
RBC # BLD AUTO: 3.03 10*6/MM3 (ref 3.77–5.28)
RBC # BLD AUTO: 3.04 10*6/MM3 (ref 3.77–5.28)
RBC # BLD AUTO: 3.05 10*6/MM3 (ref 3.77–5.28)
RBC # BLD AUTO: 3.15 10*6/MM3 (ref 3.77–5.28)
RBC # BLD AUTO: 3.21 10*6/MM3 (ref 3.77–5.28)
RBC # BLD AUTO: 3.22 10*6/MM3 (ref 3.77–5.28)
RBC # BLD AUTO: 3.31 10*6/MM3 (ref 3.77–5.28)
RBC # BLD AUTO: 3.38 10*6/MM3 (ref 3.77–5.28)
RBC # BLD AUTO: 3.39 10*6/MM3 (ref 3.77–5.28)
RBC # BLD AUTO: 3.5 10*6/MM3 (ref 3.77–5.28)
RBC # BLD AUTO: 3.63 10*6/MM3 (ref 3.77–5.28)
RBC # BLD AUTO: 3.77 10*6/MM3 (ref 3.77–5.28)
RBC # BLD AUTO: 3.82 10*6/MM3 (ref 3.77–5.28)
RBC # BLD AUTO: 3.87 10*6/MM3 (ref 3.77–5.28)
RBC # BLD AUTO: 3.94 10*6/MM3 (ref 3.77–5.28)
RBC # BLD AUTO: 4 10*6/MM3 (ref 3.77–5.28)
RBC # BLD AUTO: 4.11 10*6/MM3 (ref 3.77–5.28)
RBC # UR STRIP: ABNORMAL /HPF
REF LAB TEST METHOD: ABNORMAL
REFERENCE VALUE: NORMAL
RHINOVIRUS RNA SPEC NAA+PROBE: NOT DETECTED
RHINOVIRUS RNA SPEC NAA+PROBE: NOT DETECTED
RSV RNA NPH QL NAA+NON-PROBE: NOT DETECTED
RSV RNA NPH QL NAA+NON-PROBE: NOT DETECTED
RVA RNA STL QL NAA+NON-PROBE: NOT DETECTED
S ENT+BONG DNA STL QL NAA+NON-PROBE: NOT DETECTED
SAO2 % BLDCOA: 86.7 % (ref 94–99)
SAO2 % BLDCOA: 96.3 % (ref 94–99)
SAO2 % BLDCOA: 98.3 % (ref 94–99)
SAO2 % BLDCOA: 98.8 % (ref 94–99)
SAO2 % BLDCOA: 99 % (ref 94–99)
SAO2 % BLDCOA: >99.2 % (ref 94–99)
SAO2 % BLDCOV: 68.8 % (ref 45–75)
SAO2 % BLDCOV: 69.3 % (ref 45–75)
SAO2 % BLDCOV: 72.3 % (ref 45–75)
SAO2 % BLDCOV: 72.8 % (ref 45–75)
SAO2 % BLDCOV: 76.4 % (ref 45–75)
SAO2 % BLDCOV: 81.2 % (ref 45–75)
SAO2 % BLDCOV: 81.6 % (ref 45–75)
SAPO I+II+IV+V RNA STL QL NAA+NON-PROBE: NOT DETECTED
SARS-COV-2 RNA NPH QL NAA+NON-PROBE: NOT DETECTED
SARS-COV-2 RNA NPH QL NAA+NON-PROBE: NOT DETECTED
SARS-COV-2 RNA RESP QL NAA+PROBE: NOT DETECTED
SET MECH RESP RATE: 26
SET MECH RESP RATE: 26
SET MECH RESP RATE: 28
SHIGELLA SP+EIEC IPAH ST NAA+NON-PROBE: NOT DETECTED
SODIUM SERPL-SCNC: 130 MMOL/L (ref 136–145)
SODIUM SERPL-SCNC: 131 MMOL/L (ref 136–145)
SODIUM SERPL-SCNC: 132 MMOL/L (ref 136–145)
SODIUM SERPL-SCNC: 133 MMOL/L (ref 136–145)
SODIUM SERPL-SCNC: 133 MMOL/L (ref 136–145)
SODIUM SERPL-SCNC: 135 MMOL/L (ref 136–145)
SODIUM SERPL-SCNC: 137 MMOL/L (ref 136–145)
SODIUM SERPL-SCNC: 139 MMOL/L (ref 136–145)
SODIUM SERPL-SCNC: 140 MMOL/L (ref 136–145)
SODIUM SERPL-SCNC: 140 MMOL/L (ref 136–145)
SODIUM SERPL-SCNC: 141 MMOL/L (ref 136–145)
SODIUM SERPL-SCNC: 142 MMOL/L (ref 136–145)
SODIUM SERPL-SCNC: 142 MMOL/L (ref 136–145)
SODIUM SERPL-SCNC: 144 MMOL/L (ref 136–145)
SP GR UR STRIP: 1.01 (ref 1–1.03)
SQUAMOUS #/AREA URNS HPF: ABNORMAL /HPF
STOMATOCYTES BLD QL SMEAR: ABNORMAL
STRESS BASELINE BP: NORMAL MMHG
STRESS BASELINE HR: 108 BPM
STRESS PERCENT HR: 88 %
STRESS POST PEAK BP: NORMAL MMHG
STRESS POST PEAK HR: 116 BPM
STRESS TARGET HR: 132 BPM
TOTAL RATE: 32 BREATHS/MINUTE
TRIGL SERPL-MCNC: 61 MG/DL (ref 0–150)
TROPONIN T DELTA: -41 NG/L
TROPONIN T DELTA: -9 NG/L
TROPONIN T DELTA: 2 NG/L
TROPONIN T SERPL HS-MCNC: 229 NG/L
TROPONIN T SERPL HS-MCNC: 29 NG/L
TROPONIN T SERPL HS-MCNC: 75 NG/L
TSH SERPL DL<=0.05 MIU/L-ACNC: 1.1 UIU/ML (ref 0.27–4.2)
TSH SERPL DL<=0.05 MIU/L-ACNC: 1.14 UIU/ML (ref 0.27–4.2)
UFH PPP CHRO-ACNC: 0.11 IU/ML (ref 0.3–0.7)
UFH PPP CHRO-ACNC: 0.23 IU/ML (ref 0.3–0.7)
UFH PPP CHRO-ACNC: 0.27 IU/ML (ref 0.3–0.7)
UFH PPP CHRO-ACNC: 0.33 IU/ML (ref 0.3–0.7)
UFH PPP CHRO-ACNC: 0.36 IU/ML (ref 0.3–0.7)
UFH PPP CHRO-ACNC: <0.1 IU/ML (ref 0.3–0.7)
UROBILINOGEN UR QL STRIP: ABNORMAL
V CHOL+PARA+VUL DNA STL QL NAA+NON-PROBE: NOT DETECTED
V CHOLERAE DNA STL QL NAA+NON-PROBE: NOT DETECTED
VARIANT LYMPHS NFR BLD MANUAL: 10 % (ref 19.6–45.3)
VENTILATOR MODE: ABNORMAL
VLDLC SERPL-MCNC: 13 MG/DL (ref 5–40)
VT ON VENT VENT: 300 ML
VT ON VENT VENT: 330 ML
VT ON VENT VENT: 350 ML
VT ON VENT VENT: 370 ML
VT ON VENT VENT: 400 ML
VT ON VENT VENT: 400 ML
WBC # UR STRIP: ABNORMAL /HPF
WBC NRBC COR # BLD AUTO: 10.24 10*3/MM3 (ref 3.4–10.8)
WBC NRBC COR # BLD AUTO: 12.57 10*3/MM3 (ref 3.4–10.8)
WBC NRBC COR # BLD AUTO: 13.37 10*3/MM3 (ref 3.4–10.8)
WBC NRBC COR # BLD AUTO: 15.07 10*3/MM3 (ref 3.4–10.8)
WBC NRBC COR # BLD AUTO: 16.39 10*3/MM3 (ref 3.4–10.8)
WBC NRBC COR # BLD AUTO: 16.58 10*3/MM3 (ref 3.4–10.8)
WBC NRBC COR # BLD AUTO: 17.54 10*3/MM3 (ref 3.4–10.8)
WBC NRBC COR # BLD AUTO: 20.24 10*3/MM3 (ref 3.4–10.8)
WBC NRBC COR # BLD AUTO: 20.31 10*3/MM3 (ref 3.4–10.8)
WBC NRBC COR # BLD AUTO: 21.27 10*3/MM3 (ref 3.4–10.8)
WBC NRBC COR # BLD AUTO: 22.14 10*3/MM3 (ref 3.4–10.8)
WBC NRBC COR # BLD AUTO: 22.37 10*3/MM3 (ref 3.4–10.8)
WBC NRBC COR # BLD AUTO: 22.53 10*3/MM3 (ref 3.4–10.8)
WBC NRBC COR # BLD AUTO: 22.69 10*3/MM3 (ref 3.4–10.8)
WBC NRBC COR # BLD AUTO: 24.89 10*3/MM3 (ref 3.4–10.8)
WBC NRBC COR # BLD AUTO: 6.42 10*3/MM3 (ref 3.4–10.8)
WBC NRBC COR # BLD AUTO: 7.25 10*3/MM3 (ref 3.4–10.8)
WBC NRBC COR # BLD AUTO: 8.49 10*3/MM3 (ref 3.4–10.8)
WHOLE BLOOD HOLD COAG: NORMAL
WHOLE BLOOD HOLD SPECIMEN: NORMAL
Y ENTEROCOL DNA STL QL NAA+NON-PROBE: NOT DETECTED

## 2024-01-01 PROCEDURE — 99232 SBSQ HOSP IP/OBS MODERATE 35: CPT | Performed by: HOSPITALIST

## 2024-01-01 PROCEDURE — 71045 X-RAY EXAM CHEST 1 VIEW: CPT | Performed by: RADIOLOGY

## 2024-01-01 PROCEDURE — 32551 INSERTION OF CHEST TUBE: CPT | Performed by: STUDENT IN AN ORGANIZED HEALTH CARE EDUCATION/TRAINING PROGRAM

## 2024-01-01 PROCEDURE — 25010000002 PROPOFOL 10 MG/ML EMULSION: Performed by: INTERNAL MEDICINE

## 2024-01-01 PROCEDURE — 99291 CRITICAL CARE FIRST HOUR: CPT | Performed by: INTERNAL MEDICINE

## 2024-01-01 PROCEDURE — 99232 SBSQ HOSP IP/OBS MODERATE 35: CPT | Performed by: STUDENT IN AN ORGANIZED HEALTH CARE EDUCATION/TRAINING PROGRAM

## 2024-01-01 PROCEDURE — 80053 COMPREHEN METABOLIC PANEL: CPT | Performed by: INTERNAL MEDICINE

## 2024-01-01 PROCEDURE — 25010000002 METHYLPREDNISOLONE PER 125 MG: Performed by: INTERNAL MEDICINE

## 2024-01-01 PROCEDURE — 94799 UNLISTED PULMONARY SVC/PX: CPT

## 2024-01-01 PROCEDURE — 25010000002 CEFEPIME PER 500 MG: Performed by: HOSPITALIST

## 2024-01-01 PROCEDURE — 82820 HEMOGLOBIN-OXYGEN AFFINITY: CPT

## 2024-01-01 PROCEDURE — 87070 CULTURE OTHR SPECIMN AEROBIC: CPT | Performed by: INTERNAL MEDICINE

## 2024-01-01 PROCEDURE — 99232 SBSQ HOSP IP/OBS MODERATE 35: CPT | Performed by: NURSE PRACTITIONER

## 2024-01-01 PROCEDURE — 93005 ELECTROCARDIOGRAM TRACING: CPT | Performed by: EMERGENCY MEDICINE

## 2024-01-01 PROCEDURE — 63710000001 INSULIN REGULAR HUMAN PER 5 UNITS: Performed by: INTERNAL MEDICINE

## 2024-01-01 PROCEDURE — 83050 HGB METHEMOGLOBIN QUAN: CPT

## 2024-01-01 PROCEDURE — 25010000002 FENTANYL 10 MCG/1 ML NS: Performed by: INTERNAL MEDICINE

## 2024-01-01 PROCEDURE — 99231 SBSQ HOSP IP/OBS SF/LOW 25: CPT | Performed by: INTERNAL MEDICINE

## 2024-01-01 PROCEDURE — 81001 URINALYSIS AUTO W/SCOPE: CPT | Performed by: EMERGENCY MEDICINE

## 2024-01-01 PROCEDURE — 83970 ASSAY OF PARATHORMONE: CPT | Performed by: INTERNAL MEDICINE

## 2024-01-01 PROCEDURE — 99222 1ST HOSP IP/OBS MODERATE 55: CPT | Performed by: NURSE PRACTITIONER

## 2024-01-01 PROCEDURE — 80048 BASIC METABOLIC PNL TOTAL CA: CPT | Performed by: STUDENT IN AN ORGANIZED HEALTH CARE EDUCATION/TRAINING PROGRAM

## 2024-01-01 PROCEDURE — 82805 BLOOD GASES W/O2 SATURATION: CPT

## 2024-01-01 PROCEDURE — 25010000002 METRONIDAZOLE 500 MG/100ML SOLUTION: Performed by: STUDENT IN AN ORGANIZED HEALTH CARE EDUCATION/TRAINING PROGRAM

## 2024-01-01 PROCEDURE — 84145 PROCALCITONIN (PCT): CPT | Performed by: INTERNAL MEDICINE

## 2024-01-01 PROCEDURE — 99231 SBSQ HOSP IP/OBS SF/LOW 25: CPT | Performed by: SURGERY

## 2024-01-01 PROCEDURE — 94761 N-INVAS EAR/PLS OXIMETRY MLT: CPT

## 2024-01-01 PROCEDURE — 25010000002 PHENYLEPHRINE 10 MG/ML SOLUTION: Performed by: INTERNAL MEDICINE

## 2024-01-01 PROCEDURE — 25010000002 CEFTRIAXONE PER 250 MG: Performed by: HOSPITALIST

## 2024-01-01 PROCEDURE — 25810000003 SODIUM CHLORIDE 0.9 % SOLUTION: Performed by: INTERNAL MEDICINE

## 2024-01-01 PROCEDURE — 83735 ASSAY OF MAGNESIUM: CPT | Performed by: INTERNAL MEDICINE

## 2024-01-01 PROCEDURE — 5A1955Z RESPIRATORY VENTILATION, GREATER THAN 96 CONSECUTIVE HOURS: ICD-10-PCS | Performed by: INTERNAL MEDICINE

## 2024-01-01 PROCEDURE — 87186 SC STD MICRODIL/AGAR DIL: CPT | Performed by: EMERGENCY MEDICINE

## 2024-01-01 PROCEDURE — 25010000002 ENOXAPARIN PER 10 MG: Performed by: STUDENT IN AN ORGANIZED HEALTH CARE EDUCATION/TRAINING PROGRAM

## 2024-01-01 PROCEDURE — 76937 US GUIDE VASCULAR ACCESS: CPT | Performed by: SURGERY

## 2024-01-01 PROCEDURE — 71045 X-RAY EXAM CHEST 1 VIEW: CPT

## 2024-01-01 PROCEDURE — 94664 DEMO&/EVAL PT USE INHALER: CPT

## 2024-01-01 PROCEDURE — 25010000002 CEFEPIME PER 500 MG: Performed by: NURSE PRACTITIONER

## 2024-01-01 PROCEDURE — 25010000002 METHYLPREDNISOLONE PER 40 MG: Performed by: HOSPITALIST

## 2024-01-01 PROCEDURE — 80061 LIPID PANEL: CPT | Performed by: HOSPITALIST

## 2024-01-01 PROCEDURE — 83036 HEMOGLOBIN GLYCOSYLATED A1C: CPT | Performed by: HOSPITALIST

## 2024-01-01 PROCEDURE — 25010000002 FENTANYL CITRATE (PF) 2500 MCG/50ML SOLUTION: Performed by: INTERNAL MEDICINE

## 2024-01-01 PROCEDURE — 99221 1ST HOSP IP/OBS SF/LOW 40: CPT | Performed by: SURGERY

## 2024-01-01 PROCEDURE — 85025 COMPLETE CBC W/AUTO DIFF WBC: CPT | Performed by: HOSPITALIST

## 2024-01-01 PROCEDURE — 25010000002 ALBUMIN HUMAN 25% PER 50 ML: Performed by: INTERNAL MEDICINE

## 2024-01-01 PROCEDURE — 25810000003 SODIUM CHLORIDE 0.9 % SOLUTION: Performed by: STUDENT IN AN ORGANIZED HEALTH CARE EDUCATION/TRAINING PROGRAM

## 2024-01-01 PROCEDURE — 94003 VENT MGMT INPAT SUBQ DAY: CPT

## 2024-01-01 PROCEDURE — 85027 COMPLETE CBC AUTOMATED: CPT | Performed by: STUDENT IN AN ORGANIZED HEALTH CARE EDUCATION/TRAINING PROGRAM

## 2024-01-01 PROCEDURE — 94760 N-INVAS EAR/PLS OXIMETRY 1: CPT

## 2024-01-01 PROCEDURE — 99231 SBSQ HOSP IP/OBS SF/LOW 25: CPT | Performed by: NURSE PRACTITIONER

## 2024-01-01 PROCEDURE — 99233 SBSQ HOSP IP/OBS HIGH 50: CPT | Performed by: NURSE PRACTITIONER

## 2024-01-01 PROCEDURE — 85025 COMPLETE CBC W/AUTO DIFF WBC: CPT | Performed by: INTERNAL MEDICINE

## 2024-01-01 PROCEDURE — 82948 REAGENT STRIP/BLOOD GLUCOSE: CPT

## 2024-01-01 PROCEDURE — 84100 ASSAY OF PHOSPHORUS: CPT | Performed by: INTERNAL MEDICINE

## 2024-01-01 PROCEDURE — 84443 ASSAY THYROID STIM HORMONE: CPT | Performed by: EMERGENCY MEDICINE

## 2024-01-01 PROCEDURE — 25010000002 FENTANYL 10 MCG/1 ML NS: Performed by: ANESTHESIOLOGY

## 2024-01-01 PROCEDURE — 02HV33Z INSERTION OF INFUSION DEVICE INTO SUPERIOR VENA CAVA, PERCUTANEOUS APPROACH: ICD-10-PCS | Performed by: SURGERY

## 2024-01-01 PROCEDURE — 83735 ASSAY OF MAGNESIUM: CPT | Performed by: PHYSICIAN ASSISTANT

## 2024-01-01 PROCEDURE — 25010000002 MORPHINE SULFATE-NACL 30-0.9 MG/30ML-% SOLUTION PREFILLED SYRINGE: Performed by: NURSE PRACTITIONER

## 2024-01-01 PROCEDURE — 25010000002 FUROSEMIDE PER 20 MG: Performed by: SPECIALIST

## 2024-01-01 PROCEDURE — 97162 PT EVAL MOD COMPLEX 30 MIN: CPT

## 2024-01-01 PROCEDURE — 25010000002 LORAZEPAM PER 2 MG: Performed by: NURSE PRACTITIONER

## 2024-01-01 PROCEDURE — 36600 WITHDRAWAL OF ARTERIAL BLOOD: CPT

## 2024-01-01 PROCEDURE — 25010000002 METHYLPREDNISOLONE PER 125 MG: Performed by: EMERGENCY MEDICINE

## 2024-01-01 PROCEDURE — 94002 VENT MGMT INPAT INIT DAY: CPT

## 2024-01-01 PROCEDURE — 99232 SBSQ HOSP IP/OBS MODERATE 35: CPT | Performed by: INTERNAL MEDICINE

## 2024-01-01 PROCEDURE — 78452 HT MUSCLE IMAGE SPECT MULT: CPT

## 2024-01-01 PROCEDURE — 87449 NOS EACH ORGANISM AG IA: CPT | Performed by: INTERNAL MEDICINE

## 2024-01-01 PROCEDURE — 87641 MR-STAPH DNA AMP PROBE: CPT | Performed by: HOSPITALIST

## 2024-01-01 PROCEDURE — 93010 ELECTROCARDIOGRAM REPORT: CPT | Performed by: INTERNAL MEDICINE

## 2024-01-01 PROCEDURE — 25010000002 MORPHINE PER 10 MG: Performed by: STUDENT IN AN ORGANIZED HEALTH CARE EDUCATION/TRAINING PROGRAM

## 2024-01-01 PROCEDURE — 94660 CPAP INITIATION&MGMT: CPT

## 2024-01-01 PROCEDURE — 99232 SBSQ HOSP IP/OBS MODERATE 35: CPT | Performed by: SPECIALIST

## 2024-01-01 PROCEDURE — 86606 ASPERGILLUS ANTIBODY: CPT | Performed by: NURSE PRACTITIONER

## 2024-01-01 PROCEDURE — 25010000002 CEFTRIAXONE PER 250 MG: Performed by: INTERNAL MEDICINE

## 2024-01-01 PROCEDURE — 71250 CT THORAX DX C-: CPT

## 2024-01-01 PROCEDURE — 25010000002 VANCOMYCIN 5 G RECONSTITUTED SOLUTION: Performed by: STUDENT IN AN ORGANIZED HEALTH CARE EDUCATION/TRAINING PROGRAM

## 2024-01-01 PROCEDURE — 71250 CT THORAX DX C-: CPT | Performed by: RADIOLOGY

## 2024-01-01 PROCEDURE — 83930 ASSAY OF BLOOD OSMOLALITY: CPT | Performed by: INTERNAL MEDICINE

## 2024-01-01 PROCEDURE — 25010000002 MIDAZOLAM PER 1 MG: Performed by: INTERNAL MEDICINE

## 2024-01-01 PROCEDURE — 80053 COMPREHEN METABOLIC PANEL: CPT | Performed by: HOSPITALIST

## 2024-01-01 PROCEDURE — 85730 THROMBOPLASTIN TIME PARTIAL: CPT | Performed by: INTERNAL MEDICINE

## 2024-01-01 PROCEDURE — 25010000002 FENTANYL CITRATE (PF) 50 MCG/ML SOLUTION: Performed by: INTERNAL MEDICINE

## 2024-01-01 PROCEDURE — 99291 CRITICAL CARE FIRST HOUR: CPT | Performed by: STUDENT IN AN ORGANIZED HEALTH CARE EDUCATION/TRAINING PROGRAM

## 2024-01-01 PROCEDURE — 93306 TTE W/DOPPLER COMPLETE: CPT | Performed by: INTERNAL MEDICINE

## 2024-01-01 PROCEDURE — 82375 ASSAY CARBOXYHB QUANT: CPT

## 2024-01-01 PROCEDURE — 25010000002 HYDROMORPHONE PER 4 MG: Performed by: HOSPITALIST

## 2024-01-01 PROCEDURE — 85025 COMPLETE CBC W/AUTO DIFF WBC: CPT | Performed by: EMERGENCY MEDICINE

## 2024-01-01 PROCEDURE — 93018 CV STRESS TEST I&R ONLY: CPT | Performed by: INTERNAL MEDICINE

## 2024-01-01 PROCEDURE — 83605 ASSAY OF LACTIC ACID: CPT | Performed by: INTERNAL MEDICINE

## 2024-01-01 PROCEDURE — 85007 BL SMEAR W/DIFF WBC COUNT: CPT | Performed by: INTERNAL MEDICINE

## 2024-01-01 PROCEDURE — 84484 ASSAY OF TROPONIN QUANT: CPT | Performed by: INTERNAL MEDICINE

## 2024-01-01 PROCEDURE — 0 TECHNETIUM SESTAMIBI: Performed by: STUDENT IN AN ORGANIZED HEALTH CARE EDUCATION/TRAINING PROGRAM

## 2024-01-01 PROCEDURE — 85520 HEPARIN ASSAY: CPT | Performed by: STUDENT IN AN ORGANIZED HEALTH CARE EDUCATION/TRAINING PROGRAM

## 2024-01-01 PROCEDURE — 83605 ASSAY OF LACTIC ACID: CPT | Performed by: EMERGENCY MEDICINE

## 2024-01-01 PROCEDURE — 25010000002 FUROSEMIDE PER 20 MG: Performed by: HOSPITALIST

## 2024-01-01 PROCEDURE — 25010000002 LORAZEPAM PER 2 MG: Performed by: STUDENT IN AN ORGANIZED HEALTH CARE EDUCATION/TRAINING PROGRAM

## 2024-01-01 PROCEDURE — 25010000002 PROCHLORPERAZINE 10 MG/2ML SOLUTION: Performed by: STUDENT IN AN ORGANIZED HEALTH CARE EDUCATION/TRAINING PROGRAM

## 2024-01-01 PROCEDURE — 84145 PROCALCITONIN (PCT): CPT | Performed by: HOSPITALIST

## 2024-01-01 PROCEDURE — 25010000002 BUMETANIDE PER 0.5 MG: Performed by: NURSE PRACTITIONER

## 2024-01-01 PROCEDURE — 87507 IADNA-DNA/RNA PROBE TQ 12-25: CPT | Performed by: HOSPITALIST

## 2024-01-01 PROCEDURE — P9047 ALBUMIN (HUMAN), 25%, 50ML: HCPCS | Performed by: HOSPITALIST

## 2024-01-01 PROCEDURE — 86738 MYCOPLASMA ANTIBODY: CPT | Performed by: INTERNAL MEDICINE

## 2024-01-01 PROCEDURE — 87493 C DIFF AMPLIFIED PROBE: CPT | Performed by: EMERGENCY MEDICINE

## 2024-01-01 PROCEDURE — 85610 PROTHROMBIN TIME: CPT | Performed by: INTERNAL MEDICINE

## 2024-01-01 PROCEDURE — 36415 COLL VENOUS BLD VENIPUNCTURE: CPT

## 2024-01-01 PROCEDURE — 93306 TTE W/DOPPLER COMPLETE: CPT

## 2024-01-01 PROCEDURE — 87205 SMEAR GRAM STAIN: CPT | Performed by: INTERNAL MEDICINE

## 2024-01-01 PROCEDURE — 99222 1ST HOSP IP/OBS MODERATE 55: CPT | Performed by: INTERNAL MEDICINE

## 2024-01-01 PROCEDURE — 93005 ELECTROCARDIOGRAM TRACING: CPT | Performed by: INTERNAL MEDICINE

## 2024-01-01 PROCEDURE — 25010000002 REGADENOSON 0.4 MG/5ML SOLUTION: Performed by: STUDENT IN AN ORGANIZED HEALTH CARE EDUCATION/TRAINING PROGRAM

## 2024-01-01 PROCEDURE — 25010000002 ENOXAPARIN PER 10 MG: Performed by: INTERNAL MEDICINE

## 2024-01-01 PROCEDURE — 87040 BLOOD CULTURE FOR BACTERIA: CPT | Performed by: EMERGENCY MEDICINE

## 2024-01-01 PROCEDURE — 25010000002 VORICONAZOLE PER 10 MG: Performed by: INTERNAL MEDICINE

## 2024-01-01 PROCEDURE — 87040 BLOOD CULTURE FOR BACTERIA: CPT | Performed by: NURSE PRACTITIONER

## 2024-01-01 PROCEDURE — 0W9B30Z DRAINAGE OF LEFT PLEURAL CAVITY WITH DRAINAGE DEVICE, PERCUTANEOUS APPROACH: ICD-10-PCS | Performed by: STUDENT IN AN ORGANIZED HEALTH CARE EDUCATION/TRAINING PROGRAM

## 2024-01-01 PROCEDURE — 86612 BLASTOMYCES ANTIBODY: CPT | Performed by: NURSE PRACTITIONER

## 2024-01-01 PROCEDURE — 82533 TOTAL CORTISOL: CPT | Performed by: INTERNAL MEDICINE

## 2024-01-01 PROCEDURE — 25010000002 HEPARIN (PORCINE) 25000-0.45 UT/250ML-% SOLUTION: Performed by: EMERGENCY MEDICINE

## 2024-01-01 PROCEDURE — 0202U NFCT DS 22 TRGT SARS-COV-2: CPT | Performed by: EMERGENCY MEDICINE

## 2024-01-01 PROCEDURE — 25010000002 HEPARIN (PORCINE) PER 1000 UNITS

## 2024-01-01 PROCEDURE — 82330 ASSAY OF CALCIUM: CPT | Performed by: INTERNAL MEDICINE

## 2024-01-01 PROCEDURE — 85610 PROTHROMBIN TIME: CPT | Performed by: EMERGENCY MEDICINE

## 2024-01-01 PROCEDURE — 25010000002 HEPARIN (PORCINE) 25000-0.45 UT/250ML-% SOLUTION

## 2024-01-01 PROCEDURE — 5A09357 ASSISTANCE WITH RESPIRATORY VENTILATION, LESS THAN 24 CONSECUTIVE HOURS, CONTINUOUS POSITIVE AIRWAY PRESSURE: ICD-10-PCS | Performed by: INTERNAL MEDICINE

## 2024-01-01 PROCEDURE — 25010000002 GLYCOPYRROLATE 0.4 MG/2ML SOLUTION: Performed by: NURSE PRACTITIONER

## 2024-01-01 PROCEDURE — 84484 ASSAY OF TROPONIN QUANT: CPT | Performed by: EMERGENCY MEDICINE

## 2024-01-01 PROCEDURE — 86140 C-REACTIVE PROTEIN: CPT | Performed by: INTERNAL MEDICINE

## 2024-01-01 PROCEDURE — 85520 HEPARIN ASSAY: CPT | Performed by: EMERGENCY MEDICINE

## 2024-01-01 PROCEDURE — 80048 BASIC METABOLIC PNL TOTAL CA: CPT | Performed by: HOSPITALIST

## 2024-01-01 PROCEDURE — 99233 SBSQ HOSP IP/OBS HIGH 50: CPT | Performed by: STUDENT IN AN ORGANIZED HEALTH CARE EDUCATION/TRAINING PROGRAM

## 2024-01-01 PROCEDURE — 82306 VITAMIN D 25 HYDROXY: CPT | Performed by: INTERNAL MEDICINE

## 2024-01-01 PROCEDURE — 25010000002 LORAZEPAM PER 2 MG: Performed by: HOSPITALIST

## 2024-01-01 PROCEDURE — 83880 ASSAY OF NATRIURETIC PEPTIDE: CPT | Performed by: NURSE PRACTITIONER

## 2024-01-01 PROCEDURE — 93005 ELECTROCARDIOGRAM TRACING: CPT | Performed by: STUDENT IN AN ORGANIZED HEALTH CARE EDUCATION/TRAINING PROGRAM

## 2024-01-01 PROCEDURE — 99233 SBSQ HOSP IP/OBS HIGH 50: CPT | Performed by: INTERNAL MEDICINE

## 2024-01-01 PROCEDURE — 80053 COMPREHEN METABOLIC PANEL: CPT | Performed by: STUDENT IN AN ORGANIZED HEALTH CARE EDUCATION/TRAINING PROGRAM

## 2024-01-01 PROCEDURE — 83880 ASSAY OF NATRIURETIC PEPTIDE: CPT | Performed by: EMERGENCY MEDICINE

## 2024-01-01 PROCEDURE — 87077 CULTURE AEROBIC IDENTIFY: CPT | Performed by: EMERGENCY MEDICINE

## 2024-01-01 PROCEDURE — 84132 ASSAY OF SERUM POTASSIUM: CPT | Performed by: INTERNAL MEDICINE

## 2024-01-01 PROCEDURE — 85520 HEPARIN ASSAY: CPT

## 2024-01-01 PROCEDURE — 86140 C-REACTIVE PROTEIN: CPT | Performed by: EMERGENCY MEDICINE

## 2024-01-01 PROCEDURE — 85730 THROMBOPLASTIN TIME PARTIAL: CPT | Performed by: EMERGENCY MEDICINE

## 2024-01-01 PROCEDURE — 25010000002 HYDROMORPHONE PER 4 MG: Performed by: NURSE PRACTITIONER

## 2024-01-01 PROCEDURE — 25010000002 HEPARIN (PORCINE) PER 1000 UNITS: Performed by: EMERGENCY MEDICINE

## 2024-01-01 PROCEDURE — 25010000002 EPINEPHRINE 1 MG/ML SOLUTION 30 ML VIAL: Performed by: STUDENT IN AN ORGANIZED HEALTH CARE EDUCATION/TRAINING PROGRAM

## 2024-01-01 PROCEDURE — 84145 PROCALCITONIN (PCT): CPT | Performed by: EMERGENCY MEDICINE

## 2024-01-01 PROCEDURE — 25010000002 CEFTRIAXONE PER 250 MG: Performed by: EMERGENCY MEDICINE

## 2024-01-01 PROCEDURE — 25010000002 METRONIDAZOLE 500 MG/100ML SOLUTION: Performed by: HOSPITALIST

## 2024-01-01 PROCEDURE — 99223 1ST HOSP IP/OBS HIGH 75: CPT | Performed by: HOSPITALIST

## 2024-01-01 PROCEDURE — 93017 CV STRESS TEST TRACING ONLY: CPT

## 2024-01-01 PROCEDURE — 94640 AIRWAY INHALATION TREATMENT: CPT

## 2024-01-01 PROCEDURE — 83605 ASSAY OF LACTIC ACID: CPT | Performed by: HOSPITALIST

## 2024-01-01 PROCEDURE — 0BH17EZ INSERTION OF ENDOTRACHEAL AIRWAY INTO TRACHEA, VIA NATURAL OR ARTIFICIAL OPENING: ICD-10-PCS | Performed by: INTERNAL MEDICINE

## 2024-01-01 PROCEDURE — 86140 C-REACTIVE PROTEIN: CPT | Performed by: HOSPITALIST

## 2024-01-01 PROCEDURE — 36556 INSERT NON-TUNNEL CV CATH: CPT | Performed by: SURGERY

## 2024-01-01 PROCEDURE — 80048 BASIC METABOLIC PNL TOTAL CA: CPT | Performed by: INTERNAL MEDICINE

## 2024-01-01 PROCEDURE — 25010000002 MORPHINE SULFATE-NACL 30-0.9 MG/30ML-% SOLUTION PREFILLED SYRINGE: Performed by: INTERNAL MEDICINE

## 2024-01-01 PROCEDURE — 0202U NFCT DS 22 TRGT SARS-COV-2: CPT | Performed by: INTERNAL MEDICINE

## 2024-01-01 PROCEDURE — 85379 FIBRIN DEGRADATION QUANT: CPT | Performed by: EMERGENCY MEDICINE

## 2024-01-01 PROCEDURE — 99285 EMERGENCY DEPT VISIT HI MDM: CPT

## 2024-01-01 PROCEDURE — 87305 ASPERGILLUS AG IA: CPT | Performed by: NURSE PRACTITIONER

## 2024-01-01 PROCEDURE — 93005 ELECTROCARDIOGRAM TRACING: CPT | Performed by: PHYSICIAN ASSISTANT

## 2024-01-01 PROCEDURE — P9612 CATHETERIZE FOR URINE SPEC: HCPCS

## 2024-01-01 PROCEDURE — 87636 SARSCOV2 & INF A&B AMP PRB: CPT | Performed by: EMERGENCY MEDICINE

## 2024-01-01 PROCEDURE — 25010000002 GLYCOPYRROLATE 0.4 MG/2ML SOLUTION: Performed by: HOSPITALIST

## 2024-01-01 PROCEDURE — 36415 COLL VENOUS BLD VENIPUNCTURE: CPT | Performed by: EMERGENCY MEDICINE

## 2024-01-01 PROCEDURE — P9047 ALBUMIN (HUMAN), 25%, 50ML: HCPCS | Performed by: INTERNAL MEDICINE

## 2024-01-01 PROCEDURE — 78452 HT MUSCLE IMAGE SPECT MULT: CPT | Performed by: INTERNAL MEDICINE

## 2024-01-01 PROCEDURE — 83735 ASSAY OF MAGNESIUM: CPT | Performed by: EMERGENCY MEDICINE

## 2024-01-01 PROCEDURE — 25810000003 SODIUM CHLORIDE 0.9 % SOLUTION: Performed by: NURSE PRACTITIONER

## 2024-01-01 PROCEDURE — A9500 TC99M SESTAMIBI: HCPCS | Performed by: STUDENT IN AN ORGANIZED HEALTH CARE EDUCATION/TRAINING PROGRAM

## 2024-01-01 PROCEDURE — 80053 COMPREHEN METABOLIC PANEL: CPT | Performed by: EMERGENCY MEDICINE

## 2024-01-01 PROCEDURE — 25010000002 ALBUMIN HUMAN 25% PER 50 ML: Performed by: HOSPITALIST

## 2024-01-01 PROCEDURE — 87449 NOS EACH ORGANISM AG IA: CPT | Performed by: NURSE PRACTITIONER

## 2024-01-01 PROCEDURE — 87040 BLOOD CULTURE FOR BACTERIA: CPT | Performed by: HOSPITALIST

## 2024-01-01 PROCEDURE — 25010000002 METHYLPREDNISOLONE PER 40 MG: Performed by: STUDENT IN AN ORGANIZED HEALTH CARE EDUCATION/TRAINING PROGRAM

## 2024-01-01 PROCEDURE — 87086 URINE CULTURE/COLONY COUNT: CPT | Performed by: EMERGENCY MEDICINE

## 2024-01-01 RX ORDER — BUMETANIDE 0.25 MG/ML
1 INJECTION INTRAMUSCULAR; INTRAVENOUS ONCE
Status: DISCONTINUED | OUTPATIENT
Start: 2024-01-01 | End: 2024-01-01

## 2024-01-01 RX ORDER — BUSPIRONE HYDROCHLORIDE 10 MG/1
10 TABLET ORAL 2 TIMES DAILY PRN
Status: DISCONTINUED | OUTPATIENT
Start: 2024-01-01 | End: 2024-01-02 | Stop reason: HOSPADM

## 2024-01-01 RX ORDER — SODIUM CHLORIDE 9 MG/ML
40 INJECTION, SOLUTION INTRAVENOUS AS NEEDED
Status: DISCONTINUED | OUTPATIENT
Start: 2024-01-01 | End: 2024-01-01

## 2024-01-01 RX ORDER — LOSARTAN POTASSIUM 50 MG/1
50 TABLET ORAL DAILY
Status: CANCELLED | OUTPATIENT
Start: 2024-01-01

## 2024-01-01 RX ORDER — SODIUM CHLORIDE 0.9 % (FLUSH) 0.9 %
10 SYRINGE (ML) INJECTION EVERY 12 HOURS SCHEDULED
Status: DISCONTINUED | OUTPATIENT
Start: 2024-01-01 | End: 2024-08-07 | Stop reason: HOSPADM

## 2024-01-01 RX ORDER — METHYLPREDNISOLONE SODIUM SUCCINATE 125 MG/2ML
80 INJECTION, POWDER, LYOPHILIZED, FOR SOLUTION INTRAMUSCULAR; INTRAVENOUS ONCE
Status: COMPLETED | OUTPATIENT
Start: 2024-01-01 | End: 2024-01-01

## 2024-01-01 RX ORDER — PREDNISONE 20 MG/1
20 TABLET ORAL DAILY
Status: CANCELLED | OUTPATIENT
Start: 2024-01-01 | End: 2024-01-01

## 2024-01-01 RX ORDER — HYDROXYZINE HYDROCHLORIDE 25 MG/1
25 TABLET, FILM COATED ORAL EVERY 6 HOURS PRN
Status: DISCONTINUED | OUTPATIENT
Start: 2024-01-01 | End: 2024-01-01

## 2024-01-01 RX ORDER — HYDROXYCHLOROQUINE SULFATE 200 MG/1
200 TABLET, FILM COATED ORAL 2 TIMES DAILY
COMMUNITY

## 2024-01-01 RX ORDER — ALBUMIN (HUMAN) 12.5 G/50ML
25 SOLUTION INTRAVENOUS
Qty: 200 ML | Refills: 0 | Status: COMPLETED | OUTPATIENT
Start: 2024-01-01 | End: 2024-01-01

## 2024-01-01 RX ORDER — LORAZEPAM 2 MG/ML
0.5 INJECTION INTRAMUSCULAR
Status: DISCONTINUED | OUTPATIENT
Start: 2024-01-01 | End: 2024-01-01

## 2024-01-01 RX ORDER — SODIUM CHLORIDE 0.9 % (FLUSH) 0.9 %
10 SYRINGE (ML) INJECTION AS NEEDED
Status: DISCONTINUED | OUTPATIENT
Start: 2024-01-01 | End: 2024-01-01

## 2024-01-01 RX ORDER — METHYLPREDNISOLONE SODIUM SUCCINATE 125 MG/2ML
60 INJECTION, POWDER, LYOPHILIZED, FOR SOLUTION INTRAMUSCULAR; INTRAVENOUS EVERY 12 HOURS
Status: DISCONTINUED | OUTPATIENT
Start: 2024-01-01 | End: 2024-01-01

## 2024-01-01 RX ORDER — METHYLPREDNISOLONE SODIUM SUCCINATE 125 MG/2ML
125 INJECTION, POWDER, LYOPHILIZED, FOR SOLUTION INTRAMUSCULAR; INTRAVENOUS EVERY 8 HOURS
Status: DISCONTINUED | OUTPATIENT
Start: 2024-01-01 | End: 2024-01-01

## 2024-01-01 RX ORDER — GLYCOPYRROLATE 0.2 MG/ML
0.4 INJECTION INTRAMUSCULAR; INTRAVENOUS EVERY 4 HOURS PRN
Status: DISCONTINUED | OUTPATIENT
Start: 2024-01-01 | End: 2024-08-07 | Stop reason: HOSPADM

## 2024-01-01 RX ORDER — AMOXICILLIN 250 MG
2 CAPSULE ORAL 2 TIMES DAILY PRN
Status: DISCONTINUED | OUTPATIENT
Start: 2024-01-01 | End: 2024-01-01

## 2024-01-01 RX ORDER — ASPIRIN 81 MG/1
81 TABLET ORAL DAILY
Status: DISCONTINUED | OUTPATIENT
Start: 2024-01-01 | End: 2024-01-02 | Stop reason: HOSPADM

## 2024-01-01 RX ORDER — MORPHINE SULFATE/0.9% NACL/PF 1 MG/ML
SYRINGE (ML) INJECTION CONTINUOUS
Status: DISCONTINUED | OUTPATIENT
Start: 2024-01-01 | End: 2024-01-01

## 2024-01-01 RX ORDER — HEPARIN SODIUM 10000 [USP'U]/100ML
16 INJECTION, SOLUTION INTRAVENOUS
Status: DISCONTINUED | OUTPATIENT
Start: 2024-01-01 | End: 2024-01-01

## 2024-01-01 RX ORDER — IPRATROPIUM BROMIDE AND ALBUTEROL SULFATE 2.5; .5 MG/3ML; MG/3ML
3 SOLUTION RESPIRATORY (INHALATION) EVERY 4 HOURS PRN
Status: DISCONTINUED | OUTPATIENT
Start: 2024-01-01 | End: 2024-01-02 | Stop reason: HOSPADM

## 2024-01-01 RX ORDER — HYDROCHLOROTHIAZIDE 25 MG/1
12.5 TABLET ORAL DAILY
Status: DISCONTINUED | OUTPATIENT
Start: 2024-01-01 | End: 2024-01-02 | Stop reason: HOSPADM

## 2024-01-01 RX ORDER — FUROSEMIDE 10 MG/ML
20 INJECTION INTRAMUSCULAR; INTRAVENOUS ONCE
Status: COMPLETED | OUTPATIENT
Start: 2024-01-01 | End: 2024-01-01

## 2024-01-01 RX ORDER — ALBUTEROL SULFATE 2.5 MG/3ML
2.5 SOLUTION RESPIRATORY (INHALATION) EVERY 4 HOURS PRN
Status: DISCONTINUED | OUTPATIENT
Start: 2024-01-01 | End: 2024-01-02 | Stop reason: HOSPADM

## 2024-01-01 RX ORDER — ENOXAPARIN SODIUM 100 MG/ML
40 INJECTION SUBCUTANEOUS NIGHTLY
Status: DISCONTINUED | OUTPATIENT
Start: 2024-01-01 | End: 2024-01-01

## 2024-01-01 RX ORDER — SODIUM CHLORIDE 0.9 % (FLUSH) 0.9 %
10 SYRINGE (ML) INJECTION EVERY 12 HOURS SCHEDULED
Status: DISCONTINUED | OUTPATIENT
Start: 2024-01-01 | End: 2024-01-01

## 2024-01-01 RX ORDER — ALBUTEROL SULFATE 90 UG/1
2 AEROSOL, METERED RESPIRATORY (INHALATION) DAILY PRN
COMMUNITY

## 2024-01-01 RX ORDER — FUROSEMIDE 10 MG/ML
40 INJECTION INTRAMUSCULAR; INTRAVENOUS ONCE
Status: COMPLETED | OUTPATIENT
Start: 2024-01-01 | End: 2024-01-01

## 2024-01-01 RX ORDER — ASPIRIN 81 MG/1
81 TABLET, CHEWABLE ORAL DAILY
Status: DISCONTINUED | OUTPATIENT
Start: 2024-01-01 | End: 2024-01-01

## 2024-01-01 RX ORDER — CITALOPRAM 20 MG/1
40 TABLET ORAL DAILY
Status: DISCONTINUED | OUTPATIENT
Start: 2024-01-01 | End: 2024-01-02 | Stop reason: HOSPADM

## 2024-01-01 RX ORDER — HYDROXYCHLOROQUINE SULFATE 200 MG/1
200 TABLET, FILM COATED ORAL 2 TIMES DAILY
Status: DISCONTINUED | OUTPATIENT
Start: 2024-01-01 | End: 2024-01-02 | Stop reason: HOSPADM

## 2024-01-01 RX ORDER — PANTOPRAZOLE SODIUM 40 MG/1
40 TABLET, DELAYED RELEASE ORAL
Status: DISCONTINUED | OUTPATIENT
Start: 2024-01-01 | End: 2024-01-02 | Stop reason: HOSPADM

## 2024-01-01 RX ORDER — IPRATROPIUM BROMIDE AND ALBUTEROL SULFATE 2.5; .5 MG/3ML; MG/3ML
3 SOLUTION RESPIRATORY (INHALATION)
Status: DISCONTINUED | OUTPATIENT
Start: 2024-01-01 | End: 2024-01-01

## 2024-01-01 RX ORDER — MEGESTROL ACETATE 40 MG/1
40 TABLET ORAL 2 TIMES DAILY
COMMUNITY

## 2024-01-01 RX ORDER — FENTANYL CITRATE 50 UG/ML
50 INJECTION, SOLUTION INTRAMUSCULAR; INTRAVENOUS
Status: ACTIVE | OUTPATIENT
Start: 2024-01-01 | End: 2024-01-01

## 2024-01-01 RX ORDER — CETIRIZINE HYDROCHLORIDE 10 MG/1
10 TABLET ORAL DAILY
Status: DISCONTINUED | OUTPATIENT
Start: 2024-01-01 | End: 2024-01-02 | Stop reason: HOSPADM

## 2024-01-01 RX ORDER — PROCHLORPERAZINE EDISYLATE 5 MG/ML
5 INJECTION INTRAMUSCULAR; INTRAVENOUS EVERY 6 HOURS PRN
Status: DISCONTINUED | OUTPATIENT
Start: 2024-01-01 | End: 2024-01-02 | Stop reason: HOSPADM

## 2024-01-01 RX ORDER — HYDROCODONE BITARTRATE AND ACETAMINOPHEN 5; 325 MG/1; MG/1
1 TABLET ORAL EVERY 8 HOURS PRN
Status: DISCONTINUED | OUTPATIENT
Start: 2024-01-01 | End: 2024-01-01

## 2024-01-01 RX ORDER — METRONIDAZOLE 500 MG/100ML
500 INJECTION, SOLUTION INTRAVENOUS EVERY 8 HOURS
Status: DISCONTINUED | OUTPATIENT
Start: 2024-01-01 | End: 2024-01-01

## 2024-01-01 RX ORDER — PREDNISONE 10 MG/1
10 TABLET ORAL DAILY
Status: CANCELLED | OUTPATIENT
Start: 2024-01-01 | End: 2024-01-01

## 2024-01-01 RX ORDER — NAPROXEN 250 MG/1
500 TABLET ORAL 2 TIMES DAILY WITH MEALS
Status: CANCELLED | OUTPATIENT
Start: 2024-01-01

## 2024-01-01 RX ORDER — AMOXICILLIN AND CLAVULANATE POTASSIUM 500; 125 MG/1; MG/1
1 TABLET, FILM COATED ORAL 2 TIMES DAILY
COMMUNITY
End: 2024-01-02 | Stop reason: HOSPADM

## 2024-01-01 RX ORDER — LEVETIRACETAM 500 MG/1
500 TABLET ORAL 2 TIMES DAILY
COMMUNITY

## 2024-01-01 RX ORDER — LEVOTHYROXINE SODIUM 0.03 MG/1
25 TABLET ORAL
Status: DISCONTINUED | OUTPATIENT
Start: 2024-01-02 | End: 2024-01-02 | Stop reason: HOSPADM

## 2024-01-01 RX ORDER — BUSPIRONE HYDROCHLORIDE 10 MG/1
10 TABLET ORAL 2 TIMES DAILY PRN
Status: DISCONTINUED | OUTPATIENT
Start: 2024-01-01 | End: 2024-01-01

## 2024-01-01 RX ORDER — NITROGLYCERIN 0.4 MG/1
0.4 TABLET SUBLINGUAL
Status: DISCONTINUED | OUTPATIENT
Start: 2024-01-01 | End: 2024-01-01

## 2024-01-01 RX ORDER — BISACODYL 10 MG
10 SUPPOSITORY, RECTAL RECTAL DAILY PRN
Status: DISCONTINUED | OUTPATIENT
Start: 2024-01-01 | End: 2024-08-07 | Stop reason: HOSPADM

## 2024-01-01 RX ORDER — METOPROLOL SUCCINATE 25 MG/1
25 TABLET, EXTENDED RELEASE ORAL
Status: DISCONTINUED | OUTPATIENT
Start: 2024-01-01 | End: 2024-01-01

## 2024-01-01 RX ORDER — NYSTATIN 100000 U/G
1 CREAM TOPICAL 2 TIMES DAILY
Status: ON HOLD | COMMUNITY
End: 2024-01-01

## 2024-01-01 RX ORDER — VECURONIUM BROMIDE 1 MG/ML
5 INJECTION, POWDER, LYOPHILIZED, FOR SOLUTION INTRAVENOUS EVERY 6 HOURS PRN
Status: DISCONTINUED | OUTPATIENT
Start: 2024-01-01 | End: 2024-01-01

## 2024-01-01 RX ORDER — HYDROXYCHLOROQUINE SULFATE 200 MG/1
200 TABLET, FILM COATED ORAL 2 TIMES DAILY
Status: DISCONTINUED | OUTPATIENT
Start: 2024-01-01 | End: 2024-01-01

## 2024-01-01 RX ORDER — TRIAMCINOLONE ACETONIDE 1 MG/G
1 CREAM TOPICAL 2 TIMES DAILY
Status: DISCONTINUED | OUTPATIENT
Start: 2024-01-01 | End: 2024-01-01

## 2024-01-01 RX ORDER — HYDROCODONE BITARTRATE AND ACETAMINOPHEN 5; 325 MG/1; MG/1
1 TABLET ORAL EVERY 8 HOURS PRN
Status: DISCONTINUED | OUTPATIENT
Start: 2024-01-01 | End: 2024-01-02 | Stop reason: HOSPADM

## 2024-01-01 RX ORDER — REGADENOSON 0.08 MG/ML
0.4 INJECTION, SOLUTION INTRAVENOUS
Status: COMPLETED | OUTPATIENT
Start: 2024-01-01 | End: 2024-01-01

## 2024-01-01 RX ORDER — ALBUTEROL SULFATE 2.5 MG/3ML
2.5 SOLUTION RESPIRATORY (INHALATION) DAILY PRN
Status: CANCELLED | OUTPATIENT
Start: 2024-01-01

## 2024-01-01 RX ORDER — DOCUSATE SODIUM 100 MG/1
100 CAPSULE, LIQUID FILLED ORAL DAILY
Status: CANCELLED | OUTPATIENT
Start: 2024-01-01

## 2024-01-01 RX ORDER — GUAIFENESIN 200 MG/10ML
200 LIQUID ORAL EVERY 4 HOURS PRN
Status: DISCONTINUED | OUTPATIENT
Start: 2024-01-01 | End: 2024-01-01

## 2024-01-01 RX ORDER — SODIUM CHLORIDE 0.9 % (FLUSH) 0.9 %
10 SYRINGE (ML) INJECTION AS NEEDED
Status: DISCONTINUED | OUTPATIENT
Start: 2024-01-01 | End: 2024-08-07 | Stop reason: HOSPADM

## 2024-01-01 RX ORDER — NICOTINE POLACRILEX 4 MG
15 LOZENGE BUCCAL
Status: DISCONTINUED | OUTPATIENT
Start: 2024-01-01 | End: 2024-01-01

## 2024-01-01 RX ORDER — LORAZEPAM 2 MG/ML
0.25 INJECTION INTRAMUSCULAR EVERY 4 HOURS PRN
Status: DISCONTINUED | OUTPATIENT
Start: 2024-01-01 | End: 2024-01-01

## 2024-01-01 RX ORDER — HYDROXYZINE HYDROCHLORIDE 25 MG/1
25 TABLET, FILM COATED ORAL EVERY 6 HOURS PRN
COMMUNITY

## 2024-01-01 RX ORDER — HEPARIN SODIUM 5000 [USP'U]/ML
25 INJECTION, SOLUTION INTRAVENOUS; SUBCUTANEOUS ONCE
Status: COMPLETED | OUTPATIENT
Start: 2024-01-01 | End: 2024-01-01

## 2024-01-01 RX ORDER — MEGESTROL ACETATE 40 MG/1
40 TABLET ORAL 2 TIMES DAILY
Status: DISCONTINUED | OUTPATIENT
Start: 2024-01-01 | End: 2024-01-01

## 2024-01-01 RX ORDER — ACETAMINOPHEN 325 MG/1
650 TABLET ORAL EVERY 6 HOURS PRN
Status: DISCONTINUED | OUTPATIENT
Start: 2024-01-01 | End: 2024-01-02 | Stop reason: HOSPADM

## 2024-01-01 RX ORDER — FLUOXETINE HYDROCHLORIDE 40 MG/1
40 CAPSULE ORAL DAILY
COMMUNITY

## 2024-01-01 RX ORDER — PREDNISONE 10 MG/1
20 TABLET ORAL DAILY
COMMUNITY
Start: 2024-01-01 | End: 2024-01-01

## 2024-01-01 RX ORDER — GLUCAGON 1 MG/ML
1 KIT INJECTION
Status: DISCONTINUED | OUTPATIENT
Start: 2024-01-01 | End: 2024-01-01

## 2024-01-01 RX ORDER — ALBUMIN (HUMAN) 12.5 G/50ML
50 SOLUTION INTRAVENOUS ONCE
Status: DISCONTINUED | OUTPATIENT
Start: 2024-01-01 | End: 2024-01-01

## 2024-01-01 RX ORDER — HEPARIN SODIUM 5000 [USP'U]/ML
60 INJECTION, SOLUTION INTRAVENOUS; SUBCUTANEOUS ONCE
Qty: 1 ML | Refills: 0 | Status: COMPLETED | OUTPATIENT
Start: 2024-01-01 | End: 2024-01-01

## 2024-01-01 RX ORDER — CHLORHEXIDINE GLUCONATE 0.12 MG/ML
15 RINSE ORAL EVERY 12 HOURS SCHEDULED
Status: DISCONTINUED | OUTPATIENT
Start: 2024-01-01 | End: 2024-01-01

## 2024-01-01 RX ORDER — TRIAMCINOLONE ACETONIDE 1 MG/G
1 CREAM TOPICAL 2 TIMES DAILY
COMMUNITY

## 2024-01-01 RX ORDER — METHYLPREDNISOLONE SODIUM SUCCINATE 40 MG/ML
40 INJECTION, POWDER, LYOPHILIZED, FOR SOLUTION INTRAMUSCULAR; INTRAVENOUS EVERY 12 HOURS
Status: DISCONTINUED | OUTPATIENT
Start: 2024-01-01 | End: 2024-01-01

## 2024-01-01 RX ORDER — LEVOTHYROXINE SODIUM 0.03 MG/1
25 TABLET ORAL
Status: DISCONTINUED | OUTPATIENT
Start: 2024-01-01 | End: 2024-01-01

## 2024-01-01 RX ORDER — BISACODYL 5 MG/1
5 TABLET, DELAYED RELEASE ORAL DAILY PRN
Status: DISCONTINUED | OUTPATIENT
Start: 2024-01-01 | End: 2024-01-01

## 2024-01-01 RX ORDER — BUMETANIDE 0.25 MG/ML
1 INJECTION INTRAMUSCULAR; INTRAVENOUS ONCE
Status: COMPLETED | OUTPATIENT
Start: 2024-01-01 | End: 2024-01-01

## 2024-01-01 RX ORDER — SCOLOPAMINE TRANSDERMAL SYSTEM 1 MG/1
2 PATCH, EXTENDED RELEASE TRANSDERMAL
Status: DISCONTINUED | OUTPATIENT
Start: 2024-01-01 | End: 2024-08-07 | Stop reason: HOSPADM

## 2024-01-01 RX ORDER — SODIUM CHLORIDE 9 MG/ML
10 INJECTION, SOLUTION INTRAVENOUS CONTINUOUS PRN
Status: DISCONTINUED | OUTPATIENT
Start: 2024-01-01 | End: 2024-08-07 | Stop reason: HOSPADM

## 2024-01-01 RX ORDER — HYDROCHLOROTHIAZIDE 12.5 MG/1
12.5 TABLET ORAL DAILY
COMMUNITY

## 2024-01-01 RX ORDER — PROCHLORPERAZINE EDISYLATE 5 MG/ML
5 INJECTION INTRAMUSCULAR; INTRAVENOUS EVERY 6 HOURS PRN
Status: DISCONTINUED | OUTPATIENT
Start: 2024-01-01 | End: 2024-08-07 | Stop reason: HOSPADM

## 2024-01-01 RX ORDER — DEXTROSE MONOHYDRATE 25 G/50ML
25 INJECTION, SOLUTION INTRAVENOUS
Status: DISCONTINUED | OUTPATIENT
Start: 2024-01-01 | End: 2024-01-01

## 2024-01-01 RX ORDER — METRONIDAZOLE 500 MG/100ML
500 INJECTION, SOLUTION INTRAVENOUS EVERY 8 HOURS
Qty: 2100 ML | Refills: 0 | Status: DISCONTINUED | OUTPATIENT
Start: 2024-01-01 | End: 2024-01-01

## 2024-01-01 RX ORDER — SODIUM CHLORIDE 9 MG/ML
40 INJECTION, SOLUTION INTRAVENOUS AS NEEDED
Status: DISCONTINUED | OUTPATIENT
Start: 2024-01-01 | End: 2024-08-07 | Stop reason: HOSPADM

## 2024-01-01 RX ORDER — PHENYLEPHRINE HCL IN 0.9% NACL 0.5 MG/5ML
.5-3 SYRINGE (ML) INTRAVENOUS
Status: DISCONTINUED | OUTPATIENT
Start: 2024-01-01 | End: 2024-01-01

## 2024-01-01 RX ORDER — ALPRAZOLAM 0.5 MG/1
0.5 TABLET ORAL 3 TIMES DAILY
Status: DISCONTINUED | OUTPATIENT
Start: 2024-01-01 | End: 2024-01-01

## 2024-01-01 RX ORDER — MORPHINE SULFATE 2 MG/ML
2 INJECTION, SOLUTION INTRAMUSCULAR; INTRAVENOUS
Status: DISCONTINUED | OUTPATIENT
Start: 2024-01-01 | End: 2024-01-01

## 2024-01-01 RX ORDER — CASTOR OIL AND BALSAM, PERU 788; 87 MG/G; MG/G
1 OINTMENT TOPICAL EVERY 12 HOURS SCHEDULED
Status: DISCONTINUED | OUTPATIENT
Start: 2024-01-01 | End: 2024-08-07 | Stop reason: HOSPADM

## 2024-01-01 RX ORDER — DOXYCYCLINE 100 MG/1
100 CAPSULE ORAL EVERY 12 HOURS SCHEDULED
Status: COMPLETED | OUTPATIENT
Start: 2024-01-01 | End: 2024-01-01

## 2024-01-01 RX ORDER — SCOLOPAMINE TRANSDERMAL SYSTEM 1 MG/1
1 PATCH, EXTENDED RELEASE TRANSDERMAL
Status: DISCONTINUED | OUTPATIENT
Start: 2024-01-01 | End: 2024-01-01

## 2024-01-01 RX ORDER — ACETAMINOPHEN 160 MG/5ML
650 SOLUTION ORAL EVERY 6 HOURS PRN
Status: DISCONTINUED | OUTPATIENT
Start: 2024-01-01 | End: 2024-08-07 | Stop reason: HOSPADM

## 2024-01-01 RX ORDER — MIDAZOLAM HYDROCHLORIDE 1 MG/ML
2 INJECTION INTRAMUSCULAR; INTRAVENOUS
Status: DISCONTINUED | OUTPATIENT
Start: 2024-01-01 | End: 2024-01-01

## 2024-01-01 RX ORDER — BUDESONIDE AND FORMOTEROL FUMARATE DIHYDRATE 160; 4.5 UG/1; UG/1
2 AEROSOL RESPIRATORY (INHALATION)
Status: DISCONTINUED | OUTPATIENT
Start: 2024-01-01 | End: 2024-01-02 | Stop reason: HOSPADM

## 2024-01-01 RX ORDER — LEVETIRACETAM 500 MG/1
500 TABLET ORAL 2 TIMES DAILY
Status: DISCONTINUED | OUTPATIENT
Start: 2024-01-01 | End: 2024-01-02 | Stop reason: HOSPADM

## 2024-01-01 RX ORDER — CALCIUM CARBONATE 500 MG/1
2 TABLET, CHEWABLE ORAL 3 TIMES DAILY PRN
Status: DISCONTINUED | OUTPATIENT
Start: 2024-01-01 | End: 2024-01-02 | Stop reason: HOSPADM

## 2024-01-01 RX ORDER — NOREPINEPHRINE BITARTRATE 0.03 MG/ML
.02-.3 INJECTION, SOLUTION INTRAVENOUS
Status: DISCONTINUED | OUTPATIENT
Start: 2024-01-01 | End: 2024-01-01

## 2024-01-01 RX ORDER — FLUOXETINE HYDROCHLORIDE 20 MG/1
40 CAPSULE ORAL DAILY
Status: DISCONTINUED | OUTPATIENT
Start: 2024-01-01 | End: 2024-01-01

## 2024-01-01 RX ORDER — CHOLECALCIFEROL (VITAMIN D3) 125 MCG
10 CAPSULE ORAL NIGHTLY PRN
Status: DISCONTINUED | OUTPATIENT
Start: 2024-01-01 | End: 2024-01-02 | Stop reason: HOSPADM

## 2024-01-01 RX ORDER — ALUMINA, MAGNESIA, AND SIMETHICONE 2400; 2400; 240 MG/30ML; MG/30ML; MG/30ML
15 SUSPENSION ORAL EVERY 6 HOURS PRN
Status: DISCONTINUED | OUTPATIENT
Start: 2024-01-01 | End: 2024-01-01

## 2024-01-01 RX ORDER — HEPARIN SODIUM 5000 [USP'U]/ML
25 INJECTION, SOLUTION INTRAVENOUS; SUBCUTANEOUS AS NEEDED
Status: DISCONTINUED | OUTPATIENT
Start: 2024-01-01 | End: 2024-01-01

## 2024-01-01 RX ORDER — PHENYLEPHRINE HCL IN 0.9% NACL 1 MG/10 ML
SYRINGE (ML) INTRAVENOUS
Status: DISCONTINUED
Start: 2024-01-01 | End: 2024-01-01 | Stop reason: WASHOUT

## 2024-01-01 RX ORDER — MORPHINE SULFATE 2 MG/ML
2 INJECTION, SOLUTION INTRAMUSCULAR; INTRAVENOUS
Status: DISCONTINUED | OUTPATIENT
Start: 2024-01-01 | End: 2024-08-07 | Stop reason: HOSPADM

## 2024-01-01 RX ORDER — NYSTATIN 100000 U/G
1 CREAM TOPICAL EVERY 12 HOURS SCHEDULED
Status: DISCONTINUED | OUTPATIENT
Start: 2024-01-01 | End: 2024-01-01

## 2024-01-01 RX ORDER — ATORVASTATIN CALCIUM 40 MG/1
40 TABLET, FILM COATED ORAL DAILY
Status: DISCONTINUED | OUTPATIENT
Start: 2024-01-01 | End: 2024-01-01

## 2024-01-01 RX ORDER — IPRATROPIUM BROMIDE AND ALBUTEROL SULFATE 2.5; .5 MG/3ML; MG/3ML
3 SOLUTION RESPIRATORY (INHALATION) EVERY 4 HOURS PRN
COMMUNITY

## 2024-01-01 RX ORDER — DONEPEZIL HYDROCHLORIDE 5 MG/1
10 TABLET, FILM COATED ORAL DAILY
Status: DISCONTINUED | OUTPATIENT
Start: 2024-01-01 | End: 2024-01-02 | Stop reason: HOSPADM

## 2024-01-01 RX ORDER — BUPIVACAINE HCL/0.9 % NACL/PF 0.125 %
PLASTIC BAG, INJECTION (ML) EPIDURAL
Status: DISCONTINUED
Start: 2024-01-01 | End: 2024-08-07 | Stop reason: HOSPADM

## 2024-01-01 RX ORDER — MIDAZOLAM HYDROCHLORIDE 1 MG/ML
2 INJECTION INTRAMUSCULAR; INTRAVENOUS ONCE
Status: COMPLETED | OUTPATIENT
Start: 2024-01-01 | End: 2024-01-01

## 2024-01-01 RX ORDER — HEPARIN SODIUM 5000 [USP'U]/ML
50 INJECTION, SOLUTION INTRAVENOUS; SUBCUTANEOUS AS NEEDED
Status: DISCONTINUED | OUTPATIENT
Start: 2024-01-01 | End: 2024-01-01

## 2024-01-01 RX ORDER — PANTOPRAZOLE SODIUM 40 MG/10ML
40 INJECTION, POWDER, LYOPHILIZED, FOR SOLUTION INTRAVENOUS
Status: DISCONTINUED | OUTPATIENT
Start: 2024-01-01 | End: 2024-01-01

## 2024-01-01 RX ORDER — MEMANTINE HYDROCHLORIDE 5 MG/1
5 TABLET ORAL EVERY 12 HOURS SCHEDULED
Status: DISCONTINUED | OUTPATIENT
Start: 2024-01-01 | End: 2024-01-01

## 2024-01-01 RX ORDER — POLYETHYLENE GLYCOL 3350 17 G/17G
17 POWDER, FOR SOLUTION ORAL DAILY PRN
Status: DISCONTINUED | OUTPATIENT
Start: 2024-01-01 | End: 2024-01-01

## 2024-01-01 RX ORDER — PREDNISONE 10 MG/1
10 TABLET ORAL 2 TIMES DAILY
Status: ON HOLD | COMMUNITY
End: 2024-01-01

## 2024-01-01 RX ORDER — AMOXICILLIN AND CLAVULANATE POTASSIUM 500; 125 MG/1; MG/1
1 TABLET, FILM COATED ORAL 2 TIMES DAILY
Status: CANCELLED | OUTPATIENT
Start: 2024-01-01 | End: 2024-01-06

## 2024-01-01 RX ORDER — VECURONIUM BROMIDE 1 MG/ML
5 INJECTION, POWDER, LYOPHILIZED, FOR SOLUTION INTRAVENOUS ONCE
Status: COMPLETED | OUTPATIENT
Start: 2024-01-01 | End: 2024-01-01

## 2024-01-01 RX ORDER — DONEPEZIL HYDROCHLORIDE 10 MG/1
10 TABLET, FILM COATED ORAL DAILY
COMMUNITY

## 2024-01-01 RX ORDER — SODIUM CHLORIDE 0.9 % (FLUSH) 0.9 %
20 SYRINGE (ML) INJECTION AS NEEDED
Status: DISCONTINUED | OUTPATIENT
Start: 2024-01-01 | End: 2024-01-01

## 2024-01-01 RX ORDER — DONEPEZIL HYDROCHLORIDE 5 MG/1
10 TABLET, FILM COATED ORAL EVERY EVENING
Status: DISCONTINUED | OUTPATIENT
Start: 2024-01-01 | End: 2024-01-01

## 2024-01-01 RX ORDER — ATORVASTATIN CALCIUM 40 MG/1
40 TABLET, FILM COATED ORAL DAILY
Status: DISCONTINUED | OUTPATIENT
Start: 2024-01-01 | End: 2024-01-02 | Stop reason: HOSPADM

## 2024-01-01 RX ORDER — MIDAZOLAM HYDROCHLORIDE 1 MG/ML
1 INJECTION INTRAMUSCULAR; INTRAVENOUS ONCE
Status: COMPLETED | OUTPATIENT
Start: 2024-01-01 | End: 2024-01-01

## 2024-01-01 RX ORDER — FUROSEMIDE 10 MG/ML
40 INJECTION INTRAMUSCULAR; INTRAVENOUS ONCE
Status: DISCONTINUED | OUTPATIENT
Start: 2024-01-01 | End: 2024-01-01

## 2024-01-01 RX ORDER — METHYLPREDNISOLONE SODIUM SUCCINATE 40 MG/ML
40 INJECTION, POWDER, LYOPHILIZED, FOR SOLUTION INTRAMUSCULAR; INTRAVENOUS DAILY
Status: DISCONTINUED | OUTPATIENT
Start: 2024-01-01 | End: 2024-01-01

## 2024-01-01 RX ORDER — BUDESONIDE 0.5 MG/2ML
0.5 INHALANT ORAL
Status: DISCONTINUED | OUTPATIENT
Start: 2024-01-01 | End: 2024-01-01

## 2024-01-01 RX ORDER — HYDROXYZINE HYDROCHLORIDE 25 MG/1
25 TABLET, FILM COATED ORAL 3 TIMES DAILY PRN
Status: DISCONTINUED | OUTPATIENT
Start: 2024-01-01 | End: 2024-01-02 | Stop reason: HOSPADM

## 2024-01-01 RX ORDER — HYDROMORPHONE HYDROCHLORIDE 1 MG/ML
0.5 INJECTION, SOLUTION INTRAMUSCULAR; INTRAVENOUS; SUBCUTANEOUS
Status: DISCONTINUED | OUTPATIENT
Start: 2024-01-01 | End: 2024-01-01

## 2024-01-01 RX ORDER — LORAZEPAM 2 MG/ML
0.5 INJECTION INTRAMUSCULAR EVERY 4 HOURS PRN
Status: DISCONTINUED | OUTPATIENT
Start: 2024-01-01 | End: 2024-01-01

## 2024-01-01 RX ORDER — BUDESONIDE AND FORMOTEROL FUMARATE DIHYDRATE 160; 4.5 UG/1; UG/1
2 AEROSOL RESPIRATORY (INHALATION)
Status: CANCELLED | OUTPATIENT
Start: 2024-01-01

## 2024-01-01 RX ORDER — ASPIRIN 81 MG/1
324 TABLET, CHEWABLE ORAL ONCE
Status: COMPLETED | OUTPATIENT
Start: 2024-01-01 | End: 2024-01-01

## 2024-01-01 RX ORDER — PRIMIDONE 250 MG/1
250 TABLET ORAL 3 TIMES DAILY
Status: ON HOLD | COMMUNITY
End: 2024-01-01

## 2024-01-01 RX ORDER — CETIRIZINE HYDROCHLORIDE 10 MG/1
10 TABLET ORAL DAILY
Status: DISCONTINUED | OUTPATIENT
Start: 2024-01-01 | End: 2024-01-01

## 2024-01-01 RX ORDER — ASPIRIN 81 MG/1
81 TABLET ORAL DAILY
COMMUNITY

## 2024-01-01 RX ORDER — PRIMIDONE 50 MG/1
200 TABLET ORAL 3 TIMES DAILY
Status: DISCONTINUED | OUTPATIENT
Start: 2024-01-01 | End: 2024-01-01

## 2024-01-01 RX ORDER — HYDROCHLOROTHIAZIDE 12.5 MG/1
12.5 TABLET ORAL DAILY
Status: CANCELLED | OUTPATIENT
Start: 2024-01-01

## 2024-01-01 RX ORDER — NAPROXEN 250 MG/1
500 TABLET ORAL 2 TIMES DAILY WITH MEALS
Status: DISCONTINUED | OUTPATIENT
Start: 2024-01-01 | End: 2024-01-01

## 2024-01-01 RX ORDER — PREDNISONE 10 MG/1
10 TABLET ORAL DAILY
COMMUNITY
Start: 2024-01-01 | End: 2024-01-01

## 2024-01-01 RX ORDER — FENTANYL CITRATE 50 UG/ML
50 INJECTION, SOLUTION INTRAMUSCULAR; INTRAVENOUS
Status: DISCONTINUED | OUTPATIENT
Start: 2024-01-01 | End: 2024-01-01 | Stop reason: SDUPTHER

## 2024-01-01 RX ORDER — LORAZEPAM 2 MG/ML
1 INJECTION INTRAMUSCULAR
Status: DISCONTINUED | OUTPATIENT
Start: 2024-01-01 | End: 2024-08-07 | Stop reason: HOSPADM

## 2024-01-01 RX ORDER — IPRATROPIUM BROMIDE AND ALBUTEROL SULFATE 2.5; .5 MG/3ML; MG/3ML
3 SOLUTION RESPIRATORY (INHALATION) EVERY 4 HOURS
Status: DISCONTINUED | OUTPATIENT
Start: 2024-01-01 | End: 2024-01-01

## 2024-01-01 RX ORDER — LEVOCETIRIZINE DIHYDROCHLORIDE 5 MG/1
5 TABLET, FILM COATED ORAL EVERY EVENING
COMMUNITY

## 2024-01-01 RX ORDER — MORPHINE SULFATE/0.9% NACL/PF 1 MG/ML
SYRINGE (ML) INJECTION CONTINUOUS
Status: DISCONTINUED | OUTPATIENT
Start: 2024-01-01 | End: 2024-01-01 | Stop reason: SDUPTHER

## 2024-01-01 RX ORDER — MORPHINE SULFATE/0.9% NACL/PF 1 MG/ML
SYRINGE (ML) INJECTION CONTINUOUS
Status: DISCONTINUED | OUTPATIENT
Start: 2024-01-01 | End: 2024-08-07 | Stop reason: HOSPADM

## 2024-01-01 RX ADMIN — Medication 10 ML: at 21:52

## 2024-01-01 RX ADMIN — LORAZEPAM 1 MG: 2 INJECTION INTRAMUSCULAR; INTRAVENOUS at 21:12

## 2024-01-01 RX ADMIN — Medication 10 ML: at 21:19

## 2024-01-01 RX ADMIN — PROPOFOL 40 MCG/KG/MIN: 10 INJECTION, EMULSION INTRAVENOUS at 01:35

## 2024-01-01 RX ADMIN — MEMANTINE HYDROCHLORIDE 5 MG: 10 TABLET, FILM COATED ORAL at 21:19

## 2024-01-01 RX ADMIN — SODIUM PHOSPHATE, MONOBASIC, MONOHYDRATE AND SODIUM PHOSPHATE, DIBASIC, ANHYDROUS 15 MMOL: 142; 276 INJECTION, SOLUTION INTRAVENOUS at 02:25

## 2024-01-01 RX ADMIN — METHYLPREDNISOLONE SODIUM SUCCINATE 40 MG: 40 INJECTION, POWDER, FOR SOLUTION INTRAMUSCULAR; INTRAVENOUS at 18:24

## 2024-01-01 RX ADMIN — GLYCOPYRROLATE 0.4 MG: 0.2 INJECTION INTRAMUSCULAR; INTRAVENOUS at 17:02

## 2024-01-01 RX ADMIN — CEFEPIME 2000 MG: 2 INJECTION, POWDER, FOR SOLUTION INTRAVENOUS at 13:50

## 2024-01-01 RX ADMIN — DOCUSATE SODIUM 50 MG AND SENNOSIDES 8.6 MG 2 TABLET: 8.6; 5 TABLET, FILM COATED ORAL at 08:58

## 2024-01-01 RX ADMIN — NYSTATIN 500000 UNITS: 100000 SUSPENSION ORAL at 21:20

## 2024-01-01 RX ADMIN — PROPOFOL 40 MCG/KG/MIN: 10 INJECTION, EMULSION INTRAVENOUS at 02:30

## 2024-01-01 RX ADMIN — BUMETANIDE 1 MG: 0.25 INJECTION, SOLUTION INTRAMUSCULAR; INTRAVENOUS at 13:20

## 2024-01-01 RX ADMIN — VITAMINS A AND D OINTMENT: 15.5; 53.4 OINTMENT TOPICAL at 08:06

## 2024-01-01 RX ADMIN — DOXYCYCLINE 100 MG: 100 CAPSULE ORAL at 08:07

## 2024-01-01 RX ADMIN — IPRATROPIUM BROMIDE AND ALBUTEROL SULFATE 3 ML: .5; 2.5 SOLUTION RESPIRATORY (INHALATION) at 00:53

## 2024-01-01 RX ADMIN — ATORVASTATIN CALCIUM 40 MG: 40 TABLET, FILM COATED ORAL at 09:06

## 2024-01-01 RX ADMIN — ASPIRIN 81 MG: 81 TABLET, COATED ORAL at 17:52

## 2024-01-01 RX ADMIN — VITAMINS A AND D OINTMENT: 15.5; 53.4 OINTMENT TOPICAL at 21:46

## 2024-01-01 RX ADMIN — PROPOFOL 5 MCG/KG/MIN: 10 INJECTION, EMULSION INTRAVENOUS at 06:20

## 2024-01-01 RX ADMIN — IPRATROPIUM BROMIDE AND ALBUTEROL SULFATE 3 ML: 2.5; .5 SOLUTION RESPIRATORY (INHALATION) at 06:29

## 2024-01-01 RX ADMIN — Medication 10 ML: at 08:26

## 2024-01-01 RX ADMIN — LORAZEPAM 0.5 MG: 2 INJECTION INTRAMUSCULAR; INTRAVENOUS at 14:51

## 2024-01-01 RX ADMIN — Medication 10 ML: at 21:46

## 2024-01-01 RX ADMIN — LEVOTHYROXINE SODIUM 25 MCG: 0.03 TABLET ORAL at 09:14

## 2024-01-01 RX ADMIN — NYSTATIN 500000 UNITS: 100000 SUSPENSION ORAL at 21:46

## 2024-01-01 RX ADMIN — METRONIDAZOLE 500 MG: 500 INJECTION, SOLUTION INTRAVENOUS at 05:28

## 2024-01-01 RX ADMIN — METRONIDAZOLE 500 MG: 500 INJECTION, SOLUTION INTRAVENOUS at 09:14

## 2024-01-01 RX ADMIN — VECURONIUM BROMIDE 5 MG: 1 INJECTION, POWDER, LYOPHILIZED, FOR SOLUTION INTRAVENOUS at 12:40

## 2024-01-01 RX ADMIN — IPRATROPIUM BROMIDE AND ALBUTEROL SULFATE 3 ML: .5; 3 SOLUTION RESPIRATORY (INHALATION) at 03:59

## 2024-01-01 RX ADMIN — PHENYLEPHRINE HYDROCHLORIDE 0.5 MCG/KG/MIN: 10 INJECTION INTRAVENOUS at 10:45

## 2024-01-01 RX ADMIN — POTASSIUM CHLORIDE 40 MEQ: 1500 TABLET, EXTENDED RELEASE ORAL at 05:51

## 2024-01-01 RX ADMIN — HEPARIN SODIUM 12 UNITS/KG/HR: 10000 INJECTION, SOLUTION INTRAVENOUS at 02:31

## 2024-01-01 RX ADMIN — IPRATROPIUM BROMIDE AND ALBUTEROL SULFATE 3 ML: .5; 3 SOLUTION RESPIRATORY (INHALATION) at 04:25

## 2024-01-01 RX ADMIN — SACUBITRIL AND VALSARTAN 1 TABLET: 24; 26 TABLET, FILM COATED ORAL at 23:17

## 2024-01-01 RX ADMIN — ALPRAZOLAM 0.5 MG: 0.5 TABLET ORAL at 09:26

## 2024-01-01 RX ADMIN — ENOXAPARIN SODIUM 40 MG: 40 INJECTION SUBCUTANEOUS at 21:18

## 2024-01-01 RX ADMIN — CEFEPIME 2000 MG: 2 INJECTION, POWDER, FOR SOLUTION INTRAVENOUS at 15:37

## 2024-01-01 RX ADMIN — VECURONIUM BROMIDE 5 MG: 1 INJECTION, POWDER, LYOPHILIZED, FOR SOLUTION INTRAVENOUS at 23:44

## 2024-01-01 RX ADMIN — METHYLPREDNISOLONE SODIUM SUCCINATE 125 MG: 125 INJECTION, POWDER, FOR SOLUTION INTRAMUSCULAR; INTRAVENOUS at 13:02

## 2024-01-01 RX ADMIN — Medication 10 ML: at 08:04

## 2024-01-01 RX ADMIN — ASPIRIN 81 MG: 81 TABLET, CHEWABLE ORAL at 09:26

## 2024-01-01 RX ADMIN — IPRATROPIUM BROMIDE AND ALBUTEROL SULFATE 3 ML: .5; 3 SOLUTION RESPIRATORY (INHALATION) at 10:34

## 2024-01-01 RX ADMIN — NYSTATIN 1 APPLICATION: 100000 CREAM TOPICAL at 08:24

## 2024-01-01 RX ADMIN — ASPIRIN 81 MG: 81 TABLET, CHEWABLE ORAL at 08:15

## 2024-01-01 RX ADMIN — HYDROXYZINE HYDROCHLORIDE 25 MG: 25 TABLET ORAL at 23:17

## 2024-01-01 RX ADMIN — ASPIRIN 81 MG: 81 TABLET, CHEWABLE ORAL at 08:53

## 2024-01-01 RX ADMIN — Medication 10 ML: at 20:39

## 2024-01-01 RX ADMIN — NYSTATIN 500000 UNITS: 100000 SUSPENSION ORAL at 12:43

## 2024-01-01 RX ADMIN — LEVOTHYROXINE SODIUM 25 MCG: 0.03 TABLET ORAL at 08:12

## 2024-01-01 RX ADMIN — Medication 10 ML: at 20:31

## 2024-01-01 RX ADMIN — IPRATROPIUM BROMIDE AND ALBUTEROL SULFATE 3 ML: 2.5; .5 SOLUTION RESPIRATORY (INHALATION) at 12:36

## 2024-01-01 RX ADMIN — DOXYCYCLINE 100 MG: 100 CAPSULE ORAL at 21:02

## 2024-01-01 RX ADMIN — Medication 10 ML: at 08:12

## 2024-01-01 RX ADMIN — LORAZEPAM 1 MG: 2 INJECTION INTRAMUSCULAR; INTRAVENOUS at 07:46

## 2024-01-01 RX ADMIN — HYDROXYCHLOROQUINE SULFATE 200 MG: 200 TABLET ORAL at 20:14

## 2024-01-01 RX ADMIN — Medication 10 ML: at 21:47

## 2024-01-01 RX ADMIN — Medication 1 APPLICATION: at 08:11

## 2024-01-01 RX ADMIN — HEPARIN SODIUM 3500 UNITS: 5000 INJECTION INTRAVENOUS; SUBCUTANEOUS at 02:28

## 2024-01-01 RX ADMIN — PROPOFOL 30 MCG/KG/MIN: 10 INJECTION, EMULSION INTRAVENOUS at 05:38

## 2024-01-01 RX ADMIN — CETIRIZINE HYDROCHLORIDE 10 MG: 10 TABLET, FILM COATED ORAL at 16:12

## 2024-01-01 RX ADMIN — DOXYCYCLINE 100 MG: 100 CAPSULE ORAL at 20:38

## 2024-01-01 RX ADMIN — ASPIRIN 81 MG: 81 TABLET, CHEWABLE ORAL at 08:09

## 2024-01-01 RX ADMIN — LEVOTHYROXINE SODIUM 25 MCG: 0.03 TABLET ORAL at 09:26

## 2024-01-01 RX ADMIN — Medication 10 ML: at 08:20

## 2024-01-01 RX ADMIN — Medication 10 ML: at 08:11

## 2024-01-01 RX ADMIN — IPRATROPIUM BROMIDE AND ALBUTEROL SULFATE 3 ML: .5; 3 SOLUTION RESPIRATORY (INHALATION) at 11:12

## 2024-01-01 RX ADMIN — HYDROMORPHONE HYDROCHLORIDE 0.5 MG: 1 INJECTION, SOLUTION INTRAMUSCULAR; INTRAVENOUS; SUBCUTANEOUS at 04:56

## 2024-01-01 RX ADMIN — Medication 10 ML: at 21:01

## 2024-01-01 RX ADMIN — NAPROXEN 500 MG: 250 TABLET ORAL at 18:37

## 2024-01-01 RX ADMIN — PROPOFOL 50 MCG/KG/MIN: 10 INJECTION, EMULSION INTRAVENOUS at 23:05

## 2024-01-01 RX ADMIN — LORAZEPAM 1 MG: 2 INJECTION INTRAMUSCULAR; INTRAVENOUS at 02:37

## 2024-01-01 RX ADMIN — ENOXAPARIN SODIUM 40 MG: 40 INJECTION SUBCUTANEOUS at 21:04

## 2024-01-01 RX ADMIN — TECHNETIUM TC 99M SESTAMIBI 1 DOSE: 1 INJECTION INTRAVENOUS at 12:50

## 2024-01-01 RX ADMIN — IPRATROPIUM BROMIDE AND ALBUTEROL SULFATE 3 ML: .5; 2.5 SOLUTION RESPIRATORY (INHALATION) at 00:07

## 2024-01-01 RX ADMIN — LORAZEPAM 1 MG: 2 INJECTION INTRAMUSCULAR; INTRAVENOUS at 00:06

## 2024-01-01 RX ADMIN — Medication 10 ML: at 21:03

## 2024-01-01 RX ADMIN — NYSTATIN 1 APPLICATION: 100000 CREAM TOPICAL at 09:58

## 2024-01-01 RX ADMIN — METHYLPREDNISOLONE SODIUM SUCCINATE 60 MG: 125 INJECTION, POWDER, FOR SOLUTION INTRAMUSCULAR; INTRAVENOUS at 05:46

## 2024-01-01 RX ADMIN — CEFEPIME 2000 MG: 2 INJECTION, POWDER, FOR SOLUTION INTRAVENOUS at 08:53

## 2024-01-01 RX ADMIN — HYDROXYCHLOROQUINE SULFATE 200 MG: 200 TABLET ORAL at 08:11

## 2024-01-01 RX ADMIN — LORAZEPAM 1 MG: 2 INJECTION INTRAMUSCULAR; INTRAVENOUS at 10:45

## 2024-01-01 RX ADMIN — NYSTATIN 1 APPLICATION: 100000 CREAM TOPICAL at 21:45

## 2024-01-01 RX ADMIN — ALPRAZOLAM 0.5 MG: 0.5 TABLET ORAL at 08:54

## 2024-01-01 RX ADMIN — CEFTRIAXONE 1000 MG: 1 INJECTION, POWDER, FOR SOLUTION INTRAMUSCULAR; INTRAVENOUS at 20:41

## 2024-01-01 RX ADMIN — BREXPIPRAZOLE 0.5 MG: 1 TABLET ORAL at 11:18

## 2024-01-01 RX ADMIN — INSULIN HUMAN 2 UNITS: 100 INJECTION, SOLUTION PARENTERAL at 00:25

## 2024-01-01 RX ADMIN — Medication 10 ML: at 08:07

## 2024-01-01 RX ADMIN — GLYCOPYRROLATE 0.4 MG: 0.2 INJECTION INTRAMUSCULAR; INTRAVENOUS at 21:10

## 2024-01-01 RX ADMIN — LEVOTHYROXINE SODIUM 25 MCG: 0.03 TABLET ORAL at 08:19

## 2024-01-01 RX ADMIN — METHYLPREDNISOLONE SODIUM SUCCINATE 125 MG: 125 INJECTION, POWDER, FOR SOLUTION INTRAMUSCULAR; INTRAVENOUS at 05:07

## 2024-01-01 RX ADMIN — PROPOFOL 30 MCG/KG/MIN: 10 INJECTION, EMULSION INTRAVENOUS at 15:07

## 2024-01-01 RX ADMIN — DOXYCYCLINE 100 MG: 100 CAPSULE ORAL at 21:18

## 2024-01-01 RX ADMIN — GLYCOPYRROLATE 0.4 MG: 0.2 INJECTION INTRAMUSCULAR; INTRAVENOUS at 09:31

## 2024-01-01 RX ADMIN — METHYLPREDNISOLONE SODIUM SUCCINATE 60 MG: 125 INJECTION, POWDER, FOR SOLUTION INTRAMUSCULAR; INTRAVENOUS at 05:04

## 2024-01-01 RX ADMIN — DOXYCYCLINE 100 MG: 100 CAPSULE ORAL at 08:25

## 2024-01-01 RX ADMIN — PROPOFOL 40 MCG/KG/MIN: 10 INJECTION, EMULSION INTRAVENOUS at 06:08

## 2024-01-01 RX ADMIN — NYSTATIN 500000 UNITS: 100000 SUSPENSION ORAL at 08:11

## 2024-01-01 RX ADMIN — Medication 10 ML: at 09:15

## 2024-01-01 RX ADMIN — CEFEPIME 2000 MG: 2 INJECTION, POWDER, FOR SOLUTION INTRAVENOUS at 00:16

## 2024-01-01 RX ADMIN — LEVOTHYROXINE SODIUM 25 MCG: 0.03 TABLET ORAL at 09:44

## 2024-01-01 RX ADMIN — INSULIN HUMAN 3 UNITS: 100 INJECTION, SOLUTION PARENTERAL at 17:43

## 2024-01-01 RX ADMIN — SODIUM PHOSPHATE, MONOBASIC, MONOHYDRATE AND SODIUM PHOSPHATE, DIBASIC, ANHYDROUS 15 MMOL: 142; 276 INJECTION, SOLUTION INTRAVENOUS at 06:44

## 2024-01-01 RX ADMIN — PANTOPRAZOLE SODIUM 40 MG: 40 INJECTION, POWDER, FOR SOLUTION INTRAVENOUS at 05:43

## 2024-01-01 RX ADMIN — MEGESTROL ACETATE 40 MG: 40 TABLET ORAL at 20:14

## 2024-01-01 RX ADMIN — Medication 10 ML: at 08:37

## 2024-01-01 RX ADMIN — GUAIFENESIN 200 MG: 100 SOLUTION ORAL at 23:17

## 2024-01-01 RX ADMIN — DONEPEZIL HYDROCHLORIDE 10 MG: 5 TABLET, FILM COATED ORAL at 16:28

## 2024-01-01 RX ADMIN — NYSTATIN 1 APPLICATION: 100000 CREAM TOPICAL at 08:04

## 2024-01-01 RX ADMIN — Medication 1 APPLICATION: at 08:59

## 2024-01-01 RX ADMIN — MEMANTINE HYDROCHLORIDE 5 MG: 10 TABLET, FILM COATED ORAL at 08:54

## 2024-01-01 RX ADMIN — Medication 10 ML: at 08:38

## 2024-01-01 RX ADMIN — MUPIROCIN 1 APPLICATION: 20 OINTMENT TOPICAL at 21:27

## 2024-01-01 RX ADMIN — DOXYCYCLINE 100 MG: 100 INJECTION, POWDER, LYOPHILIZED, FOR SOLUTION INTRAVENOUS at 04:33

## 2024-01-01 RX ADMIN — BREXPIPRAZOLE 0.5 MG: 1 TABLET ORAL at 09:25

## 2024-01-01 RX ADMIN — METHYLPREDNISOLONE SODIUM SUCCINATE 40 MG: 40 INJECTION, POWDER, FOR SOLUTION INTRAMUSCULAR; INTRAVENOUS at 09:06

## 2024-01-01 RX ADMIN — LORAZEPAM 0.5 MG: 2 INJECTION INTRAMUSCULAR; INTRAVENOUS at 15:35

## 2024-01-01 RX ADMIN — Medication 10 ML: at 08:05

## 2024-01-01 RX ADMIN — Medication 10 ML: at 21:05

## 2024-01-01 RX ADMIN — Medication 10 ML: at 21:21

## 2024-01-01 RX ADMIN — CHLORHEXIDINE GLUCONATE 15 ML: 1.2 RINSE ORAL at 08:03

## 2024-01-01 RX ADMIN — Medication 10 ML: at 08:17

## 2024-01-01 RX ADMIN — ENOXAPARIN SODIUM 40 MG: 40 INJECTION SUBCUTANEOUS at 21:26

## 2024-01-01 RX ADMIN — PROPOFOL 40 MCG/KG/MIN: 10 INJECTION, EMULSION INTRAVENOUS at 18:24

## 2024-01-01 RX ADMIN — NYSTATIN 500000 UNITS: 100000 SUSPENSION ORAL at 21:55

## 2024-01-01 RX ADMIN — SACUBITRIL AND VALSARTAN 1 TABLET: 24; 26 TABLET, FILM COATED ORAL at 15:31

## 2024-01-01 RX ADMIN — Medication 10 ML: at 08:55

## 2024-01-01 RX ADMIN — NOREPINEPHRINE BITARTRATE 0.3 MCG/KG/MIN: 0.03 INJECTION, SOLUTION INTRAVENOUS at 06:59

## 2024-01-01 RX ADMIN — BUSPIRONE HYDROCHLORIDE 10 MG: 10 TABLET ORAL at 16:13

## 2024-01-01 RX ADMIN — Medication 10 ML: at 11:45

## 2024-01-01 RX ADMIN — IPRATROPIUM BROMIDE AND ALBUTEROL SULFATE 3 ML: 2.5; .5 SOLUTION RESPIRATORY (INHALATION) at 18:51

## 2024-01-01 RX ADMIN — HYDROXYCHLOROQUINE SULFATE 200 MG: 200 TABLET ORAL at 20:29

## 2024-01-01 RX ADMIN — CEFEPIME 2000 MG: 2 INJECTION, POWDER, FOR SOLUTION INTRAVENOUS at 15:36

## 2024-01-01 RX ADMIN — Medication 10 ML: at 08:59

## 2024-01-01 RX ADMIN — PROPOFOL 30 MCG/KG/MIN: 10 INJECTION, EMULSION INTRAVENOUS at 09:15

## 2024-01-01 RX ADMIN — LORAZEPAM 0.5 MG: 2 INJECTION INTRAMUSCULAR; INTRAVENOUS at 11:13

## 2024-01-01 RX ADMIN — NYSTATIN 1 APPLICATION: 100000 CREAM TOPICAL at 21:24

## 2024-01-01 RX ADMIN — NYSTATIN 500000 UNITS: 100000 SUSPENSION ORAL at 12:14

## 2024-01-01 RX ADMIN — IPRATROPIUM BROMIDE AND ALBUTEROL SULFATE 3 ML: .5; 2.5 SOLUTION RESPIRATORY (INHALATION) at 06:27

## 2024-01-01 RX ADMIN — DOXYCYCLINE 100 MG: 100 INJECTION, POWDER, LYOPHILIZED, FOR SOLUTION INTRAVENOUS at 15:30

## 2024-01-01 RX ADMIN — NYSTATIN 1 APPLICATION: 100000 CREAM TOPICAL at 20:15

## 2024-01-01 RX ADMIN — PROPOFOL 45 MCG/KG/MIN: 10 INJECTION, EMULSION INTRAVENOUS at 12:52

## 2024-01-01 RX ADMIN — PROPOFOL 40 MCG/KG/MIN: 10 INJECTION, EMULSION INTRAVENOUS at 04:34

## 2024-01-01 RX ADMIN — ATORVASTATIN CALCIUM 40 MG: 40 TABLET, FILM COATED ORAL at 09:14

## 2024-01-01 RX ADMIN — HYDROXYZINE HYDROCHLORIDE 25 MG: 25 TABLET ORAL at 12:13

## 2024-01-01 RX ADMIN — CHLORHEXIDINE GLUCONATE 15 ML: 1.2 RINSE ORAL at 08:18

## 2024-01-01 RX ADMIN — IPRATROPIUM BROMIDE AND ALBUTEROL SULFATE 3 ML: .5; 2.5 SOLUTION RESPIRATORY (INHALATION) at 18:46

## 2024-01-01 RX ADMIN — IPRATROPIUM BROMIDE AND ALBUTEROL SULFATE 3 ML: 2.5; .5 SOLUTION RESPIRATORY (INHALATION) at 06:17

## 2024-01-01 RX ADMIN — IPRATROPIUM BROMIDE AND ALBUTEROL SULFATE 3 ML: .5; 2.5 SOLUTION RESPIRATORY (INHALATION) at 00:31

## 2024-01-01 RX ADMIN — ENOXAPARIN SODIUM 40 MG: 40 INJECTION SUBCUTANEOUS at 21:41

## 2024-01-01 RX ADMIN — HYDROXYCHLOROQUINE SULFATE 200 MG: 200 TABLET ORAL at 08:19

## 2024-01-01 RX ADMIN — MORPHINE SULFATE 2 MG: 2 INJECTION, SOLUTION INTRAMUSCULAR; INTRAVENOUS at 08:45

## 2024-01-01 RX ADMIN — IPRATROPIUM BROMIDE AND ALBUTEROL SULFATE 3 ML: .5; 2.5 SOLUTION RESPIRATORY (INHALATION) at 06:43

## 2024-01-01 RX ADMIN — DONEPEZIL HYDROCHLORIDE 10 MG: 5 TABLET, FILM COATED ORAL at 17:52

## 2024-01-01 RX ADMIN — LEVETIRACETAM 500 MG: 500 TABLET, FILM COATED ORAL at 20:41

## 2024-01-01 RX ADMIN — DEXMEDETOMIDINE 0.9 MCG/KG/HR: 200 INJECTION, SOLUTION INTRAVENOUS at 07:03

## 2024-01-01 RX ADMIN — Medication 10 ML: at 10:29

## 2024-01-01 RX ADMIN — CHLORHEXIDINE GLUCONATE 15 ML: 1.2 RINSE ORAL at 08:04

## 2024-01-01 RX ADMIN — CEFEPIME 2000 MG: 2 INJECTION, POWDER, FOR SOLUTION INTRAVENOUS at 05:20

## 2024-01-01 RX ADMIN — Medication 50 MCG/HR: at 07:00

## 2024-01-01 RX ADMIN — EMPAGLIFLOZIN 10 MG: 10 TABLET, FILM COATED ORAL at 08:48

## 2024-01-01 RX ADMIN — PRIMIDONE 200 MG: 50 TABLET ORAL at 09:47

## 2024-01-01 RX ADMIN — INSULIN HUMAN 2 UNITS: 100 INJECTION, SOLUTION PARENTERAL at 01:09

## 2024-01-01 RX ADMIN — ALBUMIN (HUMAN) 25 G: 0.25 INJECTION, SOLUTION INTRAVENOUS at 14:26

## 2024-01-01 RX ADMIN — ALPRAZOLAM 0.5 MG: 0.5 TABLET ORAL at 10:23

## 2024-01-01 RX ADMIN — NYSTATIN 500000 UNITS: 100000 SUSPENSION ORAL at 08:19

## 2024-01-01 RX ADMIN — PRIMIDONE 200 MG: 50 TABLET ORAL at 15:31

## 2024-01-01 RX ADMIN — NYSTATIN 1 APPLICATION: 100000 CREAM TOPICAL at 21:46

## 2024-01-01 RX ADMIN — Medication 10 ML: at 09:28

## 2024-01-01 RX ADMIN — CHLORHEXIDINE GLUCONATE 15 ML: 1.2 RINSE ORAL at 21:46

## 2024-01-01 RX ADMIN — MEMANTINE HYDROCHLORIDE 5 MG: 10 TABLET, FILM COATED ORAL at 08:11

## 2024-01-01 RX ADMIN — LORAZEPAM 0.5 MG: 2 INJECTION INTRAMUSCULAR; INTRAVENOUS at 22:33

## 2024-01-01 RX ADMIN — Medication 10 ML: at 21:11

## 2024-01-01 RX ADMIN — ACETAMINOPHEN 650 MG: 650 SOLUTION ORAL at 18:08

## 2024-01-01 RX ADMIN — IPRATROPIUM BROMIDE AND ALBUTEROL SULFATE 3 ML: .5; 3 SOLUTION RESPIRATORY (INHALATION) at 14:30

## 2024-01-01 RX ADMIN — Medication 10 ML: at 21:56

## 2024-01-01 RX ADMIN — MEMANTINE HYDROCHLORIDE 5 MG: 10 TABLET, FILM COATED ORAL at 21:46

## 2024-01-01 RX ADMIN — METRONIDAZOLE 500 MG: 500 INJECTION, SOLUTION INTRAVENOUS at 17:17

## 2024-01-01 RX ADMIN — BUDESONIDE 0.5 MG: 0.5 INHALANT ORAL at 06:51

## 2024-01-01 RX ADMIN — BREXPIPRAZOLE 0.5 MG: 1 TABLET ORAL at 10:49

## 2024-01-01 RX ADMIN — IPRATROPIUM BROMIDE AND ALBUTEROL SULFATE 3 ML: .5; 2.5 SOLUTION RESPIRATORY (INHALATION) at 18:17

## 2024-01-01 RX ADMIN — PHENYLEPHRINE HYDROCHLORIDE 1.7 MCG/KG/MIN: 10 INJECTION INTRAVENOUS at 08:37

## 2024-01-01 RX ADMIN — ALBUMIN (HUMAN) 25 G: 0.25 INJECTION, SOLUTION INTRAVENOUS at 13:51

## 2024-01-01 RX ADMIN — METRONIDAZOLE 500 MG: 500 INJECTION, SOLUTION INTRAVENOUS at 09:18

## 2024-01-01 RX ADMIN — MUPIROCIN 1 APPLICATION: 20 OINTMENT TOPICAL at 21:46

## 2024-01-01 RX ADMIN — Medication 250 MCG/HR: at 20:10

## 2024-01-01 RX ADMIN — NYSTATIN 500000 UNITS: 100000 SUSPENSION ORAL at 08:53

## 2024-01-01 RX ADMIN — Medication 100 MCG/HR: at 18:35

## 2024-01-01 RX ADMIN — LEVOTHYROXINE SODIUM 25 MCG: 0.03 TABLET ORAL at 08:56

## 2024-01-01 RX ADMIN — HYDROXYCHLOROQUINE SULFATE 200 MG: 200 TABLET ORAL at 21:20

## 2024-01-01 RX ADMIN — Medication 10 ML: at 21:04

## 2024-01-01 RX ADMIN — PANTOPRAZOLE SODIUM 40 MG: 40 INJECTION, POWDER, FOR SOLUTION INTRAVENOUS at 09:14

## 2024-01-01 RX ADMIN — BUDESONIDE 0.5 MG: 0.5 INHALANT ORAL at 18:33

## 2024-01-01 RX ADMIN — MEMANTINE HYDROCHLORIDE 5 MG: 10 TABLET, FILM COATED ORAL at 08:06

## 2024-01-01 RX ADMIN — CHLORHEXIDINE GLUCONATE 15 ML: 1.2 RINSE ORAL at 11:44

## 2024-01-01 RX ADMIN — CEFEPIME 2000 MG: 2 INJECTION, POWDER, FOR SOLUTION INTRAVENOUS at 15:57

## 2024-01-01 RX ADMIN — Medication 50 MCG/HR: at 10:13

## 2024-01-01 RX ADMIN — INSULIN HUMAN 2 UNITS: 100 INJECTION, SOLUTION PARENTERAL at 11:48

## 2024-01-01 RX ADMIN — IPRATROPIUM BROMIDE AND ALBUTEROL SULFATE 3 ML: 2.5; .5 SOLUTION RESPIRATORY (INHALATION) at 12:15

## 2024-01-01 RX ADMIN — MEMANTINE HYDROCHLORIDE 5 MG: 10 TABLET, FILM COATED ORAL at 20:38

## 2024-01-01 RX ADMIN — Medication 10 ML: at 20:15

## 2024-01-01 RX ADMIN — GLYCOPYRROLATE 0.4 MG: 0.2 INJECTION INTRAMUSCULAR; INTRAVENOUS at 12:26

## 2024-01-01 RX ADMIN — BREXPIPRAZOLE 0.5 MG: 1 TABLET ORAL at 09:46

## 2024-01-01 RX ADMIN — NOREPINEPHRINE BITARTRATE 0.22 MCG/KG/MIN: 0.03 INJECTION, SOLUTION INTRAVENOUS at 15:38

## 2024-01-01 RX ADMIN — Medication 10 ML: at 21:28

## 2024-01-01 RX ADMIN — LEVOTHYROXINE SODIUM 25 MCG: 0.03 TABLET ORAL at 08:09

## 2024-01-01 RX ADMIN — Medication 10 ML: at 08:58

## 2024-01-01 RX ADMIN — METHYLPREDNISOLONE SODIUM SUCCINATE 125 MG: 125 INJECTION, POWDER, FOR SOLUTION INTRAMUSCULAR; INTRAVENOUS at 21:41

## 2024-01-01 RX ADMIN — Medication 10 ML: at 21:41

## 2024-01-01 RX ADMIN — BUDESONIDE 0.5 MG: 0.5 INHALANT ORAL at 19:02

## 2024-01-01 RX ADMIN — DEXMEDETOMIDINE 1.5 MCG/KG/HR: 200 INJECTION, SOLUTION INTRAVENOUS at 02:55

## 2024-01-01 RX ADMIN — GLYCOPYRROLATE 0.4 MG: 0.2 INJECTION INTRAMUSCULAR; INTRAVENOUS at 14:10

## 2024-01-01 RX ADMIN — HYDROXYCHLOROQUINE SULFATE 200 MG: 200 TABLET ORAL at 20:38

## 2024-01-01 RX ADMIN — CEFEPIME 2000 MG: 2 INJECTION, POWDER, FOR SOLUTION INTRAVENOUS at 05:04

## 2024-01-01 RX ADMIN — HYDROMORPHONE HYDROCHLORIDE 0.5 MG: 1 INJECTION, SOLUTION INTRAMUSCULAR; INTRAVENOUS; SUBCUTANEOUS at 15:36

## 2024-01-01 RX ADMIN — MUPIROCIN 1 APPLICATION: 20 OINTMENT TOPICAL at 21:20

## 2024-01-01 RX ADMIN — BREXPIPRAZOLE 0.5 MG: 1 TABLET ORAL at 08:05

## 2024-01-01 RX ADMIN — Medication 10 ML: at 20:46

## 2024-01-01 RX ADMIN — NYSTATIN 1 APPLICATION: 100000 CREAM TOPICAL at 08:06

## 2024-01-01 RX ADMIN — MEMANTINE HYDROCHLORIDE 5 MG: 10 TABLET, FILM COATED ORAL at 21:18

## 2024-01-01 RX ADMIN — NYSTATIN 1 APPLICATION: 100000 CREAM TOPICAL at 21:41

## 2024-01-01 RX ADMIN — DOXYCYCLINE 100 MG: 100 INJECTION, POWDER, LYOPHILIZED, FOR SOLUTION INTRAVENOUS at 03:23

## 2024-01-01 RX ADMIN — INSULIN HUMAN 2 UNITS: 100 INJECTION, SOLUTION PARENTERAL at 17:12

## 2024-01-01 RX ADMIN — IPRATROPIUM BROMIDE AND ALBUTEROL SULFATE 3 ML: 2.5; .5 SOLUTION RESPIRATORY (INHALATION) at 00:27

## 2024-01-01 RX ADMIN — MEMANTINE HYDROCHLORIDE 5 MG: 10 TABLET, FILM COATED ORAL at 09:44

## 2024-01-01 RX ADMIN — PHENYLEPHRINE HYDROCHLORIDE 2 MCG/KG/MIN: 10 INJECTION INTRAVENOUS at 07:13

## 2024-01-01 RX ADMIN — VORICONAZOLE 240 MG: 200 INJECTION, POWDER, LYOPHILIZED, FOR SOLUTION INTRAVENOUS at 23:15

## 2024-01-01 RX ADMIN — PROPOFOL 50 MCG/KG/MIN: 10 INJECTION, EMULSION INTRAVENOUS at 21:32

## 2024-01-01 RX ADMIN — NOREPINEPHRINE BITARTRATE 0.3 MCG/KG/MIN: 0.03 INJECTION, SOLUTION INTRAVENOUS at 22:05

## 2024-01-01 RX ADMIN — METHYLPREDNISOLONE SODIUM SUCCINATE 60 MG: 125 INJECTION, POWDER, FOR SOLUTION INTRAMUSCULAR; INTRAVENOUS at 16:09

## 2024-01-01 RX ADMIN — PRIMIDONE 200 MG: 50 TABLET ORAL at 20:14

## 2024-01-01 RX ADMIN — PROPOFOL 35 MCG/KG/MIN: 10 INJECTION, EMULSION INTRAVENOUS at 12:43

## 2024-01-01 RX ADMIN — FLUOXETINE HYDROCHLORIDE 40 MG: 20 CAPSULE ORAL at 09:44

## 2024-01-01 RX ADMIN — CEFEPIME 2000 MG: 2 INJECTION, POWDER, FOR SOLUTION INTRAVENOUS at 16:15

## 2024-01-01 RX ADMIN — HYDROMORPHONE HYDROCHLORIDE 0.5 MG: 1 INJECTION, SOLUTION INTRAMUSCULAR; INTRAVENOUS; SUBCUTANEOUS at 23:58

## 2024-01-01 RX ADMIN — INSULIN HUMAN 2 UNITS: 100 INJECTION, SOLUTION PARENTERAL at 05:36

## 2024-01-01 RX ADMIN — ASPIRIN 81 MG: 81 TABLET, CHEWABLE ORAL at 08:07

## 2024-01-01 RX ADMIN — Medication 10 ML: at 21:12

## 2024-01-01 RX ADMIN — PHENYLEPHRINE HYDROCHLORIDE 1.1 MCG/KG/MIN: 10 INJECTION INTRAVENOUS at 10:13

## 2024-01-01 RX ADMIN — METHYLPREDNISOLONE SODIUM SUCCINATE 60 MG: 125 INJECTION, POWDER, FOR SOLUTION INTRAMUSCULAR; INTRAVENOUS at 16:23

## 2024-01-01 RX ADMIN — DONEPEZIL HYDROCHLORIDE 10 MG: 5 TABLET, FILM COATED ORAL at 18:05

## 2024-01-01 RX ADMIN — SODIUM CHLORIDE 40 ML: 9 INJECTION, SOLUTION INTRAVENOUS at 10:41

## 2024-01-01 RX ADMIN — Medication 10 ML: at 08:15

## 2024-01-01 RX ADMIN — TRIAMCINOLONE ACETONIDE 1 APPLICATION: 1 CREAM TOPICAL at 20:15

## 2024-01-01 RX ADMIN — VASOPRESSIN 0.03 UNITS/MIN: 0.2 INJECTION INTRAVENOUS at 18:01

## 2024-01-01 RX ADMIN — SCOPALAMINE 2 PATCH: 1 PATCH, EXTENDED RELEASE TRANSDERMAL at 10:25

## 2024-01-01 RX ADMIN — Medication 250 MCG/HR: at 07:16

## 2024-01-01 RX ADMIN — IPRATROPIUM BROMIDE AND ALBUTEROL SULFATE 3 ML: .5; 3 SOLUTION RESPIRATORY (INHALATION) at 06:56

## 2024-01-01 RX ADMIN — GLYCOPYRROLATE 0.4 MG: 0.2 INJECTION INTRAMUSCULAR; INTRAVENOUS at 04:10

## 2024-01-01 RX ADMIN — MEMANTINE HYDROCHLORIDE 5 MG: 10 TABLET, FILM COATED ORAL at 08:19

## 2024-01-01 RX ADMIN — Medication 10 ML: at 20:29

## 2024-01-01 RX ADMIN — MEMANTINE HYDROCHLORIDE 5 MG: 10 TABLET, FILM COATED ORAL at 08:09

## 2024-01-01 RX ADMIN — PROPOFOL 50 MCG/KG/MIN: 10 INJECTION, EMULSION INTRAVENOUS at 08:12

## 2024-01-01 RX ADMIN — METHYLPREDNISOLONE SODIUM SUCCINATE 60 MG: 125 INJECTION, POWDER, FOR SOLUTION INTRAMUSCULAR; INTRAVENOUS at 16:28

## 2024-01-01 RX ADMIN — CEFEPIME 2000 MG: 2 INJECTION, POWDER, FOR SOLUTION INTRAVENOUS at 00:05

## 2024-01-01 RX ADMIN — ENOXAPARIN SODIUM 40 MG: 40 INJECTION SUBCUTANEOUS at 20:38

## 2024-01-01 RX ADMIN — HYDROXYCHLOROQUINE SULFATE 200 MG: 200 TABLET ORAL at 20:41

## 2024-01-01 RX ADMIN — PROPOFOL 50 MCG/KG/MIN: 10 INJECTION, EMULSION INTRAVENOUS at 03:40

## 2024-01-01 RX ADMIN — CHLORHEXIDINE GLUCONATE 15 ML: 1.2 RINSE ORAL at 21:04

## 2024-01-01 RX ADMIN — SENNOSIDES AND DOCUSATE SODIUM 2 TABLET: 50; 8.6 TABLET ORAL at 08:09

## 2024-01-01 RX ADMIN — DONEPEZIL HYDROCHLORIDE 10 MG: 5 TABLET, FILM COATED ORAL at 16:10

## 2024-01-01 RX ADMIN — MEMANTINE HYDROCHLORIDE 5 MG: 10 TABLET, FILM COATED ORAL at 20:15

## 2024-01-01 RX ADMIN — INSULIN HUMAN 2 UNITS: 100 INJECTION, SOLUTION PARENTERAL at 00:21

## 2024-01-01 RX ADMIN — INSULIN HUMAN 2 UNITS: 100 INJECTION, SOLUTION PARENTERAL at 12:17

## 2024-01-01 RX ADMIN — LORAZEPAM 0.5 MG: 2 INJECTION INTRAMUSCULAR; INTRAVENOUS at 01:17

## 2024-01-01 RX ADMIN — MEMANTINE HYDROCHLORIDE 5 MG: 10 TABLET, FILM COATED ORAL at 08:15

## 2024-01-01 RX ADMIN — MEMANTINE HYDROCHLORIDE 5 MG: 10 TABLET, FILM COATED ORAL at 21:45

## 2024-01-01 RX ADMIN — Medication 10 ML: at 09:47

## 2024-01-01 RX ADMIN — IPRATROPIUM BROMIDE AND ALBUTEROL SULFATE 3 ML: .5; 3 SOLUTION RESPIRATORY (INHALATION) at 10:11

## 2024-01-01 RX ADMIN — PROPOFOL 30 MCG/KG/MIN: 10 INJECTION, EMULSION INTRAVENOUS at 22:21

## 2024-01-01 RX ADMIN — HYDROMORPHONE HYDROCHLORIDE 0.5 MG: 1 INJECTION, SOLUTION INTRAMUSCULAR; INTRAVENOUS; SUBCUTANEOUS at 22:33

## 2024-01-01 RX ADMIN — NYSTATIN 1 APPLICATION: 100000 CREAM TOPICAL at 20:38

## 2024-01-01 RX ADMIN — METHYLPREDNISOLONE SODIUM SUCCINATE 125 MG: 125 INJECTION, POWDER, FOR SOLUTION INTRAMUSCULAR; INTRAVENOUS at 05:43

## 2024-01-01 RX ADMIN — CHLORHEXIDINE GLUCONATE 15 ML: 1.2 RINSE ORAL at 08:16

## 2024-01-01 RX ADMIN — IPRATROPIUM BROMIDE AND ALBUTEROL SULFATE 3 ML: .5; 2.5 SOLUTION RESPIRATORY (INHALATION) at 12:57

## 2024-01-01 RX ADMIN — DOXYCYCLINE 100 MG: 100 CAPSULE ORAL at 21:46

## 2024-01-01 RX ADMIN — CETIRIZINE HYDROCHLORIDE 10 MG: 10 TABLET, FILM COATED ORAL at 09:46

## 2024-01-01 RX ADMIN — Medication 10 ML: at 20:10

## 2024-01-01 RX ADMIN — MUPIROCIN 1 APPLICATION: 20 OINTMENT TOPICAL at 08:04

## 2024-01-01 RX ADMIN — IPRATROPIUM BROMIDE AND ALBUTEROL SULFATE 3 ML: .5; 3 SOLUTION RESPIRATORY (INHALATION) at 14:00

## 2024-01-01 RX ADMIN — MIDAZOLAM HYDROCHLORIDE 2 MG: 1 INJECTION, SOLUTION INTRAMUSCULAR; INTRAVENOUS at 06:20

## 2024-01-01 RX ADMIN — FLUOXETINE HYDROCHLORIDE 40 MG: 20 CAPSULE ORAL at 09:15

## 2024-01-01 RX ADMIN — ATORVASTATIN CALCIUM 40 MG: 40 TABLET, FILM COATED ORAL at 17:52

## 2024-01-01 RX ADMIN — ASPIRIN 81 MG: 81 TABLET, CHEWABLE ORAL at 09:05

## 2024-01-01 RX ADMIN — IPRATROPIUM BROMIDE AND ALBUTEROL SULFATE 3 ML: .5; 3 SOLUTION RESPIRATORY (INHALATION) at 02:39

## 2024-01-01 RX ADMIN — ASPIRIN 81 MG: 81 TABLET, CHEWABLE ORAL at 08:06

## 2024-01-01 RX ADMIN — CEFEPIME 2000 MG: 2 INJECTION, POWDER, FOR SOLUTION INTRAVENOUS at 05:42

## 2024-01-01 RX ADMIN — MEMANTINE HYDROCHLORIDE 5 MG: 10 TABLET, FILM COATED ORAL at 08:05

## 2024-01-01 RX ADMIN — ENOXAPARIN SODIUM 40 MG: 40 INJECTION SUBCUTANEOUS at 00:04

## 2024-01-01 RX ADMIN — Medication 10 ML: at 00:05

## 2024-01-01 RX ADMIN — BREXPIPRAZOLE 0.5 MG: 1 TABLET ORAL at 08:25

## 2024-01-01 RX ADMIN — HYDROMORPHONE HYDROCHLORIDE 0.5 MG: 1 INJECTION, SOLUTION INTRAMUSCULAR; INTRAVENOUS; SUBCUTANEOUS at 14:57

## 2024-01-01 RX ADMIN — DONEPEZIL HYDROCHLORIDE 10 MG: 5 TABLET, FILM COATED ORAL at 16:08

## 2024-01-01 RX ADMIN — POTASSIUM CHLORIDE 40 MEQ: 1500 TABLET, EXTENDED RELEASE ORAL at 02:05

## 2024-01-01 RX ADMIN — IPRATROPIUM BROMIDE AND ALBUTEROL SULFATE 3 ML: .5; 3 SOLUTION RESPIRATORY (INHALATION) at 10:37

## 2024-01-01 RX ADMIN — BUDESONIDE 0.5 MG: 0.5 INHALANT ORAL at 18:29

## 2024-01-01 RX ADMIN — HYDROXYCHLOROQUINE SULFATE 200 MG: 200 TABLET ORAL at 21:50

## 2024-01-01 RX ADMIN — LORAZEPAM 1 MG: 2 INJECTION INTRAMUSCULAR; INTRAVENOUS at 12:42

## 2024-01-01 RX ADMIN — GLYCOPYRROLATE 0.4 MG: 0.2 INJECTION INTRAMUSCULAR; INTRAVENOUS at 09:47

## 2024-01-01 RX ADMIN — NYSTATIN 500000 UNITS: 100000 SUSPENSION ORAL at 11:32

## 2024-01-01 RX ADMIN — INSULIN HUMAN 2 UNITS: 100 INJECTION, SOLUTION PARENTERAL at 12:24

## 2024-01-01 RX ADMIN — NYSTATIN 500000 UNITS: 100000 SUSPENSION ORAL at 08:25

## 2024-01-01 RX ADMIN — LORAZEPAM 1 MG: 2 INJECTION INTRAMUSCULAR; INTRAVENOUS at 17:05

## 2024-01-01 RX ADMIN — ENOXAPARIN SODIUM 40 MG: 40 INJECTION SUBCUTANEOUS at 21:20

## 2024-01-01 RX ADMIN — ENOXAPARIN SODIUM 40 MG: 40 INJECTION SUBCUTANEOUS at 21:47

## 2024-01-01 RX ADMIN — BUDESONIDE AND FORMOTEROL FUMARATE DIHYDRATE 2 PUFF: 160; 4.5 AEROSOL RESPIRATORY (INHALATION) at 19:25

## 2024-01-01 RX ADMIN — NYSTATIN 500000 UNITS: 100000 SUSPENSION ORAL at 08:04

## 2024-01-01 RX ADMIN — MUPIROCIN 1 APPLICATION: 20 OINTMENT TOPICAL at 21:52

## 2024-01-01 RX ADMIN — MIDAZOLAM HYDROCHLORIDE 2 MG: 1 INJECTION, SOLUTION INTRAMUSCULAR; INTRAVENOUS at 15:45

## 2024-01-01 RX ADMIN — LEVOTHYROXINE SODIUM 25 MCG: 0.03 TABLET ORAL at 07:40

## 2024-01-01 RX ADMIN — DOXYCYCLINE 100 MG: 100 CAPSULE ORAL at 21:20

## 2024-01-01 RX ADMIN — MEMANTINE HYDROCHLORIDE 5 MG: 10 TABLET, FILM COATED ORAL at 21:02

## 2024-01-01 RX ADMIN — VORICONAZOLE 240 MG: 200 INJECTION, POWDER, LYOPHILIZED, FOR SOLUTION INTRAVENOUS at 23:08

## 2024-01-01 RX ADMIN — CEFEPIME 2000 MG: 2 INJECTION, POWDER, FOR SOLUTION INTRAVENOUS at 01:08

## 2024-01-01 RX ADMIN — NAPROXEN 500 MG: 250 TABLET ORAL at 09:46

## 2024-01-01 RX ADMIN — ASPIRIN 324 MG: 81 TABLET, CHEWABLE ORAL at 02:26

## 2024-01-01 RX ADMIN — LORAZEPAM 1 MG: 2 INJECTION INTRAMUSCULAR; INTRAVENOUS at 12:26

## 2024-01-01 RX ADMIN — Medication 1 APPLICATION: at 15:57

## 2024-01-01 RX ADMIN — HYDROMORPHONE HYDROCHLORIDE 0.5 MG: 1 INJECTION, SOLUTION INTRAMUSCULAR; INTRAVENOUS; SUBCUTANEOUS at 10:25

## 2024-01-01 RX ADMIN — GLYCOPYRROLATE 0.4 MG: 0.2 INJECTION INTRAMUSCULAR; INTRAVENOUS at 23:17

## 2024-01-01 RX ADMIN — DEXMEDETOMIDINE 1.1 MCG/KG/HR: 200 INJECTION, SOLUTION INTRAVENOUS at 18:34

## 2024-01-01 RX ADMIN — CEFTRIAXONE 1000 MG: 1 INJECTION, POWDER, FOR SOLUTION INTRAMUSCULAR; INTRAVENOUS at 23:51

## 2024-01-01 RX ADMIN — IPRATROPIUM BROMIDE AND ALBUTEROL SULFATE 3 ML: .5; 2.5 SOLUTION RESPIRATORY (INHALATION) at 12:23

## 2024-01-01 RX ADMIN — VASOPRESSIN 0.03 UNITS/MIN: 0.2 INJECTION INTRAVENOUS at 09:46

## 2024-01-01 RX ADMIN — MEMANTINE HYDROCHLORIDE 5 MG: 10 TABLET, FILM COATED ORAL at 20:29

## 2024-01-01 RX ADMIN — IPRATROPIUM BROMIDE AND ALBUTEROL SULFATE 3 ML: .5; 3 SOLUTION RESPIRATORY (INHALATION) at 07:06

## 2024-01-01 RX ADMIN — BREXPIPRAZOLE 0.5 MG: 1 TABLET ORAL at 18:35

## 2024-01-01 RX ADMIN — METHYLPREDNISOLONE SODIUM SUCCINATE 60 MG: 125 INJECTION, POWDER, FOR SOLUTION INTRAMUSCULAR; INTRAVENOUS at 16:59

## 2024-01-01 RX ADMIN — CHLORHEXIDINE GLUCONATE 15 ML: 1.2 RINSE ORAL at 08:24

## 2024-01-01 RX ADMIN — LORAZEPAM 1 MG: 2 INJECTION INTRAMUSCULAR; INTRAVENOUS at 00:49

## 2024-01-01 RX ADMIN — DOXYCYCLINE 100 MG: 100 INJECTION, POWDER, LYOPHILIZED, FOR SOLUTION INTRAVENOUS at 04:12

## 2024-01-01 RX ADMIN — METOPROLOL SUCCINATE 25 MG: 25 TABLET, FILM COATED, EXTENDED RELEASE ORAL at 08:49

## 2024-01-01 RX ADMIN — PROPOFOL 30 MCG/KG/MIN: 10 INJECTION, EMULSION INTRAVENOUS at 18:15

## 2024-01-01 RX ADMIN — LORAZEPAM 0.5 MG: 2 INJECTION INTRAMUSCULAR; INTRAVENOUS at 13:38

## 2024-01-01 RX ADMIN — Medication 1 APPLICATION: at 10:29

## 2024-01-01 RX ADMIN — METHYLPREDNISOLONE SODIUM SUCCINATE 60 MG: 125 INJECTION, POWDER, FOR SOLUTION INTRAMUSCULAR; INTRAVENOUS at 05:59

## 2024-01-01 RX ADMIN — MEMANTINE HYDROCHLORIDE 5 MG: 10 TABLET, FILM COATED ORAL at 21:50

## 2024-01-01 RX ADMIN — CHLORHEXIDINE GLUCONATE 15 ML: 1.2 RINSE ORAL at 09:26

## 2024-01-01 RX ADMIN — MUPIROCIN 1 APPLICATION: 20 OINTMENT TOPICAL at 08:37

## 2024-01-01 RX ADMIN — METHYLPREDNISOLONE SODIUM SUCCINATE 60 MG: 125 INJECTION, POWDER, FOR SOLUTION INTRAMUSCULAR; INTRAVENOUS at 18:05

## 2024-01-01 RX ADMIN — CEFEPIME 2000 MG: 2 INJECTION, POWDER, FOR SOLUTION INTRAVENOUS at 05:28

## 2024-01-01 RX ADMIN — LORAZEPAM 0.5 MG: 2 INJECTION INTRAMUSCULAR; INTRAVENOUS at 23:58

## 2024-01-01 RX ADMIN — ENOXAPARIN SODIUM 40 MG: 40 INJECTION SUBCUTANEOUS at 21:45

## 2024-01-01 RX ADMIN — IPRATROPIUM BROMIDE AND ALBUTEROL SULFATE 3 ML: .5; 2.5 SOLUTION RESPIRATORY (INHALATION) at 00:04

## 2024-01-01 RX ADMIN — INSULIN HUMAN 2 UNITS: 100 INJECTION, SOLUTION PARENTERAL at 00:05

## 2024-01-01 RX ADMIN — Medication 150 MCG/HR: at 18:15

## 2024-01-01 RX ADMIN — DOXYCYCLINE 100 MG: 100 CAPSULE ORAL at 21:50

## 2024-01-01 RX ADMIN — GLYCOPYRROLATE 0.4 MG: 0.2 INJECTION INTRAMUSCULAR; INTRAVENOUS at 04:55

## 2024-01-01 RX ADMIN — GLYCOPYRROLATE 0.4 MG: 0.2 INJECTION INTRAMUSCULAR; INTRAVENOUS at 21:31

## 2024-01-01 RX ADMIN — NYSTATIN 1 APPLICATION: 100000 CREAM TOPICAL at 09:44

## 2024-01-01 RX ADMIN — VITAMINS A AND D OINTMENT: 15.5; 53.4 OINTMENT TOPICAL at 08:08

## 2024-01-01 RX ADMIN — MEMANTINE HYDROCHLORIDE 5 MG: 10 TABLET, FILM COATED ORAL at 20:14

## 2024-01-01 RX ADMIN — DOXYCYCLINE 100 MG: 100 CAPSULE ORAL at 08:56

## 2024-01-01 RX ADMIN — DEXMEDETOMIDINE 1.1 MCG/KG/HR: 200 INJECTION, SOLUTION INTRAVENOUS at 00:05

## 2024-01-01 RX ADMIN — Medication 200 MCG/HR: at 02:40

## 2024-01-01 RX ADMIN — DONEPEZIL HYDROCHLORIDE 10 MG: 5 TABLET, FILM COATED ORAL at 18:25

## 2024-01-01 RX ADMIN — NAPROXEN 500 MG: 250 TABLET ORAL at 18:25

## 2024-01-01 RX ADMIN — DOXYCYCLINE 100 MG: 100 CAPSULE ORAL at 08:19

## 2024-01-01 RX ADMIN — PROPOFOL 50 MCG/KG/MIN: 10 INJECTION, EMULSION INTRAVENOUS at 00:06

## 2024-01-01 RX ADMIN — NYSTATIN 1 APPLICATION: 100000 CREAM TOPICAL at 08:03

## 2024-01-01 RX ADMIN — HYDROCHLOROTHIAZIDE 12.5 MG: 25 TABLET ORAL at 17:52

## 2024-01-01 RX ADMIN — PANTOPRAZOLE SODIUM 40 MG: 40 INJECTION, POWDER, FOR SOLUTION INTRAVENOUS at 05:42

## 2024-01-01 RX ADMIN — CHLORHEXIDINE GLUCONATE 15 ML: 1.2 RINSE ORAL at 21:51

## 2024-01-01 RX ADMIN — METRONIDAZOLE 500 MG: 500 INJECTION, SOLUTION INTRAVENOUS at 17:36

## 2024-01-01 RX ADMIN — MEMANTINE HYDROCHLORIDE 5 MG: 10 TABLET, FILM COATED ORAL at 09:26

## 2024-01-01 RX ADMIN — INSULIN HUMAN 2 UNITS: 100 INJECTION, SOLUTION PARENTERAL at 17:56

## 2024-01-01 RX ADMIN — INSULIN HUMAN 3 UNITS: 100 INJECTION, SOLUTION PARENTERAL at 23:11

## 2024-01-01 RX ADMIN — CHLORHEXIDINE GLUCONATE 15 ML: 1.2 RINSE ORAL at 21:45

## 2024-01-01 RX ADMIN — METHYLPREDNISOLONE SODIUM SUCCINATE 60 MG: 125 INJECTION, POWDER, FOR SOLUTION INTRAMUSCULAR; INTRAVENOUS at 05:42

## 2024-01-01 RX ADMIN — IPRATROPIUM BROMIDE AND ALBUTEROL SULFATE 3 ML: .5; 2.5 SOLUTION RESPIRATORY (INHALATION) at 06:17

## 2024-01-01 RX ADMIN — PANTOPRAZOLE SODIUM 40 MG: 40 TABLET, DELAYED RELEASE ORAL at 05:51

## 2024-01-01 RX ADMIN — DOXYCYCLINE 100 MG: 100 CAPSULE ORAL at 23:15

## 2024-01-01 RX ADMIN — DONEPEZIL HYDROCHLORIDE 10 MG: 5 TABLET, FILM COATED ORAL at 16:09

## 2024-01-01 RX ADMIN — Medication 200 MCG/HR: at 21:10

## 2024-01-01 RX ADMIN — IPRATROPIUM BROMIDE AND ALBUTEROL SULFATE 3 ML: 2.5; .5 SOLUTION RESPIRATORY (INHALATION) at 18:29

## 2024-01-01 RX ADMIN — NYSTATIN 500000 UNITS: 100000 SUSPENSION ORAL at 22:08

## 2024-01-01 RX ADMIN — PHENYLEPHRINE HYDROCHLORIDE 0.5 MCG/KG/MIN: 10 INJECTION INTRAVENOUS at 12:52

## 2024-01-01 RX ADMIN — NYSTATIN 500000 UNITS: 100000 SUSPENSION ORAL at 17:56

## 2024-01-01 RX ADMIN — MIDAZOLAM HYDROCHLORIDE 1 MG: 1 INJECTION, SOLUTION INTRAMUSCULAR; INTRAVENOUS at 07:42

## 2024-01-01 RX ADMIN — CEFEPIME 2000 MG: 2 INJECTION, POWDER, FOR SOLUTION INTRAVENOUS at 13:54

## 2024-01-01 RX ADMIN — IPRATROPIUM BROMIDE AND ALBUTEROL SULFATE 3 ML: .5; 2.5 SOLUTION RESPIRATORY (INHALATION) at 18:50

## 2024-01-01 RX ADMIN — LORAZEPAM 1 MG: 2 INJECTION INTRAMUSCULAR; INTRAVENOUS at 04:10

## 2024-01-01 RX ADMIN — PANTOPRAZOLE SODIUM 40 MG: 40 INJECTION, POWDER, FOR SOLUTION INTRAVENOUS at 05:07

## 2024-01-01 RX ADMIN — Medication 10 ML: at 09:11

## 2024-01-01 RX ADMIN — ASPIRIN 81 MG: 81 TABLET, CHEWABLE ORAL at 08:19

## 2024-01-01 RX ADMIN — MUPIROCIN 1 APPLICATION: 20 OINTMENT TOPICAL at 08:20

## 2024-01-01 RX ADMIN — CEFTRIAXONE 1000 MG: 1 INJECTION, POWDER, FOR SOLUTION INTRAMUSCULAR; INTRAVENOUS at 23:17

## 2024-01-01 RX ADMIN — IPRATROPIUM BROMIDE AND ALBUTEROL SULFATE 3 ML: .5; 3 SOLUTION RESPIRATORY (INHALATION) at 18:17

## 2024-01-01 RX ADMIN — INSULIN HUMAN 2 UNITS: 100 INJECTION, SOLUTION PARENTERAL at 18:11

## 2024-01-01 RX ADMIN — ATORVASTATIN CALCIUM 40 MG: 40 TABLET, FILM COATED ORAL at 09:46

## 2024-01-01 RX ADMIN — LORAZEPAM 1 MG: 2 INJECTION INTRAMUSCULAR; INTRAVENOUS at 18:01

## 2024-01-01 RX ADMIN — ENOXAPARIN SODIUM 40 MG: 40 INJECTION SUBCUTANEOUS at 21:02

## 2024-01-01 RX ADMIN — BREXPIPRAZOLE 0.5 MG: 1 TABLET ORAL at 08:09

## 2024-01-01 RX ADMIN — VORICONAZOLE 240 MG: 200 INJECTION, POWDER, LYOPHILIZED, FOR SOLUTION INTRAVENOUS at 11:30

## 2024-01-01 RX ADMIN — Medication 1 APPLICATION: at 20:09

## 2024-01-01 RX ADMIN — Medication 10 ML: at 08:10

## 2024-01-01 RX ADMIN — CEFTRIAXONE 1000 MG: 1 INJECTION, POWDER, FOR SOLUTION INTRAMUSCULAR; INTRAVENOUS at 22:45

## 2024-01-01 RX ADMIN — HYDROMORPHONE HYDROCHLORIDE 0.5 MG: 1 INJECTION, SOLUTION INTRAMUSCULAR; INTRAVENOUS; SUBCUTANEOUS at 14:51

## 2024-01-01 RX ADMIN — Medication 250 MCG/HR: at 20:30

## 2024-01-01 RX ADMIN — PHENYLEPHRINE HYDROCHLORIDE 0.5 MCG/KG/MIN: 10 INJECTION INTRAVENOUS at 06:09

## 2024-01-01 RX ADMIN — PANTOPRAZOLE SODIUM 40 MG: 40 INJECTION, POWDER, FOR SOLUTION INTRAVENOUS at 05:04

## 2024-01-01 RX ADMIN — CHLORHEXIDINE GLUCONATE 15 ML: 1.2 RINSE ORAL at 21:20

## 2024-01-01 RX ADMIN — MUPIROCIN 1 APPLICATION: 20 OINTMENT TOPICAL at 21:45

## 2024-01-01 RX ADMIN — IPRATROPIUM BROMIDE AND ALBUTEROL SULFATE 3 ML: .5; 3 SOLUTION RESPIRATORY (INHALATION) at 22:30

## 2024-01-01 RX ADMIN — IPRATROPIUM BROMIDE AND ALBUTEROL SULFATE 3 ML: .5; 2.5 SOLUTION RESPIRATORY (INHALATION) at 18:44

## 2024-01-01 RX ADMIN — LORAZEPAM 1 MG: 2 INJECTION INTRAMUSCULAR; INTRAVENOUS at 12:03

## 2024-01-01 RX ADMIN — MIDAZOLAM HYDROCHLORIDE 2 MG: 1 INJECTION, SOLUTION INTRAMUSCULAR; INTRAVENOUS at 04:38

## 2024-01-01 RX ADMIN — PHENYLEPHRINE HYDROCHLORIDE 0.8 MCG/KG/MIN: 10 INJECTION INTRAVENOUS at 13:59

## 2024-01-01 RX ADMIN — SCOPALAMINE 2 PATCH: 1 PATCH, EXTENDED RELEASE TRANSDERMAL at 10:00

## 2024-01-01 RX ADMIN — MUPIROCIN 1 APPLICATION: 20 OINTMENT TOPICAL at 08:55

## 2024-01-01 RX ADMIN — DOXYCYCLINE 100 MG: 100 CAPSULE ORAL at 21:26

## 2024-01-01 RX ADMIN — IPRATROPIUM BROMIDE AND ALBUTEROL SULFATE 3 ML: .5; 2.5 SOLUTION RESPIRATORY (INHALATION) at 18:57

## 2024-01-01 RX ADMIN — PHENYLEPHRINE HYDROCHLORIDE 0.8 MCG/KG/MIN: 10 INJECTION INTRAVENOUS at 18:37

## 2024-01-01 RX ADMIN — MORPHINE SULFATE 2 MG: 2 INJECTION, SOLUTION INTRAMUSCULAR; INTRAVENOUS at 03:32

## 2024-01-01 RX ADMIN — Medication: at 19:04

## 2024-01-01 RX ADMIN — DOCUSATE SODIUM 50 MG AND SENNOSIDES 8.6 MG 2 TABLET: 8.6; 5 TABLET, FILM COATED ORAL at 20:41

## 2024-01-01 RX ADMIN — LORAZEPAM 0.25 MG: 2 INJECTION INTRAMUSCULAR; INTRAVENOUS at 04:12

## 2024-01-01 RX ADMIN — CHLORHEXIDINE GLUCONATE 15 ML: 1.2 RINSE ORAL at 08:11

## 2024-01-01 RX ADMIN — CEFEPIME 2000 MG: 2 INJECTION, POWDER, FOR SOLUTION INTRAVENOUS at 08:04

## 2024-01-01 RX ADMIN — LORAZEPAM 1 MG: 2 INJECTION INTRAMUSCULAR; INTRAVENOUS at 08:04

## 2024-01-01 RX ADMIN — IPRATROPIUM BROMIDE AND ALBUTEROL SULFATE 3 ML: .5; 2.5 SOLUTION RESPIRATORY (INHALATION) at 00:17

## 2024-01-01 RX ADMIN — VECURONIUM BROMIDE 5 MG: 1 INJECTION, POWDER, LYOPHILIZED, FOR SOLUTION INTRAVENOUS at 10:15

## 2024-01-01 RX ADMIN — DEXMEDETOMIDINE 1.1 MCG/KG/HR: 200 INJECTION, SOLUTION INTRAVENOUS at 06:11

## 2024-01-01 RX ADMIN — LORAZEPAM 1 MG: 2 INJECTION INTRAMUSCULAR; INTRAVENOUS at 09:45

## 2024-01-01 RX ADMIN — MORPHINE SULFATE 2 MG: 2 INJECTION, SOLUTION INTRAMUSCULAR; INTRAVENOUS at 01:51

## 2024-01-01 RX ADMIN — DEXMEDETOMIDINE 0.2 MCG/KG/HR: 200 INJECTION, SOLUTION INTRAVENOUS at 10:32

## 2024-01-01 RX ADMIN — DEXMEDETOMIDINE 1.1 MCG/KG/HR: 200 INJECTION, SOLUTION INTRAVENOUS at 07:21

## 2024-01-01 RX ADMIN — EMPAGLIFLOZIN 10 MG: 10 TABLET, FILM COATED ORAL at 09:46

## 2024-01-01 RX ADMIN — IPRATROPIUM BROMIDE AND ALBUTEROL SULFATE 3 ML: .5; 2.5 SOLUTION RESPIRATORY (INHALATION) at 18:40

## 2024-01-01 RX ADMIN — IPRATROPIUM BROMIDE AND ALBUTEROL SULFATE 3 ML: .5; 3 SOLUTION RESPIRATORY (INHALATION) at 22:08

## 2024-01-01 RX ADMIN — CEFEPIME 2000 MG: 2 INJECTION, POWDER, FOR SOLUTION INTRAVENOUS at 13:52

## 2024-01-01 RX ADMIN — IPRATROPIUM BROMIDE AND ALBUTEROL SULFATE 3 ML: .5; 2.5 SOLUTION RESPIRATORY (INHALATION) at 12:08

## 2024-01-01 RX ADMIN — VITAMINS A AND D OINTMENT: 15.5; 53.4 OINTMENT TOPICAL at 21:21

## 2024-01-01 RX ADMIN — ALPRAZOLAM 0.5 MG: 0.5 TABLET ORAL at 21:45

## 2024-01-01 RX ADMIN — METHYLPREDNISOLONE SODIUM SUCCINATE 40 MG: 40 INJECTION, POWDER, FOR SOLUTION INTRAMUSCULAR; INTRAVENOUS at 09:00

## 2024-01-01 RX ADMIN — INSULIN HUMAN 2 UNITS: 100 INJECTION, SOLUTION PARENTERAL at 11:29

## 2024-01-01 RX ADMIN — DOXYCYCLINE 100 MG: 100 CAPSULE ORAL at 08:15

## 2024-01-01 RX ADMIN — CEFEPIME 2000 MG: 2 INJECTION, POWDER, FOR SOLUTION INTRAVENOUS at 21:20

## 2024-01-01 RX ADMIN — Medication 250 MCG/HR: at 10:13

## 2024-01-01 RX ADMIN — LORAZEPAM 1 MG: 2 INJECTION INTRAMUSCULAR; INTRAVENOUS at 04:20

## 2024-01-01 RX ADMIN — DOXYCYCLINE 100 MG: 100 CAPSULE ORAL at 08:36

## 2024-01-01 RX ADMIN — CHLORHEXIDINE GLUCONATE 15 ML: 1.2 RINSE ORAL at 08:37

## 2024-01-01 RX ADMIN — INSULIN HUMAN 2 UNITS: 100 INJECTION, SOLUTION PARENTERAL at 12:03

## 2024-01-01 RX ADMIN — TECHNETIUM TC 99M SESTAMIBI 1 DOSE: 1 INJECTION INTRAVENOUS at 14:23

## 2024-01-01 RX ADMIN — PRIMIDONE 200 MG: 50 TABLET ORAL at 16:12

## 2024-01-01 RX ADMIN — LEVOTHYROXINE SODIUM 25 MCG: 0.03 TABLET ORAL at 08:15

## 2024-01-01 RX ADMIN — NOREPINEPHRINE BITARTRATE 0.02 MCG/KG/MIN: 0.03 INJECTION, SOLUTION INTRAVENOUS at 06:28

## 2024-01-01 RX ADMIN — MEMANTINE HYDROCHLORIDE 5 MG: 10 TABLET, FILM COATED ORAL at 08:36

## 2024-01-01 RX ADMIN — IPRATROPIUM BROMIDE AND ALBUTEROL SULFATE 3 ML: .5; 3 SOLUTION RESPIRATORY (INHALATION) at 18:33

## 2024-01-01 RX ADMIN — MEMANTINE HYDROCHLORIDE 5 MG: 10 TABLET, FILM COATED ORAL at 21:05

## 2024-01-01 RX ADMIN — HYDROXYCHLOROQUINE SULFATE 200 MG: 200 TABLET ORAL at 21:41

## 2024-01-01 RX ADMIN — IPRATROPIUM BROMIDE AND ALBUTEROL SULFATE 3 ML: 2.5; .5 SOLUTION RESPIRATORY (INHALATION) at 23:45

## 2024-01-01 RX ADMIN — Medication 1 APPLICATION: at 09:17

## 2024-01-01 RX ADMIN — NOREPINEPHRINE BITARTRATE 0.3 MCG/KG/MIN: 0.03 INJECTION, SOLUTION INTRAVENOUS at 14:45

## 2024-01-01 RX ADMIN — VITAMINS A AND D OINTMENT: 15.5; 53.4 OINTMENT TOPICAL at 17:49

## 2024-01-01 RX ADMIN — METHYLPREDNISOLONE SODIUM SUCCINATE 40 MG: 40 INJECTION, POWDER, FOR SOLUTION INTRAMUSCULAR; INTRAVENOUS at 05:27

## 2024-01-01 RX ADMIN — Medication 10 ML: at 08:03

## 2024-01-01 RX ADMIN — BREXPIPRAZOLE 0.5 MG: 1 TABLET ORAL at 08:06

## 2024-01-01 RX ADMIN — Medication 50 MCG/HR: at 21:56

## 2024-01-01 RX ADMIN — CITALOPRAM HYDROBROMIDE 40 MG: 20 TABLET ORAL at 17:52

## 2024-01-01 RX ADMIN — BUSPIRONE HYDROCHLORIDE 10 MG: 10 TABLET ORAL at 09:46

## 2024-01-01 RX ADMIN — CEFEPIME 2000 MG: 2 INJECTION, POWDER, FOR SOLUTION INTRAVENOUS at 22:05

## 2024-01-01 RX ADMIN — ASPIRIN 81 MG: 81 TABLET, CHEWABLE ORAL at 08:12

## 2024-01-01 RX ADMIN — NYSTATIN 500000 UNITS: 100000 SUSPENSION ORAL at 21:02

## 2024-01-01 RX ADMIN — BREXPIPRAZOLE 0.5 MG: 1 TABLET ORAL at 08:12

## 2024-01-01 RX ADMIN — Medication: at 09:59

## 2024-01-01 RX ADMIN — IPRATROPIUM BROMIDE AND ALBUTEROL SULFATE 3 ML: 2.5; .5 SOLUTION RESPIRATORY (INHALATION) at 19:02

## 2024-01-01 RX ADMIN — Medication 300 MCG/HR: at 05:02

## 2024-01-01 RX ADMIN — METHYLPREDNISOLONE SODIUM SUCCINATE 60 MG: 125 INJECTION, POWDER, FOR SOLUTION INTRAMUSCULAR; INTRAVENOUS at 04:40

## 2024-01-01 RX ADMIN — NYSTATIN 1 APPLICATION: 100000 CREAM TOPICAL at 21:20

## 2024-01-01 RX ADMIN — PANTOPRAZOLE SODIUM 40 MG: 40 INJECTION, POWDER, FOR SOLUTION INTRAVENOUS at 05:59

## 2024-01-01 RX ADMIN — NYSTATIN 500000 UNITS: 100000 SUSPENSION ORAL at 12:04

## 2024-01-01 RX ADMIN — IPRATROPIUM BROMIDE AND ALBUTEROL SULFATE 3 ML: .5; 3 SOLUTION RESPIRATORY (INHALATION) at 14:39

## 2024-01-01 RX ADMIN — CHLORHEXIDINE GLUCONATE 15 ML: 1.2 RINSE ORAL at 21:41

## 2024-01-01 RX ADMIN — LEVOTHYROXINE SODIUM 25 MCG: 0.03 TABLET ORAL at 08:36

## 2024-01-01 RX ADMIN — METHYLPREDNISOLONE SODIUM SUCCINATE 60 MG: 125 INJECTION, POWDER, FOR SOLUTION INTRAMUSCULAR; INTRAVENOUS at 16:42

## 2024-01-01 RX ADMIN — INSULIN HUMAN 2 UNITS: 100 INJECTION, SOLUTION PARENTERAL at 12:33

## 2024-01-01 RX ADMIN — LORAZEPAM 1 MG: 2 INJECTION INTRAMUSCULAR; INTRAVENOUS at 21:10

## 2024-01-01 RX ADMIN — PHENYLEPHRINE HYDROCHLORIDE 1.4 MCG/KG/MIN: 10 INJECTION INTRAVENOUS at 21:28

## 2024-01-01 RX ADMIN — HYDROMORPHONE HYDROCHLORIDE 0.5 MG: 1 INJECTION, SOLUTION INTRAMUSCULAR; INTRAVENOUS; SUBCUTANEOUS at 01:42

## 2024-01-01 RX ADMIN — INSULIN HUMAN 2 UNITS: 100 INJECTION, SOLUTION PARENTERAL at 12:21

## 2024-01-01 RX ADMIN — FUROSEMIDE 40 MG: 10 INJECTION, SOLUTION INTRAMUSCULAR; INTRAVENOUS at 09:14

## 2024-01-01 RX ADMIN — DEXMEDETOMIDINE 0.7 MCG/KG/HR: 200 INJECTION, SOLUTION INTRAVENOUS at 14:24

## 2024-01-01 RX ADMIN — SODIUM CHLORIDE 2000 MG: 9 INJECTION, SOLUTION INTRAVENOUS at 22:52

## 2024-01-01 RX ADMIN — PROPOFOL 40 MCG/KG/MIN: 10 INJECTION, EMULSION INTRAVENOUS at 21:27

## 2024-01-01 RX ADMIN — HEPARIN SODIUM 1500 UNITS: 5000 INJECTION INTRAVENOUS; SUBCUTANEOUS at 18:47

## 2024-01-01 RX ADMIN — DOXYCYCLINE 100 MG: 100 INJECTION, POWDER, LYOPHILIZED, FOR SOLUTION INTRAVENOUS at 05:22

## 2024-01-01 RX ADMIN — MEMANTINE HYDROCHLORIDE 5 MG: 10 TABLET, FILM COATED ORAL at 08:25

## 2024-01-01 RX ADMIN — ASPIRIN 81 MG: 81 TABLET, CHEWABLE ORAL at 08:05

## 2024-01-01 RX ADMIN — Medication 10 ML: at 09:43

## 2024-01-01 RX ADMIN — DONEPEZIL HYDROCHLORIDE 10 MG: 5 TABLET, FILM COATED ORAL at 16:13

## 2024-01-01 RX ADMIN — PROPOFOL 25 MCG/KG/MIN: 10 INJECTION, EMULSION INTRAVENOUS at 18:05

## 2024-01-01 RX ADMIN — PROPOFOL 35 MCG/KG/MIN: 10 INJECTION, EMULSION INTRAVENOUS at 16:47

## 2024-01-01 RX ADMIN — MUPIROCIN 1 APPLICATION: 20 OINTMENT TOPICAL at 09:13

## 2024-01-01 RX ADMIN — CEFEPIME 2000 MG: 2 INJECTION, POWDER, FOR SOLUTION INTRAVENOUS at 05:56

## 2024-01-01 RX ADMIN — METHYLPREDNISOLONE SODIUM SUCCINATE 40 MG: 40 INJECTION, POWDER, FOR SOLUTION INTRAMUSCULAR; INTRAVENOUS at 21:21

## 2024-01-01 RX ADMIN — NYSTATIN 1 APPLICATION: 100000 CREAM TOPICAL at 08:56

## 2024-01-01 RX ADMIN — VITAMINS A AND D OINTMENT: 15.5; 53.4 OINTMENT TOPICAL at 08:18

## 2024-01-01 RX ADMIN — METHYLPREDNISOLONE SODIUM SUCCINATE 60 MG: 125 INJECTION, POWDER, FOR SOLUTION INTRAMUSCULAR; INTRAVENOUS at 18:11

## 2024-01-01 RX ADMIN — PROPOFOL 45 MCG/KG/MIN: 10 INJECTION, EMULSION INTRAVENOUS at 07:23

## 2024-01-01 RX ADMIN — Medication 200 MCG/HR: at 23:47

## 2024-01-01 RX ADMIN — IPRATROPIUM BROMIDE AND ALBUTEROL SULFATE 3 ML: .5; 2.5 SOLUTION RESPIRATORY (INHALATION) at 12:02

## 2024-01-01 RX ADMIN — VITAMINS A AND D OINTMENT: 15.5; 53.4 OINTMENT TOPICAL at 09:27

## 2024-01-01 RX ADMIN — ALPRAZOLAM 0.5 MG: 0.5 TABLET ORAL at 14:02

## 2024-01-01 RX ADMIN — METHYLPREDNISOLONE SODIUM SUCCINATE 60 MG: 125 INJECTION, POWDER, FOR SOLUTION INTRAMUSCULAR; INTRAVENOUS at 06:08

## 2024-01-01 RX ADMIN — METHYLPREDNISOLONE SODIUM SUCCINATE 125 MG: 125 INJECTION, POWDER, FOR SOLUTION INTRAMUSCULAR; INTRAVENOUS at 20:29

## 2024-01-01 RX ADMIN — Medication 150 MCG/HR: at 04:34

## 2024-01-01 RX ADMIN — METHYLPREDNISOLONE SODIUM SUCCINATE 60 MG: 125 INJECTION, POWDER, FOR SOLUTION INTRAMUSCULAR; INTRAVENOUS at 16:43

## 2024-01-01 RX ADMIN — Medication 1 APPLICATION: at 21:45

## 2024-01-01 RX ADMIN — FUROSEMIDE 20 MG: 10 INJECTION, SOLUTION INTRAMUSCULAR; INTRAVENOUS at 12:44

## 2024-01-01 RX ADMIN — IPRATROPIUM BROMIDE AND ALBUTEROL SULFATE 3 ML: .5; 3 SOLUTION RESPIRATORY (INHALATION) at 22:25

## 2024-01-01 RX ADMIN — CHLORHEXIDINE GLUCONATE 15 ML: 1.2 RINSE ORAL at 20:29

## 2024-01-01 RX ADMIN — INSULIN HUMAN 2 UNITS: 100 INJECTION, SOLUTION PARENTERAL at 00:15

## 2024-01-01 RX ADMIN — VITAMINS A AND D OINTMENT: 15.5; 53.4 OINTMENT TOPICAL at 08:56

## 2024-01-01 RX ADMIN — DEXMEDETOMIDINE 1.3 MCG/KG/HR: 200 INJECTION, SOLUTION INTRAVENOUS at 16:28

## 2024-01-01 RX ADMIN — MUPIROCIN 1 APPLICATION: 20 OINTMENT TOPICAL at 21:06

## 2024-01-01 RX ADMIN — MUPIROCIN 1 APPLICATION: 20 OINTMENT TOPICAL at 21:41

## 2024-01-01 RX ADMIN — NYSTATIN 1 APPLICATION: 100000 CREAM TOPICAL at 08:38

## 2024-01-01 RX ADMIN — DOXYCYCLINE 100 MG: 100 CAPSULE ORAL at 08:06

## 2024-01-01 RX ADMIN — PHENYLEPHRINE HYDROCHLORIDE 1.4 MCG/KG/MIN: 10 INJECTION INTRAVENOUS at 21:43

## 2024-01-01 RX ADMIN — PROPOFOL 30 MCG/KG/MIN: 10 INJECTION, EMULSION INTRAVENOUS at 12:17

## 2024-01-01 RX ADMIN — PROPOFOL 40 MCG/KG/MIN: 10 INJECTION, EMULSION INTRAVENOUS at 12:44

## 2024-01-01 RX ADMIN — METRONIDAZOLE 500 MG: 500 INJECTION, SOLUTION INTRAVENOUS at 02:25

## 2024-01-01 RX ADMIN — VECURONIUM BROMIDE 5 MG: 1 INJECTION, POWDER, LYOPHILIZED, FOR SOLUTION INTRAVENOUS at 03:06

## 2024-01-01 RX ADMIN — NYSTATIN 500000 UNITS: 100000 SUSPENSION ORAL at 12:49

## 2024-01-01 RX ADMIN — ALPRAZOLAM 0.5 MG: 0.5 TABLET ORAL at 15:57

## 2024-01-01 RX ADMIN — PROPOFOL 40 MCG/KG/MIN: 10 INJECTION, EMULSION INTRAVENOUS at 21:43

## 2024-01-01 RX ADMIN — ENOXAPARIN SODIUM 40 MG: 40 INJECTION SUBCUTANEOUS at 21:50

## 2024-01-01 RX ADMIN — PROPOFOL 20 MCG/KG/MIN: 10 INJECTION, EMULSION INTRAVENOUS at 00:05

## 2024-01-01 RX ADMIN — Medication 10 ML: at 20:41

## 2024-01-01 RX ADMIN — PROPOFOL 50 MCG/KG/MIN: 10 INJECTION, EMULSION INTRAVENOUS at 04:38

## 2024-01-01 RX ADMIN — NYSTATIN 500000 UNITS: 100000 SUSPENSION ORAL at 17:46

## 2024-01-01 RX ADMIN — IPRATROPIUM BROMIDE AND ALBUTEROL SULFATE 3 ML: .5; 3 SOLUTION RESPIRATORY (INHALATION) at 22:14

## 2024-01-01 RX ADMIN — Medication: at 05:06

## 2024-01-01 RX ADMIN — VITAMINS A AND D OINTMENT: 15.5; 53.4 OINTMENT TOPICAL at 21:02

## 2024-01-01 RX ADMIN — Medication 10 ML: at 09:29

## 2024-01-01 RX ADMIN — NYSTATIN 1 APPLICATION: 100000 CREAM TOPICAL at 08:16

## 2024-01-01 RX ADMIN — CHLORHEXIDINE GLUCONATE 15 ML: 1.2 RINSE ORAL at 21:24

## 2024-01-01 RX ADMIN — HYDROXYCHLOROQUINE SULFATE 200 MG: 200 TABLET ORAL at 08:36

## 2024-01-01 RX ADMIN — SCOPALAMINE 1 PATCH: 1 PATCH, EXTENDED RELEASE TRANSDERMAL at 14:18

## 2024-01-01 RX ADMIN — REGADENOSON 0.4 MG: 0.08 INJECTION, SOLUTION INTRAVENOUS at 14:43

## 2024-01-01 RX ADMIN — IPRATROPIUM BROMIDE AND ALBUTEROL SULFATE 3 ML: .5; 3 SOLUTION RESPIRATORY (INHALATION) at 11:46

## 2024-01-01 RX ADMIN — PROCHLORPERAZINE EDISYLATE 5 MG: 5 INJECTION INTRAMUSCULAR; INTRAVENOUS at 11:38

## 2024-01-01 RX ADMIN — HYDROXYCHLOROQUINE SULFATE 200 MG: 200 TABLET ORAL at 08:09

## 2024-01-01 RX ADMIN — IPRATROPIUM BROMIDE AND ALBUTEROL SULFATE 3 ML: .5; 3 SOLUTION RESPIRATORY (INHALATION) at 18:38

## 2024-01-01 RX ADMIN — LORAZEPAM 0.5 MG: 2 INJECTION INTRAMUSCULAR; INTRAVENOUS at 15:31

## 2024-01-01 RX ADMIN — PHENYLEPHRINE HYDROCHLORIDE 0.5 MCG/KG/MIN: 10 INJECTION INTRAVENOUS at 23:12

## 2024-01-01 RX ADMIN — VITAMINS A AND D OINTMENT: 15.5; 53.4 OINTMENT TOPICAL at 08:24

## 2024-01-01 RX ADMIN — Medication 10 ML: at 21:10

## 2024-01-01 RX ADMIN — TRIAMCINOLONE ACETONIDE 1 APPLICATION: 1 CREAM TOPICAL at 09:44

## 2024-01-01 RX ADMIN — NYSTATIN 1 APPLICATION: 100000 CREAM TOPICAL at 08:18

## 2024-01-01 RX ADMIN — ALBUMIN (HUMAN) 25 G: 0.25 INJECTION, SOLUTION INTRAVENOUS at 00:08

## 2024-01-01 RX ADMIN — Medication 1 APPLICATION: at 21:56

## 2024-01-01 RX ADMIN — ASPIRIN 81 MG: 81 TABLET, CHEWABLE ORAL at 08:25

## 2024-01-01 RX ADMIN — CHLORHEXIDINE GLUCONATE 15 ML: 1.2 RINSE ORAL at 08:05

## 2024-01-01 RX ADMIN — PANTOPRAZOLE SODIUM 40 MG: 40 INJECTION, POWDER, FOR SOLUTION INTRAVENOUS at 05:20

## 2024-01-01 RX ADMIN — CEFEPIME 2000 MG: 2 INJECTION, POWDER, FOR SOLUTION INTRAVENOUS at 14:00

## 2024-01-01 RX ADMIN — CEFEPIME 2000 MG: 2 INJECTION, POWDER, FOR SOLUTION INTRAVENOUS at 05:35

## 2024-01-01 RX ADMIN — METHYLPREDNISOLONE SODIUM SUCCINATE 80 MG: 125 INJECTION, POWDER, FOR SOLUTION INTRAMUSCULAR; INTRAVENOUS at 21:40

## 2024-01-01 RX ADMIN — DONEPEZIL HYDROCHLORIDE 10 MG: 5 TABLET, FILM COATED ORAL at 18:11

## 2024-01-01 RX ADMIN — NYSTATIN 500000 UNITS: 100000 SUSPENSION ORAL at 09:25

## 2024-01-01 RX ADMIN — METRONIDAZOLE 500 MG: 500 INJECTION, SOLUTION INTRAVENOUS at 09:13

## 2024-01-01 RX ADMIN — HYDROMORPHONE HYDROCHLORIDE 0.5 MG: 1 INJECTION, SOLUTION INTRAMUSCULAR; INTRAVENOUS; SUBCUTANEOUS at 13:33

## 2024-01-01 RX ADMIN — HYDROMORPHONE HYDROCHLORIDE 0.5 MG: 1 INJECTION, SOLUTION INTRAMUSCULAR; INTRAVENOUS; SUBCUTANEOUS at 11:56

## 2024-01-01 RX ADMIN — VANCOMYCIN HYDROCHLORIDE 1250 MG: 5 INJECTION, POWDER, LYOPHILIZED, FOR SOLUTION INTRAVENOUS at 09:59

## 2024-01-01 RX ADMIN — HYDROXYCHLOROQUINE SULFATE 200 MG: 200 TABLET ORAL at 21:18

## 2024-01-01 RX ADMIN — NYSTATIN 500000 UNITS: 100000 SUSPENSION ORAL at 21:50

## 2024-01-01 RX ADMIN — ALBUMIN (HUMAN) 25 G: 0.25 INJECTION, SOLUTION INTRAVENOUS at 00:43

## 2024-01-01 RX ADMIN — INSULIN HUMAN 2 UNITS: 100 INJECTION, SOLUTION PARENTERAL at 11:04

## 2024-01-01 RX ADMIN — BREXPIPRAZOLE 0.5 MG: 1 TABLET ORAL at 10:00

## 2024-01-01 RX ADMIN — LEVOTHYROXINE SODIUM 25 MCG: 0.03 TABLET ORAL at 08:06

## 2024-01-01 RX ADMIN — METHYLPREDNISOLONE SODIUM SUCCINATE 60 MG: 125 INJECTION, POWDER, FOR SOLUTION INTRAMUSCULAR; INTRAVENOUS at 05:56

## 2024-01-01 RX ADMIN — Medication 250 MCG/HR: at 23:50

## 2024-01-01 RX ADMIN — NYSTATIN 500000 UNITS: 100000 SUSPENSION ORAL at 20:38

## 2024-01-01 RX ADMIN — NYSTATIN 1 APPLICATION: 100000 CREAM TOPICAL at 20:30

## 2024-01-01 RX ADMIN — HEPARIN SODIUM 15 UNITS/KG/HR: 10000 INJECTION, SOLUTION INTRAVENOUS at 16:14

## 2024-01-01 RX ADMIN — EPINEPHRINE 0.02 MCG/KG/MIN: 1 INJECTION INTRAMUSCULAR; INTRAVENOUS; SUBCUTANEOUS at 11:42

## 2024-01-01 RX ADMIN — VORICONAZOLE 240 MG: 200 INJECTION, POWDER, LYOPHILIZED, FOR SOLUTION INTRAVENOUS at 10:01

## 2024-01-01 RX ADMIN — Medication 200 MCG/HR: at 12:52

## 2024-01-01 RX ADMIN — DONEPEZIL HYDROCHLORIDE 10 MG: 5 TABLET, FILM COATED ORAL at 16:23

## 2024-01-01 RX ADMIN — FUROSEMIDE 40 MG: 10 INJECTION, SOLUTION INTRAMUSCULAR; INTRAVENOUS at 00:08

## 2024-01-01 RX ADMIN — CEFEPIME 2000 MG: 2 INJECTION, POWDER, FOR SOLUTION INTRAVENOUS at 05:07

## 2024-01-01 RX ADMIN — DEXMEDETOMIDINE 0.9 MCG/KG/HR: 200 INJECTION, SOLUTION INTRAVENOUS at 14:02

## 2024-01-01 RX ADMIN — PROPOFOL 40 MCG/KG/MIN: 10 INJECTION, EMULSION INTRAVENOUS at 17:46

## 2024-01-01 RX ADMIN — DONEPEZIL HYDROCHLORIDE 10 MG: 5 TABLET, FILM COATED ORAL at 16:42

## 2024-01-01 RX ADMIN — IPRATROPIUM BROMIDE AND ALBUTEROL SULFATE 3 ML: .5; 3 SOLUTION RESPIRATORY (INHALATION) at 19:43

## 2024-01-01 RX ADMIN — LEVOTHYROXINE SODIUM 25 MCG: 0.03 TABLET ORAL at 08:07

## 2024-01-01 RX ADMIN — Medication: at 15:36

## 2024-01-01 RX ADMIN — METHYLPREDNISOLONE SODIUM SUCCINATE 60 MG: 125 INJECTION, POWDER, FOR SOLUTION INTRAMUSCULAR; INTRAVENOUS at 05:20

## 2024-01-01 RX ADMIN — HYDROXYCHLOROQUINE SULFATE 200 MG: 200 TABLET ORAL at 09:46

## 2024-01-01 RX ADMIN — METHYLPREDNISOLONE SODIUM SUCCINATE 60 MG: 125 INJECTION, POWDER, FOR SOLUTION INTRAMUSCULAR; INTRAVENOUS at 05:37

## 2024-01-01 RX ADMIN — METHYLPREDNISOLONE SODIUM SUCCINATE 125 MG: 125 INJECTION, POWDER, FOR SOLUTION INTRAMUSCULAR; INTRAVENOUS at 12:44

## 2024-01-01 RX ADMIN — IPRATROPIUM BROMIDE AND ALBUTEROL SULFATE 3 ML: .5; 3 SOLUTION RESPIRATORY (INHALATION) at 02:13

## 2024-01-01 RX ADMIN — IPRATROPIUM BROMIDE AND ALBUTEROL SULFATE 3 ML: .5; 3 SOLUTION RESPIRATORY (INHALATION) at 02:20

## 2024-01-01 RX ADMIN — NYSTATIN 1 APPLICATION: 100000 CREAM TOPICAL at 08:11

## 2024-01-01 RX ADMIN — DOXYCYCLINE 100 MG: 100 CAPSULE ORAL at 08:09

## 2024-01-01 RX ADMIN — NYSTATIN 500000 UNITS: 100000 SUSPENSION ORAL at 18:02

## 2024-01-01 RX ADMIN — IPRATROPIUM BROMIDE AND ALBUTEROL SULFATE 3 ML: 2.5; .5 SOLUTION RESPIRATORY (INHALATION) at 12:38

## 2024-01-01 RX ADMIN — MEMANTINE HYDROCHLORIDE 5 MG: 10 TABLET, FILM COATED ORAL at 21:41

## 2024-01-01 RX ADMIN — MUPIROCIN 1 APPLICATION: 20 OINTMENT TOPICAL at 08:09

## 2024-01-01 RX ADMIN — DOXYCYCLINE 100 MG: 100 CAPSULE ORAL at 21:41

## 2024-01-01 RX ADMIN — VITAMINS A AND D OINTMENT: 15.5; 53.4 OINTMENT TOPICAL at 20:38

## 2024-01-01 RX ADMIN — HYDROXYCHLOROQUINE SULFATE 200 MG: 200 TABLET ORAL at 21:35

## 2024-01-01 RX ADMIN — MEMANTINE HYDROCHLORIDE 5 MG: 10 TABLET, FILM COATED ORAL at 09:15

## 2024-01-01 RX ADMIN — PANTOPRAZOLE SODIUM 40 MG: 40 INJECTION, POWDER, FOR SOLUTION INTRAVENOUS at 05:56

## 2024-01-01 RX ADMIN — CEFEPIME 2000 MG: 2 INJECTION, POWDER, FOR SOLUTION INTRAVENOUS at 09:26

## 2024-01-01 RX ADMIN — ALPRAZOLAM 0.5 MG: 0.5 TABLET ORAL at 15:17

## 2024-01-01 RX ADMIN — CEFEPIME 2000 MG: 2 INJECTION, POWDER, FOR SOLUTION INTRAVENOUS at 15:52

## 2024-01-01 RX ADMIN — CEFEPIME 2000 MG: 2 INJECTION, POWDER, FOR SOLUTION INTRAVENOUS at 21:50

## 2024-01-01 RX ADMIN — GLYCOPYRROLATE 0.4 MG: 0.2 INJECTION INTRAMUSCULAR; INTRAVENOUS at 14:57

## 2024-01-01 RX ADMIN — CHLORHEXIDINE GLUCONATE 15 ML: 1.2 RINSE ORAL at 20:38

## 2024-01-01 RX ADMIN — IPRATROPIUM BROMIDE AND ALBUTEROL SULFATE 3 ML: .5; 2.5 SOLUTION RESPIRATORY (INHALATION) at 07:02

## 2024-01-01 RX ADMIN — ALPRAZOLAM 0.5 MG: 0.5 TABLET ORAL at 21:46

## 2024-01-01 RX ADMIN — METHYLPREDNISOLONE SODIUM SUCCINATE 40 MG: 40 INJECTION, POWDER, FOR SOLUTION INTRAMUSCULAR; INTRAVENOUS at 21:47

## 2024-01-01 RX ADMIN — IPRATROPIUM BROMIDE AND ALBUTEROL SULFATE 3 ML: .5; 3 SOLUTION RESPIRATORY (INHALATION) at 07:03

## 2024-01-01 RX ADMIN — CHLORHEXIDINE GLUCONATE 15 ML: 1.2 RINSE ORAL at 08:55

## 2024-01-01 RX ADMIN — DONEPEZIL HYDROCHLORIDE 10 MG: 5 TABLET, FILM COATED ORAL at 16:27

## 2024-01-01 RX ADMIN — CEFEPIME 2000 MG: 2 INJECTION, POWDER, FOR SOLUTION INTRAVENOUS at 08:11

## 2024-01-01 RX ADMIN — NYSTATIN 500000 UNITS: 100000 SUSPENSION ORAL at 18:45

## 2024-01-01 RX ADMIN — LORAZEPAM 0.5 MG: 2 INJECTION INTRAMUSCULAR; INTRAVENOUS at 04:56

## 2024-01-01 RX ADMIN — LEVOTHYROXINE SODIUM 25 MCG: 0.03 TABLET ORAL at 08:25

## 2024-01-01 RX ADMIN — IPRATROPIUM BROMIDE AND ALBUTEROL SULFATE 3 ML: .5; 3 SOLUTION RESPIRATORY (INHALATION) at 14:53

## 2024-01-01 RX ADMIN — PROPOFOL 40 MCG/KG/MIN: 10 INJECTION, EMULSION INTRAVENOUS at 05:07

## 2024-01-01 RX ADMIN — MUPIROCIN 1 APPLICATION: 20 OINTMENT TOPICAL at 21:18

## 2024-01-01 RX ADMIN — HYDROCODONE BITARTRATE AND ACETAMINOPHEN 1 TABLET: 5; 325 TABLET ORAL at 17:21

## 2024-01-01 RX ADMIN — ALUMINUM HYDROXIDE, MAGNESIUM HYDROXIDE, DIMETHICONE 15 ML: 400; 400; 40 SUSPENSION ORAL at 22:46

## 2024-01-01 RX ADMIN — PROPOFOL 40 MCG/KG/MIN: 10 INJECTION, EMULSION INTRAVENOUS at 05:35

## 2024-01-01 RX ADMIN — BREXPIPRAZOLE 0.5 MG: 1 TABLET ORAL at 08:19

## 2024-01-01 RX ADMIN — MUPIROCIN 1 APPLICATION: 20 OINTMENT TOPICAL at 09:27

## 2024-01-01 RX ADMIN — LORAZEPAM 1 MG: 2 INJECTION INTRAMUSCULAR; INTRAVENOUS at 17:02

## 2024-01-01 RX ADMIN — INSULIN HUMAN 2 UNITS: 100 INJECTION, SOLUTION PARENTERAL at 23:51

## 2024-01-01 RX ADMIN — PANTOPRAZOLE SODIUM 40 MG: 40 INJECTION, POWDER, FOR SOLUTION INTRAVENOUS at 05:47

## 2024-01-01 RX ADMIN — GUAIFENESIN 200 MG: 100 SOLUTION ORAL at 22:11

## 2024-01-01 RX ADMIN — PANTOPRAZOLE SODIUM 40 MG: 40 INJECTION, POWDER, FOR SOLUTION INTRAVENOUS at 06:12

## 2024-01-01 RX ADMIN — METRONIDAZOLE 500 MG: 500 INJECTION, SOLUTION INTRAVENOUS at 02:02

## 2024-01-01 RX ADMIN — ATORVASTATIN CALCIUM 40 MG: 40 TABLET, FILM COATED ORAL at 08:48

## 2024-01-01 RX ADMIN — BREXPIPRAZOLE 0.5 MG: 1 TABLET ORAL at 08:54

## 2024-01-01 RX ADMIN — Medication 10 ML: at 09:44

## 2024-01-01 RX ADMIN — PROPOFOL 30 MCG/KG/MIN: 10 INJECTION, EMULSION INTRAVENOUS at 10:58

## 2024-01-01 RX ADMIN — ENOXAPARIN SODIUM 40 MG: 40 INJECTION SUBCUTANEOUS at 20:41

## 2024-01-01 RX ADMIN — MUPIROCIN 1 APPLICATION: 20 OINTMENT TOPICAL at 00:05

## 2024-01-01 RX ADMIN — DOXYCYCLINE 100 MG: 100 CAPSULE ORAL at 08:05

## 2024-01-01 RX ADMIN — METOPROLOL SUCCINATE 25 MG: 25 TABLET, FILM COATED, EXTENDED RELEASE ORAL at 09:46

## 2024-01-01 RX ADMIN — Medication: at 10:23

## 2024-01-01 RX ADMIN — CHLORHEXIDINE GLUCONATE 15 ML: 1.2 RINSE ORAL at 08:09

## 2024-01-01 RX ADMIN — PROPOFOL 50 MCG/KG/MIN: 10 INJECTION, EMULSION INTRAVENOUS at 17:51

## 2024-01-01 RX ADMIN — PHENYLEPHRINE HYDROCHLORIDE 2 MCG/KG/MIN: 10 INJECTION INTRAVENOUS at 08:02

## 2024-01-01 RX ADMIN — CEFEPIME 2000 MG: 2 INJECTION, POWDER, FOR SOLUTION INTRAVENOUS at 21:24

## 2024-01-01 RX ADMIN — NYSTATIN 1 APPLICATION: 100000 CREAM TOPICAL at 21:55

## 2024-01-01 RX ADMIN — Medication 10 ML: at 08:06

## 2024-01-01 RX ADMIN — CETIRIZINE HYDROCHLORIDE 10 MG: 10 TABLET, FILM COATED ORAL at 17:52

## 2024-01-01 RX ADMIN — CHLORHEXIDINE GLUCONATE 15 ML: 1.2 RINSE ORAL at 21:02

## 2024-01-01 RX ADMIN — MUPIROCIN 1 APPLICATION: 20 OINTMENT TOPICAL at 08:11

## 2024-01-01 RX ADMIN — PROPOFOL 45 MCG/KG/MIN: 10 INJECTION, EMULSION INTRAVENOUS at 02:36

## 2024-01-01 RX ADMIN — CEFEPIME 2000 MG: 2 INJECTION, POWDER, FOR SOLUTION INTRAVENOUS at 21:18

## 2024-01-01 RX ADMIN — NYSTATIN 1 APPLICATION: 100000 CREAM TOPICAL at 21:02

## 2024-01-01 RX ADMIN — LORAZEPAM 1 MG: 2 INJECTION INTRAMUSCULAR; INTRAVENOUS at 16:40

## 2024-01-01 RX ADMIN — HYDROXYCHLOROQUINE SULFATE 200 MG: 200 TABLET ORAL at 08:25

## 2024-01-01 RX ADMIN — MEGESTROL ACETATE 40 MG: 40 TABLET ORAL at 09:46

## 2024-01-01 RX ADMIN — IPRATROPIUM BROMIDE AND ALBUTEROL SULFATE 3 ML: .5; 3 SOLUTION RESPIRATORY (INHALATION) at 10:12

## 2024-01-01 RX ADMIN — NYSTATIN 1 APPLICATION: 100000 CREAM TOPICAL at 09:28

## 2024-01-01 RX ADMIN — IPRATROPIUM BROMIDE AND ALBUTEROL SULFATE 3 ML: .5; 2.5 SOLUTION RESPIRATORY (INHALATION) at 06:53

## 2024-01-01 RX ADMIN — DOXYCYCLINE 100 MG: 100 INJECTION, POWDER, LYOPHILIZED, FOR SOLUTION INTRAVENOUS at 16:13

## 2024-01-01 RX ADMIN — CEFEPIME 2000 MG: 2 INJECTION, POWDER, FOR SOLUTION INTRAVENOUS at 21:41

## 2024-01-01 RX ADMIN — ALPRAZOLAM 0.5 MG: 0.5 TABLET ORAL at 21:03

## 2024-01-01 RX ADMIN — FENTANYL CITRATE 50 MCG: 50 INJECTION, SOLUTION INTRAMUSCULAR; INTRAVENOUS at 04:35

## 2024-01-01 RX ADMIN — ENOXAPARIN SODIUM 40 MG: 40 INJECTION SUBCUTANEOUS at 20:30

## 2024-01-01 RX ADMIN — MORPHINE SULFATE 2 MG: 2 INJECTION, SOLUTION INTRAMUSCULAR; INTRAVENOUS at 07:29

## 2024-01-01 RX ADMIN — IPRATROPIUM BROMIDE AND ALBUTEROL SULFATE 3 ML: 2.5; .5 SOLUTION RESPIRATORY (INHALATION) at 06:51

## 2024-01-01 RX ADMIN — ASPIRIN 81 MG: 81 TABLET, CHEWABLE ORAL at 09:46

## 2024-01-01 RX ADMIN — IPRATROPIUM BROMIDE AND ALBUTEROL SULFATE 3 ML: .5; 3 SOLUTION RESPIRATORY (INHALATION) at 06:46

## 2024-01-01 RX ADMIN — ASPIRIN 81 MG: 81 TABLET, CHEWABLE ORAL at 09:16

## 2024-01-01 RX ADMIN — DEXMEDETOMIDINE 1.5 MCG/KG/HR: 200 INJECTION, SOLUTION INTRAVENOUS at 02:19

## 2024-01-01 RX ADMIN — ASPIRIN 81 MG: 81 TABLET, CHEWABLE ORAL at 08:36

## 2024-01-01 RX ADMIN — IPRATROPIUM BROMIDE AND ALBUTEROL SULFATE 3 ML: .5; 2.5 SOLUTION RESPIRATORY (INHALATION) at 00:58

## 2024-01-01 RX ADMIN — NYSTATIN 1 APPLICATION: 100000 CREAM TOPICAL at 21:52

## 2024-01-01 RX ADMIN — Medication 10 ML: at 08:48

## 2024-01-01 RX ADMIN — FLUOXETINE HYDROCHLORIDE 40 MG: 20 CAPSULE ORAL at 16:12

## 2024-01-01 RX ADMIN — INSULIN HUMAN 2 UNITS: 100 INJECTION, SOLUTION PARENTERAL at 12:59

## 2024-01-01 RX ADMIN — Medication 10 ML: at 21:27

## 2024-01-01 RX ADMIN — LORAZEPAM 1 MG: 2 INJECTION INTRAMUSCULAR; INTRAVENOUS at 02:41

## 2024-01-01 RX ADMIN — IPRATROPIUM BROMIDE AND ALBUTEROL SULFATE 3 ML: .5; 3 SOLUTION RESPIRATORY (INHALATION) at 06:39

## 2024-01-01 RX ADMIN — DONEPEZIL HYDROCHLORIDE 10 MG: 5 TABLET, FILM COATED ORAL at 16:46

## 2024-01-01 RX ADMIN — VITAMINS A AND D OINTMENT: 15.5; 53.4 OINTMENT TOPICAL at 21:05

## 2024-01-01 RX ADMIN — MUPIROCIN 1 APPLICATION: 20 OINTMENT TOPICAL at 08:16

## 2024-01-01 RX ADMIN — CHLORHEXIDINE GLUCONATE 15 ML: 1.2 RINSE ORAL at 21:18

## 2024-01-01 RX ADMIN — DONEPEZIL HYDROCHLORIDE 10 MG: 5 TABLET, FILM COATED ORAL at 16:43

## 2024-01-01 RX ADMIN — MEMANTINE HYDROCHLORIDE 5 MG: 10 TABLET, FILM COATED ORAL at 21:25

## 2024-01-01 RX ADMIN — LORAZEPAM 1 MG: 2 INJECTION INTRAMUSCULAR; INTRAVENOUS at 23:15

## 2024-01-01 RX ADMIN — ASPIRIN 81 MG: 81 TABLET, CHEWABLE ORAL at 08:48

## 2024-01-01 RX ADMIN — VITAMINS A AND D OINTMENT: 15.5; 53.4 OINTMENT TOPICAL at 21:52

## 2024-01-01 RX ADMIN — ALPRAZOLAM 0.5 MG: 0.5 TABLET ORAL at 08:12

## 2024-01-01 RX ADMIN — PANTOPRAZOLE SODIUM 40 MG: 40 INJECTION, POWDER, FOR SOLUTION INTRAVENOUS at 05:37

## 2024-01-01 RX ADMIN — CEFEPIME 2000 MG: 2 INJECTION, POWDER, FOR SOLUTION INTRAVENOUS at 05:43

## 2024-01-01 RX ADMIN — Medication 10 ML: at 09:27

## 2024-01-01 RX ADMIN — Medication 10 ML: at 09:07

## 2024-01-01 RX ADMIN — DOXYCYCLINE 100 MG: 100 INJECTION, POWDER, LYOPHILIZED, FOR SOLUTION INTRAVENOUS at 17:09

## 2024-01-01 RX ADMIN — BUDESONIDE 0.5 MG: 0.5 INHALANT ORAL at 06:17

## 2024-01-01 RX ADMIN — IPRATROPIUM BROMIDE AND ALBUTEROL SULFATE 3 ML: .5; 2.5 SOLUTION RESPIRATORY (INHALATION) at 06:18

## 2024-01-01 NOTE — THERAPY EVALUATION
Acute Care - Physical Therapy Initial Evaluation   Alex     Patient Name: Lexie KEITH Valdez  : 1960  MRN: [id number]  Today's Date: 2024   Onset of Illness/Injury or Date of Surgery: 23  Visit Dx:     ICD-10-CM ICD-9-CM   1. Sepsis, due to unspecified organism, unspecified whether acute organ dysfunction present  A41.9 038.9     995.91   2. UTI (urinary tract infection), uncomplicated  N39.0 599.0   3. Hypokalemia  E87.6 276.8     Patient Active Problem List   Diagnosis    MARU (stress urinary incontinence, female)    Chronic cystitis    Detrusor instability    Chronic obstructive lung disease    Arthritis    Bursitis    Mixed anxiety depressive disorder    Fibromyalgia    Hyperlipidemia    Hypertensive disorder    Hypothyroidism    COPD exacerbation    UTI (urinary tract infection)     Past Medical History:   Diagnosis Date    Anxiety     Arthritis     Depression     Hypertensive disorder 3/14/2018    Hypothyroidism 8/3/2021    MARU (stress urinary incontinence, female) 2019    UTI (urinary tract infection) 2023     Past Surgical History:   Procedure Laterality Date    BREAST BIOPSY Left 2000    benign    GALLBLADDER SURGERY      GASTRIC BANDING      HYSTERECTOMY      age 37     PT Assessment (last 12 hours)       PT Evaluation and Treatment       Row Name 24 1347          Physical Therapy Time and Intention    Subjective Information no complaints  -AG     Document Type evaluation  -AG     Mode of Treatment physical therapy  -AG     Patient Effort good  -AG     Symptoms Noted During/After Treatment fatigue  -AG       Row Name 24 1341          General Information    Patient Profile Reviewed yes  -AG     Onset of Illness/Injury or Date of Surgery 23  -AG     Referring Physician Dr. Santillan  -     Patient Observations cooperative;agree to therapy;alert  -AG     Patient/Family/Caregiver Comments/Observations pt. supine in bed on 2L O2 nc; son and DIL at bedside.  Pt. pleasant,  cooperative for evaluation.  -AG     Prior Level of Function independent:;all household mobility  uses no assistive device inside home; independent with BADL, self care, light household chores; uses RW in community; limited by SOA, uses 2L O2 continuously.  -AG     Equipment Currently Used at Home shower chair;oxygen  -AG     Pertinent History of Current Functional Problem pt. admitted with UTI  -AG     Existing Precautions/Restrictions fall;oxygen therapy device and L/min  -AG     Equipment Issued to Patient gait belt  -AG     Risks Reviewed patient:;dizziness;LOB  -AG     Benefits Reviewed patient and family:;improve function;increase independence;increase strength;increase balance;increase knowledge;decrease risk of DVT  -AG     Barriers to Rehab none identified  -AG       Row Name 01/01/24 1347          Living Environment    Current Living Arrangements home  -AG     Home Accessibility stairs to enter home  -AG     People in Home spouse  -AG     Primary Care Provided by self  -AG       Row Name 01/01/24 1347          Home Main Entrance    Number of Stairs, Main Entrance two  -AG     Stair Railings, Main Entrance railings on both sides of stairs  -AG       Row Name 01/01/24 1347          Home Use of Assistive/Adaptive Equipment    Equipment Currently Used at Home commode;oxygen;shower chair;walker, rolling  -AG       Row Name 01/01/24 1347          Pain    Pretreatment Pain Rating 0/10 - no pain  -AG     Posttreatment Pain Rating 0/10 - no pain  -AG       Row Name 01/01/24 1347          Cognition    Affect/Mental Status (Cognition) WNL  -AG     Orientation Status (Cognition) oriented x 3  -AG     Follows Commands (Cognition) WFL  -AG     Personal Safety Interventions fall prevention program maintained;gait belt;nonskid shoes/slippers when out of bed;supervised activity  -AG       Row Name 01/01/24 1347          Range of Motion (ROM)    Range of Motion ROM is WFL;bilateral lower extremities  -AG       Row Name  01/01/24 1347          Strength (Manual Muscle Testing)    Strength (Manual Muscle Testing) strength is WFL;bilateral lower extremities  B LE 4+/5  -[facility] Name 01/01/24 1347          Sensory    Hearing Status WFL  -[facility] Name 01/01/24 1347          Vision Assessment/Intervention    Visual Impairment/Limitations WFL;corrective lenses full-time  -[facility] Name 01/01/24 1347          Sensory Assessment (Somatosensory)    Sensory Assessment (Somatosensory) LE sensation intact  -[facility] Name 01/01/24 1347          Mobility    Extremity Weight-bearing Status --  no restrictions  -[facility] Name 01/01/24 1347          Bed Mobility    Bed Mobility rolling left;rolling right;scooting/bridging;supine-sit;sit-supine  -AG     Rolling Left Nye (Bed Mobility) standby assist  -AG     Rolling Right [place] (Bed Mobility) standby assist  -AG     Supine-Sit Nye (Bed Mobility) minimum assist (75% patient effort)  -     Bed Mobility, Safety Issues decreased use of arms for pushing/pulling;decreased use of legs for bridging/pushing  -     Assistive Device (Bed Mobility) bed rails  -[facility] Name 01/01/24 1347          Transfers    Transfers sit-stand transfer;stand-sit transfer;stand pivot/stand step transfer  -AG       [facility] Name 01/01/24 1347          Sit-Stand Transfer    Sit-Stand Nye (Transfers) verbal cues;nonverbal cues (demo/gesture);standby assist;contact guard  -     Assistive Device (Sit-Stand Transfers) walker, front-wheeled  -[facility] Name 01/01/24 1347          Stand-Sit Transfer    Stand-Sit Nye (Transfers) verbal cues;nonverbal cues (demo/gesture);contact guard;standby assist  -     Assistive Device (Stand-Sit Transfers) walker, front-wheeled  -AG       [facility] Name 01/01/24 1347          Stand Pivot/Stand Step Transfer    Stand Pivot/Stand Step [place] (Transfers) contact guard;nonverbal cues (demo/gesture);verbal cues;standby assist  -AG      Assistive Device (Stand Pivot Stand Step Transfer) walker, front-wheeled  -AG       Row Name 01/01/24 1347          Gait/Stairs (Locomotion)    Gait/Stairs Locomotion gait/ambulation independence;gait/ambulation assistive device;distance ambulated;gait pattern;gait deviations;maintains weight-bearing status  -AG     [place] Level (Gait) contact guard;nonverbal cues (demo/gesture);verbal cues  -AG     Assistive Device (Gait) walker, front-wheeled  -AG     Patient was able to Ambulate yes  -AG     Distance in Feet (Gait) 120  -AG     Pattern (Gait) step-through  -AG       Row Name 01/01/24 1347          Safety Issues, Functional Mobility    Impairments Affecting Function (Mobility) balance;endurance/activity tolerance  -AG       Row Name 01/01/24 1347          Balance    Balance Assessment sitting static balance;sitting dynamic balance;sit to stand dynamic balance;standing static balance;standing dynamic balance  -AG     Static Sitting Balance modified independence  -AG     Position, Sitting Balance unsupported;sitting edge of bed  -AG     Static Standing Balance standby assist;verbal cues;non-verbal cues (demo/gesture);contact guard  -AG     Dynamic Standing Balance standby assist;verbal cues;contact guard  -AG     Position/Device Used, Standing Balance walker, front-wheeled  -AG       Row Name 01/01/24 1347          Coping    Observed Emotional State cooperative;calm  -     Verbalized Emotional State acceptance  -AG     Trust Relationship/Rapport care explained;choices provided;thoughts/feelings acknowledged  -AG     Family/Support Persons family;son  -AG     Involvement in Care at bedside;attentive to patient  -AG     Family/Support System Care support provided;self-care encouraged  -AG       Row Name 01/01/24 1340          Plan of Care Review    Plan of Care Reviewed With patient;family  -AG     Outcome Evaluation PT evaluation complete.  Pt. min A/ CGA for functional mobility skills; ambulated 120 ft w/  RW, CGA/ SBA on room air.  PT will continue to follow.  -AG       Row Name 01/01/24 1347          Vital Signs    Intra SpO2 (%) 86  -AG     O2 Delivery Intra Treatment room air  -AG     Post SpO2 (%) 96  -AG     O2 Delivery Post Treatment room air  -AG       Row Name 01/01/24 1347          Positioning and Restraints    Pre-Treatment Position in bed  -AG     Post Treatment Position bed  -AG     In Bed sitting EOB;call light within reach;encouraged to call for assist;with family/caregiver;side rails up x3  -AG       Row Name 01/01/24 1347          Therapy Assessment/Plan (PT)    Patient/Family Therapy Goals Statement (PT) return home with family  -AG     Functional Level at Time of Evaluation (PT) CGA  -AG     PT Diagnosis (PT) impaired functional mobility, endurance  -AG     Rehab Potential (PT) good, to achieve stated therapy goals  -AG     Criteria for Skilled Interventions Met (PT) yes;meets criteria  -AG     Therapy Frequency (PT) 2 times/wk  2-5 times/ week per priority  -AG     Predicted Duration of Therapy Intervention (PT) LOS  -AG     Problem List (PT) problems related to;balance;mobility  -AG     Activity Limitations Related to Problem List (PT) unable to ambulate safely;BADLs not performed adequately or safely;unable to transfer safely  -AG       Row Name 01/01/24 1342          Therapy Plan Review/Discharge Plan (PT)    Therapy Plan Review (PT) evaluation/treatment results reviewed;care plan/treatment goals reviewed;risks/benefits reviewed;current/potential barriers reviewed;participants voiced agreement with care plan;participants included;patient;son  -AG       Row Name 01/01/24 1345          Physical Therapy Goals    Bed Mobility Goal Selection (PT) bed mobility, PT goal 1  -AG     Transfer Goal Selection (PT) transfer, PT goal 1  -AG     Gait Training Goal Selection (PT) gait training, PT goal 1  -AG       Row Name 01/01/24 1342          Bed Mobility Goal 1 (PT)    Activity/Assistive Device (Bed  Mobility Goal 1, PT) bed mobility activities, all  -AG     [place] Level/Cues Needed (Bed Mobility Goal 1, PT) independent  -AG     Time Frame (Bed Mobility Goal 1, PT) by discharge  -AG       Row Name 01/01/24 1347          Transfer Goal 1 (PT)    Activity/Assistive Device (Transfer Goal 1, PT) sit-to-stand/stand-to-sit;bed-to-chair/chair-to-bed;toilet  -AG     [place] Level/Cues Needed (Transfer Goal 1, PT) modified independence  -AG     Time Frame (Transfer Goal 1, PT) by discharge  -AG       Row Name 01/01/24 1347          Gait Training Goal 1 (PT)    Activity/Assistive Device (Gait Training Goal 1, PT) gait (walking locomotion);assistive device use;decrease fall risk;diminish gait deviation;improve balance and speed;increase endurance/gait distance;walker, rolling  -AG     [place] Level (Gait Training Goal 1, PT) standby assist  -AG     Distance (Gait Training Goal 1, PT) 300  -AG     Time Frame (Gait Training Goal 1, PT) by discharge  -AG               User Key  (r) = Recorded By, (t) = Taken By, (c) = Cosigned By      Initials Name Provider Type    Rabia Melissa, PT Physical Therapist                    Physical Therapy Education       Title: PT OT SLP Therapies (Done)       Topic: Physical Therapy (Done)       Point: Mobility training (Done)       Learning Progress Summary             Patient Acceptance, E,D, VU,NR by AG at 1/1/2024 1345   Family Acceptance, E,D, VU,NR by AG at 1/1/2024 1345                         Point: Home exercise program (Done)       Learning Progress Summary             Patient Acceptance, E,D, VU,NR by AG at 1/1/2024 1345   Family Acceptance, E,D, VU,NR by AG at 1/1/2024 1345                         Point: Body mechanics (Done)       Learning Progress Summary             Patient Acceptance, E,D, VU,NR by AG at 1/1/2024 1345   Family Acceptance, E,D, VU,NR by AG at 1/1/2024 1345                         Point: Precautions (Done)       Learning Progress Summary              Patient Acceptance, E,D, VU,NR by  at 1/1/2024 1345   Family Acceptance, E,D, VU,NR by  at 1/1/2024 1345                                         User Key       Initials Effective Dates Name Provider Type Discipline     06/16/21 -  Rabia Collado, PT Physical Therapist PT                  PT Recommendation and Plan  Anticipated Discharge Disposition (PT): home with home health, home with assist  Planned Therapy Interventions (PT): balance training, bed mobility training, gait training, home exercise program, transfer training, patient/family education, postural re-education  Therapy Frequency (PT): 2 times/wk (2-5 times/ week per priority)  Plan of Care Reviewed With: patient, family  Outcome Evaluation: PT evaluation complete.  Pt. min A/ CGA for functional mobility skills; ambulated 120 ft w/ RW, CGA/ SBA on room air.  PT will continue to follow.       Time Calculation:    PT Charges       Row Name 01/01/24 1345             Time Calculation    PT Received On 01/01/24  -      PT Goal Re-Cert Due Date 01/15/24  -                User Key  (r) = Recorded By, (t) = Taken By, (c) = Cosigned By      Initials Name Provider Type     Rabia Collado, PT Physical Therapist                  Therapy Charges for Today       Code Description Service Date Service Provider Modifiers Qty    75603058653 HC PT EVAL MOD COMPLEXITY 4 1/1/2024 Rabia Collado, PT GP 1            PT G-Codes  AM-PAC 6 Clicks Score (PT): 18    Rabia Collado, PT  1/1/2024

## 2024-01-01 NOTE — PLAN OF CARE
Goal Outcome Evaluation:  Plan of Care Reviewed With: patient        Progress: improving  Outcome Evaluation: Patient's VSS. Pt ambulated to bedside during shift. Pt had family at bedside. Pt voiced no further concerns at this time.

## 2024-01-01 NOTE — PLAN OF CARE
Goal Outcome Evaluation:              Outcome Evaluation: Pt is resting in bed at this time. Pt arrived from the ER this shift. Pt potassium was 2.9, and is being replaced. No concerns or complaints at this time. Will continue POC

## 2024-01-01 NOTE — ED PROVIDER NOTES
Subjective   History of Present Illness  63-year-old female with past medical history of anxiety, depression, arthritis, hypertension, hypothyroidism, and hypercholesterolemia presents to the emergency room for intermittent nausea and vomiting for the past couple of weeks.  Patient states around 1 to 2 weeks ago she was started on amoxicillin for urinary tract infection and states to her knowledge that cleared up.  She denies any recent illness.  She denies fevers, cough, shortness of breath, chest pain, abdominal pain, or diarrhea.  She denies any specific aggravating or alleviating factors.  Denies any other complaints or concerns at this time.    History provided by:  Patient and relative   used: No        Review of Systems   Constitutional:  Positive for appetite change and chills. Negative for fever.   HENT: Negative.     Respiratory: Negative.     Cardiovascular: Negative.  Negative for chest pain.   Gastrointestinal:  Positive for nausea and vomiting. Negative for abdominal pain.   Endocrine: Negative.    Genitourinary: Negative.  Negative for dysuria.   Skin: Negative.    Neurological: Negative.    Psychiatric/Behavioral: Negative.     All other systems reviewed and are negative.      Past Medical History:   Diagnosis Date    Anxiety     Arthritis     Depression     Hypertensive disorder 3/14/2018    Hypothyroidism 8/3/2021    MARU (stress urinary incontinence, female) 9/11/2019       Allergies   Allergen Reactions    Codeine     Sulfa Antibiotics        Past Surgical History:   Procedure Laterality Date    BREAST BIOPSY Left 2000    benign    GALLBLADDER SURGERY      GASTRIC BANDING      HYSTERECTOMY      age 37       Family History   Problem Relation Age of Onset    Panic disorder Mother     COPD Mother     Alcohol abuse Father     Alzheimer's disease Father     Breast cancer Neg Hx        Social History     Socioeconomic History    Marital status:    Tobacco Use    Smoking  status: Former     Types: Cigarettes     Start date: 1971     Quit date: 10/18/2005     Years since quittin.2    Smokeless tobacco: Never   Vaping Use    Vaping Use: Never used   Substance and Sexual Activity    Alcohol use: No    Drug use: No    Sexual activity: Yes     Partners: Male           Objective   Physical Exam  Vitals and nursing note reviewed.   Constitutional:       General: She is not in acute distress.     Appearance: She is well-developed. She is ill-appearing. She is not diaphoretic.      Interventions: Nasal cannula in place.      Comments: 2L   HENT:      Head: Normocephalic and atraumatic.      Right Ear: External ear normal.      Left Ear: External ear normal.      Nose: Nose normal.   Eyes:      Conjunctiva/sclera: Conjunctivae normal.      Pupils: Pupils are equal, round, and reactive to light.   Neck:      Vascular: No JVD.      Trachea: No tracheal deviation.   Cardiovascular:      Rate and Rhythm: Normal rate and regular rhythm.      Heart sounds: Normal heart sounds. No murmur heard.  Pulmonary:      Effort: Pulmonary effort is normal. No respiratory distress.      Breath sounds: Normal breath sounds. No wheezing.   Abdominal:      General: Bowel sounds are normal.      Palpations: Abdomen is soft.      Tenderness: There is no abdominal tenderness.   Musculoskeletal:         General: No deformity. Normal range of motion.      Cervical back: Normal range of motion and neck supple.   Skin:     General: Skin is warm and dry.      Coloration: Skin is not pale.      Findings: No erythema or rash.   Neurological:      Mental Status: She is alert and oriented to person, place, and time.      Cranial Nerves: No cranial nerve deficit.   Psychiatric:         Behavior: Behavior normal.         Thought Content: Thought content normal.         Procedures           ED Course  ED Course as of 23 2153   Sun Dec 31, 2023   2045 Procalcitonin: 0.04 [TK]    Nitrite, UA(!): Positive [TK]    2045 Bacteria, UA(!): 4+ [TK]   2110 CT Abdomen Pelvis With Contrast [TK]   2151 Spoke with Dr. Santillan, she will admit patient to hospitalist services for further evaluation and treatment. [TK]      ED Course User Index  [TK] Mingo Domínguez, MECHELLE                                   Results for orders placed or performed during the hospital encounter of 12/31/23   COVID-19 and FLU A/B PCR, 1 HR TAT - Swab, Nasopharynx    Specimen: Nasopharynx; Swab   Result Value Ref Range    COVID19 Not Detected Not Detected - Ref. Range    Influenza A PCR Not Detected Not Detected    Influenza B PCR Not Detected Not Detected   Comprehensive Metabolic Panel    Specimen: Blood   Result Value Ref Range    Glucose 151 (H) 65 - 99 mg/dL    BUN 15 8 - 23 mg/dL    Creatinine 0.77 0.57 - 1.00 mg/dL    Sodium 138 136 - 145 mmol/L    Potassium 2.9 (L) 3.5 - 5.2 mmol/L    Chloride 100 98 - 107 mmol/L    CO2 25.9 22.0 - 29.0 mmol/L    Calcium 9.4 8.6 - 10.5 mg/dL    Total Protein 7.2 6.0 - 8.5 g/dL    Albumin 4.1 3.5 - 5.2 g/dL    ALT (SGPT) 10 1 - 33 U/L    AST (SGOT) 22 1 - 32 U/L    Alkaline Phosphatase 75 39 - 117 U/L    Total Bilirubin 0.4 0.0 - 1.2 mg/dL    Globulin 3.1 gm/dL    A/G Ratio 1.3 g/dL    BUN/Creatinine Ratio 19.5 7.0 - 25.0    Anion Gap 12.1 5.0 - 15.0 mmol/L    eGFR 86.3 >60.0 mL/min/1.73   Lipase    Specimen: Blood   Result Value Ref Range    Lipase 9 (L) 13 - 60 U/L   Urinalysis With Microscopic If Indicated (No Culture) - Urine, Clean Catch    Specimen: Urine, Clean Catch   Result Value Ref Range    Color, UA Dark Yellow (A) Yellow, Straw    Appearance, UA Clear Clear    pH, UA 5.5 5.0 - 8.0    Specific Gravity, UA >1.030 (H) 1.005 - 1.030    Glucose, UA Negative Negative    Ketones, UA Trace (A) Negative    Bilirubin, UA Small (1+) (A) Negative    Blood, UA Negative Negative    Protein, UA 30 mg/dL (1+) (A) Negative    Leuk Esterase, UA Trace (A) Negative    Nitrite, UA Positive (A) Negative    Urobilinogen, UA 1.0  E.U./dL 0.2 - 1.0 E.U./dL   Lactic Acid, Plasma    Specimen: Blood   Result Value Ref Range    Lactate 2.9 (C) 0.5 - 2.0 mmol/L   CBC Auto Differential    Specimen: Blood   Result Value Ref Range    WBC 15.05 (H) 3.40 - 10.80 10*3/mm3    RBC 4.35 3.77 - 5.28 10*6/mm3    Hemoglobin 13.9 12.0 - 15.9 g/dL    Hematocrit 41.3 34.0 - 46.6 %    MCV 94.9 79.0 - 97.0 fL    MCH 32.0 26.6 - 33.0 pg    MCHC 33.7 31.5 - 35.7 g/dL    RDW 11.4 (L) 12.3 - 15.4 %    RDW-SD 39.9 37.0 - 54.0 fl    MPV 10.5 6.0 - 12.0 fL    Platelets 234 140 - 450 10*3/mm3    Neutrophil % 81.4 (H) 42.7 - 76.0 %    Lymphocyte % 10.8 (L) 19.6 - 45.3 %    Monocyte % 6.1 5.0 - 12.0 %    Eosinophil % 0.7 0.3 - 6.2 %    Basophil % 0.5 0.0 - 1.5 %    Immature Grans % 0.5 0.0 - 0.5 %    Neutrophils, Absolute 12.26 (H) 1.70 - 7.00 10*3/mm3    Lymphocytes, Absolute 1.62 0.70 - 3.10 10*3/mm3    Monocytes, Absolute 0.92 (H) 0.10 - 0.90 10*3/mm3    Eosinophils, Absolute 0.10 0.00 - 0.40 10*3/mm3    Basophils, Absolute 0.08 0.00 - 0.20 10*3/mm3    Immature Grans, Absolute 0.07 (H) 0.00 - 0.05 10*3/mm3    nRBC 0.0 0.0 - 0.2 /100 WBC   STAT Lactic Acid, Reflex    Specimen: Arm, Right; Blood   Result Value Ref Range    Lactate 1.1 0.5 - 2.0 mmol/L   C-reactive Protein    Specimen: Blood   Result Value Ref Range    C-Reactive Protein 0.71 (H) 0.00 - 0.50 mg/dL   Procalcitonin    Specimen: Blood   Result Value Ref Range    Procalcitonin 0.04 0.00 - 0.25 ng/mL   Magnesium    Specimen: Blood   Result Value Ref Range    Magnesium 2.0 1.6 - 2.4 mg/dL   Urinalysis, Microscopic Only - Urine, Clean Catch    Specimen: Urine, Clean Catch   Result Value Ref Range    RBC, UA 0-2 None Seen, 0-2 /HPF    WBC, UA 21-50 (A) None Seen, 0-2 /HPF    Bacteria, UA 4+ (A) None Seen /HPF    Squamous Epithelial Cells, UA 7-12 (A) None Seen, 0-2 /HPF    Hyaline Casts, UA 3-6 None Seen /LPF    Waxy Casts 0-2 None Seen /LPF    Methodology Automated Microscopy    [place] Urine Culture Tube - Urine,  Clean Catch    Specimen: Urine, Clean Catch   Result Value Ref Range    Extra Tube Hold for add-ons.    Green Top (Gel)   Result Value Ref Range    Extra Tube Hold for add-ons.    Lavender Top   Result Value Ref Range    Extra Tube hold for add-on    Gold Top - SST   Result Value Ref Range    Extra Tube Hold for add-ons.    Light Blue Top   Result Value Ref Range    Extra Tube Hold for add-ons.        CT Abdomen Pelvis With Contrast   Final Result   Urinary bladder wall thickening with mild adjacent fat stranding.   Correlate for cystitis.           This report was finalized on 12/31/2023 8:50 PM by Alex Pallas, DO.                        Medical Decision Making  Problems Addressed:  Hypokalemia: complicated acute illness or injury  Sepsis, due to unspecified organism, unspecified whether acute organ dysfunction present: complicated acute illness or injury  UTI (urinary tract infection), uncomplicated: complicated acute illness or injury    Amount and/or Complexity of Data Reviewed  Labs: ordered. Decision-making details documented in ED Course.  Radiology: ordered. Decision-making details documented in ED Course.    Risk  OTC drugs.  Prescription drug management.  Decision regarding hospitalization.        Final diagnoses:   Sepsis, due to unspecified organism, unspecified whether acute organ dysfunction present   UTI (urinary tract infection), uncomplicated   Hypokalemia       ED Disposition  ED Disposition       ED Disposition   Decision to Admit    Condition   --    Comment   --               No follow-up provider specified.       Medication List      No changes were made to your prescriptions during this visit.            Mingo Domínguez PA-C  12/31/23 1918

## 2024-01-01 NOTE — H&P
[facility] Medicine Services  HISTORY & PHYSICAL    Patient Identification:  Name:  Lexie Valdez  Age:  63 y.o.  Sex:  female  :  1960  MRN:  [id number]   Visit Number:  [id number]  Admit Date: 2023   Primary Care Physician:  Violet Pop APRN     Subjective     Chief complaint:   Chief Complaint   Patient presents with    Vomiting     History of presenting illness:   Patient is a 63 y.o. female with past medical history significant for chronic hypoxic respiratory failure (2L NC), COPD, essential hypertension, hyperlipidemia, fibromyalgia, anxiety, depression, that presented to the [facility] emergency department for evaluation of nausea and vomiting.     The patient states for the past week she has been experiencing nausea, emesis, and mild dysuria.  Her PCP diagnosed her with a UTI and started her on an antibiotic.  The patient reports several days into the medication, she felt like her symptoms were getting worse and therefore her antibiotic was changed.  She is unsure what medication she was put on but believes it was amoxicillin.  She denies any fever, abdominal pain, diarrhea, malodorous urine, or hematuria.  She does admit to chills but states this is chronic.  She also admits to a chronic tremor that has been present since she was a teenager.  She has been able to drink some fluids but reports her appetite has been very poor.  She denies any sick contacts.    Upon arrival to the ED, vitals were temperature 97.9 °F, pulse 92, respirations 17, /70, SpO2 99% on 2 L NC.    In the emergency department the patient received IV Rocephin 2 g.     Patient has been admitted to the medical/surgical floor for further evaluation and treatment.  ---------------------------------------------------------------------------------------------------------------------   Review of Systems   Constitutional:  Positive for chills (Chronic). Negative for diaphoresis and  fever.   Respiratory:  Negative for cough, shortness of breath and wheezing.    Cardiovascular:  Negative for chest pain, palpitations and leg swelling.   Gastrointestinal:  Positive for nausea and vomiting. Negative for abdominal pain, constipation and diarrhea.   Genitourinary:  Positive for dysuria. Negative for frequency.   Musculoskeletal:  Negative for gait problem.   Neurological:  Negative for dizziness, syncope and light-headedness.   Psychiatric/Behavioral:  Negative for confusion.       ---------------------------------------------------------------------------------------------------------------------   Past Medical History:   Diagnosis Date    Anxiety     Arthritis     Depression     Hypertensive disorder 3/14/2018    Hypothyroidism 8/3/2021    MARU (stress urinary incontinence, female) 2019    UTI (urinary tract infection) 2023     Past Surgical History:   Procedure Laterality Date    BREAST BIOPSY Left     benign    GALLBLADDER SURGERY      GASTRIC BANDING      HYSTERECTOMY      age 37     Family History   Problem Relation Age of Onset    Panic disorder Mother     COPD Mother     Alcohol abuse Father     Alzheimer's disease Father     Breast cancer Neg Hx      Social History     Socioeconomic History    Marital status:    Tobacco Use    Smoking status: Former     Types: Cigarettes     Start date: 1971     Quit date: 10/18/2005     Years since quittin.2    Smokeless tobacco: Never   Vaping Use    Vaping Use: Never used   Substance and Sexual Activity    Alcohol use: No    Drug use: No    Sexual activity: Yes     Partners: Male     ---------------------------------------------------------------------------------------------------------------------   Allergies:  Codeine and Sulfa antibiotics  ---------------------------------------------------------------------------------------------------------------------   Medications below are reported home medications pulling from  within the system; at this time, these medications have not been reconciled unless otherwise specified and are in the verification process for further verifcation as current home medications.    Prior to Admission Medications       Prescriptions Last Dose Informant Patient Reported? Taking?    alendronate (FOSAMAX) 70 MG tablet  Pharmacy, Self Yes No    Take 1 tablet by mouth Every 7 (Seven) Days.    aspirin 81 MG chewable tablet  Self Yes No    Chew 1 tablet Daily.    atorvastatin (LIPITOR) 40 MG tablet  Pharmacy, Self Yes No    Take 1 tablet by mouth Daily.    Brexpiprazole 0.5 MG tablet  Pharmacy, Self Yes No    Take 0.5 mg by mouth Daily.    busPIRone (BUSPAR) 10 MG tablet  Pharmacy, Self Yes No    Take 1 tablet by mouth 2 (Two) Times a Day.    citalopram (CeleXA) 40 MG tablet  Pharmacy, Self Yes No    Take 1 tablet by mouth Daily.    docusate sodium (COLACE) 100 MG capsule  Self Yes No    Take 1 capsule by mouth 2 (Two) Times a Day As Needed for Constipation.    doxycycline (MONODOX) 100 MG capsule   No No    Take 1 capsule by mouth every 12 hours for 5 days as part of COPD Rescue Kit. (Only Start if in YELLOW ZONE.)    Fluticasone-Umeclidin-Vilant (Trelegy Ellipta) 200-62.5-25 MCG/ACT aerosol powder    No No    Inhale 1 puff Daily.    HYDROcodone-acetaminophen (NORCO) 5-325 MG per tablet  Pharmacy, Self Yes No    Take 1 tablet by mouth 2 (Two) Times a Day As Needed.    hydroxychloroquine (PLAQUENIL) 200 MG tablet  Pharmacy, Self Yes No    Take 1 tablet by mouth 2 (Two) Times a Day.    hydrOXYzine (ATARAX) 10 MG tablet  Pharmacy, Self Yes No    Take 1 tablet by mouth 3 (Three) Times a Day As Needed for Anxiety.    ipratropium-albuterol (DUO-NEB) 0.5-2.5 mg/3 ml nebulizer  Pharmacy, Self No No    Take 3 mL by nebulization 4 (Four) Times a Day As Needed for Wheezing.    ipratropium-albuterol (DUO-NEB) 0.5-2.5 mg/3 ml nebulizer   No No    Take 3 mL by neb every 30 minutes as needed for shortness of air for up to 6  doses. Part of COPD Rescue Kit. (Only Start if in YELLOW ZONE.)    levothyroxine (SYNTHROID, LEVOTHROID) 25 MCG tablet  Pharmacy, Self Yes No    Take 1 tablet by mouth Every Morning.    losartan (COZAAR) 50 MG tablet  Pharmacy, Self Yes No    Take 1 tablet by mouth Daily.    nabumetone (RELAFEN) 750 MG tablet  Pharmacy, Self Yes No    Take 1 tablet by mouth 2 (Two) Times a Day As Needed.    oxybutynin (DITROPAN) 5 MG tablet  Pharmacy, Self Yes No    Take 1 tablet by mouth Daily.    predniSONE (DELTASONE) 20 MG tablet   No No    Take 2 tablets by mouth daily for 5 days as part of COPD Rescue Kit. (Only Start if in YELLOW ZONE.)    Ventolin  (90 Base) MCG/ACT inhaler   No No    Inhale 2 puffs by mouth Every 4 (Four) Hours As Needed for Wheezing.    vitamin B-12 (CYANOCOBALAMIN) 2500 MCG sublingual tablet tablet  Self Yes No    Place 2,500 mcg under the tongue Daily.    vitamin D (ERGOCALCIFEROL) 1.25 MG (35484 UT) capsule capsule  Pharmacy, Self Yes No    Take 1 capsule by mouth 1 (One) Time Per Week.          ---------------------------------------------------------------------------------------------------------------------    Objective     Hospital Scheduled Meds:  [START ON 1/1/2024] cefTRIAXone, 1,000 mg, Intravenous, Q24H  enoxaparin, 40 mg, Subcutaneous, Nightly  [START ON 1/1/2024] insulin lispro, 2-7 Units, Subcutaneous, 4x Daily AC & at Bedtime  potassium chloride ER, 40 mEq, Oral, Q4H  senna-docusate sodium, 2 tablet, Oral, BID  sodium chloride, 10 mL, Intravenous, Q12H           Current listed hospital scheduled medications may not yet reflect those currently placed in orders that are signed and held, awaiting patient's arrival to floor/unit.    ---------------------------------------------------------------------------------------------------------------------   Vital Signs:  Temp:  [97.9 °F (36.6 °C)] 97.9 °F (36.6 °C)  Heart Rate:  [92] 92  Resp:  [17] 17  BP: (166)/(70) 166/70  No data  found.  SpO2 Percentage    12/31/23 1752   SpO2: 99%     SpO2:  [99 %] 99 %  on  Flow (L/min):  [2] 2;   Device (Oxygen Therapy): nasal cannula    Body mass index is 28.84 kg/m².  Wt Readings from Last 3 Encounters:   12/31/23 76.2 kg (168 lb)   10/19/23 78.9 kg (174 lb)   07/19/23 78.9 kg (174 lb)     ---------------------------------------------------------------------------------------------------------------------   Physical Exam:  Physical Exam  Vitals and nursing note reviewed.   Constitutional:       General: She is awake. She is not in acute distress.     Appearance: Normal appearance. She is well-developed. She is not diaphoretic.      Interventions: Nasal cannula in place.      Comments: 2L NC    HENT:      Head: Normocephalic and atraumatic.      Right Ear: External ear normal.      Left Ear: External ear normal.      Nose: Nose normal.   Eyes:      Conjunctiva/sclera: Conjunctivae normal.      Pupils: Pupils are equal, round, and reactive to light.   Neck:      Vascular: No JVD.      Trachea: No tracheal deviation.   Cardiovascular:      Rate and Rhythm: Normal rate and regular rhythm.      Heart sounds: Normal heart sounds. No murmur heard.  Pulmonary:      Effort: Pulmonary effort is normal. No respiratory distress.      Breath sounds: Normal breath sounds. No wheezing.   Abdominal:      General: Bowel sounds are normal.      Palpations: Abdomen is soft.      Tenderness: There is no abdominal tenderness.   Musculoskeletal:         General: No deformity. Normal range of motion.      Cervical back: Normal range of motion and neck supple.   Skin:     General: Skin is warm and dry.      Coloration: Skin is not pale.      Findings: No erythema or rash.   Neurological:      Mental Status: She is alert and oriented to person, place, and time.      Cranial Nerves: No cranial nerve deficit.   Psychiatric:         Behavior: Behavior normal. Behavior is cooperative.         Thought Content: Thought content  "normal.       ---------------------------------------------------------------------------------------------------------------------  EKG:    Baseline EKG ordered and pending.     Telemetry:    Patient is not currently on telemetry.    ---------------------------------------------------------------------------------------------------------------------             Results from last 7 days   Lab Units 12/31/23  2115 12/31/23  1814   CRP mg/dL  --  0.71*   LACTATE mmol/L 1.1 2.9*   WBC 10*3/mm3  --  15.05*   HEMOGLOBIN g/dL  --  13.9   HEMATOCRIT %  --  41.3   MCV fL  --  94.9   MCHC g/dL  --  33.7   PLATELETS 10*3/mm3  --  234     Results from last 7 days   Lab Units 12/31/23  1814   SODIUM mmol/L 138   POTASSIUM mmol/L 2.9*   MAGNESIUM mg/dL 2.0   CHLORIDE mmol/L 100   CO2 mmol/L 25.9   BUN mg/dL 15   CREATININE mg/dL 0.77   CALCIUM mg/dL 9.4   GLUCOSE mg/dL 151*   ALBUMIN g/dL 4.1   BILIRUBIN mg/dL 0.4   ALK PHOS U/L 75   AST (SGOT) U/L 22   ALT (SGPT) U/L 10   Estimated Creatinine Clearance: 73.8 mL/min (by C-G formula based on SCr of 0.77 mg/dL).  No results found for: \"AMMONIA\"    No results found for: \"HGBA1C\", \"POCGLU\"  Lab Results   Component Value Date    HGBA1C 5.60 01/16/2020     Lab Results   Component Value Date    TSH 4.250 (H) 01/16/2020     Pain Management Panel           No data to display              I have personally reviewed the above laboratory results.   ---------------------------------------------------------------------------------------------------------------------  Imaging Results (Last 7 Days)       Procedure Component Value Units Date/Time    CT Abdomen Pelvis With Contrast [[id number]] Collected: 12/31/23 2047     Updated: 12/31/23 2052    Narrative:      INDICATION: Vomiting. Sepsis.     COMPARISON: No relevant priors available.     TECHNIQUE: Axial CT images of the abdomen and pelvis were obtained  following IV contrast administration. Coronal and sagittal reformations  were reviewed.   "   FINDINGS:  Chronic appearing interstitial lung markings in the lung bases.     The liver, spleen, pancreas and adrenal glands appear unremarkable. No  hydronephrosis. The gallbladder is surgically absent. Gastric lap band  noted.     No evidence of bowel obstruction/colitis/appendicitis. No free air. No  drainable fluid collection.     Urinary bladder wall thickening with mild adjacent fat stranding.     Degenerative changes in the spine. No acute osseous abnormality evident.       Impression:      Urinary bladder wall thickening with mild adjacent fat stranding.  Correlate for cystitis.        This report was finalized on 12/31/2023 8:50 PM by Alex Pallas, DO.             I have personally reviewed the above radiology results.     Last Echocardiogram:  Results for orders placed during the hospital encounter of 03/21/23    Adult Transthoracic Echo Complete w/ Color, Spectral and Contrast if necessary per protocol    Interpretation Summary    The right ventricular cavity is mild to moderately dilated.    The right atrial cavity is borderline dilated.    Estimated right ventricular systolic pressure from tricuspid regurgitation is normal (<35 mmHg).    Mild pulmonary hypertension is present.    ---------------------------------------------------------------------------------------------------------------------    Assessment & Plan      Active Hospital Problems    Diagnosis  POA    **UTI (urinary tract infection) [N39.0]  Yes     #Severe sepsis criteria POA 2/2 to acute UTI (HR>90, lactate>2, WBC>12)   -UA grossly abnormal with trace ketones, positive nitrites, trace leukocytes, 21-50 WBC, 4+ bacteria; somewhat dirty sample with 7-12 squamous epithelial cells  -Patient received IV Rocephin 2 g in the emergency department; continue on the floor  -Initial lactate 2.9, has improved to 1.1 without IV fluid resuscitation  -Urine culture and blood cultures pending  -Repeat a.m. CBC  -Continue to monitor vitals closely;  patient has been afebrile since arriving to our facility  -CT of the abdomen/pelvis with contrast was obtained and did not show any evidence of pyelonephritis, however did show bladder wall thickening consistent with cystitis    #Hypokalemia, POA   -Potassium 2.9  -P.o. potassium 40 mEq every 4 hours for 3 doses ordered  -Obtain repeat potassium in a.m.  -Obtain EKG    #Hyperglycemia POA with no known hx of DM   -Glucose 151  -Obtain hemoglobin A1c    #Chronic hypoxic respiratory failure (2L NC)   #COPD, w/o acute exacerbation   -Currently on 2 L NC with O2 saturation well above 95%  -Continue home oxygen requirements  -Nebs available as needed    #Essential hypertension   -BP has been slightly hypertensive  -Review home medications once reconciled per pharmacy, plan to continue home antihypertensive agents with appropriate holding parameters    #Hyperlipidemia   -Plan to continue home statin     #Fibromyalgia   -Review home medications once available     #Hypothyroidism  -Obtain TSH   -Plan to continue synthroid      #Anxiety   #Depression   -Review home medications once reconciled per pharmacy   -Atarax available PRN       F/E/N: No IV fluids.  Replace electrolytes as necessary.  Regular diet.  ---------------------------------------------------  DVT Prophylaxis: Lovenox  GI Prophylaxis: Protonix  Activity: Up with assistance, fall precautions    The patient is considered to be a high risk patient due to: Severe sepsis criteria secondary to acute UTI, hypokalemia    INPATIENT status due to the need for care which can only be reasonably provided in an hospital setting such as aggressive/expedited ancillary services and/or consultation services, the necessity for IV medications, close physician monitoring and/or the possible need for procedures.  In such, I feel patient’s risk for adverse outcomes and need for care warrant INPATIENT evaluation and predict the patient’s care encounter to likely last beyond 2  midnights.    Code Status: FULL CODE     I have discussed the patient's assessment and plan with the patient, patient's sister at bedside, and attending physician      Bonita Price PA-C  Hospitalist Service -- [facility]       12/31/23  23:13 EST    Attending Physician: Jenny Santillan DO

## 2024-01-01 NOTE — NURSING NOTE
Pharmacy contacted over the medication that was started from the patients home medication list. The medication name is called Brexpiprazole. Pharmacy said to ask the patient to bring the medication in to be able to use patients home supply. Pt stated she would have have her family bring in all her medication.

## 2024-01-01 NOTE — PROGRESS NOTES
[facility] HOSPITALIST PROGRESS NOTE     Patient Identification:  Name:  Lexie KEITH Valdez  Age:  63 y.o.  Sex:  female  :  1960  MRN:  [id number]  Visit Number:  [id number]  ROOM: [address] [place] [address]     Primary Care Provider:  Violet Pop APRN    Length of stay in inpatient status:  1    Subjective     Chief Compliant:    Chief Complaint   Patient presents with    Vomiting       History of Presenting Illness:    Patient seen and examined today with multiple family members present at bedside. Patient reports feeling much better and says nausea/vomiting have resolved. She denies any abdominal pain.     Objective     Current Hospital Meds:cefTRIAXone, 1,000 mg, Intravenous, Q24H  enoxaparin, 40 mg, Subcutaneous, Nightly  insulin lispro, 2-7 Units, Subcutaneous, 4x Daily AC & at Bedtime  pantoprazole, 40 mg, Oral, Q AM  senna-docusate sodium, 2 tablet, Oral, BID  sodium chloride, 10 mL, Intravenous, Q12H         Current Antimicrobial Therapy:  Anti-Infectives (From admission, onward)      Ordered     Dose/Rate Route Frequency Start Stop    23  cefTRIAXone (ROCEPHIN) 1,000 mg in sodium chloride 0.9 % 100 mL IVPB-VTB        Ordering Provider: Jenny Santillan DO    1,000 mg  200 mL/hr over 30 Minutes Intravenous Every 24 Hours 24  cefTRIAXone (ROCEPHIN) 2,000 mg in sodium chloride 0.9 % 100 mL IVPB-VTB        Ordering Provider: Mingo Domínguez PA-C    2,000 mg  200 mL/hr over 30 Minutes Intravenous Once 23          Current Diuretic Therapy:  Diuretics (From admission, onward)      None          ----------------------------------------------------------------------------------------------------------------------  Vital Signs:  Temp:  [97.8 °F (36.6 °C)-98.6 °F (37 °C)] 98.1 °F (36.7 °C)  Heart Rate:  [78-92] 83  Resp:  [17-18] 18  BP: (122-166)/(70-85) 122/78  SpO2:  [99 %] 99 %  on  Flow (L/min):  [2] 2;   Device (Oxygen  Therapy): nasal cannula  Body mass index is 27.93 kg/m².    Wt Readings from Last 3 Encounters:   12/31/23 73.8 kg (162 lb 11.2 oz)   10/19/23 78.9 kg (174 lb)   07/19/23 78.9 kg (174 lb)     Intake & Output (last 3 days)         12/29 0701  12/30 0700 12/30 0701  12/31 0700 12/31 0701  01/01 0700 01/01 0701 01/02 0700    P.O.   400     I.V. (mL/kg)    0 (0)    Total Intake(mL/kg)   400 (5.4) 0 (0)    Net   +400 0            Urine Unmeasured Occurrence   3 x 2 x          Diet: Regular/House Diet; Texture: Regular Texture (IDDSI 7); Fluid Consistency: Thin (IDDSI 0)  ----------------------------------------------------------------------------------------------------------------------  Physical exam:   Constitutional:  Well-developed and well-nourished.  No acute distress.      HENT:  Head:  Normocephalic and atraumatic.    Cardiovascular:  Normal rate, regular rhythm   Pulmonary/Chest:  No respiratory distress, no wheezes, no crackles, with normal breath sounds and good air movement in anterior lung fields  Abdominal:  Soft. No distension and no tenderness.   Musculoskeletal:  No deformity.    Neurological: Awake, alert, no focal deficit on gross examination. No slurred speech or facial droop.   Skin:  Skin is warm and dry.   Peripheral vascular:  No cyanosis, no edema.  Psychiatric: Appropriate mood and affect    ----------------------------------------------------------------------------------------------------------------------  Results from last 7 days   Lab Units 01/01/24  0244 12/31/23  2115 12/31/23  1814   CRP mg/dL  --   --  0.71*   LACTATE mmol/L  --  1.1 2.9*   WBC 10*3/mm3 12.57*  --  15.05*   HEMOGLOBIN g/dL 12.1  --  13.9   HEMATOCRIT % 37.5  --  41.3   MCV fL 98.2*  --  94.9   MCHC g/dL 32.3  --  33.7   PLATELETS 10*3/mm3 167  --  234         Results from last 7 days   Lab Units 01/01/24  1010 01/01/24 0244 12/31/23  1814   SODIUM mmol/L  --  141 138   POTASSIUM mmol/L 5.1 4.2 2.9*   MAGNESIUM mg/dL   "--  2.1 2.0   CHLORIDE mmol/L  --  108* 100   CO2 mmol/L  --  21.5* 25.9   BUN mg/dL  --  10 15   CREATININE mg/dL  --  0.53* 0.77   CALCIUM mg/dL  --  8.4* 9.4   GLUCOSE mg/dL  --  97 151*   ALBUMIN g/dL  --  3.2* 4.1   BILIRUBIN mg/dL  --  0.3 0.4   ALK PHOS U/L  --  58 75   AST (SGOT) U/L  --  16 22   ALT (SGPT) U/L  --  7 10   Estimated Creatinine Clearance: 105.5 mL/min (A) (by C-G formula based on SCr of 0.53 mg/dL (L)).  No results found for: \"AMMONIA\"              Hemoglobin A1C   Date/Time Value Ref Range Status   12/31/2023 1814 5.00 4.80 - 5.60 % Final     Glucose   Date/Time Value Ref Range Status   01/01/2024 1055 100 70 - 130 mg/dL Final   01/01/2024 0741 96 70 - 130 mg/dL Final     Lab Results   Component Value Date    TSH 1.100 12/31/2023     No results found for: \"PREGTESTUR\", \"PREGSERUM\", \"HCG\", \"HCGQUANT\"  Pain Management Panel           No data to display              Brief Urine Lab Results  (Last result in the past 365 days)        Color   Clarity   Blood   Leuk Est   Nitrite   Protein   CREAT   Urine HCG        12/31/23 1926 Dark Yellow   Clear   Negative   Trace   Positive   30 mg/dL (1+)                 No results found for: \"BLOODCX\"  No results found for: \"URINECX\"  No results found for: \"WOUNDCX\"  No results found for: \"STOOLCX\"  No results found for: \"RESPCX\"  No results found for: \"AFBCX\"  Results from last 7 days   Lab Units 12/31/23 2115 12/31/23 1814   PROCALCITONIN ng/mL  --  0.04   LACTATE mmol/L 1.1 2.9*   CRP mg/dL  --  0.71*       I have personally looked at the labs and they are summarized above.  ----------------------------------------------------------------------------------------------------------------------  Detailed radiology reports for the last 24 hours:  Imaging Results (Last 24 Hours)       Procedure Component Value Units Date/Time    CT Abdomen Pelvis With Contrast [[id number]] Collected: 12/31/23 2047     Updated: 12/31/23 2052    Narrative:      INDICATION: " Vomiting. Sepsis.     COMPARISON: No relevant priors available.     TECHNIQUE: Axial CT images of the abdomen and pelvis were obtained  following IV contrast administration. Coronal and sagittal reformations  were reviewed.     FINDINGS:  Chronic appearing interstitial lung markings in the lung bases.     The liver, spleen, pancreas and adrenal glands appear unremarkable. No  hydronephrosis. The gallbladder is surgically absent. Gastric lap band  noted.     No evidence of bowel obstruction/colitis/appendicitis. No free air. No  drainable fluid collection.     Urinary bladder wall thickening with mild adjacent fat stranding.     Degenerative changes in the spine. No acute osseous abnormality evident.       Impression:      Urinary bladder wall thickening with mild adjacent fat stranding.  Correlate for cystitis.        This report was finalized on 12/31/2023 8:50 PM by Alex Pallas, DO.             Assessment & Plan      #Severe sepsis secondary to acute UTI  - criteria met with HR>90, lactate >2, and WBC>12  - Sepsis is resolving. HR is WNL, lactate normalized, and WBC decreased from 15.05 to 12.57.   - Continue empiric ceftriaxone pending urine and blood culture results, cultures are still in process  - repeat cbc in a.m.     #Hypokalemia  - Resolved after supplementation  - Monitor and replace per protocol    #Hyperglycemia without known diagnosis of DM  -Hemoglobin A1c was 5.0, so likely her hyperglycemia was due to acute infection/physiologic stress.  Hypoglycemia has now resolved.  Monitor with chemistry panel in a.m.    # Chronic hypoxic respiratory failure on 2 L nasal cannula at baseline  # COPD without acute exacerbation  -Continue supplemental oxygen to keep SpO2 greater than 92%  -Neb treatments available as needed    # Essential hypertension  -Restart home HCTZ 12.5 mg daily  -Hold losartan for now as blood pressure has not been significantly elevated today.  If blood pressure is staying persistently  elevated then we can restart this.  Monitor vital signs per protocol.    # Hyperlipidemia  -Home statin    # Fibromyalgia  -Continue home hydrocodone/acetaminophen as needed.  Currently holding home naproxen and Relafen to prevent increased risk of clinically significant bleeding while she is receiving Lovenox for DVT prophylaxis.  -Continue home Plaquenil.  Recommend follow-up with PCP after discharge for continued medication management.    # Mixed anxiety depressive disorder  # Memory difficulty?  -Continue home BuSpar, Celexa, brexpiprazole, and donepezil  -Recommend outpatient follow-up with PCP for continued medication management    # Hypothyroidism  -Continue home Synthroid    # Seizure disorder?  -Continue home Keppra as reconciled by pharmacy    VTE Prophylaxis:   Mechanical Order History:       None          Pharmalogical Order History:        Ordered     Dose Route Frequency Stop    12/31/23 9557  Enoxaparin Sodium (LOVENOX) syringe 40 mg         40 mg SC Nightly --                    Dispo:  likely home at discharge pending clinical improvement/medical stability     Cathleen Davila DO  [facility] Hospitalist  01/01/24  16:31 EST

## 2024-01-02 VITALS
TEMPERATURE: 98.6 F | RESPIRATION RATE: 18 BRPM | SYSTOLIC BLOOD PRESSURE: 154 MMHG | WEIGHT: 162.7 LBS | HEIGHT: 64 IN | OXYGEN SATURATION: 99 % | DIASTOLIC BLOOD PRESSURE: 66 MMHG | HEART RATE: 78 BPM | BODY MASS INDEX: 27.78 KG/M2

## 2024-01-02 LAB
ANION GAP SERPL CALCULATED.3IONS-SCNC: 4.2 MMOL/L (ref 5–15)
BASOPHILS # BLD AUTO: 0.08 10*3/MM3 (ref 0–0.2)
BASOPHILS NFR BLD AUTO: 0.7 % (ref 0–1.5)
BUN SERPL-MCNC: 7 MG/DL (ref 8–23)
BUN/CREAT SERPL: 12.7 (ref 7–25)
CALCIUM SPEC-SCNC: 8.6 MG/DL (ref 8.6–10.5)
CHLORIDE SERPL-SCNC: 107 MMOL/L (ref 98–107)
CO2 SERPL-SCNC: 26.8 MMOL/L (ref 22–29)
CREAT SERPL-MCNC: 0.55 MG/DL (ref 0.57–1)
DEPRECATED RDW RBC AUTO: 41.8 FL (ref 37–54)
EGFRCR SERPLBLD CKD-EPI 2021: 102.5 ML/MIN/1.73
EOSINOPHIL # BLD AUTO: 0.62 10*3/MM3 (ref 0–0.4)
EOSINOPHIL NFR BLD AUTO: 5.6 % (ref 0.3–6.2)
ERYTHROCYTE [DISTWIDTH] IN BLOOD BY AUTOMATED COUNT: 11.5 % (ref 12.3–15.4)
GLUCOSE BLDC GLUCOMTR-MCNC: 122 MG/DL (ref 70–130)
GLUCOSE BLDC GLUCOMTR-MCNC: 85 MG/DL (ref 70–130)
GLUCOSE SERPL-MCNC: 117 MG/DL (ref 65–99)
HCT VFR BLD AUTO: 37.5 % (ref 34–46.6)
HGB BLD-MCNC: 11.8 G/DL (ref 12–15.9)
IMM GRANULOCYTES # BLD AUTO: 0.04 10*3/MM3 (ref 0–0.05)
IMM GRANULOCYTES NFR BLD AUTO: 0.4 % (ref 0–0.5)
LYMPHOCYTES # BLD AUTO: 3.51 10*3/MM3 (ref 0.7–3.1)
LYMPHOCYTES NFR BLD AUTO: 31.9 % (ref 19.6–45.3)
MCH RBC QN AUTO: 31.3 PG (ref 26.6–33)
MCHC RBC AUTO-ENTMCNC: 31.5 G/DL (ref 31.5–35.7)
MCV RBC AUTO: 99.5 FL (ref 79–97)
MONOCYTES # BLD AUTO: 0.78 10*3/MM3 (ref 0.1–0.9)
MONOCYTES NFR BLD AUTO: 7.1 % (ref 5–12)
NEUTROPHILS NFR BLD AUTO: 5.98 10*3/MM3 (ref 1.7–7)
NEUTROPHILS NFR BLD AUTO: 54.3 % (ref 42.7–76)
NRBC BLD AUTO-RTO: 0 /100 WBC (ref 0–0.2)
PLATELET # BLD AUTO: 168 10*3/MM3 (ref 140–450)
PMV BLD AUTO: 10.6 FL (ref 6–12)
POTASSIUM SERPL-SCNC: 3.6 MMOL/L (ref 3.5–5.2)
RBC # BLD AUTO: 3.77 10*6/MM3 (ref 3.77–5.28)
SODIUM SERPL-SCNC: 138 MMOL/L (ref 136–145)
WBC NRBC COR # BLD AUTO: 11.01 10*3/MM3 (ref 3.4–10.8)

## 2024-01-02 PROCEDURE — 85025 COMPLETE CBC W/AUTO DIFF WBC: CPT | Performed by: STUDENT IN AN ORGANIZED HEALTH CARE EDUCATION/TRAINING PROGRAM

## 2024-01-02 PROCEDURE — 94799 UNLISTED PULMONARY SVC/PX: CPT

## 2024-01-02 PROCEDURE — 82948 REAGENT STRIP/BLOOD GLUCOSE: CPT

## 2024-01-02 PROCEDURE — 94664 DEMO&/EVAL PT USE INHALER: CPT

## 2024-01-02 PROCEDURE — 80048 BASIC METABOLIC PNL TOTAL CA: CPT | Performed by: STUDENT IN AN ORGANIZED HEALTH CARE EDUCATION/TRAINING PROGRAM

## 2024-01-02 RX ORDER — POTASSIUM CHLORIDE 20 MEQ/1
40 TABLET, EXTENDED RELEASE ORAL EVERY 4 HOURS
Status: COMPLETED | OUTPATIENT
Start: 2024-01-02 | End: 2024-01-02

## 2024-01-02 RX ORDER — CEFDINIR 300 MG/1
300 CAPSULE ORAL 2 TIMES DAILY
Qty: 10 CAPSULE | Refills: 0 | Status: SHIPPED | OUTPATIENT
Start: 2024-01-02 | End: 2024-01-07

## 2024-01-02 RX ADMIN — POTASSIUM CHLORIDE 40 MEQ: 1500 TABLET, EXTENDED RELEASE ORAL at 04:53

## 2024-01-02 RX ADMIN — BUDESONIDE AND FORMOTEROL FUMARATE DIHYDRATE 2 PUFF: 160; 4.5 AEROSOL RESPIRATORY (INHALATION) at 06:52

## 2024-01-02 RX ADMIN — ASPIRIN 81 MG: 81 TABLET, COATED ORAL at 08:02

## 2024-01-02 RX ADMIN — CETIRIZINE HYDROCHLORIDE 10 MG: 10 TABLET, FILM COATED ORAL at 08:02

## 2024-01-02 RX ADMIN — OFLOXACIN 50000 UNITS: 300 TABLET, COATED ORAL at 08:02

## 2024-01-02 RX ADMIN — TIOTROPIUM BROMIDE INHALATION SPRAY 2 PUFF: 3.12 SPRAY, METERED RESPIRATORY (INHALATION) at 06:52

## 2024-01-02 RX ADMIN — POTASSIUM CHLORIDE 40 MEQ: 1500 TABLET, EXTENDED RELEASE ORAL at 08:02

## 2024-01-02 RX ADMIN — LEVETIRACETAM 500 MG: 500 TABLET, FILM COATED ORAL at 08:02

## 2024-01-02 RX ADMIN — CITALOPRAM HYDROBROMIDE 40 MG: 20 TABLET ORAL at 08:02

## 2024-01-02 RX ADMIN — Medication 10 ML: at 08:04

## 2024-01-02 RX ADMIN — PANTOPRAZOLE SODIUM 40 MG: 40 TABLET, DELAYED RELEASE ORAL at 05:57

## 2024-01-02 RX ADMIN — HYDROXYCHLOROQUINE SULFATE 200 MG: 200 TABLET ORAL at 08:02

## 2024-01-02 RX ADMIN — DOCUSATE SODIUM 50 MG AND SENNOSIDES 8.6 MG 2 TABLET: 8.6; 5 TABLET, FILM COATED ORAL at 08:02

## 2024-01-02 RX ADMIN — HYDROCHLOROTHIAZIDE 12.5 MG: 25 TABLET ORAL at 08:02

## 2024-01-02 RX ADMIN — LEVOTHYROXINE SODIUM 25 MCG: 25 TABLET ORAL at 05:57

## 2024-01-02 RX ADMIN — ATORVASTATIN CALCIUM 40 MG: 40 TABLET, FILM COATED ORAL at 08:02

## 2024-01-02 RX ADMIN — DONEPEZIL HYDROCHLORIDE 10 MG: 5 TABLET, FILM COATED ORAL at 08:02

## 2024-01-02 NOTE — DISCHARGE SUMMARY
[facility] HOSPITALISTS DISCHARGE SUMMARY    Patient Identification:  Name:  Lexie KEITH Valdez  Age:  63 y.o.  Sex:  female  :  1960  MRN:  [id number]  Visit Number:  [id number]    Date of Admission: 2023  Date of Discharge:  2024     PCP: Violet Pop APRN    DISCHARGE DIAGNOSIS ***      CONSULTS ***      PROCEDURES PERFORMED  ***    HOSPITAL COURSE  Patient is a 63 y.o. female presented to [facility] complaining of ***.  Please see the admitting history and physical for further details.          VITAL SIGNS:  Temp:  [97.5 °F (36.4 °C)-98.6 °F (37 °C)] 98.6 °F (37 °C)  Heart Rate:  [64-97] 78  Resp:  [18-20] 18  BP: (122-154)/(59-78) 154/66  SpO2:  [99 %] 99 %  on  Flow (L/min):  [2] 2;   Device (Oxygen Therapy): nasal cannula    Body mass index is 27.93 kg/m².  Wt Readings from Last 3 Encounters:   23 73.8 kg (162 lb 11.2 oz)   10/19/23 78.9 kg (174 lb)   23 78.9 kg (174 lb)       PHYSICAL EXAM: ***  Constitutional:  Well-developed and well-nourished.  No acute distress.      HENT:  Head:  Normocephalic and atraumatic.  Mouth:  Moist mucous membranes.    Eyes:  Conjunctivae and EOM are normal. No scleral icterus.    Neck:  Neck supple.  No JVD present.    Cardiovascular:  Normal rate, regular rhythm, and normal heart sounds. No murmur.  Pulmonary/Chest:  Normal rate and effort. Breath sounds clear to auscultation bilaterally with good air movement.  Abdominal:  Soft. No distension and no tenderness. Normal bowel sounds present.  Musculoskeletal:  No tenderness and no deformity.  No red or swollen joints anywhere.    Neurological:  Alert and oriented to person, place, and time.  No focal strength or sensory deficit.    Skin:  Skin is warm and dry. No rash noted. No pallor.   Peripheral vascular:  No clubbing, no cyanosis, no edema.  DISCHARGE DISPOSITION   Stable    DISCHARGE MEDICATIONS:     Discharge Medications        New Medications        Instructions  Start Date   cefdinir 300 MG capsule  Commonly known as: OMNICEF   300 mg, Oral, 2 Times Daily             Continue These Medications        Instructions Start Date   aspirin 81 MG EC tablet   81 mg, Oral, Daily      atorvastatin 40 MG tablet  Commonly known as: LIPITOR   40 mg, Oral, Daily      Brexpiprazole 0.5 MG tablet   1 tablet, Oral, Daily      busPIRone 10 MG tablet  Commonly known as: BUSPAR   10 mg, Oral, 2 Times Daily PRN      citalopram 40 MG tablet  Commonly known as: CeleXA   40 mg, Oral, Daily      docusate sodium 100 MG capsule  Commonly known as: COLACE   100 mg, Oral, Daily      donepezil 10 MG tablet  Commonly known as: ARICEPT   10 mg, Oral, Daily      hydroCHLOROthiazide 12.5 MG tablet  Commonly known as: HYDRODIURIL   12.5 mg, Oral, Daily      HYDROcodone-acetaminophen 5-325 MG per tablet  Commonly known as: NORCO   1 tablet, Oral, Every 8 Hours PRN      hydroxychloroquine 200 MG tablet  Commonly known as: PLAQUENIL   1 tablet, Oral, 2 Times Daily      levETIRAcetam 500 MG tablet  Commonly known as: KEPPRA   500 mg, Oral, 2 Times Daily      levocetirizine 5 MG tablet  Commonly known as: XYZAL   5 mg, Oral, Every Evening      levothyroxine 25 MCG tablet  Commonly known as: SYNTHROID, LEVOTHROID   25 mcg, Oral, Every Early Morning      losartan 50 MG tablet  Commonly known as: COZAAR   50 mg, Oral, Daily      nabumetone 750 MG tablet  Commonly known as: RELAFEN   750 mg, Oral, 2 Times Daily      Trelegy Ellipta 200-62.5-25 MCG/ACT aerosol powder   Generic drug: Fluticasone-Umeclidin-Vilant   1 puff, Inhalation, Daily      Ventolin  (90 Base) MCG/ACT inhaler  Generic drug: albuterol sulfate HFA   Inhale 2 puffs by mouth Every 4 (Four) Hours As Needed for Wheezing.      vitamin D 1.25 MG (62369 UT) capsule capsule  Commonly known as: ERGOCALCIFEROL   50,000 Units, Oral, Weekly             Stop These Medications      amoxicillin-clavulanate 500-125 MG per tablet  Commonly known as:  AUGMENTIN              Diet Instructions       Diet: Regular/House Diet; Regular Texture (IDDSI 7); Thin (IDDSI 0)      Discharge Diet: Regular/House Diet    Texture: Regular Texture (IDDSI 7)    Fluid Consistency: Thin (IDDSI 0)          Activity Instructions       Activity as Tolerated            Additional Instructions for the Follow-ups that You Need to Schedule       Discharge Follow-up with PCP   As directed       Currently Documented PCP:    Violet Pop APRN    PCP Phone Number:    [phone]     Follow Up Details: within 1 week               Follow-up Information       Violet Pop APRN .    Specialty: Family Medicine  Why: within 1 week  Contact information:  [address] [facility] [address]  [phone]                              TEST  RESULTS PENDING AT DISCHARGE  Pending Labs       Order Current Status    Blood Culture - Blood, Wrist, Left Preliminary result    Blood Culture - Blood, Wrist, Right Preliminary result    Urine Culture - Urine, Urine, Clean Catch Preliminary result             CODE STATUS  Code Status and Medical Interventions:   Ordered at: 12/31/23 2219     Code Status (Patient has no pulse and is not breathing):    CPR (Attempt to Resuscitate)     Medical Interventions (Patient has pulse or is breathing):    Full Support       Cathleen Davila DO  [facility] Hospitalist  01/02/24  11:40 EST    Please note that this discharge summary required more than 30 minutes to complete.

## 2024-01-02 NOTE — PAYOR COMM NOTE
"CONTACT: DINORA HILL RN  UTILIZATION MANAGEMENT DEPT.  [facility]  [address] [facility] [place] [address]  PHONE: [phone]  FAX: [phone]        DISCHARGE NOTIFICATION  DC DATE: 01/02/23 TO HOME    AWAITING AUTHORIZATION, PLEASE.    REF# [id number]      Lexie Nagy (63 y.o. Female)       Date of Birth   1960    Social Security Number       Address   [address] [place] [address] [place] [address]    Home Phone       MRN   [id number]       [facility]    Marital Status                               Admission Date   12/31/23    Admission Type   Emergency    Admitting Provider   Jenny Santillan DO    Attending Provider       Department, Room/Bed   [place] [address]/       Discharge Date   1/2/2024    Discharge Disposition   Home or Self Care    Discharge Destination                                 Attending Provider: (none)   Allergies: Codeine, Sulfa Antibiotics    Isolation: None   Infection: None   Code Status: CPR    Ht: 162.6 cm (64\")   Wt: 73.8 kg (162 lb 11.2 oz)    Admission Cmt: None   Principal Problem: UTI (urinary tract infection) [N39.0]                   Active Insurance as of 12/31/2023       Primary Coverage       Payor Plan Insurance Group Employer/Plan Group    ANTHEM MEDICARE REPLACEMENT ANTHEM MEDICARE ADVANTAGE [id number]       Payor Plan Address Payor Plan Phone Number Payor Plan Fax Number Effective Dates    [address] [phone]  1/1/2022 - None Entered    [facility] [phone]         Subscriber Name Subscriber Birth Date Member ID       LEXIE NAGY 1960 [id number]               Secondary Coverage       Payor Plan Insurance Group Employer/Plan Group    [place]        Payor Plan Address Payor Plan Phone Number Payor Plan Fax Number Effective Dates    [address] [phone]  5/1/2021 - None Entered    [facility] [id number]         Subscriber Name Subscriber Birth Date Member ID       LEXIE NAGY 1960 " [id number]                     Emergency Contacts        (Rel.) Home Phone Work Phone Mobile Phone    Khris Valdez (Spouse) -- -- [phone]    Faith Davila (Son) [phone] -- --    Rosa Kessler (Sister) -- -- [phone]              Discharge Summary    No notes of this type exist for this encounter.       Discharge Order (From admission, onward)       Start     Ordered    01/02/24 1133  Discharge patient  Once        Expected Discharge Date: 01/02/24   Discharge Disposition: Home or Self Care   Physician of Record for Attribution - Please select from Treatment Team: CADEN DAVILA [[id number]]   Review needed by CMO to determine Physician of Record: No      Question Answer Comment   Physician of Record for Attribution - Please select from Treatment Team CADEN DAVILA    Review needed by CMO to determine Physician of Record No        01/02/24 1139

## 2024-01-02 NOTE — CASE MANAGEMENT/SOCIAL WORK
Discharge Planning Assessment   [place]     Patient Name: Lexie Valdez  MRN: [id number]  Today's Date: 1/2/2024    Admit Date: 12/31/2023    Plan: ALEC spoke with pt at the bedside. Pt lives at home in [place] with her spouse and plans to return. Pt has O2@2L, POC, rollator, nebulizer, BSC and quad cane via [employer]. Pt does not have HH but has utilized [facility] in the past for PT. PCP is Violet BETTENCOURT and she gets rx filled at [facility] on [address]. Pt has [employer] insurance and denies any issues with copays. Pt's spouse Khris will transport at d/c.   Discharge Needs Assessment       Row Name 01/02/24 1231       Living Environment    People in Home spouse    Current Living Arrangements home    Primary Care Provided by self    Provides Primary Care For no one    Family Caregiver if Needed spouse    Family Caregiver Names Spouse Khris and pt's sister helps if needed.    Quality of Family Relationships unable to assess    Able to Return to Prior Arrangements yes       Resource/Environmental Concerns    Resource/Environmental Concerns none    Transportation Concerns none       Transition Planning    Patient/Family Anticipates Transition to home    Patient/Family Anticipated Services at Transition none    Transportation Anticipated family or friend will provide       Discharge Needs Assessment    Readmission Within the Last 30 Days no previous admission in last 30 days    Equipment Currently Used at Home oxygen;rollator;nebulizer;commode    Concerns to be Addressed no discharge needs identified;denies needs/concerns at this time    Anticipated Changes Related to Illness none    Equipment Needed After Discharge none    Patient's Choice of Community Agency(s) Pt does not have HH but has had [facility] HH in the past.                   Discharge Plan       Row Name 01/02/24 1232       Plan    Plan ALEC spoke with pt at the bedside. Pt lives at home in [place] with her spouse and plans to return. Pt has  O2@2L, POC, rollator, nebulizer, BSC and quad cane via [employer]. Pt does not have HH but has utilized [facility] in the past for PT. PCP is Violet BETTENCOURT and she gets rx filled at [facility] on [address]. Pt has [facility] insurance and denies any issues with copays. Pt's spouse Khris will transport at d/c.              Demographic Summary       Row Name 01/02/24 7620       General Information    Admission Type inpatient    Referral Source admission list;case finding                 Nadine Motta RN

## 2024-01-02 NOTE — PLAN OF CARE
Goal Outcome Evaluation:  Plan of Care Reviewed With: patient        Progress: no change  Outcome Evaluation: Patient resting in bed. Patient has ambulated to bedside during the shift. IV ABX given per orders. No acute changes or complaints. Will continue with plan of care.

## 2024-01-03 ENCOUNTER — READMISSION MANAGEMENT (OUTPATIENT)
Dept: CALL CENTER | Facility: HOSPITAL | Age: 64
End: 2024-01-03
Payer: MEDICARE

## 2024-01-03 LAB — BACTERIA SPEC AEROBE CULT: ABNORMAL

## 2024-01-03 NOTE — OUTREACH NOTE
Prep Survey      Flowsheet Row Responses   [facility]? [facility]   Is LACE score < 7 ? No   Eligibility Readm Mgmt   Discharge diagnosis Severe sepsis, UTI   Does the patient have one of the following disease processes/diagnoses(primary or secondary)? Sepsis   Does the patient have Home health ordered? No   Is there a DME ordered? No   Prep survey completed? Yes            Anita TAVERAS - Registered Nurse

## 2024-01-05 LAB
BACTERIA SPEC AEROBE CULT: NORMAL
BACTERIA SPEC AEROBE CULT: NORMAL

## 2024-01-09 ENCOUNTER — READMISSION MANAGEMENT (OUTPATIENT)
Dept: CALL CENTER | Facility: HOSPITAL | Age: 64
End: 2024-01-09
Payer: MEDICARE

## 2024-01-09 NOTE — OUTREACH NOTE
Sepsis Week 1 Survey      Flowsheet Row Responses   [facility] patient discharged from? [facility]   Does the patient have one of the following disease processes/diagnoses(primary or secondary)? Sepsis   Week 1 attempt successful? Yes   Call start time 1449   Call end time 1451   Discharge diagnosis Severe sepsis, UTI   Meds reviewed with patient/caregiver? Yes   Is the patient having any side effects they believe may be caused by any medication additions or changes? No   Does the patient have all medications related to this admission filled (includes all antibiotics, inhalers, nebulizers,steroids,etc.) Yes   Is the patient taking all medications as directed (includes completed medication regime)? Yes   Does the patient have a primary care provider?  Yes   Does the patient have an appointment with their PCP within 7 days of discharge? Yes   Has the patient kept scheduled appointments due by today? Yes   Psychosocial issues? No   Did the patient receive a copy of their discharge instructions? Yes   Nursing interventions Reviewed instructions with patient   What is the patient's perception of their health status since discharge? Improving   Nursing interventions Nurse provided patient education   Is the patient/caregiver able to teach back TIME? T emperature - higher or lower than normal, I nfection - may have signs and symptoms of an infection, M ental Decline - confused, sleepy, difficult to arouse, E xtremely Ill - severe pain, discomfort, shortness of breath   Nursing interventions Nurse provided reassurance to patient, Nurse provided patient education   Is patient/caregiver able to teach back steps to recovery at home? Set small, achievable goals for return to baseline health   Is the patient/caregiver able to teach back signs and symptoms of worsening condition: Fever   Is the patient/caregiver able to teach back the hierarchy of who to call/visit for symptoms/problems? PCP, Specialist, Home health nurse,  Urgent Care, ED, 911 Yes   Week 1 call completed? Yes   Is the patient interested in additional calls from an ambulatory ? No   Would this patient benefit from a Referral to [facility] Social Work? No   Wrap up additional comments pt stated has pcp appt in morning. Threw away her AVS.   Call end time 1451            ALHAJI RIVERA - Registered Nurse

## 2024-04-11 ENCOUNTER — LAB (OUTPATIENT)
Dept: LAB | Facility: HOSPITAL | Age: 64
End: 2024-04-11
Payer: MEDICARE

## 2024-04-11 ENCOUNTER — TRANSCRIBE ORDERS (OUTPATIENT)
Dept: ADMINISTRATIVE | Facility: HOSPITAL | Age: 64
End: 2024-04-11
Payer: MEDICARE

## 2024-04-11 DIAGNOSIS — E78.2 MIXED HYPERLIPIDEMIA: ICD-10-CM

## 2024-04-11 DIAGNOSIS — I51.9 MYXEDEMA HEART DISEASE: Primary | ICD-10-CM

## 2024-04-11 DIAGNOSIS — I10 ESSENTIAL HYPERTENSION, MALIGNANT: ICD-10-CM

## 2024-04-11 DIAGNOSIS — R73.03 DIABETES MELLITUS, LATENT: ICD-10-CM

## 2024-04-11 DIAGNOSIS — E55.9 VITAMIN D DEFICIENCY: ICD-10-CM

## 2024-04-11 DIAGNOSIS — E03.9 HYPOTHYROIDISM, UNSPECIFIED TYPE: ICD-10-CM

## 2024-04-11 DIAGNOSIS — E03.9 MYXEDEMA HEART DISEASE: Primary | ICD-10-CM

## 2024-04-11 LAB
25(OH)D3 SERPL-MCNC: 77.3 NG/ML (ref 30–100)
ALBUMIN SERPL-MCNC: 4 G/DL (ref 3.5–5.2)
ALBUMIN/GLOB SERPL: 1.7 G/DL
ALP SERPL-CCNC: 75 U/L (ref 39–117)
ALT SERPL W P-5'-P-CCNC: 11 U/L (ref 1–33)
ANION GAP SERPL CALCULATED.3IONS-SCNC: 14 MMOL/L (ref 5–15)
AST SERPL-CCNC: 20 U/L (ref 1–32)
BASOPHILS # BLD AUTO: 0.08 10*3/MM3 (ref 0–0.2)
BASOPHILS NFR BLD AUTO: 0.7 % (ref 0–1.5)
BILIRUB SERPL-MCNC: 0.4 MG/DL (ref 0–1.2)
BUN SERPL-MCNC: 11 MG/DL (ref 8–23)
BUN/CREAT SERPL: 18.3 (ref 7–25)
CALCIUM SPEC-SCNC: 9.1 MG/DL (ref 8.6–10.5)
CHLORIDE SERPL-SCNC: 99 MMOL/L (ref 98–107)
CHOLEST SERPL-MCNC: 130 MG/DL (ref 0–200)
CHROMATIN AB SERPL-ACNC: 70.5 IU/ML (ref 0–14)
CK SERPL-CCNC: 70 U/L (ref 20–180)
CO2 SERPL-SCNC: 23 MMOL/L (ref 22–29)
CREAT SERPL-MCNC: 0.6 MG/DL (ref 0.57–1)
CRP SERPL-MCNC: 0.37 MG/DL (ref 0–0.5)
DEPRECATED RDW RBC AUTO: 43.8 FL (ref 37–54)
EGFRCR SERPLBLD CKD-EPI 2021: 100.4 ML/MIN/1.73
EOSINOPHIL # BLD AUTO: 0.6 10*3/MM3 (ref 0–0.4)
EOSINOPHIL NFR BLD AUTO: 5.1 % (ref 0.3–6.2)
ERYTHROCYTE [DISTWIDTH] IN BLOOD BY AUTOMATED COUNT: 12.9 % (ref 12.3–15.4)
ERYTHROCYTE [SEDIMENTATION RATE] IN BLOOD: 8 MM/HR (ref 0–30)
GLOBULIN UR ELPH-MCNC: 2.3 GM/DL
GLUCOSE SERPL-MCNC: 85 MG/DL (ref 65–99)
HBA1C MFR BLD: 4.8 % (ref 4.8–5.6)
HCT VFR BLD AUTO: 37 % (ref 34–46.6)
HDLC SERPL-MCNC: 61 MG/DL (ref 40–60)
HGB BLD-MCNC: 12.7 G/DL (ref 12–15.9)
IMM GRANULOCYTES # BLD AUTO: 0.1 10*3/MM3 (ref 0–0.05)
IMM GRANULOCYTES NFR BLD AUTO: 0.9 % (ref 0–0.5)
LDLC SERPL CALC-MCNC: 53 MG/DL (ref 0–100)
LDLC/HDLC SERPL: 0.86 {RATIO}
LYMPHOCYTES # BLD AUTO: 2.81 10*3/MM3 (ref 0.7–3.1)
LYMPHOCYTES NFR BLD AUTO: 24.1 % (ref 19.6–45.3)
MCH RBC QN AUTO: 32.1 PG (ref 26.6–33)
MCHC RBC AUTO-ENTMCNC: 34.3 G/DL (ref 31.5–35.7)
MCV RBC AUTO: 93.4 FL (ref 79–97)
MONOCYTES # BLD AUTO: 0.79 10*3/MM3 (ref 0.1–0.9)
MONOCYTES NFR BLD AUTO: 6.8 % (ref 5–12)
NEUTROPHILS NFR BLD AUTO: 62.4 % (ref 42.7–76)
NEUTROPHILS NFR BLD AUTO: 7.29 10*3/MM3 (ref 1.7–7)
NRBC BLD AUTO-RTO: 0 /100 WBC (ref 0–0.2)
PLATELET # BLD AUTO: 208 10*3/MM3 (ref 140–450)
PMV BLD AUTO: 11.6 FL (ref 6–12)
POTASSIUM SERPL-SCNC: 3.8 MMOL/L (ref 3.5–5.2)
PROT SERPL-MCNC: 6.3 G/DL (ref 6–8.5)
RBC # BLD AUTO: 3.96 10*6/MM3 (ref 3.77–5.28)
SODIUM SERPL-SCNC: 136 MMOL/L (ref 136–145)
T4 FREE SERPL-MCNC: 1.83 NG/DL (ref 0.93–1.7)
TRIGL SERPL-MCNC: 83 MG/DL (ref 0–150)
TSH SERPL DL<=0.05 MIU/L-ACNC: 2.04 UIU/ML (ref 0.27–4.2)
VLDLC SERPL-MCNC: 16 MG/DL (ref 5–40)
WBC NRBC COR # BLD AUTO: 11.67 10*3/MM3 (ref 3.4–10.8)

## 2024-04-11 PROCEDURE — 84443 ASSAY THYROID STIM HORMONE: CPT

## 2024-04-11 PROCEDURE — 82306 VITAMIN D 25 HYDROXY: CPT

## 2024-04-11 PROCEDURE — 85652 RBC SED RATE AUTOMATED: CPT

## 2024-04-11 PROCEDURE — 82550 ASSAY OF CK (CPK): CPT

## 2024-04-11 PROCEDURE — 80053 COMPREHEN METABOLIC PANEL: CPT

## 2024-04-11 PROCEDURE — 83036 HEMOGLOBIN GLYCOSYLATED A1C: CPT

## 2024-04-11 PROCEDURE — 86431 RHEUMATOID FACTOR QUANT: CPT

## 2024-04-11 PROCEDURE — 85025 COMPLETE CBC W/AUTO DIFF WBC: CPT

## 2024-04-11 PROCEDURE — 80061 LIPID PANEL: CPT

## 2024-04-11 PROCEDURE — 36415 COLL VENOUS BLD VENIPUNCTURE: CPT

## 2024-04-11 PROCEDURE — 86140 C-REACTIVE PROTEIN: CPT

## 2024-04-11 PROCEDURE — 84439 ASSAY OF FREE THYROXINE: CPT

## 2024-04-19 ENCOUNTER — OFFICE VISIT (OUTPATIENT)
Dept: PULMONOLOGY | Facility: CLINIC | Age: 64
End: 2024-04-19
Payer: MEDICARE

## 2024-04-19 VITALS
SYSTOLIC BLOOD PRESSURE: 102 MMHG | OXYGEN SATURATION: 84 % | DIASTOLIC BLOOD PRESSURE: 60 MMHG | HEART RATE: 73 BPM | TEMPERATURE: 97.9 F | WEIGHT: 148 LBS | HEIGHT: 64 IN | BODY MASS INDEX: 25.27 KG/M2

## 2024-04-19 DIAGNOSIS — Z87.891 FORMER SMOKER: ICD-10-CM

## 2024-04-19 DIAGNOSIS — M05.79 RHEUMATOID ARTHRITIS INVOLVING MULTIPLE SITES WITH POSITIVE RHEUMATOID FACTOR: ICD-10-CM

## 2024-04-19 DIAGNOSIS — J96.11 CHRONIC RESPIRATORY FAILURE WITH HYPOXIA: ICD-10-CM

## 2024-04-19 DIAGNOSIS — J44.9 CHRONIC OBSTRUCTIVE PULMONARY DISEASE, UNSPECIFIED COPD TYPE: Primary | ICD-10-CM

## 2024-04-19 DIAGNOSIS — J84.9 ILD (INTERSTITIAL LUNG DISEASE): ICD-10-CM

## 2024-04-19 PROCEDURE — 1160F RVW MEDS BY RX/DR IN RCRD: CPT | Performed by: PHYSICIAN ASSISTANT

## 2024-04-19 PROCEDURE — 99214 OFFICE O/P EST MOD 30 MIN: CPT | Performed by: PHYSICIAN ASSISTANT

## 2024-04-19 PROCEDURE — 3078F DIAST BP <80 MM HG: CPT | Performed by: PHYSICIAN ASSISTANT

## 2024-04-19 PROCEDURE — 3074F SYST BP LT 130 MM HG: CPT | Performed by: PHYSICIAN ASSISTANT

## 2024-04-19 PROCEDURE — 1159F MED LIST DOCD IN RCRD: CPT | Performed by: PHYSICIAN ASSISTANT

## 2024-04-19 RX ORDER — FLUTICASONE FUROATE, UMECLIDINIUM BROMIDE AND VILANTEROL TRIFENATATE 200; 62.5; 25 UG/1; UG/1; UG/1
1 POWDER RESPIRATORY (INHALATION) DAILY
Qty: 180 EACH | Refills: 3 | Status: SHIPPED | OUTPATIENT
Start: 2024-04-19

## 2024-04-19 RX ORDER — IPRATROPIUM BROMIDE AND ALBUTEROL SULFATE 2.5; .5 MG/3ML; MG/3ML
3 SOLUTION RESPIRATORY (INHALATION) EVERY 4 HOURS PRN
Qty: 360 ML | Refills: 3 | Status: SHIPPED | OUTPATIENT
Start: 2024-04-19

## 2024-04-19 RX ORDER — ALBUTEROL SULFATE 90 UG/1
2 AEROSOL, METERED RESPIRATORY (INHALATION) EVERY 4 HOURS PRN
Qty: 18 G | Refills: 8 | Status: SHIPPED | OUTPATIENT
Start: 2024-04-19

## 2024-04-19 RX ORDER — PRIMIDONE 50 MG/1
TABLET ORAL
COMMUNITY
Start: 2024-04-15

## 2024-04-19 NOTE — PROGRESS NOTES
Subjective      Chief Complaint  ILD (interstitial lung disease)    Subjective      History of Present Illness  Lexie Valdez is a 63 y.o. female who presents today to [facility] with past medical history of hypothyoidism, essential hypertension, anxiety, rheumatoid arthritis, depression, and COPD who presents today for ILD (interstitial lung disease). This visit is a follow up appointment.     ILD (interstitial lung disease):  Patient reports she is doing well. She feels that her breathing is currently at baseline. Her saturation was noted to be 84% after ambulating to exam room on 2 L but did improve to 93% with rest. She continues to use Trelegy 1 puff daily. She has an albuterol inhaler and nebulizer treatments to use as needed. She is compliant with using her supplemental oxygen continuously with baseline of 2 L.       Current Outpatient Medications:   •  aspirin 81 MG EC tablet, Take 1 tablet by mouth Daily., Disp: , Rfl:   •  atorvastatin (LIPITOR) 40 MG tablet, Take 1 tablet by mouth Daily., Disp: , Rfl:   •  Brexpiprazole 0.5 MG tablet, Take 0.5 mg by mouth Daily., Disp: , Rfl:   •  busPIRone (BUSPAR) 10 MG tablet, Take 1 tablet by mouth 2 (Two) Times a Day As Needed (Mood)., Disp: , Rfl:   •  citalopram (CeleXA) 40 MG tablet, Take 1 tablet by mouth Daily., Disp: , Rfl:   •  docusate sodium (COLACE) 100 MG capsule, Take 1 capsule by mouth Daily., Disp: , Rfl:   •  donepezil (ARICEPT) 10 MG tablet, Take 1 tablet by mouth Daily., Disp: , Rfl:   •  Fluticasone-Umeclidin-Vilant (Trelegy Ellipta) 200-62.5-25 MCG/ACT inhaler, Inhale 1 puff Daily., Disp: 180 each, Rfl: 3  •  hydroCHLOROthiazide (HYDRODIURIL) 12.5 MG tablet, Take 1 tablet by mouth Daily., Disp: , Rfl:   •  HYDROcodone-acetaminophen (NORCO) 5-325 MG per tablet, Take 1 tablet by mouth Every 8 (Eight) Hours As Needed for Moderate Pain., Disp: , Rfl:   •  hydroxychloroquine (PLAQUENIL) 200 MG  "tablet, Take 1 tablet by mouth 2 (Two) Times a Day., Disp: , Rfl:   •  levETIRAcetam (KEPPRA) 500 MG tablet, Take 1 tablet by mouth 2 (Two) Times a Day., Disp: , Rfl:   •  levocetirizine (XYZAL) 5 MG tablet, Take 1 tablet by mouth Every Evening., Disp: , Rfl:   •  levothyroxine (SYNTHROID, LEVOTHROID) 25 MCG tablet, Take 1 tablet by mouth Every Morning., Disp: , Rfl:   •  losartan (COZAAR) 50 MG tablet, Take 1 tablet by mouth Daily., Disp: , Rfl:   •  nabumetone (RELAFEN) 750 MG tablet, Take 1 tablet by mouth 2 (Two) Times a Day., Disp: , Rfl:   •  primidone (MYSOLINE) 50 MG tablet, , Disp: , Rfl:   •  Ventolin  (90 Base) MCG/ACT inhaler, Inhale 2 puffs by mouth Every 4 (Four) Hours As Needed for Wheezing., Disp: 18 g, Rfl: 8  •  vitamin D (ERGOCALCIFEROL) 1.25 MG (73747 UT) capsule capsule, Take 1 capsule by mouth 1 (One) Time Per Week., Disp: , Rfl:   •  ipratropium-albuterol (DUO-NEB) 0.5-2.5 mg/3 ml nebulizer, Take 3 mL by nebulization Every 4 (Four) Hours As Needed for Wheezing or Shortness of Air., Disp: 360 mL, Rfl: 3      Allergies   Allergen Reactions   • Codeine GI Intolerance   • Sulfa Antibiotics Itching       Objective     Objective   Vital Signs:  /60   Pulse 73   Temp 97.9 °F (36.6 °C)   Ht 162.6 cm (64\")   Wt 67.1 kg (148 lb)   SpO2 (!) 84% Comment: 2L POC up to 93%  BMI 25.40 kg/m²   Estimated body mass index is 25.4 kg/m² as calculated from the following:    Height as of this encounter: 162.6 cm (64\").    Weight as of this encounter: 67.1 kg (148 lb).    Past Medical History:   Diagnosis Date   • Anxiety    • Arthritis    • Depression    • Hypertensive disorder 3/14/2018   • Hypothyroidism 8/3/2021   • MARU (stress urinary incontinence, female) 9/11/2019   • UTI (urinary tract infection) 12/31/2023     Past Surgical History:   Procedure Laterality Date   • BREAST BIOPSY Left 2000    benign   • GALLBLADDER SURGERY     • GASTRIC BANDING     • HYSTERECTOMY      age 37     Social " History     Socioeconomic History   • Marital status:    Tobacco Use   • Smoking status: Former     Current packs/day: 0.00     Average packs/day: 1.5 packs/day for 34.3 years (51.5 ttl pk-yrs)     Types: Cigarettes     Start date: 1971     Quit date: 10/18/2005     Years since quittin.5     Passive exposure: Past   • Smokeless tobacco: Never   Vaping Use   • Vaping status: Never Used   Substance and Sexual Activity   • Alcohol use: No   • Drug use: No   • Sexual activity: Yes     Partners: Male      Physical Exam  Constitutional:       General: She is awake.      Appearance: Normal appearance. She is overweight.   HENT:      Head: Normocephalic and atraumatic.      Nose: Nose normal.      Mouth/Throat:      Mouth: Mucous membranes are moist.      Pharynx: Oropharynx is clear.   Eyes:      Conjunctiva/sclera: Conjunctivae normal.      Pupils: Pupils are equal, round, and reactive to light.   Cardiovascular:      Rate and Rhythm: Normal rate and regular rhythm.      Pulses: Normal pulses.      Heart sounds: Normal heart sounds. No murmur heard.     No friction rub. No gallop.   Pulmonary:      Effort: Pulmonary effort is normal. No tachypnea, accessory muscle usage or respiratory distress.      Breath sounds: Decreased breath sounds and rales present. No wheezing or rhonchi.   Musculoskeletal:         General: Normal range of motion.      Cervical back: Full passive range of motion without pain and normal range of motion.   Skin:     General: Skin is warm and dry.   Neurological:      General: No focal deficit present.      Mental Status: She is alert. Mental status is at baseline.   Psychiatric:         Mood and Affect: Mood normal.         Behavior: Behavior normal. Behavior is cooperative.         Thought Content: Thought content normal.        Result Review :  The following labs and radiology results have been reviewed.    Lab Review:   FEV1   Date Value Ref Range Status   2019 1.38 liters  Final     FVC   Date Value Ref Range Status   06/26/2019 1.48 liters Final     Lab on 04/11/2024   Component Date Value Ref Range Status   • Glucose 04/11/2024 85  65 - 99 mg/dL Final   • BUN 04/11/2024 11  8 - 23 mg/dL Final   • Creatinine 04/11/2024 0.60  0.57 - 1.00 mg/dL Final   • Sodium 04/11/2024 136  136 - 145 mmol/L Final   • Potassium 04/11/2024 3.8  3.5 - 5.2 mmol/L Final   • Chloride 04/11/2024 99  98 - 107 mmol/L Final   • CO2 04/11/2024 23.0  22.0 - 29.0 mmol/L Final   • Calcium 04/11/2024 9.1  8.6 - 10.5 mg/dL Final   • Total Protein 04/11/2024 6.3  6.0 - 8.5 g/dL Final   • Albumin 04/11/2024 4.0  3.5 - 5.2 g/dL Final   • ALT (SGPT) 04/11/2024 11  1 - 33 U/L Final   • AST (SGOT) 04/11/2024 20  1 - 32 U/L Final   • Alkaline Phosphatase 04/11/2024 75  39 - 117 U/L Final   • Total Bilirubin 04/11/2024 0.4  0.0 - 1.2 mg/dL Final   • Globulin 04/11/2024 2.3  gm/dL Final   • A/G Ratio 04/11/2024 1.7  g/dL Final   • BUN/Creatinine Ratio 04/11/2024 18.3  7.0 - 25.0 Final   • Anion Gap 04/11/2024 14.0  5.0 - 15.0 mmol/L Final   • eGFR 04/11/2024 100.4  >60.0 mL/min/1.73 Final   • Total Cholesterol 04/11/2024 130  0 - 200 mg/dL Final   • Triglycerides 04/11/2024 83  0 - 150 mg/dL Final   • HDL Cholesterol 04/11/2024 61 (H)  40 - 60 mg/dL Final   • LDL Cholesterol  04/11/2024 53  0 - 100 mg/dL Final   • VLDL Cholesterol 04/11/2024 16  5 - 40 mg/dL Final   • LDL/HDL Ratio 04/11/2024 0.86   Final   • Hemoglobin A1C 04/11/2024 4.80  4.80 - 5.60 % Final   • C-Reactive Protein 04/11/2024 0.37  0.00 - 0.50 mg/dL Final   • Free T4 04/11/2024 1.83 (H)  0.93 - 1.70 ng/dL Final   • TSH 04/11/2024 2.040  0.270 - 4.200 uIU/mL Final   • 25 Hydroxy, Vitamin D 04/11/2024 77.3  30.0 - 100.0 ng/ml Final   • Creatine Kinase 04/11/2024 70  20 - 180 U/L Final   • Rheumatoid Factor Quantitative 04/11/2024 70.5 (H)  0.0 - 14.0 IU/mL Final   • Sed Rate 04/11/2024 8  0 - 30 mm/hr Final   • WBC 04/11/2024 11.67 (H)  3.40 - 10.80 10*3/mm3  Final   • RBC 04/11/2024 3.96  3.77 - 5.28 10*6/mm3 Final   • Hemoglobin 04/11/2024 12.7  12.0 - 15.9 g/dL Final   • Hematocrit 04/11/2024 37.0  34.0 - 46.6 % Final   • MCV 04/11/2024 93.4  79.0 - 97.0 fL Final   • MCH 04/11/2024 32.1  26.6 - 33.0 pg Final   • MCHC 04/11/2024 34.3  31.5 - 35.7 g/dL Final   • RDW 04/11/2024 12.9  12.3 - 15.4 % Final   • RDW-SD 04/11/2024 43.8  37.0 - 54.0 fl Final   • MPV 04/11/2024 11.6  6.0 - 12.0 fL Final   • Platelets 04/11/2024 208  140 - 450 10*3/mm3 Final   • Neutrophil % 04/11/2024 62.4  42.7 - 76.0 % Final   • Lymphocyte % 04/11/2024 24.1  19.6 - 45.3 % Final   • Monocyte % 04/11/2024 6.8  5.0 - 12.0 % Final   • Eosinophil % 04/11/2024 5.1  0.3 - 6.2 % Final   • Basophil % 04/11/2024 0.7  0.0 - 1.5 % Final   • Immature Grans % 04/11/2024 0.9 (H)  0.0 - 0.5 % Final   • Neutrophils, Absolute 04/11/2024 7.29 (H)  1.70 - 7.00 10*3/mm3 Final   • Lymphocytes, Absolute 04/11/2024 2.81  0.70 - 3.10 10*3/mm3 Final   • Monocytes, Absolute 04/11/2024 0.79  0.10 - 0.90 10*3/mm3 Final   • Eosinophils, Absolute 04/11/2024 0.60 (H)  0.00 - 0.40 10*3/mm3 Final   • Basophils, Absolute 04/11/2024 0.08  0.00 - 0.20 10*3/mm3 Final   • Immature Grans, Absolute 04/11/2024 0.10 (H)  0.00 - 0.05 10*3/mm3 Final   • nRBC 04/11/2024 0.0  0.0 - 0.2 /100 WBC Final      Reviewed previous CT chest angiogram from 03/21/2023    Narrative & Impression   EXAM:    CT Angiography Chest With Intravenous Contrast     EXAM DATE:    3/21/2023 1:56 PM     CLINICAL HISTORY:    Pulmonary embolism (PE) suspected, positive D-dimer     TECHNIQUE:    Axial computed tomographic angiography images of the chest with  intravenous contrast.  This CT exam was performed using one or more of  the following dose reduction techniques:  automated exposure control,  adjustment of the mA and/or kV according to patient size, and/or use of  iterative reconstruction technique.    MIP reconstructed images were created and reviewed.      COMPARISON:    02/21/2023     FINDINGS:    Pulmonary arteries:  Unremarkable.  No pulmonary embolism.    Aorta:  No acute findings.  No thoracic aortic aneurysm.    Lungs:  Subpleural fibrotic change and interstitial prominence is  again noted.  No mass.    Pleural space:  Unremarkable.  No significant effusion.  No  pneumothorax.    Heart:  Unremarkable.  No cardiomegaly.  No significant pericardial  effusion.  No evidence of RV dysfunction.    Bones/joints:  No acute fracture.  No dislocation.    Soft tissues:  Unremarkable.    Lymph nodes:  Unremarkable.  No enlarged lymph nodes.     IMPRESSION:  1.  No pulmonary embolism.  2.  Subpleural fibrotic change and interstitial prominence is again  noted.     This report was finalized on 3/21/2023 2:41 PM by Dr. Dominguez Sims MD.        Reviewed PFT from 12/2019      Assessment / Plan         Assessment   Diagnoses and all orders for this visit:    1. Chronic obstructive pulmonary disease, unspecified COPD type (Primary)  2. Former smoker  Continue albuterol inhaler 2 puffs every 4-6 hours as needed.   Continue nebulizer treatments every 4-6 hours as needed.   Previously on Breo inhaler but didn't feel that it was effective.   Continue Trelegy 200 mcg 1 puff daily.     -     Fluticasone-Umeclidin-Vilant (Trelegy Ellipta) 200-62.5-25 MCG/ACT inhaler; Inhale 1 puff Daily.  Dispense: 180 each; Refill: 3  -     Ventolin  (90 Base) MCG/ACT inhaler; Inhale 2 puffs by mouth Every 4 (Four) Hours As Needed for Wheezing.  Dispense: 18 g; Refill: 8  -     ipratropium-albuterol (DUO-NEB) 0.5-2.5 mg/3 ml nebulizer; Take 3 mL by nebulization Every 4 (Four) Hours As Needed for Wheezing or Shortness of Air.  Dispense: 360 mL; Refill: 3    3. ILD (interstitial lung disease)  4. Rheumatoid arthritis involving multiple sites with positive rheumatoid factor  Will refer to rheumatology to optimize treatment for rheumatoid arthritis.   Will obtain updated HRCT imaging to assess for  progression of ILD.   Discussed starting Ofev and educated on side effects, patient is agreeable. Will submit prescription form.    -     Ambulatory Referral to Rheumatology  -     CT Chest Hi Resolution Diagnostic; Future    5. Chronic respiratory failure with hypoxia  Compliant with using continuous 2 L supplemental oxygen use.   Saturations noted to be 84% on 2 L after ambulation but improved to 93% with rest. Educated to increase oxygen requirements to 3 L with ambulation and 2 L at rest.      I spent 35 minutes caring for Lexie on this date of service. This time includes time spent by me in the following activities:preparing for the visit, reviewing tests, obtaining and/or reviewing a separately obtained history, performing a medically appropriate examination and/or evaluation , counseling and educating the patient/family/caregiver, ordering medications, tests, or procedures, referring and communicating with other health care professionals , documenting information in the medical record, independently interpreting results and communicating that information with the patient/family/caregiver, and care coordination    Follow Up   Return in about 3 months (around 7/19/2024), or if symptoms worsen or fail to improve, for Next scheduled follow up.    Patient was given instructions and counseling regarding her condition or for health maintenance advice. Please see specific information pulled into the AVS if appropriate.       This document has been electronically signed by Marika Ramsey PA-C   April 19, 2024 10:23 EDT    Dictated Utilizing Dragon Dictation: Part of this note may be an electronic transcription/translation of spoken language to printed text using the Dragon Dictation System.

## 2024-04-26 ENCOUNTER — HOSPITAL ENCOUNTER (INPATIENT)
Facility: HOSPITAL | Age: 64
LOS: 2 days | Discharge: HOME | End: 2024-04-30
Attending: STUDENT IN AN ORGANIZED HEALTH CARE EDUCATION/TRAINING PROGRAM | Admitting: INTERNAL MEDICINE
Payer: MEDICARE

## 2024-04-26 ENCOUNTER — APPOINTMENT (OUTPATIENT)
Dept: CT IMAGING | Facility: HOSPITAL | Age: 64
End: 2024-04-26
Payer: MEDICARE

## 2024-04-26 ENCOUNTER — APPOINTMENT (OUTPATIENT)
Dept: GENERAL RADIOLOGY | Facility: HOSPITAL | Age: 64
End: 2024-04-26
Payer: MEDICARE

## 2024-04-26 DIAGNOSIS — G25.0 TREMOR, ESSENTIAL: ICD-10-CM

## 2024-04-26 DIAGNOSIS — J10.1 INFLUENZA A: Primary | ICD-10-CM

## 2024-04-26 DIAGNOSIS — J44.1 COPD WITH ACUTE EXACERBATION: ICD-10-CM

## 2024-04-26 LAB
A-A DO2: 56 MMHG (ref 0–300)
ALBUMIN SERPL-MCNC: 3.6 G/DL (ref 3.5–5.2)
ALBUMIN/GLOB SERPL: 1.1 G/DL
ALP SERPL-CCNC: 93 U/L (ref 39–117)
ALT SERPL W P-5'-P-CCNC: 12 U/L (ref 1–33)
ANION GAP SERPL CALCULATED.3IONS-SCNC: 11.9 MMOL/L (ref 5–15)
APTT PPP: 30.5 SECONDS (ref 26.5–34.5)
ARTERIAL PATENCY WRIST A: POSITIVE
AST SERPL-CCNC: 22 U/L (ref 1–32)
ATMOSPHERIC PRESS: 730 MMHG
BASE EXCESS BLDA CALC-SCNC: 4 MMOL/L (ref 0–2)
BASOPHILS # BLD AUTO: 0.04 10*3/MM3 (ref 0–0.2)
BASOPHILS NFR BLD AUTO: 0.2 % (ref 0–1.5)
BDY SITE: ABNORMAL
BILIRUB SERPL-MCNC: 0.3 MG/DL (ref 0–1.2)
BUN SERPL-MCNC: 12 MG/DL (ref 8–23)
BUN/CREAT SERPL: 21.1 (ref 7–25)
CALCIUM SPEC-SCNC: 9 MG/DL (ref 8.6–10.5)
CHLORIDE SERPL-SCNC: 95 MMOL/L (ref 98–107)
CO2 BLDA-SCNC: 28.3 MMOL/L (ref 22–33)
CO2 SERPL-SCNC: 27.1 MMOL/L (ref 22–29)
COHGB MFR BLD: 1.1 % (ref 0–5)
CREAT SERPL-MCNC: 0.57 MG/DL (ref 0.57–1)
CRP SERPL-MCNC: 17.13 MG/DL (ref 0–0.5)
D-LACTATE SERPL-SCNC: 1.3 MMOL/L (ref 0.5–2)
DEPRECATED RDW RBC AUTO: 43.2 FL (ref 37–54)
EGFRCR SERPLBLD CKD-EPI 2021: 101.6 ML/MIN/1.73
EOSINOPHIL # BLD AUTO: 0.01 10*3/MM3 (ref 0–0.4)
EOSINOPHIL NFR BLD AUTO: 0.1 % (ref 0.3–6.2)
ERYTHROCYTE [DISTWIDTH] IN BLOOD BY AUTOMATED COUNT: 12.3 % (ref 12.3–15.4)
ERYTHROCYTE [SEDIMENTATION RATE] IN BLOOD: 19 MM/HR (ref 0–30)
FLUAV RNA RESP QL NAA+PROBE: DETECTED
FLUBV RNA RESP QL NAA+PROBE: NOT DETECTED
GAS FLOW AIRWAY: 2 LPM
GEN 5 2HR TROPONIN T REFLEX: 14 NG/L
GLOBULIN UR ELPH-MCNC: 3.2 GM/DL
GLUCOSE SERPL-MCNC: 109 MG/DL (ref 65–99)
HCO3 BLDA-SCNC: 27.2 MMOL/L (ref 20–26)
HCT VFR BLD AUTO: 40 % (ref 34–46.6)
HCT VFR BLD CALC: 40.4 % (ref 38–51)
HGB BLD-MCNC: 13.5 G/DL (ref 12–15.9)
HGB BLDA-MCNC: 13.2 G/DL (ref 13.5–17.5)
HOLD SPECIMEN: NORMAL
HOLD SPECIMEN: NORMAL
IMM GRANULOCYTES # BLD AUTO: 0.09 10*3/MM3 (ref 0–0.05)
IMM GRANULOCYTES NFR BLD AUTO: 0.5 % (ref 0–0.5)
INHALED O2 CONCENTRATION: 28 %
INR PPP: 0.98 (ref 0.9–1.1)
LYMPHOCYTES # BLD AUTO: 0.94 10*3/MM3 (ref 0.7–3.1)
LYMPHOCYTES NFR BLD AUTO: 5.2 % (ref 19.6–45.3)
Lab: ABNORMAL
MAGNESIUM SERPL-MCNC: 1.9 MG/DL (ref 1.6–2.4)
MCH RBC QN AUTO: 32.3 PG (ref 26.6–33)
MCHC RBC AUTO-ENTMCNC: 33.8 G/DL (ref 31.5–35.7)
MCV RBC AUTO: 95.7 FL (ref 79–97)
METHGB BLD QL: 0.2 % (ref 0–3)
MODALITY: ABNORMAL
MONOCYTES # BLD AUTO: 1.33 10*3/MM3 (ref 0.1–0.9)
MONOCYTES NFR BLD AUTO: 7.4 % (ref 5–12)
NEUTROPHILS NFR BLD AUTO: 15.65 10*3/MM3 (ref 1.7–7)
NEUTROPHILS NFR BLD AUTO: 86.6 % (ref 42.7–76)
NRBC BLD AUTO-RTO: 0 /100 WBC (ref 0–0.2)
NT-PROBNP SERPL-MCNC: 235.1 PG/ML (ref 0–900)
OXYHGB MFR BLDV: 97.5 % (ref 94–99)
PCO2 BLDA: 35.5 MM HG (ref 35–45)
PCO2 TEMP ADJ BLD: ABNORMAL MM[HG]
PH BLDA: 7.49 PH UNITS (ref 7.35–7.45)
PH, TEMP CORRECTED: ABNORMAL
PLATELET # BLD AUTO: 171 10*3/MM3 (ref 140–450)
PMV BLD AUTO: 10.8 FL (ref 6–12)
PO2 BLDA: 95.6 MM HG (ref 83–108)
PO2 TEMP ADJ BLD: ABNORMAL MM[HG]
POTASSIUM SERPL-SCNC: 3.7 MMOL/L (ref 3.5–5.2)
PROCALCITONIN SERPL-MCNC: 0.1 NG/ML (ref 0–0.25)
PROT SERPL-MCNC: 6.8 G/DL (ref 6–8.5)
PROTHROMBIN TIME: 13.5 SECONDS (ref 12.1–14.7)
RBC # BLD AUTO: 4.18 10*6/MM3 (ref 3.77–5.28)
SAO2 % BLDCOA: 98.8 % (ref 94–99)
SARS-COV-2 RNA RESP QL NAA+PROBE: NOT DETECTED
SODIUM SERPL-SCNC: 134 MMOL/L (ref 136–145)
TROPONIN T DELTA: -1 NG/L
TROPONIN T SERPL HS-MCNC: 15 NG/L
VENTILATOR MODE: ABNORMAL
WBC NRBC COR # BLD AUTO: 18.06 10*3/MM3 (ref 3.4–10.8)
WHOLE BLOOD HOLD COAG: NORMAL
WHOLE BLOOD HOLD SPECIMEN: NORMAL

## 2024-04-26 PROCEDURE — 93005 ELECTROCARDIOGRAM TRACING: CPT | Performed by: PHYSICIAN ASSISTANT

## 2024-04-26 PROCEDURE — 80053 COMPREHEN METABOLIC PANEL: CPT | Performed by: PHYSICIAN ASSISTANT

## 2024-04-26 PROCEDURE — 71250 CT THORAX DX C-: CPT | Performed by: RADIOLOGY

## 2024-04-26 PROCEDURE — 83050 HGB METHEMOGLOBIN QUAN: CPT

## 2024-04-26 PROCEDURE — 94799 UNLISTED PULMONARY SVC/PX: CPT

## 2024-04-26 PROCEDURE — 83735 ASSAY OF MAGNESIUM: CPT | Performed by: PHYSICIAN ASSISTANT

## 2024-04-26 PROCEDURE — 25010000002 METHYLPREDNISOLONE PER 125 MG: Performed by: PHYSICIAN ASSISTANT

## 2024-04-26 PROCEDURE — 83605 ASSAY OF LACTIC ACID: CPT | Performed by: PHYSICIAN ASSISTANT

## 2024-04-26 PROCEDURE — 25010000002 CEFTRIAXONE PER 250 MG: Performed by: PHYSICIAN ASSISTANT

## 2024-04-26 PROCEDURE — 36415 COLL VENOUS BLD VENIPUNCTURE: CPT

## 2024-04-26 PROCEDURE — 84145 PROCALCITONIN (PCT): CPT | Performed by: PHYSICIAN ASSISTANT

## 2024-04-26 PROCEDURE — 83880 ASSAY OF NATRIURETIC PEPTIDE: CPT | Performed by: PHYSICIAN ASSISTANT

## 2024-04-26 PROCEDURE — G0378 HOSPITAL OBSERVATION PER HR: HCPCS

## 2024-04-26 PROCEDURE — 85025 COMPLETE CBC W/AUTO DIFF WBC: CPT | Performed by: PHYSICIAN ASSISTANT

## 2024-04-26 PROCEDURE — 94640 AIRWAY INHALATION TREATMENT: CPT

## 2024-04-26 PROCEDURE — 99223 1ST HOSP IP/OBS HIGH 75: CPT

## 2024-04-26 PROCEDURE — 94761 N-INVAS EAR/PLS OXIMETRY MLT: CPT

## 2024-04-26 PROCEDURE — 84484 ASSAY OF TROPONIN QUANT: CPT | Performed by: PHYSICIAN ASSISTANT

## 2024-04-26 PROCEDURE — 93010 ELECTROCARDIOGRAM REPORT: CPT | Performed by: INTERNAL MEDICINE

## 2024-04-26 PROCEDURE — 87636 SARSCOV2 & INF A&B AMP PRB: CPT | Performed by: PHYSICIAN ASSISTANT

## 2024-04-26 PROCEDURE — 87040 BLOOD CULTURE FOR BACTERIA: CPT | Performed by: PHYSICIAN ASSISTANT

## 2024-04-26 PROCEDURE — 71045 X-RAY EXAM CHEST 1 VIEW: CPT | Performed by: RADIOLOGY

## 2024-04-26 PROCEDURE — 85652 RBC SED RATE AUTOMATED: CPT | Performed by: PHYSICIAN ASSISTANT

## 2024-04-26 PROCEDURE — 71045 X-RAY EXAM CHEST 1 VIEW: CPT

## 2024-04-26 PROCEDURE — 36600 WITHDRAWAL OF ARTERIAL BLOOD: CPT

## 2024-04-26 PROCEDURE — 25810000003 SODIUM CHLORIDE 0.9 % SOLUTION: Performed by: PHYSICIAN ASSISTANT

## 2024-04-26 PROCEDURE — 85610 PROTHROMBIN TIME: CPT | Performed by: PHYSICIAN ASSISTANT

## 2024-04-26 PROCEDURE — 86140 C-REACTIVE PROTEIN: CPT | Performed by: PHYSICIAN ASSISTANT

## 2024-04-26 PROCEDURE — 82375 ASSAY CARBOXYHB QUANT: CPT

## 2024-04-26 PROCEDURE — 71250 CT THORAX DX C-: CPT

## 2024-04-26 PROCEDURE — 82805 BLOOD GASES W/O2 SATURATION: CPT

## 2024-04-26 PROCEDURE — 25010000002 ENOXAPARIN PER 10 MG: Performed by: INTERNAL MEDICINE

## 2024-04-26 PROCEDURE — 99285 EMERGENCY DEPT VISIT HI MDM: CPT

## 2024-04-26 PROCEDURE — 85730 THROMBOPLASTIN TIME PARTIAL: CPT | Performed by: PHYSICIAN ASSISTANT

## 2024-04-26 RX ORDER — OSELTAMIVIR PHOSPHATE 75 MG/1
75 CAPSULE ORAL EVERY 12 HOURS SCHEDULED
Qty: 10 CAPSULE | Refills: 0 | Status: DISCONTINUED | OUTPATIENT
Start: 2024-04-27 | End: 2024-04-26

## 2024-04-26 RX ORDER — POLYETHYLENE GLYCOL 3350 17 G/17G
17 POWDER, FOR SOLUTION ORAL DAILY PRN
Status: DISCONTINUED | OUTPATIENT
Start: 2024-04-26 | End: 2024-04-30 | Stop reason: HOSPADM

## 2024-04-26 RX ORDER — OSELTAMIVIR PHOSPHATE 75 MG/1
75 CAPSULE ORAL EVERY 12 HOURS SCHEDULED
Status: DISCONTINUED | OUTPATIENT
Start: 2024-04-27 | End: 2024-04-30 | Stop reason: HOSPADM

## 2024-04-26 RX ORDER — AMOXICILLIN 250 MG
2 CAPSULE ORAL 2 TIMES DAILY PRN
Status: DISCONTINUED | OUTPATIENT
Start: 2024-04-26 | End: 2024-04-30 | Stop reason: HOSPADM

## 2024-04-26 RX ORDER — GUAIFENESIN 600 MG/1
1200 TABLET, EXTENDED RELEASE ORAL EVERY 12 HOURS SCHEDULED
Status: DISCONTINUED | OUTPATIENT
Start: 2024-04-26 | End: 2024-04-30 | Stop reason: HOSPADM

## 2024-04-26 RX ORDER — GUAIFENESIN/DEXTROMETHORPHAN 100-10MG/5
10 SYRUP ORAL EVERY 6 HOURS PRN
Status: DISCONTINUED | OUTPATIENT
Start: 2024-04-26 | End: 2024-04-30 | Stop reason: HOSPADM

## 2024-04-26 RX ORDER — BENZONATATE 100 MG/1
100 CAPSULE ORAL ONCE
Status: COMPLETED | OUTPATIENT
Start: 2024-04-26 | End: 2024-04-26

## 2024-04-26 RX ORDER — SODIUM CHLORIDE 0.9 % (FLUSH) 0.9 %
10 SYRINGE (ML) INJECTION AS NEEDED
Status: DISCONTINUED | OUTPATIENT
Start: 2024-04-26 | End: 2024-04-30 | Stop reason: HOSPADM

## 2024-04-26 RX ORDER — METHYLPREDNISOLONE SODIUM SUCCINATE 125 MG/2ML
125 INJECTION, POWDER, LYOPHILIZED, FOR SOLUTION INTRAMUSCULAR; INTRAVENOUS ONCE
Status: COMPLETED | OUTPATIENT
Start: 2024-04-26 | End: 2024-04-26

## 2024-04-26 RX ORDER — OSELTAMIVIR PHOSPHATE 75 MG/1
75 CAPSULE ORAL ONCE
Status: COMPLETED | OUTPATIENT
Start: 2024-04-26 | End: 2024-04-26

## 2024-04-26 RX ORDER — BISACODYL 5 MG/1
5 TABLET, DELAYED RELEASE ORAL DAILY PRN
Status: DISCONTINUED | OUTPATIENT
Start: 2024-04-26 | End: 2024-04-30 | Stop reason: HOSPADM

## 2024-04-26 RX ORDER — CALCIUM CARBONATE 500 MG/1
2 TABLET, CHEWABLE ORAL 2 TIMES DAILY PRN
Status: DISCONTINUED | OUTPATIENT
Start: 2024-04-26 | End: 2024-04-30 | Stop reason: HOSPADM

## 2024-04-26 RX ORDER — BISACODYL 10 MG
10 SUPPOSITORY, RECTAL RECTAL DAILY PRN
Status: DISCONTINUED | OUTPATIENT
Start: 2024-04-26 | End: 2024-04-30 | Stop reason: HOSPADM

## 2024-04-26 RX ORDER — SODIUM CHLORIDE 0.9 % (FLUSH) 0.9 %
10 SYRINGE (ML) INJECTION EVERY 12 HOURS SCHEDULED
Status: DISCONTINUED | OUTPATIENT
Start: 2024-04-26 | End: 2024-04-30 | Stop reason: HOSPADM

## 2024-04-26 RX ORDER — IPRATROPIUM BROMIDE AND ALBUTEROL SULFATE 2.5; .5 MG/3ML; MG/3ML
3 SOLUTION RESPIRATORY (INHALATION)
Status: DISCONTINUED | OUTPATIENT
Start: 2024-04-27 | End: 2024-04-30 | Stop reason: HOSPADM

## 2024-04-26 RX ORDER — SODIUM CHLORIDE 9 MG/ML
40 INJECTION, SOLUTION INTRAVENOUS AS NEEDED
Status: DISCONTINUED | OUTPATIENT
Start: 2024-04-26 | End: 2024-04-30 | Stop reason: HOSPADM

## 2024-04-26 RX ORDER — ACETAMINOPHEN 325 MG/1
650 TABLET ORAL EVERY 4 HOURS PRN
Status: DISCONTINUED | OUTPATIENT
Start: 2024-04-26 | End: 2024-04-30 | Stop reason: HOSPADM

## 2024-04-26 RX ORDER — ENOXAPARIN SODIUM 100 MG/ML
40 INJECTION SUBCUTANEOUS NIGHTLY
Status: DISCONTINUED | OUTPATIENT
Start: 2024-04-26 | End: 2024-04-30 | Stop reason: HOSPADM

## 2024-04-26 RX ORDER — METHYLPREDNISOLONE SODIUM SUCCINATE 40 MG/ML
40 INJECTION, POWDER, LYOPHILIZED, FOR SOLUTION INTRAMUSCULAR; INTRAVENOUS EVERY 12 HOURS
Status: DISCONTINUED | OUTPATIENT
Start: 2024-04-27 | End: 2024-04-29

## 2024-04-26 RX ORDER — IPRATROPIUM BROMIDE AND ALBUTEROL SULFATE 2.5; .5 MG/3ML; MG/3ML
3 SOLUTION RESPIRATORY (INHALATION) ONCE
Status: COMPLETED | OUTPATIENT
Start: 2024-04-26 | End: 2024-04-26

## 2024-04-26 RX ORDER — IPRATROPIUM BROMIDE AND ALBUTEROL SULFATE 2.5; .5 MG/3ML; MG/3ML
SOLUTION RESPIRATORY (INHALATION)
Status: DISPENSED
Start: 2024-04-26 | End: 2024-04-27

## 2024-04-26 RX ADMIN — BENZONATATE 100 MG: 100 CAPSULE ORAL at 15:00

## 2024-04-26 RX ADMIN — IPRATROPIUM BROMIDE AND ALBUTEROL SULFATE 3 ML: .5; 2.5 SOLUTION RESPIRATORY (INHALATION) at 13:06

## 2024-04-26 RX ADMIN — IPRATROPIUM BROMIDE AND ALBUTEROL SULFATE 3 ML: 2.5; .5 SOLUTION RESPIRATORY (INHALATION) at 22:26

## 2024-04-26 RX ADMIN — ENOXAPARIN SODIUM 40 MG: 40 INJECTION SUBCUTANEOUS at 21:52

## 2024-04-26 RX ADMIN — GUAIFENESIN 1200 MG: 600 TABLET, EXTENDED RELEASE ORAL at 21:52

## 2024-04-26 RX ADMIN — OSELTAMIVIR PHOSPHATE 75 MG: 75 CAPSULE ORAL at 15:00

## 2024-04-26 RX ADMIN — Medication 10 ML: at 21:52

## 2024-04-26 RX ADMIN — SODIUM CHLORIDE 1000 ML: 9 INJECTION, SOLUTION INTRAVENOUS at 14:12

## 2024-04-26 RX ADMIN — DOXYCYCLINE 100 MG: 100 INJECTION, POWDER, LYOPHILIZED, FOR SOLUTION INTRAVENOUS at 15:00

## 2024-04-26 RX ADMIN — METHYLPREDNISOLONE SODIUM SUCCINATE 125 MG: 125 INJECTION, POWDER, FOR SOLUTION INTRAMUSCULAR; INTRAVENOUS at 13:38

## 2024-04-26 RX ADMIN — SODIUM CHLORIDE 2000 MG: 9 INJECTION, SOLUTION INTRAVENOUS at 14:11

## 2024-04-26 NOTE — ED PROVIDER NOTES
Subjective   History of Present Illness  63-year-old female who presents to the ED today on the recommendation of her PCP due to having the flu.  She states she has been sick for the last 2 days.  She states she has had a dry cough and has felt very short of breath.  She states it hurts in her chest when she coughs.  She denies any fever.  She states she has not had any diarrhea or vomiting.  She saw her primary care provider today and was sent here to the ER.  She does have a history of COPD.  She states she wears 2 L of oxygen at rest and 3 L of oxygen during exertion.    History provided by:  Patient  URI  Presenting symptoms: congestion, cough and fatigue    Presenting symptoms: no fever    Severity:  Moderate  Onset quality:  Gradual  Duration:  2 days  Timing:  Constant  Progression:  Worsening  Chronicity:  New  Relieved by:  Nothing  Worsened by:  Nothing  Associated symptoms: myalgias and wheezing    Risk factors: chronic respiratory disease        Review of Systems   Constitutional:  Positive for fatigue. Negative for fever.   HENT:  Positive for congestion.    Eyes: Negative.    Respiratory:  Positive for cough, shortness of breath and wheezing.    Cardiovascular:  Positive for chest pain.   Gastrointestinal: Negative.    Genitourinary: Negative.    Musculoskeletal:  Positive for myalgias.   Skin: Negative.    Neurological:  Positive for weakness.   Psychiatric/Behavioral: Negative.     All other systems reviewed and are negative.      Past Medical History:   Diagnosis Date    Anxiety     Arthritis     Depression     Hypertensive disorder 3/14/2018    Hypothyroidism 8/3/2021    MARU (stress urinary incontinence, female) 9/11/2019    UTI (urinary tract infection) 12/31/2023       Allergies   Allergen Reactions    Codeine GI Intolerance    Sulfa Antibiotics Itching       Past Surgical History:   Procedure Laterality Date    BREAST BIOPSY Left 2000    benign    GALLBLADDER SURGERY      GASTRIC BANDING       HYSTERECTOMY      age 37       Family History   Problem Relation Age of Onset    Panic disorder Mother     COPD Mother     Alcohol abuse Father     Alzheimer's disease Father     Breast cancer Neg Hx        Social History     Socioeconomic History    Marital status:    Tobacco Use    Smoking status: Former     Current packs/day: 0.00     Average packs/day: 1.5 packs/day for 34.3 years (51.5 ttl pk-yrs)     Types: Cigarettes     Start date: 1971     Quit date: 10/18/2005     Years since quittin.5     Passive exposure: Past    Smokeless tobacco: Never   Vaping Use    Vaping status: Never Used   Substance and Sexual Activity    Alcohol use: No    Drug use: No    Sexual activity: Yes     Partners: Male           Objective   Physical Exam  Vitals and nursing note reviewed.   Constitutional:       General: She is not in acute distress.     Appearance: She is ill-appearing. She is not diaphoretic.   HENT:      Head: Normocephalic and atraumatic.      Right Ear: External ear normal.      Left Ear: External ear normal.      Nose: Congestion present.   Eyes:      Extraocular Movements: Extraocular movements intact.      Conjunctiva/sclera: Conjunctivae normal.      Pupils: Pupils are equal, round, and reactive to light.   Cardiovascular:      Rate and Rhythm: Regular rhythm. Tachycardia present.      Pulses: Normal pulses.      Heart sounds: Normal heart sounds.   Pulmonary:      Effort: Tachypnea and accessory muscle usage present. No respiratory distress.      Breath sounds: Rhonchi (in all lung fields) present.   Abdominal:      General: Bowel sounds are normal.      Palpations: Abdomen is soft.      Tenderness: There is no abdominal tenderness.   Musculoskeletal:         General: Normal range of motion.      Cervical back: Normal range of motion and neck supple. No rigidity or tenderness.   Lymphadenopathy:      Cervical: No cervical adenopathy.   Skin:     General: Skin is warm and dry.      Capillary  Refill: Capillary refill takes less than 2 seconds.   Neurological:      General: No focal deficit present.      Mental Status: She is alert and oriented to person, place, and time.   Psychiatric:         Mood and Affect: Mood normal.         Procedures       Results for orders placed or performed during the hospital encounter of 04/26/24   COVID-19 and FLU A/B PCR, 1 HR TAT - Swab, Nasopharynx    Specimen: Nasopharynx; Swab   Result Value Ref Range    COVID19 Not Detected Not Detected - Ref. Range    Influenza A PCR Detected (A) Not Detected    Influenza B PCR Not Detected Not Detected   Comprehensive Metabolic Panel    Specimen: Arm, Left; Blood   Result Value Ref Range    Glucose 109 (H) 65 - 99 mg/dL    BUN 12 8 - 23 mg/dL    Creatinine 0.57 0.57 - 1.00 mg/dL    Sodium 134 (L) 136 - 145 mmol/L    Potassium 3.7 3.5 - 5.2 mmol/L    Chloride 95 (L) 98 - 107 mmol/L    CO2 27.1 22.0 - 29.0 mmol/L    Calcium 9.0 8.6 - 10.5 mg/dL    Total Protein 6.8 6.0 - 8.5 g/dL    Albumin 3.6 3.5 - 5.2 g/dL    ALT (SGPT) 12 1 - 33 U/L    AST (SGOT) 22 1 - 32 U/L    Alkaline Phosphatase 93 39 - 117 U/L    Total Bilirubin 0.3 0.0 - 1.2 mg/dL    Globulin 3.2 gm/dL    A/G Ratio 1.1 g/dL    BUN/Creatinine Ratio 21.1 7.0 - 25.0    Anion Gap 11.9 5.0 - 15.0 mmol/L    eGFR 101.6 >60.0 mL/min/1.73   Protime-INR    Specimen: Arm, Left; Blood   Result Value Ref Range    Protime 13.5 12.1 - 14.7 Seconds    INR 0.98 0.90 - 1.10   aPTT    Specimen: Arm, Left; Blood   Result Value Ref Range    PTT 30.5 26.5 - 34.5 seconds   BNP    Specimen: Arm, Left; Blood   Result Value Ref Range    proBNP 235.1 0.0 - 900.0 pg/mL   High Sensitivity Troponin T    Specimen: Arm, Left; Blood   Result Value Ref Range    HS Troponin T 15 (H) <14 ng/L   Lactic Acid, Plasma    Specimen: Arm, Left; Blood   Result Value Ref Range    Lactate 1.3 0.5 - 2.0 mmol/L   Procalcitonin    Specimen: Arm, Left; Blood   Result Value Ref Range    Procalcitonin 0.10 0.00 - 0.25 ng/mL    Sedimentation Rate    Specimen: Arm, Left; Blood   Result Value Ref Range    Sed Rate 19 0 - 30 mm/hr   C-reactive Protein    Specimen: Arm, Left; Blood   Result Value Ref Range    C-Reactive Protein 17.13 (H) 0.00 - 0.50 mg/dL   Magnesium    Specimen: Arm, Left; Blood   Result Value Ref Range    Magnesium 1.9 1.6 - 2.4 mg/dL   CBC Auto Differential    Specimen: Arm, Left; Blood   Result Value Ref Range    WBC 18.06 (H) 3.40 - 10.80 10*3/mm3    RBC 4.18 3.77 - 5.28 10*6/mm3    Hemoglobin 13.5 12.0 - 15.9 g/dL    Hematocrit 40.0 34.0 - 46.6 %    MCV 95.7 79.0 - 97.0 fL    MCH 32.3 26.6 - 33.0 pg    MCHC 33.8 31.5 - 35.7 g/dL    RDW 12.3 12.3 - 15.4 %    RDW-SD 43.2 37.0 - 54.0 fl    MPV 10.8 6.0 - 12.0 fL    Platelets 171 140 - 450 10*3/mm3    Neutrophil % 86.6 (H) 42.7 - 76.0 %    Lymphocyte % 5.2 (L) 19.6 - 45.3 %    Monocyte % 7.4 5.0 - 12.0 %    Eosinophil % 0.1 (L) 0.3 - 6.2 %    Basophil % 0.2 0.0 - 1.5 %    Immature Grans % 0.5 0.0 - 0.5 %    Neutrophils, Absolute 15.65 (H) 1.70 - 7.00 10*3/mm3    Lymphocytes, Absolute 0.94 0.70 - 3.10 10*3/mm3    Monocytes, Absolute 1.33 (H) 0.10 - 0.90 10*3/mm3    Eosinophils, Absolute 0.01 0.00 - 0.40 10*3/mm3    Basophils, Absolute 0.04 0.00 - 0.20 10*3/mm3    Immature Grans, Absolute 0.09 (H) 0.00 - 0.05 10*3/mm3    nRBC 0.0 0.0 - 0.2 /100 WBC   Blood Gas, Arterial With Co-Ox    Specimen: Arterial Blood   Result Value Ref Range    Site Left Radial     Andrés's Test Positive     pH, Arterial 7.493 (H) 7.350 - 7.450 pH units    pCO2, Arterial 35.5 35.0 - 45.0 mm Hg    pO2, Arterial 95.6 83.0 - 108.0 mm Hg    HCO3, Arterial 27.2 (H) 20.0 - 26.0 mmol/L    Base Excess, Arterial 4.0 (H) 0.0 - 2.0 mmol/L    O2 Saturation, Arterial 98.8 94.0 - 99.0 %    Hemoglobin, Blood Gas 13.2 (L) 13.5 - 17.5 g/dL    Hematocrit, Blood Gas 40.4 38.0 - 51.0 %    Oxyhemoglobin 97.5 94 - 99 %    Methemoglobin 0.20 0.00 - 3.00 %    Carboxyhemoglobin 1.1 0 - 5 %    A-a DO2 56.0 0.0 - 300.0 mmHg     CO2 Content 28.3 22 - 33 mmol/L    Barometric Pressure for Blood Gas 730 mmHg    Modality Nasal Cannula     FIO2 28 %    Flow Rate 2.0 lpm    Ventilator Mode NA     Collected by [id number]     pH, Temp Corrected      pCO2, Temperature Corrected      pO2, Temperature Corrected     ECG 12 Lead Dyspnea   Result Value Ref Range    QT Interval 438 ms    QTC Interval 538 ms   Green Top (Gel)   Result Value Ref Range    Extra Tube Hold for add-ons.    Lavender Top   Result Value Ref Range    Extra Tube hold for add-on    Gold Top - SST   Result Value Ref Range    Extra Tube Hold for add-ons.    Light Blue Top   Result Value Ref Range    Extra Tube Hold for add-ons.           ED Course  ED Course as of 04/26/24 1533   Fri Apr 26, 2024   1337 WBC(!): 18.06 []   1404 C-Reactive Protein(!): 17.13 []   1405 XR Chest 1 View  FINDINGS:    Lungs and pleural spaces:  Prominent interstitial opacities which can  be seen with edema or atypical pneumonia.  Low lung volumes.  No  effusion or pneumothorax.    Heart:  Cardiomegaly.    Mediastinum:  Unremarkable as visualized.  Normal mediastinal contour.    Bones/joints:  Unremarkable as visualized.  No acute fracture.    Tubes, lines and devices:  Lap-Band device noted.     IMPRESSION:  1.  No effusion or pneumothorax.  2.  Prominent interstitial opacities which can be seen with edema or  atypical pneumonia.  3.  Cardiomegaly.  4.  Low lung volumes.   []   1415 Influenza A PCR(!): Detected []   1505 Dr. Burnette to admit []      ED Course User Index  [] Rabia Quiñonez, PA                                             Medical Decision Making  63-year-old female who presents to the ED today for flulike symptoms.  They started 2 days ago.  She tested positive for influenza A today.  Chest x-ray and CT of the chest show a possible atypical pneumonia.  I did give her dose of Rocephin and doxycycline in the ED.  She was also given IV fluids, Tamiflu and Tessalon.  She was given a DuoNeb  breathing treatment and IV Solu-Medrol.  Her lung sounds were improving.  She still feels generally ill related to the flu.  I discussed with Dr. Harris with our hospitalist service team.  Plan is for an observation admit due to she is most likely having a COPD exacerbation related to the flu.      Amount and/or Complexity of Data Reviewed  Labs: ordered. Decision-making details documented in ED Course.  Radiology: ordered. Decision-making details documented in ED Course.  ECG/medicine tests: ordered.    Risk  Prescription drug management.        Final diagnoses:   Influenza A   COPD with acute exacerbation       ED Disposition  ED Disposition       ED Disposition   Decision to Admit    Condition   --    Comment   Level of Care: Med/Surg [1]   Diagnosis: Influenza A [320530]   Admitting Physician: WILBER HARRIS [[id number]]                 No follow-up provider specified.       Medication List      No changes were made to your prescriptions during this visit.            Rabia Quiñonez PA  04/26/24 1531

## 2024-04-26 NOTE — H&P
[facility] Medicine Services  History & Physical    Patient Identification:  Name:  Lexie Valdez  Age:  63 y.o.  Sex:  female  :  1960  MRN:  [id number]   Visit Number:  [id number]  Admit Date: 2024   Primary Care Physician:  Violet Pop APRN    Subjective     Chief complaint: Flu, pneumonia    History of presenting illness:      Lexie Valdez is a 63 y.o. female who presented for further evaluation of influenza. Per hand off was referred to the ED by PCP today after testing positive for the flu. She was seen and examined in the ED with family member at bedside. Patient reports she began feeling unwell 2 days ago. Complains of general malaise, shortness of breath, and cough which thus far has been nonproductive. No known fevers at home but reports has been chilling. She does have history of COPD and wears supplemental O2 at 2 L at baseline. No known sick contacts. No abdominal pain, N/V or diarrhea. No difficulty with urination or dysuria. She does report decreased appetite but has been able to tolerate fluids.     Past medical history is significant for ILD/COPD, anxiety/depression, fibromyalgia, HTN, HLD, hypothyroidism    Upon arrival to the ED, vital signs were temp 99.7, heart rate 100, respirations 20, /79, SpO2 95% on 2 L per nasal cannula.  Laboratory workup was notable for CRP 17.13 with WBC count 18.06 and neutrophil percentage 86.6.  Pro-Roberto was WNL.  Respiratory PCR is positive for influenza A.  Imaging showed changes of advanced pulmonary fibrosis with interstitial thickening and peripheral honeycombing.  There is prominence of the interstitium which is increased and could indicate atypical pneumonia.    Known Emergency Department medications received prior to my evaluation included Tessalon, Rocephin, doxycycline, DuoNeb, 125 mg Solu-Medrol, Tamiflu, 1 L normal saline.   Emergency Department Room location at the time of my evaluation was [facility].      ---------------------------------------------------------------------------------------------------------------------   Review of Systems   Constitutional:  Positive for appetite change, chills and fatigue. Negative for fever.   HENT:  Negative for congestion and rhinorrhea.    Respiratory:  Positive for cough and shortness of breath.    Cardiovascular:  Negative for chest pain.   Gastrointestinal:  Negative for abdominal pain, diarrhea, nausea and vomiting.   Genitourinary:  Negative for difficulty urinating and dysuria.   Musculoskeletal:  Negative for arthralgias and myalgias.   Skin:  Negative for rash and wound.   Neurological:  Negative for dizziness and light-headedness.        ---------------------------------------------------------------------------------------------------------------------   Past Medical History:   Diagnosis Date    Anxiety     Arthritis     Depression     Hypertensive disorder 3/14/2018    Hypothyroidism 8/3/2021    MARU (stress urinary incontinence, female) 2019    UTI (urinary tract infection) 2023     Past Surgical History:   Procedure Laterality Date    BREAST BIOPSY Left 2000    benign    GALLBLADDER SURGERY      GASTRIC BANDING      HYSTERECTOMY      age 37     Family History   Problem Relation Age of Onset    Panic disorder Mother     COPD Mother     Alcohol abuse Father     Alzheimer's disease Father     Breast cancer Neg Hx      Social History     Socioeconomic History    Marital status:    Tobacco Use    Smoking status: Former     Current packs/day: 0.00     Average packs/day: 1.5 packs/day for 34.3 years (51.5 ttl pk-yrs)     Types: Cigarettes     Start date: 1971     Quit date: 10/18/2005     Years since quittin.5     Passive exposure: Past    Smokeless tobacco: Never   Vaping Use    Vaping status: Never Used   Substance and Sexual Activity    Alcohol use: No    Drug use: No    Sexual activity: Yes     Partners: Male      ---------------------------------------------------------------------------------------------------------------------   Allergies:  Codeine and Sulfa antibiotics  ---------------------------------------------------------------------------------------------------------------------   Home medications:    Medications below are reported home medications pulling from within the system; at this time, these medications have not been reconciled unless otherwise specified and are in the verification process for further verifcation as current home medications.  (Not in a hospital admission)      Hospital Scheduled Meds:          Current listed hospital scheduled medications may not yet reflect those currently placed in orders that are signed and held awaiting patient's arrival to floor.   ---------------------------------------------------------------------------------------------------------------------     Objective     Vital Signs:  Temp:  [99.7 °F (37.6 °C)] 99.7 °F (37.6 °C)  Heart Rate:  [] 97  Resp:  [20-26] 24  BP: (135-161)/(77-94) 157/94      04/26/24  1240   Weight: 66.7 kg (147 lb)     Body mass index is 25.23 kg/m².  ---------------------------------------------------------------------------------------------------------------------       Physical Exam  Vitals and nursing note reviewed.   Constitutional:       General: She is not in acute distress.     Appearance: She is ill-appearing.   HENT:      Head: Normocephalic and atraumatic.   Eyes:      Extraocular Movements: Extraocular movements intact.      Conjunctiva/sclera: Conjunctivae normal.   Cardiovascular:      Rate and Rhythm: Regular rhythm. Tachycardia present.   Pulmonary:      Effort: Pulmonary effort is normal.      Breath sounds: Wheezing (coarse, with velcro type crackles) present.   Abdominal:      Palpations: Abdomen is soft.   Musculoskeletal:      Right lower leg: No edema.      Left lower leg: No edema.   Skin:     General: Skin is  "warm and dry.   Neurological:      Mental Status: She is alert. Mental status is at baseline.   Psychiatric:         Mood and Affect: Mood normal.         Behavior: Behavior normal.             ---------------------------------------------------------------------------------------------------------------------  EKG:        I have personally looked at the EKG.  ---------------------------------------------------------------------------------------------------------------------   Results from last 7 days   Lab Units 04/26/24  1324   CRP mg/dL 17.13*   LACTATE mmol/L 1.3   WBC 10*3/mm3 18.06*   HEMOGLOBIN g/dL 13.5   HEMATOCRIT % 40.0   MCV fL 95.7   MCHC g/dL 33.8   PLATELETS 10*3/mm3 171   INR  0.98     Results from last 7 days   Lab Units 04/26/24  1303   PH, ARTERIAL pH units 7.493*   PO2 ART mm Hg 95.6   PCO2, ARTERIAL mm Hg 35.5   HCO3 ART mmol/L 27.2*     Results from last 7 days   Lab Units 04/26/24  1324   SODIUM mmol/L 134*   POTASSIUM mmol/L 3.7   MAGNESIUM mg/dL 1.9   CHLORIDE mmol/L 95*   CO2 mmol/L 27.1   BUN mg/dL 12   CREATININE mg/dL 0.57   CALCIUM mg/dL 9.0   GLUCOSE mg/dL 109*   ALBUMIN g/dL 3.6   BILIRUBIN mg/dL 0.3   ALK PHOS U/L 93   AST (SGOT) U/L 22   ALT (SGPT) U/L 12   Estimated Creatinine Clearance: 93.7 mL/min (by C-G formula based on SCr of 0.57 mg/dL).  No results found for: \"AMMONIA\"  Results from last 7 days   Lab Units 04/26/24  1324   HSTROP T ng/L 15*     Results from last 7 days   Lab Units 04/26/24  1324   PROBNP pg/mL 235.1     Lab Results   Component Value Date    HGBA1C 4.80 04/11/2024     Lab Results   Component Value Date    TSH 2.040 04/11/2024    FREET4 1.83 (H) 04/11/2024     No results found for: \"PREGTESTUR\", \"PREGSERUM\", \"HCG\", \"HCGQUANT\"  Pain Management Panel           No data to display              No results found for: \"BLOODCX\"  No results found for: \"URINECX\"  No results found for: \"WOUNDCX\"  No results found for: \"STOOLCX\"    "   ---------------------------------------------------------------------------------------------------------------------  Imaging Results (Last 7 Days)       Procedure Component Value Units Date/Time    CT Chest Without Contrast Diagnostic [[id number]] Collected: 04/26/24 1456     Updated: 04/26/24 1500    Narrative:      EXAM:    CT Chest Without Intravenous Contrast     EXAM DATE:    4/26/2024 2:25 PM     CLINICAL HISTORY:    cough, sob, fever, hx copd     TECHNIQUE:    Axial computed tomography images of the chest without intravenous  contrast.  Sagittal and coronal reformatted images were created and  reviewed.  This CT exam was performed using one or more of the following  dose reduction techniques:  automated exposure control, adjustment of  the mA and/or kV according to patient size, and/or use of iterative  reconstruction technique.     COMPARISON:    3/21/2023     FINDINGS:    Lungs and pleural spaces:  Peripheral predominant pulmonary fibrosis  noted with honeycombing features.  Interstitial thickening.  No  significant pleural effusions.  No pneumothorax.    Heart:  Cardiomegaly.  No significant pericardial effusion.  No  significant coronary artery calcifications.    Bones/joints:  Unremarkable as visualized.  No dislocation.  No acute  bony findings are identified.    Soft tissues:  Unremarkable as visualized.    Vasculature:  Atherosclerosis thoracic aorta.  No thoracic aortic  aneurysm.    Lymph nodes:  Unremarkable as visualized.  No enlarged lymph nodes.    Pancreas:  Fatty infiltration of pancreas.    Tubes, lines and devices:  Lap-Band device.       Impression:      1.  Changes of advanced pulmonary fibrosis with interstitial thickening  and peripheral honeycombing. Low lung volumes.  2.  Cardiomegaly.  3.  Prominence of the interstitium has increased and could indicate  superimposed edema or atypical pneumonia.  4.  No effusion or pneumothorax. Otherwise stable exam.        This report was  "finalized on 4/26/2024 2:58 PM by Dr. Harpal Elias MD.       XR Chest 1 View [[id number]] Collected: 04/26/24 1355     Updated: 04/26/24 1357    Narrative:      EXAM:    XR Chest, 1 View     EXAM DATE:    4/26/2024 1:25 PM     CLINICAL HISTORY:    cough, sob, dx with flu     TECHNIQUE:    Frontal view of the chest.     COMPARISON:    3/21/2023     FINDINGS:    Lungs and pleural spaces:  Prominent interstitial opacities which can  be seen with edema or atypical pneumonia.  Low lung volumes.  No  effusion or pneumothorax.    Heart:  Cardiomegaly.    Mediastinum:  Unremarkable as visualized.  Normal mediastinal contour.    Bones/joints:  Unremarkable as visualized.  No acute fracture.    Tubes, lines and devices:  Lap-Band device noted.       Impression:      1.  No effusion or pneumothorax.  2.  Prominent interstitial opacities which can be seen with edema or  atypical pneumonia.  3.  Cardiomegaly.  4.  Low lung volumes.        This report was finalized on 4/26/2024 1:55 PM by Dr. Harpal Elias MD.               Cultures:  No results found for: \"BLOODCX\", \"URINECX\", \"WOUNDCX\", \"MRSACX\", \"RESPCX\", \"STOOLCX\"    Last echocardiogram:  Results for orders placed during the hospital encounter of 03/21/23    Adult Transthoracic Echo Complete w/ Color, Spectral and Contrast if necessary per protocol    Interpretation Summary    The right ventricular cavity is mild to moderately dilated.    The right atrial cavity is borderline dilated.    Estimated right ventricular systolic pressure from tricuspid regurgitation is normal (<35 mmHg).    Mild pulmonary hypertension is present.          I have personally reviewed the above radiology images and read the final radiology report on 04/26/24  ---------------------------------------------------------------------------------------------------------------------  Assessment / Plan     Active Hospital Problems    Diagnosis  POA    **Influenza A [J10.1]  Yes "       ASSESSMENT/PLAN:    Influenza A  Complicated by ILD/COPD in acute exacerbation  Chronic respiratory failure with hypoxemia  Concern for atypical pneumonia, likely viral  Patient presents secondary to 2 days worsening shortness of breath and cough with general malaise.  Presented to PCPs office this morning where per report she did test positive for influenza.  She was referred to the ED.  Respiratory PCR in the ED is positive for influenza A.  CRP is elevated at 17.13 with WBC count 18.06 and neutrophil percentage 86.6.  Pro-Roberto was WNL.  Imaging showed advanced pulmonary fibrosis with interstitial thickening and peripheral honeycombing.  There is possible atypical pneumonia.  She did receive Rocephin and doxycycline in the ED as well as 125 mg Solu-Medrol and Tamiflu  Admit to [facility] for further observation and management  Continue Tamiflu  Continue 40 mg Solu-Medrol twice daily with scheduled DuoNebs, Mucinex, Robitussin  Encourage OPEP, cough/deep breathing  Given known viral infection with normal Pro-Roberto will hold on further antibiotics for now.  Sputum culture to further delineate.  Titrate supplemental O2 as needed  Repeat labs in the a.m.  Continue to provide supportive care    Chronic:  Anxiety/depression  Fibromyalgia  HTN  HLD  Hypothyroidism  Plan to resume home med regimen as indicated once med rec is complete  Monitor vital signs  ----------  -DVT prophylaxis: Lovenox  -Activity: Ad laila.  -Expected length of stay: Less than 2 midnights  -Disposition pending course    High risk secondary to influenza A in the setting of ILD/COPD in acute exacerbation    Code Status and Medical Interventions:   Ordered at: 04/26/24 1511     Code Status (Patient has no pulse and is not breathing):    CPR (Attempt to Resuscitate)     Medical Interventions (Patient has pulse or is breathing):    Full Support       Usama Boggs PA-C   04/26/24  16:04 EDT

## 2024-04-27 LAB
ALBUMIN SERPL-MCNC: 2.7 G/DL (ref 3.5–5.2)
ALBUMIN/GLOB SERPL: 1 G/DL
ALP SERPL-CCNC: 70 U/L (ref 39–117)
ALT SERPL W P-5'-P-CCNC: 9 U/L (ref 1–33)
ANION GAP SERPL CALCULATED.3IONS-SCNC: 9.4 MMOL/L (ref 5–15)
AST SERPL-CCNC: 20 U/L (ref 1–32)
BACTERIA SPEC RESP CULT: NORMAL
BASOPHILS # BLD AUTO: 0.02 10*3/MM3 (ref 0–0.2)
BASOPHILS NFR BLD AUTO: 0.1 % (ref 0–1.5)
BILIRUB SERPL-MCNC: <0.2 MG/DL (ref 0–1.2)
BUN SERPL-MCNC: 12 MG/DL (ref 8–23)
BUN/CREAT SERPL: 28.6 (ref 7–25)
CALCIUM SPEC-SCNC: 8 MG/DL (ref 8.6–10.5)
CHLORIDE SERPL-SCNC: 103 MMOL/L (ref 98–107)
CO2 SERPL-SCNC: 23.6 MMOL/L (ref 22–29)
CREAT SERPL-MCNC: 0.42 MG/DL (ref 0.57–1)
DEPRECATED RDW RBC AUTO: 42.5 FL (ref 37–54)
EGFRCR SERPLBLD CKD-EPI 2021: 109.4 ML/MIN/1.73
EOSINOPHIL # BLD AUTO: 0 10*3/MM3 (ref 0–0.4)
EOSINOPHIL NFR BLD AUTO: 0 % (ref 0.3–6.2)
ERYTHROCYTE [DISTWIDTH] IN BLOOD BY AUTOMATED COUNT: 12.1 % (ref 12.3–15.4)
GLOBULIN UR ELPH-MCNC: 2.6 GM/DL
GLUCOSE SERPL-MCNC: 130 MG/DL (ref 65–99)
GRAM STN SPEC: NORMAL
HCT VFR BLD AUTO: 33.2 % (ref 34–46.6)
HGB BLD-MCNC: 11.2 G/DL (ref 12–15.9)
IMM GRANULOCYTES # BLD AUTO: 0.07 10*3/MM3 (ref 0–0.05)
IMM GRANULOCYTES NFR BLD AUTO: 0.5 % (ref 0–0.5)
LYMPHOCYTES # BLD AUTO: 1.03 10*3/MM3 (ref 0.7–3.1)
LYMPHOCYTES NFR BLD AUTO: 7.1 % (ref 19.6–45.3)
MAGNESIUM SERPL-MCNC: 2 MG/DL (ref 1.6–2.4)
MCH RBC QN AUTO: 32.5 PG (ref 26.6–33)
MCHC RBC AUTO-ENTMCNC: 33.7 G/DL (ref 31.5–35.7)
MCV RBC AUTO: 96.2 FL (ref 79–97)
MONOCYTES # BLD AUTO: 0.95 10*3/MM3 (ref 0.1–0.9)
MONOCYTES NFR BLD AUTO: 6.5 % (ref 5–12)
NEUTROPHILS NFR BLD AUTO: 12.52 10*3/MM3 (ref 1.7–7)
NEUTROPHILS NFR BLD AUTO: 85.8 % (ref 42.7–76)
NRBC BLD AUTO-RTO: 0 /100 WBC (ref 0–0.2)
PLATELET # BLD AUTO: 151 10*3/MM3 (ref 140–450)
PMV BLD AUTO: 11 FL (ref 6–12)
POTASSIUM SERPL-SCNC: 3.1 MMOL/L (ref 3.5–5.2)
POTASSIUM SERPL-SCNC: 4.7 MMOL/L (ref 3.5–5.2)
PROT SERPL-MCNC: 5.3 G/DL (ref 6–8.5)
QT INTERVAL: 438 MS
QTC INTERVAL: 538 MS
RBC # BLD AUTO: 3.45 10*6/MM3 (ref 3.77–5.28)
SODIUM SERPL-SCNC: 136 MMOL/L (ref 136–145)
WBC NRBC COR # BLD AUTO: 14.59 10*3/MM3 (ref 3.4–10.8)

## 2024-04-27 PROCEDURE — 94799 UNLISTED PULMONARY SVC/PX: CPT

## 2024-04-27 PROCEDURE — 25010000002 ENOXAPARIN PER 10 MG: Performed by: INTERNAL MEDICINE

## 2024-04-27 PROCEDURE — 99232 SBSQ HOSP IP/OBS MODERATE 35: CPT | Performed by: INTERNAL MEDICINE

## 2024-04-27 PROCEDURE — 94761 N-INVAS EAR/PLS OXIMETRY MLT: CPT

## 2024-04-27 PROCEDURE — 25010000002 METHYLPREDNISOLONE PER 40 MG: Performed by: INTERNAL MEDICINE

## 2024-04-27 PROCEDURE — 83735 ASSAY OF MAGNESIUM: CPT | Performed by: INTERNAL MEDICINE

## 2024-04-27 PROCEDURE — 84132 ASSAY OF SERUM POTASSIUM: CPT | Performed by: INTERNAL MEDICINE

## 2024-04-27 PROCEDURE — G0378 HOSPITAL OBSERVATION PER HR: HCPCS

## 2024-04-27 PROCEDURE — 80053 COMPREHEN METABOLIC PANEL: CPT | Performed by: INTERNAL MEDICINE

## 2024-04-27 PROCEDURE — 87205 SMEAR GRAM STAIN: CPT | Performed by: INTERNAL MEDICINE

## 2024-04-27 PROCEDURE — 94664 DEMO&/EVAL PT USE INHALER: CPT

## 2024-04-27 PROCEDURE — 85025 COMPLETE CBC W/AUTO DIFF WBC: CPT | Performed by: INTERNAL MEDICINE

## 2024-04-27 PROCEDURE — 25010000002 FUROSEMIDE PER 20 MG: Performed by: INTERNAL MEDICINE

## 2024-04-27 RX ORDER — IPRATROPIUM BROMIDE AND ALBUTEROL SULFATE 2.5; .5 MG/3ML; MG/3ML
3 SOLUTION RESPIRATORY (INHALATION) EVERY 4 HOURS PRN
Status: CANCELLED | OUTPATIENT
Start: 2024-04-27

## 2024-04-27 RX ORDER — MEMANTINE HYDROCHLORIDE 5 MG/1
5 TABLET ORAL 2 TIMES DAILY
Status: DISCONTINUED | OUTPATIENT
Start: 2024-04-27 | End: 2024-04-30 | Stop reason: HOSPADM

## 2024-04-27 RX ORDER — PRIMIDONE 50 MG/1
50 TABLET ORAL 2 TIMES DAILY
Status: DISCONTINUED | OUTPATIENT
Start: 2024-04-27 | End: 2024-04-30 | Stop reason: HOSPADM

## 2024-04-27 RX ORDER — LEVOTHYROXINE SODIUM 0.03 MG/1
25 TABLET ORAL
Status: DISCONTINUED | OUTPATIENT
Start: 2024-04-28 | End: 2024-04-30 | Stop reason: HOSPADM

## 2024-04-27 RX ORDER — BUSPIRONE HYDROCHLORIDE 10 MG/1
10 TABLET ORAL 2 TIMES DAILY PRN
Status: DISCONTINUED | OUTPATIENT
Start: 2024-04-27 | End: 2024-04-30 | Stop reason: HOSPADM

## 2024-04-27 RX ORDER — FLUOXETINE 10 MG/1
10 CAPSULE ORAL DAILY
COMMUNITY

## 2024-04-27 RX ORDER — POTASSIUM CHLORIDE 20 MEQ/1
40 TABLET, EXTENDED RELEASE ORAL EVERY 4 HOURS
Qty: 6 TABLET | Refills: 0 | Status: COMPLETED | OUTPATIENT
Start: 2024-04-27 | End: 2024-04-27

## 2024-04-27 RX ORDER — ALBUTEROL SULFATE 2.5 MG/3ML
2.5 SOLUTION RESPIRATORY (INHALATION) EVERY 4 HOURS PRN
Status: DISCONTINUED | OUTPATIENT
Start: 2024-04-27 | End: 2024-04-30 | Stop reason: HOSPADM

## 2024-04-27 RX ORDER — HYDROXYCHLOROQUINE SULFATE 200 MG/1
200 TABLET, FILM COATED ORAL 2 TIMES DAILY
Status: DISCONTINUED | OUTPATIENT
Start: 2024-04-27 | End: 2024-04-30 | Stop reason: HOSPADM

## 2024-04-27 RX ORDER — BUDESONIDE AND FORMOTEROL FUMARATE DIHYDRATE 160; 4.5 UG/1; UG/1
2 AEROSOL RESPIRATORY (INHALATION)
Status: DISCONTINUED | OUTPATIENT
Start: 2024-04-27 | End: 2024-04-30 | Stop reason: HOSPADM

## 2024-04-27 RX ORDER — CITALOPRAM 20 MG/1
40 TABLET ORAL DAILY
Status: DISCONTINUED | OUTPATIENT
Start: 2024-04-27 | End: 2024-04-30 | Stop reason: HOSPADM

## 2024-04-27 RX ORDER — FLUOXETINE 10 MG/1
10 CAPSULE ORAL ONCE
Status: COMPLETED | OUTPATIENT
Start: 2024-04-27 | End: 2024-04-28

## 2024-04-27 RX ORDER — HYDROCHLOROTHIAZIDE 25 MG/1
12.5 TABLET ORAL DAILY
Status: DISCONTINUED | OUTPATIENT
Start: 2024-04-28 | End: 2024-04-30 | Stop reason: HOSPADM

## 2024-04-27 RX ORDER — DONEPEZIL HYDROCHLORIDE 5 MG/1
10 TABLET, FILM COATED ORAL DAILY
Status: DISCONTINUED | OUTPATIENT
Start: 2024-04-28 | End: 2024-04-30 | Stop reason: HOSPADM

## 2024-04-27 RX ORDER — HYDROCODONE BITARTRATE AND ACETAMINOPHEN 5; 325 MG/1; MG/1
1 TABLET ORAL EVERY 8 HOURS PRN
Status: DISCONTINUED | OUTPATIENT
Start: 2024-04-27 | End: 2024-04-30 | Stop reason: HOSPADM

## 2024-04-27 RX ORDER — DONEPEZIL HYDROCHLORIDE 5 MG/1
10 TABLET, FILM COATED ORAL ONCE
Status: COMPLETED | OUTPATIENT
Start: 2024-04-27 | End: 2024-04-28

## 2024-04-27 RX ORDER — ASPIRIN 81 MG/1
81 TABLET ORAL DAILY
Status: DISCONTINUED | OUTPATIENT
Start: 2024-04-28 | End: 2024-04-30 | Stop reason: HOSPADM

## 2024-04-27 RX ORDER — POLYETHYLENE GLYCOL 3350 17 G/17G
17 POWDER, FOR SOLUTION ORAL DAILY
COMMUNITY

## 2024-04-27 RX ORDER — MEMANTINE HYDROCHLORIDE 5 MG/1
5 TABLET ORAL 2 TIMES DAILY
COMMUNITY

## 2024-04-27 RX ORDER — ASPIRIN 81 MG/1
81 TABLET ORAL ONCE
Status: COMPLETED | OUTPATIENT
Start: 2024-04-27 | End: 2024-04-28

## 2024-04-27 RX ORDER — FLUOXETINE 10 MG/1
10 CAPSULE ORAL DAILY
Status: DISCONTINUED | OUTPATIENT
Start: 2024-04-28 | End: 2024-04-30 | Stop reason: HOSPADM

## 2024-04-27 RX ORDER — BACITRACIN ZINC, NEOMYCIN SULFATE AND POLYMYXIN B SULFATE 400; 5; 5000 [IU]/G; MG/G; [IU]/G
1 OINTMENT TOPICAL 2 TIMES DAILY
Status: DISCONTINUED | OUTPATIENT
Start: 2024-04-27 | End: 2024-04-30 | Stop reason: HOSPADM

## 2024-04-27 RX ORDER — FUROSEMIDE 10 MG/ML
40 INJECTION INTRAMUSCULAR; INTRAVENOUS ONCE
Status: COMPLETED | OUTPATIENT
Start: 2024-04-27 | End: 2024-04-27

## 2024-04-27 RX ORDER — LOSARTAN POTASSIUM 50 MG/1
50 TABLET ORAL DAILY
Status: DISCONTINUED | OUTPATIENT
Start: 2024-04-28 | End: 2024-04-30 | Stop reason: HOSPADM

## 2024-04-27 RX ORDER — POLYETHYLENE GLYCOL 3350 17 G/17G
17 POWDER, FOR SOLUTION ORAL DAILY
Status: CANCELLED | OUTPATIENT
Start: 2024-04-27

## 2024-04-27 RX ORDER — LOSARTAN POTASSIUM 50 MG/1
50 TABLET ORAL ONCE
Status: COMPLETED | OUTPATIENT
Start: 2024-04-27 | End: 2024-04-28

## 2024-04-27 RX ORDER — NABUMETONE 750 MG/1
750 TABLET, FILM COATED ORAL 2 TIMES DAILY PRN
Status: DISCONTINUED | OUTPATIENT
Start: 2024-04-28 | End: 2024-04-30 | Stop reason: HOSPADM

## 2024-04-27 RX ORDER — ATORVASTATIN CALCIUM 40 MG/1
40 TABLET, FILM COATED ORAL ONCE
Status: COMPLETED | OUTPATIENT
Start: 2024-04-27 | End: 2024-04-28

## 2024-04-27 RX ORDER — ATORVASTATIN CALCIUM 40 MG/1
40 TABLET, FILM COATED ORAL DAILY
Status: DISCONTINUED | OUTPATIENT
Start: 2024-04-28 | End: 2024-04-30 | Stop reason: HOSPADM

## 2024-04-27 RX ORDER — CETIRIZINE HYDROCHLORIDE 10 MG/1
10 TABLET ORAL DAILY
Status: DISCONTINUED | OUTPATIENT
Start: 2024-04-28 | End: 2024-04-30 | Stop reason: HOSPADM

## 2024-04-27 RX ADMIN — DOCUSATE SODIUM 50 MG AND SENNOSIDES 8.6 MG 2 TABLET: 8.6; 5 TABLET, FILM COATED ORAL at 17:12

## 2024-04-27 RX ADMIN — OSELTAMIVIR PHOSPHATE 75 MG: 75 CAPSULE ORAL at 20:38

## 2024-04-27 RX ADMIN — IPRATROPIUM BROMIDE AND ALBUTEROL SULFATE 3 ML: 2.5; .5 SOLUTION RESPIRATORY (INHALATION) at 07:15

## 2024-04-27 RX ADMIN — ENOXAPARIN SODIUM 40 MG: 40 INJECTION SUBCUTANEOUS at 20:38

## 2024-04-27 RX ADMIN — METHYLPREDNISOLONE SODIUM SUCCINATE 40 MG: 40 INJECTION, POWDER, FOR SOLUTION INTRAMUSCULAR; INTRAVENOUS at 13:04

## 2024-04-27 RX ADMIN — BACITRACIN ZINC, NEOMYCIN SULFATE AND POLYMYXIN B SULFATE 1 APPLICATION: 400; 5; 5000 OINTMENT TOPICAL at 20:38

## 2024-04-27 RX ADMIN — Medication 10 ML: at 20:39

## 2024-04-27 RX ADMIN — GUAIFENESIN 1200 MG: 600 TABLET, EXTENDED RELEASE ORAL at 08:23

## 2024-04-27 RX ADMIN — GUAIFENESIN 1200 MG: 600 TABLET, EXTENDED RELEASE ORAL at 20:38

## 2024-04-27 RX ADMIN — METHYLPREDNISOLONE SODIUM SUCCINATE 40 MG: 40 INJECTION, POWDER, FOR SOLUTION INTRAMUSCULAR; INTRAVENOUS at 02:16

## 2024-04-27 RX ADMIN — POTASSIUM CHLORIDE 40 MEQ: 1500 TABLET, EXTENDED RELEASE ORAL at 17:12

## 2024-04-27 RX ADMIN — OSELTAMIVIR PHOSPHATE 75 MG: 75 CAPSULE ORAL at 05:21

## 2024-04-27 RX ADMIN — POTASSIUM CHLORIDE 40 MEQ: 1500 TABLET, EXTENDED RELEASE ORAL at 13:04

## 2024-04-27 RX ADMIN — IPRATROPIUM BROMIDE AND ALBUTEROL SULFATE 3 ML: 2.5; .5 SOLUTION RESPIRATORY (INHALATION) at 13:33

## 2024-04-27 RX ADMIN — FUROSEMIDE 40 MG: 10 INJECTION, SOLUTION INTRAMUSCULAR; INTRAVENOUS at 17:12

## 2024-04-27 RX ADMIN — ACETAMINOPHEN 650 MG: 325 TABLET ORAL at 20:37

## 2024-04-27 RX ADMIN — IPRATROPIUM BROMIDE AND ALBUTEROL SULFATE 3 ML: 2.5; .5 SOLUTION RESPIRATORY (INHALATION) at 19:08

## 2024-04-27 RX ADMIN — POTASSIUM CHLORIDE 40 MEQ: 1500 TABLET, EXTENDED RELEASE ORAL at 09:47

## 2024-04-27 NOTE — PLAN OF CARE
Goal Outcome Evaluation:  Plan of Care Reviewed With: patient           Outcome Evaluation: Pt admitted to floor from ER this shift. A&O on 3L NC. IV steroids given as scheduled. No new concerns or complaints, VSS and will continue with POC.

## 2024-04-27 NOTE — PROGRESS NOTES
[facility] HOSPITALIST PROGRESS NOTE    Subjective     History:   Lexie Valdez is a 63 y.o. female admitted on 4/26/2024 secondary to Influenza A     Procedures: None    CC: Follow up Influenza, COPD exacerbation     Patient seen and examined with JESSIKA Oakes. Awake and alert. States she feels a little better today. Cough and dyspnea appear improved. No reported CP. No reported nausea, vomiting or diarrhea. No acute events overnight per RN.     History taken from: patient, chart, and RN.      Objective     Vital Signs  Temp:  [98.3 °F (36.8 °C)-99.8 °F (37.7 °C)] 98.3 °F (36.8 °C)  Heart Rate:  [65-98] 79  Resp:  [18-32] 32  BP: (102-167)/(54-99) 123/54    Intake/Output Summary (Last 24 hours) at 4/27/2024 1448  Last data filed at 4/27/2024 0900  Gross per 24 hour   Intake 1380 ml   Output 600 ml   Net 780 ml         Physical Exam:  General:    Awake, alert, in no acute distress, chronically ill appearing    Heart:      Normal S1 and S2. Regular rate and rhythm. No significant murmur, rubs or gallops appreciated.   Lungs:     Respirations regular, even and unlabored. Scattered wheezes throughout with a few scattered rhonchi.    Abdomen:   Soft and nontender. No guarding, rebound tenderness or  organomegaly noted. Bowel sounds present x 4.   Extremities:  No clubbing, cyanosis or edema noted. Moves UE and LE equally B/L. Small abrasion left lateral foot with no evidence of drainage or erythema.      Results Review:    Results from last 7 days   Lab Units 04/27/24  0134 04/26/24  1324   WBC 10*3/mm3 14.59* 18.06*   HEMOGLOBIN g/dL 11.2* 13.5   PLATELETS 10*3/mm3 151 171     Results from last 7 days   Lab Units 04/27/24  0134 04/26/24  1324   SODIUM mmol/L 136 134*   POTASSIUM mmol/L 3.1* 3.7   CHLORIDE mmol/L 103 95*   CO2 mmol/L 23.6 27.1   BUN mg/dL 12 12   CREATININE mg/dL 0.42* 0.57   CALCIUM mg/dL 8.0* 9.0   GLUCOSE mg/dL 130* 109*     Results from last 7 days   Lab Units 04/27/24  013  04/26/24  1324   BILIRUBIN mg/dL <0.2 0.3   ALK PHOS U/L 70 93   AST (SGOT) U/L 20 22   ALT (SGPT) U/L 9 12     Results from last 7 days   Lab Units 04/27/24  0134 04/26/24  1324   MAGNESIUM mg/dL 2.0 1.9     Results from last 7 days   Lab Units 04/26/24  1324   INR  0.98     Results from last 7 days   Lab Units 04/26/24  1809 04/26/24  1324   HSTROP T ng/L 14* 15*       Imaging Results (Last 24 Hours)       Procedure Component Value Units Date/Time    CT Chest Without Contrast Diagnostic [[id number]] Collected: 04/26/24 1456     Updated: 04/26/24 1500    Narrative:      EXAM:    CT Chest Without Intravenous Contrast     EXAM DATE:    4/26/2024 2:25 PM     CLINICAL HISTORY:    cough, sob, fever, hx copd     TECHNIQUE:    Axial computed tomography images of the chest without intravenous  contrast.  Sagittal and coronal reformatted images were created and  reviewed.  This CT exam was performed using one or more of the following  dose reduction techniques:  automated exposure control, adjustment of  the mA and/or kV according to patient size, and/or use of iterative  reconstruction technique.     COMPARISON:    3/21/2023     FINDINGS:    Lungs and pleural spaces:  Peripheral predominant pulmonary fibrosis  noted with honeycombing features.  Interstitial thickening.  No  significant pleural effusions.  No pneumothorax.    Heart:  Cardiomegaly.  No significant pericardial effusion.  No  significant coronary artery calcifications.    Bones/joints:  Unremarkable as visualized.  No dislocation.  No acute  bony findings are identified.    Soft tissues:  Unremarkable as visualized.    Vasculature:  Atherosclerosis thoracic aorta.  No thoracic aortic  aneurysm.    Lymph nodes:  Unremarkable as visualized.  No enlarged lymph nodes.    Pancreas:  Fatty infiltration of pancreas.    Tubes, lines and devices:  Lap-Band device.       Impression:      1.  Changes of advanced pulmonary fibrosis with interstitial thickening  and  peripheral honeycombing. Low lung volumes.  2.  Cardiomegaly.  3.  Prominence of the interstitium has increased and could indicate  superimposed edema or atypical pneumonia.  4.  No effusion or pneumothorax. Otherwise stable exam.        This report was finalized on 4/26/2024 2:58 PM by Dr. Harpal Elias MD.                 Medications:  enoxaparin, 40 mg, Subcutaneous, Nightly  guaiFENesin, 1,200 mg, Oral, Q12H  ipratropium-albuterol, 3 mL, Nebulization, 4x Daily - RT  methylPREDNISolone sodium succinate, 40 mg, Intravenous, Q12H  oseltamivir, 75 mg, Oral, Q12H  potassium chloride ER, 40 mEq, Oral, Q4H  sodium chloride, 10 mL, Intravenous, Q12H  Triple Antibiotic, 1 Application, Apply externally, BID               Assessment & Plan   Influenza A pneumonia: Cont Tamiflu. Procal normal and will continue to hold antibiotics as presentation and imaging appear more consistent with viral illness. Supportive treatment.     Acute exacerbation of COPD: Likely exacerbated by above. Cont Tamiflu, steroids and nebs.     Leukocytosis: Likely 2/2 above. Improved today. Repeat CBC in the AM.     Hypokalemia: Mg is 2. Start electrolyte replacement protocols. Repeat labs in the AM.     Chronic hypoxic respiratory failure: O2 requirements currently stable. Cont treatment as outlined above.     Essential HTN: BP currently stable. Review home medication regimen once confirmed by pharmacy. Cont to monitor.     Recent dog scratch: Cont Neosporin.     DVT PPX: Lovenox    Disposition Likely home when medically stable.     Geoffrey Burnette DO  04/27/24  14:48 EDT

## 2024-04-27 NOTE — PLAN OF CARE
Goal Outcome Evaluation:  Plan of Care Reviewed With: patient        Progress: no change  Outcome Evaluation: Patient resting in bed at this time. A&O. VSS. 3L NC. No acute changes noted. No requests or complaints at this time. Will continue with POC.

## 2024-04-28 LAB
ANION GAP SERPL CALCULATED.3IONS-SCNC: 8.8 MMOL/L (ref 5–15)
BASOPHILS # BLD AUTO: 0.01 10*3/MM3 (ref 0–0.2)
BASOPHILS NFR BLD AUTO: 0.1 % (ref 0–1.5)
BUN SERPL-MCNC: 17 MG/DL (ref 8–23)
BUN/CREAT SERPL: 34.7 (ref 7–25)
CALCIUM SPEC-SCNC: 8.4 MG/DL (ref 8.6–10.5)
CHLORIDE SERPL-SCNC: 104 MMOL/L (ref 98–107)
CO2 SERPL-SCNC: 26.2 MMOL/L (ref 22–29)
CREAT SERPL-MCNC: 0.49 MG/DL (ref 0.57–1)
DEPRECATED RDW RBC AUTO: 43 FL (ref 37–54)
EGFRCR SERPLBLD CKD-EPI 2021: 105.4 ML/MIN/1.73
EOSINOPHIL # BLD AUTO: 0 10*3/MM3 (ref 0–0.4)
EOSINOPHIL NFR BLD AUTO: 0 % (ref 0.3–6.2)
ERYTHROCYTE [DISTWIDTH] IN BLOOD BY AUTOMATED COUNT: 12.2 % (ref 12.3–15.4)
GLUCOSE SERPL-MCNC: 134 MG/DL (ref 65–99)
HCT VFR BLD AUTO: 34.6 % (ref 34–46.6)
HGB BLD-MCNC: 11.4 G/DL (ref 12–15.9)
IMM GRANULOCYTES # BLD AUTO: 0.06 10*3/MM3 (ref 0–0.05)
IMM GRANULOCYTES NFR BLD AUTO: 0.6 % (ref 0–0.5)
LYMPHOCYTES # BLD AUTO: 1.66 10*3/MM3 (ref 0.7–3.1)
LYMPHOCYTES NFR BLD AUTO: 16.6 % (ref 19.6–45.3)
MCH RBC QN AUTO: 31.5 PG (ref 26.6–33)
MCHC RBC AUTO-ENTMCNC: 32.9 G/DL (ref 31.5–35.7)
MCV RBC AUTO: 95.6 FL (ref 79–97)
MONOCYTES # BLD AUTO: 0.84 10*3/MM3 (ref 0.1–0.9)
MONOCYTES NFR BLD AUTO: 8.4 % (ref 5–12)
NEUTROPHILS NFR BLD AUTO: 7.46 10*3/MM3 (ref 1.7–7)
NEUTROPHILS NFR BLD AUTO: 74.3 % (ref 42.7–76)
NRBC BLD AUTO-RTO: 0 /100 WBC (ref 0–0.2)
PLATELET # BLD AUTO: 184 10*3/MM3 (ref 140–450)
PMV BLD AUTO: 10.5 FL (ref 6–12)
POTASSIUM SERPL-SCNC: 4.4 MMOL/L (ref 3.5–5.2)
RBC # BLD AUTO: 3.62 10*6/MM3 (ref 3.77–5.28)
SODIUM SERPL-SCNC: 139 MMOL/L (ref 136–145)
WBC NRBC COR # BLD AUTO: 10.03 10*3/MM3 (ref 3.4–10.8)

## 2024-04-28 PROCEDURE — 94760 N-INVAS EAR/PLS OXIMETRY 1: CPT

## 2024-04-28 PROCEDURE — 94799 UNLISTED PULMONARY SVC/PX: CPT

## 2024-04-28 PROCEDURE — 25010000002 FUROSEMIDE PER 20 MG: Performed by: INTERNAL MEDICINE

## 2024-04-28 PROCEDURE — 94664 DEMO&/EVAL PT USE INHALER: CPT

## 2024-04-28 PROCEDURE — 99232 SBSQ HOSP IP/OBS MODERATE 35: CPT | Performed by: INTERNAL MEDICINE

## 2024-04-28 PROCEDURE — 85025 COMPLETE CBC W/AUTO DIFF WBC: CPT | Performed by: INTERNAL MEDICINE

## 2024-04-28 PROCEDURE — 25010000002 METHYLPREDNISOLONE PER 40 MG: Performed by: INTERNAL MEDICINE

## 2024-04-28 PROCEDURE — 80048 BASIC METABOLIC PNL TOTAL CA: CPT | Performed by: INTERNAL MEDICINE

## 2024-04-28 PROCEDURE — 94761 N-INVAS EAR/PLS OXIMETRY MLT: CPT

## 2024-04-28 PROCEDURE — 25010000002 ENOXAPARIN PER 10 MG: Performed by: INTERNAL MEDICINE

## 2024-04-28 RX ORDER — FUROSEMIDE 10 MG/ML
40 INJECTION INTRAMUSCULAR; INTRAVENOUS ONCE
Status: COMPLETED | OUTPATIENT
Start: 2024-04-28 | End: 2024-04-28

## 2024-04-28 RX ADMIN — CETIRIZINE HYDROCHLORIDE 10 MG: 10 TABLET, FILM COATED ORAL at 09:05

## 2024-04-28 RX ADMIN — MEMANTINE HYDROCHLORIDE 5 MG: 5 TABLET, FILM COATED ORAL at 00:06

## 2024-04-28 RX ADMIN — IPRATROPIUM BROMIDE AND ALBUTEROL SULFATE 3 ML: 2.5; .5 SOLUTION RESPIRATORY (INHALATION) at 00:26

## 2024-04-28 RX ADMIN — BACITRACIN ZINC, NEOMYCIN SULFATE AND POLYMYXIN B SULFATE 1 APPLICATION: 400; 5; 5000 OINTMENT TOPICAL at 20:50

## 2024-04-28 RX ADMIN — HYDROCODONE BITARTRATE AND ACETAMINOPHEN 1 TABLET: 5; 325 TABLET ORAL at 20:57

## 2024-04-28 RX ADMIN — Medication 10 ML: at 08:11

## 2024-04-28 RX ADMIN — GUAIFENESIN 1200 MG: 600 TABLET, EXTENDED RELEASE ORAL at 20:50

## 2024-04-28 RX ADMIN — IPRATROPIUM BROMIDE AND ALBUTEROL SULFATE 3 ML: 2.5; .5 SOLUTION RESPIRATORY (INHALATION) at 19:20

## 2024-04-28 RX ADMIN — FLUOXETINE HYDROCHLORIDE 10 MG: 10 CAPSULE ORAL at 09:04

## 2024-04-28 RX ADMIN — IPRATROPIUM BROMIDE AND ALBUTEROL SULFATE 3 ML: 2.5; .5 SOLUTION RESPIRATORY (INHALATION) at 07:08

## 2024-04-28 RX ADMIN — HYDROXYCHLOROQUINE SULFATE 200 MG: 200 TABLET ORAL at 09:05

## 2024-04-28 RX ADMIN — LOSARTAN POTASSIUM 50 MG: 50 TABLET, FILM COATED ORAL at 09:05

## 2024-04-28 RX ADMIN — FLUOXETINE HYDROCHLORIDE 10 MG: 10 CAPSULE ORAL at 00:06

## 2024-04-28 RX ADMIN — OSELTAMIVIR PHOSPHATE 75 MG: 75 CAPSULE ORAL at 08:10

## 2024-04-28 RX ADMIN — ASPIRIN 81 MG: 81 TABLET, COATED ORAL at 00:08

## 2024-04-28 RX ADMIN — HYDROXYCHLOROQUINE SULFATE 200 MG: 200 TABLET ORAL at 20:50

## 2024-04-28 RX ADMIN — HYDROCODONE BITARTRATE AND ACETAMINOPHEN 1 TABLET: 5; 325 TABLET ORAL at 00:12

## 2024-04-28 RX ADMIN — DONEPEZIL HYDROCHLORIDE 10 MG: 5 TABLET, FILM COATED ORAL at 09:04

## 2024-04-28 RX ADMIN — PRIMIDONE 50 MG: 50 TABLET ORAL at 20:50

## 2024-04-28 RX ADMIN — OSELTAMIVIR PHOSPHATE 75 MG: 75 CAPSULE ORAL at 20:50

## 2024-04-28 RX ADMIN — BACITRACIN ZINC, NEOMYCIN SULFATE AND POLYMYXIN B SULFATE 1 APPLICATION: 400; 5; 5000 OINTMENT TOPICAL at 08:10

## 2024-04-28 RX ADMIN — MEMANTINE HYDROCHLORIDE 5 MG: 5 TABLET, FILM COATED ORAL at 20:50

## 2024-04-28 RX ADMIN — ENOXAPARIN SODIUM 40 MG: 40 INJECTION SUBCUTANEOUS at 20:50

## 2024-04-28 RX ADMIN — Medication 10 ML: at 20:51

## 2024-04-28 RX ADMIN — BUDESONIDE AND FORMOTEROL FUMARATE DIHYDRATE 2 PUFF: 160; 4.5 AEROSOL RESPIRATORY (INHALATION) at 07:08

## 2024-04-28 RX ADMIN — BREXPIPRAZOLE 0.5 MG: 1 TABLET ORAL at 00:06

## 2024-04-28 RX ADMIN — BREXPIPRAZOLE 0.5 MG: 1 TABLET ORAL at 09:05

## 2024-04-28 RX ADMIN — ATORVASTATIN CALCIUM 40 MG: 40 TABLET, FILM COATED ORAL at 00:07

## 2024-04-28 RX ADMIN — LEVOTHYROXINE SODIUM 25 MCG: 25 TABLET ORAL at 05:28

## 2024-04-28 RX ADMIN — CITALOPRAM HYDROBROMIDE 40 MG: 20 TABLET ORAL at 00:07

## 2024-04-28 RX ADMIN — ATORVASTATIN CALCIUM 40 MG: 40 TABLET, FILM COATED ORAL at 09:05

## 2024-04-28 RX ADMIN — FUROSEMIDE 40 MG: 10 INJECTION, SOLUTION INTRAMUSCULAR; INTRAVENOUS at 13:36

## 2024-04-28 RX ADMIN — PRIMIDONE 50 MG: 50 TABLET ORAL at 09:05

## 2024-04-28 RX ADMIN — HYDROCHLOROTHIAZIDE 12.5 MG: 25 TABLET ORAL at 09:05

## 2024-04-28 RX ADMIN — GUAIFENESIN 1200 MG: 600 TABLET, EXTENDED RELEASE ORAL at 08:10

## 2024-04-28 RX ADMIN — METHYLPREDNISOLONE SODIUM SUCCINATE 40 MG: 40 INJECTION, POWDER, FOR SOLUTION INTRAMUSCULAR; INTRAVENOUS at 13:35

## 2024-04-28 RX ADMIN — MEMANTINE HYDROCHLORIDE 5 MG: 5 TABLET, FILM COATED ORAL at 09:05

## 2024-04-28 RX ADMIN — IPRATROPIUM BROMIDE AND ALBUTEROL SULFATE 3 ML: 2.5; .5 SOLUTION RESPIRATORY (INHALATION) at 13:27

## 2024-04-28 RX ADMIN — ASPIRIN 81 MG: 81 TABLET, COATED ORAL at 09:04

## 2024-04-28 RX ADMIN — HYDROXYCHLOROQUINE SULFATE 200 MG: 200 TABLET ORAL at 00:05

## 2024-04-28 RX ADMIN — PRIMIDONE 50 MG: 50 TABLET ORAL at 00:06

## 2024-04-28 RX ADMIN — TIOTROPIUM BROMIDE INHALATION SPRAY 2 PUFF: 3.12 SPRAY, METERED RESPIRATORY (INHALATION) at 07:08

## 2024-04-28 RX ADMIN — BUDESONIDE AND FORMOTEROL FUMARATE DIHYDRATE 2 PUFF: 160; 4.5 AEROSOL RESPIRATORY (INHALATION) at 19:20

## 2024-04-28 RX ADMIN — LOSARTAN POTASSIUM 50 MG: 50 TABLET, FILM COATED ORAL at 00:06

## 2024-04-28 RX ADMIN — DONEPEZIL HYDROCHLORIDE 10 MG: 5 TABLET, FILM COATED ORAL at 00:07

## 2024-04-28 RX ADMIN — METHYLPREDNISOLONE SODIUM SUCCINATE 40 MG: 40 INJECTION, POWDER, FOR SOLUTION INTRAMUSCULAR; INTRAVENOUS at 02:07

## 2024-04-28 NOTE — PLAN OF CARE
Goal Outcome Evaluation:   Pt resting in bed. Pt ambulated in room. No acute changes. VSS. Will continue plan of care.

## 2024-04-28 NOTE — PROGRESS NOTES
[facility] HOSPITALIST PROGRESS NOTE    Subjective     History:   Lexie Valdez is a 63 y.o. female admitted on 4/26/2024 secondary to Influenza A     Procedures: None    CC: Follow up Influenza, COPD exacerbation     Patient seen and examined with Ed, RN. Awake and alert. States she feels a little better again today but continues to report cough, congestion and dyspnea. No reported CP. No reported nausea, vomiting or diarrhea. No acute events overnight per RN.     History taken from: patient, chart, and RN.      Objective     Vital Signs  Temp:  [98.2 °F (36.8 °C)-98.8 °F (37.1 °C)] 98.5 °F (36.9 °C)  Heart Rate:  [62-92] 70  Resp:  [18-32] 18  BP: (112-144)/(60-68) 132/63    Intake/Output Summary (Last 24 hours) at 4/28/2024 1246  Last data filed at 4/28/2024 0900  Gross per 24 hour   Intake 1400 ml   Output 2000 ml   Net -600 ml         Physical Exam:  General:    Awake, alert, in no acute distress, chronically ill appearing    Heart:      Normal S1 and S2. Regular rate and rhythm. No significant murmur, rubs or gallops appreciated.   Lungs:     Respirations regular, even and unlabored. Coarse breath sounds with scattered wheezes, rales and rhonchi throughout.     Abdomen:   Soft and nontender. No guarding, rebound tenderness or  organomegaly noted. Bowel sounds present x 4.   Extremities:  No clubbing, cyanosis or edema noted. Moves UE and LE equally B/L. (+) tremor      Results Review:    Results from last 7 days   Lab Units 04/28/24  0148 04/27/24  0134 04/26/24  1324   WBC 10*3/mm3 10.03 14.59* 18.06*   HEMOGLOBIN g/dL 11.4* 11.2* 13.5   PLATELETS 10*3/mm3 184 151 171     Results from last 7 days   Lab Units 04/28/24  0148 04/27/24  2236 04/27/24  0134 04/26/24  1324   SODIUM mmol/L 139  --  136 134*   POTASSIUM mmol/L 4.4 4.7 3.1* 3.7   CHLORIDE mmol/L 104  --  103 95*   CO2 mmol/L 26.2  --  23.6 27.1   BUN mg/dL 17  --  12 12   CREATININE mg/dL 0.49*  --  0.42* 0.57   CALCIUM mg/dL 8.4*  --   8.0* 9.0   GLUCOSE mg/dL 134*  --  130* 109*     Results from last 7 days   Lab Units 04/27/24  0134 04/26/24  1324   BILIRUBIN mg/dL <0.2 0.3   ALK PHOS U/L 70 93   AST (SGOT) U/L 20 22   ALT (SGPT) U/L 9 12     Results from last 7 days   Lab Units 04/27/24  0134 04/26/24  1324   MAGNESIUM mg/dL 2.0 1.9     Results from last 7 days   Lab Units 04/26/24  1324   INR  0.98     Results from last 7 days   Lab Units 04/26/24  1809 04/26/24  1324   HSTROP T ng/L 14* 15*       Imaging Results (Last 24 Hours)       ** No results found for the last 24 hours. **              Medications:  aspirin, 81 mg, Oral, Daily  atorvastatin, 40 mg, Oral, Daily  Brexpiprazole, 0.5 mg, Oral, Daily  budesonide-formoterol, 2 puff, Inhalation, BID - RT   And  tiotropium bromide monohydrate, 2 puff, Inhalation, Daily - RT  cetirizine, 10 mg, Oral, Daily  [START ON 4/29/2024] cholecalciferol, 50,000 Units, Oral, Weekly  citalopram, 40 mg, Oral, Daily  donepezil, 10 mg, Oral, Daily  enoxaparin, 40 mg, Subcutaneous, Nightly  FLUoxetine, 10 mg, Oral, Daily  guaiFENesin, 1,200 mg, Oral, Q12H  hydroCHLOROthiazide, 12.5 mg, Oral, Daily  hydroxychloroquine, 200 mg, Oral, BID  ipratropium-albuterol, 3 mL, Nebulization, 4x Daily - RT  levothyroxine, 25 mcg, Oral, Q AM  losartan, 50 mg, Oral, Daily  memantine, 5 mg, Oral, BID  methylPREDNISolone sodium succinate, 40 mg, Intravenous, Q12H  oseltamivir, 75 mg, Oral, Q12H  primidone, 50 mg, Oral, BID  sodium chloride, 10 mL, Intravenous, Q12H  Triple Antibiotic, 1 Application, Apply externally, BID               Assessment & Plan   Influenza A pneumonia: Cont Tamiflu. Procal normal and will continue to hold antibiotics as presentation and imaging appear more consistent with viral illness. Supportive treatment.     Acute exacerbation of COPD: Likely exacerbated by above. Cont Tamiflu, steroids and nebs. Order a dose of Lasix to assist with lung compliance.     Leukocytosis: Likely 2/2 above. Resolved today.  Repeat CBC in the AM.     Hypokalemia: Improved with supplementation. Mg is 2. Cont electrolyte replacement protocols. Repeat labs in the AM.     Chronic hypoxic respiratory failure: O2 requirements currently stable. Cont treatment as outlined above.     Essential HTN: BP currently stable. Cont home medication regimen. Cont to monitor.     Recent dog scratch: Cont Neosporin.     DVT PPX: Lovenox    Disposition Likely home when medically stable, possibly 24 hours.     Geoffrey Burnette DO  04/28/24  12:46 EDT

## 2024-04-28 NOTE — PLAN OF CARE
Goal Outcome Evaluation:  Plan of Care Reviewed With: patient           Outcome Evaluation: Pt rested in bed this shift. PRN pain meds given. IV steroids given as scheduled. No new concerns or complaints, VSS and will continue with POC.

## 2024-04-29 ENCOUNTER — TELEPHONE (OUTPATIENT)
Dept: PULMONOLOGY | Facility: CLINIC | Age: 64
End: 2024-04-29
Payer: MEDICARE

## 2024-04-29 LAB
ANION GAP SERPL CALCULATED.3IONS-SCNC: 7.2 MMOL/L (ref 5–15)
BASOPHILS # BLD AUTO: 0.02 10*3/MM3 (ref 0–0.2)
BASOPHILS NFR BLD AUTO: 0.2 % (ref 0–1.5)
BUN SERPL-MCNC: 15 MG/DL (ref 8–23)
BUN/CREAT SERPL: 32.6 (ref 7–25)
CALCIUM SPEC-SCNC: 8.6 MG/DL (ref 8.6–10.5)
CHLORIDE SERPL-SCNC: 98 MMOL/L (ref 98–107)
CO2 SERPL-SCNC: 29.8 MMOL/L (ref 22–29)
CREAT SERPL-MCNC: 0.46 MG/DL (ref 0.57–1)
DEPRECATED RDW RBC AUTO: 42.5 FL (ref 37–54)
EGFRCR SERPLBLD CKD-EPI 2021: 107 ML/MIN/1.73
EOSINOPHIL # BLD AUTO: 0 10*3/MM3 (ref 0–0.4)
EOSINOPHIL NFR BLD AUTO: 0 % (ref 0.3–6.2)
ERYTHROCYTE [DISTWIDTH] IN BLOOD BY AUTOMATED COUNT: 12 % (ref 12.3–15.4)
GLUCOSE SERPL-MCNC: 126 MG/DL (ref 65–99)
HCT VFR BLD AUTO: 36 % (ref 34–46.6)
HGB BLD-MCNC: 12.1 G/DL (ref 12–15.9)
IMM GRANULOCYTES # BLD AUTO: 0.07 10*3/MM3 (ref 0–0.05)
IMM GRANULOCYTES NFR BLD AUTO: 0.7 % (ref 0–0.5)
LYMPHOCYTES # BLD AUTO: 2.62 10*3/MM3 (ref 0.7–3.1)
LYMPHOCYTES NFR BLD AUTO: 25.9 % (ref 19.6–45.3)
MAGNESIUM SERPL-MCNC: 2.2 MG/DL (ref 1.6–2.4)
MCH RBC QN AUTO: 32.4 PG (ref 26.6–33)
MCHC RBC AUTO-ENTMCNC: 33.6 G/DL (ref 31.5–35.7)
MCV RBC AUTO: 96.5 FL (ref 79–97)
MONOCYTES # BLD AUTO: 1.01 10*3/MM3 (ref 0.1–0.9)
MONOCYTES NFR BLD AUTO: 10 % (ref 5–12)
NEUTROPHILS NFR BLD AUTO: 6.41 10*3/MM3 (ref 1.7–7)
NEUTROPHILS NFR BLD AUTO: 63.2 % (ref 42.7–76)
NRBC BLD AUTO-RTO: 0 /100 WBC (ref 0–0.2)
PLATELET # BLD AUTO: 193 10*3/MM3 (ref 140–450)
PMV BLD AUTO: 10.8 FL (ref 6–12)
POTASSIUM SERPL-SCNC: 4 MMOL/L (ref 3.5–5.2)
RBC # BLD AUTO: 3.73 10*6/MM3 (ref 3.77–5.28)
SODIUM SERPL-SCNC: 135 MMOL/L (ref 136–145)
WBC NRBC COR # BLD AUTO: 10.13 10*3/MM3 (ref 3.4–10.8)

## 2024-04-29 PROCEDURE — 80048 BASIC METABOLIC PNL TOTAL CA: CPT | Performed by: INTERNAL MEDICINE

## 2024-04-29 PROCEDURE — 94799 UNLISTED PULMONARY SVC/PX: CPT

## 2024-04-29 PROCEDURE — 85025 COMPLETE CBC W/AUTO DIFF WBC: CPT | Performed by: INTERNAL MEDICINE

## 2024-04-29 PROCEDURE — 94664 DEMO&/EVAL PT USE INHALER: CPT

## 2024-04-29 PROCEDURE — 25010000002 METHYLPREDNISOLONE PER 40 MG: Performed by: INTERNAL MEDICINE

## 2024-04-29 PROCEDURE — 99232 SBSQ HOSP IP/OBS MODERATE 35: CPT | Performed by: INTERNAL MEDICINE

## 2024-04-29 PROCEDURE — 94761 N-INVAS EAR/PLS OXIMETRY MLT: CPT

## 2024-04-29 PROCEDURE — 97162 PT EVAL MOD COMPLEX 30 MIN: CPT

## 2024-04-29 PROCEDURE — 83735 ASSAY OF MAGNESIUM: CPT | Performed by: INTERNAL MEDICINE

## 2024-04-29 PROCEDURE — 25010000002 ENOXAPARIN PER 10 MG: Performed by: INTERNAL MEDICINE

## 2024-04-29 RX ORDER — PREDNISONE 20 MG/1
40 TABLET ORAL
Status: DISCONTINUED | OUTPATIENT
Start: 2024-04-30 | End: 2024-04-30 | Stop reason: HOSPADM

## 2024-04-29 RX ADMIN — IPRATROPIUM BROMIDE AND ALBUTEROL SULFATE 3 ML: 2.5; .5 SOLUTION RESPIRATORY (INHALATION) at 13:49

## 2024-04-29 RX ADMIN — ATORVASTATIN CALCIUM 40 MG: 40 TABLET, FILM COATED ORAL at 08:20

## 2024-04-29 RX ADMIN — IPRATROPIUM BROMIDE AND ALBUTEROL SULFATE 3 ML: 2.5; .5 SOLUTION RESPIRATORY (INHALATION) at 07:31

## 2024-04-29 RX ADMIN — HYDROCHLOROTHIAZIDE 12.5 MG: 25 TABLET ORAL at 08:21

## 2024-04-29 RX ADMIN — BREXPIPRAZOLE 0.5 MG: 1 TABLET ORAL at 08:19

## 2024-04-29 RX ADMIN — IPRATROPIUM BROMIDE AND ALBUTEROL SULFATE 3 ML: 2.5; .5 SOLUTION RESPIRATORY (INHALATION) at 19:31

## 2024-04-29 RX ADMIN — ASPIRIN 81 MG: 81 TABLET, COATED ORAL at 08:19

## 2024-04-29 RX ADMIN — GUAIFENESIN 1200 MG: 600 TABLET, EXTENDED RELEASE ORAL at 08:19

## 2024-04-29 RX ADMIN — LEVOTHYROXINE SODIUM 25 MCG: 25 TABLET ORAL at 05:13

## 2024-04-29 RX ADMIN — BUDESONIDE AND FORMOTEROL FUMARATE DIHYDRATE 2 PUFF: 160; 4.5 AEROSOL RESPIRATORY (INHALATION) at 19:31

## 2024-04-29 RX ADMIN — PRIMIDONE 50 MG: 50 TABLET ORAL at 08:20

## 2024-04-29 RX ADMIN — DONEPEZIL HYDROCHLORIDE 10 MG: 5 TABLET, FILM COATED ORAL at 08:20

## 2024-04-29 RX ADMIN — OSELTAMIVIR PHOSPHATE 75 MG: 75 CAPSULE ORAL at 20:07

## 2024-04-29 RX ADMIN — HYDROXYCHLOROQUINE SULFATE 200 MG: 200 TABLET ORAL at 08:21

## 2024-04-29 RX ADMIN — ENOXAPARIN SODIUM 40 MG: 40 INJECTION SUBCUTANEOUS at 20:08

## 2024-04-29 RX ADMIN — LOSARTAN POTASSIUM 50 MG: 50 TABLET, FILM COATED ORAL at 08:20

## 2024-04-29 RX ADMIN — TIOTROPIUM BROMIDE INHALATION SPRAY 2 PUFF: 3.12 SPRAY, METERED RESPIRATORY (INHALATION) at 07:32

## 2024-04-29 RX ADMIN — CETIRIZINE HYDROCHLORIDE 10 MG: 10 TABLET, FILM COATED ORAL at 08:20

## 2024-04-29 RX ADMIN — BACITRACIN ZINC, NEOMYCIN SULFATE AND POLYMYXIN B SULFATE 1 APPLICATION: 400; 5; 5000 OINTMENT TOPICAL at 08:19

## 2024-04-29 RX ADMIN — OFLOXACIN 50000 UNITS: 300 TABLET, COATED ORAL at 08:20

## 2024-04-29 RX ADMIN — METHYLPREDNISOLONE SODIUM SUCCINATE 40 MG: 40 INJECTION, POWDER, FOR SOLUTION INTRAMUSCULAR; INTRAVENOUS at 13:54

## 2024-04-29 RX ADMIN — BUDESONIDE AND FORMOTEROL FUMARATE DIHYDRATE 2 PUFF: 160; 4.5 AEROSOL RESPIRATORY (INHALATION) at 07:31

## 2024-04-29 RX ADMIN — Medication 10 ML: at 08:21

## 2024-04-29 RX ADMIN — METHYLPREDNISOLONE SODIUM SUCCINATE 40 MG: 40 INJECTION, POWDER, FOR SOLUTION INTRAMUSCULAR; INTRAVENOUS at 01:48

## 2024-04-29 RX ADMIN — HYDROXYCHLOROQUINE SULFATE 200 MG: 200 TABLET ORAL at 20:07

## 2024-04-29 RX ADMIN — OSELTAMIVIR PHOSPHATE 75 MG: 75 CAPSULE ORAL at 08:19

## 2024-04-29 RX ADMIN — GUAIFENESIN 1200 MG: 600 TABLET, EXTENDED RELEASE ORAL at 20:07

## 2024-04-29 RX ADMIN — Medication 10 ML: at 20:08

## 2024-04-29 RX ADMIN — CITALOPRAM HYDROBROMIDE 40 MG: 20 TABLET ORAL at 08:20

## 2024-04-29 RX ADMIN — FLUOXETINE HYDROCHLORIDE 10 MG: 10 CAPSULE ORAL at 08:19

## 2024-04-29 RX ADMIN — MEMANTINE HYDROCHLORIDE 5 MG: 5 TABLET, FILM COATED ORAL at 20:07

## 2024-04-29 RX ADMIN — BACITRACIN ZINC, NEOMYCIN SULFATE AND POLYMYXIN B SULFATE 1 APPLICATION: 400; 5; 5000 OINTMENT TOPICAL at 20:07

## 2024-04-29 RX ADMIN — PRIMIDONE 50 MG: 50 TABLET ORAL at 20:07

## 2024-04-29 RX ADMIN — MEMANTINE HYDROCHLORIDE 5 MG: 5 TABLET, FILM COATED ORAL at 08:20

## 2024-04-29 NOTE — CASE MANAGEMENT/SOCIAL WORK
Discharge Planning Assessment   Alex     Patient Name: Lexie Valdez  MRN: [id number]  Today's Date: 4/29/2024    Admit Date: 4/26/2024    Plan: CM spoke with pt at the bedside. Pt lives at home in [facility] with spouse Khris (who is also in the hospital with flu) and plans to return at d/c. Pt has O2@3L, portable tanks, nebulizer, cane and rollator via [employer]. Pt is requesting a BSC because her commode seat is way too low. Pt does not have HH and denies any needs. PCP is Viloet BETTENCOURT and she gets rx filled at [facility] on [address]. Pt has [employer] Medicare replacement insurance and denies any issues. Pt's sister Rosa Kessler helps her at home when needed. Pt no longer drives r/t her tremors. Pt's [facility] will transport at d/c. CM will follow.   Discharge Needs Assessment       Row Name 04/29/24 1212       Living Environment    People in Home spouse    Current Living Arrangements home    Provides Primary Care For no one    Family Caregiver if Needed spouse;sibling(s)    Family Caregiver Names Spouse Khris who is in the hospital as well with Flu or pt's sister Rosa Kessler provides assistance as needed.    Able to Return to Prior Arrangements yes       Resource/Environmental Concerns    Resource/Environmental Concerns none    Transportation Concerns none       Transition Planning    Patient/Family Anticipates Transition to home    Patient/Family Anticipated Services at Transition durable medical equipment    Transportation Anticipated family or friend will provide       Discharge Needs Assessment    Readmission Within the Last 30 Days no previous admission in last 30 days    Equipment Currently Used at Home oxygen;rollator;cane, straight;nebulizer    Concerns to be Addressed denies needs/concerns at this time;adjustment to diagnosis/illness    Anticipated Changes Related to Illness none    Equipment Needed After Discharge commode    Patient's Choice of Community Agency(s) Pt does not have HH and  denies any needs.                   Discharge Plan       Row Name 04/29/24 1226       Plan    Plan CM spoke with pt at the bedside. Pt lives at home in [facility] with spouse Khris (who is also in the hospital with flu) and plans to return at d/c. Pt has O2@3L, portable tanks, nebulizer, cane and rollator via [employer]. Pt is requesting a BSC because her commode seat is way too low. Pt does not have HH and denies any needs. PCP is Violet BETTENCOURT and she gets rx filled at [facility] on [address]. Pt has [employer] Medicare replacement insurance and denies any issues. Pt's sister Rosa Kessler helps her at home when needed. Pt no longer drives r/t her tremors. Pt's [facility] Valdez will transport at d/c. CM will follow.    Patient/Family in Agreement with Plan yes              Demographic Summary       Row Name 04/29/24 1212       General Information    Admission Type inpatient    Referral Source admission list;case finding    Reason for Consult discharge planning                 Nadine Motta RN

## 2024-04-29 NOTE — PLAN OF CARE
Goal Outcome Evaluation:  Plan of Care Reviewed With: patient        Progress: improving  Outcome Evaluation: Patient resting in bed at this time. VSS. PRN pain medicatoin given per MAR. IV steriods given per MAR. Patient voices no concerns at this time. Will continue with plan of care.

## 2024-04-29 NOTE — THERAPY EVALUATION
Acute Care - Physical Therapy Initial Evaluation   Alex     Patient Name: Lexie Valdez  : 1960  MRN: [id number]  Today's Date: 2024   Onset of Illness/Injury or Date of Surgery: 24  Visit Dx:     ICD-10-CM ICD-9-CM   1. Influenza A  J10.1 487.1   2. COPD with acute exacerbation  J44.1 491.21     Patient Active Problem List   Diagnosis    MARU (stress urinary incontinence, female)    Chronic cystitis    Detrusor instability    Chronic obstructive lung disease    Arthritis    Bursitis    Mixed anxiety depressive disorder    Fibromyalgia    Hyperlipidemia    Hypertensive disorder    Hypothyroidism    COPD exacerbation    UTI (urinary tract infection)    Influenza A     Past Medical History:   Diagnosis Date    Anxiety     Arthritis     Depression     Hypertensive disorder 3/14/2018    Hypothyroidism 8/3/2021    MARU (stress urinary incontinence, female) 2019    UTI (urinary tract infection) 2023     Past Surgical History:   Procedure Laterality Date    BREAST BIOPSY Left 2000    benign    GALLBLADDER SURGERY      GASTRIC BANDING      HYSTERECTOMY      age 37     PT Assessment (Last 12 Hours)       PT Evaluation and Treatment       Row Name 24 1000          Physical Therapy Time and Intention    Document Type evaluation  -     Mode of Treatment physical therapy  -     Patient Effort good  -       Row Name 24 1000          General Information    Patient Profile Reviewed yes  -     Onset of Illness/Injury or Date of Surgery 24  -     Referring Physician Vasile  -     Patient Observations alert;cooperative;agree to therapy  -     Patient/Family/Caregiver Comments/Observations Pt has resting tremor and reports they are trying to adjust her medication to improve it.  -     Prior Level of Function independent:;all household mobility;gait;transfer;bed mobility  -     Equipment Currently Used at Home shower chair;oxygen  Has cane and walker.  Uses them at  times.  -     Existing Precautions/Restrictions fall;oxygen therapy device and L/min  -       Row Name 04/29/24 1000          Previous Level of Function/Home Environm    Bed Mobility, Premorbid Functional Level independent  -     Transfers, Premorbid Functional Level independent  -     Household Ambulation, Premorbid Functional Level independent;uses device or equipment  -     Stairs, Premorbid Functional Level independent;uses device or equipment  -     Previous Level of Function, Premorbid Pt reports she was MI throughout her home but usually has supervision.  Her mother in law is able to assist her as needed upon returning home since her  is in the hospital with flu as well.  -       Row Name 04/29/24 1000          Living Environment    Current Living Arrangements home  -     Home Accessibility stairs to enter home  -     People in Home spouse  -     Primary Care Provided by self  -       Row Name 04/29/24 1000          Home Main Entrance    Number of Stairs, Main Entrance four  -     Stair Railings, Main Entrance railings safe and in good condition  -       Row Name 04/29/24 1000          Pain    Pretreatment Pain Rating 0/10 - no pain  -     Posttreatment Pain Rating 0/10 - no pain  -[facility] Name 04/29/24 1000          Cognition    Affect/Mental Status (Cognition) [facility]     Orientation Status (Cognition) oriented x 3  -     Follows Commands (Cognition) [facility]     Personal Safety Interventions fall prevention program maintained;gait belt;muscle strengthening facilitated;nonskid shoes/slippers when out of bed;supervised activity  -       Row Name 04/29/24 1000          Range of Motion (ROM)    Range of Motion ROM is [facility] Name 04/29/24 1000          Strength Comprehensive (MMT)    Comment, General Manual Muscle Testing (MMT) Assessment 4/5 strength in   -[facility] Name 04/29/24 1000          Bed Mobility    Bed Mobility supine-sit;sit-supine  -      Supine-Sit [place] (Bed Mobility) standby assist  -KP     Assistive Device (Bed Mobility) bed rails;head of bed elevated  -KP       Row Name 04/29/24 1000          Transfers    Transfers sit-stand transfer;stand-sit transfer  -KP       Row Name 04/29/24 1000          Sit-Stand Transfer    Sit-Stand [place] (Transfers) standby assist;contact guard;1 person assist  -KP     Assistive Device (Sit-Stand Transfers) walker, front-wheeled  -KP       Row Name 04/29/24 1000          Stand-Sit Transfer    Stand-Sit [place] (Transfers) standby assist;1 person assist  -KP     Assistive Device (Stand-Sit Transfers) walker, front-wheeled  -KP       Row Name 04/29/24 1000          Gait/Stairs (Locomotion)    [place] Level (Gait) standby assist;contact guard;1 person assist  -KP     Assistive Device (Gait) walker, front-wheeled  -KP     Patient was able to Ambulate yes  -KP     Distance in Feet (Gait) 25  -KP     Pattern (Gait) step-through  -KP       Row Name 04/29/24 1000          Balance    Balance Assessment standing static balance;standing dynamic balance  -KP     Static Standing Balance standby assist;1-person assist  -KP     Dynamic Standing Balance standby assist;contact guard;1-person assist  -KP     Position/Device Used, Standing Balance walker, front-wheeled  -KP       Row Name             Wound 04/27/24 0827 Left lateral ankle Traumatic    Wound - Properties Group Placement Date: 04/27/24  -AW Placement Time: 0827 -AW Present on Original Admission: Y  -AW Side: Left  -AW Orientation: lateral  -AW Location: ankle  -AW Primary Wound Type: Traumatic  -AW    Retired Wound - Properties Group Placement Date: 04/27/24  -AW Placement Time: 0827 -AW Present on Original Admission: Y  -AW Side: Left  -AW Orientation: lateral  -AW Location: ankle  -AW Primary Wound Type: Traumatic  -AW    Retired Wound - Properties Group Date first assessed: 04/27/24  -AW Time first assessed: 0827 -AW Present on Original  Admission: Y  -AW Side: Left  -AW Location: ankle  -AW Primary Wound Type: Traumatic  -AW      Row Name 04/29/24 1000          Plan of Care Review    Plan of Care Reviewed With patient  -     Outcome Evaluation PT evaluation completed.  Patient was able to ambulate throughout room with FWW and SBA/CGA without LOB.  She was educated on using AD upon intial return home for safety and she verbalized understanding.  She has both a FWW and cane at home.  She is expected to d/c home with assistance from family and use of AD.  -       Row Name 04/29/24 1000          Positioning and Restraints    Pre-Treatment Position in bed  -     Post Treatment Position chair  -     In Chair notified nsg;sitting;call light within reach;encouraged to call for assist  Lines in tact and needs in reach  -       Row Name 04/29/24 1000          Therapy Assessment/Plan (PT)    Patient/Family Therapy Goals Statement (PT) Return home  -     Functional Level at Time of Evaluation (PT) CGA/SBA  -     PT Diagnosis (PT) Flu  -     Rehab Potential (PT) good, to achieve stated therapy goals  -     Criteria for Skilled Interventions Met (PT) yes;meets criteria;skilled treatment is necessary  -     Therapy Frequency (PT) 2 times/wk  -     Predicted Duration of Therapy Intervention (PT) 2 wks  -     Problem List (PT) problems related to;balance;mobility;strength  -     Activity Limitations Related to Problem List (PT) unable to ambulate safely  -       Row Name 04/29/24 1000          PT Evaluation Complexity    History, PT Evaluation Complexity 3 or more personal factors and/or comorbidities  -     Examination of Body Systems (PT Eval Complexity) 1-2 elements  -     Clinical Presentation (PT Evaluation Complexity) stable  -     Clinical Decision Making (PT Evaluation Complexity) low complexity  -     Overall Complexity (PT Evaluation Complexity) low complexity  -       Row Name 04/29/24 1000          Physical Therapy  Goals    Bed Mobility Goal Selection (PT) bed mobility, PT goal 1  -     Transfer Goal Selection (PT) transfer, PT goal 1  -     Gait Training Goal Selection (PT) gait training, PT goal 1  -       Row Name 04/29/24 1000          Bed Mobility Goal 1 (PT)    Activity/Assistive Device (Bed Mobility Goal 1, PT) bed mobility activities, all  -KP     [place] Level/Cues Needed (Bed Mobility Goal 1, PT) independent  -KP     Time Frame (Bed Mobility Goal 1, PT) long term goal (LTG);by discharge  -       Row Name 04/29/24 1000          Transfer Goal 1 (PT)    Activity/Assistive Device (Transfer Goal 1, PT) transfers, all;sit-to-stand/stand-to-sit;bed-to-chair/chair-to-bed;walker, rolling  -KP     [place] Level/Cues Needed (Transfer Goal 1, PT) modified independence  -KP     Time Frame (Transfer Goal 1, PT) long term goal (LTG);by discharge  -       Row Name 04/29/24 1000          Gait Training Goal 1 (PT)    Activity/Assistive Device (Gait Training Goal 1, PT) assistive device use;walker, rolling  -KP     [place] Level (Gait Training Goal 1, PT) modified independence  -KP     Distance (Gait Training Goal 1, PT) 100ft  -KP     Time Frame (Gait Training Goal 1, PT) long term goal (LTG);by discharge  -               User Key  (r) = Recorded By, (t) = Taken By, (c) = Cosigned By      Initials Name Provider Type    Violette Spence RN Registered Nurse    Sona Mejia, GERBER Physical Therapist                      PT Recommendation and Plan  Anticipated Discharge Disposition (PT): home with assist  Planned Therapy Interventions (PT): balance training, gait training, home exercise program, neuromuscular re-education, patient/family education, stair training, strengthening, transfer training  Therapy Frequency (PT): 2 times/wk  Plan of Care Reviewed With: patient  Outcome Evaluation: PT evaluation completed.  Patient was able to ambulate throughout room with FWW and SBA/CGA without LOB.  She was  educated on using AD upon intial return home for safety and she verbalized understanding.  She has both a FWW and cane at home.  She is expected to d/c home with assistance from family and use of AD.       Time Calculation:    PT Charges       Row Name 04/29/24 1045             Time Calculation    PT Received On 04/29/24  -      PT Goal Re-Cert Due Date 05/13/24  -                User Key  (r) = Recorded By, (t) = Taken By, (c) = Cosigned By      Initials Name Provider Type    Sona Mejia, PT Physical Therapist                  Therapy Charges for Today       Code Description Service Date Service Provider Modifiers Qty    08671171887 HC PT EVAL MOD COMPLEXITY 4 4/29/2024 Sona Karimi, PT GP 1            PT G-Codes  AM-PAC 6 Clicks Score (PT): 15    Sona Karimi PT  4/29/2024

## 2024-04-29 NOTE — PROGRESS NOTES
[facility]     Progress Note    Patient Name: Lexie KEITH Valdez  : 1960  MRN: [id number]  Primary Care Physician:  Violet Pop APRN  Date of admission: 2024    Subjective   Subjective     Chief Complaint: 63-year-old female being seen in follow-up for influenza A    History of Present Illness  Patient Reports overall feeling better compared to admission.  Continues to report intermittent cough.  States she would like to participate with therapy and be more ambulatory prior to returning home.  No acute overnight events reported to me per nursing staff.    Review of Systems   Constitutional:  Positive for fatigue. Negative for fever.   HENT:  Negative for hearing loss.    Respiratory:  Positive for cough and shortness of breath.    Cardiovascular:  Negative for chest pain.   Gastrointestinal:  Negative for vomiting.   Genitourinary:  Negative for dysuria.   Neurological:  Positive for weakness.     Objective   Objective     Vitals:   Temp:  [97.4 °F (36.3 °C)-98.6 °F (37 °C)] 98 °F (36.7 °C)  Heart Rate:  [68-88] 77  Resp:  [18-20] 20  BP: (114-134)/(66-76) 114/66  Flow (L/min):  [3] 3    Physical Exam  Constitutional:       General: She is not in acute distress.     Appearance: She is well-developed.      Interventions: Nasal cannula in place.   HENT:      Head: Normocephalic and atraumatic.   Eyes:      Conjunctiva/sclera: Conjunctivae normal.   Neck:      Trachea: No tracheal deviation.   Cardiovascular:      Rate and Rhythm: Normal rate and regular rhythm.      Heart sounds: No murmur heard.     No friction rub. No gallop.   Pulmonary:      Effort: No respiratory distress.      Breath sounds: Examination of the right-upper field reveals wheezing. Examination of the left-upper field reveals wheezing. Wheezing present. No rales.   Abdominal:      General: Bowel sounds are normal. There is no distension.      Palpations: Abdomen is soft.      Tenderness: There is no abdominal tenderness. There is no  guarding.   Skin:     General: Skin is warm and dry.      Findings: No erythema or rash.   Neurological:      Mental Status: She is alert and oriented to person, place, and time.      Cranial Nerves: No cranial nerve deficit.      Motor: Tremor present.          Result Review    Result Review:  I have personally reviewed the results from the time of this admission to 4/29/2024 19:01 EDT and agree with these findings:  [x]  Laboratory list / accordion  []  Microbiology  []  Radiology  []  EKG/Telemetry   []  Cardiology/Vascular   []  Pathology  []  Old records  []  Other:  Most notable findings include:     Results from last 7 days   Lab Units 04/29/24 0229 04/28/24 0148 04/27/24  0134   WBC 10*3/mm3 10.13 10.03 14.59*   HEMOGLOBIN g/dL 12.1 11.4* 11.2*   HEMATOCRIT % 36.0 34.6 33.2*   PLATELETS 10*3/mm3 193 184 151     Results from last 7 days   Lab Units 04/29/24 0229 04/28/24 0148 04/27/24 2236 04/27/24  0134 04/26/24  1324   SODIUM mmol/L 135* 139  --  136 134*   POTASSIUM mmol/L 4.0 4.4 4.7 3.1* 3.7   CHLORIDE mmol/L 98 104  --  103 95*   CO2 mmol/L 29.8* 26.2  --  23.6 27.1   BUN mg/dL 15 17  --  12 12   CREATININE mg/dL 0.46* 0.49*  --  0.42* 0.57   CALCIUM mg/dL 8.6 8.4*  --  8.0* 9.0   BILIRUBIN mg/dL  --   --   --  <0.2 0.3   ALK PHOS U/L  --   --   --  70 93   ALT (SGPT) U/L  --   --   --  9 12   AST (SGOT) U/L  --   --   --  20 22   GLUCOSE mg/dL 126* 134*  --  130* 109*         Assessment / Plan     #Influenza A pneumonia  #Acute COPD exacerbation due to above  #Steroid-induced leukocytosis, resolved  #Hypokalemia, resolved  #Former smoker  #Generalized weakness/physical debility  -Continue Symbicort/Spiriva  -Continue with Mucinex, scheduled nebulized inhalants and will transition to p.o. prednisone in a.m.  -Continue with Tamiflu for plans of a 5-day completion course  -Continue supplemental oxygen as needed and titrate for saturations 90 to 95%  -Encourage ambulation    DVT  prophylaxis:  Lovenox    CODE STATUS:    Code Status (Patient has no pulse and is not breathing): CPR (Attempt to Resuscitate)  Medical Interventions (Patient has pulse or is breathing): Full Support    Disposition:  I expect patient to be discharged home in 1-2 days.    Jenny Santillan, DO

## 2024-04-30 ENCOUNTER — READMISSION MANAGEMENT (OUTPATIENT)
Dept: CALL CENTER | Facility: HOSPITAL | Age: 64
End: 2024-04-30
Payer: MEDICARE

## 2024-04-30 ENCOUNTER — TELEPHONE (OUTPATIENT)
Dept: PULMONOLOGY | Facility: CLINIC | Age: 64
End: 2024-04-30

## 2024-04-30 VITALS
HEART RATE: 75 BPM | OXYGEN SATURATION: 99 % | TEMPERATURE: 97.7 F | HEIGHT: 64 IN | BODY MASS INDEX: 25.48 KG/M2 | RESPIRATION RATE: 20 BRPM | DIASTOLIC BLOOD PRESSURE: 62 MMHG | WEIGHT: 149.25 LBS | SYSTOLIC BLOOD PRESSURE: 143 MMHG

## 2024-04-30 PROBLEM — J44.1 COPD EXACERBATION: Status: RESOLVED | Noted: 2023-03-21 | Resolved: 2024-04-30

## 2024-04-30 PROCEDURE — 63710000001 PREDNISONE PER 1 MG: Performed by: INTERNAL MEDICINE

## 2024-04-30 PROCEDURE — 94799 UNLISTED PULMONARY SVC/PX: CPT

## 2024-04-30 PROCEDURE — 94664 DEMO&/EVAL PT USE INHALER: CPT

## 2024-04-30 PROCEDURE — 99238 HOSP IP/OBS DSCHRG MGMT 30/<: CPT | Performed by: INTERNAL MEDICINE

## 2024-04-30 PROCEDURE — 94761 N-INVAS EAR/PLS OXIMETRY MLT: CPT

## 2024-04-30 RX ORDER — PREDNISONE 20 MG/1
40 TABLET ORAL
Qty: 4 TABLET | Refills: 0 | Status: SHIPPED | OUTPATIENT
Start: 2024-05-01

## 2024-04-30 RX ORDER — GUAIFENESIN 600 MG/1
1200 TABLET, EXTENDED RELEASE ORAL EVERY 12 HOURS SCHEDULED
Qty: 10 TABLET | Refills: 0 | Status: SHIPPED | OUTPATIENT
Start: 2024-04-30

## 2024-04-30 RX ORDER — OSELTAMIVIR PHOSPHATE 75 MG/1
75 CAPSULE ORAL EVERY 12 HOURS SCHEDULED
Qty: 3 CAPSULE | Refills: 0 | Status: SHIPPED | OUTPATIENT
Start: 2024-04-30 | End: 2024-05-02

## 2024-04-30 RX ADMIN — ASPIRIN 81 MG: 81 TABLET, COATED ORAL at 09:00

## 2024-04-30 RX ADMIN — CITALOPRAM HYDROBROMIDE 40 MG: 20 TABLET ORAL at 09:00

## 2024-04-30 RX ADMIN — BREXPIPRAZOLE 0.5 MG: 1 TABLET ORAL at 09:01

## 2024-04-30 RX ADMIN — FLUOXETINE HYDROCHLORIDE 10 MG: 10 CAPSULE ORAL at 09:01

## 2024-04-30 RX ADMIN — TIOTROPIUM BROMIDE INHALATION SPRAY 2 PUFF: 3.12 SPRAY, METERED RESPIRATORY (INHALATION) at 07:27

## 2024-04-30 RX ADMIN — LOSARTAN POTASSIUM 50 MG: 50 TABLET, FILM COATED ORAL at 09:01

## 2024-04-30 RX ADMIN — CETIRIZINE HYDROCHLORIDE 10 MG: 10 TABLET, FILM COATED ORAL at 09:00

## 2024-04-30 RX ADMIN — LEVOTHYROXINE SODIUM 25 MCG: 25 TABLET ORAL at 05:57

## 2024-04-30 RX ADMIN — PREDNISONE 40 MG: 20 TABLET ORAL at 09:00

## 2024-04-30 RX ADMIN — Medication 10 ML: at 09:04

## 2024-04-30 RX ADMIN — OSELTAMIVIR PHOSPHATE 75 MG: 75 CAPSULE ORAL at 09:01

## 2024-04-30 RX ADMIN — PRIMIDONE 50 MG: 50 TABLET ORAL at 09:01

## 2024-04-30 RX ADMIN — ATORVASTATIN CALCIUM 40 MG: 40 TABLET, FILM COATED ORAL at 09:00

## 2024-04-30 RX ADMIN — HYDROCHLOROTHIAZIDE 12.5 MG: 25 TABLET ORAL at 09:01

## 2024-04-30 RX ADMIN — MEMANTINE HYDROCHLORIDE 5 MG: 5 TABLET, FILM COATED ORAL at 09:01

## 2024-04-30 RX ADMIN — HYDROXYCHLOROQUINE SULFATE 200 MG: 200 TABLET ORAL at 09:00

## 2024-04-30 RX ADMIN — GUAIFENESIN 1200 MG: 600 TABLET, EXTENDED RELEASE ORAL at 09:01

## 2024-04-30 RX ADMIN — BACITRACIN ZINC, NEOMYCIN SULFATE AND POLYMYXIN B SULFATE 1 APPLICATION: 400; 5; 5000 OINTMENT TOPICAL at 09:02

## 2024-04-30 RX ADMIN — DONEPEZIL HYDROCHLORIDE 10 MG: 5 TABLET, FILM COATED ORAL at 09:01

## 2024-04-30 RX ADMIN — IPRATROPIUM BROMIDE AND ALBUTEROL SULFATE 3 ML: 2.5; .5 SOLUTION RESPIRATORY (INHALATION) at 07:27

## 2024-04-30 RX ADMIN — BUDESONIDE AND FORMOTEROL FUMARATE DIHYDRATE 2 PUFF: 160; 4.5 AEROSOL RESPIRATORY (INHALATION) at 07:27

## 2024-04-30 NOTE — PLAN OF CARE
Goal Outcome Evaluation:              Outcome Evaluation: Pt is resting in bed at this time. Pt refused a bath this shift, educated on importance. No concerns or complaints at this time. Will continue POC.

## 2024-04-30 NOTE — DISCHARGE SUMMARY
[facility] HOSPITALISTS DISCHARGE SUMMARY    Patient Identification:  Name:  Lexie KEITH Valdez  Age:  63 y.o.  Sex:  female  :  1960  MRN:  [id number]  Visit Number:  [id number]    Date of Admission: 2024  Date of Discharge:      PCP: Violet Pop APRN    DISCHARGE DIAGNOSIS  #Influenza A pneumonia  #Acute COPD exacerbation due to above  #Steroid-induced leukocytosis, resolved  #Hypokalemia, resolved  #Generalized weakness/physical debility    Already has copd rescue kit  Decreased bibasi    CONSULTS   COPD education    PROCEDURES PERFORMED  None    HOSPITAL COURSE  Patient is a 63 y.o. female presented to [facility] complaining of general malaise, shortness of breath and cough.  Please see the admitting history and physical for further details.     Workup in the emergency department revealed an acute COPD exacerbation due to influenza A with concern for atypical/viral pneumonia.  Patient was admitted to the hospital medicine service and isolation precautions.  Patient's procalcitonin level was within normal limits.  Patient did have leukocytosis which was thought to be steroid-induced.    Patient was started on IV steroids, scheduled nebulized inhalants and Tamiflu.  Patient was not started on antibiotic therapy given high likelihood of acute viral illness.  Patient did receive a dose of IV Lasix during her hospitalization to assist with lung compliance.  Patient did subsequently have some hypokalemia and received supplementation.  Magnesium was checked and found to be within normal limits at 2.    Patient did participate with physical therapy during her hospital stay.  She is able to ambulate in room with front wheeled walker.  Patient states that she does use assistive devices at home for ambulation.    Patient was offered a COPD rescue kit at the time of discharge but stated that she already had 1.  Patient was discharged home to complete a course of Tamiflu and a short  course of prednisone.  It was felt that patient had maximized the benefit of her inpatient hospitalization and patient was discharged home in hemodynamically stable condition.  Patient remained stable on her baseline 3 L/min nasal cannula.    VITAL SIGNS:  Temp:  [97.6 °F (36.4 °C)-98.5 °F (36.9 °C)] 97.7 °F (36.5 °C)  Heart Rate:  [68-80] 75  Resp:  [18-20] 20  BP: (114-143)/(62-70) 143/62  SpO2:  [97 %-99 %] 99 %  on  Flow (L/min):  [3] 3;   Device (Oxygen Therapy): nasal cannula    Body mass index is 25.61 kg/m².  Wt Readings from Last 3 Encounters:   04/26/24 67.7 kg (149 lb 4 oz)   04/19/24 67.1 kg (148 lb)   12/31/23 73.8 kg (162 lb 11.2 oz)       PHYSICAL EXAM:  Physical Exam  Constitutional:       General: She is not in acute distress.     Appearance: She is well-developed.      Interventions: Nasal cannula in place.   HENT:      Head: Normocephalic and atraumatic.   Eyes:      Conjunctiva/sclera: Conjunctivae normal.   Neck:      Trachea: No tracheal deviation.   Cardiovascular:      Rate and Rhythm: Normal rate and regular rhythm.      Heart sounds: No murmur heard.     No friction rub. No gallop.   Pulmonary:      Effort: No respiratory distress.      Breath sounds: Examination of the right-lower field reveals decreased breath sounds. Examination of the left-lower field reveals decreased breath sounds. Decreased breath sounds present. No wheezing or rales.   Abdominal:      General: Bowel sounds are normal. There is no distension.      Palpations: Abdomen is soft.      Tenderness: There is no abdominal tenderness. There is no guarding.   Skin:     General: Skin is warm and dry.      Findings: No erythema or rash.   Neurological:      Mental Status: She is alert and oriented to person, place, and time.      Cranial Nerves: No cranial nerve deficit.          DISCHARGE DISPOSITION   Stable    DISCHARGE MEDICATIONS      Your medication list        START taking these medications        Instructions Last Dose  Given Next Dose Due   guaiFENesin 600 MG 12 hr tablet  Commonly known as: MUCINEX      Take 2 tablets by mouth Every 12 (Twelve) Hours.       oseltamivir 75 MG capsule  Commonly known as: TAMIFLU      Take 1 capsule by mouth Every 12 (Twelve) Hours for 3 doses. Indications: Influenza A       predniSONE 20 MG tablet  Commonly known as: DELTASONE  Start taking on: May 1, 2024      Take 2 tablets by mouth Daily With Breakfast.              CONTINUE taking these medications        Instructions Last Dose Given Next Dose Due   aspirin 81 MG EC tablet      Take 1 tablet by mouth Daily.       atorvastatin 40 MG tablet  Commonly known as: LIPITOR      Take 1 tablet by mouth Daily.       Brexpiprazole 0.5 MG tablet      Take 0.5 mg by mouth Daily.       busPIRone 10 MG tablet  Commonly known as: BUSPAR      Take 1 tablet by mouth 2 (Two) Times a Day As Needed (Mood).       citalopram 40 MG tablet  Commonly known as: CeleXA      Take 1 tablet by mouth Daily.       donepezil 10 MG tablet  Commonly known as: ARICEPT      Take 1 tablet by mouth Daily.       FLUoxetine 10 MG capsule  Commonly known as: PROzac      Take 1 capsule by mouth Daily.       hydroCHLOROthiazide 12.5 MG tablet      Take 1 tablet by mouth Daily.       HYDROcodone-acetaminophen 5-325 MG per tablet  Commonly known as: NORCO      Take 1 tablet by mouth Every 8 (Eight) Hours As Needed for Moderate Pain.       hydroxychloroquine 200 MG tablet  Commonly known as: PLAQUENIL      Take 1 tablet by mouth 2 (Two) Times a Day.       ipratropium-albuterol 0.5-2.5 mg/3 ml nebulizer  Commonly known as: DUO-NEB      Take 3 mL by nebulization Every 4 (Four) Hours As Needed for Wheezing or Shortness of Air.       levocetirizine 5 MG tablet  Commonly known as: XYZAL      Take 1 tablet by mouth Every Evening.       levothyroxine 25 MCG tablet  Commonly known as: SYNTHROID, LEVOTHROID      Take 1 tablet by mouth Every Morning.       losartan 50 MG tablet  Commonly known as:  COZAAR      Take 1 tablet by mouth Daily.       memantine 5 MG tablet  Commonly known as: NAMENDA      Take 1 tablet by mouth 2 (Two) Times a Day.       nabumetone 750 MG tablet  Commonly known as: RELAFEN      Take 1 tablet by mouth 2 (Two) Times a Day As Needed for Mild Pain or Moderate Pain.       polyethylene glycol 17 g packet  Commonly known as: MIRALAX      Take 17 g by mouth Daily.       primidone 50 MG tablet  Commonly known as: MYSOLINE      Take 1 tablet by mouth 2 (Two) Times a Day.       Trelegy Ellipta 200-62.5-25 MCG/ACT inhaler  Generic drug: Fluticasone-Umeclidin-Vilant      Inhale 1 puff Daily.       Ventolin  (90 Base) MCG/ACT inhaler  Generic drug: albuterol sulfate HFA      Inhale 2 puffs by mouth Every 4 (Four) Hours As Needed for Wheezing.       vitamin D 1.25 MG (50482 UT) capsule capsule  Commonly known as: ERGOCALCIFEROL      Take 1 capsule by mouth 1 (One) Time Per Week.                 Where to Get Your Medications        These medications [facility] [address] [place] [address]      Hours: Monday to Friday 7 AM to 6 PM Phone: [phone]   guaiFENesin 600 MG 12 hr tablet  oseltamivir 75 MG capsule  predniSONE 20 MG tablet             Diet Instructions       Diet: Regular/House Diet; Regular (IDDSI 7); Thin (IDDSI 0)      Discharge Diet: Regular/House Diet    Texture: Regular (IDDSI 7)    Fluid Consistency: Thin (IDDSI 0)          Activity Instructions       Activity as Tolerated      Measure Blood Pressure            Future Appointments   Date Time Provider Department Center   5/8/2024 10:30 AM [facility] CT 1  COR CT IS [facility]   7/19/2024  8:30 AM Marika Ramsey PA-C [facility] CORS COR     Your Scheduled Appointments      May 08, 2024 10:30 AM  CT cor chest hi resolution with [facility] CT 1  [facility] IMAGING  CT ([facility]) [address] [facility] [address]  [phone]   N/A         Jul 19, 2024  8:30  AM  Follow Up with Marika Ramsey PA-C  [facility] GROUP PULMONARY & CRITICAL CARE MEDICINE ([place]) [address]  [facility] [address] [phone]   Established: Please bring outside images or reports.              Additional Instructions for the Follow-ups that You Need to Schedule       Discharge Follow-up with PCP   As directed       Currently Documented PCP:    Violet Pop APRN    PCP Phone Number:    [phone]     Follow Up Details: 1 week; hospital follow up Flu A and COPD exacerbation               Follow-up Information       Violet Pop APRN .    Specialty: Family Medicine  Why: 1 week; hospital follow up Flu A and COPD exacerbation  Contact information:  [address]  [facility] [address]  [phone]                              TEST  RESULTS PENDING AT DISCHARGE  Pending Labs       Order Current Status    Blood Culture - Blood, Arm, Left Preliminary result    Blood Culture - Blood, Arm, Right Preliminary result             CODE STATUS  Code Status and Medical Interventions:   Ordered at: 04/26/24 1511     Code Status (Patient has no pulse and is not breathing):    CPR (Attempt to Resuscitate)     Medical Interventions (Patient has pulse or is breathing):    Full Support       Jenny Santillan DO  04/30/24  10:34 EDT    Please note that this discharge summary required less than 30 minutes to complete.    Please send a copy of this dictation to the following providers:  Violet Pop APRN

## 2024-04-30 NOTE — DISCHARGE INSTR - APPOINTMENTS
YOU HAVE A FOLLOW-UP APPOINTMENT SCHEDULED WITH SOFIA HAILE NP ON 5-7-24 AT 4:00, OFFICE CAN BE REACHED [phone]

## 2024-04-30 NOTE — PAYOR COMM NOTE
"CONTACT: DINORA HILL RN  UTILIZATION MANAGEMENT DEPT.  [facility]  [address]  [place] [address]  PHONE: [phone]  FAX: [phone]        DISCHARGE NOTIFICATION  DC DATE: 4/30/24 TO HOME    REF#[id number]       Lexie Valdez (63 y.o. Female)       Date of Birth   1960    Social Security Number       Address   [address] [place] [address]    Home Phone       MRN   [id number]       Jew   [facility]    Marital Status                               Admission Date   4/28/24    Admission Type   Emergency    Admitting Provider   Geoffrey Burnette DO    Attending Provider       Department, Room/Bed   [facility] [address] [address]/       Discharge Date   4/30/2024    Discharge Disposition   Home or Self Care    Discharge Destination   Home                              Attending Provider: (none)   Allergies: Codeine, Sulfa Antibiotics    Isolation: Droplet   Infection: Influenza (04/26/24)   Code Status: CPR    Ht: 162.6 cm (64.02\")   Wt: 67.7 kg (149 lb 4 oz)    Admission Cmt: None   Principal Problem: Influenza A [J10.1]                   Active Insurance as of 4/26/2024       Primary Coverage       Payor Plan Insurance Group Employer/Plan Group    ANTHEM MEDICARE REPLACEMENT ANTHEM MEDICARE ADVANTAGE [id number]       Payor Plan Address Payor Plan Phone Number Payor Plan Fax Number Effective Dates    [address] [phone]  1/1/2022 - None Entered    [facility] [address]         Subscriber Name Subscriber Birth Date Member ID       LEXIE VALDEZ 1960 [id number]               Secondary Coverage       Payor Plan Insurance Group Employer/Plan Group    [place]        Payor Plan Address Payor Plan Phone Number Payor Plan Fax Number Effective Dates    [address] [phone]  5/1/2021 - None Entered    [facility] [id number]         Subscriber Name Subscriber Birth Date Member ID       LEXIE VALDEZ 1960 [id number]               "       Emergency Contacts        (Rel.) Home Phone Work Phone Mobile Phone    Valdez,Khris (Spouse) -- -- [phone]    Faith Davila (Son) [phone] -- --    Rosa Kessler (Sister) -- -- [phone]              Discharge Summary    No notes of this type exist for this encounter.       Discharge Order (From admission, onward)       Start     Ordered    04/30/24 1029  Discharge patient  Once        Expected Discharge Date: 04/30/24   Discharge Disposition: Home or Self Care   Physician of Record for Attribution - Please select from Treatment Team: SCOTT LAZARO [[id number]]   Review needed by CMO to determine Physician of Record: No      Question Answer Comment   Physician of Record for Attribution - Please select from Treatment Team SCOTT LAZARO    Review needed by CMO to determine Physician of Record No        04/30/24 1359

## 2024-04-30 NOTE — CONSULTS
COPD Education        Referring Provider: Dr. Burnette  Reason for Consultation: COPD Exacerbation  Pulmonologist: [facility]  Last outpatient pulmonary visit: 2024  Length of Diagnosis: Several Years per patient recollection  Last Hospital stay for COPD? None    Subjective .     Age: 63 y.o.  Sex: female  What stage have you been told you are in? Unknown  Do you use a CPAP or BIPAP? no   Have you had any recent weight loss? Yes, 7lbs  How many pillows do you use? 2    Last PFT/when/where? 2019  FEV1: 62%-Post neb tx    Classification of Airflow Limitation Severity in COPD (Based on Post-Bronchodilator FEV1)  Gold 1: Mild FEV1 ? 80% predicted   Gold 2:  Moderate 50% ? FEV1 < 80% predicted   Gold 3: Severe 30% ? FEV1 < 50% predicted   Gold 4: Very Severe FEV1 < 30% predicted     FEV1/FVC: actual: 87. Post neb tx.    Do you have dyspnea? yes Dyspnea on exertion  Home O2: 2L NC, HS    Social History:  Social History     Socioeconomic History    Marital status:    Tobacco Use    Smoking status: Former, 30 pack years     Types: Cigarettes     Start date: 1971     Quit date: 10/18/2005     Years since quittin.4    Smokeless tobacco: Never   Vaping Use    Vaping Use: Never used   Substance and Sexual Activity    Alcohol use: No    Drug use: No    Sexual activity: Yes     Partners: Male       Smoking Cessation: No    Airway Clearance methods utilized: no, flutter valve ordered for patient.    History of Sleep Apnea: no  Patient's Last ABG:         Objective     SpO2 SpO2: 99 % (24 0727)  Device Device (Oxygen Therapy): nasal cannula (24 0900)  Flow Flow (L/min): 3 (24 0900)  Home Equipment Used: O2, Nebulizer Provided by: Immediately  Breath Sounds: diminished breath sounds- throughout           Home Medications:  Medications Prior to Admission   Medication Sig Dispense Refill Last Dose    aspirin 81 MG EC tablet Take 1 tablet by mouth Daily.   2024     atorvastatin (LIPITOR) 40 MG tablet Take 1 tablet by mouth Daily.   4/26/2024    Brexpiprazole 0.5 MG tablet Take 0.5 mg by mouth Daily.   4/26/2024    citalopram (CeleXA) 40 MG tablet Take 1 tablet by mouth Daily.   4/26/2024    donepezil (ARICEPT) 10 MG tablet Take 1 tablet by mouth Daily.   4/26/2024    FLUoxetine (PROzac) 10 MG capsule Take 1 capsule by mouth Daily.   4/26/2024    Fluticasone-Umeclidin-Vilant (Trelegy Ellipta) 200-62.5-25 MCG/ACT inhaler Inhale 1 puff Daily. 180 each 3 4/26/2024    hydroCHLOROthiazide (HYDRODIURIL) 12.5 MG tablet Take 1 tablet by mouth Daily.   4/26/2024    hydroxychloroquine (PLAQUENIL) 200 MG tablet Take 1 tablet by mouth 2 (Two) Times a Day.   4/26/2024    levocetirizine (XYZAL) 5 MG tablet Take 1 tablet by mouth Every Evening.   Past Week    levothyroxine (SYNTHROID, LEVOTHROID) 25 MCG tablet Take 1 tablet by mouth Every Morning.   4/26/2024    losartan (COZAAR) 50 MG tablet Take 1 tablet by mouth Daily.   4/26/2024    memantine (NAMENDA) 5 MG tablet Take 1 tablet by mouth 2 (Two) Times a Day.   4/26/2024    polyethylene glycol (MIRALAX) 17 g packet Take 17 g by mouth Daily.   4/26/2024    primidone (MYSOLINE) 50 MG tablet Take 1 tablet by mouth 2 (Two) Times a Day.   4/26/2024    vitamin D (ERGOCALCIFEROL) 1.25 MG (25393 UT) capsule capsule Take 1 capsule by mouth 1 (One) Time Per Week.   Past Week    busPIRone (BUSPAR) 10 MG tablet Take 1 tablet by mouth 2 (Two) Times a Day As Needed (Mood).   Unknown    HYDROcodone-acetaminophen (NORCO) 5-325 MG per tablet Take 1 tablet by mouth Every 8 (Eight) Hours As Needed for Moderate Pain.   Unknown    ipratropium-albuterol (DUO-NEB) 0.5-2.5 mg/3 ml nebulizer Take 3 mL by nebulization Every 4 (Four) Hours As Needed for Wheezing or Shortness of Air. 360 mL 3 Unknown    nabumetone (RELAFEN) 750 MG tablet Take 1 tablet by mouth 2 (Two) Times a Day As Needed for Mild Pain or Moderate Pain.   Unknown    Ventolin  (90 Base)  "MCG/ACT inhaler Inhale 2 puffs by mouth Every 4 (Four) Hours As Needed for Wheezing. 18 g 8 Unknown     Barriers to Learning? No    Discussion:       COPD education given via the booklet \"A Patient's Guide to COPD\".     COPD Zones: (Green/yellow/Red): YES     Exacerbation or Flare up signs and symptoms: YES     Causes of COPD Exacerbation:      Lung Infection YES     Indoor and Outdoor Irratants YES     COPD Medication Non-Compliance YES     Healthy eating and drinking habbits: YES     Exercise and Activity: YES     Managing Medications: YES     Patient understands use of Rescue Medications: YES and Albuterol MDI and Duoneb nebulizer treatments       Patient understands use of Maintenance Medications: YES and Trelegy 200       Proper MDI technique (w/wo Spacer): YES       How to use a nebulizer: YES     How to clean a nebulizer: YES     Breathing Techniques:       Purse-lipped breathing YES     Oxygen therapy SAFETY:  YES       Action Plan            COPD/L[facility] Health [facility] follow up scheduled: NO and Ms. Valdez stated at this time she wishes to continue to only see Pulmonology at their office. She stated transportation is difficult.      Education Minutes with patient: YES and 30 minutes       Goals Discussed with patient: Keep home smoke free., Take medications as ordered., GO to Dr. appointments., Increase activity., Eat healthier., Increase use of pursed lip breathing., Decrease flare-ups., and Increase COPD Knowledge.    COPD education was given to Ms. Valdez via the booklet , \"A Patient's Guide to COPD\". Discussion of the COPD zones (Green/Yellow/Red) was competed with emphasizes on what to do in the yellow and red zones. She was in her room and pleasantly interested in COPD education.      Proper Inhaler technique was illustrated with and without the given Aero Chamber spacer I provided to the patient. Instruction on rescue and maintenance medications, the function of each and the importance of using them as " prescribed.      Pursed Lip breathing was instructed as well as when to utilize the breathing technique.      Ms. Valdez gets her O2 supplies from [employer]. I made her aware of their COPD program and she was interested. I will notify [employer] and let them know of her interest in their program.      She stated she sometimes has copious secretions at home that are hard to mobilize. I placed a order for a flutter valve to help her excrete her secretions.      Ms. Valdez was aware she has a follow up visit scheduled with Marika Landry, on 7/19/2024. We discussed the importance of her attending this appointment.     Thanks for allowing me to participate in her care,    JALEESA Carolina, RRT  COPD Navigator  04/30/24  09:17 EDT

## 2024-04-30 NOTE — TELEPHONE ENCOUNTER
Caller: NURSE EDUCATOR - [employer]    Relationship to patient: Other    Best call back number: [phone]    Patient is needing: THE NURSE EDUCATOR WITH [employer] CALLED TO CONFIRM THE PHONE NUMBER SHE HAS. SHE STATED SHE HAS ATTEMPTED TO CONTACT THE PATIENT THREE TIMES REGARDING MEDICINE EDUCATION AND HELP NAVIGATING GETTING LOWEST COST OPTIONS. HOWEVER SHE HAS BEEN UNABLE TO REACH THE PATIENT.

## 2024-05-01 LAB
BACTERIA SPEC AEROBE CULT: NORMAL
BACTERIA SPEC AEROBE CULT: NORMAL

## 2024-05-01 NOTE — OUTREACH NOTE
Prep Survey      Flowsheet Row Responses   [facility] patient discharged from? [facility]   Is LACE score < 7 ? No   Eligibility Readm Mgmt   Discharge diagnosis Influenza A   Does the patient have one of the following disease processes/diagnoses(primary or secondary)? Other   Does the patient have Home health ordered? No   Is there a DME ordered? Yes   What DME was ordered? [facility] for [facility]   Prep survey completed? Yes            SOFIA A - Registered Nurse

## 2024-05-07 ENCOUNTER — TELEPHONE (OUTPATIENT)
Dept: PULMONOLOGY | Facility: CLINIC | Age: 64
End: 2024-05-07
Payer: MEDICARE

## 2024-05-08 ENCOUNTER — HOSPITAL ENCOUNTER (OUTPATIENT)
Facility: HOSPITAL | Age: 64
Discharge: HOME | End: 2024-05-08
Admitting: PHYSICIAN ASSISTANT
Payer: MEDICARE

## 2024-05-08 DIAGNOSIS — J84.9 ILD (INTERSTITIAL LUNG DISEASE): ICD-10-CM

## 2024-05-08 DIAGNOSIS — J98.4 RESTRICTIVE LUNG DISEASE: Primary | ICD-10-CM

## 2024-05-08 DIAGNOSIS — M05.79 RHEUMATOID ARTHRITIS INVOLVING MULTIPLE SITES WITH POSITIVE RHEUMATOID FACTOR: ICD-10-CM

## 2024-05-08 PROCEDURE — 71250 CT THORAX DX C-: CPT

## 2024-05-08 PROCEDURE — 71250 CT THORAX DX C-: CPT | Performed by: RADIOLOGY

## 2024-05-09 ENCOUNTER — READMISSION MANAGEMENT (OUTPATIENT)
Dept: CALL CENTER | Facility: HOSPITAL | Age: 64
End: 2024-05-09
Payer: MEDICARE

## 2024-06-10 ENCOUNTER — TELEPHONE (OUTPATIENT)
Dept: PULMONOLOGY | Facility: CLINIC | Age: 64
End: 2024-06-10
Payer: MEDICARE

## 2024-06-10 NOTE — TELEPHONE ENCOUNTER
[facility] called to let us know that appeal submitted had also been denied, as they require further testing. [facility] is going to reach out to patient to see if she would like to be sent to [facility] for financial assistance.

## 2024-06-11 ENCOUNTER — HOSPITAL ENCOUNTER (OUTPATIENT)
Dept: RESPIRATORY THERAPY | Facility: HOSPITAL | Age: 64
Discharge: HOME | End: 2024-06-11
Payer: MEDICARE

## 2024-06-11 DIAGNOSIS — J98.4 RESTRICTIVE LUNG DISEASE: ICD-10-CM

## 2024-06-11 NOTE — PROGRESS NOTES
Patient unable to exhale for six seconds and finish spirometry. Patient complained of shortness of breath and stated she could not complete pft. Patient refused to go to ER. Marika TOLEDO office notified that patient was unable to do pft.

## 2024-07-07 DIAGNOSIS — J44.9 CHRONIC OBSTRUCTIVE PULMONARY DISEASE, UNSPECIFIED COPD TYPE: ICD-10-CM

## 2024-07-08 RX ORDER — IPRATROPIUM BROMIDE AND ALBUTEROL SULFATE 2.5; .5 MG/3ML; MG/3ML
SOLUTION RESPIRATORY (INHALATION)
Qty: 360 ML | Refills: 3 | Status: SHIPPED | OUTPATIENT
Start: 2024-07-08

## 2024-07-18 PROBLEM — I21.4 NSTEMI (NON-ST ELEVATED MYOCARDIAL INFARCTION): Status: ACTIVE | Noted: 2024-07-18

## 2024-07-18 NOTE — CASE MANAGEMENT/SOCIAL WORK
Discharge Planning Assessment   Alex     Patient Name: Lexie Valdez  MRN: [id number]  Today's Date: 7/18/2024    Admit Date: 7/17/2024    Plan: Patient is an independent retired  who lives at home with her , where she plans to return at discharge.  Patient's PCP is Violet Pop and she uses [facility] for her prescription needs.  Patient denies any insurance or financial issues at this time.  Patient does not utilize Home Health but if needed at discharge she lives in [facility].  Patient has BP Cuff, SC, BSC, Nebulizer, O2 @ 3L, Portable Concentrator, Pulse Ox, Rollator, Shower Chair & WC from [facility].  Family will provide transportation at discharge.  No other issues or concerns are noted at this time.  CM will continue to follow and assist with any discharge needs.   Discharge Needs Assessment       Row Name 07/18/24 1445       Living Environment    People in Home spouse    Name(s) of People in Home Spouse - Khris    Current Living Arrangements other (see comments)  Mobile Home    Duration at Residence 8 yrs.    Potentially Unsafe Housing Conditions none    Primary Care Provided by spouse/significant other;other (see comments)  Sibling    Provides Primary Care For no one, unable/limited ability to care for self    Family Caregiver if Needed sibling(s);spouse    Family Caregiver Names Khris & Rosa barnes when needed    Quality of Family Relationships helpful;involved;supportive    Able to Return to Prior Arrangements yes       Resource/Environmental Concerns    Resource/Environmental Concerns none    Transportation Concerns none       Transition Planning    Patient/Family Anticipates Transition to home with family    Patient/Family Anticipated Services at Transition none    Transportation Anticipated family or friend will provide       Discharge Needs Assessment    Readmission Within the Last 30 Days no previous admission in last 30 days    Equipment Currently Used at Home  cane, straight;wheelchair;rollator;nebulizer;oxygen;commode;bp cuff;pulse ox;shower chair    Concerns to be Addressed no discharge needs identified;denies needs/concerns at this time    Anticipated Changes Related to Illness inability to care for self    Equipment Needed After Discharge none    Provided Post Acute Provider List? Refused    Refused Provider List Comment Patient uses [facility].    Current Discharge Risk chronically ill                   Discharge Plan       Row Name 07/18/24 9377       Plan    Plan Patient is an independent retired  who lives at home with her , where she plans to return at discharge.  Patient's PCP is Violet Pop and she uses [facility] for her prescription needs.  Patient denies any insurance or financial issues at this time.  Patient does not utilize Home Health but if needed at discharge she lives in [facility].  Patient has BP Cuff, SC, BSC, Nebulizer, O2 @ 3L, Portable Concentrator, Pulse Ox, Rollator, Shower Chair & WC from [facility].  Family will provide transportation at discharge.  No other issues or concerns are noted at this time.  CM will continue to follow and assist with any discharge needs.    Patient/Family in Agreement with Plan yes                  Continued Care and Services - Admitted Since 7/17/2024    No active coordination exists for this encounter.       Expected Discharge Date and Time       Expected Discharge Date Expected Discharge Time    Jul 20, 2024            Demographic Summary       Row Name 07/18/24 1446       General Information    Admission Type inpatient    Arrived From home;emergency department    Referral Source high risk screening;admission list    Reason for Consult discharge planning;other (see comments)  CM Trigger    Preferred Language English                   Functional Status       Row Name 07/18/24 1444       Functional Status    Usual Activity Tolerance good    Current Activity Tolerance good        Functional Status, IADL    Medications independent    Meal Preparation completely dependent    Housekeeping completely dependent    Laundry completely dependent    Shopping completely dependent       Mental Status    General Appearance WDL WDL       Mental Status Summary    Recent Changes in Mental Status/Cognitive Functioning no changes       Employment/    Employment Status retired;disabled    Current or Previous Occupation healthcare    Employment/ Comments                    Psychosocial    No documentation.                  Abuse/Neglect    No documentation.                  Legal    No documentation.                  Substance Abuse    No documentation.                  Patient Forms    No documentation.                     Tracy Suggs RN

## 2024-07-18 NOTE — PROGRESS NOTES
[facility] INFUSION  Lexie Valdez is a  64 y.o. female receiving heparin infusion.     Therapy for (VTE/Cardiac):   Cardiac  Patient Dosing Weight: 57.2 kg  Initial Bolus (Y/N):   Y  Any Bolus (Y/N):   Y        Signs or Symptoms of Bleeding: N    Cardiac or Other (Not VTE)   Initial Bolus: 60 units/kg (Max 4,000 units)  Initial rate: 12 units/kg/hr (Max 1,000 units/hr)   Anti Xa Rebolus Infusion Hold time Change infusion Dose (Units/kg/hr) Next Anti Xa or aPTT Level Due   < 0.11 50 Units/kg  (4000 Units Max) None Increase by  3 Units/kg/hr 6 hours   0.11- 0.19 25 Units/kg  (2000 Units Max) None Increase by  2 Units/kg/hr 6 hours   0.2 - 0.29 0 None Increase by  1 Units/kg/hr 6 hours   0.3 - 0.5 0 None No Change 6 hours (after 2 consecutive levels in range check q24h @0700)   0.51 - 0.6 0 None Decrease by  1 Units/kg/hr 6 hours   0.61 - 0.8 0 30 Minutes Decrease by  2 Units/kg/hr 6 hours   0.81 - 1 0 60 Minutes Decrease by  3 Units/kg/hr 6 hours   >1 0 Hold  After Anti Xa less than 0.5 decrease previous rate by  4 Units/kg/hr  Every 2 hours until Anti Xa  less than 0.5 then when infusion restarts in 6 hours       Recommend anti-Xa every 6 hours.          Date   Time   Anti-Xa Current Rate (Unit/kg/hr) Bolus   (Units) Rate Change   (Unit/kg/hr) New Rate (Unit/kg/hr) Next   Anti-Xa Comments  Pump Check Daily   7/18 0214 < 0.10 - 3500 +12 12 0900 No s/s of bleeding per nurse Shereen   7/18 1002 0.23 12 - +1 13 1800 Labs late, no bolus, rate adjusted, no s/s bleeding, d/w  Brianna ROSEN                                                                                                                                                                                                                      Pharmacy will continue to follow anti-Xa results and monitor for signs and symptoms of bleeding or thrombosis.      07/18/24   saulo

## 2024-07-18 NOTE — PLAN OF CARE
Goal Outcome Evaluation:              Outcome Evaluation: Pt is resting in bed, VSS at this time with no complaints. POC ongoing

## 2024-07-18 NOTE — ED NOTES
Pt refused being straight cath for urine. Assist pt to bedside commode and pt oxygen declined to 71% on 3L NC. Once pt was placed back in bed,  pt to breath in nose and out mouth and slow to breathing. Pt had labored breathing and nasal flaring.

## 2024-07-18 NOTE — PROGRESS NOTES
Patient seen and examined during am rounds. H&P from same day reviewed as well as admission labwork and imaging. Agree with plan as previously documented with below updates:     Agree with initial concern for active primary cardiac pathology as patient O2 requirement at stable baseline and CXR while having significant b/l reticular opacifications, not significantly changed compared to prior imaging. Will await cardiology input while continuing npo status and heparin gtt. OK to resume diet this afternoon

## 2024-07-18 NOTE — PLAN OF CARE
Goal Outcome Evaluation:  Plan of Care Reviewed With: patient        Problem: Adult Inpatient Plan of Care  Goal: Plan of Care Review  Outcome: Ongoing, Progressing  Flowsheets (Taken 7/18/2024 0418)  Plan of Care Reviewed With: patient  Goal: Absence of Hospital-Acquired Illness or Injury  Intervention: Identify and Manage Fall Risk  Description: Perform standard risk assessment on admission using a validated tool or comprehensive approach appropriate to the patient; reassess fall risk frequently, with change in status or transfer to another level of care.  Communicate fall injury risk to interprofessional healthcare team.  Determine need for increased observation, equipment and environmental modification, such as low bed, signage and supportive, nonskid footwear.  Adjust safety measures to individual developmental age, stage and identified risk factors.  Reinforce the importance of safety and physical activity with patient and family.  Perform regular intentional rounding to assess need for position change, pain assessment and personal needs, including assistance with toileting.  Recent Flowsheet Documentation  Taken 7/18/2024 0315 by Rin Jasmine RN  Safety Promotion/Fall Prevention: safety round/check completed  Intervention: Prevent and Manage VTE (Venous Thromboembolism) Risk  Description: Assess for VTE (venous thromboembolism) risk.  Encourage and assist with early ambulation.  Initiate and maintain compression or other therapy, as indicated, based on identified risk in accordance with organizational protocol and provider order.  Encourage both active and passive leg exercises while in bed, if unable to ambulate.  Recent Flowsheet Documentation  Taken 7/18/2024 0315 by Rin Jasmine RN  VTE Prevention/Management: (See MAR) other (see comments)  Goal: Optimal Comfort and Wellbeing  Intervention: Provide Person-Centered Care  Description: Use a family-focused approach to care.  Develop trust and rapport  by proactively providing information, encouraging questions, addressing concerns and offering reassurance.  Acknowledge emotional response to hospitalization.  Recognize and utilize personal coping strategies.  Honor spiritual and cultural preferences.  Recent Flowsheet Documentation  Taken 7/18/2024 0315 by Rin Jasmine RN  Trust Relationship/Rapport:   care explained   choices provided   questions answered   reassurance provided   thoughts/feelings acknowledged  Goal: Readiness for Transition of Care  Intervention: Mutually Develop Transition Plan  Description: Identify available resources for support (e.g., family, friends, community).  Identify and address barriers to ongoing treatment and home management (e.g., environmental, financial).  Provide opportunities to practice self-management skills.  Assess and monitor emotional readiness for transition.  Establish or reconnect linkage with outpatient providers or community-based services.  Recent Flowsheet Documentation  Taken 7/18/2024 0349 by Rin Jasmine RN  Transportation Anticipated: family or friend will provide  Patient/Family Anticipated Services at Transition:   Patient/Family Anticipates Transition to: home with family  Taken 7/18/2024 0345 by Rin Jasmine RN  Equipment Currently Used at Home:   cane, straight   wheelchair   rollator   oxygen   nebulizer     Problem: COPD (Chronic Obstructive Pulmonary Disease) Comorbidity  Goal: Maintenance of COPD Symptom Control  Intervention: Maintain COPD-Symptom Control  Description: Evaluate adherence to management plan (e.g., medication, trigger avoidance, infection prevention, self-monitoring).  Advocate for continuation of home regimen, including medication, method of delivery, schedule and symptom monitoring.  Anticipate the need for breathing techniques and activity pacing to minimize fatigue and breathlessness.  Assess for proper use of inhaled medication and delivery technique;  assist or reinstruct if needed.  Evaluate effectiveness of coping skills; encourage expression of feelings, expectations and concerns related to disease management and quality of life; reinforce education to enhance management plan and wellbeing.  Recent Flowsheet Documentation  Taken 7/18/2024 0315 by Rin Jasmine RN  Medication Review/Management: medications reviewed        Problem: COPD (Chronic Obstructive Pulmonary Disease) Comorbidity  Goal: Maintenance of COPD Symptom Control  Intervention: Maintain COPD-Symptom Control  Description: Evaluate adherence to management plan (e.g., medication, trigger avoidance, infection prevention, self-monitoring).  Advocate for continuation of home regimen, including medication, method of delivery, schedule and symptom monitoring.  Anticipate the need for breathing techniques and activity pacing to minimize fatigue and breathlessness.  Assess for proper use of inhaled medication and delivery technique; assist or reinstruct if needed.  Evaluate effectiveness of coping skills; encourage expression of feelings, expectations and concerns related to disease management and quality of life; reinforce education to enhance management plan and wellbeing.  Recent Flowsheet Documentation  Taken 7/18/2024 0315 by Rin Jasmine RN  Medication Review/Management: medications reviewed

## 2024-07-18 NOTE — NURSING NOTE
Wound consult for abrasion to the RT lower leg and a linear fissure to the midline gluteal cleft. Fissure to the cleft is a result of moisture associated skin damage and presents with a moist diamond wound and a shallow pink wound bed. Sheering noted to surrounding tissue. Apply Z-guard BID and with episodes of incontinence. RT leg presents as an abrasion from a dog scratch. Wound bed is covered by a dry scab and the diamond wound skin is pink and blanchable. New order to paint with betadine daily and leave open to air.    07/18/24 1439   Wound 07/18/24 0310 Right lower leg Abrasion   Placement Date/Time: 07/18/24 0310   Present on Original Admission: Yes  Side: Right  Orientation: lower  Location: leg  Primary Wound Type: (c) Abrasion   Dressing Appearance open to air   Base red;dry;scab   Periwound pink;dry;intact   Periwound Temperature warm   Periwound Skin Turgor soft   Wound Length (cm) 4 cm   Wound Width (cm) 2.7 cm   Wound Depth (cm) 0.1 cm   Wound Surface Area (cm^2) 10.8 cm^2   Wound Volume (cm^3) 1.08 cm^3   Drainage Amount none   Dressing Care open to air   Wound 07/18/24 1438 coccyx MASD (Moisture associated skin damage)   Placement Date/Time: 07/18/24 1438   Present on Original Admission: Yes  Location: coccyx  Primary Wound Type: MASD (Moisture associated skin damage)   Pressure Injury Stage O  (moisture associated skin damage w a fissure)   Base red;moist   Red (%), Wound Tissue Color 100   Periwound pink;blanchable   Periwound Temperature warm   Periwound Skin Turgor soft   Drainage Amount none   Care, Wound   (Z-guard BID and with episodes of incontinence)

## 2024-07-18 NOTE — ED PROVIDER NOTES
Subjective   History of Present Illness  Patient is a 64-year-old female, records indicate that she has COPD and is home oxygen dependent at 3 L.  She presents complaining of shortness of breath that started about 2 weeks ago, has been slowly and progressively worsening, worse today.  She states that she is not coughing much, when she does cough it is nonproductive.  She and her family report that she has had some diarrhea over the past 2 days, approximately 4 times daily.  She has generalized weakness, has not been ambulating much.  When she does ambulate she requires a walker.  She does suffer with chronic debility.  She has not been eating, she has been drinking some fluids but not very much.  She complains that her mouth is dry.  She denies fever, chills, chest pain, hemoptysis, abdominal pain, vomiting, hematemesis, hematochezia or melena, syncope or near syncope, focal numbness or weakness, other symptoms or other complaints.  Patient declines a breathing treatment, states that she does not feel that she needs one at this time, states that she took one at home just prior to coming here.      Review of Systems   All other systems reviewed and are negative.      Past Medical History:   Diagnosis Date    Anxiety     Arthritis     Depression     Hypertensive disorder 3/14/2018    Hypothyroidism 8/3/2021    MARU (stress urinary incontinence, female) 9/11/2019    UTI (urinary tract infection) 12/31/2023       Allergies   Allergen Reactions    Codeine GI Intolerance    Sulfa Antibiotics Itching       Past Surgical History:   Procedure Laterality Date    BREAST BIOPSY Left 2000    benign    GALLBLADDER SURGERY      GASTRIC BANDING      HYSTERECTOMY      age 37       Family History   Problem Relation Age of Onset    Panic disorder Mother     COPD Mother     Alcohol abuse Father     Alzheimer's disease Father     Breast cancer Neg Hx        Social History     Socioeconomic History    Marital status:    Tobacco Use     Smoking status: Former     Current packs/day: 0.00     Average packs/day: 1.5 packs/day for 34.3 years (51.5 ttl pk-yrs)     Types: Cigarettes     Start date: 1971     Quit date: 10/18/2005     Years since quittin.7     Passive exposure: Past    Smokeless tobacco: Never   Vaping Use    Vaping status: Never Used   Substance and Sexual Activity    Alcohol use: No    Drug use: No    Sexual activity: Yes     Partners: Male           Objective   Physical Exam  Vitals and nursing note reviewed.   Constitutional:       Appearance: Normal appearance. She is well-developed. She is not toxic-appearing or diaphoretic.      Comments: Chronically ill-appearing female, awake and alert, appears mildly dyspneic.   HENT:      Head: Normocephalic and atraumatic.   Eyes:      General: No scleral icterus.     Pupils: Pupils are equal, round, and reactive to light.   Neck:      Trachea: No tracheal deviation.   Cardiovascular:      Rate and Rhythm: Normal rate and regular rhythm.   Pulmonary:      Breath sounds: No wheezing.      Comments: Respirations are bit labored.  She has pursed lip breathing.  Diffuse crackles are heard consistent with pulmonary fibrosis.  Chest:      Chest wall: No tenderness.   Abdominal:      General: Bowel sounds are normal.      Palpations: Abdomen is soft.      Tenderness: There is no abdominal tenderness. There is no guarding or rebound.   Musculoskeletal:         General: No tenderness. Normal range of motion.      Cervical back: Normal range of motion and neck supple. No rigidity or tenderness.      Right lower leg: No tenderness.      Left lower leg: No tenderness.   Skin:     General: Skin is warm and dry.      Capillary Refill: Capillary refill takes less than 2 seconds.   Neurological:      General: No focal deficit present.      Mental Status: She is alert and oriented to person, place, and time.      GCS: GCS eye subscore is 4. GCS verbal subscore is 5. GCS motor subscore is 6.       Motor: No abnormal muscle tone.   Psychiatric:         Mood and Affect: Mood normal.         Behavior: Behavior normal.         Procedures  XR Chest 1 View   Final Result   No significant interval change allowing for differences in technique.           This report was finalized on 7/17/2024 9:38 PM by Alex Pallas, DO.            Results for orders placed or performed during the hospital encounter of 07/17/24   COVID-19 and FLU A/B PCR, 1 HR TAT - Swab, Nasopharynx    Specimen: Nasopharynx; Swab   Result Value Ref Range    COVID19 Not Detected Not Detected - Ref. Range    Influenza A PCR Not Detected Not Detected    Influenza B PCR Not Detected Not Detected   Respiratory Panel PCR w/COVID-19(SARS-CoV-2) RHONDA/HEATHER/TASNEEM/PAD/COR/CHRISTELLE In-House, NP Swab in UTM/VTM, 2 HR TAT - Swab, Nasopharynx    Specimen: Nasopharynx; Swab   Result Value Ref Range    ADENOVIRUS, PCR Not Detected Not Detected    Coronavirus 229E Not Detected Not Detected    Coronavirus HKU1 Not Detected Not Detected    Coronavirus NL63 Not Detected Not Detected    Coronavirus OC43 Not Detected Not Detected    COVID19 Not Detected Not Detected - Ref. Range    Human Metapneumovirus Not Detected Not Detected    Human Rhinovirus/Enterovirus Not Detected Not Detected    Influenza A PCR Not Detected Not Detected    Influenza B PCR Not Detected Not Detected    Parainfluenza Virus 1 Not Detected Not Detected    Parainfluenza Virus 2 Not Detected Not Detected    Parainfluenza Virus 3 Not Detected Not Detected    Parainfluenza Virus 4 Not Detected Not Detected    RSV, PCR Not Detected Not Detected    Bordetella pertussis pcr Not Detected Not Detected    Bordetella parapertussis PCR Not Detected Not Detected    Chlamydophila pneumoniae PCR Not Detected Not Detected    Mycoplasma pneumo by PCR Not Detected Not Detected   Comprehensive Metabolic Panel    Specimen: Blood   Result Value Ref Range    Glucose 177 (H) 65 - 99 mg/dL    BUN 8 8 - 23 mg/dL    Creatinine 0.49 (L)  0.57 - 1.00 mg/dL    Sodium 131 (L) 136 - 145 mmol/L    Potassium 4.1 3.5 - 5.2 mmol/L    Chloride 93 (L) 98 - 107 mmol/L    CO2 26.2 22.0 - 29.0 mmol/L    Calcium 9.2 8.6 - 10.5 mg/dL    Total Protein 6.2 6.0 - 8.5 g/dL    Albumin 3.4 (L) 3.5 - 5.2 g/dL    ALT (SGPT) 13 1 - 33 U/L    AST (SGOT) 43 (H) 1 - 32 U/L    Alkaline Phosphatase 106 39 - 117 U/L    Total Bilirubin 0.2 0.0 - 1.2 mg/dL    Globulin 2.8 gm/dL    A/G Ratio 1.2 g/dL    BUN/Creatinine Ratio 16.3 7.0 - 25.0    Anion Gap 11.8 5.0 - 15.0 mmol/L    eGFR 104.7 >60.0 mL/min/1.73   BNP    Specimen: Blood   Result Value Ref Range    proBNP 3,550.0 (H) 0.0 - 900.0 pg/mL   Single High Sensitivity Troponin T    Specimen: Blood   Result Value Ref Range    HS Troponin T 229 (C) <14 ng/L   CBC Auto Differential    Specimen: Blood   Result Value Ref Range    WBC 8.49 3.40 - 10.80 10*3/mm3    RBC 4.11 3.77 - 5.28 10*6/mm3    Hemoglobin 12.9 12.0 - 15.9 g/dL    Hematocrit 38.3 34.0 - 46.6 %    MCV 93.2 79.0 - 97.0 fL    MCH 31.4 26.6 - 33.0 pg    MCHC 33.7 31.5 - 35.7 g/dL    RDW 13.2 12.3 - 15.4 %    RDW-SD 44.7 37.0 - 54.0 fl    MPV 10.8 6.0 - 12.0 fL    Platelets 280 140 - 450 10*3/mm3    Neutrophil % 88.7 (H) 42.7 - 76.0 %    Lymphocyte % 8.7 (L) 19.6 - 45.3 %    Monocyte % 1.9 (L) 5.0 - 12.0 %    Eosinophil % 0.0 (L) 0.3 - 6.2 %    Basophil % 0.2 0.0 - 1.5 %    Immature Grans % 0.5 0.0 - 0.5 %    Neutrophils, Absolute 7.53 (H) 1.70 - 7.00 10*3/mm3    Lymphocytes, Absolute 0.74 0.70 - 3.10 10*3/mm3    Monocytes, Absolute 0.16 0.10 - 0.90 10*3/mm3    Eosinophils, Absolute 0.00 0.00 - 0.40 10*3/mm3    Basophils, Absolute 0.02 0.00 - 0.20 10*3/mm3    Immature Grans, Absolute 0.04 0.00 - 0.05 10*3/mm3    nRBC 0.0 0.0 - 0.2 /100 WBC   Lactic Acid, Plasma    Specimen: Blood   Result Value Ref Range    Lactate 2.0 0.5 - 2.0 mmol/L   Urinalysis With Culture If Indicated - Urine, Catheter    Specimen: Urine, Catheter   Result Value Ref Range    Color, UA Yellow Yellow,  Straw    Appearance, UA Cloudy (A) Clear    pH, UA 6.5 5.0 - 8.0    Specific Gravity, UA 1.013 1.005 - 1.030    Glucose, UA Negative Negative    Ketones, UA Negative Negative    Bilirubin, UA Negative Negative    Blood, UA Negative Negative    Protein, UA Negative Negative    Leuk Esterase, UA Trace (A) Negative    Nitrite, UA Positive (A) Negative    Urobilinogen, UA 0.2 E.U./dL 0.2 - 1.0 E.U./dL   TSH    Specimen: Blood   Result Value Ref Range    TSH 1.140 0.270 - 4.200 uIU/mL   Magnesium    Specimen: Blood   Result Value Ref Range    Magnesium 1.7 1.6 - 2.4 mg/dL   C-reactive Protein    Specimen: Blood   Result Value Ref Range    C-Reactive Protein 1.65 (H) 0.00 - 0.50 mg/dL   Procalcitonin    Specimen: Blood   Result Value Ref Range    Procalcitonin 0.06 0.00 - 0.25 ng/mL   Urinalysis, Microscopic Only - Urine, Catheter    Specimen: Urine, Catheter   Result Value Ref Range    RBC, UA 0-2 None Seen, 0-2 /HPF    WBC, UA 6-10 (A) None Seen, 0-2 /HPF    Bacteria, UA 4+ (A) None Seen /HPF    Squamous Epithelial Cells, UA 3-6 (A) None Seen, 0-2 /HPF    Hyaline Casts, UA None Seen None Seen /LPF    Methodology Automated Microscopy    High Sensitivity Troponin T 2Hr    Specimen: Arm, Left; Blood   Result Value Ref Range    HS Troponin T 188 (C) <14 ng/L    Troponin T Delta -41 (L) >=-4 - <+4 ng/L   Blood Gas, Arterial With Co-Ox    Specimen: Arterial Blood   Result Value Ref Range    Site Left Radial     Andrés's Test Positive     pH, Arterial 7.458 (H) 7.350 - 7.450 pH units    pCO2, Arterial 40.2 35.0 - 45.0 mm Hg    pO2, Arterial 154.0 (H) 83.0 - 108.0 mm Hg    HCO3, Arterial 28.4 (H) 20.0 - 26.0 mmol/L    Base Excess, Arterial 4.2 (H) 0.0 - 2.0 mmol/L    O2 Saturation, Arterial >99.2 (H) 94.0 - 99.0 %    Hemoglobin, Blood Gas 12.2 (L) 13.5 - 17.5 g/dL    Hematocrit, Blood Gas 37.4 (L) 38.0 - 51.0 %    Oxyhemoglobin >99.0 (H) 94 - 99 %    Methemoglobin <-0.10 (L) 0.00 - 3.00 %    Carboxyhemoglobin 1.5 0 - 5 %    A-a  DO2 18.6 0.0 - 300.0 mmHg    CO2 Content 29.7 22 - 33 mmol/L    Barometric Pressure for Blood Gas 726 mmHg    Modality Nasal Cannula     FIO2 32 %    Flow Rate 3.0 lpm    Ventilator Mode NA     Collected by [id number]     pH, Temp Corrected      pCO2, Temperature Corrected      pO2, Temperature Corrected     D-dimer, Quantitative    Specimen: Blood   Result Value Ref Range    D-Dimer, Quantitative 0.29 0.00 - 0.65 MCGFEU/mL   Heparin Anti-Xa    Specimen: Blood   Result Value Ref Range    Heparin Anti-Xa (UFH) <0.10 (L) 0.30 - 0.70 IU/ml   Protime-INR    Specimen: Blood   Result Value Ref Range    Protime 12.6 12.1 - 14.7 Seconds    INR 0.93 0.90 - 1.10   aPTT    Specimen: Blood   Result Value Ref Range    PTT 26.5 26.5 - 34.5 seconds   CBC Auto Differential    Specimen: Blood   Result Value Ref Range    WBC 6.42 3.40 - 10.80 10*3/mm3    RBC 3.94 3.77 - 5.28 10*6/mm3    Hemoglobin 12.3 12.0 - 15.9 g/dL    Hematocrit 36.7 34.0 - 46.6 %    MCV 93.1 79.0 - 97.0 fL    MCH 31.2 26.6 - 33.0 pg    MCHC 33.5 31.5 - 35.7 g/dL    RDW 13.1 12.3 - 15.4 %    RDW-SD 44.9 37.0 - 54.0 fl    MPV 10.7 6.0 - 12.0 fL    Platelets 252 140 - 450 10*3/mm3    Neutrophil % 83.5 (H) 42.7 - 76.0 %    Lymphocyte % 14.3 (L) 19.6 - 45.3 %    Monocyte % 1.9 (L) 5.0 - 12.0 %    Eosinophil % 0.0 (L) 0.3 - 6.2 %    Basophil % 0.0 0.0 - 1.5 %    Immature Grans % 0.3 0.0 - 0.5 %    Neutrophils, Absolute 5.36 1.70 - 7.00 10*3/mm3    Lymphocytes, Absolute 0.92 0.70 - 3.10 10*3/mm3    Monocytes, Absolute 0.12 0.10 - 0.90 10*3/mm3    Eosinophils, Absolute 0.00 0.00 - 0.40 10*3/mm3    Basophils, Absolute 0.00 0.00 - 0.20 10*3/mm3    Immature Grans, Absolute 0.02 0.00 - 0.05 10*3/mm3    nRBC 0.0 0.0 - 0.2 /100 WBC   Hemoglobin A1c    Specimen: Blood   Result Value Ref Range    Hemoglobin A1C 4.60 (L) 4.80 - 5.60 %   CBC Auto Differential    Specimen: Blood   Result Value Ref Range    WBC 7.25 3.40 - 10.80 10*3/mm3    RBC 3.87 3.77 - 5.28 10*6/mm3    Hemoglobin  12.1 12.0 - 15.9 g/dL    Hematocrit 36.8 34.0 - 46.6 %    MCV 95.1 79.0 - 97.0 fL    MCH 31.3 26.6 - 33.0 pg    MCHC 32.9 31.5 - 35.7 g/dL    RDW 13.2 12.3 - 15.4 %    RDW-SD 45.7 37.0 - 54.0 fl    MPV 10.7 6.0 - 12.0 fL    Platelets 276 140 - 450 10*3/mm3    Neutrophil % 73.3 42.7 - 76.0 %    Lymphocyte % 21.1 19.6 - 45.3 %    Monocyte % 5.1 5.0 - 12.0 %    Eosinophil % 0.0 (L) 0.3 - 6.2 %    Basophil % 0.1 0.0 - 1.5 %    Immature Grans % 0.4 0.0 - 0.5 %    Neutrophils, Absolute 5.31 1.70 - 7.00 10*3/mm3    Lymphocytes, Absolute 1.53 0.70 - 3.10 10*3/mm3    Monocytes, Absolute 0.37 0.10 - 0.90 10*3/mm3    Eosinophils, Absolute 0.00 0.00 - 0.40 10*3/mm3    Basophils, Absolute 0.01 0.00 - 0.20 10*3/mm3    Immature Grans, Absolute 0.03 0.00 - 0.05 10*3/mm3    nRBC 0.0 0.0 - 0.2 /100 WBC   Comprehensive Metabolic Panel    Specimen: Blood   Result Value Ref Range    Glucose 130 (H) 65 - 99 mg/dL    BUN 7 (L) 8 - 23 mg/dL    Creatinine 0.37 (L) 0.57 - 1.00 mg/dL    Sodium 132 (L) 136 - 145 mmol/L    Potassium 4.0 3.5 - 5.2 mmol/L    Chloride 96 (L) 98 - 107 mmol/L    CO2 25.2 22.0 - 29.0 mmol/L    Calcium 8.8 8.6 - 10.5 mg/dL    Total Protein 6.0 6.0 - 8.5 g/dL    Albumin 3.1 (L) 3.5 - 5.2 g/dL    ALT (SGPT) 12 1 - 33 U/L    AST (SGOT) 37 (H) 1 - 32 U/L    Alkaline Phosphatase 90 39 - 117 U/L    Total Bilirubin <0.2 0.0 - 1.2 mg/dL    Globulin 2.9 gm/dL    A/G Ratio 1.1 g/dL    BUN/Creatinine Ratio 18.9 7.0 - 25.0    Anion Gap 10.8 5.0 - 15.0 mmol/L    eGFR 112.1 >60.0 mL/min/1.73   Lipid Panel    Specimen: Blood   Result Value Ref Range    Total Cholesterol 136 0 - 200 mg/dL    Triglycerides 61 0 - 150 mg/dL    HDL Cholesterol 67 (H) 40 - 60 mg/dL    LDL Cholesterol  56 0 - 100 mg/dL    VLDL Cholesterol 13 5 - 40 mg/dL    LDL/HDL Ratio 0.85    ECG 12 Lead ED Triage Standing Order; SOA   Result Value Ref Range    QT Interval 364 ms    QTC Interval 471 ms   Green Top (Gel)   Result Value Ref Range    Extra Tube Hold for  add-ons.    Lavender Top   Result Value Ref Range    Extra Tube hold for add-on    Gold Top - SST   Result Value Ref Range    Extra Tube Hold for add-ons.    Light Blue Top   Result Value Ref Range    Extra Tube Hold for add-ons.                 ED Course  ED Course as of 07/18/24 0608   Wed Jul 17, 2024 2059 EKG shows sinus tachycardia, rate 101.  SD interval 114, QRS duration 96, QTc 471 ms.  Much baseline artifact.  Poor quality tracing.  No evidence for STEMI.  Electronically signed by Al Lewis MD, 07/17/24, 8:59 PM EDT.   [CM]   2131 Last echocardiogram on file from March 2023:  nterpretation Summary       ·  The right ventricular cavity is mild to moderately dilated.  ·  The right atrial cavity is borderline dilated.  ·  Estimated right ventricular systolic pressure from tricuspid regurgitation is normal (<35 mmHg).  ·  Mild pulmonary hypertension is present.      This is the only echocardiogram we have on file.  No mention of ejection fraction or diastolic function.  Patient denies any history of congestive heart failure. [CM]   2149 Nursing advises that the patient refused straight catheter urine, got her up to the bedside commode.  She did provide the sample, but even with a small amount of effort she became dyspneic and pulse ox dropped into the 70s on her 3 L of oxygen.  Nursing advises that back up in the bed, breathing more comfortably now, pulse ox at this time is 100%. [CM]   Thu Jul 18, 2024   0032 Elevated troponins noted, trending downward.  Her troponins were normal 2 months ago.  I have added a D-dimer, and if elevated we will do a CT angio of the chest.  I have explained this to the patient's family. [CM]   0124 Hospitalist paged for admission. [CM]   0156 Case discussed with Dr. Charles.  He will admit patient to the hospital.  He wants me to call cardiology regarding heparin drip. [CM]   0159 Case discussed with Dr. Tineo.  He advises heparinization, will see the patient in  consultation.  I am ordering heparin protocol.  Dr. Taylor notified. [CM]      ED Course User Index  [CM] Al Lewis MD                                             Medical Decision Making  Amount and/or Complexity of Data Reviewed  Labs: ordered. Decision-making details documented in ED Course.  Radiology: ordered. Decision-making details documented in ED Course.  ECG/medicine tests: ordered. Decision-making details documented in ED Course.    Risk  OTC drugs.  Prescription drug management.  Drug therapy requiring intensive monitoring for toxicity.  Decision regarding hospitalization.        Final diagnoses:   Non-STEMI (non-ST elevated myocardial infarction)       ED Disposition  ED Disposition       ED Disposition   Decision to Admit    Condition   --    Comment   Level of Care: Progressive Care [20]   Diagnosis: NSTEMI (non-ST elevated myocardial infarction) [296048]   Admitting Physician: ANGEL TAYLOR [[id number]]   Attending Physician: ANGEL TAYLOR [[id number]]   Certification: I Certify That Inpatient Hospital Services Are Medically Necessary For Greater Than 2 Midnights                 Please note that portions of this note were completed with a voice recognition program.                Al Lewis MD  07/18/24 0609

## 2024-07-18 NOTE — PAYOR COMM NOTE
"[facility]  NPI:[id number]    Utilization Review  Contact: Echo Hinton RN  Phone: [phone]  Fax:[phone]    INITIATE INPATIENT AUTHORIZATION    Lexie Valdez (64 y.o. Female)       Date of Birth   1960    Social Security Number       Address   [address] [place] [address]    Home Phone       MRN   [id number]       Restorationist   Amish    Marital Status                               Admission Date   7/17/24    Admission Type   Emergency    Admitting Provider   Estuardo Charles MD    Attending Provider   Eduardo Freeman MD    Department, Room/Bed   [facility] PROGRESS CARE, P206/1P       Discharge Date       Discharge Disposition       Discharge Destination                                 Attending Provider: Eduardo Freeman MD    Allergies: Codeine, Sulfa Antibiotics    Isolation: Spore   Infection: C.difficile (rule out) (07/17/24)   Code Status: CPR    Ht: 162.6 cm (64\")   Wt: 58 kg (127 lb 13.9 oz)    Admission Cmt: None   Principal Problem: NSTEMI (non-ST elevated myocardial infarction) [I21.4]                   Active Insurance as of 7/17/2024       Primary Coverage       Payor Plan Insurance Group Employer/Plan Group    ANTHEM MEDICARE REPLACEMENT ANTHEM MEDICARE ADVANTAGE [id number]       Payor Plan Address Payor Plan Phone Number Payor Plan Fax Number Effective Dates    [address] [phone]  1/1/2022 - None Entered    [facility] [id number]         Subscriber Name Subscriber Birth Date Member ID       LEXIE VALDEZ 1960 [id number]               Secondary Coverage       Payor Plan Insurance Group Employer/Plan Group    [place]        Payor Plan Address Payor Plan Phone Number Payor Plan Fax Number Effective Dates    [address] [phone]  5/1/2021 - None Entered    [facility] [id number]         Subscriber Name Subscriber Birth Date Member ID       LEXIE VALDEZ 1960 [id number]               "       Emergency Contacts        (Rel.) Home Phone Work Phone Mobile Phone    Khris Valdez (Spouse) -- -- [phone]    Faith Davila (Son) [phone] -- --    Rosa Kessler (Sister) -- -- [phone]                 History & Physical        Estuardo Charles MD at 24 0259          Hospitalist History and Physical        Patient Identification  Name: Lexie KEITH Valdez  Age/Sex: 64 y.o. female  :  1960        MRN: [id number]  Visit Number: [id number]  Admit Date: 2024   PCP: Violet Pop APRN          Chief complaint short of breath    History of Present Illness:  Patient is a 64 y.o. female with history of anxiety/depression, arthritis, HTN, hypothyroidism, stress urinary incontinence with frequent UTI's, COPD and pulmonary fibrosis on 3L NC at home, and resting tremors in face and extremities, who presents with worsening shortness of breath for the last couple days. She denies increased cough or sputum production. She denies increased lower extremity edema. She is not sure if she has been wheezing more or not. She does note chest pressure associated with dyspnea, and states that though it is somewhat chronic, it has been worse than baseline the last couple days. Her sister adds that her symptoms are exacerbated significantly by exertion, to the point that she cannot walk across a room in her house without getting out of breath. In the ED, patient was noted to be tachycardic and tachypneic. O2 saturation has been >90% and mostly around 100% on home 3L NC. ABG showed ph 7.458, CO2 40, PO2 154, bicarb 28, and sat >99.2%. Troponin was 229 with repeat 188, BNP was 3550, Na 131, Cr 0.49, glucose 177, CRP 1.65, lactate 2.0, procal and WBC normal but with 88.7% neutrophils. UA showed positive nitrite, trace leuk, 6-10 WBC, 4+ bacteria and 3-6 squamous epithelial cells. Upon further questioning, patient reports she has had more dysuria but says this comes and goes as she has frequent  UTI's. She also reports 4 bouts of diarrhea each day for the last 2 days. She has also been nauseated and dry heaving at times. Urine and blood cultures are pending. Respiratory panel is negative. CXR showed cardiomegaly, fibrotic changes, with no significant change compared to last exam in 4/2024.       Review of Systems  Review of Systems   Constitutional:  Positive for activity change and fatigue. Negative for appetite change, chills, diaphoresis, fever and unexpected weight change.   HENT:  Negative for congestion, postnasal drip, rhinorrhea, sinus pressure, sinus pain and sore throat.    Eyes:  Negative for photophobia and visual disturbance.   Respiratory:  Positive for shortness of breath. Negative for cough and wheezing.    Cardiovascular:  Positive for chest pain. Negative for palpitations and leg swelling.   Gastrointestinal:  Positive for diarrhea and nausea. Negative for abdominal distention, abdominal pain, constipation and vomiting.   Endocrine: Negative for polyphagia and polyuria.   Genitourinary:  Positive for dysuria. Negative for flank pain, frequency and hematuria.   Musculoskeletal:  Negative for arthralgias, back pain, joint swelling, myalgias, neck pain and neck stiffness.   Skin:  Negative for color change, pallor, rash and wound.   Neurological:  Negative for dizziness, tremors, seizures, syncope, weakness, light-headedness, numbness and headaches.   Hematological:  Negative for adenopathy. Does not bruise/bleed easily.   Psychiatric/Behavioral:  Negative for agitation, behavioral problems and confusion.        History  Past Medical History:   Diagnosis Date    Anxiety     Arthritis     Depression     Hypertensive disorder 3/14/2018    Hypothyroidism 8/3/2021    MARU (stress urinary incontinence, female) 9/11/2019    UTI (urinary tract infection) 12/31/2023     Past Surgical History:   Procedure Laterality Date    BREAST BIOPSY Left 2000    benign    GALLBLADDER SURGERY      GASTRIC BANDING       HYSTERECTOMY      age 37     Family History   Problem Relation Age of Onset    Panic disorder Mother     COPD Mother     Alcohol abuse Father     Alzheimer's disease Father     Breast cancer Neg Hx      Social History     Tobacco Use    Smoking status: Former     Current packs/day: 0.00     Average packs/day: 1.5 packs/day for 34.3 years (51.5 ttl pk-yrs)     Types: Cigarettes     Start date: 1971     Quit date: 10/18/2005     Years since quittin.7     Passive exposure: Past    Smokeless tobacco: Never   Vaping Use    Vaping status: Never Used   Substance Use Topics    Alcohol use: No    Drug use: No     (Not in a hospital admission)    Allergies:  Codeine and Sulfa antibiotics    Objective    Vital Signs  Temp:  [98.2 °F (36.8 °C)] 98.2 °F (36.8 °C)  Heart Rate:  [] 84  Resp:  [22] 22  BP: ()/(46-85) 118/79  Body mass index is 21.63 kg/m².    Physical Exam:  Physical Exam  Constitutional:       Comments: Frail appearing elderly female, resting tremor noted around mouth and in upper extremities. No acute distress but appears acutely and chronically ill.    HENT:      Head: Normocephalic and atraumatic.      Right Ear: External ear normal.      Left Ear: External ear normal.      Nose: Nose normal.      Mouth/Throat:      Mouth: Mucous membranes are moist.      Pharynx: Oropharynx is clear.   Eyes:      Extraocular Movements: Extraocular movements intact.      Conjunctiva/sclera: Conjunctivae normal.      Pupils: Pupils are equal, round, and reactive to light.   Cardiovascular:      Rate and Rhythm: Normal rate and regular rhythm.      Pulses: Normal pulses.      Heart sounds: Normal heart sounds. No murmur heard.  Pulmonary:      Breath sounds: Normal breath sounds.      Comments: Mildly tachypneic. Dry velcro like crackles heard throughout both lungs. No wheezing appreciated.   Abdominal:      General: Abdomen is flat. Bowel sounds are normal. There is no distension.      Palpations:  "Abdomen is soft.      Tenderness: There is no abdominal tenderness.   Musculoskeletal:         General: Normal range of motion.      Cervical back: Normal range of motion and neck supple. No tenderness.      Right lower leg: No edema.      Left lower leg: No edema.   Lymphadenopathy:      Cervical: No cervical adenopathy.   Skin:     General: Skin is warm and dry.      Capillary Refill: Capillary refill takes less than 2 seconds.      Coloration: Skin is not jaundiced.      Findings: No bruising or lesion.   Neurological:      General: No focal deficit present.      Mental Status: She is oriented to person, place, and time.      Comments: Resting tremor noted around mouth and upper extremities   Psychiatric:         Mood and Affect: Mood normal.         Behavior: Behavior normal.           Results Review:       Lab Results:  Results from last 7 days   Lab Units 07/18/24  0211 07/17/24 2119   WBC 10*3/mm3 6.42 8.49   HEMOGLOBIN g/dL 12.3 12.9   PLATELETS 10*3/mm3 252 280     Results from last 7 days   Lab Units 07/17/24 2119   CRP mg/dL 1.65*     Results from last 7 days   Lab Units 07/17/24 2119   SODIUM mmol/L 131*   POTASSIUM mmol/L 4.1   CHLORIDE mmol/L 93*   CO2 mmol/L 26.2   BUN mg/dL 8   CREATININE mg/dL 0.49*   CALCIUM mg/dL 9.2   GLUCOSE mg/dL 177*     Results from last 7 days   Lab Units 07/17/24 2119   MAGNESIUM mg/dL 1.7     No results found for: \"HGBA1C\"  Results from last 7 days   Lab Units 07/17/24  2119   BILIRUBIN mg/dL 0.2   ALK PHOS U/L 106   AST (SGOT) U/L 43*   ALT (SGPT) U/L 13     Results from last 7 days   Lab Units 07/17/24  2320 07/17/24  2119   HSTROP T ng/L 188* 229*         Results from last 7 days   Lab Units 07/18/24  0211   INR  0.93     Results from last 7 days   Lab Units 07/17/24  2203   PH, ARTERIAL pH units 7.458*   PO2 ART mm Hg 154.0*   PCO2, ARTERIAL mm Hg 40.2   HCO3 ART mmol/L 28.4*       I have reviewed the patient's laboratory results.    Imaging:  Imaging Results (Last " 72 Hours)       Procedure Component Value Units Date/Time    XR Chest 1 View [[id number]] Collected: 07/17/24 2137     Updated: 07/17/24 2140    Narrative:      INDICATION: Difficulty breathing.     TECHNIQUE: Frontal radiograph of the chest.     COMPARISON: 4/26/2024.     FINDINGS:   Cardiomegaly. Pulmonary fibrotic changes again noted. No significant  change. No pleural effusion or pneumothorax. No acute fracture.       Impression:      No significant interval change allowing for differences in technique.        This report was finalized on 7/17/2024 9:38 PM by Alex Pallas, DO.               I have personally reviewed the patient's radiologic imaging.        EKG:   Sinus tachycardia, , QTc 471  Left axis deviation  Minimal voltage criteria for LVH, may be normal variant  Cannot rule out Anterior infarct , age undetermined  Abnormal ECG  When compared with ECG of 26-APR-2024 13:32,  Significant changes have occurred    I have personally reviewed the patient's EKG. No overt ST changes appreciated.         Assessment & Plan    - Worsening dyspnea, initially suspected exacerbation of COPD/pulmonary fibrosis and treated with IV solu-medrol (declined breathing treatment because just received one before arrival to the ED). However, with elevated troponin and BNP, in conjunction with symptoms of chest pressure and nausea, concern was raised for NSTEMI and resulting CHF contributing to her symptoms as well.  - COPD/pulmonary fibrosis exacerbation: continue IV solu-medrol and add IV doxycycline and scheduled nebs.   - NSTEMI (non-ST elevated myocardial infarction) type I vs type II secondary to above: treat with ASA and statin. Cardiology notified and recommended IV heparin infusion. Keep NPO except sips with meds. Obtain ECHO in the morning. Await further recommendations from cardiology in the morning.   - UTI: treat with IV rocephin. Follow up urine and blood cultures.  - Diarrhea: stool studies ordered, waiting for  patient to have another loose bowel movement.     DVT Prophylaxis: anticoagulated with IV heparin infusion    Estimated Length of Stay >2 midnights    I discussed the patient's findings, assessment and plan with the patient, her sister at bedside, and ED provider Dr Lewis.     Patient is high risk due to COPD/pulmonary fibrosis exacerbation, NSTEMI    Estuardo Charles MD  07/18/24  02:57 EDT      Electronically signed by Estuardo Charles MD at 07/18/24 0314          Emergency Department Notes        Cornell Garcia PCT at 07/18/24 0024          Asked patient for stool sample. Patient is unable to at this time.    Electronically signed by Cornell Garcia PCT at 07/18/24 0024       Shereen Marie RN at 07/17/24 2150          Pt refused being straight cath for urine. Assist pt to bedside commode and pt oxygen declined to 71% on 3L NC. Once pt was placed back in bed,  pt to breath in nose and out mouth and slow to breathing. Pt had labored breathing and nasal flaring.    Electronically signed by Shereen Marie RN at 07/17/24 2152       Al Lewis MD at 07/17/24 2126          Subjective   History of Present Illness  Patient is a 64-year-old female, records indicate that she has COPD and is home oxygen dependent at 3 L.  She presents complaining of shortness of breath that started about 2 weeks ago, has been slowly and progressively worsening, worse today.  She states that she is not coughing much, when she does cough it is nonproductive.  She and her family report that she has had some diarrhea over the past 2 days, approximately 4 times daily.  She has generalized weakness, has not been ambulating much.  When she does ambulate she requires a walker.  She does suffer with chronic debility.  She has not been eating, she has been drinking some fluids but not very much.  She complains that her mouth is dry.  She denies fever, chills, chest pain, hemoptysis, abdominal pain, vomiting, hematemesis,  hematochezia or melena, syncope or near syncope, focal numbness or weakness, other symptoms or other complaints.  Patient declines a breathing treatment, states that she does not feel that she needs one at this time, states that she took one at home just prior to coming here.      Review of Systems   All other systems reviewed and are negative.      Past Medical History:   Diagnosis Date    Anxiety     Arthritis     Depression     Hypertensive disorder 3/14/2018    Hypothyroidism 8/3/2021    MARU (stress urinary incontinence, female) 2019    UTI (urinary tract infection) 2023       Allergies   Allergen Reactions    Codeine GI Intolerance    Sulfa Antibiotics Itching       Past Surgical History:   Procedure Laterality Date    BREAST BIOPSY Left 2000    benign    GALLBLADDER SURGERY      GASTRIC BANDING      HYSTERECTOMY      age 37       Family History   Problem Relation Age of Onset    Panic disorder Mother     COPD Mother     Alcohol abuse Father     Alzheimer's disease Father     Breast cancer Neg Hx        Social History     Socioeconomic History    Marital status:    Tobacco Use    Smoking status: Former     Current packs/day: 0.00     Average packs/day: 1.5 packs/day for 34.3 years (51.5 ttl pk-yrs)     Types: Cigarettes     Start date: 1971     Quit date: 10/18/2005     Years since quittin.7     Passive exposure: Past    Smokeless tobacco: Never   Vaping Use    Vaping status: Never Used   Substance and Sexual Activity    Alcohol use: No    Drug use: No    Sexual activity: Yes     Partners: Male           Objective   Physical Exam  Vitals and nursing note reviewed.   Constitutional:       Appearance: Normal appearance. She is well-developed. She is not toxic-appearing or diaphoretic.      Comments: Chronically ill-appearing female, awake and alert, appears mildly dyspneic.   HENT:      Head: Normocephalic and atraumatic.   Eyes:      General: No scleral icterus.     Pupils: Pupils are  equal, round, and reactive to light.   Neck:      Trachea: No tracheal deviation.   Cardiovascular:      Rate and Rhythm: Normal rate and regular rhythm.   Pulmonary:      Breath sounds: No wheezing.      Comments: Respirations are bit labored.  She has pursed lip breathing.  Diffuse crackles are heard consistent with pulmonary fibrosis.  Chest:      Chest wall: No tenderness.   Abdominal:      General: Bowel sounds are normal.      Palpations: Abdomen is soft.      Tenderness: There is no abdominal tenderness. There is no guarding or rebound.   Musculoskeletal:         General: No tenderness. Normal range of motion.      Cervical back: Normal range of motion and neck supple. No rigidity or tenderness.      Right lower leg: No tenderness.      Left lower leg: No tenderness.   Skin:     General: Skin is warm and dry.      Capillary Refill: Capillary refill takes less than 2 seconds.   Neurological:      General: No focal deficit present.      Mental Status: She is alert and oriented to person, place, and time.      GCS: GCS eye subscore is 4. GCS verbal subscore is 5. GCS motor subscore is 6.      Motor: No abnormal muscle tone.   Psychiatric:         Mood and Affect: Mood normal.         Behavior: Behavior normal.         Procedures  XR Chest 1 View   Final Result   No significant interval change allowing for differences in technique.           This report was finalized on 7/17/2024 9:38 PM by Alex Pallas, DO.            Results for orders placed or performed during the hospital encounter of 07/17/24   COVID-19 and FLU A/B PCR, 1 HR TAT - Swab, Nasopharynx    Specimen: Nasopharynx; Swab   Result Value Ref Range    COVID19 Not Detected Not Detected - Ref. Range    Influenza A PCR Not Detected Not Detected    Influenza B PCR Not Detected Not Detected   Respiratory Panel PCR w/COVID-19(SARS-CoV-2) RHONDA/HEATHER/TASNEEM/PAD/COR/CHRISTELLE In-House, NP Swab in UTM/VTM, 2 HR TAT - Swab, Nasopharynx    Specimen: Nasopharynx; Swab   Result  Value Ref Range    ADENOVIRUS, PCR Not Detected Not Detected    Coronavirus 229E Not Detected Not Detected    Coronavirus HKU1 Not Detected Not Detected    Coronavirus NL63 Not Detected Not Detected    Coronavirus OC43 Not Detected Not Detected    COVID19 Not Detected Not Detected - Ref. Range    Human Metapneumovirus Not Detected Not Detected    Human Rhinovirus/Enterovirus Not Detected Not Detected    Influenza A PCR Not Detected Not Detected    Influenza B PCR Not Detected Not Detected    Parainfluenza Virus 1 Not Detected Not Detected    Parainfluenza Virus 2 Not Detected Not Detected    Parainfluenza Virus 3 Not Detected Not Detected    Parainfluenza Virus 4 Not Detected Not Detected    RSV, PCR Not Detected Not Detected    Bordetella pertussis pcr Not Detected Not Detected    Bordetella parapertussis PCR Not Detected Not Detected    Chlamydophila pneumoniae PCR Not Detected Not Detected    Mycoplasma pneumo by PCR Not Detected Not Detected   Comprehensive Metabolic Panel    Specimen: Blood   Result Value Ref Range    Glucose 177 (H) 65 - 99 mg/dL    BUN 8 8 - 23 mg/dL    Creatinine 0.49 (L) 0.57 - 1.00 mg/dL    Sodium 131 (L) 136 - 145 mmol/L    Potassium 4.1 3.5 - 5.2 mmol/L    Chloride 93 (L) 98 - 107 mmol/L    CO2 26.2 22.0 - 29.0 mmol/L    Calcium 9.2 8.6 - 10.5 mg/dL    Total Protein 6.2 6.0 - 8.5 g/dL    Albumin 3.4 (L) 3.5 - 5.2 g/dL    ALT (SGPT) 13 1 - 33 U/L    AST (SGOT) 43 (H) 1 - 32 U/L    Alkaline Phosphatase 106 39 - 117 U/L    Total Bilirubin 0.2 0.0 - 1.2 mg/dL    Globulin 2.8 gm/dL    A/G Ratio 1.2 g/dL    BUN/Creatinine Ratio 16.3 7.0 - 25.0    Anion Gap 11.8 5.0 - 15.0 mmol/L    eGFR 104.7 >60.0 mL/min/1.73   BNP    Specimen: Blood   Result Value Ref Range    proBNP 3,550.0 (H) 0.0 - 900.0 pg/mL   Single High Sensitivity Troponin T    Specimen: Blood   Result Value Ref Range    HS Troponin T 229 (C) <14 ng/L   CBC Auto Differential    Specimen: Blood   Result Value Ref Range    WBC 8.49  3.40 - 10.80 10*3/mm3    RBC 4.11 3.77 - 5.28 10*6/mm3    Hemoglobin 12.9 12.0 - 15.9 g/dL    Hematocrit 38.3 34.0 - 46.6 %    MCV 93.2 79.0 - 97.0 fL    MCH 31.4 26.6 - 33.0 pg    MCHC 33.7 31.5 - 35.7 g/dL    RDW 13.2 12.3 - 15.4 %    RDW-SD 44.7 37.0 - 54.0 fl    MPV 10.8 6.0 - 12.0 fL    Platelets 280 140 - 450 10*3/mm3    Neutrophil % 88.7 (H) 42.7 - 76.0 %    Lymphocyte % 8.7 (L) 19.6 - 45.3 %    Monocyte % 1.9 (L) 5.0 - 12.0 %    Eosinophil % 0.0 (L) 0.3 - 6.2 %    Basophil % 0.2 0.0 - 1.5 %    Immature Grans % 0.5 0.0 - 0.5 %    Neutrophils, Absolute 7.53 (H) 1.70 - 7.00 10*3/mm3    Lymphocytes, Absolute 0.74 0.70 - 3.10 10*3/mm3    Monocytes, Absolute 0.16 0.10 - 0.90 10*3/mm3    Eosinophils, Absolute 0.00 0.00 - 0.40 10*3/mm3    Basophils, Absolute 0.02 0.00 - 0.20 10*3/mm3    Immature Grans, Absolute 0.04 0.00 - 0.05 10*3/mm3    nRBC 0.0 0.0 - 0.2 /100 WBC   Lactic Acid, Plasma    Specimen: Blood   Result Value Ref Range    Lactate 2.0 0.5 - 2.0 mmol/L   Urinalysis With Culture If Indicated - Urine, Catheter    Specimen: Urine, Catheter   Result Value Ref Range    Color, UA Yellow Yellow, Straw    Appearance, UA Cloudy (A) Clear    pH, UA 6.5 5.0 - 8.0    Specific Gravity, UA 1.013 1.005 - 1.030    Glucose, UA Negative Negative    Ketones, UA Negative Negative    Bilirubin, UA Negative Negative    Blood, UA Negative Negative    Protein, UA Negative Negative    Leuk Esterase, UA Trace (A) Negative    Nitrite, UA Positive (A) Negative    Urobilinogen, UA 0.2 E.U./dL 0.2 - 1.0 E.U./dL   TSH    Specimen: Blood   Result Value Ref Range    TSH 1.140 0.270 - 4.200 uIU/mL   Magnesium    Specimen: Blood   Result Value Ref Range    Magnesium 1.7 1.6 - 2.4 mg/dL   C-reactive Protein    Specimen: Blood   Result Value Ref Range    C-Reactive Protein 1.65 (H) 0.00 - 0.50 mg/dL   Procalcitonin    Specimen: Blood   Result Value Ref Range    Procalcitonin 0.06 0.00 - 0.25 ng/mL   Urinalysis, Microscopic Only - Urine,  Catheter    Specimen: Urine, Catheter   Result Value Ref Range    RBC, UA 0-2 None Seen, 0-2 /HPF    WBC, UA 6-10 (A) None Seen, 0-2 /HPF    Bacteria, UA 4+ (A) None Seen /HPF    Squamous Epithelial Cells, UA 3-6 (A) None Seen, 0-2 /HPF    Hyaline Casts, UA None Seen None Seen /LPF    Methodology Automated Microscopy    High Sensitivity Troponin T 2Hr    Specimen: Arm, Left; Blood   Result Value Ref Range    HS Troponin T 188 (C) <14 ng/L    Troponin T Delta -41 (L) >=-4 - <+4 ng/L   Blood Gas, Arterial With Co-Ox    Specimen: Arterial Blood   Result Value Ref Range    Site Left Radial     Andrés's Test Positive     pH, Arterial 7.458 (H) 7.350 - 7.450 pH units    pCO2, Arterial 40.2 35.0 - 45.0 mm Hg    pO2, Arterial 154.0 (H) 83.0 - 108.0 mm Hg    HCO3, Arterial 28.4 (H) 20.0 - 26.0 mmol/L    Base Excess, Arterial 4.2 (H) 0.0 - 2.0 mmol/L    O2 Saturation, Arterial >99.2 (H) 94.0 - 99.0 %    Hemoglobin, Blood Gas 12.2 (L) 13.5 - 17.5 g/dL    Hematocrit, Blood Gas 37.4 (L) 38.0 - 51.0 %    Oxyhemoglobin >99.0 (H) 94 - 99 %    Methemoglobin <-0.10 (L) 0.00 - 3.00 %    Carboxyhemoglobin 1.5 0 - 5 %    A-a DO2 18.6 0.0 - 300.0 mmHg    CO2 Content 29.7 22 - 33 mmol/L    Barometric Pressure for Blood Gas 726 mmHg    Modality Nasal Cannula     FIO2 32 %    Flow Rate 3.0 lpm    Ventilator Mode NA     Collected by [id number]     pH, Temp Corrected      pCO2, Temperature Corrected      pO2, Temperature Corrected     D-dimer, Quantitative    Specimen: Blood   Result Value Ref Range    D-Dimer, Quantitative 0.29 0.00 - 0.65 MCGFEU/mL   Heparin Anti-Xa    Specimen: Blood   Result Value Ref Range    Heparin Anti-Xa (UFH) <0.10 (L) 0.30 - 0.70 IU/ml   Protime-INR    Specimen: Blood   Result Value Ref Range    Protime 12.6 12.1 - 14.7 Seconds    INR 0.93 0.90 - 1.10   aPTT    Specimen: Blood   Result Value Ref Range    PTT 26.5 26.5 - 34.5 seconds   CBC Auto Differential    Specimen: Blood   Result Value Ref Range    WBC 6.42 3.40 -  10.80 10*3/mm3    RBC 3.94 3.77 - 5.28 10*6/mm3    Hemoglobin 12.3 12.0 - 15.9 g/dL    Hematocrit 36.7 34.0 - 46.6 %    MCV 93.1 79.0 - 97.0 fL    MCH 31.2 26.6 - 33.0 pg    MCHC 33.5 31.5 - 35.7 g/dL    RDW 13.1 12.3 - 15.4 %    RDW-SD 44.9 37.0 - 54.0 fl    MPV 10.7 6.0 - 12.0 fL    Platelets 252 140 - 450 10*3/mm3    Neutrophil % 83.5 (H) 42.7 - 76.0 %    Lymphocyte % 14.3 (L) 19.6 - 45.3 %    Monocyte % 1.9 (L) 5.0 - 12.0 %    Eosinophil % 0.0 (L) 0.3 - 6.2 %    Basophil % 0.0 0.0 - 1.5 %    Immature Grans % 0.3 0.0 - 0.5 %    Neutrophils, Absolute 5.36 1.70 - 7.00 10*3/mm3    Lymphocytes, Absolute 0.92 0.70 - 3.10 10*3/mm3    Monocytes, Absolute 0.12 0.10 - 0.90 10*3/mm3    Eosinophils, Absolute 0.00 0.00 - 0.40 10*3/mm3    Basophils, Absolute 0.00 0.00 - 0.20 10*3/mm3    Immature Grans, Absolute 0.02 0.00 - 0.05 10*3/mm3    nRBC 0.0 0.0 - 0.2 /100 WBC   Hemoglobin A1c    Specimen: Blood   Result Value Ref Range    Hemoglobin A1C 4.60 (L) 4.80 - 5.60 %   CBC Auto Differential    Specimen: Blood   Result Value Ref Range    WBC 7.25 3.40 - 10.80 10*3/mm3    RBC 3.87 3.77 - 5.28 10*6/mm3    Hemoglobin 12.1 12.0 - 15.9 g/dL    Hematocrit 36.8 34.0 - 46.6 %    MCV 95.1 79.0 - 97.0 fL    MCH 31.3 26.6 - 33.0 pg    MCHC 32.9 31.5 - 35.7 g/dL    RDW 13.2 12.3 - 15.4 %    RDW-SD 45.7 37.0 - 54.0 fl    MPV 10.7 6.0 - 12.0 fL    Platelets 276 140 - 450 10*3/mm3    Neutrophil % 73.3 42.7 - 76.0 %    Lymphocyte % 21.1 19.6 - 45.3 %    Monocyte % 5.1 5.0 - 12.0 %    Eosinophil % 0.0 (L) 0.3 - 6.2 %    Basophil % 0.1 0.0 - 1.5 %    Immature Grans % 0.4 0.0 - 0.5 %    Neutrophils, Absolute 5.31 1.70 - 7.00 10*3/mm3    Lymphocytes, Absolute 1.53 0.70 - 3.10 10*3/mm3    Monocytes, Absolute 0.37 0.10 - 0.90 10*3/mm3    Eosinophils, Absolute 0.00 0.00 - 0.40 10*3/mm3    Basophils, Absolute 0.01 0.00 - 0.20 10*3/mm3    Immature Grans, Absolute 0.03 0.00 - 0.05 10*3/mm3    nRBC 0.0 0.0 - 0.2 /100 WBC   Comprehensive Metabolic Panel     Specimen: Blood   Result Value Ref Range    Glucose 130 (H) 65 - 99 mg/dL    BUN 7 (L) 8 - 23 mg/dL    Creatinine 0.37 (L) 0.57 - 1.00 mg/dL    Sodium 132 (L) 136 - 145 mmol/L    Potassium 4.0 3.5 - 5.2 mmol/L    Chloride 96 (L) 98 - 107 mmol/L    CO2 25.2 22.0 - 29.0 mmol/L    Calcium 8.8 8.6 - 10.5 mg/dL    Total Protein 6.0 6.0 - 8.5 g/dL    Albumin 3.1 (L) 3.5 - 5.2 g/dL    ALT (SGPT) 12 1 - 33 U/L    AST (SGOT) 37 (H) 1 - 32 U/L    Alkaline Phosphatase 90 39 - 117 U/L    Total Bilirubin <0.2 0.0 - 1.2 mg/dL    Globulin 2.9 gm/dL    A/G Ratio 1.1 g/dL    BUN/Creatinine Ratio 18.9 7.0 - 25.0    Anion Gap 10.8 5.0 - 15.0 mmol/L    eGFR 112.1 >60.0 mL/min/1.73   Lipid Panel    Specimen: Blood   Result Value Ref Range    Total Cholesterol 136 0 - 200 mg/dL    Triglycerides 61 0 - 150 mg/dL    HDL Cholesterol 67 (H) 40 - 60 mg/dL    LDL Cholesterol  56 0 - 100 mg/dL    VLDL Cholesterol 13 5 - 40 mg/dL    LDL/HDL Ratio 0.85    ECG 12 Lead ED Triage Standing Order; SOA   Result Value Ref Range    QT Interval 364 ms    QTC Interval 471 ms   Green Top (Gel)   Result Value Ref Range    Extra Tube Hold for add-ons.    Lavender Top   Result Value Ref Range    Extra Tube hold for add-on    Gold Top - SST   Result Value Ref Range    Extra Tube Hold for add-ons.    Light Blue Top   Result Value Ref Range    Extra Tube Hold for add-ons.                ED Course  ED Course as of 07/18/24 0608   Wed Jul 17, 2024 2059 EKG shows sinus tachycardia, rate 101.  MT interval 114, QRS duration 96, QTc 471 ms.  Much baseline artifact.  Poor quality tracing.  No evidence for STEMI.  Electronically signed by Al Lewis MD, 07/17/24, 8:59 PM EDT.   [CM]   2131 Last echocardiogram on file from March 2023:  nterpretation Summary       ·  The right ventricular cavity is mild to moderately dilated.  ·  The right atrial cavity is borderline dilated.  ·  Estimated right ventricular systolic pressure from tricuspid regurgitation is  normal (<35 mmHg).  ·  Mild pulmonary hypertension is present.      This is the only echocardiogram we have on file.  No mention of ejection fraction or diastolic function.  Patient denies any history of congestive heart failure. [CM]   2149 Nursing advises that the patient refused straight catheter urine, got her up to the bedside commode.  She did provide the sample, but even with a small amount of effort she became dyspneic and pulse ox dropped into the 70s on her 3 L of oxygen.  Nursing advises that back up in the bed, breathing more comfortably now, pulse ox at this time is 100%. [CM]   Thu Jul 18, 2024   0032 Elevated troponins noted, trending downward.  Her troponins were normal 2 months ago.  I have added a D-dimer, and if elevated we will do a CT angio of the chest.  I have explained this to the patient's family. [CM]   0124 Hospitalist paged for admission. [CM]   0156 Case discussed with Dr. Charles.  He will admit patient to the hospital.  He wants me to call cardiology regarding heparin drip. [CM]   0159 Case discussed with Dr. Tineo.  He advises heparinization, will see the patient in consultation.  I am ordering heparin protocol.  Dr. Charles notified. [CM]      ED Course User Index  [CM] Al Lewis MD                                             Medical Decision Making  Amount and/or Complexity of Data Reviewed  Labs: ordered. Decision-making details documented in ED Course.  Radiology: ordered. Decision-making details documented in ED Course.  ECG/medicine tests: ordered. Decision-making details documented in ED Course.    Risk  OTC drugs.  Prescription drug management.  Drug therapy requiring intensive monitoring for toxicity.  Decision regarding hospitalization.        Final diagnoses:   Non-STEMI (non-ST elevated myocardial infarction)       ED Disposition  ED Disposition       ED Disposition   Decision to Admit    Condition   --    Comment   Level of Care: Progressive Care [20]    Diagnosis: NSTEMI (non-ST elevated myocardial infarction) [271488]   Admitting Physician: ANGEL TAYLOR [[id number]]   Attending Physician: ANGEL TAYLOR [[id number]]   Certification: I Certify That Inpatient Hospital Services Are Medically Necessary For Greater Than 2 Midnights                 Please note that portions of this note were completed with a voice recognition program.                Al Lewis MD  07/18/24 0609      Electronically signed by Al Lewis MD at 07/18/24 0609       Vital Signs (last 2 days)       Date/Time Temp Temp src Pulse Resp BP Patient Position SpO2    07/18/24 0800 98 (36.7) Oral -- -- -- -- --    07/18/24 0700 -- -- 90 16 112/80 -- 100    07/18/24 0639 -- -- 86 18 -- -- 100    07/18/24 0629 -- -- 85 18 -- -- 100    07/18/24 0600 -- -- 94 18 92/42 -- 100    07/18/24 0500 -- -- 83 22 95/59 -- 100    07/18/24 0400 98 (36.7) Axillary 87 22 105/66 -- 100    07/18/24 0319 -- -- 82 28 101/63 -- 100    07/18/24 0215 -- -- 84 -- 118/79 -- 100    07/18/24 0200 -- -- 80 -- 105/69 -- 100    07/18/24 0145 -- -- 80 -- 102/67 -- 100    07/18/24 0130 -- -- 81 -- 103/80 -- 100    07/18/24 0115 -- -- -- -- 102/68 -- --    07/18/24 0100 -- -- 82 -- 96/73 -- 100    07/18/24 0045 -- -- 81 -- 106/73 -- 100    07/18/24 0030 -- -- 82 -- 107/71 -- 100    07/18/24 0015 -- -- 82 -- 109/70 -- 100    07/18/24 0000 -- -- 83 -- 116/79 -- 100    07/17/24 2345 -- -- 89 -- 117/85 -- 100 07/17/24 2330 -- -- 90 -- 91/72 -- 100 07/17/24 2315 -- -- 86 -- 114/75 -- 100 07/17/24 2300 -- -- 85 -- 99/46 -- 100 07/17/24 2245 -- -- 83 -- 117/77 -- 100 07/17/24 2230 -- -- 88 -- 114/79 -- 100 07/17/24 2215 -- -- 86 -- 112/63 -- 100 07/17/24 2200 -- -- -- -- -- -- 100 07/17/24 2145 -- -- 109 -- 90/65 -- 91 07/17/24 2130 -- -- 92 -- 105/78 -- 100 07/17/24 2039 98.2 (36.8) Oral 101 22 116/53 Sitting 97          Facility-Administered Medications as of 7/18/2024   Medication Dose  Route Frequency Provider Last Rate Last Admin    [COMPLETED] aspirin chewable tablet 324 mg  324 mg Oral Once Al Lewis MD   324 mg at 07/18/24 0226    aspirin chewable tablet 81 mg  81 mg Oral Daily Estuardo Charles MD   81 mg at 07/18/24 0905    atorvastatin (LIPITOR) tablet 40 mg  40 mg Oral Daily Estuardo Charles MD   40 mg at 07/18/24 0906    sennosides-docusate (PERICOLACE) 8.6-50 MG per tablet 2 tablet  2 tablet Oral BID PRN Estuardo Charles MD        And    polyethylene glycol (MIRALAX) packet 17 g  17 g Oral Daily PRN Estuardo Charles MD        And    bisacodyl (DULCOLAX) EC tablet 5 mg  5 mg Oral Daily PRN Estuardo Charles MD        And    bisacodyl (DULCOLAX) suppository 10 mg  10 mg Rectal Daily PRN Estuardo Charles MD        cefTRIAXone (ROCEPHIN) 1,000 mg in sodium chloride 0.9 % 100 mL IVPB-VTB  1,000 mg Intravenous Q24H Estuardo Charles MD        [COMPLETED] cefTRIAXone (ROCEPHIN) 2,000 mg in sodium chloride 0.9 % 100 mL IVPB-VTB  2,000 mg Intravenous Once Al Lewis MD   Stopped at 07/18/24 0035    doxycycline (VIBRAMYCIN) 100 mg in sodium chloride 0.9 % 100 mL IVPB-VTB  100 mg Intravenous Q12H Estuardo Charles MD   100 mg at 07/18/24 0522    [COMPLETED] heparin (porcine) 5000 UNIT/ML injection 3,500 Units  60 Units/kg Intravenous Once Al Lewis MD   3,500 Units at 07/18/24 0228    heparin 85239 units/250 mL (100 units/mL) in 0.45 % NaCl infusion  12 Units/kg/hr (Dosing Weight) Intravenous Titrated Estuardo Charles MD 6.86 mL/hr at 07/18/24 0231 12 Units/kg/hr at 07/18/24 0231    ipratropium (ATROVENT) nebulizer solution 0.5 mg  0.5 mg Nebulization Q6H PRN Estuardo Charles MD        ipratropium-albuterol (DUO-NEB) nebulizer solution 3 mL  3 mL Nebulization Q6H - RT Estuardo Charles MD   3 mL at 07/18/24 0629    methylPREDNISolone sodium succinate (SOLU-Medrol) injection 40 mg  40 mg Intravenous Q12H  Estuardo Charles MD   40 mg at 07/18/24 0906    [COMPLETED] methylPREDNISolone sodium succinate (SOLU-Medrol) injection 80 mg  80 mg Intravenous Once Al Lewis MD   80 mg at 07/17/24 2140    nitroglycerin (NITROSTAT) SL tablet 0.4 mg  0.4 mg Sublingual Q5 Min PRN Estuardo Charles MD        Pharmacy to Dose Heparin   Does not apply Continuous PRN Estuardo Charles MD        sodium chloride 0.9 % flush 10 mL  10 mL Intravenous PRN Estuardo Charles MD        sodium chloride 0.9 % flush 10 mL  10 mL Intravenous PRN Estuardo Charles MD        sodium chloride 0.9 % flush 10 mL  10 mL Intravenous Q12H Estuardo Charles MD   10 mL at 07/18/24 0907    sodium chloride 0.9 % flush 10 mL  10 mL Intravenous PRN Estuardo Charles MD        sodium chloride 0.9 % infusion 40 mL  40 mL Intravenous PREstuardo Porter MD         Orders (all)        Start     Ordered    07/19/24 2125  Auto Discontinue GI Panel in 48 Hours if not Collected  ONCE GI PANEL         07/17/24 2124 07/19/24 2125  Auto Discontinue in 48 Hours if not Collected  ONCE CDIFF         07/17/24 2124 07/19/24 0600  CBC & Differential  Every Third Day      Comments: Discontinue After Heparin Stopped      07/18/24 0200    07/18/24 2300  cefTRIAXone (ROCEPHIN) 1,000 mg in sodium chloride 0.9 % 100 mL IVPB-VTB  Every 24 Hours         07/18/24 0342    07/18/24 1000  methylPREDNISolone sodium succinate (SOLU-Medrol) injection 40 mg  Every 12 Hours         07/18/24 0342    07/18/24 0900  aspirin chewable tablet 81 mg  Daily         07/18/24 0156    07/18/24 0900  atorvastatin (LIPITOR) tablet 40 mg  Daily         07/18/24 0342    07/18/24 0900  sodium chloride 0.9 % flush 10 mL  Every 12 Hours Scheduled         07/18/24 0342    07/18/24 0900  Heparin Anti-Xa  Timed,   Status:  Canceled         07/18/24 0222    07/18/24 0900  Heparin Anti-Xa  Timed         07/18/24 0222    07/18/24 0800  Vital Signs  Every 8  Hours        Comments: Per per hospital policy    07/18/24 0342    07/18/24 0800  Oral Care  2 Times Daily       07/18/24 0342    07/18/24 0700  ipratropium-albuterol (DUO-NEB) nebulizer solution 3 mL  Every 6 Hours - RT         07/18/24 0342    07/18/24 0700  Adult Transthoracic Echo Complete w/ Color, Spectral and Contrast if necessary per protocol  Once         07/18/24 0342    07/18/24 0600  Strict Intake & Output  Every 6 Hours         07/18/24 0342    07/18/24 0600  CBC Auto Differential  Morning Draw         07/18/24 0342    07/18/24 0600  Comprehensive Metabolic Panel  Morning Draw         07/18/24 0342    07/18/24 0600  Lipid Panel  Morning Draw         07/18/24 0342    07/18/24 0405  Wound Ostomy Eval & Treat  Once         07/18/24 0404    07/18/24 0404  Stage II Pressure Ulcer Care Every Other Day  Every Other Day        Comments: - Gently Cleanse With Normal Saline  - Cover With Silicone Border Dressing (If Indicated)  - For Frequent Incontinence - Apply Skin Protective Barrier Cream Rather Than Silicone Border Dressing    07/18/24 0403    07/18/24 0404  Follow Pressure Ulcer Prevention Measures Policy  Continuous        Comments: Implement Appropriate Pressure Ulcer Prevention Measures  - Open Order Report to View Full Instructions  Enter Wound LDA & Document Assessment  Add Wound Care Plan  Add Patient Education Per Policy    07/18/24 0403    07/18/24 0400  doxycycline (VIBRAMYCIN) 100 mg in sodium chloride 0.9 % 100 mL IVPB-VTB  Every 12 Hours         07/18/24 0342    07/18/24 0343  Notify Physician (with Parameters)  Until Discontinued         07/18/24 0342    07/18/24 0343  Insert Peripheral IV  Once         07/18/24 0342    07/18/24 0343  Saline Lock & Maintain IV Access  Continuous,   Status:  Canceled         07/18/24 0342    07/18/24 0343  Continuous Cardiac Monitoring  Continuous        Comments: Follow Standing Orders As Outlined in Process Instructions (Open Order Report to View Full  "Instructions)    07/18/24 0342    07/18/24 0343  Maintain IV Access  Continuous         07/18/24 0342    07/18/24 0343  Telemetry - Place Orders & Notify Provider of Results When Patient Experiences Acute Chest Pain, Dysrhythmia or Respiratory Distress  Continuous        Comments: Open Order Report to View Parameters Requiring Provider Notification    07/18/24 0342    07/18/24 0343  May Be Off Telemetry for Tests  Continuous         07/18/24 0342    07/18/24 0343  Continuous Pulse Oximetry  Continuous         07/18/24 0342    07/18/24 0343  Daily Weights  Daily       07/18/24 0342    07/18/24 0343  NPO Diet NPO Type: Sips with Meds  Diet Effective Now         07/18/24 0342    07/18/24 0343  Inpatient Cardiology Consult  Once        Provider:  Yamel Lorenzo, APRN    07/18/24 0342    07/18/24 0342  sennosides-docusate (PERICOLACE) 8.6-50 MG per tablet 2 tablet  2 Times Daily PRN        Placed in \"And\" Linked Group    07/18/24 0342    07/18/24 0342  polyethylene glycol (MIRALAX) packet 17 g  Daily PRN        Placed in \"And\" Linked Group    07/18/24 0342    07/18/24 0342  bisacodyl (DULCOLAX) EC tablet 5 mg  Daily PRN        Placed in \"And\" Linked Group    07/18/24 0342    07/18/24 0342  bisacodyl (DULCOLAX) suppository 10 mg  Daily PRN        Placed in \"And\" Linked Group    07/18/24 0342    07/18/24 0342  ipratropium (ATROVENT) nebulizer solution 0.5 mg  Every 6 Hours PRN         07/18/24 0342    07/18/24 0342  sodium chloride 0.9 % flush 10 mL  As Needed         07/18/24 0342    07/18/24 0342  sodium chloride 0.9 % infusion 40 mL  As Needed         07/18/24 0342    07/18/24 0342  nitroglycerin (NITROSTAT) SL tablet 0.4 mg  Every 5 Minutes PRN         07/18/24 0342    07/18/24 0304  Hemoglobin A1c  STAT         07/18/24 0303    07/18/24 0230  heparin (porcine) 5000 UNIT/ML injection 3,500 Units  Once         07/18/24 0200    07/18/24 0216  heparin 21794 units/250 mL (100 units/mL) in 0.45 % NaCl infusion  Titrated  "        07/18/24 0200    07/18/24 0212  aspirin chewable tablet 324 mg  Once         07/18/24 0156    07/18/24 0204  Code Status and Medical Interventions:  Continuous         07/18/24 0204    07/18/24 0203  Inpatient Admission  Once         07/18/24 0204    07/18/24 0201  Initiate & Follow Heparin Protocol  Continuous         07/18/24 0200    07/18/24 0201  Notify Provider Platelet Count Less Than 76956  Continuous        Comments: Open Order Report to View Parameters Requiring Provider Notification    07/18/24 0200    07/18/24 0201  Stop Infusion & Notify Provider if Bleeding Occurs  Continuous        Comments: Open Order Report to View Parameters Requiring Provider Notification    07/18/24 0200    07/18/24 0201  CBC & Differential  STAT        Comments: Prior to Initial Heparin Bolus      07/18/24 0200    07/18/24 0201  Heparin Anti-Xa  STAT        Comments: Prior to Initial Heparin Bolus      07/18/24 0200    07/18/24 0201  Protime-INR  STAT        Comments: Prior to Initial Heparin Bolus      07/18/24 0200    07/18/24 0201  aPTT  STAT        Comments: Prior to Initial Heparin Bolus      07/18/24 0200    07/18/24 0201  CBC Auto Differential  PROCEDURE ONCE        Comments: Prior to Initial Heparin Bolus      07/18/24 0200    07/18/24 0200  heparin (porcine) 5000 UNIT/ML injection 2,900 Units  As Needed,   Status:  Discontinued         07/18/24 0200    07/18/24 0200  heparin (porcine) 5000 UNIT/ML injection 1,500 Units  As Needed,   Status:  Discontinued         07/18/24 0200    07/18/24 0200  Pharmacy to Dose Heparin  Continuous PRN         07/18/24 0200    07/18/24 0031  D-dimer, Quantitative  Once         07/18/24 0030    07/17/24 2319  High Sensitivity Troponin T 2Hr  PROCEDURE ONCE         07/17/24 2202 07/17/24 2234  cefTRIAXone (ROCEPHIN) 2,000 mg in sodium chloride 0.9 % 100 mL IVPB-VTB  Once         07/17/24 2218 07/17/24 2205  Blood Gas, Arterial With Co-Ox  PROCEDURE ONCE         07/17/24 2203     07/17/24 2158  Urine Culture - Urine, Urine, Catheter  Once         07/17/24 2157 07/17/24 2154  Urinalysis, Microscopic Only - Urine, Catheter  Once         07/17/24 2153 07/17/24 2150  Blood Gas, Arterial -With Co-Ox Panel: Yes  Once         07/17/24 2149 07/17/24 2139  methylPREDNISolone sodium succinate (SOLU-Medrol) injection 80 mg  Once         07/17/24 2123 07/17/24 2130  XR Chest 1 View  1 Time Imaging         07/17/24 2034 07/17/24 2126  TSH  Once         07/17/24 2125 07/17/24 2126  Magnesium  Once         07/17/24 2125 07/17/24 2126  C-reactive Protein  Once         07/17/24 2125 07/17/24 2126  Procalcitonin  Once         07/17/24 2125 07/17/24 2125  Respiratory Panel PCR w/COVID-19(SARS-CoV-2) RHONDA/HEATHER/TASNEEM/PAD/COR/CHRISTELLE In-House, NP Swab in UTM/VTM, 2 HR TAT - Swab, Nasopharynx  Once         07/17/24 2124 07/17/24 2125  Gastrointestinal Panel, PCR - Stool, Per Rectum  Once         07/17/24 2124 07/17/24 2125  Clostridioides difficile Toxin - Stool, Per Rectum  Once         07/17/24 2124 07/17/24 2125  Clostridioides difficile Toxin, PCR - Stool, Per Rectum  PROCEDURE ONCE         07/17/24 2124 07/17/24 2125  [place] Draw  Once,   Status:  Canceled         07/17/24 2124 07/17/24 2125  Urinalysis With Culture If Indicated - Urine, Catheter  Once         07/17/24 2124 07/17/24 2125  Green Top (Gel)  PROCEDURE ONCE,   Status:  Canceled         07/17/24 2125 07/17/24 2125  Lavender Top  PROCEDURE ONCE,   Status:  Canceled         07/17/24 2125 07/17/24 2125  Gold Top - SST  PROCEDURE ONCE,   Status:  Canceled         07/17/24 2125 07/17/24 2125  Light Blue Top  PROCEDURE ONCE,   Status:  Canceled         07/17/24 2125 07/17/24 2045  Undress & Gown  Once         07/17/24 2044 07/17/24 2045  Vital Signs  Every 30 Minutes         07/17/24 2044 07/17/24 2045  Insert Peripheral IV  Once         07/17/24 2044 07/17/24 2045  Lactic Acid, Plasma  Once          07/17/24 2044 07/17/24 2045  [place] Draw  Once,   Status:  Canceled         07/17/24 2044 07/17/24 2045  Blood Culture - Blood, Arm, Right  Once         07/17/24 2044 07/17/24 2045  Blood Culture - Blood, Arm, Left  Once         07/17/24 2044 07/17/24 2045  Green Top (Gel)  PROCEDURE ONCE,   Status:  Canceled         07/17/24 2044 07/17/24 2045  Lavender Top  PROCEDURE ONCE,   Status:  Canceled         07/17/24 2044 07/17/24 2045  Gold Top - SST  PROCEDURE ONCE,   Status:  Canceled         07/17/24 2044 07/17/24 2045  Light Blue Top  PROCEDURE ONCE,   Status:  Canceled         07/17/24 2044 07/17/24 2044  sodium chloride 0.9 % flush 10 mL  As Needed         07/17/24 2044 07/17/24 2034  NPO Diet NPO Type: Strict NPO  Diet Effective Now,   Status:  Canceled         07/17/24 2034 07/17/24 2034  Undress & Gown  Once         07/17/24 2034 07/17/24 2034  Cardiac Monitoring  Continuous,   Status:  Canceled        Comments: Follow Standing Orders As Outlined in Process Instructions (Open Order Report to View Full Instructions)    07/17/24 2034 07/17/24 2034  Continuous Pulse Oximetry  Continuous,   Status:  Canceled         07/17/24 2034 07/17/24 2034  Vital Signs  Per Hospital Policy         07/17/24 2034 07/17/24 2034  ECG 12 Lead ED Triage Standing Order; SOA  Once         07/17/24 2034 07/17/24 2034  XR Chest 2 View  1 Time Imaging,   Status:  Canceled         07/17/24 2034 07/17/24 2034  Insert Peripheral IV  Once         07/17/24 2034 07/17/24 2034  [place] Draw  Once         07/17/24 2034 07/17/24 2034  CBC & Differential  Once         07/17/24 2034 07/17/24 2034  Comprehensive Metabolic Panel  Once         07/17/24 2034 07/17/24 2034  BNP  Once         07/17/24 2034 07/17/24 2034  Single High Sensitivity Troponin T  Once         07/17/24 2034 07/17/24 2034  COVID PRE-OP / PRE-PROCEDURE SCREENING ORDER (NO ISOLATION) - Swab, Nasopharynx  Once          07/17/24 2034 07/17/24 2034  Green Top (Gel)  PROCEDURE ONCE         07/17/24 2034 07/17/24 2034  Lavender Top  PROCEDURE ONCE         07/17/24 2034 07/17/24 2034  Gold Top - SST  PROCEDURE ONCE         07/17/24 2034 07/17/24 2034  Light Blue Top  PROCEDURE ONCE         07/17/24 2034 07/17/24 2034  CBC Auto Differential  PROCEDURE ONCE         07/17/24 2034 07/17/24 2034  COVID-19 and FLU A/B PCR, 1 HR TAT - Swab, Nasopharynx  PROCEDURE ONCE         07/17/24 2034 07/17/24 2033  sodium chloride 0.9 % flush 10 mL  As Needed         07/17/24 2034 07/17/24 0000  Telemetry Scan  Once         07/17/24 0000    07/17/24 0000  Telemetry Scan  Once         07/17/24 0000    Unscheduled  Oxygen Therapy- Nasal Cannula; Titrate 1-6 LPM Per SpO2; 90 - 95%  Continuous PRN       07/17/24 2034    Unscheduled  Up With Assistance  As Needed       07/18/24 0342    Unscheduled  Stage II Pressure Ulcer Care PRN  As Needed      Comments: - Gently Cleanse With Normal Saline  - Cover With Silicone Border Dressing (If Indicated)  - For Frequent Incontinence - Apply Skin Protective Barrier Cream Rather Than Silicone Border Dressing    07/18/24 0403    --  FLUoxetine (PROzac) 40 MG capsule  Daily         07/18/24 0221    --  hydrOXYzine (ATARAX) 25 MG tablet  Every 6 Hours PRN         07/18/24 0221    --  ipratropium-albuterol (DUO-NEB) 0.5-2.5 mg/3 ml nebulizer  4 Times Daily - RT         07/18/24 0221    --  megestrol (MEGACE) 40 MG tablet  2 Times Daily         07/18/24 0221    --  nystatin (MYCOSTATIN) 960529 UNIT/GM cream  2 Times Daily         07/18/24 0221    --  predniSONE (DELTASONE) 10 MG tablet  2 Times Daily         07/18/24 0221    --  primidone (MYSOLINE) 250 MG tablet  3 Times Daily         07/18/24 0221    --  triamcinolone (KENALOG) 0.1 % cream  2 Times Daily         07/18/24 0221                  Physician Progress Notes (all)    No notes of this type exist for this encounter.       Consult Notes  (all)    No notes of this type exist for this encounter.

## 2024-07-18 NOTE — H&P
Hospitalist History and Physical        Patient Identification  Name: Lexie KEITH Valdez  Age/Sex: 64 y.o. female  :  1960        MRN: [id number]  Visit Number: [id number]  Admit Date: 2024   PCP: Violet Pop APRN          Chief complaint short of breath    History of Present Illness:  Patient is a 64 y.o. female with history of anxiety/depression, arthritis, HTN, hypothyroidism, stress urinary incontinence with frequent UTI's, COPD and pulmonary fibrosis on 3L NC at home, and resting tremors in face and extremities, who presents with worsening shortness of breath for the last couple days. She denies increased cough or sputum production. She denies increased lower extremity edema. She is not sure if she has been wheezing more or not. She does note chest pressure associated with dyspnea, and states that though it is somewhat chronic, it has been worse than baseline the last couple days. Her sister adds that her symptoms are exacerbated significantly by exertion, to the point that she cannot walk across a room in her house without getting out of breath. In the ED, patient was noted to be tachycardic and tachypneic. O2 saturation has been >90% and mostly around 100% on home 3L NC. ABG showed ph 7.458, CO2 40, PO2 154, bicarb 28, and sat >99.2%. Troponin was 229 with repeat 188, BNP was 3550, Na 131, Cr 0.49, glucose 177, CRP 1.65, lactate 2.0, procal and WBC normal but with 88.7% neutrophils. UA showed positive nitrite, trace leuk, 6-10 WBC, 4+ bacteria and 3-6 squamous epithelial cells. Upon further questioning, patient reports she has had more dysuria but says this comes and goes as she has frequent UTI's. She also reports 4 bouts of diarrhea each day for the last 2 days. She has also been nauseated and dry heaving at times. Urine and blood cultures are pending. Respiratory panel is negative. CXR showed cardiomegaly, fibrotic changes, with no significant change compared to last exam in 2024.        Review of Systems  Review of Systems   Constitutional:  Positive for activity change and fatigue. Negative for appetite change, chills, diaphoresis, fever and unexpected weight change.   HENT:  Negative for congestion, postnasal drip, rhinorrhea, sinus pressure, sinus pain and sore throat.    Eyes:  Negative for photophobia and visual disturbance.   Respiratory:  Positive for shortness of breath. Negative for cough and wheezing.    Cardiovascular:  Positive for chest pain. Negative for palpitations and leg swelling.   Gastrointestinal:  Positive for diarrhea and nausea. Negative for abdominal distention, abdominal pain, constipation and vomiting.   Endocrine: Negative for polyphagia and polyuria.   Genitourinary:  Positive for dysuria. Negative for flank pain, frequency and hematuria.   Musculoskeletal:  Negative for arthralgias, back pain, joint swelling, myalgias, neck pain and neck stiffness.   Skin:  Negative for color change, pallor, rash and wound.   Neurological:  Negative for dizziness, tremors, seizures, syncope, weakness, light-headedness, numbness and headaches.   Hematological:  Negative for adenopathy. Does not bruise/bleed easily.   Psychiatric/Behavioral:  Negative for agitation, behavioral problems and confusion.        History  Past Medical History:   Diagnosis Date    Anxiety     Arthritis     Depression     Hypertensive disorder 3/14/2018    Hypothyroidism 8/3/2021    MARU (stress urinary incontinence, female) 9/11/2019    UTI (urinary tract infection) 12/31/2023     Past Surgical History:   Procedure Laterality Date    BREAST BIOPSY Left 2000    benign    GALLBLADDER SURGERY      GASTRIC BANDING      HYSTERECTOMY      age 37     Family History   Problem Relation Age of Onset    Panic disorder Mother     COPD Mother     Alcohol abuse Father     Alzheimer's disease Father     Breast cancer Neg Hx      Social History     Tobacco Use    Smoking status: Former     Current packs/day: 0.00      Average packs/day: 1.5 packs/day for 34.3 years (51.5 ttl pk-yrs)     Types: Cigarettes     Start date: 1971     Quit date: 10/18/2005     Years since quittin.7     Passive exposure: Past    Smokeless tobacco: Never   Vaping Use    Vaping status: Never Used   Substance Use Topics    Alcohol use: No    Drug use: No     (Not in a hospital admission)    Allergies:  Codeine and Sulfa antibiotics    Objective     Vital Signs  Temp:  [98.2 °F (36.8 °C)] 98.2 °F (36.8 °C)  Heart Rate:  [] 84  Resp:  [22] 22  BP: ()/(46-85) 118/79  Body mass index is 21.63 kg/m².    Physical Exam:  Physical Exam  Constitutional:       Comments: Frail appearing elderly female, resting tremor noted around mouth and in upper extremities. No acute distress but appears acutely and chronically ill.    HENT:      Head: Normocephalic and atraumatic.      Right Ear: External ear normal.      Left Ear: External ear normal.      Nose: Nose normal.      Mouth/Throat:      Mouth: Mucous membranes are moist.      Pharynx: Oropharynx is clear.   Eyes:      Extraocular Movements: Extraocular movements intact.      Conjunctiva/sclera: Conjunctivae normal.      Pupils: Pupils are equal, round, and reactive to light.   Cardiovascular:      Rate and Rhythm: Normal rate and regular rhythm.      Pulses: Normal pulses.      Heart sounds: Normal heart sounds. No murmur heard.  Pulmonary:      Breath sounds: Normal breath sounds.      Comments: Mildly tachypneic. Dry velcro like crackles heard throughout both lungs. No wheezing appreciated.   Abdominal:      General: Abdomen is flat. Bowel sounds are normal. There is no distension.      Palpations: Abdomen is soft.      Tenderness: There is no abdominal tenderness.   Musculoskeletal:         General: Normal range of motion.      Cervical back: Normal range of motion and neck supple. No tenderness.      Right lower leg: No edema.      Left lower leg: No edema.   Lymphadenopathy:      Cervical:  "No cervical adenopathy.   Skin:     General: Skin is warm and dry.      Capillary Refill: Capillary refill takes less than 2 seconds.      Coloration: Skin is not jaundiced.      Findings: No bruising or lesion.   Neurological:      General: No focal deficit present.      Mental Status: She is oriented to person, place, and time.      Comments: Resting tremor noted around mouth and upper extremities   Psychiatric:         Mood and Affect: Mood normal.         Behavior: Behavior normal.           Results Review:       Lab Results:  Results from last 7 days   Lab Units 07/18/24  0211 07/17/24 2119   WBC 10*3/mm3 6.42 8.49   HEMOGLOBIN g/dL 12.3 12.9   PLATELETS 10*3/mm3 252 280     Results from last 7 days   Lab Units 07/17/24 2119   CRP mg/dL 1.65*     Results from last 7 days   Lab Units 07/17/24 2119   SODIUM mmol/L 131*   POTASSIUM mmol/L 4.1   CHLORIDE mmol/L 93*   CO2 mmol/L 26.2   BUN mg/dL 8   CREATININE mg/dL 0.49*   CALCIUM mg/dL 9.2   GLUCOSE mg/dL 177*     Results from last 7 days   Lab Units 07/17/24  2119   MAGNESIUM mg/dL 1.7     No results found for: \"HGBA1C\"  Results from last 7 days   Lab Units 07/17/24 2119   BILIRUBIN mg/dL 0.2   ALK PHOS U/L 106   AST (SGOT) U/L 43*   ALT (SGPT) U/L 13     Results from last 7 days   Lab Units 07/17/24  2320 07/17/24  2119   HSTROP T ng/L 188* 229*         Results from last 7 days   Lab Units 07/18/24  0211   INR  0.93     Results from last 7 days   Lab Units 07/17/24  2203   PH, ARTERIAL pH units 7.458*   PO2 ART mm Hg 154.0*   PCO2, ARTERIAL mm Hg 40.2   HCO3 ART mmol/L 28.4*       I have reviewed the patient's laboratory results.    Imaging:  Imaging Results (Last 72 Hours)       Procedure Component Value Units Date/Time    XR Chest 1 View [[id number]] Collected: 07/17/24 2137     Updated: 07/17/24 2140    Narrative:      INDICATION: Difficulty breathing.     TECHNIQUE: Frontal radiograph of the chest.     COMPARISON: 4/26/2024.     FINDINGS:   Cardiomegaly. " Pulmonary fibrotic changes again noted. No significant  change. No pleural effusion or pneumothorax. No acute fracture.       Impression:      No significant interval change allowing for differences in technique.        This report was finalized on 7/17/2024 9:38 PM by Alex Pallas, DO.               I have personally reviewed the patient's radiologic imaging.        EKG:   Sinus tachycardia, , QTc 471  Left axis deviation  Minimal voltage criteria for LVH, may be normal variant  Cannot rule out Anterior infarct , age undetermined  Abnormal ECG  When compared with ECG of 26-APR-2024 13:32,  Significant changes have occurred    I have personally reviewed the patient's EKG. No overt ST changes appreciated.         Assessment & Plan     - Worsening dyspnea, initially suspected exacerbation of COPD/pulmonary fibrosis and treated with IV solu-medrol (declined breathing treatment because just received one before arrival to the ED). However, with elevated troponin and BNP, in conjunction with symptoms of chest pressure and nausea, concern was raised for NSTEMI and resulting CHF contributing to her symptoms as well.  - COPD/pulmonary fibrosis exacerbation: continue IV solu-medrol and add IV doxycycline and scheduled nebs.   - NSTEMI (non-ST elevated myocardial infarction) type I vs type II secondary to above: treat with ASA and statin. Cardiology notified and recommended IV heparin infusion. Keep NPO except sips with meds. Obtain ECHO in the morning. Await further recommendations from cardiology in the morning.   - UTI: treat with IV rocephin. Follow up urine and blood cultures.  - Diarrhea: stool studies ordered, waiting for patient to have another loose bowel movement.     DVT Prophylaxis: anticoagulated with IV heparin infusion    Estimated Length of Stay >2 midnights    I discussed the patient's findings, assessment and plan with the patient, her sister at bedside, and ED provider Dr Lewis.     Patient is high  risk due to COPD/pulmonary fibrosis exacerbation, NSTEMI    Estuardo Charles MD  07/18/24  02:57 EDT

## 2024-07-19 NOTE — PROGRESS NOTES
[facility] HospitalIST PROGRESS NOTE     Patient Identification:  Name:  Lexie Valdez  Age:  64 y.o.  Sex:  female  :  1960  MRN:  [id number]  Visit Number:  [id number]  ROOM: [address]     Primary Care Provider:  Violet Pop APRN     Date of Admission: 2024    Length of stay in inpatient status:  1    Subjective     Chief Compliant:    Chief Complaint   Patient presents with    Shortness of Breath       CP free this am, no issues reported overnight.        Objective       Vital Signs:  Temp:  [97.7 °F (36.5 °C)-98.7 °F (37.1 °C)] 97.9 °F (36.6 °C)  Heart Rate:  [] 90  Resp:  [18-29] 20  BP: ()/(45-83) 117/56  SpO2:  [81 %-100 %] 91 %  on  Flow (L/min):  [2-4] 4;   Device (Oxygen Therapy): nasal cannula  Body mass index is 21.95 kg/m².      ----------------------------------------------------------------------------------------------------------------------  Physical Exam  Vitals and nursing note reviewed.   Eyes:      General: No scleral icterus.     Extraocular Movements: Extraocular movements intact.   Cardiovascular:      Rate and Rhythm: Normal rate.      Pulses: Normal pulses.   Pulmonary:      Effort: Pulmonary effort is normal. No respiratory distress.   Abdominal:      General: There is no distension.      Palpations: Abdomen is soft.   Musculoskeletal:      Right lower leg: No edema.      Left lower leg: No edema.   Skin:     General: Skin is warm and dry.   Psychiatric:         Behavior: Behavior normal.       ----------------------------------------------------------------------------------------------------------------------          Assessment & Plan      #NSTEMI, type 1  #Dyspnea secondary to HF  #HFrEF, new onset  -Stress test intermediate w/defect fixed w/o ischemia, EF reduced on TTE  -Given overall health status and lack of reversible ischemia, treating with GDMT optimization. No plan for [facility] currently  -Cards following, entresto and SGLT2 initiated per  their recs  -Cont asa, statin    #UTI  -Cont ceftriaxone    #Diarrhea  -Improved, C.diff precautions Dced    #COPD hx  #Pulmonary fibrosis hx  -Cont steroid and doxycycline        Code Status and Medical Interventions:   Ordered at: 07/18/24 0205     Code Status (Patient has no pulse and is not breathing):    CPR (Attempt to Resuscitate)     Medical Interventions (Patient has pulse or is breathing):    Full Support         Disposition: pend GDMT initation    I have reviewed any copied/forwarded text or data, verified its accuracy, and updated as necessary above.    Eduardo Freeman MD  [facility] Hospitalist  07/19/24  19:41 EDT

## 2024-07-19 NOTE — PLAN OF CARE
Goal Outcome Evaluation:      Heparin therapeutic.  Held pm dose of metoprolol.  Patient refused jardiance.    Problem: Adult Inpatient Plan of Care  Goal: Plan of Care Review  Outcome: Ongoing, Progressing  Flowsheets  Taken 7/19/2024 0542 by Lizz Flowers RN  Progress: no change  Taken 7/18/2024 0418 by Rin Jasmine RN  Plan of Care Reviewed With: patient  Goal: Patient-Specific Goal (Individualized)  Outcome: Ongoing, Progressing  Goal: Absence of Hospital-Acquired Illness or Injury  Outcome: Ongoing, Progressing  Intervention: Prevent Skin Injury  Recent Flowsheet Documentation  Taken 7/19/2024 0200 by Lizz Flowers RN  Skin Protection:   adhesive use limited   incontinence pads utilized   pulse oximeter probe site changed  Intervention: Prevent and Manage VTE (Venous Thromboembolism) Risk  Recent Flowsheet Documentation  Taken 7/18/2024 1930 by Lizz Flowers RN  Activity Management: activity encouraged  VTE Prevention/Management: (heparin drip) other (see comments)  Range of Motion: active ROM (range of motion) encouraged  Goal: Optimal Comfort and Wellbeing  Outcome: Ongoing, Progressing  Intervention: Provide Person-Centered Care  Recent Flowsheet Documentation  Taken 7/18/2024 1930 by Lizz Flowers RN  Trust Relationship/Rapport:   care explained   choices provided  Goal: Readiness for Transition of Care  Outcome: Ongoing, Progressing     Problem: COPD (Chronic Obstructive Pulmonary Disease) Comorbidity  Goal: Maintenance of COPD Symptom Control  Outcome: Ongoing, Progressing     Problem: Fall Injury Risk  Goal: Absence of Fall and Fall-Related Injury  Outcome: Ongoing, Progressing     Problem: Adjustment to Illness (Sepsis/Septic Shock)  Goal: Optimal Coping  Outcome: Ongoing, Progressing  Intervention: Optimize Psychosocial Adjustment to Illness  Recent Flowsheet Documentation  Taken 7/18/2024 1930 by Lizz Flowers RN  Family/Support System Care: support provided     Problem: Bleeding  (Sepsis/Septic Shock)  Goal: Absence of Bleeding  Outcome: Ongoing, Progressing     Problem: Glycemic Control Impaired (Sepsis/Septic Shock)  Goal: Blood Glucose Level Within Desired Range  Outcome: Ongoing, Progressing     Problem: Infection Progression (Sepsis/Septic Shock)  Goal: Absence of Infection Signs and Symptoms  Outcome: Ongoing, Progressing  Intervention: Promote Recovery  Recent Flowsheet Documentation  Taken 7/18/2024 1930 by Lizz Flowers RN  Activity Management: activity encouraged  Intervention: Promote Stabilization  Recent Flowsheet Documentation  Taken 7/18/2024 1930 by Lizz Flowers RN  Fluid/Electrolyte Management: fluids provided     Problem: Nutrition Impaired (Sepsis/Septic Shock)  Goal: Optimal Nutrition Intake  Outcome: Ongoing, Progressing     Problem: Skin Injury Risk Increased  Goal: Skin Health and Integrity  Outcome: Ongoing, Progressing  Intervention: Optimize Skin Protection  Recent Flowsheet Documentation  Taken 7/19/2024 0200 by Lizz Flowers RN  Pressure Reduction Techniques:   frequent weight shift encouraged   heels elevated off bed   positioned off wounds  Pressure Reduction Devices:   pressure-redistributing mattress utilized   heel offloading device utilized   positioning supports utilized  Skin Protection:   adhesive use limited   incontinence pads utilized   pulse oximeter probe site changed        Progress: no change

## 2024-07-19 NOTE — PROGRESS NOTES
Pharmacy was consulted and checked on the price of Entresto for the patient. Per patient's plan, copay will be $0 for 1 month supply. No other issues identified at this time.    Thank you,    Fanny August, Pharmacy Intern  07/19/24  17:01 EDT

## 2024-07-19 NOTE — PHARMACY PATIENT ASSISTANCE
Pharmacy checked on cost of Jardiance. Per patient's insurance, it is covered with no copay.    Thank you,  Mary Beth Nickerson, LizetD

## 2024-07-19 NOTE — PROGRESS NOTES
"    [facility]  PROGRESS NOTE    Patient information:  Name: Lexie Valdez  Age/Sex: 64 y.o. female  :  1960        PCP: Violet Pop APRN  Attending: Estuardo Charles MD  MRN:  [id number]  Visit Number:  [id number]    LOS: 1  CODE STATUS:    Code Status and Medical Interventions:   Ordered at: 24 0205     Code Status (Patient has no pulse and is not breathing):    CPR (Attempt to Resuscitate)     Medical Interventions (Patient has pulse or is breathing):    Full Support       PROBLEM LIST:Principal Problem:    NSTEMI (non-ST elevated myocardial infarction)      Reason for Cardiology follow-up: NSTEMI     Subjective   ADMISSION INFORMATION:  Chief Complaint   Patient presents with    Shortness of Breath       DATE:2024    HPI:  Lexie Valdez is a 64 y.o. 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     Patient presented to [facility] ([facility]) emergency room (ER) on 2024 with complaints of increased shortness of breath x 2 weeks has become progressively worse and exacerbated with minimal activity.     Primary Cardiologist: None found in patient's chart.     Interval History:   Patient was in room 209 when she was seen and examined.  Patient is lying in bed resting quietly.  No acute distress noted at this time.  Patient is lying in bed resting quietly.  No acute distress noted at this time.  Patient does report her breathing is \"better\".      Objective     Vital Signs  Temp:  [97.7 °F (36.5 °C)-98.7 °F (37.1 °C)] 97.7 °F (36.5 °C)  Heart Rate:  [] 92  Resp:  [18-29] 20  BP: ()/(45-86) 111/45  Flow (L/min):  [1-4] 4  Device (Oxygen Therapy): nasal cannula  Vital Signs (last 72 hrs)          0700   0659 " 07/17 0700  07/18 0659 07/18 0700  07/19 0659 07/19 0700  07/19 1618   Most Recent      Temp (°F)   98 -  98.2    97.7 -  99.2       97.7 (36.5) 07/19 0400    Heart Rate   80 -  109    84 -  105    86 -  92     90 07/19 1225    Resp   18 -  28    16 -  29    18 -  20     18 07/19 1225    BP   90/65 -  118/79    91/62 -  124/77      114/68     114/68 07/19 0723    SpO2 (%)   91 -  100    91 -  100    94 -  99     95 07/19 1225    Flow (L/min)     3    1 -  3      2     2 07/19 1225          BMI:Body mass index is 21.95 kg/m².    WEIGHT:      07/17/24 2039 07/18/24 0400 07/19/24  0400   Weight: 57.2 kg (126 lb) 58 kg (127 lb 13.9 oz) 58 kg (127 lb 13.9 oz)       DIET:Diet: Cardiac, Diabetic; Healthy Heart (2-3 Na+); Consistent Carbohydrate; Fluid Consistency: Thin (IDDSI 0)    I&O:  Intake & Output (last 3 days)         07/16 0701 07/17 0700 07/17 0701  07/18 0700 07/18 0701  07/19 0700 07/19 0701  07/20 0700    P.O.   490     I.V. (mL/kg)   489.9 (8.6)     IV Piggyback  100 100     Total Intake(mL/kg)  100 (1.7) 1079.9 (18.9)     Urine (mL/kg/hr)   450 (0.3)     Stool   600     Total Output   1050     Net  +100 +29.9             Urine Unmeasured Occurrence   2 x              PHYSICAL EXAM:  Vitals reviewed.   Constitutional:       Appearance: Healthy appearance. Not in distress.   Eyes:      Conjunctiva/sclera: Conjunctivae normal.      Pupils: Pupils are equal, round, and reactive to light.   HENT:      Nose: Nose normal.    Mouth/Throat:      Pharynx: Oropharynx is clear.   Neck:      Vascular: JVD normal.   Pulmonary:      Effort: Pulmonary effort is normal.      Breath sounds: Normal breath sounds.   Cardiovascular:      Normal rate. Regular rhythm.      Murmurs: There is no murmur.      No gallop.  No rub.   Pulses:     Intact distal pulses.   Abdominal:      General: Bowel sounds are normal.      Palpations: Abdomen is soft.   Musculoskeletal: Normal range of motion.      Cervical back: Normal range of  motion and neck supple. Skin:     General: Skin is warm and dry.      Comments: ulcer noted to RLE-dry, and no erythremia noted.   Neurological:      General: No focal deficit present.      Mental Status: Alert and oriented to person, place and time.      Comments: Resting tremors noted in face, BUE, and mildly in BLE.                    Results review   Results Review:    I have reviewed the patient's new clinical results. 07/19/24 16:27 EDT    Results from last 7 days   Lab Units 07/17/24  2320 07/17/24 2119   HSTROP T ng/L 188* 229*     Lab Results   Component Value Date    PROBNP 3,550.0 (H) 07/17/2024    PROBNP 235.1 04/26/2024    PROBNP 394.0 03/21/2023     Results from last 7 days   Lab Units 07/19/24  0046 07/18/24  0420 07/18/24  0211 07/17/24 2119   WBC 10*3/mm3 15.07* 7.25 6.42 8.49   HEMOGLOBIN g/dL 12.3 12.1 12.3 12.9   PLATELETS 10*3/mm3 269 276 252 280     Results from last 7 days   Lab Units 07/18/24  0420 07/17/24 2119   SODIUM mmol/L 132* 131*   POTASSIUM mmol/L 4.0 4.1   CHLORIDE mmol/L 96* 93*   CO2 mmol/L 25.2 26.2   BUN mg/dL 7* 8   CREATININE mg/dL 0.37* 0.49*   CALCIUM mg/dL 8.8 9.2   GLUCOSE mg/dL 130* 177*   ALT (SGPT) U/L 12 13   AST (SGOT) U/L 37* 43*     Lab Results   Component Value Date    MG 1.7 07/17/2024    MG 2.2 04/29/2024    MG 2.0 04/27/2024     Estimated Creatinine Clearance: 138.8 mL/min (A) (by C-G formula based on SCr of 0.37 mg/dL (L)).    Lab Results   Component Value Date    HGBA1C 4.60 (L) 07/18/2024    HGBA1C 4.80 04/11/2024    HGBA1C 5.00 12/31/2023     Lab Results   Component Value Date    CHOL 136 07/18/2024    TRIG 61 07/18/2024    LDL 56 07/18/2024    HDL 67 (H) 07/18/2024        Lab Results   Component Value Date    INR 0.93 07/18/2024    INR 0.98 04/26/2024    INR 1.00 03/21/2023     Lab Results   Component Value Date    LABHEPA 0.36 07/19/2024    LABHEPA 0.33 07/19/2024    LABHEPA 0.11 (L) 07/18/2024    LABHEPA 0.23 (L) 07/18/2024       Lab Results    Component Value Date    TSH 1.140 07/17/2024      Pain Management Panel           No data to display              Microbiology Results (last 10 days)       Procedure Component Value - Date/Time    Gastrointestinal Panel, PCR - Stool, Per Rectum [[id number]]  (Normal) Collected: 07/19/24 0410    Lab Status: Final result Specimen: Stool from Per Rectum Updated: 07/19/24 0603     Campylobacter Not Detected     Plesiomonas shigelloides Not Detected     Salmonella Not Detected     Vibrio Not Detected     Vibrio cholerae Not Detected     Yersinia enterocolitica Not Detected     Enteroaggregative E. coli (EAEC) Not Detected     Enteropathogenic E. coli (EPEC) Not Detected     Enterotoxigenic E. coli (ETEC) lt/st Not Detected     Shiga-like toxin-producing E. coli (STEC) stx1/stx2 Not Detected     Shigella/Enteroinvasive E. coli (EIEC) Not Detected     Cryptosporidium Not Detected     Cyclospora cayetanensis Not Detected     Entamoeba histolytica Not Detected     Giardia lamblia Not Detected     Adenovirus F40/41 Not Detected     Astrovirus Not Detected     Norovirus GI/GII Not Detected     Rotavirus A Not Detected     Sapovirus (I, II, IV or V) Not Detected    Clostridioides difficile Toxin - Stool, Per Rectum [[id number]] Collected: 07/19/24 0410    Lab Status: Final result Specimen: Stool from Per Rectum Updated: 07/19/24 0527    Narrative:      The following orders were created for panel order Clostridioides difficile Toxin - Stool, Per Rectum.  Procedure                               Abnormality         Status                     ---------                               -----------         ------                     Clostridioides difficile...[[id number]]                      Final result                 Please view results for these tests on the individual orders.    Clostridioides difficile Toxin, PCR - Stool, Per Rectum [[id number]] Collected: 07/19/24 0410    Lab Status: Final result Specimen: Stool from Per Rectum  Updated: 07/19/24 0527     Toxigenic C. difficile by PCR Negative     027 Toxin Presumptive Negative    Narrative:      The result indicates the absence of toxigenic C. difficile from stool specimen.     Respiratory Panel PCR w/COVID-19(SARS-CoV-2) RHONDA/HEATHER/TASNEEM/PAD/COR/CHRISTELLE In-House, NP Swab in UTM/VTM, 2 HR TAT - Swab, Nasopharynx [[id number]]  (Normal) Collected: 07/17/24 2127    Lab Status: Final result Specimen: Swab from Nasopharynx Updated: 07/17/24 2325     ADENOVIRUS, PCR Not Detected     Coronavirus 229E Not Detected     Coronavirus HKU1 Not Detected     Coronavirus NL63 Not Detected     Coronavirus OC43 Not Detected     COVID19 Not Detected     Human Metapneumovirus Not Detected     Human Rhinovirus/Enterovirus Not Detected     Influenza A PCR Not Detected     Influenza B PCR Not Detected     Parainfluenza Virus 1 Not Detected     Parainfluenza Virus 2 Not Detected     Parainfluenza Virus 3 Not Detected     Parainfluenza Virus 4 Not Detected     RSV, PCR Not Detected     Bordetella pertussis pcr Not Detected     Bordetella parapertussis PCR Not Detected     Chlamydophila pneumoniae PCR Not Detected     Mycoplasma pneumo by PCR Not Detected    Narrative:      In the setting of a positive respiratory panel with a viral infection PLUS a negative procalcitonin without other underlying concern for bacterial infection, consider observing off antibiotics or discontinuation of antibiotics and continue supportive care. If the respiratory panel is positive for atypical bacterial infection (Bordetella pertussis, Chlamydophila pneumoniae, or Mycoplasma pneumoniae), consider antibiotic de-escalation to target atypical bacterial infection.    COVID PRE-OP / PRE-PROCEDURE SCREENING ORDER (NO ISOLATION) - Swab, Nasopharynx [[id number]]  (Normal) Collected: 07/17/24 2119    Lab Status: Final result Specimen: Swab from Nasopharynx Updated: 07/17/24 2149    Narrative:      The following orders were created for panel order COVID  PRE-OP / PRE-PROCEDURE SCREENING ORDER (NO ISOLATION) - Swab, Nasopharynx.  Procedure                               Abnormality         Status                     ---------                               -----------         ------                     COVID-19 and FLU A/B PCR...[[id number]]  Normal              Final result                 Please view results for these tests on the individual orders.    COVID-19 and FLU A/B PCR, 1 HR TAT - Swab, Nasopharynx [[id number]]  (Normal) Collected: 07/17/24 2119    Lab Status: Final result Specimen: Swab from Nasopharynx Updated: 07/17/24 2149     COVID19 Not Detected     Influenza A PCR Not Detected     Influenza B PCR Not Detected    Narrative:      Fact sheet for providers: https://www.fda.gov/media/865321/download    Fact sheet for patients: https://www.fda.gov/media/631682/download    Test performed by PCR.    Blood Culture - Blood, Arm, Right [[id number]]  (Normal) Collected: 07/17/24 2119    Lab Status: Preliminary result Specimen: Blood from Arm, Right Updated: 07/18/24 2132     Blood Culture No growth at 24 hours    Blood Culture - Blood, Arm, Left [[id number]]  (Normal) Collected: 07/17/24 2119    Lab Status: Preliminary result Specimen: Blood from Arm, Left Updated: 07/18/24 2132     Blood Culture No growth at 24 hours           Imaging Results (Last 24 Hours)       ** No results found for the last 24 hours. **          ECHO:  Results for orders placed during the hospital encounter of 07/17/24    Adult Transthoracic Echo Complete w/ Color, Spectral and Contrast if necessary per protocol    Interpretation Summary    Normal left ventricular cavity size and wall thickness noted.    The following left ventricular wall segments are hypokinetic: mid anterior, basal anterolateral, apical lateral, apical septal, mid anteroseptal and basal anterior. The following left ventricular wall segments are akinetic: apex.    Left ventricular systolic function is moderately decreased.  Left ventricular ejection fraction appears to be 36 - 40%.    Left ventricular diastolic function is consistent with (grade I) impaired relaxation.    The aortic valve is structurally normal with no regurgitation or stenosis present.    The mitral valve is structurally normal with no significant stenosis present. Mild mitral valve regurgitation is present.    Mild tricuspid valve regurgitation is present. Estimated right ventricular systolic pressure from tricuspid regurgitation is moderately elevated (45-55 mmHg).    There is no evidence of pericardial effusion. .    Comments: Patient seem to have developed a new regional wall motion normalities with decreased LV systolic function as compared to the previous study in March 2023.      STRESS TEST:  Results for orders placed during the hospital encounter of 07/17/24    Stress Test With Myocardial Perfusion One Day    Interpretation Summary  Images from the original result were not included.      A pharmacological stress test was performed using regadenoson without low-level exercise.    Findings consistent with an indeterminate ECG stress test.    Myocardial perfusion imaging indicates a moderate-sized infarct located in the inferior wall, septal wall and apex with no significant ischemia noted.    Abnormal LV wall motion consistent with apical dyskinesis.    Left ventricular ejection fraction is moderately reduced (Calculated EF = 43%).    Impressions are consistent with an intermediate risk study.       HEART CATH:  No results found for this or any previous visit.      TELEMETRY:            I reviewed the patient's new clinical results.    ALLERGIES: Codeine and Sulfa antibiotics    Medication Review:   Current list of medications may not reflect those currently placed in orders that are not signed or are being held.     aspirin, 81 mg, Oral, Daily  atorvastatin, 40 mg, Oral, Daily  Brexpiprazole, 0.5 mg, Oral, Daily  budesonide, 0.5 mg, Nebulization, BID -  RT  cefTRIAXone, 1,000 mg, Intravenous, Q24H  cetirizine, 10 mg, Oral, Daily  donepezil, 10 mg, Oral, Q PM  doxycycline, 100 mg, Intravenous, Q12H  empagliflozin, 10 mg, Oral, Daily  FLUoxetine, 40 mg, Oral, Daily  hydroxychloroquine, 200 mg, Oral, BID  ipratropium-albuterol, 3 mL, Nebulization, Q6H - RT  [START ON 7/20/2024] levothyroxine, 25 mcg, Oral, Daily With Breakfast  megestrol, 40 mg, Oral, BID  memantine, 5 mg, Oral, Q12H  methylPREDNISolone sodium succinate, 40 mg, Intravenous, Q12H  metoprolol succinate XL, 25 mg, Oral, Q24H  naproxen, 500 mg, Oral, BID With Meals  Phenylephrine HCl-NaCl, , ,   Phenylephrine HCl-NaCl, , ,   primidone, 200 mg, Oral, TID  sodium chloride, 10 mL, Intravenous, Q12H  triamcinolone, 1 Application, Topical, BID      heparin, 15 Units/kg/hr (Dosing Weight), Last Rate: 15 Units/kg/hr (07/19/24 1614)  Pharmacy Consult,   Pharmacy to Dose Heparin,         aluminum-magnesium hydroxide-simethicone    senna-docusate sodium **AND** polyethylene glycol **AND** bisacodyl **AND** bisacodyl    busPIRone    guaifenesin    HYDROcodone-acetaminophen    hydrOXYzine    ipratropium    nitroglycerin    Pharmacy Consult    Pharmacy to Dose Heparin    Phenylephrine HCl-NaCl    Phenylephrine HCl-NaCl    sodium chloride    sodium chloride    sodium chloride    sodium chloride    Assessment    Acute non-ST elevation myocardial infarction (type I versus type II due to respiratory failure with hypoxia)  Advanced COPD/pulmonary fibrosis, on home oxygen.  Acute on chronic respiratory failure with hypoxia, on home oxygen.  Systemic hypertension.  Mild hyponatremia    Recommendations / Plan     Continue with aspirin and atorvastatin  Echo Doppler study has revealed new regional wall motion abnormalities with moderately depressed LV systolic function with an estimated ejection fraction of about 40%.  Her nuclear stress test revealed a large apical fixed defect indicating of old myocardial scar with no  evidence of myocardial ischemia  Will try to optimize GDMT for her cardiomyopathy.  Obtain cost of Entresto start this if affordable.  Continue metoprolol succinate as tolerated by her blood pressure and continue Jardiance 10 mg daily. BP has been softly low.       I have discussed the patients findings and recommendations with the patient.    Thank you very much for asking us to be involved in this patient's care.  We will follow along with you.    Electronically signed by SG Chandler, 07/19/24, 4:23 PM EDT.                   Please note that portions of this note were completed with a voice recognition program.    Please note that portions of this note were copied and has been reviewed and is accurate as of 7/19/2024 .

## 2024-07-19 NOTE — PROGRESS NOTES
HEPARIN INFUSION  Lexie Valdez is a  64 y.o. female receiving heparin infusion.     Therapy for (VTE/Cardiac):   Cardiac  Patient Dosing Weight: 57.2 kg  Initial Bolus (Y/N):   Y  Any Bolus (Y/N):   Y        Signs or Symptoms of Bleeding: N    Cardiac or Other (Not VTE)   Initial Bolus: 60 units/kg (Max 4,000 units)  Initial rate: 12 units/kg/hr (Max 1,000 units/hr)   Anti Xa Rebolus Infusion Hold time Change infusion Dose (Units/kg/hr) Next Anti Xa or aPTT Level Due   < 0.11 50 Units/kg  (4000 Units Max) None Increase by  3 Units/kg/hr 6 hours   0.11- 0.19 25 Units/kg  (2000 Units Max) None Increase by  2 Units/kg/hr 6 hours   0.2 - 0.29 0 None Increase by  1 Units/kg/hr 6 hours   0.3 - 0.5 0 None No Change 6 hours (after 2 consecutive levels in range check q24h @0700)   0.51 - 0.6 0 None Decrease by  1 Units/kg/hr 6 hours   0.61 - 0.8 0 30 Minutes Decrease by  2 Units/kg/hr 6 hours   0.81 - 1 0 60 Minutes Decrease by  3 Units/kg/hr 6 hours   >1 0 Hold  After Anti Xa less than 0.5 decrease previous rate by  4 Units/kg/hr  Every 2 hours until Anti Xa  less than 0.5 then when infusion restarts in 6 hours       Recommend anti-Xa every 6 hours.          Date   Time   Anti-Xa Current Rate (Unit/kg/hr) Bolus   (Units) Rate Change   (Unit/kg/hr) New Rate (Unit/kg/hr) Next   Anti-Xa Comments  Pump Check Daily   7/18 0214 < 0.10 - 3500 +12 12 0900 No s/s of bleeding per nurse Shereen   7/18 1002 0.23 12 - +1 13 1800 Labs late, no bolus, rate adjusted, no s/s bleeding, d/w  Brianna  RN   7/18 1724 0.11 13 1500 +2 15 0100 Discussed bolus and rate increase with RN Rocio - no s/s of bleeding reported.   7/19 0125 0.33 15 - - 15 0800 Tx x1. No s/s of bleeding per nurse Ewa   7/19 1538 0.36 15 - - 15 0700 7/20 Tx x 2. Spoke with patients nurse and no rate change or s/s of bleeding                                                                                                                                                                                      Pharmacy will continue to follow anti-Xa results and monitor for signs and symptoms of bleeding or thrombosis.    Pelon Mendoza, PharmD  07/19/24  15:47 EDT

## 2024-07-19 NOTE — PLAN OF CARE
Goal Outcome Evaluation:           Progress: no change  Outcome Evaluation: Pt A&Ox4 resting in the bed at this time, VSS, O2 @ 4 LPM, no further complaint at this time. bed in lowest position, wheels locked, id band on and call light in reach.

## 2024-07-20 NOTE — PROGRESS NOTES
[facility]  PROGRESS NOTE    Patient information:  Name: Lexie Valdez  Age/Sex: 64 y.o. female  :  1960        PCP: Violet Pop APRN  Attending: Estuardo Charles MD  MRN:  [id number]  Visit Number:  [id number]    LOS: 2  CODE STATUS:    Code Status and Medical Interventions:   Ordered at: 24 0205     Code Status (Patient has no pulse and is not breathing):    CPR (Attempt to Resuscitate)     Medical Interventions (Patient has pulse or is breathing):    Full Support       PROBLEM LIST:Principal Problem:    NSTEMI (non-ST elevated myocardial infarction)      Reason for Cardiology follow-up: NSTEMI     Subjective   ADMISSION INFORMATION:  Chief Complaint   Patient presents with    Shortness of Breath       DATE:2024    HPI:  Lexie Valdez is a 64 y.o. 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     Patient presented to [facility] ([facility]) emergency room (ER) on 2024 with complaints of increased shortness of breath x 2 weeks has become progressively worse and exacerbated with minimal activity.     Primary Cardiologist: None found in patient's chart.     Interval History:      Patient was found to have new regional wall motion abnormalities with mdoerately depressed LV systolic function with estimated EF of 40%.  She had nuclear stress test revealing large apical fixed defect indicating of old myocardial scar with no known evidence of myocardial ischemia.   Attempts are being made to elevate GDMT    This AM, she is examined in room 206A. She reports some shortness of breath.  It does appear her O2 requirements have trended downward from a few days ago.      Orders placed:   Low dose Entresto started with hold parameter  for hypotension    Objective     Vital Signs  Temp:  [97.7 °F (36.5 °C)-98.2 °F (36.8 °C)] 98.2 °F (36.8 °C)  Heart Rate:  [70-92] 89  Resp:  [16-29] 24  BP: ()/(45-81) 118/68  Flow (L/min):  [2-4] 3  Device (Oxygen Therapy): nasal cannula  Vital Signs (last 72 hrs)         07/16 0700 07/17 0659 07/17 0700 07/18 0659 07/18 0700 07/19 0659 07/19 0700 07/19 1618   Most Recent      Temp (°F)   98 -  98.2    97.7 -  99.2       97.7 (36.5) 07/19 0400    Heart Rate   80 -  109    84 -  105    86 -  92     90 07/19 1225    Resp   18 -  28    16 -  29    18 -  20     18 07/19 1225    BP   90/65 -  118/79    91/62 -  124/77      114/68     114/68 07/19 0723    SpO2 (%)   91 -  100    91 -  100    94 -  99     95 07/19 1225    Flow (L/min)     3    1 -  3      2     2 07/19 1225          BMI:Body mass index is 21.95 kg/m².    WEIGHT:      07/18/24  0400 07/19/24  0400 07/20/24  0400   Weight: 58 kg (127 lb 13.9 oz) 58 kg (127 lb 13.9 oz) 58 kg (127 lb 13.9 oz)       DIET:Diet: Cardiac, Diabetic; Healthy Heart (2-3 Na+); Consistent Carbohydrate; Fluid Consistency: Thin (IDDSI 0)    I&O:  Intake & Output (last 3 days)         07/16 0701 07/17 0700 07/17 0701  07/18 0700 07/18 0701 07/19 0700 07/19 0701 07/20 0700    P.O.   490     I.V. (mL/kg)   489.9 (8.6)     IV Piggyback  100 100     Total Intake(mL/kg)  100 (1.7) 1079.9 (18.9)     Urine (mL/kg/hr)   450 (0.3)     Stool   600     Total Output   1050     Net  +100 +29.9             Urine Unmeasured Occurrence   2 x              PHYSICAL EXAM:  Vitals reviewed.   Constitutional:       Appearance: Healthy appearance. Not in distress.   Eyes:      Conjunctiva/sclera: Conjunctivae normal.      Pupils: Pupils are equal, round, and reactive to light.   HENT:      Nose: Nose normal.    Mouth/Throat:      Pharynx: Oropharynx is clear.   Neck:      Vascular: JVD normal.   Pulmonary:      Effort: Pulmonary effort is normal.      Breath sounds: Normal breath sounds.       Comments: Reduced bases  Cardiovascular:      Normal rate. Regular rhythm.      Murmurs: There is no murmur.      No gallop.  No rub.      Comments: Gallop  Pulses:     Intact distal pulses.   Abdominal:      General: Bowel sounds are normal.      Palpations: Abdomen is soft.   Musculoskeletal: Normal range of motion.      Cervical back: Normal range of motion and neck supple. Skin:     General: Skin is warm and dry.      Comments: ulcer noted to RLE-dry, and no erythremia noted.   Neurological:      General: No focal deficit present.      Mental Status: Alert and oriented to person, place and time.      Comments: Resting tremors noted in face, BUE, and mildly in BLE.                    Results review   Results Review:    I have reviewed the patient's new clinical results. 07/20/24 09:07 EDT    Results from last 7 days   Lab Units 07/17/24  2320 07/17/24 2119   HSTROP T ng/L 188* 229*     Lab Results   Component Value Date    PROBNP 3,550.0 (H) 07/17/2024    PROBNP 235.1 04/26/2024    PROBNP 394.0 03/21/2023     Results from last 7 days   Lab Units 07/19/24  2356 07/19/24  0046 07/18/24  0420 07/18/24  0211 07/17/24 2119   WBC 10*3/mm3 13.37* 15.07* 7.25 6.42 8.49   HEMOGLOBIN g/dL 11.5* 12.3 12.1 12.3 12.9   PLATELETS 10*3/mm3 214 269 276 252 280     Results from last 7 days   Lab Units 07/19/24  2356 07/18/24  0420 07/17/24 2119   SODIUM mmol/L 133* 132* 131*   POTASSIUM mmol/L 4.5 4.0 4.1   CHLORIDE mmol/L 102 96* 93*   CO2 mmol/L 22.2 25.2 26.2   BUN mg/dL 15 7* 8   CREATININE mg/dL 0.44* 0.37* 0.49*   CALCIUM mg/dL 8.8 8.8 9.2   GLUCOSE mg/dL 114* 130* 177*   ALT (SGPT) U/L  --  12 13   AST (SGOT) U/L  --  37* 43*     Lab Results   Component Value Date    MG 1.7 07/17/2024    MG 2.2 04/29/2024    MG 2.0 04/27/2024     Estimated Creatinine Clearance: 116.7 mL/min (A) (by C-G formula based on SCr of 0.44 mg/dL (L)).    Lab Results   Component Value Date    HGBA1C 4.60 (L) 07/18/2024    HGBA1C 4.80 04/11/2024     HGBA1C 5.00 12/31/2023     Lab Results   Component Value Date    CHOL 136 07/18/2024    TRIG 61 07/18/2024    LDL 56 07/18/2024    HDL 67 (H) 07/18/2024        Lab Results   Component Value Date    INR 0.93 07/18/2024    INR 0.98 04/26/2024    INR 1.00 03/21/2023     Lab Results   Component Value Date    LABHEPA 0.27 (L) 07/20/2024    LABHEPA 0.36 07/19/2024    LABHEPA 0.33 07/19/2024    LABHEPA 0.11 (L) 07/18/2024       Lab Results   Component Value Date    TSH 1.140 07/17/2024      Pain Management Panel           No data to display              Microbiology Results (last 10 days)       Procedure Component Value - Date/Time    Gastrointestinal Panel, PCR - Stool, Per Rectum [[id number]]  (Normal) Collected: 07/19/24 0410    Lab Status: Final result Specimen: Stool from Per Rectum Updated: 07/19/24 0603     Campylobacter Not Detected     Plesiomonas shigelloides Not Detected     Salmonella Not Detected     Vibrio Not Detected     Vibrio cholerae Not Detected     Yersinia enterocolitica Not Detected     Enteroaggregative E. coli (EAEC) Not Detected     Enteropathogenic E. coli (EPEC) Not Detected     Enterotoxigenic E. coli (ETEC) lt/st Not Detected     Shiga-like toxin-producing E. coli (STEC) stx1/stx2 Not Detected     Shigella/Enteroinvasive E. coli (EIEC) Not Detected     Cryptosporidium Not Detected     Cyclospora cayetanensis Not Detected     Entamoeba histolytica Not Detected     Giardia lamblia Not Detected     Adenovirus F40/41 Not Detected     Astrovirus Not Detected     Norovirus GI/GII Not Detected     Rotavirus A Not Detected     Sapovirus (I, II, IV or V) Not Detected    Clostridioides difficile Toxin - Stool, Per Rectum [[id number]] Collected: 07/19/24 0410    Lab Status: Final result Specimen: Stool from Per Rectum Updated: 07/19/24 0527    Narrative:      The following orders were created for panel order Clostridioides difficile Toxin - Stool, Per Rectum.  Procedure                                Abnormality         Status                     ---------                               -----------         ------                     Clostridioides difficile...[[id number]]                      Final result                 Please view results for these tests on the individual orders.    Clostridioides difficile Toxin, PCR - Stool, Per Rectum [[id number]] Collected: 07/19/24 0410    Lab Status: Final result Specimen: Stool from Per Rectum Updated: 07/19/24 0527     Toxigenic C. difficile by PCR Negative     027 Toxin Presumptive Negative    Narrative:      The result indicates the absence of toxigenic C. difficile from stool specimen.     Urine Culture - Urine, Urine, Catheter [[id number]]  (Abnormal) Collected: 07/17/24 2145    Lab Status: Preliminary result Specimen: Urine, Catheter Updated: 07/19/24 1930     Urine Culture >100,000 CFU/mL Gram Negative Bacilli    Narrative:      Colonization of the urinary tract without infection is common. Treatment is discouraged unless the patient is symptomatic, pregnant, or undergoing an invasive urologic procedure.    Respiratory Panel PCR w/COVID-19(SARS-CoV-2) RHONDA/HEATHER/TASNEEM/PAD/COR/CHRISTELLE In-House, NP Swab in UTM/VTM, 2 HR TAT - Swab, Nasopharynx [[id number]]  (Normal) Collected: 07/17/24 2127    Lab Status: Final result Specimen: Swab from Nasopharynx Updated: 07/17/24 2325     ADENOVIRUS, PCR Not Detected     Coronavirus 229E Not Detected     Coronavirus HKU1 Not Detected     Coronavirus NL63 Not Detected     Coronavirus OC43 Not Detected     COVID19 Not Detected     Human Metapneumovirus Not Detected     Human Rhinovirus/Enterovirus Not Detected     Influenza A PCR Not Detected     Influenza B PCR Not Detected     Parainfluenza Virus 1 Not Detected     Parainfluenza Virus 2 Not Detected     Parainfluenza Virus 3 Not Detected     Parainfluenza Virus 4 Not Detected     RSV, PCR Not Detected     Bordetella pertussis pcr Not Detected     Bordetella parapertussis PCR Not Detected      Chlamydophila pneumoniae PCR Not Detected     Mycoplasma pneumo by PCR Not Detected    Narrative:      In the setting of a positive respiratory panel with a viral infection PLUS a negative procalcitonin without other underlying concern for bacterial infection, consider observing off antibiotics or discontinuation of antibiotics and continue supportive care. If the respiratory panel is positive for atypical bacterial infection (Bordetella pertussis, Chlamydophila pneumoniae, or Mycoplasma pneumoniae), consider antibiotic de-escalation to target atypical bacterial infection.    COVID PRE-OP / PRE-PROCEDURE SCREENING ORDER (NO ISOLATION) - Swab, Nasopharynx [[id number]]  (Normal) Collected: 07/17/24 2119    Lab Status: Final result Specimen: Swab from Nasopharynx Updated: 07/17/24 2149    Narrative:      The following orders were created for panel order COVID PRE-OP / PRE-PROCEDURE SCREENING ORDER (NO ISOLATION) - Swab, Nasopharynx.  Procedure                               Abnormality         Status                     ---------                               -----------         ------                     COVID-19 and FLU A/B PCR...[[id number]]  Normal              Final result                 Please view results for these tests on the individual orders.    COVID-19 and FLU A/B PCR, 1 HR TAT - Swab, Nasopharynx [[id number]]  (Normal) Collected: 07/17/24 2119    Lab Status: Final result Specimen: Swab from Nasopharynx Updated: 07/17/24 2149     COVID19 Not Detected     Influenza A PCR Not Detected     Influenza B PCR Not Detected    Narrative:      Fact sheet for providers: https://www.fda.gov/media/959172/download    Fact sheet for patients: https://www.fda.gov/media/886465/download    Test performed by PCR.    Blood Culture - Blood, Arm, Right [[id number]]  (Normal) Collected: 07/17/24 2119    Lab Status: Preliminary result Specimen: Blood from Arm, Right Updated: 07/19/24 2131     Blood Culture No growth at 2 days     Blood Culture - Blood, Arm, Left [[id number]]  (Normal) Collected: 07/17/24 2119    Lab Status: Preliminary result Specimen: Blood from Arm, Left Updated: 07/19/24 2131     Blood Culture No growth at 2 days           Imaging Results (Last 24 Hours)       ** No results found for the last 24 hours. **          ECHO:  Results for orders placed during the hospital encounter of 07/17/24    Adult Transthoracic Echo Complete w/ Color, Spectral and Contrast if necessary per protocol    Interpretation Summary    Normal left ventricular cavity size and wall thickness noted.    The following left ventricular wall segments are hypokinetic: mid anterior, basal anterolateral, apical lateral, apical septal, mid anteroseptal and basal anterior. The following left ventricular wall segments are akinetic: apex.    Left ventricular systolic function is moderately decreased. Left ventricular ejection fraction appears to be 36 - 40%.    Left ventricular diastolic function is consistent with (grade I) impaired relaxation.    The aortic valve is structurally normal with no regurgitation or stenosis present.    The mitral valve is structurally normal with no significant stenosis present. Mild mitral valve regurgitation is present.    Mild tricuspid valve regurgitation is present. Estimated right ventricular systolic pressure from tricuspid regurgitation is moderately elevated (45-55 mmHg).    There is no evidence of pericardial effusion. .    Comments: Patient seem to have developed a new regional wall motion normalities with decreased LV systolic function as compared to the previous study in March 2023.      STRESS TEST:  Results for orders placed during the hospital encounter of 07/17/24    Stress Test With Myocardial Perfusion One Day    Interpretation Summary  Images from the original result were not included.      A pharmacological stress test was performed using regadenoson without low-level exercise.    Findings consistent with an  indeterminate ECG stress test.    Myocardial perfusion imaging indicates a moderate-sized infarct located in the inferior wall, septal wall and apex with no significant ischemia noted.    Abnormal LV wall motion consistent with apical dyskinesis.    Left ventricular ejection fraction is moderately reduced (Calculated EF = 43%).    Impressions are consistent with an intermediate risk study.       HEART CATH:  No results found for this or any previous visit.      TELEMETRY:      SR in the 90s    I reviewed the patient's new clinical results.    ALLERGIES: Codeine and Sulfa antibiotics    Medication Review:   Current list of medications may not reflect those currently placed in orders that are not signed or are being held.     aspirin, 81 mg, Oral, Daily  atorvastatin, 40 mg, Oral, Daily  Brexpiprazole, 0.5 mg, Oral, Daily  budesonide, 0.5 mg, Nebulization, BID - RT  cefTRIAXone, 1,000 mg, Intravenous, Q24H  cetirizine, 10 mg, Oral, Daily  donepezil, 10 mg, Oral, Q PM  doxycycline, 100 mg, Intravenous, Q12H  empagliflozin, 10 mg, Oral, Daily  FLUoxetine, 40 mg, Oral, Daily  hydroxychloroquine, 200 mg, Oral, BID  ipratropium-albuterol, 3 mL, Nebulization, Q6H - RT  levothyroxine, 25 mcg, Oral, Daily With Breakfast  megestrol, 40 mg, Oral, BID  memantine, 5 mg, Oral, Q12H  [START ON 7/21/2024] methylPREDNISolone sodium succinate, 40 mg, Intravenous, Daily  metoprolol succinate XL, 25 mg, Oral, Q24H  naproxen, 500 mg, Oral, BID With Meals  nystatin, 1 Application, Topical, Q12H  Phenylephrine HCl-NaCl, , ,   Phenylephrine HCl-NaCl, , ,   primidone, 200 mg, Oral, TID  sodium chloride, 10 mL, Intravenous, Q12H  sodium chloride, 10 mL, Intravenous, Q12H  triamcinolone, 1 Application, Topical, BID      Pharmacy Consult,         aluminum-magnesium hydroxide-simethicone    senna-docusate sodium **AND** polyethylene glycol **AND** bisacodyl **AND** bisacodyl    busPIRone    guaifenesin    HYDROcodone-acetaminophen     hydrOXYzine    ipratropium    nitroglycerin    Pharmacy Consult    Phenylephrine HCl-NaCl    Phenylephrine HCl-NaCl    prochlorperazine    sodium chloride    sodium chloride    sodium chloride    sodium chloride    sodium chloride    sodium chloride    Assessment    Acute non-ST elevation myocardial infarction likely type II due to respiratory failure with hypoxia  Advanced COPD/pulmonary fibrosis, on home oxygen.  Acute on chronic respiratory failure with hypoxia, on home oxygen.  HFrEF, improved  Essential hypertension  Mild hyponatremia    Recommendations / Plan   1.  Continue with daily aspirin and atorvastatin  2.  Will start Entresto and advance GDMT medications subsequently as an outpatient she needs to be followed up with heart failure clinic postdischarge  3.  No chest pain no ischemia on the stress test  4.  Fluid restriction for hyponatremia  5.  Will follow at a distance please call if assistance is required                  Please note that portions of this note were completed with a voice recognition program.    Please note that portions of this note were copied and has been reviewed and is accurate as of 7/20/2024 .     Electronically signed by Rinku Braxton MD, 07/20/24, 12:57 PM EDT.

## 2024-07-20 NOTE — PLAN OF CARE
Goal Outcome Evaluation:  Plan of Care Reviewed With: patient           Outcome Evaluation: Patient resting in bed at this time. VSS, continued on 3L NC. Heparin gtt infusing per order.  Patient has voiced no complaints this shift. Call light in reach. Will continue to monitor through end of shift.

## 2024-07-20 NOTE — PLAN OF CARE
Goal Outcome Evaluation:           Progress: improving  Outcome Evaluation: Patient vital signs currently stable. On 10 BHF NC. Patient did have a significant increae in O2 requirements today. See orders obtained. PRN medication given for anxiety and nausea. Otherwise, no significant changes noted.

## 2024-07-20 NOTE — PROGRESS NOTES
[facility]     Patient Identification:  Name:  Lexie KEITH Valdez  Age:  64 y.o.  Sex:  female  :  1960  MRN:  [id number]  Visit Number:  [id number]  ROOM: [address]     Primary Care Provider:  Violet Pop APRN     Date of Admission: 2024    Length of stay in inpatient status:  2    Subjective     Chief Compliant:    Chief Complaint   Patient presents with    Shortness of Breath       Patient remains CP free off heparin gtt s/p stress test. O2 requirement increased this am with subjective dyspnea and anxiety reported along with nausea. Repeat abg showed hypoxia and cxr showed worsened significant b/l pulmonary infiltrates w/o effusion.        Objective       Vital Signs:  Temp:  [97.7 °F (36.5 °C)-98.4 °F (36.9 °C)] 98.4 °F (36.9 °C)  Heart Rate:  [] 93  Resp:  [16-29] 20  BP: ()/(53-96) 104/64  SpO2:  [86 %-100 %] 99 %  on  Flow (L/min):  [3-10] 10;   Device (Oxygen Therapy): bubble;high-flow nasal cannula  Body mass index is 21.95 kg/m².      ----------------------------------------------------------------------------------------------------------------------  Physical Exam  Vitals and nursing note reviewed.   Eyes:      General: No scleral icterus.     Extraocular Movements: Extraocular movements intact.   Cardiovascular:      Rate and Rhythm: Normal rate.      Pulses: Normal pulses.   Pulmonary:      Effort: Pulmonary effort is normal. No respiratory distress.      Breath sounds: Rales present. No wheezing.   Abdominal:      General: There is no distension.      Palpations: Abdomen is soft.   Musculoskeletal:      Right lower leg: No edema.      Left lower leg: No edema.   Skin:     General: Skin is warm and dry.   Psychiatric:         Behavior: Behavior normal.       ----------------------------------------------------------------------------------------------------------------------          Assessment & Plan      #DEWEY, type 1  #Dyspnea  secondary to HF  #HFrEF, new onset  -Stress test intermediate w/defect fixed w/o ischemia, EF reduced on TTE  -Given overall health status and lack of reversible ischemia, treating with GDMT optimization. No plan for C currently  -Cards following, entresto and SGLT2 initiated per their recs  -Cont asa, statin  -One time lasix dose given worsening CXR, monitor diuretic effect    #UTI  -Cont ceftriaxone    #Diarrhea  -Improved, C.diff precautions Dced    #COPD hx  #Pulmonary fibrosis hx  -Cont steroid and doxycycline  -Worsening b/l infiltrates w.o effusions concerning for possible ILD flare, increased steroid back to solumedrol BID and consulted Pulm for input as patient may benefit from steroid burst if condition worsening. O2 requirement increased today        Code Status and Medical Interventions:   Ordered at: 07/18/24 0205     Code Status (Patient has no pulse and is not breathing):    CPR (Attempt to Resuscitate)     Medical Interventions (Patient has pulse or is breathing):    Full Support         Disposition: Cont to monitor inpatient. Cardiac condition stable but respiratory status worsening    I have reviewed any copied/forwarded text or data, verified its accuracy, and updated as necessary above.    Eduardo Freeman MD  [facility]  07/20/24  16:23 EDT

## 2024-07-21 NOTE — PLAN OF CARE
"Goal Outcome Evaluation:  Plan of Care Reviewed With: patient           Outcome Evaluation: Patient currently asleep in bed. VSS at this time. Patient c/o SOA and tachypnea, with mild hypotension noted on assesment. MD aware. See new orders, lasix and albumin given per orders. HHFNC continued at 55L/65%. Patient is uncomfortable, \"burning up from this oxygen.\" Temperature WNL, cool cloth and light bedding used to aid in patient comfort. PRN meds given for anxiety according to orders. Adequate UOP from external cath since administration of lasix earlier in shift. Call light in reach, bed in low, locked position. Patient refuses to float heels off of bed at this time, educated on importance of frequent weight shifting. Will continue to monitor through end of shift.                               "

## 2024-07-21 NOTE — PROGRESS NOTES
[facility] HOSPITALIST PROGRESS NOTE     Patient Identification:  Name:  Lexie KEITH Valdez  Age:  64 y.o.  Sex:  female  :  1960  MRN:  [id number]  Visit Number:  [id number]  ROOM: [address]     Primary Care Provider:  Violet Pop APRN     Date of Admission: 2024    Length of stay in inpatient status:  3    Subjective     Chief Compliant:    Chief Complaint   Patient presents with    Shortness of Breath       Overnight patient had rising O2 requirement, did have nausea and dry heaving reported yesterday afternoon but no overt aspiration event noted. Intubated in early morning hours and CVC placed this am. On MV patient with tremor causing complications with ETT tube that improved with increased sedation but resulted in rising vasopressor requirement. Tremor improved but patient had rising peak pressures and vent dyssynchrony that improved after vecuronium 5mg dose prior to move to CT scan.     CT chest performed and unfortunately showed progressive b/l infiltrates with edema and mild effusion. She was given two doses 40mg IV lasix this am, additional diuresis held for now. Steroid escalated to 125mg q8hr solumedrol. O2 requirement stable.        Objective       Vital Signs:  Temp:  [98.2 °F (36.8 °C)-99.8 °F (37.7 °C)] 98.7 °F (37.1 °C)  Heart Rate:  [] 79  Resp:  [20-45] 28  BP: ()/() 131/70  FiO2 (%):  [40 %-100 %] 40 %  SpO2:  [91 %-100 %] 100 %  on  Flow (L/min):  [55] 55;   Device (Oxygen Therapy): ventilator  Body mass index is 22.44 kg/m².      ----------------------------------------------------------------------------------------------------------------------  Physical Exam  Vitals and nursing note reviewed.   Constitutional:       Comments: Intubated/sedated   Eyes:      Comments: Eyes open but not tracking movement. Pupils reactive   Cardiovascular:      Rate and Rhythm: Normal rate and regular rhythm.   Pulmonary:      Effort: Respiratory distress present.       Breath sounds: Rales present. No wheezing.   Abdominal:      General: There is no distension.      Palpations: Abdomen is soft.      Comments: Bowel sounds active   Musculoskeletal:      Right lower leg: No edema.      Left lower leg: No edema.   Skin:     General: Skin is warm and dry.   Neurological:      Comments: Deeply sedated       ----------------------------------------------------------------------------------------------------------------------          Assessment & Plan    Patient initially presented with respiratory fatigue with normal procal and crp along with abnormal stress with EF reduced leading to suspected HF driving presentation. GDMT was initiated along with gentle diuresis with initial improvement in condition. Patient unfortunately developed worsening nausea 7/20 afternoon and likely had mild aspiration causing more rapid worsening of IPF along with pulmonary edema leading ultimately to intubation.    #COPD hx  #Pulmonary fibrosis hx  #Aspiration pneumonitis  -Suspect patient aspirated after admission now with pulm edema and aspiration pneumonitis with CT chest worsened compared to prior. Case discussed with pulm and agree with continuing steroid escalated to solumedrol 125mg q8hr, duoneb q4hr, abx escalated to cefepime/flagyl (vanc given but stopped w/negative mrsa nares) and resp pcr repeated and negative. PAL pending  -Patient with significantly elevated peak airway pressures, ETT patency confirmed and TV being returned with initial gas worse but repeat improving. A-a gradient preserved but given extent of lung fibrosis, patient at exceedingly high risk for PTX.     #NSTEMI, type 1  #Dyspnea secondary to HF  #HFrEF, new onset  -Stress test intermediate w/defect fixed w/o ischemia, EF reduced on TTE  -Given overall health status and lack of reversible ischemia, treating with GDMT optimization. No plan for [facility] currently  -Cards following, holding GDMT given decline  -Cont asa, statin  -On  multiple vasopressors, diurese as able given CT findings. 40mg x2 doses this am, monitor response    #Hypotension, septic vs sedation induced  -Vasopressors initiated in following order to be deescalated in reverse. Levophed -> vasopressin -> epinephrine. Avoiding masha given HFrEF, no tachy-arrhythmia noted    #UTI  -abx as above    #Diarrhea  -Improved, C.diff precautions Dced    F: NPO, Tfs via NG (trophic @ 10cc/hr)  A: Fentanyl   S: Propofol  T: pLOV  H: HOB elevated  U: ppi  G: PRN  S: Daily  B: PRN  I: RIJ cvc  D: cefepime/flagyl        I have spent 45 minutes of critical care time with > 50% of time spent in direct patient care, evaluating the patient at bedside, reviewing all labs and images, reviewing ABG and adjusting vent settings, reviewing pain and sedation gtt's, communicating plan of care w/ nursing staff and consultants, and utilizing high complexity medical decision making to assess and treat vital organ system failure in an individual who has impairment of one or more vital organ systems such that there is a high probability of imminent or life threatening deterioration in the patient’s condition. Failure to initiate the above interventions on an urgent basis would likely result in sudden, clinically significant or life threatening deterioration in the patient's condition.        Code Status and Medical Interventions:   Ordered at: 07/18/24 0205     Code Status (Patient has no pulse and is not breathing):    CPR (Attempt to Resuscitate)     Medical Interventions (Patient has pulse or is breathing):    Full Support         Disposition: cont icu care, prognosis poor. Palliative consulted and family updated at bedside    I have reviewed any copied/forwarded text or data, verified its accuracy, and updated as necessary above.    Eduardo Freeman MD  [facility] Hospitalist  07/21/24  16:34 EDT

## 2024-07-21 NOTE — NURSING NOTE
Patient transferred to CCU room 10. Patient belongings packed. RT at bedside during transfer. Attempted to call patient's son and sister x 2 to give update on change in status.

## 2024-07-21 NOTE — PROGRESS NOTES
Pharmacokinetics Service Note:    Lexie Valdez is a 64 y.o. female being evaluated by Pharmacy for vancomycin dosing.    Indication for Therapy: Pneumonia  Currently Estimated Renal Function: ClCr >125 mL/min using serum Cr of 0.37 mg/dL  Proposed Empiric Regimen Using Predicted Pharmacokinetic Parameters and Demographics: 1250mg IV q12 hours  Duration of Therapy Ordered: 7 days  Target Steady State AUC Range: 400-600 mg/L*hr  PAUC: 83%    Monitoring Planned: Monitor renal function and make dose adjustments as needed.       Rakesh Pandya, PharmD  Pharmacy Resident  07/21/24  08:56 EDT

## 2024-07-21 NOTE — NURSING NOTE
0615 Dr. Lewis at bedside, pt hyperoxygenated  0619 8.25ml Etomidate given  0620 # 7.5 ETT @21 lip  0622 16F. OG @ 60  Chest xray ordered

## 2024-07-21 NOTE — PROCEDURES
Central Line Procedure Note    PREOPERATIVE DIAGNOSIS: Need for central venous access, shock      POSTOPERATIVE DIAGNOSIS: same       PROCEDURE PERFORMED: Placement of right internal jugular central venous line       SURGEON: Barbara Soto MD        SPECIMENS: None       ANESTHESIA: Local       FINDINGS:   1.Successful placement of right internal jugular central venous line          DESCRIPTION OF PROCEDURE:     The patient was placed in the supine position.  I began by prepping the neck. A sterile drape was placed over the area.  Local anesthetic was injected into the skin.  Patient was placed in Trendelenburg.  I used a sterile ultrasound to identify the internal jugular vein. A needle was used to gain entrance to the vessel. A guidewire was threaded down. The placement of the guidewire was confirmed in the internal jugular vein with ultrasound. I then created a small skin opening around the guidewire and advanced the dilator over the guidewire into the vein.  I then removed the dilator and advanced the triple-lumen central venous catheter over the guidewire.  This easily threaded into the internal jugular vein.  I then removed the guidewire and aspirated venous blood through the port.  I aspirated and flushed the 2 remaining ports.  I then secured the central line to the neck with a 2-0 silk suture.  A Biopatch was placed around the entrance of the line to the skin.  I then used Mastisol to prep the skin.  A Tegaderm was applied over the line.  This concluded the procedure.  A stat chest x-ray was ordered.

## 2024-07-21 NOTE — PLAN OF CARE
Goal Outcome Evaluation:           Progress: declining  Outcome Evaluation: pt VSS, pt remains intubated and sedated with fentanyl and propfol, pt on vasopressin and levophed. pt's peak pressures remain high on ventilator. pt had mendez cath placed for urinary retention, still minimal UOP, given paralytic for vent synchrony this shift, afebrile no bowel movements.Bed in lowest position call light in reach.

## 2024-07-21 NOTE — CONSULTS
Patient Name:  Lexie Valdez  YOB: 1960  [id number]       Patient Care Team:  Violet Pop APRN as PCP - General (Family Medicine)      General Surgery Consult Note     Date of Consultation: 07/21/24    Consulting Physician : Eduardo Freeman MD    Reason for Consult : Need for central venous access    Subjective     I have been asked to see  Lexie Valdez , a 64 y.o. female in consultation for central venous access.  She is intubated, sedated, and on high-dose pressors in the unit.      Allergy:   Allergies   Allergen Reactions    Codeine GI Intolerance    Sulfa Antibiotics Itching       Medications:  aspirin, 81 mg, Oral, Daily  atorvastatin, 40 mg, Oral, Daily  Brexpiprazole, 0.5 mg, Oral, Daily  budesonide, 0.5 mg, Nebulization, BID - RT  cefepime, 2,000 mg, Intravenous, Q8H  donepezil, 10 mg, Oral, Q PM  FLUoxetine, 40 mg, Oral, Daily  furosemide, 40 mg, Intravenous, Once  hydroxychloroquine, 200 mg, Oral, BID  ipratropium-albuterol, 3 mL, Nebulization, Q6H - RT  levothyroxine, 25 mcg, Oral, Daily With Breakfast  memantine, 5 mg, Oral, Q12H  methylPREDNISolone sodium succinate, 125 mg, Intravenous, Q8H  metoprolol succinate XL, 25 mg, Oral, Q24H  metroNIDAZOLE, 500 mg, Intravenous, Q8H  nystatin, 1 Application, Topical, Q12H  pantoprazole, 40 mg, Intravenous, Q AM  Phenylephrine HCl-NaCl, , ,   Phenylephrine HCl-NaCl, , ,   [Held by provider] primidone, 200 mg, Oral, TID  sodium chloride, 10 mL, Intravenous, Q12H  sodium chloride, 10 mL, Intravenous, Q12H  sodium chloride, 10 mL, Intravenous, Q12H  triamcinolone, 1 Application, Topical, BID  vancomycin, 1,250 mg, Intravenous, Once  vancomycin, 1,250 mg, Intravenous, Q12H      fentanyl 10 mcg/mL,  mcg/hr, Last Rate: 50 mcg/hr (07/21/24 0833)  norepinephrine, 0.02-0.3 mcg/kg/min (Dosing Weight), Last Rate: 0.24 mcg/kg/min (07/21/24 0708)  Pharmacy Consult - Pharmacy to dose,   Pharmacy Consult,   Pharmacy to dose vancomycin,   propofol,  5-50 mcg/kg/min (Dosing Weight), Last Rate: 40 mcg/kg/min (07/21/24 0846)  vasopressin, 0.03 Units/min, Last Rate: Stopped (07/21/24 0725)      No current facility-administered medications on file prior to encounter.     Current Outpatient Medications on File Prior to Encounter   Medication Sig    aspirin 81 MG EC tablet Take 1 tablet by mouth Daily.    atorvastatin (LIPITOR) 40 MG tablet Take 1 tablet by mouth Daily.    Brexpiprazole 0.5 MG tablet Take 0.5 mg by mouth Daily.    busPIRone (BUSPAR) 10 MG tablet Take 1 tablet by mouth 2 (Two) Times a Day As Needed (Mood).    donepezil (ARICEPT) 10 MG tablet Take 1 tablet by mouth Every Evening.    FLUoxetine (PROzac) 40 MG capsule Take 1 capsule by mouth Daily.    Fluticasone-Umeclidin-Vilant (Trelegy Ellipta) 200-62.5-25 MCG/ACT inhaler Inhale 1 puff Daily.    hydroCHLOROthiazide 12.5 MG tablet Take 1 tablet by mouth Daily.    HYDROcodone-acetaminophen (NORCO) 5-325 MG per tablet Take 1 tablet by mouth Every 8 (Eight) Hours As Needed for Moderate Pain.    hydroxychloroquine (PLAQUENIL) 200 MG tablet Take 1 tablet by mouth 2 (Two) Times a Day.    hydrOXYzine (ATARAX) 25 MG tablet Take 1 tablet by mouth Every 6 (Six) Hours As Needed for Anxiety.    ipratropium-albuterol (DUO-NEB) 0.5-2.5 mg/3 ml nebulizer Take 3 mL by nebulization Every 4 (Four) Hours As Needed for Wheezing or Shortness of Air.    levocetirizine (XYZAL) 5 MG tablet Take 1 tablet by mouth Every Evening.    levothyroxine (SYNTHROID, LEVOTHROID) 25 MCG tablet Take 1 tablet by mouth Every Morning.    losartan (COZAAR) 50 MG tablet Take 1 tablet by mouth Daily.    megestrol (MEGACE) 40 MG tablet Take 1 tablet by mouth 2 (Two) Times a Day.    memantine (NAMENDA) 5 MG tablet Take 1 tablet by mouth 2 (Two) Times a Day.    nabumetone (RELAFEN) 750 MG tablet Take 1 tablet by mouth 2 (Two) Times a Day As Needed for Mild Pain or Moderate Pain.    primidone (MYSOLINE) 50 MG tablet Take 4 tablets by mouth 3 (Three)  "Times a Day.    triamcinolone (KENALOG) 0.1 % cream Apply 1 Application topically to the appropriate area as directed 2 (Two) Times a Day.    albuterol sulfate  (90 Base) MCG/ACT inhaler Inhale 2 puffs Daily As Needed for Wheezing.    [] predniSONE (DELTASONE) 10 MG tablet Take 2 tablets by mouth Daily.    predniSONE (DELTASONE) 10 MG tablet Take 1 tablet by mouth Daily.       PMHx:   Past Medical History:   Diagnosis Date    Anxiety     Arthritis     Depression     Hypertensive disorder 3/14/2018    Hypothyroidism 8/3/2021    MARU (stress urinary incontinence, female) 2019    UTI (urinary tract infection) 2023       Unable to obtain history or review of systems due to patient intubated and sedated.          Objective     Physical Exam:      Vital Signs  /65   Pulse 102   Temp 98.6 °F (37 °C) (Axillary)   Resp (!) 42   Ht 162.6 cm (64\")   Wt 59.3 kg (130 lb 11.7 oz)   SpO2 100%   BMI 22.44 kg/m²     Intake/Output Summary (Last 24 hours) at 2024 0915  Last data filed at 2024 0528  Gross per 24 hour   Intake 835.93 ml   Output 1300 ml   Net -464.07 ml         Physical Exam:    Head: Normocephalic, atraumatic.   Eyes: Pupils equal  Mouth: No lesions noted  CV: Regular rate and rhythm   Lungs: Bilateral chest rise and fall, no use of accessory muscles   Abdomen: Soft, nontender, nondistended  Extremities:  No cyanosis, clubbing or edema bilaterally         Assessment and Plan:    Problem List Items Addressed This Visit    None  Visit Diagnoses       Non-STEMI (non-ST elevated myocardial infarction)    -  Primary    Relevant Medications    nitroglycerin (NITROSTAT) SL tablet 0.4 mg    metoprolol succinate XL (TOPROL-XL) 24 hr tablet 25 mg    Phenylephrine HCl-NaCl 1-0.9 MG/10ML-% 100 mcg/ml injection  - ADS Override Pull    Phenylephrine HCl-NaCl 1-0.9 MG/10ML-% 100 mcg/ml injection  - ADS Override Pull    norepinephrine (LEVOPHED) 8 mg in 250 mL NS infusion (premix)         "     Active Hospital Problems    Diagnosis  POA    **NSTEMI (non-ST elevated myocardial infarction) [I21.4]  Yes      Resolved Hospital Problems   No resolved problems to display.     Ms. Valdez is a 64-year-old female with need for central venous access.  Right internal jugular central line placed.  Consent obtained from .    Barbara Soto MD  07/21/24  09:15 EDT

## 2024-07-21 NOTE — PLAN OF CARE
Problem: Nutrition Impaired (Sepsis/Septic Shock)  Goal: Optimal Nutrition Intake  Outcome: Ongoing, Progressing   Goal Outcome Evaluation:        Patient placed on BiPAP upon admission to CCU for work of breathing.

## 2024-07-21 NOTE — PROGRESS NOTES
[facility] Physician - Brief Progress Note  PERMANENT  07/21/2024 06:17    [facility] - Alex - Alex - CCU - 10 - [place], [place] ([facility])    NAGY, DAVE WOLF    Date of Service 07/21/2024 06:17    HPI/Events of Note [facility] Provider Assessment Note  64-year-old female, transferred to the ICU with worsening hypoxemia and increased work of breathing.  Cross covering hospitalist ordered IV Lasix as well as dose of IV albumin.  Chest x-ray showed bilateral airspace opacities.  Patient's antibiotics were broadened from ceftriaxone to cefepime and Flagyl.  MRSA swab sent.  Patient sent to the ICU for BiPAP.  On initial view in the ICU, patient was breathing  44 times a minute, is in distress, is using accessory muscles to breathe and needs to be intubated if this is consistent with her goals.  This was clarified by the bedside RN with the patient and her   sister.  They understand that intubation we will be very hazardous given her underlying heart failure as well as chronic underlying lung disease but at this point she will have a respiratory arrest without intubation    Past Medical History includes but not limited to:  Pulmonary fibrosis, coronary artery disease, heart failure with reduced ejection fraction      Results:  White cell count 20, hemoglobin 11.8, platelets 227  7.40/40/107  Sodium 130, potassium 4.7, chloride 99, CO2 20, BUN 17, creatinine 0.37  CRP 6.5  Lactic acid 1.1, procalcitonin 0.07  Chest x-ray with cardiomegaly, small lung volumes, bilateral basilar predominant opacities  Impression:  Acute on chronic hypoxemic respiratory failure  IPF with acute exacerbation  Pulmonary edema versus pneumonia  Elevated troponins, systolic heart failure  Plan:  Patient will need intubation now  Pre oxygenation as much as possible, minimize dropping pre load with intubation meds given her underlying heart failure.  Have suggested to the bedside RN to have norepinephrine running for intubation  Will need  close attention to her ventilator settings diamond -intubation, rate needs to be set high to match her current tachypnea, peep to be titrated for oxygenation.  Continue steroids and broad-spectrum antibiotics  Have ordered propofol and fentanyl when she was intubated  Available as needed diamond-intubation to assist bedside staff          x                       Video assessment performed          x                        Most recent labs reviewed          x                      Vital signs reviewed           x                     Best practices addressed  VTE prophylaxis: SCD  SUP (when indicated): PPI  Glycemic control: POC glucose q6, stop jardiance, add SSI as needed  Please notify bedside physician when present or [facility] provider  if glucose > 180 x 2  Sepsis guidelines: NA  Lung protective strategy: NA             x                 Spoke with Bedside RN             x                   Orders written  Contact [facility] Provider for any needs if bedside provider not present      Interventions Major-Respiratory failure - evaluation and management  Intermediate-Communication with other healthcare providers and/or family        Electronically Signed by: Sam Neves) on 07/21/2024 06:18

## 2024-07-21 NOTE — PROGRESS NOTES
[facility]  PROGRESS NOTE    Patient information:  Name: Lexie Valdez  Age/Sex: 64 y.o. female  :  1960        PCP: Violet Pop APRN  Attending: Estuardo Charles MD  MRN:  [id number]  Visit Number:  [id number]    LOS: 3  CODE STATUS:    Code Status and Medical Interventions:   Ordered at: 24 0205     Code Status (Patient has no pulse and is not breathing):    CPR (Attempt to Resuscitate)     Medical Interventions (Patient has pulse or is breathing):    Full Support       PROBLEM LIST:Principal Problem:    NSTEMI (non-ST elevated myocardial infarction)      Reason for Cardiology follow-up: NSTEMI     Subjective   ADMISSION INFORMATION:  Chief Complaint   Patient presents with    Shortness of Breath       DATE:2024    HPI:  Lexie Valdez is a 64 y.o. 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     Patient presented to [facility] ([facility]) emergency room (ER) on 2024 with complaints of increased shortness of breath x 2 weeks has become progressively worse and exacerbated with minimal activity.     Primary Cardiologist: None found in patient's chart.     Interval History:      On 24 patient developed worsening respiratory failure and was moved to CCU.   She was felt to have worsening pulmonary fibrosis and was started on IV steroids.  Respiratory support was upgraded to BiPAP and patient had escalation in IV abx for nosocominal infection.  She also received IV Lasix & IV albumin during this time.  Patient was intubated later in the shift due to worsening respiratory failure.     CXRs indicated multifocal and worsening pneumonia coupled with some concern as well for pulmonary edema.      Mrs. Valdez is evaluated in  CCU 10.  She is sedated and intubated at the time of evaluation. Discussed at bedside with nursing staff as well as respiratory therapists    Orders placed:   No new orders placed on evaluation.     Objective     Vital Signs  Temp:  [97.7 °F (36.5 °C)-98.7 °F (37.1 °C)] 98.6 °F (37 °C)  Heart Rate:  [] 89  Resp:  [20-45] 37  BP: ()/() 101/65  Flow (L/min):  [3-55] 55  FiO2 (%):  [70 %-100 %] 70 %  Device (Oxygen Therapy): ventilator  Vital Signs (last 72 hrs)         07/16 0700 07/17 0659 07/17 0700 07/18 0659 07/18 0700 07/19 0659 07/19 0700 07/19 1618   Most Recent      Temp (°F)   98 -  98.2    97.7 -  99.2       97.7 (36.5) 07/19 0400    Heart Rate   80 -  109    84 -  105    86 -  92     90 07/19 1225    Resp   18 -  28    16 -  29    18 -  20     18 07/19 1225    BP   90/65 -  118/79    91/62 -  124/77      114/68     114/68 07/19 0723    SpO2 (%)   91 -  100    91 -  100    94 -  99     95 07/19 1225    Flow (L/min)     3    1 -  3      2     2 07/19 1225          BMI:Body mass index is 22.44 kg/m².    WEIGHT:      07/19/24  0400 07/20/24  0400 07/21/24  0400   Weight: 58 kg (127 lb 13.9 oz) 58 kg (127 lb 13.9 oz) 59.3 kg (130 lb 11.7 oz)       DIET:Diet: Cardiac, Diabetic; Healthy Heart (2-3 Na+); Consistent Carbohydrate; Fluid Consistency: Thin (IDDSI 0)    I&O:  Intake & Output (last 3 days)         07/16 0701 07/17 0700 07/17 0701 07/18 0700 07/18 0701 07/19 0700 07/19 0701 07/20 0700    P.O.   490     I.V. (mL/kg)   489.9 (8.6)     IV Piggyback  100 100     Total Intake(mL/kg)  100 (1.7) 1079.9 (18.9)     Urine (mL/kg/hr)   450 (0.3)     Stool   600     Total Output   1050     Net  +100 +29.9             Urine Unmeasured Occurrence   2 x              PHYSICAL EXAM:  Vitals reviewed.   Constitutional:       Appearance: Acutely ill-appearing.      Interventions: Sedated and intubated.   Eyes:      Conjunctiva/sclera: Conjunctivae normal.      Pupils: Pupils are equal, round, and  reactive to light.   HENT:      Nose: Nose normal.    Mouth/Throat:      Pharynx: Oropharynx is clear.   Neck:      Vascular: JVD normal.   Pulmonary:      Effort: Pulmonary effort is normal. Intubated.      Breath sounds: Normal breath sounds.      Comments: Widespread crackles appreciated  Cardiovascular:      Normal rate. Regular rhythm.      Murmurs: There is no murmur.      No gallop.  No rub.      Comments: Gallop  Pulses:     Intact distal pulses.   Musculoskeletal: Normal range of motion.      Cervical back: Normal range of motion and neck supple. Skin:     General: Skin is warm and dry.      Comments: ulcer noted to RLE-dry, and no erythremia noted.   Neurological:      General: No focal deficit present.      Mental Status: Oriented to person, place and time.      Comments: Resting tremors noted in face, BUE, and mildly in BLE.                    Results review   Results Review:    I have reviewed the patient's new clinical results. 07/21/24 08:27 EDT    Results from last 7 days   Lab Units 07/17/24  2320 07/17/24 2119   HSTROP T ng/L 188* 229*     Lab Results   Component Value Date    PROBNP 3,550.0 (H) 07/17/2024    PROBNP 235.1 04/26/2024    PROBNP 394.0 03/21/2023     Results from last 7 days   Lab Units 07/21/24  0016 07/19/24  2356 07/19/24  0046 07/18/24  0420 07/18/24  0211 07/17/24 2119   WBC 10*3/mm3 20.24* 13.37* 15.07* 7.25 6.42 8.49   HEMOGLOBIN g/dL 11.8* 11.5* 12.3 12.1 12.3 12.9   PLATELETS 10*3/mm3 227 214 269 276 252 280     Results from last 7 days   Lab Units 07/21/24  0016 07/19/24  2356 07/18/24  0420 07/17/24 2119   SODIUM mmol/L 130* 133* 132* 131*   POTASSIUM mmol/L 4.7 4.5 4.0 4.1   CHLORIDE mmol/L 99 102 96* 93*   CO2 mmol/L 20.5* 22.2 25.2 26.2   BUN mg/dL 17 15 7* 8   CREATININE mg/dL 0.37* 0.44* 0.37* 0.49*   CALCIUM mg/dL 8.4* 8.8 8.8 9.2   GLUCOSE mg/dL 122* 114* 130* 177*   ALT (SGPT) U/L  --   --  12 13   AST (SGOT) U/L  --   --  37* 43*     Lab Results   Component Value  Date    MG 1.7 07/17/2024    MG 2.2 04/29/2024    MG 2.0 04/27/2024     Estimated Creatinine Clearance: 141.9 mL/min (A) (by C-G formula based on SCr of 0.37 mg/dL (L)).    Lab Results   Component Value Date    HGBA1C 4.60 (L) 07/18/2024    HGBA1C 4.80 04/11/2024    HGBA1C 5.00 12/31/2023     Lab Results   Component Value Date    CHOL 136 07/18/2024    TRIG 61 07/18/2024    LDL 56 07/18/2024    HDL 67 (H) 07/18/2024        Lab Results   Component Value Date    INR 0.93 07/18/2024    INR 0.98 04/26/2024    INR 1.00 03/21/2023     Lab Results   Component Value Date    LABHEPA 0.27 (L) 07/20/2024    LABHEPA 0.36 07/19/2024    LABHEPA 0.33 07/19/2024    LABHEPA 0.11 (L) 07/18/2024       Lab Results   Component Value Date    TSH 1.140 07/17/2024      Pain Management Panel           No data to display              Microbiology Results (last 10 days)       Procedure Component Value - Date/Time    Gastrointestinal Panel, PCR - Stool, Per Rectum [[id number]]  (Normal) Collected: 07/19/24 0410    Lab Status: Final result Specimen: Stool from Per Rectum Updated: 07/19/24 0603     Campylobacter Not Detected     Plesiomonas shigelloides Not Detected     Salmonella Not Detected     Vibrio Not Detected     Vibrio cholerae Not Detected     Yersinia enterocolitica Not Detected     Enteroaggregative E. coli (EAEC) Not Detected     Enteropathogenic E. coli (EPEC) Not Detected     Enterotoxigenic E. coli (ETEC) lt/st Not Detected     Shiga-like toxin-producing E. coli (STEC) stx1/stx2 Not Detected     Shigella/Enteroinvasive E. coli (EIEC) Not Detected     Cryptosporidium Not Detected     Cyclospora cayetanensis Not Detected     Entamoeba histolytica Not Detected     Giardia lamblia Not Detected     Adenovirus F40/41 Not Detected     Astrovirus Not Detected     Norovirus GI/GII Not Detected     Rotavirus A Not Detected     Sapovirus (I, II, IV or V) Not Detected    Clostridioides difficile Toxin - Stool, Per Rectum [[id number]]  Collected: 07/19/24 0410    Lab Status: Final result Specimen: Stool from Per Rectum Updated: 07/19/24 0527    Narrative:      The following orders were created for panel order Clostridioides difficile Toxin - Stool, Per Rectum.  Procedure                               Abnormality         Status                     ---------                               -----------         ------                     Clostridioides difficile...[[id number]]                      Final result                 Please view results for these tests on the individual orders.    Clostridioides difficile Toxin, PCR - Stool, Per Rectum [[id number]] Collected: 07/19/24 0410    Lab Status: Final result Specimen: Stool from Per Rectum Updated: 07/19/24 0527     Toxigenic C. difficile by PCR Negative     027 Toxin Presumptive Negative    Narrative:      The result indicates the absence of toxigenic C. difficile from stool specimen.     Urine Culture - Urine, Urine, Catheter [[id number]]  (Abnormal)  (Susceptibility) Collected: 07/17/24 2145    Lab Status: Final result Specimen: Urine, Catheter Updated: 07/20/24 0959     Urine Culture >100,000 CFU/mL Escherichia coli    Narrative:      Colonization of the urinary tract without infection is common. Treatment is discouraged unless the patient is symptomatic, pregnant, or undergoing an invasive urologic procedure.    Susceptibility        Escherichia coli      MILES      Amoxicillin + Clavulanate Susceptible      Ampicillin Susceptible      Ampicillin + Sulbactam Susceptible      Cefazolin Susceptible      Cefepime Susceptible      Ceftazidime Susceptible      Ceftriaxone Susceptible      Gentamicin Susceptible      Levofloxacin Susceptible      Nitrofurantoin Susceptible      Piperacillin + Tazobactam Susceptible      Trimethoprim + Sulfamethoxazole Susceptible                           Respiratory Panel PCR w/COVID-19(SARS-CoV-2) RHONDA/HEATHER/TASNEEM/PAD/COR/CHRISTELLE In-House, NP Swab in UTM/VTM, 2 HR TAT - Swab,  Nasopharynx [[id number]]  (Normal) Collected: 07/17/24 2127    Lab Status: Final result Specimen: Swab from Nasopharynx Updated: 07/17/24 2325     ADENOVIRUS, PCR Not Detected     Coronavirus 229E Not Detected     Coronavirus HKU1 Not Detected     Coronavirus NL63 Not Detected     Coronavirus OC43 Not Detected     COVID19 Not Detected     Human Metapneumovirus Not Detected     Human Rhinovirus/Enterovirus Not Detected     Influenza A PCR Not Detected     Influenza B PCR Not Detected     Parainfluenza Virus 1 Not Detected     Parainfluenza Virus 2 Not Detected     Parainfluenza Virus 3 Not Detected     Parainfluenza Virus 4 Not Detected     RSV, PCR Not Detected     Bordetella pertussis pcr Not Detected     Bordetella parapertussis PCR Not Detected     Chlamydophila pneumoniae PCR Not Detected     Mycoplasma pneumo by PCR Not Detected    Narrative:      In the setting of a positive respiratory panel with a viral infection PLUS a negative procalcitonin without other underlying concern for bacterial infection, consider observing off antibiotics or discontinuation of antibiotics and continue supportive care. If the respiratory panel is positive for atypical bacterial infection (Bordetella pertussis, Chlamydophila pneumoniae, or Mycoplasma pneumoniae), consider antibiotic de-escalation to target atypical bacterial infection.    COVID PRE-OP / PRE-PROCEDURE SCREENING ORDER (NO ISOLATION) - Swab, Nasopharynx [[id number]]  (Normal) Collected: 07/17/24 2119    Lab Status: Final result Specimen: Swab from Nasopharynx Updated: 07/17/24 2149    Narrative:      The following orders were created for panel order COVID PRE-OP / PRE-PROCEDURE SCREENING ORDER (NO ISOLATION) - Swab, Nasopharynx.  Procedure                               Abnormality         Status                     ---------                               -----------         ------                     COVID-19 and FLU A/B PCR...[[id number]]  Normal              Final  result                 Please view results for these tests on the individual orders.    COVID-19 and FLU A/B PCR, 1 HR TAT - Swab, Nasopharynx [[id number]]  (Normal) Collected: 07/17/24 2119    Lab Status: Final result Specimen: Swab from Nasopharynx Updated: 07/17/24 2149     COVID19 Not Detected     Influenza A PCR Not Detected     Influenza B PCR Not Detected    Narrative:      Fact sheet for providers: https://www.fda.gov/media/181040/download    Fact sheet for patients: https://www.fda.gov/media/954675/download    Test performed by PCR.    Blood Culture - Blood, Arm, Right [[id number]]  (Normal) Collected: 07/17/24 2119    Lab Status: Preliminary result Specimen: Blood from Arm, Right Updated: 07/20/24 2131     Blood Culture No growth at 3 days    Blood Culture - Blood, Arm, Left [[id number]]  (Normal) Collected: 07/17/24 2119    Lab Status: Preliminary result Specimen: Blood from Arm, Left Updated: 07/20/24 2131     Blood Culture No growth at 3 days                   Imaging Results (Last 24 Hours)       Procedure Component Value Units Date/Time    XR Chest 1 View [[id number]] Resulted: 07/21/24 0759     Updated: 07/21/24 0816    XR Chest 1 View [[id number]] Collected: 07/21/24 0722     Updated: 07/21/24 0726    Narrative:      PROCEDURE: Portable chest x-ray examination performed on July 21, 2024.  Single view. Upright position.     HISTORY: Endotracheal tube and OG tube placement. Confirm position.     COMPARISON: None.     FINDINGS:     Mild enlarged heart size  Endotracheal tube in place with tip 1.8 cm above the irene.  Enteric drain in place with tip to the stomach.  Lap band device in place.  Central pulmonary vascular congestion.  Patchy and confluent airspace opacities in the upper and lower lobes  could represent underlying multifocal pneumonia and/or edema.  Mild hypoinflated lungs  No pleural effusion. No pneumothorax.  Mild levoconvex curve at the upper thoracic spine.  Cholecystectomy clips in the  right upper quadrant.       Impression:         1.  Endotracheal tube in place with tip 1.8 cm above the irene.  2.  Enteric drain in place with tip in the stomach.  3.  Multifocal pneumonia with central pulmonary vascular congestion.  4.  Diffuse edema in the lung parenchyma is also possible.  5.  No pleural effusion or pneumothorax  6.  No pneumomediastinum.     This report was finalized on 7/21/2024 7:24 AM by Elian Islas MD.       XR Chest 1 View [[id number]] Collected: 07/21/24 0125     Updated: 07/21/24 0130    Narrative:      PROCEDURE: Portable chest x-ray examination performed on July 21, 2024.  Single view. Upright position.     HISTORY: Dyspnea. Hypoxia.     COMPARISON: None.     FINDINGS:     Enlarged heart size  Patchy and confluent airspace opacities in the upper and lower lobes  consistent with multifocal pneumonia  Central pulmonary vascular congestion with edema.  No pleural effusion. No pneumothorax  Lap band device in place  Surgical clips in the right upper quadrant.  Mild dextroconvex curve at the thoracolumbar junction.       Impression:         1.  Multifocal pneumonia.  2.  Enlarged heart size.  3.  Central pulmonary vascular congestion.  4.  Edema.  5.  No pleural effusion.   6.  No pneumothorax.  7.  Lap band device in place.  8.  No free air in the upper abdomen.        This report was finalized on 7/21/2024 1:28 AM by Elian Islas MD.       XR Chest 1 View [[id number]] Collected: 07/20/24 1447     Updated: 07/20/24 1450    Narrative:      PROCEDURE: XR CHEST 1 VW-       HISTORY: Hypoxia; I21.4-Non-ST elevation (NSTEMI) myocardial infarction     COMPARISON: 7/17/2024.     FINDINGS: The heart is normal in size. The mediastinum is unremarkable.  There are coarse interstitial lung markings. There is worsening of  bilateral airspace disease and small effusions. There is no  pneumothorax. There are no acute osseous abnormalities.       Impression:      Fibrotic lung changes with worsening  bilateral airspace  disease and small effusions which may reflect pulmonary edema or  pneumonia.        This report was finalized on 7/20/2024 2:48 PM by Urban Arzola M.D..             ECHO:  Results for orders placed during the hospital encounter of 07/17/24    Adult Transthoracic Echo Complete w/ Color, Spectral and Contrast if necessary per protocol    Interpretation Summary    Normal left ventricular cavity size and wall thickness noted.    The following left ventricular wall segments are hypokinetic: mid anterior, basal anterolateral, apical lateral, apical septal, mid anteroseptal and basal anterior. The following left ventricular wall segments are akinetic: apex.    Left ventricular systolic function is moderately decreased. Left ventricular ejection fraction appears to be 36 - 40%.    Left ventricular diastolic function is consistent with (grade I) impaired relaxation.    The aortic valve is structurally normal with no regurgitation or stenosis present.    The mitral valve is structurally normal with no significant stenosis present. Mild mitral valve regurgitation is present.    Mild tricuspid valve regurgitation is present. Estimated right ventricular systolic pressure from tricuspid regurgitation is moderately elevated (45-55 mmHg).    There is no evidence of pericardial effusion. .    Comments: Patient seem to have developed a new regional wall motion normalities with decreased LV systolic function as compared to the previous study in March 2023.      STRESS TEST:  Results for orders placed during the hospital encounter of 07/17/24    Stress Test With Myocardial Perfusion One Day    Interpretation Summary  Images from the original result were not included.      A pharmacological stress test was performed using regadenoson without low-level exercise.    Findings consistent with an indeterminate ECG stress test.    Myocardial perfusion imaging indicates a moderate-sized infarct located in the  inferior wall, septal wall and apex with no significant ischemia noted.    Abnormal LV wall motion consistent with apical dyskinesis.    Left ventricular ejection fraction is moderately reduced (Calculated EF = 43%).    Impressions are consistent with an intermediate risk study.       HEART CATH:  No results found for this or any previous visit.      TELEMETRY:      SR in the 90s    I reviewed the patient's new clinical results.    ALLERGIES: Codeine and Sulfa antibiotics    Medication Review:   Current list of medications may not reflect those currently placed in orders that are not signed or are being held.     aspirin, 81 mg, Oral, Daily  atorvastatin, 40 mg, Oral, Daily  Brexpiprazole, 0.5 mg, Oral, Daily  budesonide, 0.5 mg, Nebulization, BID - RT  cefepime, 2,000 mg, Intravenous, Q8H  donepezil, 10 mg, Oral, Q PM  doxycycline, 100 mg, Intravenous, Q12H  FLUoxetine, 40 mg, Oral, Daily  furosemide, 40 mg, Intravenous, Once  hydroxychloroquine, 200 mg, Oral, BID  ipratropium-albuterol, 3 mL, Nebulization, Q6H - RT  levothyroxine, 25 mcg, Oral, Daily With Breakfast  memantine, 5 mg, Oral, Q12H  methylPREDNISolone sodium succinate, 40 mg, Intravenous, Q12H  metoprolol succinate XL, 25 mg, Oral, Q24H  nystatin, 1 Application, Topical, Q12H  pantoprazole, 40 mg, Intravenous, Q AM  Phenylephrine HCl-NaCl, , ,   Phenylephrine HCl-NaCl, , ,   [Held by provider] primidone, 200 mg, Oral, TID  sodium chloride, 10 mL, Intravenous, Q12H  sodium chloride, 10 mL, Intravenous, Q12H  sodium chloride, 10 mL, Intravenous, Q12H  triamcinolone, 1 Application, Topical, BID      fentanyl 10 mcg/mL,  mcg/hr, Last Rate: Stopped (07/21/24 0703)  norepinephrine, 0.02-0.3 mcg/kg/min (Dosing Weight), Last Rate: 0.24 mcg/kg/min (07/21/24 0708)  Pharmacy Consult - Pharmacy to dose,   Pharmacy Consult,   propofol, 5-50 mcg/kg/min (Dosing Weight), Last Rate: 20 mcg/kg/min (07/21/24 0723)  vasopressin, 0.03 Units/min, Last Rate: Stopped  (07/21/24 0725)        aluminum-magnesium hydroxide-simethicone    senna-docusate sodium **AND** polyethylene glycol **AND** bisacodyl **AND** bisacodyl    busPIRone    guaifenesin    HYDROcodone-acetaminophen    hydrOXYzine    ipratropium    LORazepam    nitroglycerin    Pharmacy Consult - Pharmacy to dose    Pharmacy Consult    Phenylephrine HCl-NaCl    Phenylephrine HCl-NaCl    prochlorperazine    sodium chloride    sodium chloride    sodium chloride    sodium chloride    sodium chloride    sodium chloride    sodium chloride    sodium chloride    Assessment    Acute non-ST elevation myocardial infarction likely type II due to respiratory failure with hypoxia  Advanced COPD/pulmonary fibrosis, on home oxygen.  Acute on chronic respiratory failure requiring intubation mechanical ventilation.  HFrEF, worsening with acute respiratory failure  Essential hypertension  Mild hyponatremia    Recommendations / Plan   1.  Events yesterday noted patient requires pressor support we will try to diurese if her blood pressure can tolerate it  2.  Will advance GDMT medication once patient's blood pressure is more stable  3.  Overall poor prognosis                  Please note that portions of this note were completed with a voice recognition program.    Please note that portions of this note were copied and has been reviewed and is accurate as of 7/21/2024 .     Electronically signed by Rinku Braxton MD, 07/20/24, 12:57 PM EDT.  Electronically signed by Rinku Braxton MD, 07/21/24, 11:44 AM EDT.

## 2024-07-21 NOTE — PROGRESS NOTES
Hospitalist Update:    Notified by RN around 11:30 pm that patient was exhibiting more labored breathing and had diffuse rales on exam. Her O2 requirements had reportedly increased significantly during day shift to heated high flow. She was felt to have worsening pulmonary fibrosis and was started on IV steroids. There was also concern for fluid overload, but RN states that diuretics were held earlier because patient's BP had been soft during the day. BP now up to 118/91. Examined patient at the bedside; O2 sat adequate on HHF and ABG confirms this without CO2 retention or respiratory acidosis, but patient was tachypneic up to 30-40 breaths per minute. Ordered dose of IV lasix 40mg along with IV albumin 50g to try to diurese without dropping BP too much. Also obtained CXR which appears to show widespread airspace disease; radiology read this as multifocal pneumonia, cardiomegaly and central pulmonary vascular congestion with edema. Shortly thereafter, morning labs resulted showing WBC had increased from 13 to 20. This was possibly due to steroids, but checked CRP, procal and lactic acid to evaluate further; procal and lactate were normal but CRP has increased from 1.65 on 7.17 to 6.50 now. Patient diuresed 1L after IV lasix and BP remained stable. However, RN just notified me again around 4 am that patient was becoming more tachypneic again, breathing 40 times per minute, and appears to be tiring out. Went back to bedside and patient is certainly tachypneic, using accessory muscles, and appears very fatigued, though her RN reports she just received ativan which could be contributing as well. Lungs less crackly on exam but coarse and overall not moving air well. Earlier I had discussed bipap as an option with patient, but she said she was too anxious to try. I revisited this option with her again, and told her that as much respiratory difficulty as she's currently having, we could try it or we could consider  escalating straight to intubation. Patient wishes to try the bipap first. Bipap orders are in. Will escalate antibiotic coverage from rocephin to cefepime and flagyl for nosocomial infection. Check MRSA nares and if positive add vancomycin. Keep doxycycline for atypical coverage. Will transfer to CCU for closer monitoring and to intubate if patient doesn't tolerate bipap or respiratory status continues to deteriorate.

## 2024-07-21 NOTE — PLAN OF CARE
Goal Outcome Evaluation:           Problem: Communication Impairment (Mechanical Ventilation, Invasive)  Goal: Effective Communication  Outcome: Ongoing, Progressing     Problem: Device-Related Complication Risk (Mechanical Ventilation, Invasive)  Goal: Optimal Device Function  Outcome: Ongoing, Progressing  Intervention: Optimize Device Care and Function  Flowsheets (Taken 7/21/2024 0626)  Airway Safety Measures:   manual resuscitator/mask at bedside   suction at bedside     Problem: Inability to Wean (Mechanical Ventilation, Invasive)  Goal: Mechanical Ventilation Liberation  Outcome: Ongoing, Progressing     Problem: Skin and Tissue Injury (Mechanical Ventilation, Invasive)  Goal: Absence of Device-Related Skin and Tissue Injury  Outcome: Ongoing, Progressing     Problem: Ventilator-Induced Lung Injury (Mechanical Ventilation, Invasive)  Goal: Absence of Ventilator-Induced Lung Injury  Outcome: Ongoing, Progressing  Intervention: Prevent Ventilator-Associated Pneumonia  Flowsheets  Taken 7/21/2024 0847  Head of Bed (HOB) Positioning: HOB at 30-45 degrees  VAP Prevention Bundle: HOB elevation maintained  Oral Care: suction provided  Taken 7/21/2024 0828  Head of Bed (HOB) Positioning: HOB at 30-45 degrees  Taken 7/21/2024 0626  Head of Bed (HOB) Positioning: HOB at 30-45 degrees  Oral Care: suction provided  Intervention: Facilitate Lung-Protection Measures  Flowsheets (Taken 7/21/2024 0626)  Lung Protection Measures:   low tidal volume provided   lung compliance monitored   plateau/inspiratory pressure monitored   ventilator synchrony promoted   ventilator waveforms monitored

## 2024-07-21 NOTE — PROGRESS NOTES
THC Physician - Brief Progress Note  PERMANENT  07/21/2024 07:08    [facility] - [facility] - [facility] - CCU - 10 - [place], [place] ([facility])    NGAY, DAVE KEITHAlfonzo    Date of Service 07/21/2024 07:08    HPI/Events of Note Patient with post intubation hypotension requiring propofol to be placed on hold.  Have instructed the bedside staff to increase the peripheral norepinephrine at this time and call for central line placement.  Have also added   vasopressin has a 2nd pressor if needed.  We will give midazolam 1 mg IV x1 to help with sedation as the propofol is off.  Check ABG now      Interventions Major-Hypotension - evaluation and management, Respiratory failure - evaluation and management  Intermediate-Communication with other healthcare providers and/or family        Electronically Signed by: Sam Neves) on 07/21/2024 07:10

## 2024-07-21 NOTE — CONSULTS
Chief Complaint:  Pulmonary and critical care is following for ventilator management and critical illness management-    HPI-  patient is a 64-year-old female with past medical history positive for COPD, pulmonary fibrosis on 3 L nasal cannula at home, resting tremors, history of anxiety/depression, arthritis, hypertension, hypothyroidism and stress urinary incontinence with frequent UTIs presented with worsening shortness of breath from last 2 to 3 days which has been progressively getting worse.  Shortness of breath is associated with generalized fatigue and tiredness.  Denies any increased cough or fever.  Denies any increased lower extremity swelling or upper extremity swelling.  Did not travel anywhere recently.    Patient was admitted on 717 and on 718 and 719 patient slightly felt better.  But since yesterday evening patient has been having more shortness of breath and increased oxygen requirements.  All the labs medications ins and outs vitals treatment given in the ER then on the medical floor reviewed.  Images reviewed.  Blood gases reviewed.    On my assessment patient was on 10 L bubble high flow nasal cannula oxygen and saturating around 93 to 94%.  Able to provide me history without getting short of breath.    None of the family members at bedside.  MDR done with patient's nurse.        Review of Systems:      Positive for generalized fatigue and tiredness otherwise negative    Vital Signs  Temp:  [97.7 °F (36.5 °C)-98.7 °F (37.1 °C)] 98.6 °F (37 °C)  Heart Rate:  [] 89  Resp:  [20-45] 37  BP: ()/() 101/65  FiO2 (%):  [70 %-100 %] 70 %  Body mass index is 22.44 kg/m².    Intake/Output Summary (Last 24 hours) at 7/21/2024 0847  Last data filed at 7/21/2024 0549  Gross per 24 hour   Intake 835.93 ml   Output 1450 ml   Net -614.07 ml     No intake/output data recorded.    Physical Exam:   Patient was seen and examined via telemedicine.     General- normal in appearance, not in any  acute distress    HEENT- no scleral icterus    Neck-supple    Respiratory-wearing bubble high flow around 10 L.  Not in any respiratory distress.  Cardiovascular-  NSR    GI-nondistended     CNS-nonfocal    Musculoskeletal -no visible edema  Extremities- no obvious deformity noticed     Psychiatric-  Skin- no visible rash      Results Review:     I reviewed the patient's new clinical results.  Results from last 7 days   Lab Units 07/21/24  0016 07/19/24  2356 07/19/24  0046   WBC 10*3/mm3 20.24* 13.37* 15.07*   HEMOGLOBIN g/dL 11.8* 11.5* 12.3   PLATELETS 10*3/mm3 227 214 269     Results from last 7 days   Lab Units 07/21/24  0016 07/19/24 2356 07/18/24  0420 07/17/24  2119   SODIUM mmol/L 130* 133* 132* 131*   POTASSIUM mmol/L 4.7 4.5 4.0 4.1   CHLORIDE mmol/L 99 102 96* 93*   CO2 mmol/L 20.5* 22.2 25.2 26.2   BUN mg/dL 17 15 7* 8   CREATININE mg/dL 0.37* 0.44* 0.37* 0.49*   CALCIUM mg/dL 8.4* 8.8 8.8 9.2   GLUCOSE mg/dL 122* 114* 130* 177*   MAGNESIUM mg/dL  --   --   --  1.7     Lab Results   Component Value Date    INR 0.93 07/18/2024    INR 0.98 04/26/2024    INR 1.00 03/21/2023    PROTIME 12.6 07/18/2024    PROTIME 13.5 04/26/2024    PROTIME 13.4 03/21/2023     Results from last 7 days   Lab Units 07/18/24  0420 07/17/24  2119   ALK PHOS U/L 90 106   BILIRUBIN mg/dL <0.2 0.2   ALT (SGPT) U/L 12 13   AST (SGOT) U/L 37* 43*     Results from last 7 days   Lab Units 07/21/24  0811   PH, ARTERIAL pH units 7.367   PO2 ART mm Hg 101.0   PCO2, ARTERIAL mm Hg 46.6*   HCO3 ART mmol/L 26.8*     Imaging Results (Last 24 Hours)       Procedure Component Value Units Date/Time    XR Chest 1 View [[id number]] Resulted: 07/21/24 0759     Updated: 07/21/24 0816    XR Chest 1 View [[id number]] Collected: 07/21/24 0722     Updated: 07/21/24 0726    Narrative:      PROCEDURE: Portable chest x-ray examination performed on July 21, 2024.  Single view. Upright position.     HISTORY: Endotracheal tube and OG tube placement. Confirm  position.     COMPARISON: None.     FINDINGS:     Mild enlarged heart size  Endotracheal tube in place with tip 1.8 cm above the irene.  Enteric drain in place with tip to the stomach.  Lap band device in place.  Central pulmonary vascular congestion.  Patchy and confluent airspace opacities in the upper and lower lobes  could represent underlying multifocal pneumonia and/or edema.  Mild hypoinflated lungs  No pleural effusion. No pneumothorax.  Mild levoconvex curve at the upper thoracic spine.  Cholecystectomy clips in the right upper quadrant.       Impression:         1.  Endotracheal tube in place with tip 1.8 cm above the irene.  2.  Enteric drain in place with tip in the stomach.  3.  Multifocal pneumonia with central pulmonary vascular congestion.  4.  Diffuse edema in the lung parenchyma is also possible.  5.  No pleural effusion or pneumothorax  6.  No pneumomediastinum.     This report was finalized on 7/21/2024 7:24 AM by Elian Islas MD.       XR Chest 1 View [[id number]] Collected: 07/21/24 0125     Updated: 07/21/24 0130    Narrative:      PROCEDURE: Portable chest x-ray examination performed on July 21, 2024.  Single view. Upright position.     HISTORY: Dyspnea. Hypoxia.     COMPARISON: None.     FINDINGS:     Enlarged heart size  Patchy and confluent airspace opacities in the upper and lower lobes  consistent with multifocal pneumonia  Central pulmonary vascular congestion with edema.  No pleural effusion. No pneumothorax  Lap band device in place  Surgical clips in the right upper quadrant.  Mild dextroconvex curve at the thoracolumbar junction.       Impression:         1.  Multifocal pneumonia.  2.  Enlarged heart size.  3.  Central pulmonary vascular congestion.  4.  Edema.  5.  No pleural effusion.   6.  No pneumothorax.  7.  Lap band device in place.  8.  No free air in the upper abdomen.        This report was finalized on 7/21/2024 1:28 AM by Elian Islas MD.       XR Chest 1 View  [[id number]] Collected: 07/20/24 1447     Updated: 07/20/24 1450    Narrative:      PROCEDURE: XR CHEST 1 VW-       HISTORY: Hypoxia; I21.4-Non-ST elevation (NSTEMI) myocardial infarction     COMPARISON: 7/17/2024.     FINDINGS: The heart is normal in size. The mediastinum is unremarkable.  There are coarse interstitial lung markings. There is worsening of  bilateral airspace disease and small effusions. There is no  pneumothorax. There are no acute osseous abnormalities.       Impression:      Fibrotic lung changes with worsening bilateral airspace  disease and small effusions which may reflect pulmonary edema or  pneumonia.        This report was finalized on 7/20/2024 2:48 PM by Urban Arzola M.D..                    aspirin, 81 mg, Oral, Daily  atorvastatin, 40 mg, Oral, Daily  Brexpiprazole, 0.5 mg, Oral, Daily  budesonide, 0.5 mg, Nebulization, BID - RT  cefepime, 2,000 mg, Intravenous, Q8H  donepezil, 10 mg, Oral, Q PM  doxycycline, 100 mg, Intravenous, Q12H  FLUoxetine, 40 mg, Oral, Daily  furosemide, 40 mg, Intravenous, Once  hydroxychloroquine, 200 mg, Oral, BID  ipratropium-albuterol, 3 mL, Nebulization, Q6H - RT  levothyroxine, 25 mcg, Oral, Daily With Breakfast  memantine, 5 mg, Oral, Q12H  methylPREDNISolone sodium succinate, 40 mg, Intravenous, Q12H  metoprolol succinate XL, 25 mg, Oral, Q24H  nystatin, 1 Application, Topical, Q12H  pantoprazole, 40 mg, Intravenous, Q AM  Phenylephrine HCl-NaCl, , ,   Phenylephrine HCl-NaCl, , ,   [Held by provider] primidone, 200 mg, Oral, TID  sodium chloride, 10 mL, Intravenous, Q12H  sodium chloride, 10 mL, Intravenous, Q12H  sodium chloride, 10 mL, Intravenous, Q12H  triamcinolone, 1 Application, Topical, BID      fentanyl 10 mcg/mL,  mcg/hr, Last Rate: Stopped (07/21/24 0703)  norepinephrine, 0.02-0.3 mcg/kg/min (Dosing Weight), Last Rate: 0.24 mcg/kg/min (07/21/24 0708)  Pharmacy Consult - Pharmacy to dose,   Pharmacy Consult,   propofol, 5-50  mcg/kg/min (Dosing Weight), Last Rate: 20 mcg/kg/min (07/21/24 0723)  vasopressin, 0.03 Units/min, Last Rate: Stopped (07/21/24 0725)      Microbiology Results (last 10 days)       Procedure Component Value - Date/Time    MRSA Screen, PCR (Inpatient) - Swab, Nares [[id number]]  (Normal) Collected: 07/21/24 1252    Lab Status: Final result Specimen: Swab from Nares Updated: 07/21/24 1424     MRSA PCR No MRSA Detected    Narrative:      The negative predictive value of this diagnostic test is high and should only be used to consider de-escalating anti-MRSA therapy. A positive result may indicate colonization with MRSA and must be correlated clinically.    Respiratory Panel PCR w/COVID-19(SARS-CoV-2) RHONDA/HEATHER/TASNEEM/PAD/COR/CHRISTELLE In-House, NP Swab in UTM/VTM, 2 HR TAT - Swab, Nasopharynx [[id number]]  (Normal) Collected: 07/21/24 1247    Lab Status: Final result Specimen: Swab from Nasopharynx Updated: 07/21/24 1357     ADENOVIRUS, PCR Not Detected     Coronavirus 229E Not Detected     Coronavirus HKU1 Not Detected     Coronavirus NL63 Not Detected     Coronavirus OC43 Not Detected     COVID19 Not Detected     Human Metapneumovirus Not Detected     Human Rhinovirus/Enterovirus Not Detected     Influenza A PCR Not Detected     Influenza B PCR Not Detected     Parainfluenza Virus 1 Not Detected     Parainfluenza Virus 2 Not Detected     Parainfluenza Virus 3 Not Detected     Parainfluenza Virus 4 Not Detected     RSV, PCR Not Detected     Bordetella pertussis pcr Not Detected     Bordetella parapertussis PCR Not Detected     Chlamydophila pneumoniae PCR Not Detected     Mycoplasma pneumo by PCR Not Detected    Narrative:      In the setting of a positive respiratory panel with a viral infection PLUS a negative procalcitonin without other underlying concern for bacterial infection, consider observing off antibiotics or discontinuation of antibiotics and continue supportive care. If the respiratory panel is positive for atypical  bacterial infection (Bordetella pertussis, Chlamydophila pneumoniae, or Mycoplasma pneumoniae), consider antibiotic de-escalation to target atypical bacterial infection.    Respiratory Culture - Sputum, ET Suction [[id number]] Collected: 07/21/24 1152    Lab Status: Preliminary result Specimen: Sputum from ET Suction Updated: 07/21/24 1501     Gram Stain Moderate (3+) WBCs seen      Rare (1+) Epithelial cells seen      No organisms seen    Gastrointestinal Panel, PCR - Stool, Per Rectum [[id number]]  (Normal) Collected: 07/19/24 0410    Lab Status: Final result Specimen: Stool from Per Rectum Updated: 07/19/24 0603     Campylobacter Not Detected     Plesiomonas shigelloides Not Detected     Salmonella Not Detected     Vibrio Not Detected     Vibrio cholerae Not Detected     Yersinia enterocolitica Not Detected     Enteroaggregative E. coli (EAEC) Not Detected     Enteropathogenic E. coli (EPEC) Not Detected     Enterotoxigenic E. coli (ETEC) lt/st Not Detected     Shiga-like toxin-producing E. coli (STEC) stx1/stx2 Not Detected     Shigella/Enteroinvasive E. coli (EIEC) Not Detected     Cryptosporidium Not Detected     Cyclospora cayetanensis Not Detected     Entamoeba histolytica Not Detected     Giardia lamblia Not Detected     Adenovirus F40/41 Not Detected     Astrovirus Not Detected     Norovirus GI/GII Not Detected     Rotavirus A Not Detected     Sapovirus (I, II, IV or V) Not Detected    Clostridioides difficile Toxin - Stool, Per Rectum [[id number]] Collected: 07/19/24 0410    Lab Status: Final result Specimen: Stool from Per Rectum Updated: 07/19/24 0527    Narrative:      The following orders were created for panel order Clostridioides difficile Toxin - Stool, Per Rectum.  Procedure                               Abnormality         Status                     ---------                               -----------         ------                     Clostridioides difficile...[[id number]]                      Final  result                 Please view results for these tests on the individual orders.    Clostridioides difficile Toxin, PCR - Stool, Per Rectum [[id number]] Collected: 07/19/24 0410    Lab Status: Final result Specimen: Stool from Per Rectum Updated: 07/19/24 0527     Toxigenic C. difficile by PCR Negative     027 Toxin Presumptive Negative    Narrative:      The result indicates the absence of toxigenic C. difficile from stool specimen.     Urine Culture - Urine, Urine, Catheter [[id number]]  (Abnormal)  (Susceptibility) Collected: 07/17/24 2145    Lab Status: Final result Specimen: Urine, Catheter Updated: 07/20/24 0959     Urine Culture >100,000 CFU/mL Escherichia coli    Narrative:      Colonization of the urinary tract without infection is common. Treatment is discouraged unless the patient is symptomatic, pregnant, or undergoing an invasive urologic procedure.    Susceptibility        Escherichia coli      MILES      Amoxicillin + Clavulanate Susceptible      Ampicillin Susceptible      Ampicillin + Sulbactam Susceptible      Cefazolin Susceptible      Cefepime Susceptible      Ceftazidime Susceptible      Ceftriaxone Susceptible      Gentamicin Susceptible      Levofloxacin Susceptible      Nitrofurantoin Susceptible      Piperacillin + Tazobactam Susceptible      Trimethoprim + Sulfamethoxazole Susceptible                           Respiratory Panel PCR w/COVID-19(SARS-CoV-2) RHONDA/HEATHER/TASNEEM/PAD/COR/CHRISTELLE In-House, NP Swab in UTM/VTM, 2 HR TAT - Swab, Nasopharynx [[id number]]  (Normal) Collected: 07/17/24 2127    Lab Status: Final result Specimen: Swab from Nasopharynx Updated: 07/17/24 1322     ADENOVIRUS, PCR Not Detected     Coronavirus 229E Not Detected     Coronavirus HKU1 Not Detected     Coronavirus NL63 Not Detected     Coronavirus OC43 Not Detected     COVID19 Not Detected     Human Metapneumovirus Not Detected     Human Rhinovirus/Enterovirus Not Detected     Influenza A PCR Not Detected     Influenza B PCR  Not Detected     Parainfluenza Virus 1 Not Detected     Parainfluenza Virus 2 Not Detected     Parainfluenza Virus 3 Not Detected     Parainfluenza Virus 4 Not Detected     RSV, PCR Not Detected     Bordetella pertussis pcr Not Detected     Bordetella parapertussis PCR Not Detected     Chlamydophila pneumoniae PCR Not Detected     Mycoplasma pneumo by PCR Not Detected    Narrative:      In the setting of a positive respiratory panel with a viral infection PLUS a negative procalcitonin without other underlying concern for bacterial infection, consider observing off antibiotics or discontinuation of antibiotics and continue supportive care. If the respiratory panel is positive for atypical bacterial infection (Bordetella pertussis, Chlamydophila pneumoniae, or Mycoplasma pneumoniae), consider antibiotic de-escalation to target atypical bacterial infection.    COVID PRE-OP / PRE-PROCEDURE SCREENING ORDER (NO ISOLATION) - Swab, Nasopharynx [[id number]]  (Normal) Collected: 07/17/24 2119    Lab Status: Final result Specimen: Swab from Nasopharynx Updated: 07/17/24 2149    Narrative:      The following orders were created for panel order COVID PRE-OP / PRE-PROCEDURE SCREENING ORDER (NO ISOLATION) - Swab, Nasopharynx.  Procedure                               Abnormality         Status                     ---------                               -----------         ------                     COVID-19 and FLU A/B PCR...[[id number]]  Normal              Final result                 Please view results for these tests on the individual orders.    COVID-19 and FLU A/B PCR, 1 HR TAT - Swab, Nasopharynx [[id number]]  (Normal) Collected: 07/17/24 2119    Lab Status: Final result Specimen: Swab from Nasopharynx Updated: 07/17/24 2149     COVID19 Not Detected     Influenza A PCR Not Detected     Influenza B PCR Not Detected    Narrative:      Fact sheet for providers: https://www.fda.gov/media/030083/download    Fact sheet for patients:  https://www.fda.gov/media/839327/download    Test performed by PCR.    Blood Culture - Blood, Arm, Right [[id number]]  (Normal) Collected: 07/17/24 2119    Lab Status: Preliminary result Specimen: Blood from Arm, Right Updated: 07/20/24 2131     Blood Culture No growth at 3 days    Blood Culture - Blood, Arm, Left [[id number]]  (Normal) Collected: 07/17/24 2119    Lab Status: Preliminary result Specimen: Blood from Arm, Left Updated: 07/20/24 2131     Blood Culture No growth at 3 days           Medication Review:     Assessment & Plan     Neurology-essential tremors-continue current treatment.  Continue to monitor neurological status.  Avoid benzodiazepines in elderly.    Respiratory-likely due to exacerbation of pulmonary fibrosis-steroid dose reviewed and adjusted.    Changed patient's oxygen from bubble high flow to high flow nasal cannula oxygen.  The flow in the high flow nasal cannula oxygen will provide a PEEP and will help in patient's breathing and oxygenation.  Asked patient if she would use BiPAP-patient denied and said she gets claustrophobic and would not want to be on BiPAP.  If her breathing gets worse she rather gets intubated and be on a ventilator.      Current medication list reviewed.  Latest microbiology reviewed.  Respiratory panel reviewed.  Continue nebs.  Continue oxygen to maintain saturation 88 to 92%.  Procalitonin Results:      Lab 07/21/24  1309 07/21/24  0130 07/17/24 2119   PROCALCITONIN 0.21 0.07 0.06      Chest x-ray-latest reviewed    ABG-latest reviewed    Cardiology- hemodynamically -stable.  If needed will start on midodrine to maintain a MAP of 65 and above.  This was discussed with patient's nurse.  Blood pressure running borderline reviewed  Continue to monitor HR- rate and rhythm, BP     Nephrology- Cr and BUN stable  I/O-reviewed    GI-continue current diet.  Continue aspiration precautions    Hematology- CBC  Hb  platelet  WBC    ID  Culture  And  Antibiotics    Endocrinology- Maintain Blood sugar 140 -180  Blood sugars reviewed    Electrolytes-   Mag and phos       DVT prophylaxis-    Bed side rounds were done with RT and patient's nurse. All the lab and clinical findings were discussed with them and plan was also discussed in great detail.    Family member present-none on my assessment      NSTEMI (non-ST elevated myocardial infarction)       Case d/w nurse and team   This service is provided via audio video tele medicine   I am in [place] and patient is in [facility]          John Mcmahon MD  07/21/24  08:27 EDT

## 2024-07-22 PROCEDURE — 99291 CRITICAL CARE FIRST HOUR: CPT | Performed by: INTERNAL MEDICINE

## 2024-07-22 NOTE — PROGRESS NOTES
Chief Complaint:  Pulmonary and critical care is following for ventilator management and critical illness management-    Sub-overnight events reviewed.  All the labs medications ins and outs and vitals reviewed.  Overnight patient's breathing got worse and patient was intubated.  Case was primary team in detail.  All the labs medications vitals were discussed in detail.    Peak and plateau pressures reviewed.  Rounded on patient multiple times throughout the day.  Chest x-ray reviewed.      Review of Systems:      Currently obtained eyes on my assessment patient was intubated and sedated.    Vital Signs  Temp:  [98.5 °F (36.9 °C)-99.8 °F (37.7 °C)] 98.7 °F (37.1 °C)  Heart Rate:  [] 76  Resp:  [23-45] 28  BP: ()/() 130/73  FiO2 (%):  [40 %-100 %] 40 %  Body mass index is 22.44 kg/m².    Intake/Output Summary (Last 24 hours) at 7/21/2024 2145  Last data filed at 7/21/2024 1801  Gross per 24 hour   Intake 1550.63 ml   Output 1400 ml   Net 150.63 ml     No intake/output data recorded.    Physical Exam:   Patient was seen and examined via telemedicine.     General-intubated and sedated having tremors  HEENT- no scleral icterus    Neck-no visible JVD seen    Respiratory- intubated sedated.  Ventilator settings graphics reviewed  Cardiovascular-  NSR    GI-nondistended     CNS- intubated and sedated    Musculoskeletal -no visible edema  Extremities- no obvious deformity noticed     Psychiatric-  Skin- no visible rash      Results Review:     I reviewed the patient's new clinical results.  Results from last 7 days   Lab Units 07/21/24  0016 07/19/24  2356 07/19/24  0046   WBC 10*3/mm3 20.24* 13.37* 15.07*   HEMOGLOBIN g/dL 11.8* 11.5* 12.3   PLATELETS 10*3/mm3 227 214 269     Results from last 7 days   Lab Units 07/21/24  0016 07/19/24  2356 07/18/24  0420 07/17/24  2119   SODIUM mmol/L 130* 133* 132* 131*   POTASSIUM mmol/L 4.7 4.5 4.0 4.1   CHLORIDE mmol/L 99 102 96* 93*   CO2 mmol/L 20.5* 22.2  25.2 26.2   BUN mg/dL 17 15 7* 8   CREATININE mg/dL 0.37* 0.44* 0.37* 0.49*   CALCIUM mg/dL 8.4* 8.8 8.8 9.2   GLUCOSE mg/dL 122* 114* 130* 177*   MAGNESIUM mg/dL  --   --   --  1.7     Lab Results   Component Value Date    INR 0.93 07/18/2024    INR 0.98 04/26/2024    INR 1.00 03/21/2023    PROTIME 12.6 07/18/2024    PROTIME 13.5 04/26/2024    PROTIME 13.4 03/21/2023     Results from last 7 days   Lab Units 07/18/24  0420 07/17/24  2119   ALK PHOS U/L 90 106   BILIRUBIN mg/dL <0.2 0.2   ALT (SGPT) U/L 12 13   AST (SGOT) U/L 37* 43*     Results from last 7 days   Lab Units 07/21/24  2106   PH, ARTERIAL pH units 7.315*   PO2 ART mm Hg 137.0*   PCO2, ARTERIAL mm Hg 48.2*   HCO3 ART mmol/L 24.5     Imaging Results (Last 24 Hours)       Procedure Component Value Units Date/Time    CT Chest Without Contrast Diagnostic [[id number]] Collected: 07/21/24 1613     Updated: 07/21/24 1617    Narrative:      PROCEDURE: CT CHEST WO CONTRAST DIAGNOSTIC-     HISTORY: hypoxia, progressive fibrosis; I21.4-Non-ST elevation (NSTEMI)  myocardial infarction     COMPARISON: 5/8/2024.     PROCEDURE:  Multiple axial CT images were obtained from the thoracic  inlet through the upper abdomen without the use of contrast. This study  was performed with techniques to keep radiation doses as low as  reasonably achievable (ALARA). Individualized dose reduction techniques  using automated exposure control or adjustment of mA and/or kV according  to the patient size were employed.     FINDINGS:  An endotracheal tube is in place with the tip well positioned above the  irene. An nasogastric tube is present in the body of the stomach. There  is no adenopathy within the chest. The heart is normal in size. The  aorta is normal in caliber. There is a small right pleural effusion.  Lung windows reveal interlobular septal thickening and subpleural lines  in the upper and lower lobes with bronchiectasis in the left lower lobe.  There are diffuse bilateral  groundglass opacities. The visualized upper  abdomen is unremarkable. Bone windows reveal no acute osseous  abnormalities.       Impression:      1. Groundglass opacities and small right pleural effusion which may  reflect pulmonary edema or pneumonia.  2. Fibrotic lung changes consistent with usual interstitial pneumonitis.        This report was finalized on 7/21/2024 4:15 PM by Urban Arzola M.D..       XR Chest 1 View [[id number]] Collected: 07/21/24 1306     Updated: 07/21/24 1309    Narrative:      INDICATION: Tube placement     TECHNIQUE: Frontal radiograph of the chest.     COMPARISON: Radiograph from earlier the same day.       Impression:      FINDINGS/IMPRESSION:    Endotracheal tube tip approximately 3 cm above the irene. Enteric tube  in stomach. Right IJ central venous catheter tip in the superior vena  cava. Improved multifocal infiltrates/edema. No pleural effusion or  pneumothorax. No acute fracture.        This report was finalized on 7/21/2024 1:07 PM by Alex Pallas, DO.       XR Chest 1 View [[id number]] Collected: 07/21/24 0901     Updated: 07/21/24 0905    Narrative:      CLINICAL HISTORY: Central line placement.     COMPARISON: 7/21/2024 at 6:37 AM.     TECHNIQUE:  Single AP view of the chest       Impression:      FINDINGS AND IMPRESSION:    1.  Since the prior study, a right internal jugular vein central venous  catheter has been placed, the tip of which is in the cavoatrial  junction.  2.  No pneumothorax.  3.  Unchanged extensive perihilar groundglass opacities and  consolidation.  4.  No pleural effusion.  5.  Endotracheal tube tip is 1.2 cm above the irene.  6.  Nasogastric tube tip is in the stomach.  7.  A lap band is again seen.     This report was finalized on 7/21/2024 9:03 AM by Violette Wilcox MD.       XR Chest 1 View [[id number]] Collected: 07/21/24 0722     Updated: 07/21/24 0726    Narrative:      PROCEDURE: Portable chest x-ray examination performed on July 21,  2024.  Single view. Upright position.     HISTORY: Endotracheal tube and OG tube placement. Confirm position.     COMPARISON: None.     FINDINGS:     Mild enlarged heart size  Endotracheal tube in place with tip 1.8 cm above the irene.  Enteric drain in place with tip to the stomach.  Lap band device in place.  Central pulmonary vascular congestion.  Patchy and confluent airspace opacities in the upper and lower lobes  could represent underlying multifocal pneumonia and/or edema.  Mild hypoinflated lungs  No pleural effusion. No pneumothorax.  Mild levoconvex curve at the upper thoracic spine.  Cholecystectomy clips in the right upper quadrant.       Impression:         1.  Endotracheal tube in place with tip 1.8 cm above the irene.  2.  Enteric drain in place with tip in the stomach.  3.  Multifocal pneumonia with central pulmonary vascular congestion.  4.  Diffuse edema in the lung parenchyma is also possible.  5.  No pleural effusion or pneumothorax  6.  No pneumomediastinum.     This report was finalized on 7/21/2024 7:24 AM by Elian Islas MD.       XR Chest 1 View [[id number]] Collected: 07/21/24 0125     Updated: 07/21/24 0130    Narrative:      PROCEDURE: Portable chest x-ray examination performed on July 21, 2024.  Single view. Upright position.     HISTORY: Dyspnea. Hypoxia.     COMPARISON: None.     FINDINGS:     Enlarged heart size  Patchy and confluent airspace opacities in the upper and lower lobes  consistent with multifocal pneumonia  Central pulmonary vascular congestion with edema.  No pleural effusion. No pneumothorax  Lap band device in place  Surgical clips in the right upper quadrant.  Mild dextroconvex curve at the thoracolumbar junction.       Impression:         1.  Multifocal pneumonia.  2.  Enlarged heart size.  3.  Central pulmonary vascular congestion.  4.  Edema.  5.  No pleural effusion.   6.  No pneumothorax.  7.  Lap band device in place.  8.  No free air in the upper abdomen.         This report was finalized on 7/21/2024 1:28 AM by Elian Islas MD.                    aspirin, 81 mg, Oral, Daily  atorvastatin, 40 mg, Oral, Daily  Brexpiprazole, 0.5 mg, Oral, Daily  budesonide, 0.5 mg, Nebulization, BID - RT  cefepime, 2,000 mg, Intravenous, Q8H  chlorhexidine, 15 mL, Mouth/Throat, Q12H  donepezil, 10 mg, Oral, Q PM  enoxaparin, 40 mg, Subcutaneous, Nightly  FLUoxetine, 40 mg, Oral, Daily  hydroxychloroquine, 200 mg, Oral, BID  ipratropium-albuterol, 3 mL, Nebulization, Q4H  levothyroxine, 25 mcg, Oral, Daily With Breakfast  memantine, 5 mg, Oral, Q12H  methylPREDNISolone sodium succinate, 125 mg, Intravenous, Q8H  metoprolol succinate XL, 25 mg, Oral, Q24H  metroNIDAZOLE, 500 mg, Intravenous, Q8H  nystatin, 1 Application, Topical, Q12H  pantoprazole, 40 mg, Intravenous, Q AM  Phenylephrine HCl-NaCl, , ,   Phenylephrine HCl-NaCl, , ,   [Held by provider] primidone, 200 mg, Oral, TID  sodium chloride, 10 mL, Intravenous, Q12H  sodium chloride, 10 mL, Intravenous, Q12H  sodium chloride, 10 mL, Intravenous, Q12H  sodium chloride, 10 mL, Intravenous, Q12H  sodium chloride, 10 mL, Intravenous, Q12H  sodium chloride, 10 mL, Intravenous, Q12H  triamcinolone, 1 Application, Topical, BID      EPINEPHrine, 0.02-0.3 mcg/kg/min (Dosing Weight)  fentanyl 10 mcg/mL,  mcg/hr, Last Rate: 100 mcg/hr (07/21/24 0920)  norepinephrine, 0.02-0.3 mcg/kg/min (Dosing Weight), Last Rate: 0.3 mcg/kg/min (07/21/24 1445)  Pharmacy Consult - Pharmacy to dose,   Pharmacy Consult,   Pharmacy to dose vancomycin,   propofol, 5-50 mcg/kg/min (Dosing Weight), Last Rate: 35 mcg/kg/min (07/21/24 1243)  vasopressin, 0.03 Units/min, Last Rate: 0.03 Units/min (07/21/24 1801)      Microbiology Results (last 10 days)       Procedure Component Value - Date/Time    MRSA Screen, PCR (Inpatient) - Swab, Nares [[id number]]  (Normal) Collected: 07/21/24 1252    Lab Status: Final result Specimen: Swab from Nares Updated: 07/21/24  1424     MRSA PCR No MRSA Detected    Narrative:      The negative predictive value of this diagnostic test is high and should only be used to consider de-escalating anti-MRSA therapy. A positive result may indicate colonization with MRSA and must be correlated clinically.    Respiratory Panel PCR w/COVID-19(SARS-CoV-2) [facility]/HEATHER/TASNEEM/PAD/[facility]/CH[facility]STELLE In-House, NP Swab in UTM/VTM, 2 HR TAT - Swab, Nasopharynx [[id number]]  (Normal) Collected: 07/21/24 1247    Lab Status: Final result Specimen: Swab from Nasopharynx Updated: 07/21/24 1357     ADENOVIRUS, PCR Not Detected     Coronavirus 229E Not Detected     Coronavirus HKU1 Not Detected     Coronavirus NL63 Not Detected     Coronavirus OC43 Not Detected     COVID19 Not Detected     Human Metapneumovirus Not Detected     Human Rhinovirus/Enterovirus Not Detected     Influenza A PCR Not Detected     Influenza B PCR Not Detected     Parainfluenza Virus 1 Not Detected     Parainfluenza Virus 2 Not Detected     Parainfluenza Virus 3 Not Detected     Parainfluenza Virus 4 Not Detected     RSV, PCR Not Detected     Bordetella pertussis pcr Not Detected     Bordetella parapertussis PCR Not Detected     Chlamydophila pneumoniae PCR Not Detected     Mycoplasma pneumo by PCR Not Detected    Narrative:      In the setting of a positive respiratory panel with a viral infection PLUS a negative procalcitonin without other underlying concern for bacterial infection, consider observing off antibiotics or discontinuation of antibiotics and continue supportive care. If the respiratory panel is positive for atypical bacterial infection (Bordetella pertussis, Chlamydophila pneumoniae, or Mycoplasma pneumoniae), consider antibiotic de-escalation to target atypical bacterial infection.    Respiratory Culture - Sputum, ET Suction [[id number]] Collected: 07/21/24 1152    Lab Status: Preliminary result Specimen: Sputum from ET Suction Updated: 07/21/24 1501     Gram Stain Moderate (3+) WBCs seen       Rare (1+) Epithelial cells seen      No organisms seen    Gastrointestinal Panel, PCR - Stool, Per Rectum [[id number]]  (Normal) Collected: 07/19/24 0410    Lab Status: Final result Specimen: Stool from Per Rectum Updated: 07/19/24 0603     Campylobacter Not Detected     Plesiomonas shigelloides Not Detected     Salmonella Not Detected     Vibrio Not Detected     Vibrio cholerae Not Detected     Yersinia enterocolitica Not Detected     Enteroaggregative E. coli (EAEC) Not Detected     Enteropathogenic E. coli (EPEC) Not Detected     Enterotoxigenic E. coli (ETEC) lt/st Not Detected     Shiga-like toxin-producing E. coli (STEC) stx1/stx2 Not Detected     Shigella/Enteroinvasive E. coli (EIEC) Not Detected     Cryptosporidium Not Detected     Cyclospora cayetanensis Not Detected     Entamoeba histolytica Not Detected     Giardia lamblia Not Detected     Adenovirus F40/41 Not Detected     Astrovirus Not Detected     Norovirus GI/GII Not Detected     Rotavirus A Not Detected     Sapovirus (I, II, IV or V) Not Detected    Clostridioides difficile Toxin - Stool, Per Rectum [[id number]] Collected: 07/19/24 0410    Lab Status: Final result Specimen: Stool from Per Rectum Updated: 07/19/24 0527    Narrative:      The following orders were created for panel order Clostridioides difficile Toxin - Stool, Per Rectum.  Procedure                               Abnormality         Status                     ---------                               -----------         ------                     Clostridioides difficile...[[id number]]                      Final result                 Please view results for these tests on the individual orders.    Clostridioides difficile Toxin, PCR - Stool, Per Rectum [[id number]] Collected: 07/19/24 0410    Lab Status: Final result Specimen: Stool from Per Rectum Updated: 07/19/24 0527     Toxigenic C. difficile by PCR Negative     027 Toxin Presumptive Negative    Narrative:      The result indicates  the absence of toxigenic C. difficile from stool specimen.     Urine Culture - Urine, Urine, Catheter [[id number]]  (Abnormal)  (Susceptibility) Collected: 07/17/24 2145    Lab Status: Final result Specimen: Urine, Catheter Updated: 07/20/24 0959     Urine Culture >100,000 CFU/mL Escherichia coli    Narrative:      Colonization of the urinary tract without infection is common. Treatment is discouraged unless the patient is symptomatic, pregnant, or undergoing an invasive urologic procedure.    Susceptibility        Escherichia coli      MILES      Amoxicillin + Clavulanate Susceptible      Ampicillin Susceptible      Ampicillin + Sulbactam Susceptible      Cefazolin Susceptible      Cefepime Susceptible      Ceftazidime Susceptible      Ceftriaxone Susceptible      Gentamicin Susceptible      Levofloxacin Susceptible      Nitrofurantoin Susceptible      Piperacillin + Tazobactam Susceptible      Trimethoprim + Sulfamethoxazole Susceptible                           Respiratory Panel PCR w/COVID-19(SARS-CoV-2) [facility]/HEATHER/TASNEEM/PAD/[facility]/CHRISTELLE In-House, NP Swab in UTM/VTM, 2 HR TAT - Swab, Nasopharynx [[id number]]  (Normal) Collected: 07/17/24 2127    Lab Status: Final result Specimen: Swab from Nasopharynx Updated: 07/17/24 2325     ADENOVIRUS, PCR Not Detected     Coronavirus 229E Not Detected     Coronavirus HKU1 Not Detected     Coronavirus NL63 Not Detected     Coronavirus OC43 Not Detected     COVID19 Not Detected     Human Metapneumovirus Not Detected     Human Rhinovirus/Enterovirus Not Detected     Influenza A PCR Not Detected     Influenza B PCR Not Detected     Parainfluenza Virus 1 Not Detected     Parainfluenza Virus 2 Not Detected     Parainfluenza Virus 3 Not Detected     Parainfluenza Virus 4 Not Detected     RSV, PCR Not Detected     Bordetella pertussis pcr Not Detected     Bordetella parapertussis PCR Not Detected     Chlamydophila pneumoniae PCR Not Detected     Mycoplasma pneumo by PCR Not Detected     Narrative:      In the setting of a positive respiratory panel with a viral infection PLUS a negative procalcitonin without other underlying concern for bacterial infection, consider observing off antibiotics or discontinuation of antibiotics and continue supportive care. If the respiratory panel is positive for atypical bacterial infection (Bordetella pertussis, Chlamydophila pneumoniae, or Mycoplasma pneumoniae), consider antibiotic de-escalation to target atypical bacterial infection.    COVID PRE-OP / PRE-PROCEDURE SCREENING ORDER (NO ISOLATION) - Swab, Nasopharynx [[id number]]  (Normal) Collected: 07/17/24 2119    Lab Status: Final result Specimen: Swab from Nasopharynx Updated: 07/17/24 2149    Narrative:      The following orders were created for panel order COVID PRE-OP / PRE-PROCEDURE SCREENING ORDER (NO ISOLATION) - Swab, Nasopharynx.  Procedure                               Abnormality         Status                     ---------                               -----------         ------                     COVID-19 and FLU A/B PCR...[[id number]]  Normal              Final result                 Please view results for these tests on the individual orders.    COVID-19 and FLU A/B PCR, 1 HR TAT - Swab, Nasopharynx [[id number]]  (Normal) Collected: 07/17/24 2119    Lab Status: Final result Specimen: Swab from Nasopharynx Updated: 07/17/24 2149     COVID19 Not Detected     Influenza A PCR Not Detected     Influenza B PCR Not Detected    Narrative:      Fact sheet for providers: https://www.fda.gov/media/476714/download    Fact sheet for patients: https://www.fda.gov/media/306338/download    Test performed by PCR.    Blood Culture - Blood, Arm, Right [[id number]]  (Normal) Collected: 07/17/24 2119    Lab Status: Preliminary result Specimen: Blood from Arm, Right Updated: 07/21/24 2131     Blood Culture No growth at 4 days    Blood Culture - Blood, Arm, Left [[id number]]  (Normal) Collected: 07/17/24 2119    Lab  Status: Preliminary result Specimen: Blood from Arm, Left Updated: 07/21/24 2131     Blood Culture No growth at 4 days            Latest Reference Range & Units 07/17/24 22:03 07/20/24 12:55 07/20/24 23:43   pH, Arterial 7.350 - 7.450 pH units 7.458 (H) 7.410 7.403   pCO2, Arterial 35.0 - 45.0 mm Hg 40.2 37.9 40.3   pO2, Arterial 83.0 - 108.0 mm Hg 154.0 (H) 49.7 (C) 107.0   HCO3, Arterial 20.0 - 26.0 mmol/L 28.4 (H) 24.0 25.1   Base Excess 0.0 - 2.0 mmol/L 4.2 (H) -0.5 (L) 0.3   O2 Saturation, Arterial 94.0 - 99.0 % >99.2 (H) 86.7 (L) 99.0   CO2 Content 22 - 33 mmol/L 29.7 25.1 26.4   A-a DO2 0.0 - 300.0 mmHg 18.6 208.9 292.9   Carboxyhemoglobin 0 - 5 % 1.5 1.7 1.7   Methemoglobin 0.00 - 3.00 % <-0.10 (L) 0.20 <-0.10 (L)   Oxyhemoglobin 94 - 99 % >99.0 (H) 85.1 (L) 97.6   Hematocrit, Blood Gas 38.0 - 51.0 % 37.4 (L) 33.6 (L) 35.2 (L)   Hemoglobin, Blood Gas 13.5 - 17.5 g/dL 12.2 (L) 11.0 (L) 11.5 (L)   Site  Left Radial Left Brachial Right Radial   Andrés's Test  Positive N/A Positive   Modality  Nasal Cannula Nasal Cannula Heated HFNC   FIO2 % 32 44 65   Flow Rate lpm 3.0 6.0    Ventilator Mode  NA NA NA   Barometric Pressure for Blood Gas mmHg [phone]   Notified By   [id number]    Notified Time   07/20/2024 13:01    Notified Who   dr. pura parrish    (C): Data is critically low  (H): Data is abnormally high  (L): Data is abnormally low  Medication Review:     Assessment & Plan     Neurology-got intubated overnight.  Medications-drips reviewed.  Adjusted for RASS goal of -2 to -3.  Patient was dyssynchronous with the ventilator.  Give one-time dose of rocuronium 5 mL IV once.  Increase patient's sedation further.  Patient's blood pressure went.  Increased vasopressors    essential tremors-continue current treatment.      Respiratory-  Overnight got worse.  Currently intubated.  Acute on chronic hypoxic respiratory failure-ventilator settings and graphics reviewed.  Latest ABG reviewed.  Peak and plateau  pressures reviewed.  Later in the day peak airway pressures were high patient was really dyssynchronous with the ventilator.  I gave one-time dose of paralytic for patient ventilator synchrony and increase the sedating medications.  PEEP adjusted.  Obtain serial ABGs throughout the day and vent settings were adjusted.    Plateau pressure in the morning was around 55 and pH was on the lower side.    As ABG improved later on I decreased patient's tidal volume and the plateau pressures came around 45.    Peak airway alarm was also changed.    Respiratory cultures were ordered.  Will get sputum for fungal stain and culture.    As patient's FiO2 requirement improving.  May be stable for bronchoscopy tomorrow morning.    Patient might have aspirated and or patient is having flareup of IPF.  Increased patient's Solu-Medrol dose.    Will start on voriconazole-as patient was on home dose steroids and immunosuppressive medications    Gave albumin to improve patient's overall depression.  Give diuretics in the morning to improve lung compliance and diffusion capacity.    Continue antibacterial antibiotics in the form of vancomycin and cefepime.  Procalcitonin was repeated and came back negative.    Consider repeating respiratory panel.    Will start doxycycline and get mycoplasma and Legionella studies.  If negative can discontinue doxycycline tomorrow.    Current medication list reviewed.  Latest microbiology reviewed.  Respiratory panel reviewed.  Continue nebs.  Continue oxygen to maintain saturation 88 to 92%.  Procalitonin Results:      Lab 07/21/24  1309 07/21/24  0130 07/17/24  2119   PROCALCITONIN 0.21 0.07 0.06      Chest x-ray-latest reviewed    ABG-latest reviewed  Will get chest x-ray and blood gas in the morning    Chest x-ray from today reviewed        CAT scan from today morning reviewed.  Cardiology- hemodynamically -unstable.  Started on Levophed vasopressin and briefly epinephrine drip.  Adjusted to maintain  a MAP of 65 and above.  Recommend repeating echo.    Continue to monitor HR- rate and rhythm, BP     Nephrology- Cr and BUN stable  I/O-reviewed    GI-continue current diet.  Continue aspiration precautions    Hematology- CBC  Hb  platelet  WBC-latest reviewed    ID  Culture  And Antibiotics    Endocrinology- Maintain Blood sugar 140 -180  Blood sugars reviewed    Electrolytes-   Mag and phos       DVT prophylaxis-    Bed side rounds were done with RT and patient's nurse. All the lab and clinical findings were discussed with them and plan was also discussed in great detail.    Family member present-none on my assessment    Results for orders placed during the hospital encounter of 07/17/24    Adult Transthoracic Echo Complete w/ Color, Spectral and Contrast if necessary per protocol    Interpretation Summary    Normal left ventricular cavity size and wall thickness noted.    The following left ventricular wall segments are hypokinetic: mid anterior, basal anterolateral, apical lateral, apical septal, mid anteroseptal and basal anterior. The following left ventricular wall segments are akinetic: apex.    Left ventricular systolic function is moderately decreased. Left ventricular ejection fraction appears to be 36 - 40%.    Left ventricular diastolic function is consistent with (grade I) impaired relaxation.    The aortic valve is structurally normal with no regurgitation or stenosis present.    The mitral valve is structurally normal with no significant stenosis present. Mild mitral valve regurgitation is present.    Mild tricuspid valve regurgitation is present. Estimated right ventricular systolic pressure from tricuspid regurgitation is moderately elevated (45-55 mmHg).    There is no evidence of pericardial effusion. .    Comments: Patient seem to have developed a new regional wall motion normalities with decreased LV systolic function as compared to the previous study in March 2023.       NSTEMI (non-ST  elevated myocardial infarction)     Cc 51 mins  Case d/w nurse and team   This service is provided via audio video tele medicine   I am in [place] and patient is in [facility]          John Mcmahon MD  07/21/24  21:45 EDT

## 2024-07-22 NOTE — PLAN OF CARE
Problem: Adult Inpatient Plan of Care  Goal: Plan of Care Review  Outcome: Ongoing, Progressing  Flowsheets (Taken 7/22/2024 0621)  Progress: no change  Plan of Care Reviewed With: patient  Goal: Patient-Specific Goal (Individualized)  Outcome: Ongoing, Progressing  Goal: Absence of Hospital-Acquired Illness or Injury  Outcome: Ongoing, Progressing  Intervention: Identify and Manage Fall Risk  Recent Flowsheet Documentation  Taken 7/22/2024 0600 by Tonia Cordon RN  Safety Promotion/Fall Prevention: safety round/check completed  Taken 7/22/2024 0500 by Tonia Cordon RN  Safety Promotion/Fall Prevention: safety round/check completed  Taken 7/22/2024 0400 by Tonia Cordon RN  Safety Promotion/Fall Prevention: safety round/check completed  Taken 7/22/2024 0300 by Tonia Cordon RN  Safety Promotion/Fall Prevention: safety round/check completed  Taken 7/22/2024 0200 by Tonia Cordon RN  Safety Promotion/Fall Prevention: safety round/check completed  Taken 7/22/2024 0100 by Tonia Cordon RN  Safety Promotion/Fall Prevention: safety round/check completed  Taken 7/22/2024 0000 by Tonia Cordon RN  Safety Promotion/Fall Prevention: safety round/check completed  Taken 7/21/2024 2300 by Tonia Cordon RN  Safety Promotion/Fall Prevention: safety round/check completed  Taken 7/21/2024 2200 by Tonia Cordon RN  Safety Promotion/Fall Prevention: safety round/check completed  Taken 7/21/2024 2100 by Tonia Cordon RN  Safety Promotion/Fall Prevention: safety round/check completed  Taken 7/21/2024 2040 by Tonia Cordon RN  Safety Promotion/Fall Prevention: safety round/check completed  Taken 7/21/2024 2000 by Tonia Cordon RN  Safety Promotion/Fall Prevention: safety round/check completed  Taken 7/21/2024 1900 by Tonia Cordon RN  Safety Promotion/Fall Prevention: safety round/check completed  Intervention: Prevent Skin Injury  Recent Flowsheet Documentation  Taken 7/22/2024 0600 by Tonia Cordon  RN  Body Position:   turned   side-lying   left  Taken 7/22/2024 0400 by Tonia Cordon RN  Body Position:   turned   side-lying   right  Taken 7/22/2024 0200 by Tonia Cordon RN  Body Position:   turned   side-lying   left  Skin Protection:   tubing/devices free from skin contact   transparent dressing maintained   skin-to-skin areas padded   skin-to-device areas padded   incontinence pads utilized   adhesive use limited  Taken 7/22/2024 0000 by Tonia Cordon RN  Body Position:   turned   side-lying   right  Taken 7/21/2024 2200 by Tonia Cordon RN  Body Position:   turned   side-lying   left  Taken 7/21/2024 2040 by Tonia Cordon RN  Skin Protection:   tubing/devices free from skin contact   transparent dressing maintained   skin-to-skin areas padded   skin-to-device areas padded   incontinence pads utilized   adhesive use limited  Taken 7/21/2024 2000 by Tonia Cordon RN  Body Position:   turned   side-lying   right  Intervention: Prevent and Manage VTE (Venous Thromboembolism) Risk  Recent Flowsheet Documentation  Taken 7/22/2024 0200 by Tonia Cordon RN  Activity Management: bedrest  VTE Prevention/Management: (see MAR) other (see comments)  Taken 7/21/2024 2040 by Tonia Cordon RN  VTE Prevention/Management: (see MAR) other (see comments)  Taken 7/21/2024 2000 by Tonia Cordon RN  Activity Management: bedrest  Intervention: Prevent Infection  Recent Flowsheet Documentation  Taken 7/22/2024 0200 by Tonia Cordon RN  Infection Prevention:   hand hygiene promoted   rest/sleep promoted  Taken 7/21/2024 2040 by Tonia Cordon RN  Infection Prevention:   hand hygiene promoted   rest/sleep promoted  Taken 7/21/2024 2000 by Tonia Cordon RN  Infection Prevention:   hand hygiene promoted   rest/sleep promoted  Goal: Optimal Comfort and Wellbeing  Outcome: Ongoing, Progressing  Intervention: Provide Person-Centered Care  Recent Flowsheet Documentation  Taken 7/22/2024 0200 by Tonia Cordon  RN  Trust Relationship/Rapport:   care explained   choices provided  Taken 7/21/2024 2040 by Tonia Cordon RN  Trust Relationship/Rapport:   care explained   choices provided  Goal: Readiness for Transition of Care  Outcome: Ongoing, Progressing     Problem: COPD (Chronic Obstructive Pulmonary Disease) Comorbidity  Goal: Maintenance of COPD Symptom Control  Outcome: Ongoing, Progressing  Intervention: Maintain COPD-Symptom Control  Recent Flowsheet Documentation  Taken 7/22/2024 0600 by Tonia Cordon RN  Medication Review/Management: medications reviewed  Taken 7/22/2024 0500 by Tonia Cordon RN  Medication Review/Management: medications reviewed  Taken 7/22/2024 0400 by Tonia Cordon RN  Medication Review/Management: medications reviewed  Taken 7/22/2024 0300 by Tonia Cordon RN  Medication Review/Management: medications reviewed  Taken 7/22/2024 0200 by Tonia Cordon RN  Medication Review/Management: medications reviewed  Taken 7/22/2024 0100 by Tonia Cordon RN  Medication Review/Management: medications reviewed  Taken 7/22/2024 0000 by Tonia Cordon RN  Medication Review/Management: medications reviewed  Taken 7/21/2024 2300 by Tonia Cordon RN  Medication Review/Management: medications reviewed  Taken 7/21/2024 2200 by Tonia Cordon RN  Medication Review/Management: medications reviewed  Taken 7/21/2024 2100 by Tonia Cordon RN  Medication Review/Management: medications reviewed  Taken 7/21/2024 2040 by Tonia Cordon RN  Medication Review/Management: medications reviewed  Taken 7/21/2024 2000 by Tonia Cordon RN  Medication Review/Management: medications reviewed  Taken 7/21/2024 1900 by Tonia Cordon RN  Medication Review/Management: medications reviewed     Problem: Fall Injury Risk  Goal: Absence of Fall and Fall-Related Injury  Outcome: Ongoing, Progressing  Intervention: Identify and Manage Contributors  Recent Flowsheet Documentation  Taken 7/22/2024 0600 by Tonia Cordon  RN  Medication Review/Management: medications reviewed  Taken 7/22/2024 0500 by Tonia Cordon RN  Medication Review/Management: medications reviewed  Taken 7/22/2024 0400 by Tonia Cordon RN  Medication Review/Management: medications reviewed  Taken 7/22/2024 0300 by Tonia Cordon RN  Medication Review/Management: medications reviewed  Taken 7/22/2024 0200 by Tonia Cordon RN  Medication Review/Management: medications reviewed  Taken 7/22/2024 0100 by Tonia Cordon RN  Medication Review/Management: medications reviewed  Taken 7/22/2024 0000 by Tonia Cordon RN  Medication Review/Management: medications reviewed  Taken 7/21/2024 2300 by Tonia Cordon RN  Medication Review/Management: medications reviewed  Taken 7/21/2024 2200 by Tonia Cordon RN  Medication Review/Management: medications reviewed  Taken 7/21/2024 2100 by Tonia Cordon RN  Medication Review/Management: medications reviewed  Taken 7/21/2024 2040 by Tonia Cordon RN  Medication Review/Management: medications reviewed  Taken 7/21/2024 2000 by Tonia Cordon RN  Medication Review/Management: medications reviewed  Taken 7/21/2024 1900 by Tonia Cordon RN  Medication Review/Management: medications reviewed  Intervention: Promote Injury-Free Environment  Recent Flowsheet Documentation  Taken 7/22/2024 0600 by Tonia Cordon RN  Safety Promotion/Fall Prevention: safety round/check completed  Taken 7/22/2024 0500 by Tonia Cordon RN  Safety Promotion/Fall Prevention: safety round/check completed  Taken 7/22/2024 0400 by Tonia Cordon RN  Safety Promotion/Fall Prevention: safety round/check completed  Taken 7/22/2024 0300 by Tonia Cordon RN  Safety Promotion/Fall Prevention: safety round/check completed  Taken 7/22/2024 0200 by Tonia Cordon RN  Safety Promotion/Fall Prevention: safety round/check completed  Taken 7/22/2024 0100 by Tonia Cordon RN  Safety Promotion/Fall Prevention: safety round/check completed  Taken  7/22/2024 0000 by Tonia Cordon RN  Safety Promotion/Fall Prevention: safety round/check completed  Taken 7/21/2024 2300 by Tonia Cordon RN  Safety Promotion/Fall Prevention: safety round/check completed  Taken 7/21/2024 2200 by Tonia Cordon RN  Safety Promotion/Fall Prevention: safety round/check completed  Taken 7/21/2024 2100 by Tonia Cordon RN  Safety Promotion/Fall Prevention: safety round/check completed  Taken 7/21/2024 2040 by Tonia Cordon RN  Safety Promotion/Fall Prevention: safety round/check completed  Taken 7/21/2024 2000 by Tonia Cordon RN  Safety Promotion/Fall Prevention: safety round/check completed  Taken 7/21/2024 1900 by Tonia Cordon RN  Safety Promotion/Fall Prevention: safety round/check completed     Problem: Adjustment to Illness (Sepsis/Septic Shock)  Goal: Optimal Coping  Outcome: Ongoing, Progressing  Intervention: Optimize Psychosocial Adjustment to Illness  Recent Flowsheet Documentation  Taken 7/22/2024 0200 by Tonia Cordon RN  Family/Support System Care: support provided     Problem: Bleeding (Sepsis/Septic Shock)  Goal: Absence of Bleeding  Outcome: Ongoing, Progressing     Problem: Glycemic Control Impaired (Sepsis/Septic Shock)  Goal: Blood Glucose Level Within Desired Range  Outcome: Ongoing, Progressing     Problem: Infection Progression (Sepsis/Septic Shock)  Goal: Absence of Infection Signs and Symptoms  Outcome: Ongoing, Progressing  Intervention: Initiate Sepsis Management  Recent Flowsheet Documentation  Taken 7/22/2024 0200 by Tonia Cordon RN  Infection Prevention:   hand hygiene promoted   rest/sleep promoted  Taken 7/21/2024 2040 by Tonia Cordon RN  Infection Prevention:   hand hygiene promoted   rest/sleep promoted  Taken 7/21/2024 2000 by Tonia Cordon RN  Infection Prevention:   hand hygiene promoted   rest/sleep promoted  Intervention: Promote Recovery  Recent Flowsheet Documentation  Taken 7/22/2024 0200 by Tonia Cordon RN  Activity  Management: bedrest  Taken 7/21/2024 2000 by Tonia Cordon RN  Activity Management: bedrest     Problem: Nutrition Impaired (Sepsis/Septic Shock)  Goal: Optimal Nutrition Intake  Outcome: Ongoing, Progressing     Problem: Skin Injury Risk Increased  Goal: Skin Health and Integrity  Outcome: Ongoing, Progressing  Intervention: Optimize Skin Protection  Recent Flowsheet Documentation  Taken 7/22/2024 0600 by Tonia Cordon RN  Head of Bed ([facility] Positioning: HOB at 30-45 degrees  Taken 7/22/2024 0400 by Tonia Cordon RN  Head of Bed ([facility] [place]) Positioning: HOB at 30-45 degrees  Taken 7/22/2024 0200 by Tonia Cordon RN  Pressure Reduction Techniques:   frequent weight shift encouraged   heels elevated off bed   pressure points protected   weight shift assistance provided  Head of Bed ([facility] [place]) Positioning: HOB at 30-45 degrees  Pressure Reduction Devices:   specialty bed utilized   pressure-redistributing mattress utilized   positioning supports utilized   heel offloading device utilized  Skin Protection:   tubing/devices free from skin contact   transparent dressing maintained   skin-to-skin areas padded   skin-to-device areas padded   incontinence pads utilized   adhesive use limited  Taken 7/22/2024 0000 by Tonia Cordon RN  Head of Bed ([facility] [place]) Positioning: HOB at 30-45 degrees  Taken 7/21/2024 2200 by Tonia Cordon RN  Head of Bed ([facility] [place]) Positioning: HOB at 30-45 degrees  Taken 7/21/2024 2040 by Tonia Cordon RN  Pressure Reduction Techniques:   frequent weight shift encouraged   heels elevated off bed   pressure points protected   weight shift assistance provided  Pressure Reduction Devices:   specialty bed utilized   pressure-redistributing mattress utilized   positioning supports utilized   heel offloading device utilized  Skin Protection:   tubing/devices free from skin contact   transparent dressing maintained   skin-to-skin areas padded   skin-to-device areas padded   incontinence pads utilized   adhesive use  limited  Taken 7/21/2024 2000 by Tonia Cordon RN  Head of Bed ([facility]) Positioning: HOB at 30-45 degrees     Problem: Noninvasive Ventilation Acute  Goal: Effective Unassisted Ventilation and Oxygenation  Outcome: Ongoing, Progressing     Problem: Communication Impairment (Mechanical Ventilation, Invasive)  Goal: Effective Communication  Outcome: Ongoing, Progressing     Problem: Device-Related Complication Risk (Mechanical Ventilation, Invasive)  Goal: Optimal Device Function  Outcome: Ongoing, Progressing  Intervention: Optimize Device Care and Function  Recent Flowsheet Documentation  Taken 7/22/2024 0200 by Tonia Cordon RN  Airway Safety Measures: suction at bedside  Taken 7/21/2024 2000 by Tonia Cordon RN  Airway Safety Measures: suction at bedside     Problem: Inability to Wean (Mechanical Ventilation, Invasive)  Goal: Mechanical Ventilation Liberation  Outcome: Ongoing, Progressing  Intervention: Promote Extubation and Mechanical Ventilation Liberation  Recent Flowsheet Documentation  Taken 7/22/2024 0600 by Tonia Cordon RN  Medication Review/Management: medications reviewed  Taken 7/22/2024 0500 by Tonia Cordon RN  Medication Review/Management: medications reviewed  Taken 7/22/2024 0400 by Tonia Cordon RN  Medication Review/Management: medications reviewed  Taken 7/22/2024 0300 by Tonia Cordon RN  Medication Review/Management: medications reviewed  Taken 7/22/2024 0200 by Tonia Cordon RN  Medication Review/Management: medications reviewed  Taken 7/22/2024 0100 by Tonia Cordon RN  Medication Review/Management: medications reviewed  Taken 7/22/2024 0000 by Tonia Cordon RN  Medication Review/Management: medications reviewed  Taken 7/21/2024 2300 by Tonia Cordon RN  Medication Review/Management: medications reviewed  Taken 7/21/2024 2200 by Tonia Cordon RN  Medication Review/Management: medications reviewed  Taken 7/21/2024 2100 by Tonia Cordon, JESSIKA  Medication  Review/Management: medications reviewed  Taken 7/21/2024 2040 by Tonia Cordon RN  Medication Review/Management: medications reviewed  Taken 7/21/2024 2000 by Tonia Cordon RN  Medication Review/Management: medications reviewed  Taken 7/21/2024 1900 by Tonia Cordon RN  Medication Review/Management: medications reviewed     Problem: Nutrition Impairment (Mechanical Ventilation, Invasive)  Goal: Optimal Nutrition Delivery  Outcome: Ongoing, Progressing     Problem: Skin and Tissue Injury (Mechanical Ventilation, Invasive)  Goal: Absence of Device-Related Skin and Tissue Injury  Outcome: Ongoing, Progressing  Intervention: Maintain Skin and Tissue Health  Recent Flowsheet Documentation  Taken 7/22/2024 0200 by Tonia Cordon RN  Device Skin Pressure Protection:   skin-to-skin areas padded   skin-to-device areas padded   pressure points protected   positioning supports utilized   adhesive use limited   absorbent pad utilized/changed  Taken 7/21/2024 2040 by Tonia Cordon RN  Device Skin Pressure Protection:   skin-to-skin areas padded   skin-to-device areas padded   pressure points protected   positioning supports utilized   adhesive use limited   absorbent pad utilized/changed     Problem: Ventilator-Induced Lung Injury (Mechanical Ventilation, Invasive)  Goal: Absence of Ventilator-Induced Lung Injury  Outcome: Ongoing, Progressing  Intervention: Prevent Ventilator-Associated Pneumonia  Recent Flowsheet Documentation  Taken 7/22/2024 0600 by Tonia Cordon RN  Head of Bed ([facility]) Positioning: HOB at 30-45 degrees  Taken 7/22/2024 0400 by Tonia Cordon RN  Head of Bed ([facility]) Positioning: HOB at 30-45 degrees  Oral Care:   swabbed with antiseptic solution   suction provided   lip/mouth moisturizer applied  Taken 7/22/2024 0200 by Tonia Cordon RN  Head of Bed ([facility]) Positioning: HOB at 30-45 degrees  Taken 7/22/2024 0000 by Tonia Cordon RN  Head of Bed ([facility]) Positioning: HOB at 30-45 degrees  Oral Care:    swabbed with antiseptic solution   suction provided   lip/mouth moisturizer applied  Taken 7/21/2024 2200 by Tonia Cordon RN  Head of Bed ([facility]) Positioning: HOB at 30-45 degrees  Taken 7/21/2024 2040 by Tonia Cordon RN  VAP Prevention Contraindications: condition unstable  Taken 7/21/2024 2000 by Tonia Cordno RN  Head of Bed ([facility]) Positioning: HOB at 30-45 degrees  Oral Care:   swabbed with antiseptic solution   suction provided   lip/mouth moisturizer applied   Goal Outcome Evaluation:  Plan of Care Reviewed With: patient        Progress: no change

## 2024-07-22 NOTE — THERAPY TREATMENT NOTE
Reported critical blood gas results to AICU for this patient. pH 7.26, CO2 58  - awaiting call back and orders at this time.

## 2024-07-22 NOTE — PROGRESS NOTES
proBNP has come back on her 14,000 today so she needs to get some diuretics if she getting any diuretics    [facility]  PROGRESS NOTE    Patient information:  Name: Lexie Valdez  Age/Sex: 64 y.o. female  :  1960        PCP: Violet Pop APRN  Attending: Estuardo Charles MD  MRN:  [id number]  Visit Number:  [id number]    LOS: 4  CODE STATUS:    Code Status and Medical Interventions:   Ordered at: 24 0205     Code Status (Patient has no pulse and is not breathing):    CPR (Attempt to Resuscitate)     Medical Interventions (Patient has pulse or is breathing):    Full Support     PROBLEM LIST:Principal Problem:    NSTEMI (non-ST elevated myocardial infarction)    Reason for Cardiology follow-up: NSTEMI     Subjective   ADMISSION INFORMATION:  Chief Complaint   Patient presents with    Shortness of Breath     DATE:2024    HPI:  Lexie Valdez is a 64 y.o. 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     Patient presented to [facility] ([facility], [place]) emergency room (ER) on 2024 with complaints of increased shortness of breath x 2 weeks has become progressively worse and exacerbated with minimal activity.      Primary Cardiologist: None found in chart review.    Interval History:   Telemetry reveals SR/ST 90's with brief runs of SVT as seen in Telemetry strips attached below. According to patient's I & O flow chart did not reflect a negative balance of diuresing overnight. AM labs reveal sodium 135, potassium 4.5, chloride 100, CO2 23.9, BUN 25, creatinine 0.47.  Hemoglobin 9.9 and platelet count 227.  WBC 16.58.     Patient was in room CCU 10 when she was seen and examined.  Patient remains intubated and sedated. Blinking of eyelids  is noted.  Palititive at bedside.     EVENT TIMELINE:   7/18:ECHO w EF of 36 - 40%. Left ventricular diastolic function is consistent with (grade I) impaired relaxation, mild tricuspid valve regurgitation is present. Estimated right ventricular systolic pressure from tricuspid regurgitation is moderately elevated (45-55 mmHg). Patient seem to have developed a new regional wall motion normalities with decreased LV systolic function as compared to the previous study in March 2023.  Please see full report attached below.  7/18: Bess ST with MPI indicating moderate-sized infarct located in the inferior wall, septal wall and apex with no significant ischemia noted. Impressions are consistent with an intermediate risk study. EF = 43%.  Please see full report attached below.  7/21: Emergently intubated orotracheally with a #7.5 endotracheal tube at approximately 22:16 PM.   7/21: CXRs indicated multifocal and worsening pneumonia coupled with some concern as well for pulmonary edema.     Objective     Vital Signs  Temp:  [98.1 °F (36.7 °C)-98.7 °F (37.1 °C)] 98.1 °F (36.7 °C)  Heart Rate:  [] 101  Resp:  [28-33] 33  BP: ()/(44-84) 113/65  FiO2 (%):  [30 %-40 %] 30 %  Device (Oxygen Therapy): ventilator  Vital Signs (last 72 hrs)         07/19 0700  07/20 0659 07/20 0700  07/21 0659 07/21 0700  07/22 0659 07/22 0700  07/22 0842   Most Recent      Temp (°F) 97.7 -  98.4    97.7 -  98.7    98.1 -  99.8      98.5     98.5 (36.9) 07/22 0800    Heart Rate 70 -  92    80 -  113    75 -  106    91 -  95     91 07/22 0832    Resp 16 -  29    20 -  45    26 -  42      28     28 07/22 0832    BP 89/53 -  125/69    66/29 -  148/91    62/42 -  143/83    99/54 -  123/68     99/54 07/22 0830    SpO2 (%) 81 -  100    86 -  100    95 -  100    94 -  100     95 07/22 0832    Flow (L/min) 2 -  4    3 -  55         55 07/20 2345    Oxygen Concentration (%)   65 -  100    40 -  70      30     30 07/22 3232          BMI:Body mass  index is 22.18 kg/m².    WEIGHT:      07/20/24  0400 07/21/24  0400 07/22/24  0500   Weight: 58 kg (127 lb 13.9 oz) 59.3 kg (130 lb 11.7 oz) 58.6 kg (129 lb 3 oz)       DIET:NPO Diet NPO Type: Tube Feeding    I&O:  Intake & Output (last 3 days)         07/19 0701 07/20 0700 07/20 0701 07/21 0700 07/21 0701 07/22 0700 07/22 0701 07/23 0700    P.O. 660 458      I.V. (mL/kg) 557 (9.7) 413.9 (7.2) 1354.5 (23.7)     Other   0     NG/GT   172     IV Piggyback  200 600     Total Intake(mL/kg) 1217 (21.3) 1071.9 (18.7) 2126.5 (37.2)     Urine (mL/kg/hr) 666 (0.5) 1450 (1.1) 775 (0.6)     Emesis/NG output   0     Stool 0 0 0     Total Output 666 1450 775     Net +551 -378.1 +1351.5             Urine Unmeasured Occurrence 1 x 1 x      Stool Unmeasured Occurrence 1 x 2 x 0 x     Emesis Unmeasured Occurrence   0 x         I have seen and examined Ms. Valdez today.  PHYSICAL EXAM:  Constitutional:       Appearance: Not in distress. Acutely ill-appearing.   HENT:         Comments: Orally intubated.  Neck:      Vascular: No JVD. JVD elevated.   Pulmonary:      Breath sounds: Rhonchi present. Bilateral to the midchest Rales present.      Comments: Intubated with Mechanical ventilation with bilateral scattered rales with some decreased breath sounds on the left side.  Cardiovascular:      Normal rate. Regular rhythm.      Murmurs: There is no murmur.   Edema:     Peripheral edema absent.   Abdominal:      General: Bowel sounds are normal. There is no distension.      Palpations: Abdomen is soft.   Musculoskeletal:      Cervical back: Neck supple.      Comments: Sedated. SCUDS in use BLE.  Skin:     General: Skin is warm and dry.      Comments: Dry ulcer noted to RLE-dry, and no erythremia noted.    Neurological:      Comments: Intubated and sedated.                   Results review   Results Review:    I have reviewed the patient's new clinical results. 07/22/24 15:02 EDT    Results from last 7 days   Lab Units 07/17/24  3261  07/17/24 2119   HSTROP T ng/L 188* 229*     Lab Results   Component Value Date    PROBNP 14,737.0 (H) 07/22/2024    PROBNP 3,550.0 (H) 07/17/2024    PROBNP 235.1 04/26/2024     Results from last 7 days   Lab Units 07/22/24  0012 07/21/24  0016 07/19/24  2356 07/19/24  0046 07/18/24  0420 07/18/24  0211 07/17/24 2119   WBC 10*3/mm3 16.58* 20.24* 13.37* 15.07* 7.25 6.42 8.49   HEMOGLOBIN g/dL 9.9* 11.8* 11.5* 12.3 12.1 12.3 12.9   PLATELETS 10*3/mm3 227 227 214 269 276 252 280     Results from last 7 days   Lab Units 07/22/24  0012 07/21/24  0016 07/19/24 2356 07/18/24  0420 07/17/24 2119   SODIUM mmol/L 135* 130* 133* 132* 131*   POTASSIUM mmol/L 4.5 4.7 4.5 4.0 4.1   CHLORIDE mmol/L 100 99 102 96* 93*   CO2 mmol/L 23.9 20.5* 22.2 25.2 26.2   BUN mg/dL 25* 17 15 7* 8   CREATININE mg/dL 0.47* 0.37* 0.44* 0.37* 0.49*   CALCIUM mg/dL 8.2* 8.4* 8.8 8.8 9.2   GLUCOSE mg/dL 191* 122* 114* 130* 177*   ALT (SGPT) U/L 10  --   --  12 13   AST (SGOT) U/L 33*  --   --  37* 43*     Lab Results   Component Value Date    MG 1.7 07/17/2024    MG 2.2 04/29/2024    MG 2.0 04/27/2024     Estimated Creatinine Clearance: 110.4 mL/min (A) (by C-G formula based on SCr of 0.47 mg/dL (L)).    Lab Results   Component Value Date    HGBA1C 4.60 (L) 07/18/2024    HGBA1C 4.80 04/11/2024    HGBA1C 5.00 12/31/2023     Lab Results   Component Value Date    CHOL 136 07/18/2024    TRIG 61 07/18/2024    LDL 56 07/18/2024    HDL 67 (H) 07/18/2024        Lab Results   Component Value Date    INR 0.93 07/18/2024    INR 0.98 04/26/2024    INR 1.00 03/21/2023     Lab Results   Component Value Date    LABHEPA 0.27 (L) 07/20/2024    LABHEPA 0.36 07/19/2024    LABHEPA 0.33 07/19/2024    LABHEPA 0.11 (L) 07/18/2024       Lab Results   Component Value Date    TSH 1.140 07/17/2024      Pain Management Panel           No data to display              Microbiology Results (last 10 days)       Procedure Component Value - Date/Time    Mycoplasma Pneumoniae  Antibody, IgM - Blood, Arm, Left [[id number]]  (Normal) Collected: 07/22/24 0012    Lab Status: Final result Specimen: Blood from Arm, Left Updated: 07/22/24 0202     Mycoplasma pneumo IgM Negative    Legionella Antigen, Urine - Urine, Urine, Clean Catch [[id number]]  (Normal) Collected: 07/21/24 2242    Lab Status: Final result Specimen: Urine, Clean Catch Updated: 07/21/24 2308     LEGIONELLA ANTIGEN, URINE Negative    Narrative:      Presumptive negative for L. pneumophilia serogroup 1 antigen, suggesting no recent or current infection.    Respiratory Culture - Sputum, ET Suction [[id number]] Collected: 07/21/24 2231    Lab Status: Preliminary result Specimen: Sputum from ET Suction Updated: 07/21/24 2322     Gram Stain Rare (1+) Mixed ammon      No WBCs seen      No Epithelial cells seen    MRSA Screen, PCR (Inpatient) - Swab, Nares [[id number]]  (Normal) Collected: 07/21/24 1252    Lab Status: Final result Specimen: Swab from Nares Updated: 07/21/24 1424     MRSA PCR No MRSA Detected    Narrative:      The negative predictive value of this diagnostic test is high and should only be used to consider de-escalating anti-MRSA therapy. A positive result may indicate colonization with MRSA and must be correlated clinically.    Respiratory Panel PCR w/COVID-19(SARS-CoV-2) RHONDA/HEATHER/TASNEEM/PAD/COR/CHRISTELLE In-House, NP Swab in UTM/VTM, 2 HR TAT - Swab, Nasopharynx [[id number]]  (Normal) Collected: 07/21/24 1247    Lab Status: Final result Specimen: Swab from Nasopharynx Updated: 07/21/24 1357     ADENOVIRUS, PCR Not Detected     Coronavirus 229E Not Detected     Coronavirus HKU1 Not Detected     Coronavirus NL63 Not Detected     Coronavirus OC43 Not Detected     COVID19 Not Detected     Human Metapneumovirus Not Detected     Human Rhinovirus/Enterovirus Not Detected     Influenza A PCR Not Detected     Influenza B PCR Not Detected     Parainfluenza Virus 1 Not Detected     Parainfluenza Virus 2 Not Detected     Parainfluenza Virus 3  Not Detected     Parainfluenza Virus 4 Not Detected     RSV, PCR Not Detected     Bordetella pertussis pcr Not Detected     Bordetella parapertussis PCR Not Detected     Chlamydophila pneumoniae PCR Not Detected     Mycoplasma pneumo by PCR Not Detected    Narrative:      In the setting of a positive respiratory panel with a viral infection PLUS a negative procalcitonin without other underlying concern for bacterial infection, consider observing off antibiotics or discontinuation of antibiotics and continue supportive care. If the respiratory panel is positive for atypical bacterial infection (Bordetella pertussis, Chlamydophila pneumoniae, or Mycoplasma pneumoniae), consider antibiotic de-escalation to target atypical bacterial infection.    Respiratory Culture - Sputum, ET Suction [[id number]] Collected: 07/21/24 1152    Lab Status: Preliminary result Specimen: Sputum from ET Suction Updated: 07/22/24 1041     Respiratory Culture Rare growth The culture consists of normal respiratory ammon. This is a preliminary report; final report to follow.     Gram Stain Moderate (3+) WBCs seen      Rare (1+) Epithelial cells seen      No organisms seen    Blood Culture - Blood, Arm, Left [[id number]]  (Normal) Collected: 07/21/24 0450    Lab Status: Preliminary result Specimen: Blood from Arm, Left Updated: 07/22/24 0515     Blood Culture No growth at 24 hours    Blood Culture - Blood, Arm, Right [[id number]]  (Normal) Collected: 07/21/24 0448    Lab Status: Preliminary result Specimen: Blood from Arm, Right Updated: 07/22/24 0530     Blood Culture No growth at 24 hours    Gastrointestinal Panel, PCR - Stool, Per Rectum [[id number]]  (Normal) Collected: 07/19/24 0410    Lab Status: Final result Specimen: Stool from Per Rectum Updated: 07/19/24 0603     Campylobacter Not Detected     Plesiomonas shigelloides Not Detected     Salmonella Not Detected     Vibrio Not Detected     Vibrio cholerae Not Detected     Yersinia  enterocolitica Not Detected     Enteroaggregative E. coli (EAEC) Not Detected     Enteropathogenic E. coli (EPEC) Not Detected     Enterotoxigenic E. coli (ETEC) lt/st Not Detected     Shiga-like toxin-producing E. coli (STEC) stx1/stx2 Not Detected     Shigella/Enteroinvasive E. coli (EIEC) Not Detected     Cryptosporidium Not Detected     Cyclospora cayetanensis Not Detected     Entamoeba histolytica Not Detected     Giardia lamblia Not Detected     Adenovirus F40/41 Not Detected     Astrovirus Not Detected     Norovirus GI/GII Not Detected     Rotavirus A Not Detected     Sapovirus (I, II, IV or V) Not Detected    Clostridioides difficile Toxin - Stool, Per Rectum [[id number]] Collected: 07/19/24 0410    Lab Status: Final result Specimen: Stool from Per Rectum Updated: 07/19/24 0527    Narrative:      The following orders were created for panel order Clostridioides difficile Toxin - Stool, Per Rectum.  Procedure                               Abnormality         Status                     ---------                               -----------         ------                     Clostridioides difficile...[[id number]]                      Final result                 Please view results for these tests on the individual orders.    Clostridioides difficile Toxin, PCR - Stool, Per Rectum [[id number]] Collected: 07/19/24 0410    Lab Status: Final result Specimen: Stool from Per Rectum Updated: 07/19/24 0527     Toxigenic C. difficile by PCR Negative     027 Toxin Presumptive Negative    Narrative:      The result indicates the absence of toxigenic C. difficile from stool specimen.     Urine Culture - Urine, Urine, Catheter [[id number]]  (Abnormal)  (Susceptibility) Collected: 07/17/24 0526    Lab Status: Final result Specimen: Urine, Catheter Updated: 07/20/24 0981     Urine Culture >100,000 CFU/mL Escherichia coli    Narrative:      Colonization of the urinary tract without infection is common. Treatment is discouraged  unless the patient is symptomatic, pregnant, or undergoing an invasive urologic procedure.    Susceptibility        Escherichia coli      MILES      Amoxicillin + Clavulanate Susceptible      Ampicillin Susceptible      Ampicillin + Sulbactam Susceptible      Cefazolin Susceptible      Cefepime Susceptible      Ceftazidime Susceptible      Ceftriaxone Susceptible      Gentamicin Susceptible      Levofloxacin Susceptible      Nitrofurantoin Susceptible      Piperacillin + Tazobactam Susceptible      Trimethoprim + Sulfamethoxazole Susceptible                           Respiratory Panel PCR w/COVID-19(SARS-CoV-2) RHONDA/HEATHER/TASNEEM/PAD/COR/CHRISTELLE In-House, NP Swab in UTM/VTM, 2 HR TAT - Swab, Nasopharynx [[id number]]  (Normal) Collected: 07/17/24 2127    Lab Status: Final result Specimen: Swab from Nasopharynx Updated: 07/17/24 2325     ADENOVIRUS, PCR Not Detected     Coronavirus 229E Not Detected     Coronavirus HKU1 Not Detected     Coronavirus NL63 Not Detected     Coronavirus OC43 Not Detected     COVID19 Not Detected     Human Metapneumovirus Not Detected     Human Rhinovirus/Enterovirus Not Detected     Influenza A PCR Not Detected     Influenza B PCR Not Detected     Parainfluenza Virus 1 Not Detected     Parainfluenza Virus 2 Not Detected     Parainfluenza Virus 3 Not Detected     Parainfluenza Virus 4 Not Detected     RSV, PCR Not Detected     Bordetella pertussis pcr Not Detected     Bordetella parapertussis PCR Not Detected     Chlamydophila pneumoniae PCR Not Detected     Mycoplasma pneumo by PCR Not Detected    Narrative:      In the setting of a positive respiratory panel with a viral infection PLUS a negative procalcitonin without other underlying concern for bacterial infection, consider observing off antibiotics or discontinuation of antibiotics and continue supportive care. If the respiratory panel is positive for atypical bacterial infection (Bordetella pertussis, Chlamydophila pneumoniae, or Mycoplasma  pneumoniae), consider antibiotic de-escalation to target atypical bacterial infection.    COVID PRE-OP / PRE-PROCEDURE SCREENING ORDER (NO ISOLATION) - Swab, Nasopharynx [[id number]]  (Normal) Collected: 07/17/24 2119    Lab Status: Final result Specimen: Swab from Nasopharynx Updated: 07/17/24 2149    Narrative:      The following orders were created for panel order COVID PRE-OP / PRE-PROCEDURE SCREENING ORDER (NO ISOLATION) - Swab, Nasopharynx.  Procedure                               Abnormality         Status                     ---------                               -----------         ------                     COVID-19 and FLU A/B PCR...[[id number]]  Normal              Final result                 Please view results for these tests on the individual orders.    COVID-19 and FLU A/B PCR, 1 HR TAT - Swab, Nasopharynx [[id number]]  (Normal) Collected: 07/17/24 2119    Lab Status: Final result Specimen: Swab from Nasopharynx Updated: 07/17/24 2149     COVID19 Not Detected     Influenza A PCR Not Detected     Influenza B PCR Not Detected    Narrative:      Fact sheet for providers: https://www.fda.gov/media/260454/download    Fact sheet for patients: https://www.fda.gov/media/421655/download    Test performed by PCR.    Blood Culture - Blood, Arm, Right [[id number]]  (Normal) Collected: 07/17/24 2119    Lab Status: Preliminary result Specimen: Blood from Arm, Right Updated: 07/21/24 2131     Blood Culture No growth at 4 days    Blood Culture - Blood, Arm, Left [[id number]]  (Normal) Collected: 07/17/24 2119    Lab Status: Preliminary result Specimen: Blood from Arm, Left Updated: 07/21/24 2131     Blood Culture No growth at 4 days           Imaging Results (Last 24 Hours)       Procedure Component Value Units Date/Time    XR Chest 1 View [[id number]] Collected: 07/22/24 0710     Updated: 07/22/24 0713    Narrative:      CLINICAL HISTORY: Intubated.     COMPARISON: 7/21/2024.     TECHNIQUE:  Single AP view of the  chest       Impression:      FINDINGS AND IMPRESSION:    Endotracheal tube tip is 2.1 cm above the irene.  Nasogastric tube tip is not seen but is below the gastroesophageal  junction.  Right internal jugular vein central venous catheter tip is at the  cavoatrial junction.  Low lung volumes with fibrotic changes are again seen.  Upper limit normal heart size.  No pleural effusion or pneumothorax.  Status post lap banding.  Surgical clips are also seen in the right  upper quadrant.        This report was finalized on 7/22/2024 7:11 AM by Violette Wilcox MD.       CT Chest Without Contrast Diagnostic [[id number]] Collected: 07/21/24 1613     Updated: 07/21/24 1617    Narrative:      PROCEDURE: CT CHEST WO CONTRAST DIAGNOSTIC-     HISTORY: hypoxia, progressive fibrosis; I21.4-Non-ST elevation (NSTEMI)  myocardial infarction     COMPARISON: 5/8/2024.     PROCEDURE:  Multiple axial CT images were obtained from the thoracic  inlet through the upper abdomen without the use of contrast. This study  was performed with techniques to keep radiation doses as low as  reasonably achievable (ALARA). Individualized dose reduction techniques  using automated exposure control or adjustment of mA and/or kV according  to the patient size were employed.     FINDINGS:  An endotracheal tube is in place with the tip well positioned above the  irene. An nasogastric tube is present in the body of the stomach. There  is no adenopathy within the chest. The heart is normal in size. The  aorta is normal in caliber. There is a small right pleural effusion.  Lung windows reveal interlobular septal thickening and subpleural lines  in the upper and lower lobes with bronchiectasis in the left lower lobe.  There are diffuse bilateral groundglass opacities. The visualized upper  abdomen is unremarkable. Bone windows reveal no acute osseous  abnormalities.       Impression:      1. Groundglass opacities and small right pleural effusion which may  reflect  pulmonary edema or pneumonia.  2. Fibrotic lung changes consistent with usual interstitial pneumonitis.        This report was finalized on 7/21/2024 4:15 PM by Urban Arzola M.D..             ECHO:  Results for orders placed during the hospital encounter of 07/17/24    Adult Transthoracic Echo Complete w/ Color, Spectral and Contrast if necessary per protocol    Interpretation Summary    Normal left ventricular cavity size and wall thickness noted.    The following left ventricular wall segments are hypokinetic: mid anterior, basal anterolateral, apical lateral, apical septal, mid anteroseptal and basal anterior. The following left ventricular wall segments are akinetic: apex.    Left ventricular systolic function is moderately decreased. Left ventricular ejection fraction appears to be 36 - 40%.    Left ventricular diastolic function is consistent with (grade I) impaired relaxation.    The aortic valve is structurally normal with no regurgitation or stenosis present.    The mitral valve is structurally normal with no significant stenosis present. Mild mitral valve regurgitation is present.    Mild tricuspid valve regurgitation is present. Estimated right ventricular systolic pressure from tricuspid regurgitation is moderately elevated (45-55 mmHg).    There is no evidence of pericardial effusion. .    Comments: Patient seem to have developed a new regional wall motion normalities with decreased LV systolic function as compared to the previous study in March 2023.      STRESS TEST:  Results for orders placed during the hospital encounter of 07/17/24    Stress Test With Myocardial Perfusion One Day    Interpretation Summary  Images from the original result were not included.      A pharmacological stress test was performed using regadenoson without low-level exercise.    Findings consistent with an indeterminate ECG stress test.    Myocardial perfusion imaging indicates a moderate-sized infarct located in the  inferior wall, septal wall and apex with no significant ischemia noted.    Abnormal LV wall motion consistent with apical dyskinesis.    Left ventricular ejection fraction is moderately reduced (Calculated EF = 43%).    Impressions are consistent with an intermediate risk study.       HEART CATH:  No results found for this or any previous visit.      QTc intervals:        [id number]      TELEMETRY:      SR/ST 90s with brief runs of SVT as seen in Telemetry strip attached below.         I reviewed the patient's new clinical results.    ALLERGIES: Codeine and Sulfa antibiotics    Medication Review:   Current list of medications may not reflect those currently placed in orders that are not signed or are being held.     aspirin, 81 mg, Oral, Daily  Brexpiprazole, 0.5 mg, Oral, Daily  cefepime, 2,000 mg, Intravenous, Q8H  chlorhexidine, 15 mL, Mouth/Throat, Q12H  donepezil, 10 mg, Oral, Q PM  doxycycline, 100 mg, Oral, Q12H  enoxaparin, 40 mg, Subcutaneous, Nightly  hydroxychloroquine, 200 mg, Oral, BID  ipratropium-albuterol, 3 mL, Nebulization, Q4H  levothyroxine, 25 mcg, Oral, Daily With Breakfast  memantine, 5 mg, Oral, Q12H  methylPREDNISolone sodium succinate, 125 mg, Intravenous, Q8H  metoprolol succinate XL, 25 mg, Oral, Q24H  metroNIDAZOLE, 500 mg, Intravenous, Q8H  mupirocin, 1 application , Each Nare, BID  nystatin, 1 Application, Topical, Q12H  pantoprazole, 40 mg, Intravenous, Q AM  Phenylephrine HCl-NaCl, , ,   Phenylephrine HCl-NaCl, , ,   sodium chloride, 10 mL, Intravenous, Q12H  sodium chloride, 10 mL, Intravenous, Q12H  sodium chloride, 10 mL, Intravenous, Q12H  sodium chloride, 10 mL, Intravenous, Q12H  sodium chloride, 10 mL, Intravenous, Q12H  sodium chloride, 10 mL, Intravenous, Q12H  voriconazole, 4 mg/kg, Intravenous, Q12H      EPINEPHrine, 0.02-0.3 mcg/kg/min (Dosing Weight), Last Rate: Stopped (24)  fentanyl 10 mcg/mL,  mcg/hr, Last Rate: 150 mcg/hr (24  1338)  norepinephrine, 0.02-0.3 mcg/kg/min (Dosing Weight), Last Rate: 0.24 mcg/kg/min (07/22/24 1303)  Pharmacy Consult - Pharmacy to dose,   Pharmacy Consult,   Pharmacy to dose vancomycin,   propofol, 5-50 mcg/kg/min (Dosing Weight), Last Rate: 35 mcg/kg/min (07/22/24 1339)  vasopressin, 0.03 Units/min, Last Rate: Stopped (07/22/24 0320)        senna-docusate sodium **AND** polyethylene glycol **AND** bisacodyl **AND** bisacodyl    busPIRone    guaifenesin    HYDROcodone-acetaminophen    hydrOXYzine    ipratropium    LORazepam    nitroglycerin    Pharmacy Consult - Pharmacy to dose    Pharmacy Consult    Pharmacy to dose vancomycin    Phenylephrine HCl-NaCl    Phenylephrine HCl-NaCl    prochlorperazine    sodium chloride    sodium chloride    sodium chloride    sodium chloride    sodium chloride    sodium chloride    sodium chloride    sodium chloride    sodium chloride    sodium chloride    sodium chloride    vecuronium    Assessment      Acute non-ST elevation myocardial infarction (type I versus type II due to acute on chronic respiratory failure with hypoxia).  Acute on chronic respiratory failure with hypoxia, multifactorial due to combination of acute worsening of chronic interstitial lung disease, possible pneumonia and acute heart failure.  Acute HFrEF.  Shock requiring pressors, norepinephrine.      Recommendations / Plan     For her acute non-STEMI, continue with aspirin.  Not able to use a beta-blocker at this time due to hypotension/shock.  Will consider cautious diuresis if her blood pressure remained stable.  Will consider reintroduction of GDMT for her cardiomyopathy if and when her blood pressure improves adequately.  Consider further cardiac evaluation with either nuclear stress test or cardiac catheterization once her respiratory status improves adequately and she remains hemodynamically stable.      Thank you very much for asking us to be involved in this patient's care.  We will follow along  with you.    Electronically signed by SG Chandler, 07/22/24, 11:54 AM EDT.     Electronically signed by Ramo Acevedo MD, 07/22/24, 3:12 PM EDT.            Please note that portions of this note were completed with a voice recognition program.    Please note that portions of this note were copied and has been reviewed and is accurate as of 7/22/2024 .

## 2024-07-22 NOTE — CONSULTS
INFECTIOUS DISEASE CONSULTATION REPORT        Patient Identification:  Name:  Lexie Valdez  Age:  64 y.o.  Sex:  female  :  1960  MRN:  [id number]   Visit Number:  [id number]  Primary Care Physician:  Violet Pop APRN        Referring Provider: Dr. Ospina    Reason for consult: Pneumonia       LOS: 4 days        Subjective       Subjective     History of present illness:      Thank you Dr. Ospina for allowing us to participate in the care of your patient.  As you well know, Ms. Lexie Valdez is a 64 y.o. female with past medical history significant for anxiety/depression, arthritis, hypertension, hypothyroidism, stress urinary incontinence with frequent UTIs, COPD and pulmonary fibrosis chronically on 3 L per nasal cannula at home, resting tremors in face and extremities, who presented to [facility] Emergency Department on 2024 for worsening shortness of breath.  WBC 16.58.  Procalcitonin 0.24.  Mycoplasma negative.  Legionella negative.  MRSA PCR negative.  Respiratory panel PCR negative.  C. difficile toxin PCR negative.  GI panel PCR negative.  Blood cultures from 2024 showed no growth thus far.  Pittore culture from 2024 remains in process.  Urine culture from 2024 finalizes greater than 100,000 colonies of pan susceptible E. coli.    Patient is currently intubated and sedated at 30% FiO2.  Diminished lung sounds bilaterally.  Currently requiring Levophed for blood pressure support.  Abdomen soft, nontender.  Afebrile, no reported diarrhea.  Primary RN at bedside.      Infectious Disease consultation was requested for antimicrobial management.      ---------------------------------------------------------------------------------------------------------------------     Review Of Systems:    Unable to obtain.  Intubated and sedated.    ---------------------------------------------------------------------------------------------------------------------     Past  Medical History    Past Medical History:   Diagnosis Date    Anxiety     Arthritis     Depression     Hypertensive disorder 3/14/2018    Hypothyroidism 8/3/2021    MARU (stress urinary incontinence, female) 2019    UTI (urinary tract infection) 2023       Past Surgical History    Past Surgical History:   Procedure Laterality Date    BREAST BIOPSY Left 2000    benign    GALLBLADDER SURGERY      GASTRIC BANDING      HYSTERECTOMY      age 37       Family History    Family History   Problem Relation Age of Onset    Panic disorder Mother     COPD Mother     Alcohol abuse Father     Alzheimer's disease Father     Breast cancer Neg Hx        Social History    Social History     Tobacco Use    Smoking status: Former     Current packs/day: 0.00     Average packs/day: 1.5 packs/day for 34.3 years (51.5 ttl pk-yrs)     Types: Cigarettes     Start date: 1971     Quit date: 10/18/2005     Years since quittin.7     Passive exposure: Past    Smokeless tobacco: Never   Vaping Use    Vaping status: Never Used   Substance Use Topics    Alcohol use: No    Drug use: No       Allergies    Codeine and Sulfa antibiotics  ---------------------------------------------------------------------------------------------------------------------     Home Medications:    Prior to Admission Medications       Prescriptions Last Dose Informant Patient Reported? Taking?    aspirin 81 MG EC tablet 2024 Self Yes Yes    Take 1 tablet by mouth Daily.    atorvastatin (LIPITOR) 40 MG tablet 2024 Pharmacy, Self Yes Yes    Take 1 tablet by mouth Daily.    Brexpiprazole 0.5 MG tablet 2024 Pharmacy, Self Yes Yes    Take 0.5 mg by mouth Daily.    busPIRone (BUSPAR) 10 MG tablet 2024 Self, Pharmacy Yes Yes    Take 1 tablet by mouth 2 (Two) Times a Day As Needed (Mood).    donepezil (ARICEPT) 10 MG tablet 2024 Pharmacy, Self Yes Yes    Take 1 tablet by mouth Every Evening.    FLUoxetine (PROzac) 40 MG capsule 2024  Pharmacy, Self Yes Yes    Take 1 capsule by mouth Daily.    Fluticasone-Umeclidin-Vilant (Trelegy Ellipta) 200-62.5-25 MCG/ACT inhaler 7/17/2024 Pharmacy, Self No Yes    Inhale 1 puff Daily.    hydroCHLOROthiazide 12.5 MG tablet 7/17/2024 Pharmacy, Self Yes Yes    Take 1 tablet by mouth Daily.    HYDROcodone-acetaminophen (NORCO) 5-325 MG per tablet 7/17/2024 Pharmacy, Self Yes Yes    Take 1 tablet by mouth Every 8 (Eight) Hours As Needed for Moderate Pain.    hydroxychloroquine (PLAQUENIL) 200 MG tablet 7/17/2024 Pharmacy, Self Yes Yes    Take 1 tablet by mouth 2 (Two) Times a Day.    hydrOXYzine (ATARAX) 25 MG tablet 7/17/2024 Pharmacy, Self Yes Yes    Take 1 tablet by mouth Every 6 (Six) Hours As Needed for Anxiety.    ipratropium-albuterol (DUO-NEB) 0.5-2.5 mg/3 ml nebulizer Past Month Pharmacy, Self Yes Yes    Take 3 mL by nebulization Every 4 (Four) Hours As Needed for Wheezing or Shortness of Air.    levocetirizine (XYZAL) 5 MG tablet 7/17/2024 Pharmacy, Self Yes Yes    Take 1 tablet by mouth Every Evening.    levothyroxine (SYNTHROID, LEVOTHROID) 25 MCG tablet 7/17/2024 Pharmacy, Self Yes Yes    Take 1 tablet by mouth Every Morning.    losartan (COZAAR) 50 MG tablet 7/17/2024 Pharmacy, Self Yes Yes    Take 1 tablet by mouth Daily.    megestrol (MEGACE) 40 MG tablet 7/17/2024 Pharmacy, Self Yes Yes    Take 1 tablet by mouth 2 (Two) Times a Day.    memantine (NAMENDA) 5 MG tablet 7/17/2024 Pharmacy, Self Yes Yes    Take 1 tablet by mouth 2 (Two) Times a Day.    nabumetone (RELAFEN) 750 MG tablet Past Week Pharmacy, Self Yes Yes    Take 1 tablet by mouth 2 (Two) Times a Day As Needed for Mild Pain or Moderate Pain.    primidone (MYSOLINE) 50 MG tablet 7/17/2024 Pharmacy, Self Yes Yes    Take 4 tablets by mouth 3 (Three) Times a Day.    triamcinolone (KENALOG) 0.1 % cream Past Week Pharmacy, Self Yes Yes    Apply 1 Application topically to the appropriate area as directed 2 (Two) Times a Day.    albuterol  sulfate  (90 Base) MCG/ACT inhaler Unknown Pharmacy, Self Yes No    Inhale 2 puffs Daily As Needed for Wheezing.    predniSONE (DELTASONE) 10 MG tablet Unknown Pharmacy, Self Yes No    Take 2 tablets by mouth Daily.    predniSONE (DELTASONE) 10 MG tablet Unknown Pharmacy, Self Yes No    Take 1 tablet by mouth Daily.          ---------------------------------------------------------------------------------------------------------------------    Objective       Objective     Hospital Scheduled Meds:  aspirin, 81 mg, Oral, Daily  Brexpiprazole, 0.5 mg, Oral, Daily  cefepime, 2,000 mg, Intravenous, Q8H  chlorhexidine, 15 mL, Mouth/Throat, Q12H  donepezil, 10 mg, Oral, Q PM  doxycycline, 100 mg, Oral, Q12H  enoxaparin, 40 mg, Subcutaneous, Nightly  hydroxychloroquine, 200 mg, Oral, BID  ipratropium-albuterol, 3 mL, Nebulization, Q4H  levothyroxine, 25 mcg, Oral, Daily With Breakfast  memantine, 5 mg, Oral, Q12H  methylPREDNISolone sodium succinate, 125 mg, Intravenous, Q8H  metoprolol succinate XL, 25 mg, Oral, Q24H  metroNIDAZOLE, 500 mg, Intravenous, Q8H  mupirocin, 1 application , Each Nare, BID  nystatin, 1 Application, Topical, Q12H  pantoprazole, 40 mg, Intravenous, Q AM  Phenylephrine HCl-NaCl, , ,   Phenylephrine HCl-NaCl, , ,   sodium chloride, 10 mL, Intravenous, Q12H  sodium chloride, 10 mL, Intravenous, Q12H  sodium chloride, 10 mL, Intravenous, Q12H  sodium chloride, 10 mL, Intravenous, Q12H  sodium chloride, 10 mL, Intravenous, Q12H  sodium chloride, 10 mL, Intravenous, Q12H  voriconazole, 4 mg/kg, Intravenous, Q12H      EPINEPHrine, 0.02-0.3 mcg/kg/min (Dosing Weight), Last Rate: Stopped (07/21/24 2152)  fentanyl 10 mcg/mL,  mcg/hr, Last Rate: 100 mcg/hr (07/22/24 1012)  norepinephrine, 0.02-0.3 mcg/kg/min (Dosing Weight), Last Rate: 0.26 mcg/kg/min (07/22/24 0815)  Pharmacy Consult - Pharmacy to dose,   Pharmacy Consult,   Pharmacy to dose vancomycin,   propofol, 5-50 mcg/kg/min (Dosing  Weight), Last Rate: 30 mcg/kg/min (07/22/24 1009)  vasopressin, 0.03 Units/min, Last Rate: Stopped (07/22/24 0320)      ---------------------------------------------------------------------------------------------------------------------   Vital Signs:  Temp:  [98.1 °F (36.7 °C)-98.7 °F (37.1 °C)] 98.5 °F (36.9 °C)  Heart Rate:  [] 92  Resp:  [26-30] 30  BP: ()/(44-84) 114/60  FiO2 (%):  [30 %-40 %] 30 %  Mean Arterial Pressure (Non-Invasive) for the past 24 hrs (Last 3 readings):   Noninvasive MAP (mmHg)   07/22/24 1200 81   07/22/24 1145 83   07/22/24 1130 84     SpO2 Percentage    07/22/24 1130 07/22/24 1145 07/22/24 1200   SpO2: 98% 98% 98%     SpO2:  [94 %-100 %] 98 %  on   ;   Device (Oxygen Therapy): ventilator    Body mass index is 22.18 kg/m².  Wt Readings from Last 3 Encounters:   07/22/24 58.6 kg (129 lb 3 oz)   04/26/24 67.7 kg (149 lb 4 oz)   04/19/24 67.1 kg (148 lb)     ---------------------------------------------------------------------------------------------------------------------     Physical Exam:    Constitutional: Chronically ill-appearing  female.  Currently intubated and sedated at 30% FiO2.  Primary RN at bedside.  HENT:  Head: Normocephalic and atraumatic.  Mouth:  Moist mucous membranes.    Eyes:  Conjunctivae and EOM are normal.  No scleral icterus.  Neck:  Neck supple.  No JVD present.    Cardiovascular:  Normal rate, regular rhythm and normal heart sounds with no murmur. No edema.  Pulmonary/Chest: Diminished lung sounds bilaterally.  Intubated and sedated 30% FiO2.  Abdominal:  Soft.  Bowel sounds are normal.  No distension and no tenderness.   Musculoskeletal:  No edema, no tenderness, and no deformity.  No swelling or redness of joints.  Neurological: Sedate.  Skin:  Skin is warm and dry.  No rash noted.  No pallor.   Psychiatric:  Sedate.    ---------------------------------------------------------------------------------------------------------------------    Results from last 7 days   Lab Units 07/17/24 2320 07/17/24 2119   HSTROP T ng/L 188* 229*     Results from last 7 days   Lab Units 07/22/24  0012 07/17/24 2119   PROBNP pg/mL 14,737.0* 3,550.0*     Results from last 7 days   Lab Units 07/18/24  0420   CHOLESTEROL mg/dL 136   TRIGLYCERIDES mg/dL 61   HDL CHOL mg/dL 67*   LDL CHOL mg/dL 56     Results from last 7 days   Lab Units 07/21/24 2106   PH, ARTERIAL pH units 7.315*   PO2 ART mm Hg 137.0*   PCO2, ARTERIAL mm Hg 48.2*   HCO3 ART mmol/L 24.5     Results from last 7 days   Lab Units 07/22/24  0012 07/21/24  0130 07/21/24  0016 07/19/24  2356 07/18/24  0420 07/18/24  0211 07/17/24 2119   CRP mg/dL  --   --  6.50*  --   --   --  1.65*   LACTATE mmol/L  --  1.1  --   --   --   --  2.0   WBC 10*3/mm3 16.58*  --  20.24* 13.37*   < > 6.42 8.49   HEMOGLOBIN g/dL 9.9*  --  11.8* 11.5*   < > 12.3 12.9   HEMATOCRIT % 30.7*  --  36.3 36.2   < > 36.7 38.3   MCV fL 95.6  --  96.3 99.7*   < > 93.1 93.2   MCHC g/dL 32.2  --  32.5 31.8   < > 33.5 33.7   PLATELETS 10*3/mm3 227  --  227 214   < > 252 280   INR   --   --   --   --   --  0.93  --     < > = values in this interval not displayed.     Results from last 7 days   Lab Units 07/22/24  0012 07/21/24  0016 07/19/24  2356 07/18/24  0420 07/17/24  2119   SODIUM mmol/L 135* 130* 133* 132* 131*   POTASSIUM mmol/L 4.5 4.7 4.5 4.0 4.1   MAGNESIUM mg/dL  --   --   --   --  1.7   CHLORIDE mmol/L 100 99 102 96* 93*   CO2 mmol/L 23.9 20.5* 22.2 25.2 26.2   BUN mg/dL 25* 17 15 7* 8   CREATININE mg/dL 0.47* 0.37* 0.44* 0.37* 0.49*   CALCIUM mg/dL 8.2* 8.4* 8.8 8.8 9.2   GLUCOSE mg/dL 191* 122* 114* 130* 177*   ALBUMIN g/dL 4.1  --   --  3.1* 3.4*   BILIRUBIN mg/dL 0.4  --   --  <0.2 0.2   ALK PHOS U/L 142*  --   --  90 106   AST (SGOT) U/L 33*  --   --  37* 43*   ALT (SGPT) U/L 10  --   --  12 13  "  Estimated Creatinine Clearance: 110.4 mL/min (A) (by C-G formula based on SCr of 0.47 mg/dL (L)).  No results found for: \"AMMONIA\"    Glucose   Date/Time Value Ref Range Status   07/22/2024 0615 126 70 - 130 mg/dL Final   07/21/2024 2331 166 (H) 70 - 130 mg/dL Final   07/21/2024 1738 163 (H) 70 - 130 mg/dL Final   07/21/2024 1141 160 (H) 70 - 130 mg/dL Final   07/20/2024 1237 167 (H) 70 - 130 mg/dL Final     Lab Results   Component Value Date    HGBA1C 4.60 (L) 07/18/2024     Lab Results   Component Value Date    TSH 1.140 07/17/2024    FREET4 1.83 (H) 04/11/2024       Blood Culture   Date Value Ref Range Status   07/21/2024 No growth at 24 hours  Preliminary   07/21/2024 No growth at 24 hours  Preliminary   07/17/2024 No growth at 4 days  Preliminary   07/17/2024 No growth at 4 days  Preliminary     Urine Culture   Date Value Ref Range Status   07/17/2024 >100,000 CFU/mL Escherichia coli (A)  Final     No results found for: \"WOUNDCX\"  No results found for: \"STOOLCX\"  Respiratory Culture   Date Value Ref Range Status   07/21/2024   Preliminary    Rare growth The culture consists of normal respiratory ammon. This is a preliminary report; final report to follow.     Pain Management Panel           No data to display              I have personally reviewed the above laboratory results.   ---------------------------------------------------------------------------------------------------------------------  Imaging Results (Last 7 Days)       Procedure Component Value Units Date/Time    XR Chest 1 View [[id number]] Collected: 07/22/24 0710     Updated: 07/22/24 0713    Narrative:      CLINICAL HISTORY: Intubated.     COMPARISON: 7/21/2024.     TECHNIQUE:  Single AP view of the chest       Impression:      FINDINGS AND IMPRESSION:    Endotracheal tube tip is 2.1 cm above the irene.  Nasogastric tube tip is not seen but is below the gastroesophageal  junction.  Right internal jugular vein central venous catheter tip is at " the  cavoatrial junction.  Low lung volumes with fibrotic changes are again seen.  Upper limit normal heart size.  No pleural effusion or pneumothorax.  Status post lap banding.  Surgical clips are also seen in the right  upper quadrant.        This report was finalized on 7/22/2024 7:11 AM by Violette Wilcox MD.       CT Chest Without Contrast Diagnostic [[id number]] Collected: 07/21/24 1613     Updated: 07/21/24 1617    Narrative:      PROCEDURE: CT CHEST WO CONTRAST DIAGNOSTIC-     HISTORY: hypoxia, progressive fibrosis; I21.4-Non-ST elevation (NSTEMI)  myocardial infarction     COMPARISON: 5/8/2024.     PROCEDURE:  Multiple axial CT images were obtained from the thoracic  inlet through the upper abdomen without the use of contrast. This study  was performed with techniques to keep radiation doses as low as  reasonably achievable (ALARA). Individualized dose reduction techniques  using automated exposure control or adjustment of mA and/or kV according  to the patient size were employed.     FINDINGS:  An endotracheal tube is in place with the tip well positioned above the  irene. An nasogastric tube is present in the body of the stomach. There  is no adenopathy within the chest. The heart is normal in size. The  aorta is normal in caliber. There is a small right pleural effusion.  Lung windows reveal interlobular septal thickening and subpleural lines  in the upper and lower lobes with bronchiectasis in the left lower lobe.  There are diffuse bilateral groundglass opacities. The visualized upper  abdomen is unremarkable. Bone windows reveal no acute osseous  abnormalities.       Impression:      1. Groundglass opacities and small right pleural effusion which may  reflect pulmonary edema or pneumonia.  2. Fibrotic lung changes consistent with usual interstitial pneumonitis.        This report was finalized on 7/21/2024 4:15 PM by Urban Arzola M.D..       XR Chest 1 View [[id number]] Collected: 07/21/24 1306      Updated: 07/21/24 1309    Narrative:      INDICATION: Tube placement     TECHNIQUE: Frontal radiograph of the chest.     COMPARISON: Radiograph from earlier the same day.       Impression:      FINDINGS/IMPRESSION:    Endotracheal tube tip approximately 3 cm above the irene. Enteric tube  in stomach. Right IJ central venous catheter tip in the superior vena  cava. Improved multifocal infiltrates/edema. No pleural effusion or  pneumothorax. No acute fracture.        This report was finalized on 7/21/2024 1:07 PM by Alex Pallas, DO.       XR Chest 1 View [[id number]] Collected: 07/21/24 0901     Updated: 07/21/24 0905    Narrative:      CLINICAL HISTORY: Central line placement.     COMPARISON: 7/21/2024 at 6:37 AM.     TECHNIQUE:  Single AP view of the chest       Impression:      FINDINGS AND IMPRESSION:    1.  Since the prior study, a right internal jugular vein central venous  catheter has been placed, the tip of which is in the cavoatrial  junction.  2.  No pneumothorax.  3.  Unchanged extensive perihilar groundglass opacities and  consolidation.  4.  No pleural effusion.  5.  Endotracheal tube tip is 1.2 cm above the irene.  6.  Nasogastric tube tip is in the stomach.  7.  A lap band is again seen.     This report was finalized on 7/21/2024 9:03 AM by Violette Wilcox MD.       XR Chest 1 View [[id number]] Collected: 07/21/24 0722     Updated: 07/21/24 0726    Narrative:      PROCEDURE: Portable chest x-ray examination performed on July 21, 2024.  Single view. Upright position.     HISTORY: Endotracheal tube and OG tube placement. Confirm position.     COMPARISON: None.     FINDINGS:     Mild enlarged heart size  Endotracheal tube in place with tip 1.8 cm above the irene.  Enteric drain in place with tip to the stomach.  Lap band device in place.  Central pulmonary vascular congestion.  Patchy and confluent airspace opacities in the upper and lower lobes  could represent underlying multifocal pneumonia and/or  edema.  Mild hypoinflated lungs  No pleural effusion. No pneumothorax.  Mild levoconvex curve at the upper thoracic spine.  Cholecystectomy clips in the right upper quadrant.       Impression:         1.  Endotracheal tube in place with tip 1.8 cm above the irene.  2.  Enteric drain in place with tip in the stomach.  3.  Multifocal pneumonia with central pulmonary vascular congestion.  4.  Diffuse edema in the lung parenchyma is also possible.  5.  No pleural effusion or pneumothorax  6.  No pneumomediastinum.     This report was finalized on 7/21/2024 7:24 AM by Elian Islas MD.       XR Chest 1 View [[id number]] Collected: 07/21/24 0125     Updated: 07/21/24 0130    Narrative:      PROCEDURE: Portable chest x-ray examination performed on July 21, 2024.  Single view. Upright position.     HISTORY: Dyspnea. Hypoxia.     COMPARISON: None.     FINDINGS:     Enlarged heart size  Patchy and confluent airspace opacities in the upper and lower lobes  consistent with multifocal pneumonia  Central pulmonary vascular congestion with edema.  No pleural effusion. No pneumothorax  Lap band device in place  Surgical clips in the right upper quadrant.  Mild dextroconvex curve at the thoracolumbar junction.       Impression:         1.  Multifocal pneumonia.  2.  Enlarged heart size.  3.  Central pulmonary vascular congestion.  4.  Edema.  5.  No pleural effusion.   6.  No pneumothorax.  7.  Lap band device in place.  8.  No free air in the upper abdomen.        This report was finalized on 7/21/2024 1:28 AM by Elian Islas MD.       XR Chest 1 View [[id number]] Collected: 07/20/24 1447     Updated: 07/20/24 1450    Narrative:      PROCEDURE: XR CHEST 1 VW-       HISTORY: Hypoxia; I21.4-Non-ST elevation (NSTEMI) myocardial infarction     COMPARISON: 7/17/2024.     FINDINGS: The heart is normal in size. The mediastinum is unremarkable.  There are coarse interstitial lung markings. There is worsening of  bilateral airspace  disease and small effusions. There is no  pneumothorax. There are no acute osseous abnormalities.       Impression:      Fibrotic lung changes with worsening bilateral airspace  disease and small effusions which may reflect pulmonary edema or  pneumonia.        This report was finalized on 7/20/2024 2:48 PM by Urban Arzola M.D..       XR Chest 1 View [[id number]] Collected: 07/17/24 2137     Updated: 07/17/24 2140    Narrative:      INDICATION: Difficulty breathing.     TECHNIQUE: Frontal radiograph of the chest.     COMPARISON: 4/26/2024.     FINDINGS:   Cardiomegaly. Pulmonary fibrotic changes again noted. No significant  change. No pleural effusion or pneumothorax. No acute fracture.       Impression:      No significant interval change allowing for differences in technique.        This report was finalized on 7/17/2024 9:38 PM by Alex Pallas, DO.             I have personally reviewed the above radiology results.   ---------------------------------------------------------------------------------------------------------------------      Pertinent Infectious Disease Results          Assessment & Plan      Assessment      Septic shock with acute hypoxic respiratory failure requiring mechanical ventilation and pressor support  Pneumonia      Plan        Patient presented to [facility] on 7/17/2024 for worsening shortness of breath.  WBC 16.58.  Procalcitonin 0.24.  Mycoplasma negative.  Legionella negative.  MRSA PCR negative.  Respiratory panel PCR negative.  C. difficile toxin PCR negative.  GI panel PCR negative.  Blood cultures from 7/21/2024 showed no growth thus far.  Pittore culture from 7/21/2024 remains in process.  Urine culture from 7/17/2024 finalizes greater than 100,000 colonies of pan susceptible E. coli.    Patient is currently intubated and sedated at 30% FiO2.  Diminished lung sounds bilaterally.  Currently requiring Levophed for blood pressure support.   Abdomen soft, nontender.  Afebrile, no reported diarrhea.  Primary RN at bedside.    In the setting of persistent pneumonia and hypoxic respiratory failure with chronic steroid use fungal antibodies and serologies were ordered for today. Recommend to discontinue voriconazole due to prolonged QTC and no evidence of fungal infection at this time. Recommend to continue Cefepime, Doxycycline and metronidazole. We will continue to follow closely.      ANTIMICROBIAL THERAPY    cefepime 2000 mg IVPB in 100 mL NS (VTB)  doxycycline - 100 MG  metroNIDAZOLE - 500-0.79 MG/100ML-%  Pharmacy to dose vancomycin  voriconazole (VFEND) IVPB in 100 mL       Again, thank you Dr. Ospina for allowing us to participate in the care of your patient and please feel free to call for any questions you may have.        Code Status:     Code Status and Medical Interventions:   Ordered at: 07/18/24 0205     Code Status (Patient has no pulse and is not breathing):    CPR (Attempt to Resuscitate)     Medical Interventions (Patient has pulse or is breathing):    Full Support         SG Rao  07/22/24  12:08 EDT

## 2024-07-22 NOTE — PROGRESS NOTES
[facility] HOSPITALIST PROGRESS NOTE     Patient Identification:  Name:  Lexie KEITH Valdez  Age:  64 y.o.  Sex:  female  :  1960  MRN:  [id number]  Visit Number:  [id number]  ROOM: [address]     Primary Care Provider:  Violet Pop APRN     Date of Admission: 2024    Length of stay in inpatient status:  4    Subjective     Chief Compliant:    Chief Complaint   Patient presents with    Shortness of Breath     History of Presenting Illness:  The patient remains intubated and sedated with propofol and thus I was unable to obtain a history from her.  Please note the patient does follow simple commands and will shake her head yes/no to questions.  Present at bedside during my evaluation were nurses Shavon, Graciela, and Andreas.  Please note that no family members were present at bedside during my evaluation.    Consults:  Arjun Mcmahon and Christopher with pulmonology  Arjun Acevedo and Fox with cardiology  Dr. Clemons with infectious disease  Dr. Soto with general surgery    Procedures:  2024:  Oral intubated and mechanical ventilation  2024:  Right internal jugular triple lumen central line placement    Objective     Current Hospital Meds:  aspirin, 81 mg, Oral, Daily  Brexpiprazole, 0.5 mg, Oral, Daily  cefepime, 2,000 mg, Intravenous, Q8H  chlorhexidine, 15 mL, Mouth/Throat, Q12H  donepezil, 10 mg, Oral, Q PM  doxycycline, 100 mg, Oral, Q12H  enoxaparin, 40 mg, Subcutaneous, Nightly  hydroxychloroquine, 200 mg, Oral, BID  ipratropium-albuterol, 3 mL, Nebulization, Q4H  levothyroxine, 25 mcg, Oral, Daily With Breakfast  memantine, 5 mg, Oral, Q12H  methylPREDNISolone sodium succinate, 125 mg, Intravenous, Q8H  metoprolol succinate XL, 25 mg, Oral, Q24H  metroNIDAZOLE, 500 mg, Intravenous, Q8H  mupirocin, 1 application , Each Nare, BID  nystatin, 1 Application, Topical, Q12H  pantoprazole, 40 mg, Intravenous, Q AM  Phenylephrine HCl-NaCl, , ,   Phenylephrine HCl-NaCl, , ,   sodium chloride,  10 mL, Intravenous, Q12H  sodium chloride, 10 mL, Intravenous, Q12H  sodium chloride, 10 mL, Intravenous, Q12H  sodium chloride, 10 mL, Intravenous, Q12H  sodium chloride, 10 mL, Intravenous, Q12H  sodium chloride, 10 mL, Intravenous, Q12H  voriconazole, 4 mg/kg, Intravenous, Q12H    EPINEPHrine, 0.02-0.3 mcg/kg/min (Dosing Weight), Last Rate: Stopped (07/21/24 2152)  fentanyl 10 mcg/mL,  mcg/hr, Last Rate: 100 mcg/hr (07/22/24 1012)  norepinephrine, 0.02-0.3 mcg/kg/min (Dosing Weight), Last Rate: 0.26 mcg/kg/min (07/22/24 0815)  Pharmacy Consult - Pharmacy to dose,   Pharmacy Consult,   Pharmacy to dose vancomycin,   propofol, 5-50 mcg/kg/min (Dosing Weight), Last Rate: 30 mcg/kg/min (07/22/24 1009)  vasopressin, 0.03 Units/min, Last Rate: Stopped (07/22/24 0320)      Current Antimicrobial Therapy:  Anti-Infectives (From admission, onward)      Ordered     Dose/Rate Route Frequency Start Stop    07/21/24 2206  doxycycline (MONODOX) capsule 100 mg        Ordering Provider: John Mcmahon MD    100 mg Oral Every 12 Hours Scheduled 07/21/24 2300 07/26/24 2059    07/21/24 2200  voriconazole (VFEND) 240 mg in dextrose (D5W) 5 % 100 mL IVPB        Ordering Provider: John Mcmahon MD    4 mg/kg × 59.3 kg  over 120 Minutes Intravenous Every 12 Hours 07/21/24 2245 07/26/24 2259    07/21/24 0424  cefepime 2000 mg IVPB in 100 mL NS (VTB)        Ordering Provider: Estuardo Charles MD    2,000 mg  over 4 Hours Intravenous Every 8 Hours 07/21/24 1400 07/28/24 1359    07/21/24 0834  metroNIDAZOLE (FLAGYL) IVPB 500 mg        Ordering Provider: Eduardo Freeman MD    500 mg  200 mL/hr over 30 Minutes Intravenous Every 8 Hours 07/21/24 1000 07/28/24 0959    07/21/24 0856  vancomycin 1250 mg/250 mL 0.9% NS IVPB ([facility])        Ordering Provider: Eduardo Freeman MD    1,250 mg  over 75 Minutes Intravenous Once 07/21/24 1000 07/21/24 1114    07/21/24 0834  Pharmacy to dose vancomycin        Ordering Provider: Pete  Eduardo KEARNEY MD     Does not apply Continuous PRN 07/21/24 0833 07/28/24 0832    07/21/24 0424  cefepime 2000 mg IVPB in 100 mL NS (VTB)        Ordering Provider: Estuardo Charles MD    2,000 mg  over 30 Minutes Intravenous Once 07/21/24 0600 07/21/24 0558    07/19/24 1540  hydroxychloroquine (PLAQUENIL) tablet 200 mg        Ordering Provider: Estuardo Charles MD    200 mg Oral 2 Times Daily 07/19/24 2100      07/17/24 2218  cefTRIAXone (ROCEPHIN) 2,000 mg in sodium chloride 0.9 % 100 mL IVPB-VTB        Ordering Provider: Al Lewis MD    2,000 mg  200 mL/hr over 30 Minutes Intravenous Once 07/17/24 2234 07/18/24 0035          Current Diuretic Therapy:  Diuretics (From admission, onward)      Ordered     Dose/Rate Route Frequency Start Stop    07/21/24 0637  furosemide (LASIX) injection 40 mg        Ordering Provider: Estuardo Charles MD    40 mg Intravenous Once 07/21/24 0730 07/21/24 0914    07/20/24 2323  furosemide (LASIX) injection 40 mg        Ordering Provider: Estuardo Charles MD    40 mg Intravenous Once 07/21/24 0015 07/21/24 0008          ----------------------------------------------------------------------------------------------------------------------  Vital Signs:  Temp:  [98.1 °F (36.7 °C)-98.7 °F (37.1 °C)] 98.5 °F (36.9 °C)  Heart Rate:  [] 96  Resp:  [26-30] 30  BP: ()/(44-84) 111/58  FiO2 (%):  [30 %-40 %] 30 %  SpO2:  [94 %-100 %] 97 %  on   ;   Device (Oxygen Therapy): ventilator  Body mass index is 22.18 kg/m².    Wt Readings from Last 3 Encounters:   07/22/24 58.6 kg (129 lb 3 oz)   04/26/24 67.7 kg (149 lb 4 oz)   04/19/24 67.1 kg (148 lb)     Intake & Output (last 3 days)         07/19 0701 07/20 0700 07/20 0701 07/21 0700 07/21 0701 07/22 0700 07/22 0701 07/23 0700    P.O. 660 458      I.V. (mL/kg) 557 (9.7) 413.9 (7.2) 1354.5 (23.7)     Other   0     NG/GT   172     IV Piggyback  200 600     Total Intake(mL/kg) 1217 (21.3) 1071.9 (18.7)  2126.5 (37.2)     Urine (mL/kg/hr) 666 (0.5) 1450 (1.1) 775 (0.6)     Emesis/NG output   0     Stool 0 0 0     Total Output 666 1450 775     Net +551 -378.1 +1351.5             Urine Unmeasured Occurrence 1 x 1 x      Stool Unmeasured Occurrence 1 x 2 x 0 x     Emesis Unmeasured Occurrence   0 x           NPO Diet NPO Type: Tube Feeding  ----------------------------------------------------------------------------------------------------------------------  Physical Exam  Vitals and nursing note reviewed. Exam conducted with a chaperone present.   Constitutional:       General: She is in acute distress.      Appearance: Normal appearance. She is well-developed. She is not toxic-appearing or diaphoretic.      Interventions: She is sedated and intubated.      Comments: Appears acutely and chronically ill.   HENT:      Head: Normocephalic and atraumatic.      Right Ear: External ear normal.      Left Ear: External ear normal.      Nose: Nose normal.   Eyes:      General: No scleral icterus.        Right eye: No discharge.         Left eye: No discharge.      Extraocular Movements: Extraocular movements intact.      Conjunctiva/sclera: Conjunctivae normal.      Pupils: Pupils are equal, round, and reactive to light.   Cardiovascular:      Rate and Rhythm: Normal rate and regular rhythm.      Pulses: Normal pulses.      Heart sounds: No murmur heard.  Pulmonary:      Effort: Respiratory distress present. She is intubated.      Breath sounds: No wheezing or rales.   Abdominal:      General: Bowel sounds are normal. There is no distension.      Palpations: Abdomen is soft.   Musculoskeletal:         General: No swelling, deformity or signs of injury.   Skin:     Capillary Refill: Capillary refill takes less than 2 seconds.      Coloration: Skin is not jaundiced or pale.   Neurological:      Motor: Tremor present. No seizure activity.      Comments: Sedated by propofol and orally intubated.  Thus, I cannot perform this  portion of the physical.  She does try to answer some yes and no questions and the sedation is so light that she is able to follow simple commands.  She did have rhythmic movement of her lower lip on the left side as well as the bilateral hands and feet, which is thought to be due to her known tremors.   Psychiatric:      Comments: Sedated with propofol and thus I cannot perform this portion of the physical exam.     ----------------------------------------------------------------------------------------------------------------------  Tele:  NS with heart rates 70-90s.  I have personally reviewed/looked at the telemetry strips.      Last echocardiogram:  Results for orders placed during the hospital encounter of 07/17/24    Adult Transthoracic Echo Complete w/ Color, Spectral and Contrast if necessary per protocol    Interpretation Summary    Normal left ventricular cavity size and wall thickness noted.    The following left ventricular wall segments are hypokinetic: mid anterior, basal anterolateral, apical lateral, apical septal, mid anteroseptal and basal anterior. The following left ventricular wall segments are akinetic: apex.    Left ventricular systolic function is moderately decreased. Left ventricular ejection fraction appears to be 36 - 40%.    Left ventricular diastolic function is consistent with (grade I) impaired relaxation.    The aortic valve is structurally normal with no regurgitation or stenosis present.    The mitral valve is structurally normal with no significant stenosis present. Mild mitral valve regurgitation is present.    Mild tricuspid valve regurgitation is present. Estimated right ventricular systolic pressure from tricuspid regurgitation is moderately elevated (45-55 mmHg).    There is no evidence of pericardial effusion. .    Comments: Patient seem to have developed a new regional wall motion normalities with decreased LV systolic function as compared to the previous study in March  2023.    I have personally read the ECHO final report.   ----------------------------------------------------------------------------------------------------------------------  LABS:    Pending test results:  Pending Labs       Order Current Status    Blood Culture - Blood, Arm, Left Preliminary result    Blood Culture - Blood, Arm, Left Preliminary result    Blood Culture - Blood, Arm, Right Preliminary result    Blood Culture - Blood, Arm, Right Preliminary result    Respiratory Culture - Sputum, ET Suction Preliminary result    Respiratory Culture - Sputum, ET Suction Preliminary result          CBC and coagulation:  Results from last 7 days   Lab Units 07/22/24  0012 07/21/24  1309 07/21/24  0130 07/21/24  0016 07/19/24  2356 07/19/24  0046 07/18/24  0420 07/18/24  0211 07/17/24  2119   PROCALCITONIN ng/mL 0.24 0.21 0.07  --   --   --   --   --  0.06   LACTATE mmol/L  --   --  1.1  --   --   --   --   --  2.0   CRP mg/dL  --   --   --  6.50*  --   --   --   --  1.65*   WBC 10*3/mm3 16.58*  --   --  20.24* 13.37* 15.07* 7.25 6.42 8.49   HEMOGLOBIN g/dL 9.9*  --   --  11.8* 11.5* 12.3 12.1 12.3 12.9   HEMATOCRIT % 30.7*  --   --  36.3 36.2 37.6 36.8 36.7 38.3   MCV fL 95.6  --   --  96.3 99.7* 94.0 95.1 93.1 93.2   MCHC g/dL 32.2  --   --  32.5 31.8 32.7 32.9 33.5 33.7   PLATELETS 10*3/mm3 227  --   --  227 214 269 276 252 280   INR   --   --   --   --   --   --   --  0.93  --    D DIMER QUANT MCGFEU/mL  --   --   --   --   --   --   --   --  0.29     Acid/base balance:  Results from last 7 days   Lab Units 07/21/24  2106 07/21/24  0811 07/20/24  2343 07/20/24  1255 07/17/24  2203   PH, ARTERIAL pH units 7.315* 7.367 7.403 7.410 7.458*   PCO2, ARTERIAL mm Hg 48.2* 46.6* 40.3 37.9 40.2   PO2 ART mm Hg 137.0* 101.0 107.0 49.7* 154.0*   HCO3 ART mmol/L 24.5 26.8* 25.1 24.0 28.4*      Latest Reference Range & Units 07/22/24 05:03   pH, Venous 7.320 - 7.420 pH Units 7.267 (L)   pCO2, Venous 41.0 - 51.0 mm Hg 58.0 (H)    pO2, Venous 27.0 - 53.0 mm Hg 48.3   HCO3, Venous 22.0 - 28.0 mmol/L 26.4   Base Excess 0.0 - 2.0 mmol/L -1.1 (L)   O2 Saturation, Venous 45.0 - 75.0 % 81.2 (H)     Ventilator settings:  Mode: VC/AC  FiO2 (%):  [30 %-40 %] 30 %  S RR:  [28] 28  S VT:  [370 mL-400 mL] 370 mL  PEEP/CPAP (cm H2O):  [8 cm H20] 8 cm H20  MAP (cm H2O):  [15-21] 16     Renal and electrolytes:  Results from last 7 days   Lab Units 07/22/24  0012 07/21/24  0016 07/19/24 2356 07/18/24 0420 07/17/24 2119   SODIUM mmol/L 135* 130* 133* 132* 131*   POTASSIUM mmol/L 4.5 4.7 4.5 4.0 4.1   MAGNESIUM mg/dL  --   --   --   --  1.7   CHLORIDE mmol/L 100 99 102 96* 93*   CO2 mmol/L 23.9 20.5* 22.2 25.2 26.2   BUN mg/dL 25* 17 15 7* 8   CREATININE mg/dL 0.47* 0.37* 0.44* 0.37* 0.49*   CALCIUM mg/dL 8.2* 8.4* 8.8 8.8 9.2   GLUCOSE mg/dL 191* 122* 114* 130* 177*   ANION GAP mmol/L 11.1 10.5 8.8 10.8 11.8     Estimated Creatinine Clearance: 110.4 mL/min (A) (by C-G formula based on SCr of 0.47 mg/dL (L)).    Liver and pancreatic function:  Results from last 7 days   Lab Units 07/22/24  0012 07/18/24 0420 07/17/24  2119   ALBUMIN g/dL 4.1 3.1* 3.4*   BILIRUBIN mg/dL 0.4 <0.2 0.2   ALK PHOS U/L 142* 90 106   AST (SGOT) U/L 33* 37* 43*   ALT (SGPT) U/L 10 12 13     Endocrine function:  Lab Results   Component Value Date    HGBA1C 4.60 (L) 07/18/2024     Point of care bedside glucose levels:  Results from last 7 days   Lab Units 07/22/24  0615 07/21/24  2331 07/21/24  1738 07/21/24  1141 07/20/24  1237   GLUCOSE mg/dL 126 166* 163* 160* 167*     Glucose levels from the [facility]:  Results from last 7 days   Lab Units 07/22/24  0012 07/21/24  0016 07/19/24  2356 07/18/24  0420 07/17/24  2119   GLUCOSE mg/dL 191* 122* 114* 130* 177*     Lab Results   Component Value Date    TSH 1.140 07/17/2024    FREET4 1.83 (H) 04/11/2024     Cardiac:  Results from last 7 days   Lab Units 07/22/24  0012 07/17/24  2320 07/17/24  2119   HSTROP T ng/L  --  188* 229*   PROBNP  pg/mL 14,737.0*  --  3,550.0*     Results from last 7 days   Lab Units 07/18/24  0420   CHOLESTEROL mg/dL 136   TRIGLYCERIDES mg/dL 61   HDL CHOL mg/dL 67*   LDL CHOL mg/dL 56     Cultures:  Lab Results   Component Value Date    COLORU Yellow 07/17/2024    CLARITYU Cloudy (A) 07/17/2024    PHUR 6.5 07/17/2024    GLUCOSEU Negative 07/17/2024    KETONESU Negative 07/17/2024    BLOODU Negative 07/17/2024    NITRITEU Positive (A) 07/17/2024    LEUKOCYTESUR Trace (A) 07/17/2024    BILIRUBINUR Negative 07/17/2024    UROBILINOGEN 0.2 E.U./dL 07/17/2024    RBCUA 0-2 07/17/2024    WBCUA 6-10 (A) 07/17/2024    BACTERIA 4+ (A) 07/17/2024     Microbiology Results (last 10 days)       Procedure Component Value - Date/Time    Mycoplasma Pneumoniae Antibody, IgM - Blood, Arm, Left [[id number]]  (Normal) Collected: 07/22/24 0012    Lab Status: Final result Specimen: Blood from Arm, Left Updated: 07/22/24 0202     Mycoplasma pneumo IgM Negative    Legionella Antigen, Urine - Urine, Urine, Clean Catch [[id number]]  (Normal) Collected: 07/21/24 2242    Lab Status: Final result Specimen: Urine, Clean Catch Updated: 07/21/24 2308     LEGIONELLA ANTIGEN, URINE Negative    Narrative:      Presumptive negative for L. pneumophilia serogroup 1 antigen, suggesting no recent or current infection.    Respiratory Culture - Sputum, ET Suction [[id number]] Collected: 07/21/24 2231    Lab Status: Preliminary result Specimen: Sputum from ET Suction Updated: 07/21/24 2322     Gram Stain Rare (1+) Mixed ammon      No WBCs seen      No Epithelial cells seen    MRSA Screen, PCR (Inpatient) - Swab, Nares [[id number]]  (Normal) Collected: 07/21/24 1252    Lab Status: Final result Specimen: Swab from Nares Updated: 07/21/24 1424     MRSA PCR No MRSA Detected    Narrative:      The negative predictive value of this diagnostic test is high and should only be used to consider de-escalating anti-MRSA therapy. A positive result may indicate colonization with MRSA  and must be correlated clinically.    Respiratory Panel PCR w/COVID-19(SARS-CoV-2) RHONDA/HEATHER/TASNEEM/PAD/COR/CHRISTELLE In-House, NP Swab in UTM/VTM, 2 HR TAT - Swab, Nasopharynx [[id number]]  (Normal) Collected: 07/21/24 1247    Lab Status: Final result Specimen: Swab from Nasopharynx Updated: 07/21/24 1357     ADENOVIRUS, PCR Not Detected     Coronavirus 229E Not Detected     Coronavirus HKU1 Not Detected     Coronavirus NL63 Not Detected     Coronavirus OC43 Not Detected     COVID19 Not Detected     Human Metapneumovirus Not Detected     Human Rhinovirus/Enterovirus Not Detected     Influenza A PCR Not Detected     Influenza B PCR Not Detected     Parainfluenza Virus 1 Not Detected     Parainfluenza Virus 2 Not Detected     Parainfluenza Virus 3 Not Detected     Parainfluenza Virus 4 Not Detected     RSV, PCR Not Detected     Bordetella pertussis pcr Not Detected     Bordetella parapertussis PCR Not Detected     Chlamydophila pneumoniae PCR Not Detected     Mycoplasma pneumo by PCR Not Detected    Respiratory Culture - Sputum, ET Suction [[id number]] Collected: 07/21/24 1152    Lab Status: Preliminary result Specimen: Sputum from ET Suction Updated: 07/22/24 1041     Respiratory Culture Rare growth The culture consists of normal respiratory ammon. This is a preliminary report; final report to follow.     Gram Stain Moderate (3+) WBCs seen      Rare (1+) Epithelial cells seen      No organisms seen    Blood Culture - Blood, Arm, Left [[id number]]  (Normal) Collected: 07/21/24 0450    Lab Status: Preliminary result Specimen: Blood from Arm, Left Updated: 07/22/24 0515     Blood Culture No growth at 24 hours    Blood Culture - Blood, Arm, Right [[id number]]  (Normal) Collected: 07/21/24 0448    Lab Status: Preliminary result Specimen: Blood from Arm, Right Updated: 07/22/24 0530     Blood Culture No growth at 24 hours    Gastrointestinal Panel, PCR - Stool, Per Rectum [[id number]]  (Normal) Collected: 07/19/24 0410    Lab Status:  Final result Specimen: Stool from Per Rectum Updated: 07/19/24 0603     Campylobacter Not Detected     Plesiomonas shigelloides Not Detected     Salmonella Not Detected     Vibrio Not Detected     Vibrio cholerae Not Detected     Yersinia enterocolitica Not Detected     Enteroaggregative E. coli (EAEC) Not Detected     Enteropathogenic E. coli (EPEC) Not Detected     Enterotoxigenic E. coli (ETEC) lt/st Not Detected     Shiga-like toxin-producing E. coli (STEC) stx1/stx2 Not Detected     Shigella/Enteroinvasive E. coli (EIEC) Not Detected     Cryptosporidium Not Detected     Cyclospora cayetanensis Not Detected     Entamoeba histolytica Not Detected     Giardia lamblia Not Detected     Adenovirus F40/41 Not Detected     Astrovirus Not Detected     Norovirus GI/GII Not Detected     Rotavirus A Not Detected     Sapovirus (I, II, IV or V) Not Detected    Clostridioides difficile Toxin - Stool, Per Rectum [[id number]] Collected: 07/19/24 0410    Lab Status: Final result Specimen: Stool from Per Rectum Updated: 07/19/24 0527    Narrative:      The following orders were created for panel order Clostridioides difficile Toxin - Stool, Per Rectum.  Procedure                               Abnormality         Status                     ---------                               -----------         ------                     Clostridioides difficile...[[id number]]                      Final result                 Please view results for these tests on the individual orders.    Clostridioides difficile Toxin, PCR - Stool, Per Rectum [[id number]] Collected: 07/19/24 0410    Lab Status: Final result Specimen: Stool from Per Rectum Updated: 07/19/24 0527     Toxigenic C. difficile by PCR Negative     027 Toxin Presumptive Negative    Narrative:      The result indicates the absence of toxigenic C. difficile from stool specimen.     Urine Culture - Urine, Urine, Catheter [[id number]]  (Abnormal)  (Susceptibility) Collected: 07/17/24 2516     Lab Status: Final result Specimen: Urine, Catheter Updated: 07/20/24 0959     Urine Culture >100,000 CFU/mL Escherichia coli    Narrative:      Colonization of the urinary tract without infection is common. Treatment is discouraged unless the patient is symptomatic, pregnant, or undergoing an invasive urologic procedure.    Susceptibility        Escherichia coli      MILES      Amoxicillin + Clavulanate Susceptible      Ampicillin Susceptible      Ampicillin + Sulbactam Susceptible      Cefazolin Susceptible      Cefepime Susceptible      Ceftazidime Susceptible      Ceftriaxone Susceptible      Gentamicin Susceptible      Levofloxacin Susceptible      Nitrofurantoin Susceptible      Piperacillin + Tazobactam Susceptible      Trimethoprim + Sulfamethoxazole Susceptible                           Respiratory Panel PCR w/COVID-19(SARS-CoV-2) RHONDA/HEATHER/TASNEEM/PAD/COR/CHRISTELLE In-House, NP Swab in UTM/VTM, 2 HR TAT - Swab, Nasopharynx [[id number]]  (Normal) Collected: 07/17/24 2127    Lab Status: Final result Specimen: Swab from Nasopharynx Updated: 07/17/24 2325     ADENOVIRUS, PCR Not Detected     Coronavirus 229E Not Detected     Coronavirus HKU1 Not Detected     Coronavirus NL63 Not Detected     Coronavirus OC43 Not Detected     COVID19 Not Detected     Human Metapneumovirus Not Detected     Human Rhinovirus/Enterovirus Not Detected     Influenza A PCR Not Detected     Influenza B PCR Not Detected     Parainfluenza Virus 1 Not Detected     Parainfluenza Virus 2 Not Detected     Parainfluenza Virus 3 Not Detected     Parainfluenza Virus 4 Not Detected     RSV, PCR Not Detected     Bordetella pertussis pcr Not Detected     Bordetella parapertussis PCR Not Detected     Chlamydophila pneumoniae PCR Not Detected     Mycoplasma pneumo by PCR Not Detected    COVID PRE-OP / PRE-PROCEDURE SCREENING ORDER (NO ISOLATION) - Swab, Nasopharynx [[id number]]  (Normal) Collected: 07/17/24 2119    Lab Status: Final result Specimen: Swab from  Nasopharynx Updated: 07/17/24 2149    Narrative:      The following orders were created for panel order COVID PRE-OP / PRE-PROCEDURE SCREENING ORDER (NO ISOLATION) - Swab, Nasopharynx.  Procedure                               Abnormality         Status                     ---------                               -----------         ------                     COVID-19 and FLU A/B PCR...[[id number]]  Normal              Final result                 Please view results for these tests on the individual orders.    COVID-19 and FLU A/B PCR, 1 HR TAT - Swab, Nasopharynx [[id number]]  (Normal) Collected: 07/17/24 2119    Lab Status: Final result Specimen: Swab from Nasopharynx Updated: 07/17/24 2149     COVID19 Not Detected     Influenza A PCR Not Detected     Influenza B PCR Not Detected    Blood Culture - Blood, Arm, Right [[id number]]  (Normal) Collected: 07/17/24 2119    Lab Status: Preliminary result Specimen: Blood from Arm, Right Updated: 07/21/24 2131     Blood Culture No growth at 4 days    Blood Culture - Blood, Arm, Left [[id number]]  (Normal) Collected: 07/17/24 2119    Lab Status: Preliminary result Specimen: Blood from Arm, Left Updated: 07/21/24 2131     Blood Culture No growth at 4 days       I have personally looked at the labs and they are summarized above.  ----------------------------------------------------------------------------------------------------------------------  Detailed radiology reports for the last 24 hours:    Imaging Results (Last 24 Hours)       Procedure Component Value Units Date/Time    XR Chest 1 View [[id number]] Collected: 07/22/24 0710     Updated: 07/22/24 0713    Narrative:      CLINICAL HISTORY: Intubated.     COMPARISON: 7/21/2024.     TECHNIQUE:  Single AP view of the chest       Impression:      FINDINGS AND IMPRESSION:    Endotracheal tube tip is 2.1 cm above the irene.  Nasogastric tube tip is not seen but is below the gastroesophageal  junction.  Right internal jugular vein  central venous catheter tip is at the  cavoatrial junction.  Low lung volumes with fibrotic changes are again seen.  Upper limit normal heart size.  No pleural effusion or pneumothorax.  Status post lap banding.  Surgical clips are also seen in the right  upper quadrant.        This report was finalized on 7/22/2024 7:11 AM by Violette Wilcox MD.       CT Chest Without Contrast Diagnostic [[id number]] Collected: 07/21/24 1613     Updated: 07/21/24 1617    Narrative:      PROCEDURE: CT CHEST WO CONTRAST DIAGNOSTIC-     HISTORY: hypoxia, progressive fibrosis; I21.4-Non-ST elevation (NSTEMI)  myocardial infarction     COMPARISON: 5/8/2024.     PROCEDURE:  Multiple axial CT images were obtained from the thoracic  inlet through the upper abdomen without the use of contrast. This study  was performed with techniques to keep radiation doses as low as  reasonably achievable (ALARA). Individualized dose reduction techniques  using automated exposure control or adjustment of mA and/or kV according  to the patient size were employed.     FINDINGS:  An endotracheal tube is in place with the tip well positioned above the  irene. An nasogastric tube is present in the body of the stomach. There  is no adenopathy within the chest. The heart is normal in size. The  aorta is normal in caliber. There is a small right pleural effusion.  Lung windows reveal interlobular septal thickening and subpleural lines  in the upper and lower lobes with bronchiectasis in the left lower lobe.  There are diffuse bilateral groundglass opacities. The visualized upper  abdomen is unremarkable. Bone windows reveal no acute osseous  abnormalities.       Impression:      1. Groundglass opacities and small right pleural effusion which may  reflect pulmonary edema or pneumonia.  2. Fibrotic lung changes consistent with usual interstitial pneumonitis.        This report was finalized on 7/21/2024 4:15 PM by Urban Arzola M.D..       XR Chest 1 View  [[id number]] Collected: 07/21/24 1306     Updated: 07/21/24 1309    Narrative:      INDICATION: Tube placement     TECHNIQUE: Frontal radiograph of the chest.     COMPARISON: Radiograph from earlier the same day.       Impression:      FINDINGS/IMPRESSION:    Endotracheal tube tip approximately 3 cm above the irene. Enteric tube  in stomach. Right IJ central venous catheter tip in the superior vena  cava. Improved multifocal infiltrates/edema. No pleural effusion or  pneumothorax. No acute fracture.        This report was finalized on 7/21/2024 1:07 PM by Alex Pallas, DO.             I have personally looked at the radiology images and I have read the available final reports.    Assessment & Plan      -Acute NSTEMI, type 1 (positive stress test on 7/18/2024 without any reversible ischemia, that was present on admission  -History of IPF and COPD with chronic hypoxia (uses 3 L/min at home)  -Acute exacerbation of IPF/COPD causing acute hypoxic and hypercapnic respiratory failure on top of chronic hypoxic respiratory failure and sepsis (developed on 7/21/2024 due to a suspected aspiration episode) resulting in oral intubation and mechanical ventilation from aspiration pneumonitis  -Hypotension, septic vs sedation vs diuretic induced, developed during admission  -New onset heart failure with reduced EF, acute exacerbation that developed on 7/21/2024  -Prolonged QT that developed during the hospitalization  -History of essential hypertension  -History of hypothyroidism  -History of stress urinary incontinence with frequent UTI's, with an acute E coli UTI that was present on admission  -History of intermittent, diffuse body tremors    Palliative care saw the patient today and the family would like to continue with current care.  We will try to avoid QTc prolonging medications and will lightly sedate her with the propofol.  We will avoid QT prolonging agents and continue to monitor the electrolytes closely. In order to  lessen the risk of worsening QTc, the goal potassium level is 4-4.5 and goal magnesium level is 2-2.2. Will continue with daily spontaneous breathing trials.  The patient remains tachycardic; due to low blood pressures, the metoprolol has not been given.  Thus, will change the Levophed over to Yair-synephrine in order to less drug induced tachycardia.  Will continue the cefepime, voriconazole, doxycycline, pharmacy-to-dose vancomycin, and metronidazole per ID recommendations.  Will start prn regular insulin per low dose sliding since we were able to increase the tube feeds today to goal.  Will order a cortisol level for in the am.  Will continue with the Solumedrol as well per pulmonology recommendations.  Will repeat the labs in the morning with an VBG.    VTE Prophylaxis:  DVT dose Lovenox    The patient is high risk due to the following diagnoses/reasons:  acute     Disposition:  Undetermined; if she survives then may need trach and PEG and LTAC, but it is too early to decide all of this    Total critical care time was 31 minutes.    Jessica Ospina MD  [facility] Hospitalist  07/22/24  11:54 EDT

## 2024-07-22 NOTE — PROGRESS NOTES
TH[facility] Physician - Brief Progress Note  PERMANENT  07/22/2024 02:55    [facility] - Alex - Alex - CCU - [facility] - [place], [place] ([facility])    NAGY, DAVE KEITH.    Date of Service 07/22/2024 02:55    HPI/Events of Note [facility] Intervention:    Called regarding Poor interface with the vent.  Previously received vecuronium.  Currently on AC 28/370 / 40+ 8 breathing with the vent i.e. 1:2.2 peak pressure  as high as 44.  Sedated w/ Fent + Propofol.  133/72 87 R28 100%.    Have discussed with RN.  We will add fentanyl p.r.n. pushes.  So long as patient is interfacing well with vent and not hypoxic would avoid further NMBA.   In event of decompensated respiratory status, RN update.      _x___   Video Assessment performed  __x___   Most recent labs reviewed  __x___   Vital Signs reviewed  _x___      Spoke with bedside RN   __x__       Orders written  Contact [facility] for any needs if bedside physician is not present.      Interventions Major-Respiratory failure - evaluation and management        Electronically Signed by: Rubin Gentile) on 07/22/2024 03:04

## 2024-07-22 NOTE — SIGNIFICANT NOTE
07/22/24 1429   OTHER   Discipline physical therapist   Rehab Time/Intention   Session Not Performed unable to evaluate, medical status change  (pt. remains intubated, sedated.  Please reconsult PT once pt. is extubated and appropriate for participation.)

## 2024-07-22 NOTE — CONSULTS
Palliative Care Initial Consult     Attending Physician: Jessica Ospina MD  Referring Provider: Eduardo Freeman MD    assistance with advance directives, assistance with clarification of goals of care, and psychosocial support  Code Status:   Code Status and Medical Interventions:   Ordered at: 07/18/24 0205     Code Status (Patient has no pulse and is not breathing):    CPR (Attempt to Resuscitate)     Medical Interventions (Patient has pulse or is breathing):    Full Support      Advanced Directives: Advance Directive Status: Patient does not have advance directive   Healthcare surrogate: NOK  Khris, son Faith Davila  Goals of Care: .I was able to speak with pts son Faith on the unit to discuss his mothers condition and what they felt her goals of care would be for herself.Faith stated that he and Lexie's  agree that she would want to keep trying and at this time would like to take thinks one day at a time. I told Faith that we would be here for support and would be checking in on Lexie, him and their family.    HPI:  Lexie Valdez is a 64 y.o. female admitted on 7/17/2024 due to shortness of breath. Lexie has a medical history of  anxiety/depression, arthritis, HTN, hypothyroidism, stress urinary incontinence with frequent UTI's, COPD and pulmonary fibrosis on 3L NC at home, and resting tremors in face and extremities, who presents with worsening shortness of breath for the last couple days.  Pt also at time of admission c/o chest pressure associated with dyspnea stating this was chronic however had worsened in the days leading up to her admission. She also endorsed diarrhea for the last couple of days as well as dysuria and stated that she has recurring UTI's. Labs in ER revealed Troponin of 229 with repeat 188, BNP was 3550, Na 131, Cr 0.49, glucose 177, CRP 1.65, lactate 2.0, procal and WBC normal but with 88.7% neutrophils. UA showed positive nitrite, trace leuk, 6-10 WBC, 4+ bacteria and 3-6 squamous  epithelial cells. She was tachycardic and tachypneic with pH of 7.458, CO2 40, Po2 154, bicarb 28. She was admitted to PCU on admission and in the early am of  was transferred to CCU due to worsening dyspnea, bilateral infiltrates without effusion concerning for interstitial debbi disease flare, was transferred to CCU. Initially she was placed on Bipap in CCU and at  6:20 am Lexie was sedated intubated and placed on the ventilator. Palliative care consulted for GOC,ACP and for support of family.        ROS: Negative except as above in HPI.     Past Medical History:   Diagnosis Date    Anxiety     Arthritis     Depression     Hypertensive disorder 3/14/2018    Hypothyroidism 8/3/2021    MARU (stress urinary incontinence, female) 2019    UTI (urinary tract infection) 2023     Past Surgical History:   Procedure Laterality Date    BREAST BIOPSY Left 2000    benign    GALLBLADDER SURGERY      GASTRIC BANDING      HYSTERECTOMY      age 37     Social History     Socioeconomic History    Marital status:    Tobacco Use    Smoking status: Former     Current packs/day: 0.00     Average packs/day: 1.5 packs/day for 34.3 years (51.5 ttl pk-yrs)     Types: Cigarettes     Start date: 1971     Quit date: 10/18/2005     Years since quittin.7     Passive exposure: Past    Smokeless tobacco: Never   Vaping Use    Vaping status: Never Used   Substance and Sexual Activity    Alcohol use: No    Drug use: No    Sexual activity: Yes     Partners: Male     Family History   Problem Relation Age of Onset    Panic disorder Mother     COPD Mother     Alcohol abuse Father     Alzheimer's disease Father     Breast cancer Neg Hx        Allergies   Allergen Reactions    Codeine GI Intolerance    Sulfa Antibiotics Itching       Current Facility-Administered Medications   Medication Dose Route Frequency Provider Last Rate Last Admin    aspirin chewable tablet 81 mg  81 mg Oral Daily Estuardo Charles MD   81 mg  at 07/22/24 0805    sennosides-docusate (PERICOLACE) 8.6-50 MG per tablet 2 tablet  2 tablet Oral BID PRN Estuardo Charles MD        And    polyethylene glycol (MIRALAX) packet 17 g  17 g Oral Daily PRN Estuardo Charles MD        And    bisacodyl (DULCOLAX) EC tablet 5 mg  5 mg Oral Daily PRN Estuardo Charles MD        And    bisacodyl (DULCOLAX) suppository 10 mg  10 mg Rectal Daily PRN Estuardo Charles MD        Brexpiprazole tablet 0.5 mg  0.5 mg Oral Daily Estuardo Charles MD   0.5 mg at 07/22/24 0805    busPIRone (BUSPAR) tablet 10 mg  10 mg Oral BID PRN Estuardo Charles MD   10 mg at 07/20/24 0946    cefepime 2000 mg IVPB in 100 mL NS (VTB)  2,000 mg Intravenous Q8H Estuardo Charles MD   2,000 mg at 07/22/24 0507    chlorhexidine (PERIDEX) 0.12 % solution 15 mL  15 mL Mouth/Throat Q12H Eduardo Freeman MD   15 mL at 07/22/24 0803    donepezil (ARICEPT) tablet 10 mg  10 mg Oral Q PM Estuardo Charles MD   10 mg at 07/21/24 1646    doxycycline (MONODOX) capsule 100 mg  100 mg Oral Q12H John Mcmahon MD   100 mg at 07/22/24 0805    Enoxaparin Sodium (LOVENOX) syringe 40 mg  40 mg Subcutaneous Nightly Eduardo Freeman MD   40 mg at 07/21/24 2030    EPINEPHrine 5 mg in 250 mL NS infusion  0.02-0.3 mcg/kg/min (Dosing Weight) Intravenous Titrated Eduardo Freeman MD   Held at 07/21/24 2152    fentaNYL 2500 mcg/250 mL NS infusion   mcg/hr Intravenous Titrated Sam Neves MD 10 mL/hr at 07/22/24 1012 100 mcg/hr at 07/22/24 1012    guaifenesin (ROBITUSSIN) 100 MG/5ML liquid 200 mg  200 mg Oral Q4H PRN Estuardo Charles MD   200 mg at 07/20/24 2317    HYDROcodone-acetaminophen (NORCO) 5-325 MG per tablet 1 tablet  1 tablet Oral Q8H PRN Estuardo Charles MD        hydroxychloroquine (PLAQUENIL) tablet 200 mg  200 mg Oral BID Estuardo Charles MD   200 mg at 07/21/24 2029    hydrOXYzine (ATARAX) tablet 25 mg  25 mg Oral Q6H PRN Estuardo hCarles  MD Tommy   25 mg at 07/20/24 2317    ipratropium (ATROVENT) nebulizer solution 0.5 mg  0.5 mg Nebulization Q6H PRN Estuardo Charles MD        ipratropium-albuterol (DUO-NEB) nebulizer solution 3 mL  3 mL Nebulization Q4H Eduardo Freeman MD   3 mL at 07/22/24 1012    levothyroxine (SYNTHROID, LEVOTHROID) tablet 25 mcg  25 mcg Oral Daily With Breakfast Estuardo Charles MD   25 mcg at 07/22/24 0740    LORazepam (ATIVAN) injection 0.25 mg  0.25 mg Intravenous Q4H PRN Estuardo Charles MD   0.25 mg at 07/21/24 0412    memantine (NAMENDA) tablet 5 mg  5 mg Oral Q12H Estuardo Charles MD   5 mg at 07/22/24 0805    methylPREDNISolone sodium succinate (SOLU-Medrol) injection 125 mg  125 mg Intravenous Q8H John Mcmahon MD   125 mg at 07/22/24 0507    metoprolol succinate XL (TOPROL-XL) 24 hr tablet 25 mg  25 mg Oral Q24H Estuardo Charles MD   25 mg at 07/20/24 0946    metroNIDAZOLE (FLAGYL) IVPB 500 mg  500 mg Intravenous Q8H Eduardo Freeman  mL/hr at 07/22/24 0913 500 mg at 07/22/24 0913    mupirocin (BACTROBAN) 2 % nasal ointment 1 Application  1 application  Each Nare BID Jessica Ospina MD   1 Application at 07/22/24 0913    nitroglycerin (NITROSTAT) SL tablet 0.4 mg  0.4 mg Sublingual Q5 Min PRN Estuardo Charles MD        norepinephrine (LEVOPHED) 8 mg in 250 mL NS infusion (premix)  0.02-0.3 mcg/kg/min (Dosing Weight) Intravenous Titrated Sam Neves MD 27.9 mL/hr at 07/22/24 0815 0.26 mcg/kg/min at 07/22/24 0815    nystatin (MYCOSTATIN) 729556 UNIT/GM cream 1 Application  1 Application Topical Q12H Estuardo Charles MD   1 Application at 07/22/24 0803    pantoprazole (PROTONIX) injection 40 mg  40 mg Intravenous Q AM Sam Neves MD   40 mg at 07/22/24 0507    Pharmacy Consult - Pharmacy to dose   Does not apply Continuous PREstuardo Porter MD        Pharmacy Consult   Does not apply Continuous Estuardo Best MD        Pharmacy to dose  vancomycin   Does not apply Continuous PRN Eduardo Freeman MD        Phenylephrine HCl-NaCl 1-0.9 MG/10ML-% 100 mcg/ml injection  - ADS Override Pull             Phenylephrine HCl-NaCl 1-0.9 MG/10ML-% 100 mcg/ml injection  - ADS Override Pull             prochlorperazine (COMPAZINE) injection 5 mg  5 mg Intravenous Q6H PRN Estuardo Charles MD   5 mg at 07/20/24 1138    propofol (DIPRIVAN) infusion 10 mg/mL 100 mL  5-50 mcg/kg/min (Dosing Weight) Intravenous Titrated Sam Neves MD 10.3 mL/hr at 07/22/24 1009 30 mcg/kg/min at 07/22/24 1009    sodium chloride 0.9 % flush 10 mL  10 mL Intravenous PRN Estuardo Charles MD        sodium chloride 0.9 % flush 10 mL  10 mL Intravenous PRN Estuardo Charles MD        sodium chloride 0.9 % flush 10 mL  10 mL Intravenous Q12H Estuardo Charles MD   10 mL at 07/22/24 0805    sodium chloride 0.9 % flush 10 mL  10 mL Intravenous PRN Estuardo Charles MD        sodium chloride 0.9 % flush 10 mL  10 mL Intravenous Q12H Estuardo Charles MD   10 mL at 07/22/24 0805    sodium chloride 0.9 % flush 10 mL  10 mL Intravenous PRN Estuardo Charles MD        sodium chloride 0.9 % flush 10 mL  10 mL Intravenous Q12H Estuardo Charles MD   10 mL at 07/22/24 0805    sodium chloride 0.9 % flush 10 mL  10 mL Intravenous PRN Estuardo Charles MD        sodium chloride 0.9 % flush 10 mL  10 mL Intravenous Q12H Eduardo Freeman MD   10 mL at 07/22/24 0805    sodium chloride 0.9 % flush 10 mL  10 mL Intravenous Q12H Eduardo Freeman MD   10 mL at 07/22/24 0805    sodium chloride 0.9 % flush 10 mL  10 mL Intravenous Q12H Eduardo Freeman MD   10 mL at 07/22/24 0803    sodium chloride 0.9 % flush 10 mL  10 mL Intravenous PRN Eduardo Freeman MD        sodium chloride 0.9 % flush 20 mL  20 mL Intravenous PRN Eduardo Freeman MD        sodium chloride 0.9 % infusion 40 mL  40 mL Intravenous PRN Estuardo Charles MD        sodium chloride 0.9  % infusion 40 mL  40 mL Intravenous PRN Estuardo Charles MD        sodium chloride 0.9 % infusion 40 mL  40 mL Intravenous PRN Estuardo Charles MD        sodium chloride 0.9 % infusion 40 mL  40 mL Intravenous PRN Eduardo Freeman MD        vasopressin (PITRESSIN) 20 units in 100 mL NS infusion  0.03 Units/min Intravenous Continuous Sam Neves MD   Stopped at 07/22/24 0320    vecuronium (NORCURON) injection 5 mg  5 mg Intravenous Q6H PRN John Mcmahon MD   5 mg at 07/21/24 2344    voriconazole (VFEND) 240 mg in dextrose (D5W) 5 % 100 mL IVPB  4 mg/kg Intravenous Q12H John Mcmahon MD   240 mg at 07/22/24 1130     EPINEPHrine, 0.02-0.3 mcg/kg/min (Dosing Weight), Last Rate: Stopped (07/21/24 2152)  fentanyl 10 mcg/mL,  mcg/hr, Last Rate: 100 mcg/hr (07/22/24 1012)  norepinephrine, 0.02-0.3 mcg/kg/min (Dosing Weight), Last Rate: 0.26 mcg/kg/min (07/22/24 0815)  Pharmacy Consult - Pharmacy to dose,   Pharmacy Consult,   Pharmacy to dose vancomycin,   propofol, 5-50 mcg/kg/min (Dosing Weight), Last Rate: 30 mcg/kg/min (07/22/24 1009)  vasopressin, 0.03 Units/min, Last Rate: Stopped (07/22/24 0320)        senna-docusate sodium **AND** polyethylene glycol **AND** bisacodyl **AND** bisacodyl    busPIRone    guaifenesin    HYDROcodone-acetaminophen    hydrOXYzine    ipratropium    LORazepam    nitroglycerin    Pharmacy Consult - Pharmacy to dose    Pharmacy Consult    Pharmacy to dose vancomycin    Phenylephrine HCl-NaCl    Phenylephrine HCl-NaCl    prochlorperazine    sodium chloride    sodium chloride    sodium chloride    sodium chloride    sodium chloride    sodium chloride    sodium chloride    sodium chloride    sodium chloride    sodium chloride    sodium chloride    vecuronium    Current medication reviewed for route, type, dose and frequency and are current per MAR.    Palliative Performance Scale Score:     /58   Pulse 96   Temp 98.5 °F (36.9 °C) (Oral)   Resp (!) 30   Ht  "162.6 cm (64\")   Wt 58.6 kg (129 lb 3 oz)   SpO2 97%   BMI 22.18 kg/m²     Intake/Output Summary (Last 24 hours) at 7/22/2024 1158  Last data filed at 7/22/2024 0507  Gross per 24 hour   Intake 2126.48 ml   Output 775 ml   Net 1351.48 ml       PE:  General Appearance:    Chronically ill appearing, sedated intubated on vent   HEENT:    NC/AT, without obvious abnormality, EOMI, anicteric    Neck:   supple, trachea midline, no JVD   Lungs:     Sedated on vent @ 30% and 8  of peep    Heart:    RRR, normal S1 and S2, no M/R/G   Abdomen:     Soft, NT, ND, NABS    Extremities:   Moves all extremities weakly, trace BLL extremity edema   Pulses:   Pulses palpable and equal bilaterally   Skin:   Warm, dry   Neurologic:   Unable to assess   Psych:   Unable to assess, sedated on vent       Labs:   Results from last 7 days   Lab Units 07/22/24  0012   WBC 10*3/mm3 16.58*   HEMOGLOBIN g/dL 9.9*   HEMATOCRIT % 30.7*   PLATELETS 10*3/mm3 227     Results from last 7 days   Lab Units 07/22/24  0012   SODIUM mmol/L 135*   POTASSIUM mmol/L 4.5   CHLORIDE mmol/L 100   CO2 mmol/L 23.9   BUN mg/dL 25*   CREATININE mg/dL 0.47*   GLUCOSE mg/dL 191*   CALCIUM mg/dL 8.2*     Results from last 7 days   Lab Units 07/22/24  0012   SODIUM mmol/L 135*   POTASSIUM mmol/L 4.5   CHLORIDE mmol/L 100   CO2 mmol/L 23.9   BUN mg/dL 25*   CREATININE mg/dL 0.47*   CALCIUM mg/dL 8.2*   BILIRUBIN mg/dL 0.4   ALK PHOS U/L 142*   ALT (SGPT) U/L 10   AST (SGOT) U/L 33*   GLUCOSE mg/dL 191*     Imaging Results (Last 72 Hours)       Procedure Component Value Units Date/Time    XR Chest 1 View [[id number]] Collected: 07/22/24 0710     Updated: 07/22/24 0713    Narrative:      CLINICAL HISTORY: Intubated.     COMPARISON: 7/21/2024.     TECHNIQUE:  Single AP view of the chest       Impression:      FINDINGS AND IMPRESSION:    Endotracheal tube tip is 2.1 cm above the irene.  Nasogastric tube tip is not seen but is below the gastroesophageal  junction.  Right " internal jugular vein central venous catheter tip is at the  cavoatrial junction.  Low lung volumes with fibrotic changes are again seen.  Upper limit normal heart size.  No pleural effusion or pneumothorax.  Status post lap banding.  Surgical clips are also seen in the right  upper quadrant.        This report was finalized on 7/22/2024 7:11 AM by Violette Wilcox MD.       CT Chest Without Contrast Diagnostic [[id number]] Collected: 07/21/24 1613     Updated: 07/21/24 1617    Narrative:      PROCEDURE: CT CHEST WO CONTRAST DIAGNOSTIC-     HISTORY: hypoxia, progressive fibrosis; I21.4-Non-ST elevation (NSTEMI)  myocardial infarction     COMPARISON: 5/8/2024.     PROCEDURE:  Multiple axial CT images were obtained from the thoracic  inlet through the upper abdomen without the use of contrast. This study  was performed with techniques to keep radiation doses as low as  reasonably achievable (ALARA). Individualized dose reduction techniques  using automated exposure control or adjustment of mA and/or kV according  to the patient size were employed.     FINDINGS:  An endotracheal tube is in place with the tip well positioned above the  irene. An nasogastric tube is present in the body of the stomach. There  is no adenopathy within the chest. The heart is normal in size. The  aorta is normal in caliber. There is a small right pleural effusion.  Lung windows reveal interlobular septal thickening and subpleural lines  in the upper and lower lobes with bronchiectasis in the left lower lobe.  There are diffuse bilateral groundglass opacities. The visualized upper  abdomen is unremarkable. Bone windows reveal no acute osseous  abnormalities.       Impression:      1. Groundglass opacities and small right pleural effusion which may  reflect pulmonary edema or pneumonia.  2. Fibrotic lung changes consistent with usual interstitial pneumonitis.        This report was finalized on 7/21/2024 4:15 PM by Urban Arzola M.D..        XR Chest 1 View [[id number]] Collected: 07/21/24 1306     Updated: 07/21/24 1309    Narrative:      INDICATION: Tube placement     TECHNIQUE: Frontal radiograph of the chest.     COMPARISON: Radiograph from earlier the same day.       Impression:      FINDINGS/IMPRESSION:    Endotracheal tube tip approximately 3 cm above the irene. Enteric tube  in stomach. Right IJ central venous catheter tip in the superior vena  cava. Improved multifocal infiltrates/edema. No pleural effusion or  pneumothorax. No acute fracture.        This report was finalized on 7/21/2024 1:07 PM by Alex Pallas, DO.       XR Chest 1 View [[id number]] Collected: 07/21/24 0901     Updated: 07/21/24 0905    Narrative:      CLINICAL HISTORY: Central line placement.     COMPARISON: 7/21/2024 at 6:37 AM.     TECHNIQUE:  Single AP view of the chest       Impression:      FINDINGS AND IMPRESSION:    1.  Since the prior study, a right internal jugular vein central venous  catheter has been placed, the tip of which is in the cavoatrial  junction.  2.  No pneumothorax.  3.  Unchanged extensive perihilar groundglass opacities and  consolidation.  4.  No pleural effusion.  5.  Endotracheal tube tip is 1.2 cm above the irene.  6.  Nasogastric tube tip is in the stomach.  7.  A lap band is again seen.     This report was finalized on 7/21/2024 9:03 AM by Violette Wilcox MD.       XR Chest 1 View [[id number]] Collected: 07/21/24 0722     Updated: 07/21/24 0726    Narrative:      PROCEDURE: Portable chest x-ray examination performed on July 21, 2024.  Single view. Upright position.     HISTORY: Endotracheal tube and OG tube placement. Confirm position.     COMPARISON: None.     FINDINGS:     Mild enlarged heart size  Endotracheal tube in place with tip 1.8 cm above the irene.  Enteric drain in place with tip to the stomach.  Lap band device in place.  Central pulmonary vascular congestion.  Patchy and confluent airspace opacities in the upper and lower  lobes  could represent underlying multifocal pneumonia and/or edema.  Mild hypoinflated lungs  No pleural effusion. No pneumothorax.  Mild levoconvex curve at the upper thoracic spine.  Cholecystectomy clips in the right upper quadrant.       Impression:         1.  Endotracheal tube in place with tip 1.8 cm above the irene.  2.  Enteric drain in place with tip in the stomach.  3.  Multifocal pneumonia with central pulmonary vascular congestion.  4.  Diffuse edema in the lung parenchyma is also possible.  5.  No pleural effusion or pneumothorax  6.  No pneumomediastinum.     This report was finalized on 7/21/2024 7:24 AM by Elian Islas MD.       XR Chest 1 View [[id number]] Collected: 07/21/24 0125     Updated: 07/21/24 0130    Narrative:      PROCEDURE: Portable chest x-ray examination performed on July 21, 2024.  Single view. Upright position.     HISTORY: Dyspnea. Hypoxia.     COMPARISON: None.     FINDINGS:     Enlarged heart size  Patchy and confluent airspace opacities in the upper and lower lobes  consistent with multifocal pneumonia  Central pulmonary vascular congestion with edema.  No pleural effusion. No pneumothorax  Lap band device in place  Surgical clips in the right upper quadrant.  Mild dextroconvex curve at the thoracolumbar junction.       Impression:         1.  Multifocal pneumonia.  2.  Enlarged heart size.  3.  Central pulmonary vascular congestion.  4.  Edema.  5.  No pleural effusion.   6.  No pneumothorax.  7.  Lap band device in place.  8.  No free air in the upper abdomen.        This report was finalized on 7/21/2024 1:28 AM by Elian Islas MD.       XR Chest 1 View [[id number]] Collected: 07/20/24 1447     Updated: 07/20/24 1450    Narrative:      PROCEDURE: XR CHEST 1 VW-       HISTORY: Hypoxia; I21.4-Non-ST elevation (NSTEMI) myocardial infarction     COMPARISON: 7/17/2024.     FINDINGS: The heart is normal in size. The mediastinum is unremarkable.  There are coarse interstitial  lung markings. There is worsening of  bilateral airspace disease and small effusions. There is no  pneumothorax. There are no acute osseous abnormalities.       Impression:      Fibrotic lung changes with worsening bilateral airspace  disease and small effusions which may reflect pulmonary edema or  pneumonia.        This report was finalized on 7/20/2024 2:48 PM by Urban Arzola M.D..               Diagnostics: Reviewed    A: Lexie Valdez is a 64 y.o. female admitted on 7/17/2024 due to shortness of breath. Lexie has a medical history of  anxiety/depression, arthritis, HTN, hypothyroidism, stress urinary incontinence with frequent UTI's, COPD and pulmonary fibrosis on 3L NC at home, and resting tremors in face and extremities, who presents with worsening shortness of breath for the last couple days. Pt also at time of admission c/o chest pressure associated with dyspnea stating this was chronic however had worsened in the days leading up to her admission. She also endorsed diarrhea for the last couple of days as well as dysuria and stated that she has recurring UTI's. Labs in ER revealed Troponin of 229 with repeat 188, BNP was 3550, Na 131, Cr 0.49, glucose 177, CRP 1.65, lactate 2.0, procal and WBC normal but with 88.7% neutrophils. UA showed positive nitrite, trace leuk, 6-10 WBC, 4+ bacteria and 3-6 squamous epithelial cells. She was tachycardic and tachypneic with pH of 7.458, CO2 40, Po2 154, bicarb 28. She was admitted to PCU on admission and in the early am of 7/21 was transferred to CCU due to worsening dyspnea, bilateral infiltrates without effusion concerning for interstitial debbi disease flare, was transferred to CCU. Initially she was placed on Bipap in CCU and at 7/21 6:20 am Lexie was sedated intubated and placed on the ventilator. Palliative care consulted for GOC,ACP and for support of family.           P:   I was able to speak with pts son Faith on the unit to discuss his mothers condition and  what they felt her goals of care would be for herself.Faith stated that he and Lexie's  agree that she would want to keep trying and at this time would like to take thinks one day at a time. I told Faith that we would be here for support and would be checking in on Lexie, him and their family. I discussed patient and discussion with Dr Ospina.    We appreciate the consult and the opportunity to participate in [facility]'s care. We will continue to follow along. Please do not hesitate to contact us regarding further symptom management or goals of care needs, including after hours or on weekends via our on call provider at [phone].     Time: 55 minutes spent reviewing medical and medication records, assessing and examining patient, discussing with family, answering questions, providing some guidance about a plan and documentation of care, and coordinating care with other healthcare members, with > 50% time spent face to face.     Linh Lou, APRN    7/22/2024

## 2024-07-22 NOTE — PLAN OF CARE
Goal Outcome Evaluation:         No breathing trail initiated today.  Patient had increased RR when sedation was turned off.

## 2024-07-22 NOTE — PHARMACY PATIENT ASSISTANCE
Pharmacy checked on cost of  Brexpiprazole 0.5 mg from patient's home medication list. According to patient's pharmacy, the copay is $0 for a 1 month supply. Medication was last filled on 7/11/24. Next available fill is on 8/3/24. No other issues identified at this time.      Thank you,    Eva Marx, Pharmacy Intern  07/22/24  09:34 EDT

## 2024-07-22 NOTE — PLAN OF CARE
Goal Outcome Evaluation:  Plan of Care Reviewed With: patient        Progress: improving  Outcome Evaluation: pt VSS, responds to pain and stimuli, pt remains intubated and sedated on high dose of levophed. Pt has been afebrile and has made adequate UOP this shift. family at bedside and supportive of patient, failed SAT today, plans for Bronchoscopy in AM.

## 2024-07-22 NOTE — CASE MANAGEMENT/SOCIAL WORK
Continued Stay Note  NEIDA Alex     Patient Name: Lexie KEITH Valdez  MRN: [id number]  Today's Date: 7/22/2024    Admit Date: 7/17/2024     Discharge Plan       Row Name 07/22/24 1047       Plan    Plan Pt was transferred from PCU to CCU on 7/21/24 and Intubated. Pt remains intubated on this date. Pt lives with spouse. Pt PCP Violet Pop. Patient does not utilize Home Health. Pt utilizes BP Cuff, SC, BSC, Nebulizer, O2 @ 3L, Portable Concentrator, Pulse Ox, Rollator, Shower Chair & WC from [facility]. SS to follow up with discharge planning once pt has been extubated and is medically stable. SS to follow.             ADIN Garcia

## 2024-07-22 NOTE — ED NOTES
Earlier this morning, approximately 6:10 to 6:15 AM, I was called to the CCU for emergent intubation.  I found the patient to be in severe respiratory distress with decreased mentation, not doing well on BiPAP.  After IV etomidate, the patient was intubated easily orotracheally on the first attempt with a #7.5 endotracheal tube.  Direct laryngoscopy with a #2 [place] blade.  I visualized the tube to pass between the cords.  There were equal breath sounds bilaterally when bagged, no borborygmi over the abdomen, good color change with the CO2 detector.  Pulse ox 97%.  Tube anchored at 21 cm at the right corner of the mouth.  Gave a verbal order for stat portable chest x-ray and IV propofol per protocol for sedation.  Patient left in the care of hospitalist Dr. Charles, he was to check the chest x-ray.    Please note that portions of this note were completed with a voice recognition program.        Al Lewis MD  07/21/24 7917

## 2024-07-22 NOTE — PROGRESS NOTES
Progress Note Critical Care Pulmonary        Subjective  On vent , sedated, failed awakening trial.  Became tachypneic and tachycardic    Interval History: event overnight: As described above        Review of Systems:    On vent , sedated     Vital Signs  Temp:  [98.1 °F (36.7 °C)-98.7 °F (37.1 °C)] 98.5 °F (36.9 °C)  Heart Rate:  [] 104  Resp:  [26-30] 30  BP: ()/(37-84) 111/58  FiO2 (%):  [30 %-40 %] 30 %  Body mass index is 22.18 kg/m².    Intake/Output Summary (Last 24 hours) at 7/22/2024 1044  Last data filed at 7/22/2024 0507  Gross per 24 hour   Intake 2126.48 ml   Output 775 ml   Net 1351.48 ml     No intake/output data recorded.    Physical Exam:  General- normal in appearance, not in any acute distress    HEENT- pupils equally reactive to light, normal in size, no scleral icterus    Neck-supple    Respiratory-bilateral air entry, bibasilar crackles.  Cardiovascular-  Normal S1 and S2. No S3, S4 or murmurs. No JVD, no carotid bruit and no edema, pulses normal bilaterally     GI-nontender nondistended bowel sounds positive    CNS- grossly nonfocal    Extremities-no clubbing and edema        Results Review:      Results from last 7 days   Lab Units 07/22/24  0012 07/21/24  0016 07/19/24  2356   WBC 10*3/mm3 16.58* 20.24* 13.37*   HEMOGLOBIN g/dL 9.9* 11.8* 11.5*   PLATELETS 10*3/mm3 227 227 214     Results from last 7 days   Lab Units 07/22/24  0012 07/21/24  0016 07/19/24  2356 07/18/24  0420 07/17/24  2119   SODIUM mmol/L 135* 130* 133*   < > 131*   POTASSIUM mmol/L 4.5 4.7 4.5   < > 4.1   CHLORIDE mmol/L 100 99 102   < > 93*   CO2 mmol/L 23.9 20.5* 22.2   < > 26.2   BUN mg/dL 25* 17 15   < > 8   CREATININE mg/dL 0.47* 0.37* 0.44*   < > 0.49*   CALCIUM mg/dL 8.2* 8.4* 8.8   < > 9.2   GLUCOSE mg/dL 191* 122* 114*   < > 177*   MAGNESIUM mg/dL  --   --   --   --  1.7    < > = values in this interval not displayed.     Lab Results   Component Value Date    INR 0.93 07/18/2024    INR 0.98 04/26/2024     INR 1.00 03/21/2023    PROTIME 12.6 07/18/2024    PROTIME 13.5 04/26/2024    PROTIME 13.4 03/21/2023     Results from last 7 days   Lab Units 07/22/24  0012 07/18/24  0420 07/17/24  2119   ALK PHOS U/L 142* 90 106   BILIRUBIN mg/dL 0.4 <0.2 0.2   ALT (SGPT) U/L 10 12 13   AST (SGOT) U/L 33* 37* 43*     Results from last 7 days   Lab Units 07/21/24  2106   PH, ARTERIAL pH units 7.315*   PO2 ART mm Hg 137.0*   PCO2, ARTERIAL mm Hg 48.2*   HCO3 ART mmol/L 24.5     Imaging Results (Last 24 Hours)       Procedure Component Value Units Date/Time    XR Chest 1 View [[id number]] Collected: 07/22/24 0710     Updated: 07/22/24 0713    Narrative:      CLINICAL HISTORY: Intubated.     COMPARISON: 7/21/2024.     TECHNIQUE:  Single AP view of the chest       Impression:      FINDINGS AND IMPRESSION:    Endotracheal tube tip is 2.1 cm above the irene.  Nasogastric tube tip is not seen but is below the gastroesophageal  junction.  Right internal jugular vein central venous catheter tip is at the  cavoatrial junction.  Low lung volumes with fibrotic changes are again seen.  Upper limit normal heart size.  No pleural effusion or pneumothorax.  Status post lap banding.  Surgical clips are also seen in the right  upper quadrant.        This report was finalized on 7/22/2024 7:11 AM by Violette Wilcox MD.       CT Chest Without Contrast Diagnostic [[id number]] Collected: 07/21/24 1613     Updated: 07/21/24 1617    Narrative:      PROCEDURE: CT CHEST WO CONTRAST DIAGNOSTIC-     HISTORY: hypoxia, progressive fibrosis; I21.4-Non-ST elevation (NSTEMI)  myocardial infarction     COMPARISON: 5/8/2024.     PROCEDURE:  Multiple axial CT images were obtained from the thoracic  inlet through the upper abdomen without the use of contrast. This study  was performed with techniques to keep radiation doses as low as  reasonably achievable (ALARA). Individualized dose reduction techniques  using automated exposure control or adjustment of mA and/or kV  according  to the patient size were employed.     FINDINGS:  An endotracheal tube is in place with the tip well positioned above the  irene. An nasogastric tube is present in the body of the stomach. There  is no adenopathy within the chest. The heart is normal in size. The  aorta is normal in caliber. There is a small right pleural effusion.  Lung windows reveal interlobular septal thickening and subpleural lines  in the upper and lower lobes with bronchiectasis in the left lower lobe.  There are diffuse bilateral groundglass opacities. The visualized upper  abdomen is unremarkable. Bone windows reveal no acute osseous  abnormalities.       Impression:      1. Groundglass opacities and small right pleural effusion which may  reflect pulmonary edema or pneumonia.  2. Fibrotic lung changes consistent with usual interstitial pneumonitis.        This report was finalized on 7/21/2024 4:15 PM by Urban Arzola M.D..       XR Chest 1 View [[id number]] Collected: 07/21/24 1306     Updated: 07/21/24 1309    Narrative:      INDICATION: Tube placement     TECHNIQUE: Frontal radiograph of the chest.     COMPARISON: Radiograph from earlier the same day.       Impression:      FINDINGS/IMPRESSION:    Endotracheal tube tip approximately 3 cm above the irene. Enteric tube  in stomach. Right IJ central venous catheter tip in the superior vena  cava. Improved multifocal infiltrates/edema. No pleural effusion or  pneumothorax. No acute fracture.        This report was finalized on 7/21/2024 1:07 PM by Alex Pallas, DO.                    aspirin, 81 mg, Oral, Daily  Brexpiprazole, 0.5 mg, Oral, Daily  cefepime, 2,000 mg, Intravenous, Q8H  chlorhexidine, 15 mL, Mouth/Throat, Q12H  donepezil, 10 mg, Oral, Q PM  doxycycline, 100 mg, Oral, Q12H  enoxaparin, 40 mg, Subcutaneous, Nightly  hydroxychloroquine, 200 mg, Oral, BID  ipratropium-albuterol, 3 mL, Nebulization, Q4H  levothyroxine, 25 mcg, Oral, Daily With  Breakfast  memantine, 5 mg, Oral, Q12H  methylPREDNISolone sodium succinate, 125 mg, Intravenous, Q8H  metoprolol succinate XL, 25 mg, Oral, Q24H  metroNIDAZOLE, 500 mg, Intravenous, Q8H  mupirocin, 1 application , Each Nare, BID  nystatin, 1 Application, Topical, Q12H  pantoprazole, 40 mg, Intravenous, Q AM  Phenylephrine HCl-NaCl, , ,   Phenylephrine HCl-NaCl, , ,   sodium chloride, 10 mL, Intravenous, Q12H  sodium chloride, 10 mL, Intravenous, Q12H  sodium chloride, 10 mL, Intravenous, Q12H  sodium chloride, 10 mL, Intravenous, Q12H  sodium chloride, 10 mL, Intravenous, Q12H  sodium chloride, 10 mL, Intravenous, Q12H  voriconazole, 4 mg/kg, Intravenous, Q12H      EPINEPHrine, 0.02-0.3 mcg/kg/min (Dosing Weight), Last Rate: Stopped (07/21/24 2152)  fentanyl 10 mcg/mL,  mcg/hr, Last Rate: 100 mcg/hr (07/22/24 1012)  norepinephrine, 0.02-0.3 mcg/kg/min (Dosing Weight), Last Rate: 0.26 mcg/kg/min (07/22/24 0815)  Pharmacy Consult - Pharmacy to dose,   Pharmacy Consult,   Pharmacy to dose vancomycin,   propofol, 5-50 mcg/kg/min (Dosing Weight), Last Rate: 30 mcg/kg/min (07/22/24 1009)  vasopressin, 0.03 Units/min, Last Rate: Stopped (07/22/24 0320)        Medication Review:     Assessment & Plan   #1: Acute on chronic hypoxic respiratory failure    #2 interstitial lung disease, most likely a flareup of interstitial lung disease.    3.:  Pneumonia.  #4: Septic shock.  On max out Levophed on maxed out Levophed however she has been off vasopressin and epinephrine.    I had a detailed discussion with patient's son.  Will get a consent for bronchoscopy.  However, I will hold off with bronchoscopy till she becomes hemodynamically more stable.    5.  Non-STEMI.  Noted EKG abnormalities.  QT interval is more than 5 milliseconds  Repeat EKG  Vent Settings          Resp Rate (Set): 28     FiO2 (%): 30 %  PEEP/CPAP (cm H2O): 8 cm H20    Minute Ventilation (L/min) (Obs): 12.2 L/min  Resp Rate (Observed) Vent: 30     I:E  Ratio (Obs): 1:2.2    PIP Observed (cm H2O): 34 cm H2O  Plateau Pressure (cm H2O): 41 cm H2O  Driving Pressure (cm H2O): 32.6 cm H2O     Gas exchange is improved.    Plateau pressure is still high.        I do not think she is stable enough to do a bronchoscopy but will get a consent from the family members and reassess in the morning.    Follow cultures.     Current medication list reviewed.  Latest microbiology reviewed.  Respiratory panel reviewed.  Continue nebs.  Continue oxygen to maintain saturation 88 to 92%.   Cardiology- hemodynamically -unstable.    Titrate Levophed to maintain a MAP of 65 and above.       Nephrology- Cr and BUN stable  I/O-reviewed     GI-continue current diet.  Continue aspiration precautions     Hematology- CBC  Hb  platelet  WBC-latest reviewed     ID  Culture  And Antibiotics     Endocrinology- Maintain Blood sugar 140 -180  Blood sugars reviewed     Electrolytes-   Mag and phos         DVT prophylaxis-       Critical Care time spent in direct patient care: 45 minutes (excluding procedure time, if applicable) including high complexity decision making to assess, manipulate, and support vital organ system failure in this individual who has impairment of one or more vital organ systems such that there is a high probability of imminent or life threatening deterioration in the patient’s condition.  Patient is critically ill and is at higher risk for further mortality/morbidity. Continue ICU care .      Kurt White MD  07/22/24  10:44 EDT

## 2024-07-23 PROCEDURE — 99291 CRITICAL CARE FIRST HOUR: CPT | Performed by: INTERNAL MEDICINE

## 2024-07-23 NOTE — PLAN OF CARE
Problem: Inability to Wean (Mechanical Ventilation, Invasive)  Goal: Mechanical Ventilation Liberation  Outcome: Ongoing, Progressing   Goal Outcome Evaluation:

## 2024-07-23 NOTE — PROGRESS NOTES
[facility]  PROGRESS NOTE    Patient information:  Name: Lexie Valdez  Age/Sex: 64 y.o. female  :  1960        PCP: Violet Pop APRN  Attending: Estuardo Charles MD  MRN:  [id number]  Visit Number:  [id number]    LOS: 5  CODE STATUS:    Code Status and Medical Interventions:   Ordered at: 24 0205     Code Status (Patient has no pulse and is not breathing):    CPR (Attempt to Resuscitate)     Medical Interventions (Patient has pulse or is breathing):    Full Support       PROBLEM LIST:Principal Problem:    NSTEMI (non-ST elevated myocardial infarction)      Reason for Cardiology follow-up: NSTEMI     Subjective   ADMISSION INFORMATION:  Chief Complaint   Patient presents with    Shortness of Breath     DATE:2024    HPI:  Lexie Valdez is a 64 y.o. 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     Patient presented to [facility] ([facility]) emergency room (ER) on 2024 with complaints of increased shortness of breath x 2 weeks has become progressively worse and exacerbated with minimal activity.      Primary Cardiologist: None found in chart review.    Interval History:   Telemetry reveals SR 60-70s with frequent PACs. AM labs reveal potassium 3.7, creatinine 0.45, hemoglobin 9.5, platelet count 235.     Patient was in room CCU 10 when she was seen and examined.  Patient remains intubated and sedated.  FiO2 at 30%. No family is at bedside at this time.     EVENT TIMELINE:   :ECHO w EF of 36 - 40%. Left ventricular diastolic function is consistent with (grade I) impaired relaxation, mild tricuspid valve regurgitation is present. Estimated right ventricular systolic pressure from tricuspid regurgitation is moderately  elevated (45-55 mmHg). Patient seem to have developed a new regional wall motion normalities with decreased LV systolic function as compared to the previous study in March 2023.  Please see full report attached below.  7/18: Bess ST with MPI indicating moderate-sized infarct located in the inferior wall, septal wall and apex with no significant ischemia noted. Impressions are consistent with an intermediate risk study. EF = 43%.  Please see full report attached below.  7/21: Emergently intubated orotracheally with a #7.5 endotracheal tube at approximately 22:16 PM.   7/21: CXRs indicated multifocal and worsening pneumonia coupled with some concern as well for pulmonary edema.     Objective     Vital Signs  Temp:  [98.2 °F (36.8 °C)-100.1 °F (37.8 °C)] 98.6 °F (37 °C)  Heart Rate:  [] 69  Resp:  [28-40] 28  BP: ()/(37-88) 131/63  FiO2 (%):  [30 %] 30 %  Device (Oxygen Therapy): ventilator  Vital Signs (last 72 hrs)         07/20 0700  07/21 0659 07/21 0700  07/22 0659 07/22 0700  07/23 0659 07/23 0700  07/23 0843   Most Recent      Temp (°F) 97.7 -  98.7    98.1 -  99.8    98.1 -  100.1      98.2     98.2 (36.8) 07/23 0800    Heart Rate 80 -  113    75 -  106    65 -  104    68 -  73     71 07/23 0834    Resp 20 -  45    26 -  42    28 -  33      28     28 07/23 0710    BP 66/29 -  148/91    62/42 -  143/83    89/43 -  155/88    90/46 -  111/61     92/44 07/23 0834    SpO2 (%) 86 -  100    95 -  100    93 -  100    99 -  100     99 07/23 0815    Flow (L/min) 3 -  55           55 07/20 2345    Oxygen Concentration (%) 65 -  100    40 -  70      30      30     30 07/23 0710          BMI:Body mass index is 22.47 kg/m².    WEIGHT:      07/21/24  0400 07/22/24  0500 07/23/24  0545   Weight: 59.3 kg (130 lb 11.7 oz) 58.6 kg (129 lb 3 oz) 59.4 kg (130 lb 15.3 oz)       DIET:NPO Diet NPO Type: Tube Feeding    I&O:  Intake & Output (last 3 days)         07/20 0701 07/21 0700 07/21 0701 07/22 0700 07/22  0701  07/23 0700 07/23 0701  07/24 0700    P.O. 458       I.V. (mL/kg) 413.9 (7.2) 1354.5 (23.7) 1414.7 (24.7)     Other  0 323     NG/GT  172 362     IV Piggyback 200 600 400     Total Intake(mL/kg) 1071.9 (18.7) 2126.5 (37.2) 2499.7 (43.7)     Urine (mL/kg/hr) 1450 (1.1) 775 (0.6) 1000 (0.7)     Emesis/NG output  0 0     Stool 0 0 0     Total Output 0572 941 1428     Net -378.1 +1351.5 +1499.7             Urine Unmeasured Occurrence 1 x       Stool Unmeasured Occurrence 2 x 0 x 0 x     Emesis Unmeasured Occurrence  0 x 0 x              PHYSICAL EXAM:  Constitutional:       Appearance: Not in distress. Acutely ill-appearing.   HENT:         Comments: Orally intubated.  Neck:      Vascular: No JVD. JVD normal.   Pulmonary:      Comments: Intubated with Mechanical ventilation with scattered crackles noted bilaterally.  Cardiovascular:      Normal rate. Regular rhythm.      Murmurs: There is no murmur.   Edema:     Peripheral edema absent.   Abdominal:      General: Bowel sounds are normal. There is no distension.      Palpations: Abdomen is soft.   Musculoskeletal:      Cervical back: Neck supple.      Comments: Sedated. SCUDS in use BLE.  Skin:     General: Skin is warm and dry.   Neurological:      Comments: Intubated and sedated.                   Results review   Results Review:    I have reviewed the patient's new clinical results. 07/23/24 12:46 EDT    Results from last 7 days   Lab Units 07/23/24  0258 07/23/24  0058 07/17/24  2320 07/17/24  2119   HSTROP T ng/L 66* 75* 188* 229*     Lab Results   Component Value Date    PROBNP 14,737.0 (H) 07/22/2024    PROBNP 3,550.0 (H) 07/17/2024    PROBNP 235.1 04/26/2024     Results from last 7 days   Lab Units 07/23/24  0058 07/22/24  0012 07/21/24  0016 07/19/24  2356 07/19/24  0046 07/18/24  0420 07/18/24  0211   WBC 10*3/mm3 10.24 16.58* 20.24* 13.37* 15.07* 7.25 6.42   HEMOGLOBIN g/dL 9.5* 9.9* 11.8* 11.5* 12.3 12.1 12.3   PLATELETS 10*3/mm3 235 227 227 214 269 261  252     Results from last 7 days   Lab Units 07/23/24  0058 07/22/24  0012 07/21/24  0016 07/19/24  2356 07/18/24  0420 07/17/24  2119   SODIUM mmol/L 139 135* 130* 133* 132* 131*   POTASSIUM mmol/L 3.7 4.5 4.7 4.5 4.0 4.1   CHLORIDE mmol/L 106 100 99 102 96* 93*   CO2 mmol/L 24.1 23.9 20.5* 22.2 25.2 26.2   BUN mg/dL 20 25* 17 15 7* 8   CREATININE mg/dL 0.45* 0.47* 0.37* 0.44* 0.37* 0.49*   CALCIUM mg/dL 8.2* 8.2* 8.4* 8.8 8.8 9.2   GLUCOSE mg/dL 208* 191* 122* 114* 130* 177*   ALT (SGPT) U/L 15 10  --   --  12 13   AST (SGOT) U/L 26 33*  --   --  37* 43*     Lab Results   Component Value Date    MG 2.4 07/23/2024    MG 1.7 07/17/2024    MG 2.2 04/29/2024     Estimated Creatinine Clearance: 116.9 mL/min (A) (by C-G formula based on SCr of 0.45 mg/dL (L)).    Lab Results   Component Value Date    HGBA1C 4.60 (L) 07/18/2024    HGBA1C 4.80 04/11/2024    HGBA1C 5.00 12/31/2023     Lab Results   Component Value Date    CHOL 136 07/18/2024    TRIG 61 07/18/2024    LDL 56 07/18/2024    HDL 67 (H) 07/18/2024        Lab Results   Component Value Date    INR 0.93 07/18/2024    INR 0.98 04/26/2024    INR 1.00 03/21/2023     Lab Results   Component Value Date    LABHEPA 0.27 (L) 07/20/2024    LABHEPA 0.36 07/19/2024    LABHEPA 0.33 07/19/2024    LABHEPA 0.11 (L) 07/18/2024       Lab Results   Component Value Date    TSH 1.140 07/17/2024      Pain Management Panel           No data to display              Microbiology Results (last 10 days)       Procedure Component Value - Date/Time    Mycoplasma Pneumoniae Antibody, IgM - Blood, Arm, Left [[id number]]  (Normal) Collected: 07/22/24 0012    Lab Status: Final result Specimen: Blood from Arm, Left Updated: 07/22/24 0202     Mycoplasma pneumo IgM Negative    Legionella Antigen, Urine - Urine, Urine, Clean Catch [[id number]]  (Normal) Collected: 07/21/24 2242    Lab Status: Final result Specimen: Urine, Clean Catch Updated: 07/21/24 2308     LEGIONELLA ANTIGEN, URINE Negative     Narrative:      Presumptive negative for L. pneumophilia serogroup 1 antigen, suggesting no recent or current infection.    Respiratory Culture - Sputum, ET Suction [[id number]] Collected: 07/21/24 2231    Lab Status: Preliminary result Specimen: Sputum from ET Suction Updated: 07/23/24 1056     Respiratory Culture No growth     Gram Stain Rare (1+) Mixed ammon      No WBCs seen      No Epithelial cells seen    MRSA Screen, PCR (Inpatient) - Swab, Nares [[id number]]  (Normal) Collected: 07/21/24 1252    Lab Status: Final result Specimen: Swab from Nares Updated: 07/21/24 1424     MRSA PCR No MRSA Detected    Narrative:      The negative predictive value of this diagnostic test is high and should only be used to consider de-escalating anti-MRSA therapy. A positive result may indicate colonization with MRSA and must be correlated clinically.    Respiratory Panel PCR w/COVID-19(SARS-CoV-2) RHONDA/HEATHER/TASNEEM/PAD/COR/CHRISTELLE In-House, NP Swab in UTM/VTM, 2 HR TAT - Swab, Nasopharynx [[id number]]  (Normal) Collected: 07/21/24 1247    Lab Status: Final result Specimen: Swab from Nasopharynx Updated: 07/21/24 1357     ADENOVIRUS, PCR Not Detected     Coronavirus 229E Not Detected     Coronavirus HKU1 Not Detected     Coronavirus NL63 Not Detected     Coronavirus OC43 Not Detected     COVID19 Not Detected     Human Metapneumovirus Not Detected     Human Rhinovirus/Enterovirus Not Detected     Influenza A PCR Not Detected     Influenza B PCR Not Detected     Parainfluenza Virus 1 Not Detected     Parainfluenza Virus 2 Not Detected     Parainfluenza Virus 3 Not Detected     Parainfluenza Virus 4 Not Detected     RSV, PCR Not Detected     Bordetella pertussis pcr Not Detected     Bordetella parapertussis PCR Not Detected     Chlamydophila pneumoniae PCR Not Detected     Mycoplasma pneumo by PCR Not Detected    Narrative:      In the setting of a positive respiratory panel with a viral infection PLUS a negative procalcitonin without other  underlying concern for bacterial infection, consider observing off antibiotics or discontinuation of antibiotics and continue supportive care. If the respiratory panel is positive for atypical bacterial infection (Bordetella pertussis, Chlamydophila pneumoniae, or Mycoplasma pneumoniae), consider antibiotic de-escalation to target atypical bacterial infection.    Respiratory Culture - Sputum, ET Suction [[id number]] Collected: 07/21/24 1152    Lab Status: Final result Specimen: Sputum from ET Suction Updated: 07/23/24 1053     Respiratory Culture Rare growth Normal respiratory ammon. No S. aureus or Pseudomonas aeruginosa detected. Final report.     Gram Stain Moderate (3+) WBCs seen      Rare (1+) Epithelial cells seen      No organisms seen    Blood Culture - Blood, Arm, Left [[id number]]  (Normal) Collected: 07/21/24 0450    Lab Status: Preliminary result Specimen: Blood from Arm, Left Updated: 07/23/24 0515     Blood Culture No growth at 2 days    Blood Culture - Blood, Arm, Right [[id number]]  (Normal) Collected: 07/21/24 0448    Lab Status: Preliminary result Specimen: Blood from Arm, Right Updated: 07/23/24 0530     Blood Culture No growth at 2 days    Gastrointestinal Panel, PCR - Stool, Per Rectum [[id number]]  (Normal) Collected: 07/19/24 0410    Lab Status: Final result Specimen: Stool from Per Rectum Updated: 07/19/24 0603     Campylobacter Not Detected     Plesiomonas shigelloides Not Detected     Salmonella Not Detected     Vibrio Not Detected     Vibrio cholerae Not Detected     Yersinia enterocolitica Not Detected     Enteroaggregative E. coli (EAEC) Not Detected     Enteropathogenic E. coli (EPEC) Not Detected     Enterotoxigenic E. coli (ETEC) lt/st Not Detected     Shiga-like toxin-producing E. coli (STEC) stx1/stx2 Not Detected     Shigella/Enteroinvasive E. coli (EIEC) Not Detected     Cryptosporidium Not Detected     Cyclospora cayetanensis Not Detected     Entamoeba histolytica Not Detected      Giardia lamblia Not Detected     Adenovirus F40/41 Not Detected     Astrovirus Not Detected     Norovirus GI/GII Not Detected     Rotavirus A Not Detected     Sapovirus (I, II, IV or V) Not Detected    Clostridioides difficile Toxin - Stool, Per Rectum [[id number]] Collected: 07/19/24 0410    Lab Status: Final result Specimen: Stool from Per Rectum Updated: 07/19/24 0527    Narrative:      The following orders were created for panel order Clostridioides difficile Toxin - Stool, Per Rectum.  Procedure                               Abnormality         Status                     ---------                               -----------         ------                     Clostridioides difficile...[[id number]]                      Final result                 Please view results for these tests on the individual orders.    Clostridioides difficile Toxin, PCR - Stool, Per Rectum [[id number]] Collected: 07/19/24 0410    Lab Status: Final result Specimen: Stool from Per Rectum Updated: 07/19/24 0527     Toxigenic C. difficile by PCR Negative     027 Toxin Presumptive Negative    Narrative:      The result indicates the absence of toxigenic C. difficile from stool specimen.     Urine Culture - Urine, Urine, Catheter [[id number]]  (Abnormal)  (Susceptibility) Collected: 07/17/24 2145    Lab Status: Final result Specimen: Urine, Catheter Updated: 07/20/24 0959     Urine Culture >100,000 CFU/mL Escherichia coli    Narrative:      Colonization of the urinary tract without infection is common. Treatment is discouraged unless the patient is symptomatic, pregnant, or undergoing an invasive urologic procedure.    Susceptibility        Escherichia coli      MILES      Amoxicillin + Clavulanate Susceptible      Ampicillin Susceptible      Ampicillin + Sulbactam Susceptible      Cefazolin Susceptible      Cefepime Susceptible      Ceftazidime Susceptible      Ceftriaxone Susceptible      Gentamicin Susceptible      Levofloxacin Susceptible       Nitrofurantoin Susceptible      Piperacillin + Tazobactam Susceptible      Trimethoprim + Sulfamethoxazole Susceptible                           Respiratory Panel PCR w/COVID-19(SARS-CoV-2) RHONDA/HEATHER/TASNEEM/PAD/COR/CHRISTELLE In-House, NP Swab in UTM/VTM, 2 HR TAT - Swab, Nasopharynx [[id number]]  (Normal) Collected: 07/17/24 2127    Lab Status: Final result Specimen: Swab from Nasopharynx Updated: 07/17/24 2325     ADENOVIRUS, PCR Not Detected     Coronavirus 229E Not Detected     Coronavirus HKU1 Not Detected     Coronavirus NL63 Not Detected     Coronavirus OC43 Not Detected     COVID19 Not Detected     Human Metapneumovirus Not Detected     Human Rhinovirus/Enterovirus Not Detected     Influenza A PCR Not Detected     Influenza B PCR Not Detected     Parainfluenza Virus 1 Not Detected     Parainfluenza Virus 2 Not Detected     Parainfluenza Virus 3 Not Detected     Parainfluenza Virus 4 Not Detected     RSV, PCR Not Detected     Bordetella pertussis pcr Not Detected     Bordetella parapertussis PCR Not Detected     Chlamydophila pneumoniae PCR Not Detected     Mycoplasma pneumo by PCR Not Detected    Narrative:      In the setting of a positive respiratory panel with a viral infection PLUS a negative procalcitonin without other underlying concern for bacterial infection, consider observing off antibiotics or discontinuation of antibiotics and continue supportive care. If the respiratory panel is positive for atypical bacterial infection (Bordetella pertussis, Chlamydophila pneumoniae, or Mycoplasma pneumoniae), consider antibiotic de-escalation to target atypical bacterial infection.    COVID PRE-OP / PRE-PROCEDURE SCREENING ORDER (NO ISOLATION) - Swab, Nasopharynx [[id number]]  (Normal) Collected: 07/17/24 2119    Lab Status: Final result Specimen: Swab from Nasopharynx Updated: 07/17/24 2149    Narrative:      The following orders were created for panel order COVID PRE-OP / PRE-PROCEDURE SCREENING ORDER (NO ISOLATION) -  Swab, Nasopharynx.  Procedure                               Abnormality         Status                     ---------                               -----------         ------                     COVID-19 and FLU A/B PCR...[[id number]]  Normal              Final result                 Please view results for these tests on the individual orders.    COVID-19 and FLU A/B PCR, 1 HR TAT - Swab, Nasopharynx [[id number]]  (Normal) Collected: 07/17/24 2119    Lab Status: Final result Specimen: Swab from Nasopharynx Updated: 07/17/24 2149     COVID19 Not Detected     Influenza A PCR Not Detected     Influenza B PCR Not Detected    Narrative:      Fact sheet for providers: https://www.fda.gov/media/688752/download    Fact sheet for patients: https://www.fda.gov/media/970808/download    Test performed by PCR.    Blood Culture - Blood, Arm, Right [[id number]]  (Normal) Collected: 07/17/24 2119    Lab Status: Final result Specimen: Blood from Arm, Right Updated: 07/22/24 2131     Blood Culture No growth at 5 days    Blood Culture - Blood, Arm, Left [[id number]]  (Normal) Collected: 07/17/24 2119    Lab Status: Final result Specimen: Blood from Arm, Left Updated: 07/22/24 2131     Blood Culture No growth at 5 days           Imaging Results (Last 24 Hours)       ** No results found for the last 24 hours. **          ECHO:  Results for orders placed during the hospital encounter of 07/17/24    Adult Transthoracic Echo Complete w/ Color, Spectral and Contrast if necessary per protocol    Interpretation Summary    Normal left ventricular cavity size and wall thickness noted.    The following left ventricular wall segments are hypokinetic: mid anterior, basal anterolateral, apical lateral, apical septal, mid anteroseptal and basal anterior. The following left ventricular wall segments are akinetic: apex.    Left ventricular systolic function is moderately decreased. Left ventricular ejection fraction appears to be 36 - 40%.    Left  ventricular diastolic function is consistent with (grade I) impaired relaxation.    The aortic valve is structurally normal with no regurgitation or stenosis present.    The mitral valve is structurally normal with no significant stenosis present. Mild mitral valve regurgitation is present.    Mild tricuspid valve regurgitation is present. Estimated right ventricular systolic pressure from tricuspid regurgitation is moderately elevated (45-55 mmHg).    There is no evidence of pericardial effusion. .    Comments: Patient seem to have developed a new regional wall motion normalities with decreased LV systolic function as compared to the previous study in 2023.      STRESS TEST:  Results for orders placed during the hospital encounter of 24    Stress Test With Myocardial Perfusion One Day    Interpretation Summary  Images from the original result were not included.      A pharmacological stress test was performed using regadenoson without low-level exercise.    Findings consistent with an indeterminate ECG stress test.    Myocardial perfusion imaging indicates a moderate-sized infarct located in the inferior wall, septal wall and apex with no significant ischemia noted.    Abnormal LV wall motion consistent with apical dyskinesis.    Left ventricular ejection fraction is moderately reduced (Calculated EF = 43%).    Impressions are consistent with an intermediate risk study.       HEART CATH:  No results found for this or any previous visit.      [id number]      TELEMETRY:     SR 60-70s with frequent PACs            I reviewed the patient's new clinical results.    ALLERGIES: Codeine and Sulfa antibiotics    Medication Review:   Current list of medications may not reflect those currently placed in orders that are not signed or are being held.     aspirin, 81 mg, Oral, Daily  Brexpiprazole, 0.5 mg, Oral, Daily  cefepime, 2,000 mg, Intravenous, Q8H  chlorhexidine, 15 mL, Mouth/Throat, Q12H  donepezil, 10  mg, Oral, Q PM  doxycycline, 100 mg, Oral, Q12H  enoxaparin, 40 mg, Subcutaneous, Nightly  hydroxychloroquine, 200 mg, Oral, BID  insulin regular, 2-7 Units, Subcutaneous, Q6H  ipratropium-albuterol, 3 mL, Nebulization, Q4H  levothyroxine, 25 mcg, Oral, Daily With Breakfast  memantine, 5 mg, Oral, Q12H  methylPREDNISolone sodium succinate, 60 mg, Intravenous, Q12H  [Held by provider] metoprolol succinate XL, 25 mg, Oral, Q24H  mupirocin, 1 application , Each Nare, BID  nystatin, 1 Application, Topical, Q12H  pantoprazole, 40 mg, Intravenous, Q AM  Phenylephrine HCl-NaCl, , ,   Phenylephrine HCl-NaCl, , ,   sodium chloride, 10 mL, Intravenous, Q12H  sodium chloride, 10 mL, Intravenous, Q12H  sodium chloride, 10 mL, Intravenous, Q12H  sodium chloride, 10 mL, Intravenous, Q12H  sodium chloride, 10 mL, Intravenous, Q12H  sodium chloride, 10 mL, Intravenous, Q12H      fentanyl 10 mcg/mL,  mcg/hr, Last Rate: 150 mcg/hr (07/23/24 1042)  norepinephrine, 0.02-0.3 mcg/kg/min (Dosing Weight), Last Rate: Stopped (07/23/24 0302)  Pharmacy Consult - Pharmacy to dose,   Pharmacy Consult,   Pharmacy to dose vancomycin,   phenylephrine, 0.5-3 mcg/kg/min (Dosing Weight), Last Rate: 1.4 mcg/kg/min (07/23/24 1220)  propofol, 5-50 mcg/kg/min (Dosing Weight), Last Rate: 35 mcg/kg/min (07/23/24 1042)        senna-docusate sodium **AND** polyethylene glycol **AND** bisacodyl **AND** bisacodyl    busPIRone    dextrose    dextrose    glucagon (human recombinant)    guaifenesin    HYDROcodone-acetaminophen    hydrOXYzine    ipratropium    LORazepam    nitroglycerin    Pharmacy Consult - Pharmacy to dose    Pharmacy Consult    Pharmacy to dose vancomycin    Phenylephrine HCl-NaCl    Phenylephrine HCl-NaCl    prochlorperazine    sodium chloride    sodium chloride    sodium chloride    sodium chloride    sodium chloride    sodium chloride    sodium chloride    sodium chloride    sodium chloride    sodium chloride    sodium chloride     vecuronium    Assessment    Acute non-ST elevation myocardial infarction likely type II due to respiratory failure with hypoxia  Advanced COPD/pulmonary fibrosis, on home oxygen.  Acute on chronic respiratory failure requiring intubation mechanical ventilation.  HFrEF, worsening with acute respiratory failure  Essential hypertension  Mild hyponatremia            Recommendations / Plan   1.  Patient remains critically ill continue with pressor support, positive fluid balance, over gas exchange has improved we will monitor for now  2.  Stress test last week with no ischemia seen most likely elevated troponin secondary to NSTEMI type II related to acute respiratory failure and heart failure  3.  Overall prognosis is guarded          I have discussed the patients findings and recommendations with the patient.    Thank you very much for asking us to be involved in this patient's care.  We will follow along with you.    Electronically signed by SG Chandler, 07/23/24, 12:48 PM EDT.   Electronically signed by Rinku Braxton MD, 07/23/24, 1:12 PM EDT.                      Please note that portions of this note were completed with a voice recognition program.    Please note that portions of this note were copied and has been reviewed and is accurate as of 7/23/2024 .

## 2024-07-23 NOTE — PLAN OF CARE
Problem: Adult Inpatient Plan of Care  Goal: Plan of Care Review  Outcome: Ongoing, Progressing  Flowsheets (Taken 7/23/2024 0350)  Progress: no change  Plan of Care Reviewed With: patient  Goal: Patient-Specific Goal (Individualized)  Outcome: Ongoing, Progressing  Goal: Absence of Hospital-Acquired Illness or Injury  Outcome: Ongoing, Progressing  Intervention: Identify and Manage Fall Risk  Recent Flowsheet Documentation  Taken 7/23/2024 0300 by Tonia Cordon RN  Safety Promotion/Fall Prevention: safety round/check completed  Taken 7/23/2024 0200 by Tonia Cordon RN  Safety Promotion/Fall Prevention: safety round/check completed  Taken 7/23/2024 0100 by Tonia Cordon RN  Safety Promotion/Fall Prevention: safety round/check completed  Taken 7/23/2024 0000 by Tonia Cordon RN  Safety Promotion/Fall Prevention: safety round/check completed  Taken 7/22/2024 2300 by Tonia Cordon RN  Safety Promotion/Fall Prevention: safety round/check completed  Taken 7/22/2024 2200 by Tonia Cordon RN  Safety Promotion/Fall Prevention: safety round/check completed  Taken 7/22/2024 2100 by Tonia Cordon RN  Safety Promotion/Fall Prevention: safety round/check completed  Taken 7/22/2024 2000 by Tonia Cordon RN  Safety Promotion/Fall Prevention: safety round/check completed  Taken 7/22/2024 1900 by Tonia Cordon RN  Safety Promotion/Fall Prevention: safety round/check completed  Intervention: Prevent Skin Injury  Recent Flowsheet Documentation  Taken 7/23/2024 0200 by Tonia Cordon RN  Body Position:   turned   side-lying   left  Skin Protection:   tubing/devices free from skin contact   skin-to-skin areas padded   transparent dressing maintained   skin-to-device areas padded   incontinence pads utilized   adhesive use limited  Taken 7/23/2024 0000 by Tonia Cordon RN  Body Position:   turned   side-lying   right  Taken 7/22/2024 2200 by Tonia Cordon RN  Body Position:   turned   side-lying   left  Taken  7/22/2024 2000 by Tonia Cordon RN  Body Position:   turned   side-lying   right  Skin Protection:   tubing/devices free from skin contact   transparent dressing maintained   skin-to-skin areas padded   skin-to-device areas padded   incontinence pads utilized   adhesive use limited  Intervention: Prevent and Manage VTE (Venous Thromboembolism) Risk  Recent Flowsheet Documentation  Taken 7/23/2024 0200 by Tonia Cordon RN  Activity Management: bedrest  VTE Prevention/Management: (see MAR) other (see comments)  Taken 7/22/2024 2000 by Tonia Cordon RN  Activity Management: bedrest  VTE Prevention/Management: (see MAR) other (see comments)  Intervention: Prevent Infection  Recent Flowsheet Documentation  Taken 7/23/2024 0200 by Tonia Cordon RN  Infection Prevention:   hand hygiene promoted   rest/sleep promoted  Taken 7/22/2024 2000 by Tonia Cordon RN  Infection Prevention:   hand hygiene promoted   rest/sleep promoted  Goal: Optimal Comfort and Wellbeing  Outcome: Ongoing, Progressing  Intervention: Provide Person-Centered Care  Recent Flowsheet Documentation  Taken 7/23/2024 0200 by Tonia Cordon RN  Trust Relationship/Rapport:   choices provided   care explained  Taken 7/22/2024 2000 by Tonia Cordon RN  Trust Relationship/Rapport:   care explained   choices provided  Goal: Readiness for Transition of Care  Outcome: Ongoing, Progressing     Problem: COPD (Chronic Obstructive Pulmonary Disease) Comorbidity  Goal: Maintenance of COPD Symptom Control  Outcome: Ongoing, Progressing  Intervention: Maintain COPD-Symptom Control  Recent Flowsheet Documentation  Taken 7/23/2024 0300 by Tonia Cordon RN  Medication Review/Management: medications reviewed  Taken 7/23/2024 0200 by Tonia Cordon RN  Medication Review/Management: medications reviewed  Taken 7/23/2024 0100 by Tonia Cordon RN  Medication Review/Management: medications reviewed  Taken 7/23/2024 0000 by Tonia Cordon RN  Medication  Review/Management: medications reviewed  Taken 7/22/2024 2300 by Tonia Cordon RN  Medication Review/Management: medications reviewed  Taken 7/22/2024 2200 by Tonia Cordon RN  Medication Review/Management: medications reviewed  Taken 7/22/2024 2100 by Tonia Cordon RN  Medication Review/Management: medications reviewed  Taken 7/22/2024 2000 by Tonia Cordon RN  Medication Review/Management: medications reviewed  Taken 7/22/2024 1900 by Tonia Cordon RN  Medication Review/Management: medications reviewed     Problem: Fall Injury Risk  Goal: Absence of Fall and Fall-Related Injury  Outcome: Ongoing, Progressing  Intervention: Identify and Manage Contributors  Recent Flowsheet Documentation  Taken 7/23/2024 0300 by Tonia Cordon RN  Medication Review/Management: medications reviewed  Taken 7/23/2024 0200 by Tonia Cordon RN  Medication Review/Management: medications reviewed  Taken 7/23/2024 0100 by Tonia Cordon RN  Medication Review/Management: medications reviewed  Taken 7/23/2024 0000 by Tonia Cordon RN  Medication Review/Management: medications reviewed  Taken 7/22/2024 2300 by Tonia Cordon RN  Medication Review/Management: medications reviewed  Taken 7/22/2024 2200 by Tonia Cordon RN  Medication Review/Management: medications reviewed  Taken 7/22/2024 2100 by Tonia Cordon RN  Medication Review/Management: medications reviewed  Taken 7/22/2024 2000 by Tonia Cordon RN  Medication Review/Management: medications reviewed  Taken 7/22/2024 1900 by Tonia Cordon RN  Medication Review/Management: medications reviewed  Intervention: Promote Injury-Free Environment  Recent Flowsheet Documentation  Taken 7/23/2024 0300 by Tonia Cordon RN  Safety Promotion/Fall Prevention: safety round/check completed  Taken 7/23/2024 0200 by Tonia Cordon RN  Safety Promotion/Fall Prevention: safety round/check completed  Taken 7/23/2024 0100 by Tonia Cordon RN  Safety Promotion/Fall Prevention:  safety round/check completed  Taken 7/23/2024 0000 by Tonia Cordon RN  Safety Promotion/Fall Prevention: safety round/check completed  Taken 7/22/2024 2300 by Tonia Cordon RN  Safety Promotion/Fall Prevention: safety round/check completed  Taken 7/22/2024 2200 by Tonia Cordon RN  Safety Promotion/Fall Prevention: safety round/check completed  Taken 7/22/2024 2100 by Tonia Cordon RN  Safety Promotion/Fall Prevention: safety round/check completed  Taken 7/22/2024 2000 by Tonia Cordon RN  Safety Promotion/Fall Prevention: safety round/check completed  Taken 7/22/2024 1900 by Tonia Cordon RN  Safety Promotion/Fall Prevention: safety round/check completed     Problem: Adjustment to Illness (Sepsis/Septic Shock)  Goal: Optimal Coping  Outcome: Ongoing, Progressing  Intervention: Optimize Psychosocial Adjustment to Illness  Recent Flowsheet Documentation  Taken 7/23/2024 0200 by Tonia Cordon RN  Family/Support System Care: support provided  Taken 7/22/2024 2000 by Tonia Cordon RN  Family/Support System Care: support provided     Problem: Bleeding (Sepsis/Septic Shock)  Goal: Absence of Bleeding  Outcome: Ongoing, Progressing     Problem: Glycemic Control Impaired (Sepsis/Septic Shock)  Goal: Blood Glucose Level Within Desired Range  Outcome: Ongoing, Progressing     Problem: Infection Progression (Sepsis/Septic Shock)  Goal: Absence of Infection Signs and Symptoms  Outcome: Ongoing, Progressing  Intervention: Initiate Sepsis Management  Recent Flowsheet Documentation  Taken 7/23/2024 0200 by Tonia Cordon RN  Infection Prevention:   hand hygiene promoted   rest/sleep promoted  Taken 7/22/2024 2000 by Tonia Cordon RN  Infection Prevention:   hand hygiene promoted   rest/sleep promoted  Intervention: Promote Recovery  Recent Flowsheet Documentation  Taken 7/23/2024 0200 by Tonia Cordon RN  Activity Management: bedrest  Taken 7/22/2024 2000 by Tonia Cordon RN  Activity Management: bedrest      Problem: Nutrition Impaired (Sepsis/Septic Shock)  Goal: Optimal Nutrition Intake  Outcome: Ongoing, Progressing     Problem: Skin Injury Risk Increased  Goal: Skin Health and Integrity  Outcome: Ongoing, Progressing  Intervention: Optimize Skin Protection  Recent Flowsheet Documentation  Taken 7/23/2024 0200 by Tonia Cordon RN  Pressure Reduction Techniques:   frequent weight shift encouraged   heels elevated off bed   pressure points protected   weight shift assistance provided  Head of Bed (HOB) Positioning: HOB at 30-45 degrees  Pressure Reduction Devices:   specialty bed utilized   pressure-redistributing mattress utilized   positioning supports utilized   heel offloading device utilized  Skin Protection:   tubing/devices free from skin contact   skin-to-skin areas padded   transparent dressing maintained   skin-to-device areas padded   incontinence pads utilized   adhesive use limited  Taken 7/23/2024 0000 by Tonia Cordon RN  Head of Bed (HOB) Positioning: HOB at 30-45 degrees  Taken 7/22/2024 2200 by Tonia Cordon RN  Head of Bed (HOB) Positioning: HOB at 30-45 degrees  Taken 7/22/2024 2000 by Tonia Cordon RN  Pressure Reduction Techniques:   frequent weight shift encouraged   heels elevated off bed   pressure points protected   weight shift assistance provided  Head of Bed (HOB) Positioning: HOB at 30-45 degrees  Pressure Reduction Devices:   specialty bed utilized   pressure-redistributing mattress utilized   positioning supports utilized   heel offloading device utilized  Skin Protection:   tubing/devices free from skin contact   transparent dressing maintained   skin-to-skin areas padded   skin-to-device areas padded   incontinence pads utilized   adhesive use limited     Problem: Noninvasive Ventilation Acute  Goal: Effective Unassisted Ventilation and Oxygenation  Outcome: Ongoing, Progressing     Problem: Communication Impairment (Mechanical Ventilation, Invasive)  Goal: Effective  Communication  Outcome: Ongoing, Progressing     Problem: Device-Related Complication Risk (Mechanical Ventilation, Invasive)  Goal: Optimal Device Function  Outcome: Ongoing, Progressing  Intervention: Optimize Device Care and Function  Recent Flowsheet Documentation  Taken 7/23/2024 0200 by Tonia Cordon RN  Airway Safety Measures: suction at bedside  Taken 7/22/2024 2000 by Tonia Cordon RN  Airway Safety Measures: suction at bedside     Problem: Inability to Wean (Mechanical Ventilation, Invasive)  Goal: Mechanical Ventilation Liberation  Outcome: Ongoing, Progressing  Intervention: Promote Extubation and Mechanical Ventilation Liberation  Recent Flowsheet Documentation  Taken 7/23/2024 0300 by Tonia Cordon RN  Medication Review/Management: medications reviewed  Taken 7/23/2024 0200 by Tonia Cordon RN  Medication Review/Management: medications reviewed  Taken 7/23/2024 0100 by Tonia Cordon RN  Medication Review/Management: medications reviewed  Taken 7/23/2024 0000 by Tonia Cordon RN  Medication Review/Management: medications reviewed  Taken 7/22/2024 2300 by Tonia Cordon RN  Medication Review/Management: medications reviewed  Taken 7/22/2024 2200 by Tonia Cordon RN  Medication Review/Management: medications reviewed  Taken 7/22/2024 2100 by Tonia Cordon RN  Medication Review/Management: medications reviewed  Taken 7/22/2024 2000 by Tonia Cordon RN  Medication Review/Management: medications reviewed  Taken 7/22/2024 1900 by Tnoia Cordon RN  Medication Review/Management: medications reviewed     Problem: Nutrition Impairment (Mechanical Ventilation, Invasive)  Goal: Optimal Nutrition Delivery  Outcome: Ongoing, Progressing     Problem: Skin and Tissue Injury (Mechanical Ventilation, Invasive)  Goal: Absence of Device-Related Skin and Tissue Injury  Outcome: Ongoing, Progressing  Intervention: Maintain Skin and Tissue Health  Recent Flowsheet Documentation  Taken 7/23/2024 0200 by  Cordon, Tonia, RN  Device Skin Pressure Protection:   skin-to-skin areas padded   skin-to-device areas padded   pressure points protected   positioning supports utilized   adhesive use limited   absorbent pad utilized/changed  Taken 7/22/2024 2000 by Tonia Cordon RN  Device Skin Pressure Protection:   skin-to-skin areas padded   skin-to-device areas padded   pressure points protected   positioning supports utilized   adhesive use limited   absorbent pad utilized/changed     Problem: Ventilator-Induced Lung Injury (Mechanical Ventilation, Invasive)  Goal: Absence of Ventilator-Induced Lung Injury  Outcome: Ongoing, Progressing  Intervention: Prevent Ventilator-Associated Pneumonia  Recent Flowsheet Documentation  Taken 7/23/2024 0200 by Tonia Cordon RN  Head of Bed (HOB) Positioning: HOB at 30-45 degrees  Taken 7/23/2024 0000 by Tonia Cordon RN  Head of Bed (HOB) Positioning: HOB at 30-45 degrees  Oral Care:   swabbed with antiseptic solution   suction provided   lip/mouth moisturizer applied  Taken 7/22/2024 2200 by Tonia Cordon RN  Head of Bed (HOB) Positioning: HOB at 30-45 degrees  Taken 7/22/2024 2000 by Tonia Cordon RN  Head of Bed (HOB) Positioning: HOB at 30-45 degrees  VAP Prevention Bundle:   HOB elevation maintained   oral care regularly provided   readiness to extubate assessed   sedation interruption performed   VTE prophylaxis provided   vent circuit breaks minimized   stress ulcer prophylaxis provided  VAP Prevention Measures: completed  Oral Care:   swabbed with antiseptic solution   suction provided   lip/mouth moisturizer applied   Goal Outcome Evaluation:  Plan of Care Reviewed With: patient        Progress: no change

## 2024-07-23 NOTE — PROGRESS NOTES
Progress Note Critical Care Pulmonary        Subjective  On vent , sedated, failed awakening trial.  Became tachypneic and tachycardic    Interval History: event overnight: As described above        Review of Systems:    On vent , sedated     Vital Signs  Temp:  [98.1 °F (36.7 °C)-100.1 °F (37.8 °C)] 98.2 °F (36.8 °C)  Heart Rate:  [] 72  Resp:  [28-33] 28  BP: ()/(39-88) 90/52  FiO2 (%):  [30 %] 30 %  Body mass index is 22.47 kg/m².    Intake/Output Summary (Last 24 hours) at 7/23/2024 0939  Last data filed at 7/23/2024 0545  Gross per 24 hour   Intake 2499.7 ml   Output 1000 ml   Net 1499.7 ml     No intake/output data recorded.    Physical Exam:  General- normal in appearance, not in any acute distress    HEENT- pupils equally reactive to light, normal in size, no scleral icterus    Neck-supple    Respiratory-bilateral air entry, bibasilar crackles.  Cardiovascular-  Normal S1 and S2. No S3, S4 or murmurs. No JVD, no carotid bruit and no edema, pulses normal bilaterally     GI-nontender nondistended bowel sounds positive    CNS- grossly nonfocal    Extremities-no clubbing and edema        Results Review:      Results from last 7 days   Lab Units 07/23/24  0058 07/22/24  0012 07/21/24  0016   WBC 10*3/mm3 10.24 16.58* 20.24*   HEMOGLOBIN g/dL 9.5* 9.9* 11.8*   PLATELETS 10*3/mm3 235 227 227     Results from last 7 days   Lab Units 07/23/24  0058 07/22/24  0012 07/21/24  0016 07/18/24  0420 07/17/24  2119   SODIUM mmol/L 139 135* 130*   < > 131*   POTASSIUM mmol/L 3.7 4.5 4.7   < > 4.1   CHLORIDE mmol/L 106 100 99   < > 93*   CO2 mmol/L 24.1 23.9 20.5*   < > 26.2   BUN mg/dL 20 25* 17   < > 8   CREATININE mg/dL 0.45* 0.47* 0.37*   < > 0.49*   CALCIUM mg/dL 8.2* 8.2* 8.4*   < > 9.2   GLUCOSE mg/dL 208* 191* 122*   < > 177*   MAGNESIUM mg/dL 2.4  --   --   --  1.7    < > = values in this interval not displayed.     Lab Results   Component Value Date    INR 0.93 07/18/2024    INR 0.98 04/26/2024    INR  1.00 03/21/2023    PROTIME 12.6 07/18/2024    PROTIME 13.5 04/26/2024    PROTIME 13.4 03/21/2023     Results from last 7 days   Lab Units 07/23/24  0058 07/22/24  0012 07/18/24  0420   ALK PHOS U/L 104 142* 90   BILIRUBIN mg/dL 0.2 0.4 <0.2   ALT (SGPT) U/L 15 10 12   AST (SGOT) U/L 26 33* 37*     Results from last 7 days   Lab Units 07/21/24  2106   PH, ARTERIAL pH units 7.315*   PO2 ART mm Hg 137.0*   PCO2, ARTERIAL mm Hg 48.2*   HCO3 ART mmol/L 24.5     Imaging Results (Last 24 Hours)       ** No results found for the last 24 hours. **                 aspirin, 81 mg, Oral, Daily  Brexpiprazole, 0.5 mg, Oral, Daily  cefepime, 2,000 mg, Intravenous, Q8H  chlorhexidine, 15 mL, Mouth/Throat, Q12H  donepezil, 10 mg, Oral, Q PM  doxycycline, 100 mg, Oral, Q12H  enoxaparin, 40 mg, Subcutaneous, Nightly  hydroxychloroquine, 200 mg, Oral, BID  insulin regular, 2-7 Units, Subcutaneous, Q6H  ipratropium-albuterol, 3 mL, Nebulization, Q4H  levothyroxine, 25 mcg, Oral, Daily With Breakfast  memantine, 5 mg, Oral, Q12H  methylPREDNISolone sodium succinate, 60 mg, Intravenous, Q12H  [Held by provider] metoprolol succinate XL, 25 mg, Oral, Q24H  metroNIDAZOLE, 500 mg, Intravenous, Q8H  mupirocin, 1 application , Each Nare, BID  nystatin, 1 Application, Topical, Q12H  pantoprazole, 40 mg, Intravenous, Q AM  Phenylephrine HCl-NaCl, , ,   Phenylephrine HCl-NaCl, , ,   sodium chloride, 10 mL, Intravenous, Q12H  sodium chloride, 10 mL, Intravenous, Q12H  sodium chloride, 10 mL, Intravenous, Q12H  sodium chloride, 10 mL, Intravenous, Q12H  sodium chloride, 10 mL, Intravenous, Q12H  sodium chloride, 10 mL, Intravenous, Q12H  voriconazole, 4 mg/kg, Intravenous, Q12H      fentanyl 10 mcg/mL,  mcg/hr, Last Rate: 50 mcg/hr (07/23/24 0918)  norepinephrine, 0.02-0.3 mcg/kg/min (Dosing Weight), Last Rate: Stopped (07/23/24 0302)  Pharmacy Consult - Pharmacy to dose,   Pharmacy Consult,   Pharmacy to dose vancomycin,   phenylephrine,  0.5-3 mcg/kg/min (Dosing Weight), Last Rate: 1.7 mcg/kg/min (07/23/24 0837)  propofol, 5-50 mcg/kg/min (Dosing Weight), Last Rate: 15 mcg/kg/min (07/23/24 0919)        Medication Review:     Assessment & Plan   #1: Acute on chronic hypoxic respiratory failure    #2 interstitial lung disease, most likely a flareup of interstitial lung disease.    3.:  Pneumonia.  #4: Septic shock.  On  Levophed has been titrated down to 1.4 mics.        5.  Non-STEMI.  Noted EKG abnormalities.  QT interval is more than 5 milliseconds  Repeat EKG showed decrease in QT interval to 4.5  Vent Settings          Resp Rate (Set): 28     FiO2 (%): 30 %  PEEP/CPAP (cm H2O): 6 cm H20    Minute Ventilation (L/min) (Obs): 10.6 L/min  Resp Rate (Observed) Vent: 28     I:E Ratio (Obs): 1:2.2    PIP Observed (cm H2O): 39 cm H2O    Driving Pressure (cm H2O): 32.6 cm H2O     Gas exchange is improved.    Plateau pressure is 36 which has trended down from 41      Gas exchange is improved, clinically improved as pressor has been tapered down.  But plateau pressure remains high which is most likely due to severe pulmonary fibrosis.  I believe risk of bronc outweighs the benefit.    Will do a spontaneous awakening trial followed by weaning trial if possible  Follow cultures.     Current medication list reviewed.  Latest microbiology reviewed.  Respiratory panel reviewed.  Continue nebs.  Continue oxygen to maintain saturation 88 to 92%.   Cardiology- hemodynamically -unstable.    Titrate Levophed to maintain a MAP of 65 and above.       Nephrology- Cr and BUN stable  I/O-reviewed     GI-continue current diet.  Continue aspiration precautions     Hematology- CBC  Hb  platelet  WBC-latest reviewed     ID  Culture  And Antibiotics     Endocrinology- Maintain Blood sugar 140 -180  Blood sugars reviewed     Electrolytes-   Mag and phos         DVT prophylaxis-       Critical Care time spent in direct patient care: 45 minutes (excluding procedure time, if  applicable) including high complexity decision making to assess, manipulate, and support vital organ system failure in this individual who has impairment of one or more vital organ systems such that there is a high probability of imminent or life threatening deterioration in the patient’s condition.  Patient is critically ill and is at higher risk for further mortality/morbidity. Continue ICU care .      Kurt White MD  07/23/24  09:19 EDT

## 2024-07-23 NOTE — PROGRESS NOTES
PROGRESS NOTE         Patient Identification:  Name:  Lexie Valdez  Age:  64 y.o.  Sex:  female  :  1960  MRN:  [id number]  Visit Number:  [id number]  Primary Care Provider:  Violet Pop APRN         LOS: 5 days       ----------------------------------------------------------------------------------------------------------------------  Subjective       Chief Complaints:    Shortness of Breath        Interval History:      Patient remains intubated and sedated at 30% FiO2 this morning.  Son and primary RN at bedside.  Diminished lung sounds bilaterally.  Abdomen soft, nontender.  Afebrile, no reported diarrhea.  WBC 10.24.  CRP 10.32.  Respiratory culture from 2024 finalized as normal respiratory ammon.  Repeat respiratory culture on 721 shows no growth thus far.  Fungal serologies remain in process.     Review of Systems:    Unable to obtain.  Intubated and sedated.    ----------------------------------------------------------------------------------------------------------------------      Objective       Current Hospital Meds:  aspirin, 81 mg, Oral, Daily  Brexpiprazole, 0.5 mg, Oral, Daily  cefepime, 2,000 mg, Intravenous, Q8H  chlorhexidine, 15 mL, Mouth/Throat, Q12H  donepezil, 10 mg, Oral, Q PM  doxycycline, 100 mg, Oral, Q12H  enoxaparin, 40 mg, Subcutaneous, Nightly  hydroxychloroquine, 200 mg, Oral, BID  insulin regular, 2-7 Units, Subcutaneous, Q6H  ipratropium-albuterol, 3 mL, Nebulization, Q4H  levothyroxine, 25 mcg, Oral, Daily With Breakfast  memantine, 5 mg, Oral, Q12H  methylPREDNISolone sodium succinate, 60 mg, Intravenous, Q12H  [Held by provider] metoprolol succinate XL, 25 mg, Oral, Q24H  mupirocin, 1 application , Each Nare, BID  nystatin, 1 Application, Topical, Q12H  pantoprazole, 40 mg, Intravenous, Q AM  Phenylephrine HCl-NaCl, , ,   Phenylephrine HCl-NaCl, , ,   sodium chloride, 10 mL, Intravenous, Q12H  sodium chloride, 10 mL, Intravenous, Q12H  sodium  chloride, 10 mL, Intravenous, Q12H  sodium chloride, 10 mL, Intravenous, Q12H  sodium chloride, 10 mL, Intravenous, Q12H  sodium chloride, 10 mL, Intravenous, Q12H      fentanyl 10 mcg/mL,  mcg/hr, Last Rate: 150 mcg/hr (07/23/24 1042)  norepinephrine, 0.02-0.3 mcg/kg/min (Dosing Weight), Last Rate: Stopped (07/23/24 0302)  Pharmacy Consult - Pharmacy to dose,   Pharmacy Consult,   Pharmacy to dose vancomycin,   phenylephrine, 0.5-3 mcg/kg/min (Dosing Weight), Last Rate: 1.7 mcg/kg/min (07/23/24 0837)  propofol, 5-50 mcg/kg/min (Dosing Weight), Last Rate: 35 mcg/kg/min (07/23/24 1042)      ----------------------------------------------------------------------------------------------------------------------    Vital Signs:  Temp:  [98.1 °F (36.7 °C)-100.1 °F (37.8 °C)] 98.2 °F (36.8 °C)  Heart Rate:  [] 102  Resp:  [28-40] 40  BP: ()/(37-88) 116/63  FiO2 (%):  [30 %] 30 %  Mean Arterial Pressure (Non-Invasive) for the past 24 hrs (Last 3 readings):   Noninvasive MAP (mmHg)   07/23/24 1000 84   07/23/24 0945 74   07/23/24 0930 67     SpO2 Percentage    07/23/24 0945 07/23/24 1000 07/23/24 1011   SpO2: 96% 98% 100%     SpO2:  [93 %-100 %] 100 %  on   ;   Device (Oxygen Therapy): ventilator    Body mass index is 22.47 kg/m².  Wt Readings from Last 3 Encounters:   07/23/24 59.4 kg (130 lb 15.3 oz)   04/26/24 67.7 kg (149 lb 4 oz)   04/19/24 67.1 kg (148 lb)        Intake/Output Summary (Last 24 hours) at 7/23/2024 1105  Last data filed at 7/23/2024 0545  Gross per 24 hour   Intake 2499.7 ml   Output 1000 ml   Net 1499.7 ml     NPO Diet NPO Type: Tube Feeding  ----------------------------------------------------------------------------------------------------------------------      Physical Exam:    Constitutional: Thin, frail, chronically ill-appearing  female.  Currently intubated and sedated 30% FiO2.    HENT:  Head: Normocephalic and atraumatic.  Mouth:  Moist mucous membranes.    Eyes:   Conjunctivae and EOM are normal.  No scleral icterus.  Neck:  Neck supple.  No JVD present.    Cardiovascular:  Normal rate, regular rhythm and normal heart sounds with no murmur. No edema.  Pulmonary/Chest: Diminished lung sounds bilaterally.  Intubated at 30% FiO2.  Abdominal:  Soft.  Bowel sounds are normal.  No distension and no tenderness.   Musculoskeletal:  No edema, no tenderness, and no deformity.  No swelling or redness of joints.  Neurological: Sedate.  Skin:  Skin is warm and dry.  No rash noted.  No pallor.   Psychiatric: Sedate.        ----------------------------------------------------------------------------------------------------------------------  Results from last 7 days   Lab Units 07/23/24  0258 07/23/24  0058 07/17/24  2320   HSTROP T ng/L 66* 75* 188*     Results from last 7 days   Lab Units 07/22/24  0012 07/17/24  2119   PROBNP pg/mL 14,737.0* 3,550.0*     Results from last 7 days   Lab Units 07/18/24  0420   CHOLESTEROL mg/dL 136   TRIGLYCERIDES mg/dL 61   HDL CHOL mg/dL 67*   LDL CHOL mg/dL 56     Results from last 7 days   Lab Units 07/21/24  2106   PH, ARTERIAL pH units 7.315*   PO2 ART mm Hg 137.0*   PCO2, ARTERIAL mm Hg 48.2*   HCO3 ART mmol/L 24.5     Results from last 7 days   Lab Units 07/23/24  0058 07/22/24  0012 07/21/24  0130 07/21/24  0016 07/18/24  0420 07/18/24  0211 07/17/24  2119   CRP mg/dL 10.32*  --   --  6.50*  --   --  1.65*   LACTATE mmol/L 1.5  --  1.1  --   --   --  2.0   WBC 10*3/mm3 10.24 16.58*  --  20.24*   < > 6.42 8.49   HEMOGLOBIN g/dL 9.5* 9.9*  --  11.8*   < > 12.3 12.9   HEMATOCRIT % 29.6* 30.7*  --  36.3   < > 36.7 38.3   MCV fL 97.4* 95.6  --  96.3   < > 93.1 93.2   MCHC g/dL 32.1 32.2  --  32.5   < > 33.5 33.7   PLATELETS 10*3/mm3 235 227  --  227   < > 252 280   INR   --   --   --   --   --  0.93  --     < > = values in this interval not displayed.     Results from last 7 days   Lab Units 07/23/24  0058 07/22/24  0012 07/21/24  0016 07/19/24  2356  "07/18/24  0420 07/17/24  2119   SODIUM mmol/L 139 135* 130*   < > 132* 131*   POTASSIUM mmol/L 3.7 4.5 4.7   < > 4.0 4.1   MAGNESIUM mg/dL 2.4  --   --   --   --  1.7   CHLORIDE mmol/L 106 100 99   < > 96* 93*   CO2 mmol/L 24.1 23.9 20.5*   < > 25.2 26.2   BUN mg/dL 20 25* 17   < > 7* 8   CREATININE mg/dL 0.45* 0.47* 0.37*   < > 0.37* 0.49*   CALCIUM mg/dL 8.2* 8.2* 8.4*   < > 8.8 9.2   GLUCOSE mg/dL 208* 191* 122*   < > 130* 177*   ALBUMIN g/dL 3.6 4.1  --   --  3.1* 3.4*   BILIRUBIN mg/dL 0.2 0.4  --   --  <0.2 0.2   ALK PHOS U/L 104 142*  --   --  90 106   AST (SGOT) U/L 26 33*  --   --  37* 43*   ALT (SGPT) U/L 15 10  --   --  12 13    < > = values in this interval not displayed.   Estimated Creatinine Clearance: 116.9 mL/min (A) (by C-G formula based on SCr of 0.45 mg/dL (L)).  No results found for: \"AMMONIA\"    Glucose   Date/Time Value Ref Range Status   07/23/2024 0508 135 (H) 70 - 130 mg/dL Final   07/22/2024 2301 210 (H) 70 - 130 mg/dL Final   07/22/2024 1944 186 (H) 70 - 130 mg/dL Final   07/22/2024 1717 207 (H) 70 - 130 mg/dL Final   07/22/2024 1246 155 (H) 70 - 130 mg/dL Final   07/22/2024 0615 126 70 - 130 mg/dL Final   07/21/2024 2331 166 (H) 70 - 130 mg/dL Final   07/21/2024 1738 163 (H) 70 - 130 mg/dL Final     Lab Results   Component Value Date    HGBA1C 4.60 (L) 07/18/2024     Lab Results   Component Value Date    TSH 1.140 07/17/2024    FREET4 1.83 (H) 04/11/2024       Blood Culture   Date Value Ref Range Status   07/21/2024 No growth at 2 days  Preliminary   07/21/2024 No growth at 2 days  Preliminary   07/17/2024 No growth at 5 days  Final   07/17/2024 No growth at 5 days  Final     Urine Culture   Date Value Ref Range Status   07/17/2024 >100,000 CFU/mL Escherichia coli (A)  Final     No results found for: \"WOUNDCX\"  No results found for: \"STOOLCX\"  Respiratory Culture   Date Value Ref Range Status   07/21/2024 No growth  Preliminary   07/21/2024   Final    Rare growth Normal respiratory " ammon. No S. aureus or Pseudomonas aeruginosa detected. Final report.     Pain Management Panel           No data to display                  ----------------------------------------------------------------------------------------------------------------------  Imaging Results (Last 24 Hours)       ** No results found for the last 24 hours. **            ----------------------------------------------------------------------------------------------------------------------    Pertinent Infectious Disease Results                Assessment/Plan       Assessment     Septic shock with acute hypoxic respiratory failure requiring mechanical ventilation and pressor support  Pneumonia        Plan      Patient remains intubated and sedated at 30% FiO2 this morning.  Son and primary RN at bedside.  Diminished lung sounds bilaterally.  Abdomen soft, nontender.  Afebrile, no reported diarrhea.  WBC 10.24.  CRP 10.32.  Respiratory culture from 7/21/2024 finalized as normal respiratory ammon.  Repeat respiratory culture on 721 shows no growth thus far.  Fungal serologies remain in process.     Voriconazole was discontinued as there is no evidence of fungal infection at this time and to avoid any further prolongation of patient's QTc interval.  This patient's respiratory status is overall stable metronidazole was discontinued.  For now we will continue cefepime and doxycycline.  Will continue to follow closely and adjust antibiotic therapy as needed.      ANTIMICROBIAL THERAPY    cefepime 2000 mg IVPB in 100 mL NS (VTB)  doxycycline - 100 MG  Pharmacy to dose vancomycin     Code Status:   Code Status and Medical Interventions:   Ordered at: 07/18/24 6445     Code Status (Patient has no pulse and is not breathing):    CPR (Attempt to Resuscitate)     Medical Interventions (Patient has pulse or is breathing):    Full Support       SG Rao  07/23/24  11:05 EDT

## 2024-07-23 NOTE — PROCEDURES
"Chest Tube Insertion    Date/Time: 7/23/2024 5:27 PM    Performed by: Nilesh Back DO  Authorized by: Nilesh Back DO  Consent: The procedure was performed in an emergent situation.  Risks and benefits: risks, benefits and alternatives were discussed  Procedure consent: procedure consent matches procedure scheduled  Relevant documents: relevant documents present and verified  Test results: test results available and properly labeled  Site marked: the operative site was marked  Imaging studies: imaging studies available  Required items: required blood products, implants, devices, and special equipment available  Patient identity confirmed: hospital-assigned identification number  Time out: Immediately prior to procedure a \"time out\" was called to verify the correct patient, procedure, equipment, support staff and site/side marked as required.  Indications: pneumothorax    Sedation:  Patient sedated: yes  Sedation type: moderate (conscious) sedation  Sedatives: see MAR for details  Analgesia: see MAR for details  Vitals: Vital signs were monitored during sedation.    Anesthesia: see MAR for details  Preparation: skin prepped with providone-iodine  Placement location: left lateral  Scalpel size: 11  Tube size: 32 Cambodian  Dissection instrument: finger and Tish clamp  Ultrasound guidance: no  Tension pneumothorax heard: no  Tube connected to: suction  Drainage characteristics: air/blood tinged.  Suture material: 2-0 silk  Dressing: 4x4 sterile gauze, Xeroform gauze and petrolatum-impregnated gauze  Post-insertion x-ray findings: tube in good position  Patient tolerance: patient tolerated the procedure well with no immediate complications        "

## 2024-07-23 NOTE — PLAN OF CARE
Goal Outcome Evaluation:         Breathing trail initiated today.  Patient's RR increased in to the 40's quickly so patient placed back on full vent support.

## 2024-07-23 NOTE — PROGRESS NOTES
[facility] HOSPITALIST PROGRESS NOTE     Patient Identification:  Name:  Lexie KEITH Valdez  Age:  64 y.o.  Sex:  female  :  1960  MRN:  [id number]  Visit Number:  [id number]  ROOM: [address]     Primary Care Provider:  Violet Pop APRN     Date of Admission: 2024    Length of stay in inpatient status:  5    Subjective     Chief Compliant:    Chief Complaint   Patient presents with    Shortness of Breath     History of Presenting Illness:  The patient remains intubated and sedated with propofol and thus I was unable to obtain a history from her.  Please note that no family members were at bedside when I saw the patient today.  Present at bedside were nurses Shavon and Andreas.  Andreas called me urgently to see the patient as she had the development of subcutaneous emphysema that had been worsening for about 20 minutes.  When I arrived, the left neck and upper shoulder area had been marked with a pen and the subcutaneous emphysema was worsening.  On my exam, she did have decreased breath sounds on the left side compared to the right side.  Stat chest x-ray showed a small apical left-sided pneumothorax.  My hospitalist colleague, Dr. Back, graciously placed a left-sided chest tube; repeat chest x-ray demonstrated improvement of the pneumothorax afterwards.    Consults:  Arjun Mcmahon and Christopher with pulmonology  Arjun Acevedo and Fox with cardiology  Dr. Clemons with infectious disease  Dr. Soto with general surgery     Procedures:  2024:  Oral intubated and mechanical ventilation  2024:  Right internal jugular triple lumen central line placement  2024:  Placement of a left sided chest tube due to pneumothorax    Objective     Current Hospital Meds:  aspirin, 81 mg, Oral, Daily  Brexpiprazole, 0.5 mg, Oral, Daily  cefepime, 2,000 mg, Intravenous, Q8H  chlorhexidine, 15 mL, Mouth/Throat, Q12H  donepezil, 10 mg, Oral, Q PM  doxycycline, 100 mg, Oral, Q12H  enoxaparin, 40 mg,  Subcutaneous, Nightly  hydroxychloroquine, 200 mg, Oral, BID  insulin regular, 2-7 Units, Subcutaneous, Q6H  ipratropium-albuterol, 3 mL, Nebulization, Q4H  levothyroxine, 25 mcg, Oral, Daily With Breakfast  memantine, 5 mg, Oral, Q12H  methylPREDNISolone sodium succinate, 60 mg, Intravenous, Q12H  [Held by provider] metoprolol succinate XL, 25 mg, Oral, Q24H  mupirocin, 1 application , Each Nare, BID  nystatin, 1 Application, Topical, Q12H  pantoprazole, 40 mg, Intravenous, Q AM  Phenylephrine HCl-NaCl, , ,   Phenylephrine HCl-NaCl, , ,   sodium chloride, 10 mL, Intravenous, Q12H  sodium chloride, 10 mL, Intravenous, Q12H  sodium chloride, 10 mL, Intravenous, Q12H  sodium chloride, 10 mL, Intravenous, Q12H  sodium chloride, 10 mL, Intravenous, Q12H  sodium chloride, 10 mL, Intravenous, Q12H    fentanyl 10 mcg/mL,  mcg/hr, Last Rate: 250 mcg/hr (07/23/24 1950)  norepinephrine, 0.02-0.3 mcg/kg/min (Dosing Weight), Last Rate: Stopped (07/23/24 0302)  Pharmacy Consult - Pharmacy to dose,   Pharmacy Consult,   Pharmacy to dose vancomycin,   phenylephrine, 0.5-3 mcg/kg/min (Dosing Weight), Last Rate: 1.4 mcg/kg/min (07/23/24 1609)  propofol, 5-50 mcg/kg/min (Dosing Weight), Last Rate: 45 mcg/kg/min (07/23/24 1702)      Current Antimicrobial Therapy:  Anti-Infectives (From admission, onward)      Ordered     Dose/Rate Route Frequency Start Stop    07/21/24 2206  doxycycline (MONODOX) capsule 100 mg        Ordering Provider: John Mcmahon MD    100 mg Oral Every 12 Hours Scheduled 07/21/24 2300 07/26/24 2059    07/21/24 0424  cefepime 2000 mg IVPB in 100 mL NS (VTB)        Ordering Provider: Estuardo Charles MD    2,000 mg  over 4 Hours Intravenous Every 8 Hours 07/21/24 1400 07/28/24 1359    07/21/24 0856  vancomycin 1250 mg/250 mL 0.9% NS IVPB ([facility])        Ordering Provider: Eduardo Freeman MD    1,250 mg  over 75 Minutes Intravenous Once 07/21/24 1000 07/21/24 1114    07/21/24 0834  Pharmacy to dose  vancomycin        Ordering Provider: Eduardo Freeman MD     Does not apply Continuous PRN 07/21/24 0833 07/28/24 0832    07/21/24 0424  cefepime 2000 mg IVPB in 100 mL NS (VTB)        Ordering Provider: Estuardo Charles MD    2,000 mg  over 30 Minutes Intravenous Once 07/21/24 0600 07/21/24 0558    07/19/24 1540  hydroxychloroquine (PLAQUENIL) tablet 200 mg        Ordering Provider: Estuardo Charles MD    200 mg Oral 2 Times Daily 07/19/24 2100      07/17/24 2218  cefTRIAXone (ROCEPHIN) 2,000 mg in sodium chloride 0.9 % 100 mL IVPB-VTB        Ordering Provider: Al Lewis MD    2,000 mg  200 mL/hr over 30 Minutes Intravenous Once 07/17/24 2234 07/18/24 0035          Current Diuretic Therapy:  Diuretics (From admission, onward)      Ordered     Dose/Rate Route Frequency Start Stop    07/21/24 0637  furosemide (LASIX) injection 40 mg        Ordering Provider: Estuardo Charles MD    40 mg Intravenous Once 07/21/24 0730 07/21/24 0914    07/20/24 2323  furosemide (LASIX) injection 40 mg        Ordering Provider: Estuardo Charles MD    40 mg Intravenous Once 07/21/24 0015 07/21/24 0008          ----------------------------------------------------------------------------------------------------------------------  Vital Signs:  Temp:  [98.2 °F (36.8 °C)-100.1 °F (37.8 °C)] 98.4 °F (36.9 °C)  Heart Rate:  [] 74  Resp:  [28-40] 28  BP: ()/(37-88) 108/63  FiO2 (%):  [30 %] 30 %  SpO2:  [85 %-100 %] 100 %  on   ;   Device (Oxygen Therapy): ventilator  Body mass index is 22.47 kg/m².    Wt Readings from Last 3 Encounters:   07/23/24 59.4 kg (130 lb 15.3 oz)   04/26/24 67.7 kg (149 lb 4 oz)   04/19/24 67.1 kg (148 lb)     Intake & Output (last 3 days)         07/20 0701 07/21 0700 07/21 0701 07/22 0700 07/22 0701 07/23 0700 07/23 0701 07/24 0700    P.O. 458       I.V. (mL/kg) 413.9 (7.2) 1354.5 (23.7) 1414.7 (24.7)     Other  0 323     NG/GT  172 362     IV Piggyback 200 600 400      Total Intake(mL/kg) 1071.9 (18.7) 2126.5 (37.2) 2499.7 (43.7)     Urine (mL/kg/hr) 1450 (1.1) 775 (0.6) 1000 (0.7)     Emesis/NG output  0 0     Stool 0 0 0     Total Output 1518 309 2229     Net -378.1 +1351.5 +1499.7             Urine Unmeasured Occurrence 1 x       Stool Unmeasured Occurrence 2 x 0 x 0 x     Emesis Unmeasured Occurrence  0 x 0 x           NPO Diet NPO Type: Tube Feeding  ----------------------------------------------------------------------------------------------------------------------  Physical Exam; the biggest change in her physical exam today is that she had the development of left neck and chest area subcutaneous emphysema with decreased breath sounds on the left side.  The rest of the physical examination is similar to yesterday.    Vitals and nursing note reviewed. Exam conducted with a chaperone present.   Constitutional:       General: She is in acute distress.      Appearance: Normal appearance. She is well-developed. She is not toxic-appearing or diaphoretic.      Interventions: She is sedated and intubated.      Comments: Appears acutely and chronically ill.   HENT:      Head: Normocephalic and atraumatic.      Right Ear: External ear normal.      Left Ear: External ear normal.      Nose: Nose normal.   Eyes:      General: No scleral icterus.        Right eye: No discharge.         Left eye: No discharge.      Extraocular Movements: Extraocular movements intact.      Conjunctiva/sclera: Conjunctivae normal.      Pupils: Pupils are equal, round, and reactive to light.   Cardiovascular:      Rate and Rhythm: Normal rate and regular rhythm.      Pulses: Normal pulses.      Heart sounds: No murmur heard.  Pulmonary:      Effort: Respiratory distress present. She is intubated.      Breath sounds: She still does not have any crackles or wheezes, but she does have decreased breath sounds on the left side compared to yesterday.  Abdominal:      General: Bowel sounds are normal. There is  no distension.      Palpations: Abdomen is soft.   Musculoskeletal:         General: No swelling, deformity or signs of injury.   Skin:     Capillary Refill: Capillary refill takes less than 2 seconds.      Coloration: Skin is not jaundiced or pale.   Neurological:      Motor: Tremor present. No seizure activity.      Comments: Sedated by propofol and orally intubated.  Thus, I cannot perform this portion of the physical.  She does try to answer some yes and no questions and the sedation is so light that she is able to follow simple commands.  She did have rhythmic movement of her lower lip on the left side as well as the bilateral hands and feet, which is thought to be due to her known tremors.   Psychiatric:      Comments: Sedated with propofol and thus I cannot perform this portion of the physical exam.      ----------------------------------------------------------------------------------------------------------------------  Tele:  Normal sinus rhythm with rates 80s to 90s.  I personally reviewed the telemetry strips.  Overnight though, the heart rates were more 60-70 range.  ----------------------------------------------------------------------------------------------------------------------  LABS:    Pending test results:  Pending Labs       Order Current Status    Aspergillus Galactomannan Antigen - Blood, Arm, Right In process    Fungal Antibodies, Quantitative Double Immunodiffusion In process    Fungitell B-D Glucan In process    Blood Culture - Blood, Arm, Left Preliminary result    Blood Culture - Blood, Arm, Right Preliminary result    Respiratory Culture - Sputum, ET Suction Preliminary result          CBC and coagulation:  Results from last 7 days   Lab Units 07/23/24  0058 07/22/24  0012 07/21/24  1309 07/21/24  0130 07/21/24  0016 07/19/24  2356 07/19/24  0046 07/18/24  0420 07/18/24  0211 07/17/24  2119   PROCALCITONIN ng/mL  --  0.24 0.21 0.07  --   --   --   --   --  0.06   LACTATE mmol/L 1.5  --    --  1.1  --   --   --   --   --  2.0   CRP mg/dL 10.32*  --   --   --  6.50*  --   --   --   --  1.65*   WBC 10*3/mm3 10.24 16.58*  --   --  20.24* 13.37* 15.07* 7.25 6.42 8.49   HEMOGLOBIN g/dL 9.5* 9.9*  --   --  11.8* 11.5* 12.3 12.1 12.3 12.9   HEMATOCRIT % 29.6* 30.7*  --   --  36.3 36.2 37.6 36.8 36.7 38.3   MCV fL 97.4* 95.6  --   --  96.3 99.7* 94.0 95.1 93.1 93.2   MCHC g/dL 32.1 32.2  --   --  32.5 31.8 32.7 32.9 33.5 33.7   PLATELETS 10*3/mm3 235 227  --   --  227 214 269 276 252 280   INR   --   --   --   --   --   --   --   --  0.93  --    D DIMER QUANT MCGFEU/mL  --   --   --   --   --   --   --   --   --  0.29     Acid/base balance:  Results from last 7 days   Lab Units 07/21/24  2106 07/21/24  0811 07/20/24  2343 07/20/24  1255 07/17/24  2203   PH, ARTERIAL pH units 7.315* 7.367 7.403 7.410 7.458*   PCO2, ARTERIAL mm Hg 48.2* 46.6* 40.3 37.9 40.2   PO2 ART mm Hg 137.0* 101.0 107.0 49.7* 154.0*   HCO3 ART mmol/L 24.5 26.8* 25.1 24.0 28.4*      [facility] Reference Range & Units 07/23/24 04:02   pH, Venous 7.320 - 7.420 pH Units 7.375   pCO2, Venous 41.0 - 51.0 mm Hg 46.6   pO2, Venous 27.0 - 53.0 mm Hg 35.7   HCO3, Venous 22.0 - 28.0 mmol/L 27.2   Base Excess 0.0 - 2.0 mmol/L 1.6   O2 Saturation, Venous 45.0 - 75.0 % 72.3     Renal and electrolytes:  Results from last 7 days   Lab Units 07/23/24  0058 07/22/24  0012 07/21/24  0016 07/19/24  2356 07/18/24  0420 07/17/24  2119   SODIUM mmol/L 139 135* 130* 133* 132* 131*   POTASSIUM mmol/L 3.7 4.5 4.7 4.5 4.0 4.1   MAGNESIUM mg/dL 2.4  --   --   --   --  1.7   CHLORIDE mmol/L 106 100 99 102 96* 93*   CO2 mmol/L 24.1 23.9 20.5* 22.2 25.2 26.2   BUN mg/dL 20 25* 17 15 7* 8   CREATININE mg/dL 0.45* 0.47* 0.37* 0.44* 0.37* 0.49*   CALCIUM mg/dL 8.2* 8.2* 8.4* 8.8 8.8 9.2   PHOSPHORUS mg/dL 1.2*  --   --   --   --   --    GLUCOSE mg/dL 208* 191* 122* 114* 130* 177*   ANION GAP mmol/L 8.9 11.1 10.5 8.8 10.8 11.8     Estimated Creatinine Clearance: 116.9 mL/min  (A) (by C-G formula based on SCr of 0.45 mg/dL (L)).    Liver and pancreatic function:  Results from last 7 days   Lab Units 07/23/24  0058 07/22/24  0012 07/18/24  0420 07/17/24  2119   ALBUMIN g/dL 3.6 4.1 3.1* 3.4*   BILIRUBIN mg/dL 0.2 0.4 <0.2 0.2   ALK PHOS U/L 104 142* 90 106   AST (SGOT) U/L 26 33* 37* 43*   ALT (SGPT) U/L 15 10 12 13     Endocrine function:  Lab Results   Component Value Date    HGBA1C 4.60 (L) 07/18/2024     Point of care bedside glucose levels:  Results from last 7 days   Lab Units 07/23/24  1819 07/23/24  1222 07/23/24  0508 07/22/24  2301 07/22/24  1944 07/22/24  1717 07/22/24  1246 07/22/24  0615 07/21/24  2331 07/21/24  1738 07/21/24  1141 07/20/24  1237   GLUCOSE mg/dL 133* 165* 135* 210* 186* 207* 155* 126 166* 163* 160* 167*     Glucose levels from the [facility]:  Results from last 7 days   Lab Units 07/23/24  0058 07/22/24  0012 07/21/24  0016 07/19/24  2356 07/18/24  0420 07/17/24  2119   GLUCOSE mg/dL 208* 191* 122* 114* 130* 177*     Lab Results   Component Value Date    TSH 1.140 07/17/2024    FREET4 1.83 (H) 04/11/2024     Cardiac:  Results from last 7 days   Lab Units 07/23/24  0258 07/23/24  0058 07/22/24  0012 07/17/24  2320 07/17/24  2119   HSTROP T ng/L 66* 75*  --  188* 229*   PROBNP pg/mL  --   --  14,737.0*  --  3,550.0*     Cultures:  Microbiology Results (last 10 days)       Procedure Component Value - Date/Time    Mycoplasma Pneumoniae Antibody, IgM - Blood, Arm, Left [[id number]]  (Normal) Collected: 07/22/24 0012    Lab Status: Final result Specimen: Blood from Arm, Left Updated: 07/22/24 0202     Mycoplasma pneumo IgM Negative    Legionella Antigen, Urine - Urine, Urine, Clean Catch [[id number]]  (Normal) Collected: 07/21/24 2242    Lab Status: Final result Specimen: Urine, Clean Catch Updated: 07/21/24 2308     LEGIONELLA ANTIGEN, URINE Negative    Narrative:      Presumptive negative for L. pneumophilia serogroup 1 antigen, suggesting no recent or current infection.     Respiratory Culture - Sputum, ET Suction [[id number]] Collected: 07/21/24 2231    Lab Status: Preliminary result Specimen: Sputum from ET Suction Updated: 07/23/24 1056     Respiratory Culture No growth     Gram Stain Rare (1+) Mixed ammon      No WBCs seen      No Epithelial cells seen    MRSA Screen, PCR (Inpatient) - Swab, Nares [[id number]]  (Normal) Collected: 07/21/24 1252    Lab Status: Final result Specimen: Swab from Nares Updated: 07/21/24 1424     MRSA PCR No MRSA Detected    Respiratory Panel PCR w/COVID-19(SARS-CoV-2) RHONDA/HEATHER/TASNEEM/PAD/COR/CHRISTELLE In-House, NP Swab in UTM/VTM, 2 HR TAT - Swab, Nasopharynx [[id number]]  (Normal) Collected: 07/21/24 1247    Lab Status: Final result Specimen: Swab from Nasopharynx Updated: 07/21/24 1357     ADENOVIRUS, PCR Not Detected     Coronavirus 229E Not Detected     Coronavirus HKU1 Not Detected     Coronavirus NL63 Not Detected     Coronavirus OC43 Not Detected     COVID19 Not Detected     Human Metapneumovirus Not Detected     Human Rhinovirus/Enterovirus Not Detected     Influenza A PCR Not Detected     Influenza B PCR Not Detected     Parainfluenza Virus 1 Not Detected     Parainfluenza Virus 2 Not Detected     Parainfluenza Virus 3 Not Detected     Parainfluenza Virus 4 Not Detected     RSV, PCR Not Detected     Bordetella pertussis pcr Not Detected     Bordetella parapertussis PCR Not Detected     Chlamydophila pneumoniae PCR Not Detected     Mycoplasma pneumo by PCR Not Detected    Respiratory Culture - Sputum, ET Suction [[id number]] Collected: 07/21/24 1152    Lab Status: Final result Specimen: Sputum from ET Suction Updated: 07/23/24 1053     Respiratory Culture Rare growth Normal respiratory ammon. No S. aureus or Pseudomonas aeruginosa detected. Final report.     Gram Stain Moderate (3+) WBCs seen      Rare (1+) Epithelial cells seen      No organisms seen    Blood Culture - Blood, Arm, Left [[id number]]  (Normal) Collected: 07/21/24 0450    Lab Status:  Preliminary result Specimen: Blood from Arm, Left Updated: 07/23/24 0515     Blood Culture No growth at 2 days    Blood Culture - Blood, Arm, Right [[id number]]  (Normal) Collected: 07/21/24 0448    Lab Status: Preliminary result Specimen: Blood from Arm, Right Updated: 07/23/24 0530     Blood Culture No growth at 2 days    Urine Culture - Urine, Urine, Catheter [[id number]]  (Abnormal)  (Susceptibility) Collected: 07/17/24 2145    Lab Status: Final result Specimen: Urine, Catheter Updated: 07/20/24 0959     Urine Culture >100,000 CFU/mL Escherichia coli        Blood Culture - Blood, Arm, Right [[id number]]  (Normal) Collected: 07/17/24 2119    Lab Status: Final result Specimen: Blood from Arm, Right Updated: 07/22/24 2131     Blood Culture No growth at 5 days    Blood Culture - Blood, Arm, Left [[id number]]  (Normal) Collected: 07/17/24 2119    Lab Status: Final result Specimen: Blood from Arm, Left Updated: 07/22/24 2131     Blood Culture No growth at 5 days       I have personally looked at the labs and they are summarized above.  ----------------------------------------------------------------------------------------------------------------------  Detailed radiology reports for the last 24 hours:    Imaging Results (Last 24 Hours)       Procedure Component Value Units Date/Time    XR Chest 1 View [[id number]] Collected: 07/23/24 1750     Updated: 07/23/24 1753    Narrative:      INDICATION: Chest tube placement.     TECHNIQUE: One view of the chest.     COMPARISON: Radiograph from earlier today.       Impression:      FINDINGS/IMPRESSION: Interval placement of left-sided chest tube.  Decreased volume left pneumothorax. Otherwise no significant change.         This report was finalized on 7/23/2024 5:51 PM by Alex Pallas, DO.       XR Chest 1 View [[id number]] Collected: 07/23/24 1702     Updated: 07/23/24 1707    Narrative:      INDICATION: Chest pain.     TECHNIQUE: Frontal radiograph of the chest.     COMPARISON:  Radiograph from yesterday       Impression:      FINDINGS/IMPRESSION:    Heart size is similar. Multifocal infiltrates/edema. Slightly worsened.  Small left apical pneumothorax, new from prior, approximately 10-20%  volume. Subcutaneous emphysema in the neck.  Enteric tube in the stomach. Endotracheal tube tip approximately 2 cm  above the irene. Right-sided central venous catheter tip in the  cavoatrial junction.     Report called to Dima ROESN at 203PM PST 7/23/2024.        This report was finalized on 7/23/2024 5:05 PM by Alex Pallas, DO.             I have personally looked at the radiology images and I have read the available final reports.    Assessment & Plan      -Acute NSTEMI, type 1 (positive stress test on 7/18/2024 without any reversible ischemia, that was present on admission  -History of IPF and COPD with chronic hypoxia (uses 3 L/min at home)  -Acute exacerbation of IPF/COPD causing acute hypoxic and hypercapnic respiratory failure on top of chronic hypoxic respiratory failure and sepsis (developed on 7/21/2024 due to a suspected aspiration episode) resulting in oral intubation and mechanical ventilation from aspiration pneumonitis  -Hypotension, septic vs sedation vs diuretic induced, developed during admission  -New onset heart failure with reduced EF, acute exacerbation that developed on 7/21/2024  -Prolonged QT that developed during the hospitalization  -History of essential hypertension  -History of hypothyroidism  -History of stress urinary incontinence with frequent UTI's, with an acute E coli UTI that was present on admission  -History of intermittent, diffuse body tremors    Infectious disease team discontinued the voriconazole today as they have not found any objective evidence of a fungal infection; discontinuation of this medication may also help with the prolonged QTc.  Pulmonology was initially going to perform a bronchoscopy today but the risk seemed greater than the benefit as the the  plateau pressure was high due the severe pulmonary fibrosis.  After the specialist evaluated the patient, that is when I was informed about the subcutaneous emphysema; stat chest x-ray showed an apical left-sided pneumothorax.  Nurse Andreas noted that the peak pressures decreased after the subcutaneous emphysema started developing.  Dr. Back graciously placed a chest tube; repeat chest x-ray shows improvement in the pneumothorax.  We will repeat a chest x-ray tomorrow to evaluate the current state of the pneumothorax.  I will inform the consultants about this new acute issue tomorrow.  At the beginning of yesterday, the patient was on maximum dose of Levophed; as of 3 AM today, the Levophed has been weaned off and replaced with Yair-Synephrine.  Please note that the tachycardia that we saw with the Levophed has resolved since changing the patient over to Yair-Synephrine.  We will continue to monitor her blood pressures closely, with a mean arterial blood pressure goal being 65 mmHg or above.    Yesterday, started the patient on regular insulin sliding scale as she had some hyperglycemia once the tube feeds reached goal.  In the last 24 hours, the patient has only received 8 total units of regular insulin.  We will continue to monitor her glucose levels every 6 hours.    The patient failed a spontaneous breathing trial today and thus we will repeat this trial tomorrow.  If after an extended period of time, such as 2 weeks, the patient is unable to come off of the ventilator, then we need to consider trach and PEG placement.  Please note that palliative care continues to follow the patient and help the family with goals of care discussions.  Currently, the patient is still a full code with full interventions.    I will repeat the patient's blood work in the morning and this will also include a venous blood gas.    VTE Prophylaxis:  DVT dose Lovenox     Disposition:  Undetermined; if she survives then may need trach  and PEG and LTAC, but it is too early to decide all of this     Total critical care time was 44 minutes.    Jessica Ospina MD  [facility]  07/23/24  21:01 EDT

## 2024-07-23 NOTE — PLAN OF CARE
Goal Outcome Evaluation:  Plan of Care Reviewed With: patient        Progress: declining  Outcome Evaluation: pt VSS, remains intubated and sedated, on pressors and had chest tube inserted emergently, pt has slight Subcutaneous Emphysema that is improving. UOP adequate and afebrile. family at bedside and supporive of care. Failed SAT and SBT today.

## 2024-07-23 NOTE — PROGRESS NOTES
"Palliative Care Daily Progress Note     S: Medical record reviewed, followed up with Primary RN Andreas and Dr Ospina regarding patient's condition. When I saw Lexie this morning her sedation had been turned off to attempt breathing trial, however she was restless/anxious with her baseline tremor per her family. She was able to shake her head no when I asked her if she was in pain, her face was red and she appeared scared, her son Faith was at bedside holding her hand, talking to her to calm her. She was in mild(emotional) distress at this time.      O:   Palliative Performance Scale Score:     /56   Pulse 65   Temp 98.6 °F (37 °C) (Axillary)   Resp 28   Ht 162.6 cm (64.02\")   Wt 59.4 kg (130 lb 15.3 oz)   SpO2 100%   BMI 22.47 kg/m²     Intake/Output Summary (Last 24 hours) at 7/23/2024 1454  Last data filed at 7/23/2024 0545  Gross per 24 hour   Intake 2499.7 ml   Output 1000 ml   Net 1499.7 ml       PE:  General Appearance:    Chronically ill appearing, sedation off intubated on vent   HEENT:    NC/AT, without obvious abnormality, EOMI, anicteric    Neck:   supple, trachea midline, no JVD   Lungs:     Sedated on vent @ 30% and 8  of peep    Heart:    RRR, normal S1 and S2, no M/R/G   Abdomen:     Soft, NT, ND, NABS    Extremities:   Moves all extremities weakly, trace BLL extremity edema   Pulses:   Pulses palpable and equal bilaterally   Skin:   Warm, dry   Neurologic:   Awake alert to self, unable to assess further   Psych:   Restless/anxious, sedation on hold          Meds: Reviewed and changes noted    Labs:   Results from last 7 days   Lab Units 07/23/24  0058   WBC 10*3/mm3 10.24   HEMOGLOBIN g/dL 9.5*   HEMATOCRIT % 29.6*   PLATELETS 10*3/mm3 235     Results from last 7 days   Lab Units 07/23/24  0058   SODIUM mmol/L 139   POTASSIUM mmol/L 3.7   CHLORIDE mmol/L 106   CO2 mmol/L 24.1   BUN mg/dL 20   CREATININE mg/dL 0.45*   GLUCOSE mg/dL 208*   CALCIUM mg/dL 8.2*     Results from last 7 days "   Lab Units 07/23/24  0058   SODIUM mmol/L 139   POTASSIUM mmol/L 3.7   CHLORIDE mmol/L 106   CO2 mmol/L 24.1   BUN mg/dL 20   CREATININE mg/dL 0.45*   CALCIUM mg/dL 8.2*   BILIRUBIN mg/dL 0.2   ALK PHOS U/L 104   ALT (SGPT) U/L 15   AST (SGOT) U/L 26   GLUCOSE mg/dL 208*     Imaging Results (Last 72 Hours)       Procedure Component Value Units Date/Time    XR Chest 1 View [[id number]] Collected: 07/22/24 0710     Updated: 07/22/24 0713    Narrative:      CLINICAL HISTORY: Intubated.     COMPARISON: 7/21/2024.     TECHNIQUE:  Single AP view of the chest       Impression:      FINDINGS AND IMPRESSION:    Endotracheal tube tip is 2.1 cm above the irene.  Nasogastric tube tip is not seen but is below the gastroesophageal  junction.  Right internal jugular vein central venous catheter tip is at the  cavoatrial junction.  Low lung volumes with fibrotic changes are again seen.  Upper limit normal heart size.  No pleural effusion or pneumothorax.  Status post lap banding.  Surgical clips are also seen in the right  upper quadrant.        This report was finalized on 7/22/2024 7:11 AM by Violette Wilcox MD.       CT Chest Without Contrast Diagnostic [[id number]] Collected: 07/21/24 1613     Updated: 07/21/24 1617    Narrative:      PROCEDURE: CT CHEST WO CONTRAST DIAGNOSTIC-     HISTORY: hypoxia, progressive fibrosis; I21.4-Non-ST elevation (NSTEMI)  myocardial infarction     COMPARISON: 5/8/2024.     PROCEDURE:  Multiple axial CT images were obtained from the thoracic  inlet through the upper abdomen without the use of contrast. This study  was performed with techniques to keep radiation doses as low as  reasonably achievable (ALARA). Individualized dose reduction techniques  using automated exposure control or adjustment of mA and/or kV according  to the patient size were employed.     FINDINGS:  An endotracheal tube is in place with the tip well positioned above the  irene. An nasogastric tube is present in the body of the  stomach. There  is no adenopathy within the chest. The heart is normal in size. The  aorta is normal in caliber. There is a small right pleural effusion.  Lung windows reveal interlobular septal thickening and subpleural lines  in the upper and lower lobes with bronchiectasis in the left lower lobe.  There are diffuse bilateral groundglass opacities. The visualized upper  abdomen is unremarkable. Bone windows reveal no acute osseous  abnormalities.       Impression:      1. Groundglass opacities and small right pleural effusion which may  reflect pulmonary edema or pneumonia.  2. Fibrotic lung changes consistent with usual interstitial pneumonitis.        This report was finalized on 7/21/2024 4:15 PM by Urban Arzola M.D..       XR Chest 1 View [[id number]] Collected: 07/21/24 1306     Updated: 07/21/24 1309    Narrative:      INDICATION: Tube placement     TECHNIQUE: Frontal radiograph of the chest.     COMPARISON: Radiograph from earlier the same day.       Impression:      FINDINGS/IMPRESSION:    Endotracheal tube tip approximately 3 cm above the irene. Enteric tube  in stomach. Right IJ central venous catheter tip in the superior vena  cava. Improved multifocal infiltrates/edema. No pleural effusion or  pneumothorax. No acute fracture.        This report was finalized on 7/21/2024 1:07 PM by Alex Pallas, DO.       XR Chest 1 View [[id number]] Collected: 07/21/24 0901     Updated: 07/21/24 0905    Narrative:      CLINICAL HISTORY: Central line placement.     COMPARISON: 7/21/2024 at 6:37 AM.     TECHNIQUE:  Single AP view of the chest       Impression:      FINDINGS AND IMPRESSION:    1.  Since the prior study, a right internal jugular vein central venous  catheter has been placed, the tip of which is in the cavoatrial  junction.  2.  No pneumothorax.  3.  Unchanged extensive perihilar groundglass opacities and  consolidation.  4.  No pleural effusion.  5.  Endotracheal tube tip is 1.2 cm above the  irene.  6.  Nasogastric tube tip is in the stomach.  7.  A lap band is again seen.     This report was finalized on 7/21/2024 9:03 AM by Violette Wilcox MD.       XR Chest 1 View [[id number]] Collected: 07/21/24 0722     Updated: 07/21/24 0726    Narrative:      PROCEDURE: Portable chest x-ray examination performed on July 21, 2024.  Single view. Upright position.     HISTORY: Endotracheal tube and OG tube placement. Confirm position.     COMPARISON: None.     FINDINGS:     Mild enlarged heart size  Endotracheal tube in place with tip 1.8 cm above the irene.  Enteric drain in place with tip to the stomach.  Lap band device in place.  Central pulmonary vascular congestion.  Patchy and confluent airspace opacities in the upper and lower lobes  could represent underlying multifocal pneumonia and/or edema.  Mild hypoinflated lungs  No pleural effusion. No pneumothorax.  Mild levoconvex curve at the upper thoracic spine.  Cholecystectomy clips in the right upper quadrant.       Impression:         1.  Endotracheal tube in place with tip 1.8 cm above the irene.  2.  Enteric drain in place with tip in the stomach.  3.  Multifocal pneumonia with central pulmonary vascular congestion.  4.  Diffuse edema in the lung parenchyma is also possible.  5.  No pleural effusion or pneumothorax  6.  No pneumomediastinum.     This report was finalized on 7/21/2024 7:24 AM by Elian Islas MD.       XR Chest 1 View [[id number]] Collected: 07/21/24 0125     Updated: 07/21/24 0130    Narrative:      PROCEDURE: Portable chest x-ray examination performed on July 21, 2024.  Single view. Upright position.     HISTORY: Dyspnea. Hypoxia.     COMPARISON: None.     FINDINGS:     Enlarged heart size  Patchy and confluent airspace opacities in the upper and lower lobes  consistent with multifocal pneumonia  Central pulmonary vascular congestion with edema.  No pleural effusion. No pneumothorax  Lap band device in place  Surgical clips in the right  upper quadrant.  Mild dextroconvex curve at the thoracolumbar junction.       Impression:         1.  Multifocal pneumonia.  2.  Enlarged heart size.  3.  Central pulmonary vascular congestion.  4.  Edema.  5.  No pleural effusion.   6.  No pneumothorax.  7.  Lap band device in place.  8.  No free air in the upper abdomen.        This report was finalized on 7/21/2024 1:28 AM by Elian Islas MD.                 Diagnostics: Reviewed    A: Lexie Valdez is a 64 y.o.female admitted on 7/17/2024 due to shortness of breath. Lexie has a medical history of  anxiety/depression, arthritis, HTN, hypothyroidism, stress urinary incontinence with frequent UTI's, COPD and pulmonary fibrosis on 3L NC at home, and resting tremors in face and extremities, who presents with worsening shortness of breath for the last couple days. Pt also at time of admission c/o chest pressure associated with dyspnea stating this was chronic however had worsened in the days leading up to her admission. She also endorsed diarrhea for the last couple of days as well as dysuria and stated that she has recurring UTI's. Labs in ER revealed Troponin of 229 with repeat 188, BNP was 3550, Na 131, Cr 0.49, glucose 177, CRP 1.65, lactate 2.0, procal and WBC normal but with 88.7% neutrophils. UA showed positive nitrite, trace leuk, 6-10 WBC, 4+ bacteria and 3-6 squamous epithelial cells. She was tachycardic and tachypneic with pH of 7.458, CO2 40, Po2 154, bicarb 28. She was admitted to PCU on admission and in the early am of 7/21 was transferred to CCU due to worsening dyspnea, bilateral infiltrates without effusion concerning for interstitial debbi disease flare, was transferred to CCU. Initially she was placed on Bipap in CCU and at 7/21 6:20 am Lexie was sedated intubated and placed on the ventilator. Palliative care consulted for GOC,ACP and for support of family.       P:  I was able to speak with both patients  Khris and son Faith at bedside, as well  pts sister Rosa was present. We discussed sedation vacation, and that she had failed awakening trial and that decision to not due Bronchoscopy as risk outweighs benefit. Plan at this time is to continue all treatment and to attempt further breathing trials.       We will continue to follow along. Please do not hesitate to contact us regarding further sx mgmt or GOC needs, including after hours or on weekends via our on call provider at [phone].     Linh Lou, APRN    7/23/2024

## 2024-07-24 PROCEDURE — 99291 CRITICAL CARE FIRST HOUR: CPT | Performed by: INTERNAL MEDICINE

## 2024-07-24 NOTE — CASE MANAGEMENT/SOCIAL WORK
Continued Stay Note  NEIDA Alex     Patient Name: Lexie KEITH Valdez  MRN: [id number]  Today's Date: 7/24/2024    Admit Date: 7/17/2024     Discharge Plan       Row Name 07/24/24 1321       Plan    Plan Pt remains intubated. Pt lives with spouse. Patient does not utilize Home Health. Pt utilizes BP Cuff, SC, BSC, Nebulizer, O2 @ 3L, Portable Concentrator, Pulse Ox, Rollator, Shower Chair & WC from [facility]. SS to follow up with discharge planning once pt has been extubated and is medically stable. SS to follow.            ADIN Garcia

## 2024-07-24 NOTE — PROGRESS NOTES
"Palliative Care Daily Progress Note     S: Medical record reviewed, followed up with Primary JESSIKA Owens and Dr Ospina regarding patient's condition. Per conversation with Dr Ospina pt had developed subcutaneous emphysema with xray noting pneumothorax through the night and ended up receiving a Chest tube. This morning when I saw pt she was awake and was anxious with tremor(baseline per family) son Faith was at bedside holding her hand trying to calm and reassure her. She actually was able to shake her head no when I asked her if she was in pain and id not appear to be.       O:   Palliative Performance Scale Score:     /61   Pulse 73   Temp 98.5 °F (36.9 °C)   Resp 28   Ht 162.6 cm (64.02\")   Wt 62.3 kg (137 lb 5.6 oz)   SpO2 100%   BMI 23.56 kg/m²     Intake/Output Summary (Last 24 hours) at 7/24/2024 1546  Last data filed at 7/24/2024 0550  Gross per 24 hour   Intake 2678.27 ml   Output 995 ml   Net 1683.27 ml       PE:  General Appearance:    Chronically ill appearing, sedation off intubated on vent   HEENT:    NC/AT, without obvious abnormality, EOMI, anicteric    Neck:   Supple slight subcutaneous air noted on neck, trachea midline, no JVD   Lungs:     Sedated on vent @ 30% and 8  of peep, left CT in place, coarse lung sounds noted    Heart:    RRR, normal S1 and S2, no M/R/G   Abdomen:     Soft, NT, ND, NABS    Extremities:   Moves all extremities weakly, trace BLL extremity edema   Pulses:   Pulses palpable and equal bilaterally   Skin:   Warm, dry   Neurologic:   Awake alert to self, unable to assess further   Psych:   Restless/anxious, sedation on hold          Meds: Reviewed and changes noted    Labs:   Results from last 7 days   Lab Units 07/24/24  0022   WBC 10*3/mm3 22.14*   HEMOGLOBIN g/dL 9.6*   HEMATOCRIT % 30.0*   PLATELETS 10*3/mm3 287     Results from last 7 days   Lab Units 07/24/24  0022   SODIUM mmol/L 133*   POTASSIUM mmol/L 4.0   CHLORIDE mmol/L 103   CO2 mmol/L 25.0   BUN mg/dL " 17   CREATININE mg/dL 0.38*   GLUCOSE mg/dL 164*   CALCIUM mg/dL 8.2*     Results from last 7 days   Lab Units 07/24/24  0022   SODIUM mmol/L 133*   POTASSIUM mmol/L 4.0   CHLORIDE mmol/L 103   CO2 mmol/L 25.0   BUN mg/dL 17   CREATININE mg/dL 0.38*   CALCIUM mg/dL 8.2*   BILIRUBIN mg/dL <0.2   ALK PHOS U/L 89   ALT (SGPT) U/L 14   AST (SGOT) U/L 22   GLUCOSE mg/dL 164*     Imaging Results (Last 72 Hours)       Procedure Component Value Units Date/Time    XR Chest 1 View [[id number]] Collected: 07/24/24 0752     Updated: 07/24/24 0755    Narrative:      EXAM:    XR Chest, 1 View     EXAM DATE:    7/24/2024 6:23 AM     CLINICAL HISTORY:    Follow-up left-sided pneumothorax; I21.4-Non-ST elevation (NSTEMI)  myocardial infarction; J93.9-Pneumothorax, unspecified     TECHNIQUE:    Frontal view of the chest.     COMPARISON:    7/23/2024     FINDINGS:    LUNGS AND PLEURAL SPACES:  Patchy bilateral airspace disease persists.   No consolidation.  No pneumothorax.    HEART:  Heart size is stable.  No cardiomegaly.    MEDIASTINUM:  Unremarkable as visualized.  Normal mediastinal contour.    BONES/JOINTS:  Unremarkable as visualized.  No acute fracture.    TUBES, LINES AND DEVICES:  The endotracheal tube (ETT) is in  satisfactory position.  Nasogastric tube tip in the stomach.  Left chest  tube remains in position with residual small left thorax measuring up to  about 9 mm and was about 9 mm.  Right internal jugular central venous  catheter tip in the superior vena cava.       Impression:      1.  Patchy bilateral airspace disease persists.  2.  Left chest tube remains in position with residual small left thorax  measuring up to about 9 mm and was about 9 mm.        This report was finalized on 7/24/2024 7:52 AM by Dr. Dominguez Sims MD.       XR Chest 1 View [[id number]] Collected: 07/23/24 1750     Updated: 07/23/24 1753    Narrative:      INDICATION: Chest tube placement.     TECHNIQUE: One view of the chest.     COMPARISON:  Radiograph from earlier today.       Impression:      FINDINGS/IMPRESSION: Interval placement of left-sided chest tube.  Decreased volume left pneumothorax. Otherwise no significant change.         This report was finalized on 7/23/2024 5:51 PM by Alex Pallas, DO.       XR Chest 1 View [[id number]] Collected: 07/23/24 1702     Updated: 07/23/24 1707    Narrative:      INDICATION: Chest pain.     TECHNIQUE: Frontal radiograph of the chest.     COMPARISON: Radiograph from yesterday       Impression:      FINDINGS/IMPRESSION:    Heart size is similar. Multifocal infiltrates/edema. Slightly worsened.  Small left apical pneumothorax, new from prior, approximately 10-20%  volume. Subcutaneous emphysema in the neck.  Enteric tube in the stomach. Endotracheal tube tip approximately 2 cm  above the irene. Right-sided central venous catheter tip in the  cavoatrial junction.     Report called to Dima ROSEN at 203PM PST 7/23/2024.        This report was finalized on 7/23/2024 5:05 PM by Alex Pallas, DO.       XR Chest 1 View [[id number]] Collected: 07/22/24 0710     Updated: 07/22/24 0713    Narrative:      CLINICAL HISTORY: Intubated.     COMPARISON: 7/21/2024.     TECHNIQUE:  Single AP view of the chest       Impression:      FINDINGS AND IMPRESSION:    Endotracheal tube tip is 2.1 cm above the irene.  Nasogastric tube tip is not seen but is below the gastroesophageal  junction.  Right internal jugular vein central venous catheter tip is at the  cavoatrial junction.  Low lung volumes with fibrotic changes are again seen.  Upper limit normal heart size.  No pleural effusion or pneumothorax.  Status post lap banding.  Surgical clips are also seen in the right  upper quadrant.        This report was finalized on 7/22/2024 7:11 AM by Violette Wilcox MD.       CT Chest Without Contrast Diagnostic [[id number]] Collected: 07/21/24 1613     Updated: 07/21/24 1617    Narrative:      PROCEDURE: CT CHEST WO CONTRAST DIAGNOSTIC-     HISTORY:  hypoxia, progressive fibrosis; I21.4-Non-ST elevation (NSTEMI)  myocardial infarction     COMPARISON: 5/8/2024.     PROCEDURE:  Multiple axial CT images were obtained from the thoracic  inlet through the upper abdomen without the use of contrast. This study  was performed with techniques to keep radiation doses as low as  reasonably achievable (ALARA). Individualized dose reduction techniques  using automated exposure control or adjustment of mA and/or kV according  to the patient size were employed.     FINDINGS:  An endotracheal tube is in place with the tip well positioned above the  irene. An nasogastric tube is present in the body of the stomach. There  is no adenopathy within the chest. The heart is normal in size. The  aorta is normal in caliber. There is a small right pleural effusion.  Lung windows reveal interlobular septal thickening and subpleural lines  in the upper and lower lobes with bronchiectasis in the left lower lobe.  There are diffuse bilateral groundglass opacities. The visualized upper  abdomen is unremarkable. Bone windows reveal no acute osseous  abnormalities.       Impression:      1. Groundglass opacities and small right pleural effusion which may  reflect pulmonary edema or pneumonia.  2. Fibrotic lung changes consistent with usual interstitial pneumonitis.        This report was finalized on 7/21/2024 4:15 PM by Urban Arzola M.D..                 Diagnostics: Reviewed    A: Lexie Valdez is a 64 y.o. female admitted on 7/17/2024 due to shortness of breath. Lexie has a medical history of  anxiety/depression, arthritis, HTN, hypothyroidism, stress urinary incontinence with frequent UTI's, COPD and pulmonary fibrosis on 3L NC at home, and resting tremors in face and extremities, who presents with worsening shortness of breath for the last couple days. Pt also at time of admission c/o chest pressure associated with dyspnea stating this was chronic however had worsened in the days  leading up to her admission. She also endorsed diarrhea for the last couple of days as well as dysuria and stated that she has recurring UTI's. Labs in ER revealed Troponin of 229 with repeat 188, BNP was 3550, Na 131, Cr 0.49, glucose 177, CRP 1.65, lactate 2.0, procal and WBC normal but with 88.7% neutrophils. UA showed positive nitrite, trace leuk, 6-10 WBC, 4+ bacteria and 3-6 squamous epithelial cells. She was tachycardic and tachypneic with pH of 7.458, CO2 40, Po2 154, bicarb 28. She was admitted to PCU on admission and in the early am of 7/21 was transferred to CCU due to worsening dyspnea, bilateral infiltrates without effusion concerning for interstitial debbi disease flare, was transferred to CCU. Initially she was placed on Bipap in CCU and at 7/21 6:20 am Lexie was sedated intubated and placed on the ventilator. Palliative care consulted for GOC,ACP and for support of family.          P:  I was able to speak with pt son Faith and other family in CCU waiting area to provide support, answer questions reviewed today's xray. I discussed Lexie with Dr White, Dr Ospina. I told family that we are here for support and if they had questions the RN could find us. I spoke with Dr Ospina later and she stated she had spoke with son and are planning to try and get pts  sister other family to come in for a family meeting tomorrow am. I will touch base with Faith in the am to inquire about a time.    We will continue to follow along. Please do not hesitate to contact us regarding further sx mgmt or GOC needs, including after hours or on weekends via our on call provider at [phone].     Linh Lou, APRN    7/24/2024

## 2024-07-24 NOTE — PROGRESS NOTES
Chief complaint: Left pneumothorax    Chest tube in place without airleak, small pneumothorax persist but patient's respiratory status appears improved with minimal PEEP and FiO2 currently.    64-year-old female with respiratory failure and development of left pneumothorax status post chest tube placement.  Chest tube in place, pneumothorax persists but subcutaneous emphysema improved and patient's pulmonary function is improving with decrease PEEP and FiO2.  -Continue chest tube to suction for now  -Chest x-ray daily  -Surgery will continue to follow

## 2024-07-24 NOTE — PROGRESS NOTES
[facility]  PROGRESS NOTE    Patient information:  Name: Lexie Valdez  Age/Sex: 64 y.o. female  :  1960        PCP: Violet Pop APRN  Attending: Estuardo Charles MD  MRN:  [id number]  Visit Number:  [id number]    LOS: 6  CODE STATUS:    Code Status and Medical Interventions:   Ordered at: 24 0205     Code Status (Patient has no pulse and is not breathing):    CPR (Attempt to Resuscitate)     Medical Interventions (Patient has pulse or is breathing):    Full Support       PROBLEM LIST:Principal Problem:    NSTEMI (non-ST elevated myocardial infarction)      Reason for Cardiology follow-up: NSTEMI     Subjective   ADMISSION INFORMATION:  Chief Complaint   Patient presents with    Shortness of Breath       DATE:2024    HPI:  Lexie Valdez is a 64 y.o. 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     Patient presented to [facility] ([facility]) emergency room (ER) on 2024 with complaints of increased shortness of breath x 2 weeks has become progressively worse and exacerbated with minimal activity.      Primary Cardiologist: None found per chart review.    Interval History:   Telemetry reveals  SR 70s with frequent PACs. According to patient's I & O flow chart it does not reflect a negative balance of diuresing overnight. AM labs reveal potassium 4.0, creatinine 0.38, hemoglobin 9.6, platelet count 287, and WBC is 22.14.     Patient was in room CCU 10  when she was seen and examined.  Patient remains intubated and sedated. Nurse and Aide are at bedside performing AM care.       EVENT TIMELINE:   :ECHO w EF of 36 - 40%. Left ventricular diastolic function is consistent with (grade I) impaired relaxation, mild tricuspid  valve regurgitation is present. Estimated right ventricular systolic pressure from tricuspid regurgitation is moderately elevated (45-55 mmHg). Patient seem to have developed a new regional wall motion normalities with decreased LV systolic function as compared to the previous study in March 2023.  Please see full report attached below.  7/18: Bess ST with MPI indicating moderate-sized infarct located in the inferior wall, septal wall and apex with no significant ischemia noted. Impressions are consistent with an intermediate risk study. EF = 43%.  Please see full report attached below.  7/21: Emergently intubated orotracheally with a #7.5 endotracheal tube at approximately 22:16 PM.   7/21: CXRs indicated multifocal and worsening pneumonia coupled with some concern as well for pulmonary edema.   7/23: Chest tube inserted 2/2 left sided pneumothorax      Objective     Vital Signs  Temp:  [98.3 °F (36.8 °C)-98.6 °F (37 °C)] 98.6 °F (37 °C)  Heart Rate:  [62-86] 78  Resp:  [28] 28  BP: ()/(43-82) 93/51  FiO2 (%):  [30 %] 30 %  Device (Oxygen Therapy): ventilator  Vital Signs (last 72 hrs)         07/21 0700  07/22 0659 07/22 0700 07/23 0659 07/23 0700 07/24 0659 07/24 0700 07/24 0911   Most Recent      Temp (°F) 98.1 -  99.8    98.1 -  100.1    98.2 -  98.6      98.6     98.6 (37) 07/24 0800    Heart Rate 75 -  106    65 -  104    62 -  102    70 -  82     72 07/24 0830    Resp 26 -  42    28 -  33    28 -  40      28     28 07/24 0815    BP 62/42 -  143/83    89/43 -  155/88    88/43 -  132/75    105/66 -  125/67     125/67 07/24 0830    SpO2 (%) 95 -  100    93 -  100    85 -  100      100     100 07/24 0830    Oxygen Concentration (%) 40 -  70      30      30      30     30 07/24 0815          BMI:Body mass index is 23.56 kg/m².    WEIGHT:      07/22/24  0500 07/23/24  0545 07/24/24  0550   Weight: 58.6 kg (129 lb 3 oz) 59.4 kg (130 lb 15.3 oz) 62.3 kg (137 lb 5.6 oz)       DIET:NPO Diet NPO Type: Tube  Feeding    I&O:  Intake & Output (last 3 days)         07/21 0701  07/22 0700 07/22 0701  07/23 0700 07/23 0701  07/24 0700 07/24 0701 07/25 0700    P.O.        I.V. (mL/kg) 1354.5 (23.7) 1414.7 (24.7) 1369.3 (23.9)     Other 0 323 517     NG/ 362 592     IV Piggyback 600 400 200     Total Intake(mL/kg) 2126.5 (37.2) 2499.7 (43.7) 2678.3 (46.8)     Urine (mL/kg/hr) 775 (0.6) 1000 (0.7) 950 (0.7)     Emesis/NG output 0 0 0     Stool 0 0 0     Chest Tube   45     Total Output 775 1000 995     Net +1351.5 +1499.7 +1683.3             Stool Unmeasured Occurrence 0 x 0 x 0 x     Emesis Unmeasured Occurrence 0 x 0 x 0 x              PHYSICAL EXAM:  Constitutional:       Appearance: Not in distress. Acutely ill-appearing.   HENT:         Comments: Orally intubated.  Neck:      Vascular: No JVD. JVD normal.   Pulmonary:      Breath sounds: Decreased air movement present.      Comments: Chest tube in place L side  and Intubated with Mechanical ventilation decreased air entry noted to left side   Cardiovascular:      Normal rate. Regular rhythm.      Murmurs: There is no murmur.   Edema:     Peripheral edema absent.   Abdominal:      General: Bowel sounds are normal. There is no distension.      Palpations: Abdomen is soft.   Musculoskeletal:      Cervical back: Neck supple.      Comments: Sedated. SCUDS in use BLE.  Skin:     General: Skin is warm and dry.   Genitourinary:     Comments: FC with BSD in use with clear yellow urine noted.  Neurological:      Comments: Intubated and sedated.                   Results review   Results Review:    I have reviewed the patient's new clinical results. 07/24/24 11:47 EDT    Results from last 7 days   Lab Units 07/23/24  0258 07/23/24  0058 07/17/24  2320 07/17/24  2119   HSTROP T ng/L 66* 75* 188* 229*     Lab Results   Component Value Date    PROBNP 14,737.0 (H) 07/22/2024    PROBNP 3,550.0 (H) 07/17/2024    PROBNP 235.1 04/26/2024     Results from last 7 days   Lab Units  07/24/24  0022 07/23/24  0058 07/22/24  0012 07/21/24  0016 07/19/24  2356 07/19/24  0046 07/18/24  0420   WBC 10*3/mm3 22.14* 10.24 16.58* 20.24* 13.37* 15.07* 7.25   HEMOGLOBIN g/dL 9.6* 9.5* 9.9* 11.8* 11.5* 12.3 12.1   PLATELETS 10*3/mm3 287 235 227 227 214 269 276     Results from last 7 days   Lab Units 07/24/24  0022 07/23/24  0058 07/22/24  0012 07/21/24  0016 07/19/24  2356 07/18/24  0420 07/17/24  2119   SODIUM mmol/L 133* 139 135* 130* 133* 132* 131*   POTASSIUM mmol/L 4.0 3.7 4.5 4.7 4.5 4.0 4.1   CHLORIDE mmol/L 103 106 100 99 102 96* 93*   CO2 mmol/L 25.0 24.1 23.9 20.5* 22.2 25.2 26.2   BUN mg/dL 17 20 25* 17 15 7* 8   CREATININE mg/dL 0.38* 0.45* 0.47* 0.37* 0.44* 0.37* 0.49*   CALCIUM mg/dL 8.2* 8.2* 8.2* 8.4* 8.8 8.8 9.2   GLUCOSE mg/dL 164* 208* 191* 122* 114* 130* 177*   ALT (SGPT) U/L 14 15 10  --   --  12 13   AST (SGOT) U/L 22 26 33*  --   --  37* 43*     Lab Results   Component Value Date    MG 2.5 (H) 07/24/2024    MG 2.4 07/23/2024    MG 1.7 07/17/2024     Estimated Creatinine Clearance: 145.2 mL/min (A) (by C-G formula based on SCr of 0.38 mg/dL (L)).    Lab Results   Component Value Date    HGBA1C 4.60 (L) 07/18/2024    HGBA1C 4.80 04/11/2024    HGBA1C 5.00 12/31/2023     Lab Results   Component Value Date    CHOL 136 07/18/2024    TRIG 61 07/18/2024    LDL 56 07/18/2024    HDL 67 (H) 07/18/2024        Lab Results   Component Value Date    INR 0.93 07/18/2024    INR 0.98 04/26/2024    INR 1.00 03/21/2023     Lab Results   Component Value Date    LABHEPA 0.27 (L) 07/20/2024    LABHEPA 0.36 07/19/2024    LABHEPA 0.33 07/19/2024    LABHEPA 0.11 (L) 07/18/2024       Lab Results   Component Value Date    TSH 1.140 07/17/2024      Pain Management Panel           No data to display              Microbiology Results (last 10 days)       Procedure Component Value - Date/Time    Mycoplasma Pneumoniae Antibody, IgM - Blood, Arm, Left [[id number]]  (Normal) Collected: 07/22/24 0012    Lab Status:  Final result Specimen: Blood from Arm, Left Updated: 07/22/24 0202     Mycoplasma pneumo IgM Negative    Legionella Antigen, Urine - Urine, Urine, Clean Catch [[id number]]  (Normal) Collected: 07/21/24 2242    Lab Status: Final result Specimen: Urine, Clean Catch Updated: 07/21/24 2308     LEGIONELLA ANTIGEN, URINE Negative    Narrative:      Presumptive negative for L. pneumophilia serogroup 1 antigen, suggesting no recent or current infection.    Respiratory Culture - Sputum, ET Suction [[id number]] Collected: 07/21/24 2231    Lab Status: Final result Specimen: Sputum from ET Suction Updated: 07/24/24 1119     Respiratory Culture No growth     Gram Stain Rare (1+) Mixed ammon      No WBCs seen      No Epithelial cells seen    MRSA Screen, PCR (Inpatient) - Swab, Nares [[id number]]  (Normal) Collected: 07/21/24 1252    Lab Status: Final result Specimen: Swab from Nares Updated: 07/21/24 1424     MRSA PCR No MRSA Detected    Narrative:      The negative predictive value of this diagnostic test is high and should only be used to consider de-escalating anti-MRSA therapy. A positive result may indicate colonization with MRSA and must be correlated clinically.    Respiratory Panel PCR w/COVID-19(SARS-CoV-2) RHONDA/HEATHER/TASNEEM/PAD/COR/CHRISTELLE In-House, NP Swab in UTM/VTM, 2 HR TAT - Swab, Nasopharynx [[id number]]  (Normal) Collected: 07/21/24 1247    Lab Status: Final result Specimen: Swab from Nasopharynx Updated: 07/21/24 1357     ADENOVIRUS, PCR Not Detected     Coronavirus 229E Not Detected     Coronavirus HKU1 Not Detected     Coronavirus NL63 Not Detected     Coronavirus OC43 Not Detected     COVID19 Not Detected     Human Metapneumovirus Not Detected     Human Rhinovirus/Enterovirus Not Detected     Influenza A PCR Not Detected     Influenza B PCR Not Detected     Parainfluenza Virus 1 Not Detected     Parainfluenza Virus 2 Not Detected     Parainfluenza Virus 3 Not Detected     Parainfluenza Virus 4 Not Detected     RSV, PCR  Not Detected     Bordetella pertussis pcr Not Detected     Bordetella parapertussis PCR Not Detected     Chlamydophila pneumoniae PCR Not Detected     Mycoplasma pneumo by PCR Not Detected    Narrative:      In the setting of a positive respiratory panel with a viral infection PLUS a negative procalcitonin without other underlying concern for bacterial infection, consider observing off antibiotics or discontinuation of antibiotics and continue supportive care. If the respiratory panel is positive for atypical bacterial infection (Bordetella pertussis, Chlamydophila pneumoniae, or Mycoplasma pneumoniae), consider antibiotic de-escalation to target atypical bacterial infection.    Respiratory Culture - Sputum, ET Suction [[id number]] Collected: 07/21/24 1152    Lab Status: Final result Specimen: Sputum from ET Suction Updated: 07/23/24 1053     Respiratory Culture Rare growth Normal respiratory ammon. No S. aureus or Pseudomonas aeruginosa detected. Final report.     Gram Stain Moderate (3+) WBCs seen      Rare (1+) Epithelial cells seen      No organisms seen    Blood Culture - Blood, Arm, Left [[id number]]  (Normal) Collected: 07/21/24 0450    Lab Status: Preliminary result Specimen: Blood from Arm, Left Updated: 07/24/24 0515     Blood Culture No growth at 3 days    Blood Culture - Blood, Arm, Right [[id number]]  (Normal) Collected: 07/21/24 0448    Lab Status: Preliminary result Specimen: Blood from Arm, Right Updated: 07/24/24 0530     Blood Culture No growth at 3 days    Gastrointestinal Panel, PCR - Stool, Per Rectum [[id number]]  (Normal) Collected: 07/19/24 0410    Lab Status: Final result Specimen: Stool from Per Rectum Updated: 07/19/24 0603     Campylobacter Not Detected     Plesiomonas shigelloides Not Detected     Salmonella Not Detected     Vibrio Not Detected     Vibrio cholerae Not Detected     Yersinia enterocolitica Not Detected     Enteroaggregative E. coli (EAEC) Not Detected     Enteropathogenic E.  coli (EPEC) Not Detected     Enterotoxigenic E. coli (ETEC) lt/st Not Detected     Shiga-like toxin-producing E. coli (STEC) stx1/stx2 Not Detected     Shigella/Enteroinvasive E. coli (EIEC) Not Detected     Cryptosporidium Not Detected     Cyclospora cayetanensis Not Detected     Entamoeba histolytica Not Detected     Giardia lamblia Not Detected     Adenovirus F40/41 Not Detected     Astrovirus Not Detected     Norovirus GI/GII Not Detected     Rotavirus A Not Detected     Sapovirus (I, II, IV or V) Not Detected    Clostridioides difficile Toxin - Stool, Per Rectum [[id number]] Collected: 07/19/24 0410    Lab Status: Final result Specimen: Stool from Per Rectum Updated: 07/19/24 0527    Narrative:      The following orders were created for panel order Clostridioides difficile Toxin - Stool, Per Rectum.  Procedure                               Abnormality         Status                     ---------                               -----------         ------                     Clostridioides difficile...[[id number]]                      Final result                 Please view results for these tests on the individual orders.    Clostridioides difficile Toxin, PCR - Stool, Per Rectum [[id number]] Collected: 07/19/24 0410    Lab Status: Final result Specimen: Stool from Per Rectum Updated: 07/19/24 0527     Toxigenic C. difficile by PCR Negative     027 Toxin Presumptive Negative    Narrative:      The result indicates the absence of toxigenic C. difficile from stool specimen.     Urine Culture - Urine, Urine, Catheter [[id number]]  (Abnormal)  (Susceptibility) Collected: 07/17/24 6885    Lab Status: Final result Specimen: Urine, Catheter Updated: 07/20/24 0959     Urine Culture >100,000 CFU/mL Escherichia coli    Narrative:      Colonization of the urinary tract without infection is common. Treatment is discouraged unless the patient is symptomatic, pregnant, or undergoing an invasive urologic procedure.     Susceptibility        Escherichia coli      MILES      Amoxicillin + Clavulanate Susceptible      Ampicillin Susceptible      Ampicillin + Sulbactam Susceptible      Cefazolin Susceptible      Cefepime Susceptible      Ceftazidime Susceptible      Ceftriaxone Susceptible      Gentamicin Susceptible      Levofloxacin Susceptible      Nitrofurantoin Susceptible      Piperacillin + Tazobactam Susceptible      Trimethoprim + Sulfamethoxazole Susceptible                           Respiratory Panel PCR w/COVID-19(SARS-CoV-2) RHONDA/HEATHER/TASNEEM/PAD/COR/CHRISTELLE In-House, NP Swab in UTM/VTM, 2 HR TAT - Swab, Nasopharynx [[id number]]  (Normal) Collected: 07/17/24 2127    Lab Status: Final result Specimen: Swab from Nasopharynx Updated: 07/17/24 2325     ADENOVIRUS, PCR Not Detected     Coronavirus 229E Not Detected     Coronavirus HKU1 Not Detected     Coronavirus NL63 Not Detected     Coronavirus OC43 Not Detected     COVID19 Not Detected     Human Metapneumovirus Not Detected     Human Rhinovirus/Enterovirus Not Detected     Influenza A PCR Not Detected     Influenza B PCR Not Detected     Parainfluenza Virus 1 Not Detected     Parainfluenza Virus 2 Not Detected     Parainfluenza Virus 3 Not Detected     Parainfluenza Virus 4 Not Detected     RSV, PCR Not Detected     Bordetella pertussis pcr Not Detected     Bordetella parapertussis PCR Not Detected     Chlamydophila pneumoniae PCR Not Detected     Mycoplasma pneumo by PCR Not Detected    Narrative:      In the setting of a positive respiratory panel with a viral infection PLUS a negative procalcitonin without other underlying concern for bacterial infection, consider observing off antibiotics or discontinuation of antibiotics and continue supportive care. If the respiratory panel is positive for atypical bacterial infection (Bordetella pertussis, Chlamydophila pneumoniae, or Mycoplasma pneumoniae), consider antibiotic de-escalation to target atypical bacterial infection.    COVID  PRE-OP / PRE-PROCEDURE SCREENING ORDER (NO ISOLATION) - Swab, Nasopharynx [[id number]]  (Normal) Collected: 07/17/24 2119    Lab Status: Final result Specimen: Swab from Nasopharynx Updated: 07/17/24 2149    Narrative:      The following orders were created for panel order COVID PRE-OP / PRE-PROCEDURE SCREENING ORDER (NO ISOLATION) - Swab, Nasopharynx.  Procedure                               Abnormality         Status                     ---------                               -----------         ------                     COVID-19 and FLU A/B PCR...[[id number]]  Normal              Final result                 Please view results for these tests on the individual orders.    COVID-19 and FLU A/B PCR, 1 HR TAT - Swab, Nasopharynx [[id number]]  (Normal) Collected: 07/17/24 2119    Lab Status: Final result Specimen: Swab from Nasopharynx Updated: 07/17/24 2149     COVID19 Not Detected     Influenza A PCR Not Detected     Influenza B PCR Not Detected    Narrative:      Fact sheet for providers: https://www.fda.gov/media/133291/download    Fact sheet for patients: https://www.fda.gov/media/410745/download    Test performed by PCR.    Blood Culture - Blood, Arm, Right [[id number]]  (Normal) Collected: 07/17/24 2119    Lab Status: Final result Specimen: Blood from Arm, Right Updated: 07/22/24 2131     Blood Culture No growth at 5 days    Blood Culture - Blood, Arm, Left [[id number]]  (Normal) Collected: 07/17/24 2119    Lab Status: Final result Specimen: Blood from Arm, Left Updated: 07/22/24 2131     Blood Culture No growth at 5 days           Imaging Results (Last 24 Hours)       Procedure Component Value Units Date/Time    XR Chest 1 View [[id number]] Collected: 07/24/24 0752     Updated: 07/24/24 0755    Narrative:      EXAM:    XR Chest, 1 View     EXAM DATE:    7/24/2024 6:23 AM     CLINICAL HISTORY:    Follow-up left-sided pneumothorax; I21.4-Non-ST elevation (NSTEMI)  myocardial infarction; J93.9-Pneumothorax,  unspecified     TECHNIQUE:    Frontal view of the chest.     COMPARISON:    7/23/2024     FINDINGS:    LUNGS AND PLEURAL SPACES:  Patchy bilateral airspace disease persists.   No consolidation.  No pneumothorax.    HEART:  Heart size is stable.  No cardiomegaly.    MEDIASTINUM:  Unremarkable as visualized.  Normal mediastinal contour.    BONES/JOINTS:  Unremarkable as visualized.  No acute fracture.    TUBES, LINES AND DEVICES:  The endotracheal tube (ETT) is in  satisfactory position.  Nasogastric tube tip in the stomach.  Left chest  tube remains in position with residual small left thorax measuring up to  about 9 mm and was about 9 mm.  Right internal jugular central venous  catheter tip in the superior vena cava.       Impression:      1.  Patchy bilateral airspace disease persists.  2.  Left chest tube remains in position with residual small left thorax  measuring up to about 9 mm and was about 9 mm.        This report was finalized on 7/24/2024 7:52 AM by Dr. Dominguez Sims MD.       XR Chest 1 View [[id number]] Collected: 07/23/24 1750     Updated: 07/23/24 1753    Narrative:      INDICATION: Chest tube placement.     TECHNIQUE: One view of the chest.     COMPARISON: Radiograph from earlier today.       Impression:      FINDINGS/IMPRESSION: Interval placement of left-sided chest tube.  Decreased volume left pneumothorax. Otherwise no significant change.         This report was finalized on 7/23/2024 5:51 PM by Alex Pallas, DO.       XR Chest 1 View [[id number]] Collected: 07/23/24 1702     Updated: 07/23/24 1707    Narrative:      INDICATION: Chest pain.     TECHNIQUE: Frontal radiograph of the chest.     COMPARISON: Radiograph from yesterday       Impression:      FINDINGS/IMPRESSION:    Heart size is similar. Multifocal infiltrates/edema. Slightly worsened.  Small left apical pneumothorax, new from prior, approximately 10-20%  volume. Subcutaneous emphysema in the neck.  Enteric tube in the stomach.  Endotracheal tube tip approximately 2 cm  above the irene. Right-sided central venous catheter tip in the  cavoatrial junction.     Report called to Dima ROSEN at 203PM PST 7/23/2024.        This report was finalized on 7/23/2024 5:05 PM by Alex Pallas, DO.             ECHO:  Results for orders placed during the hospital encounter of 07/17/24    Adult Transthoracic Echo Complete w/ Color, Spectral and Contrast if necessary per protocol    Interpretation Summary    Normal left ventricular cavity size and wall thickness noted.    The following left ventricular wall segments are hypokinetic: mid anterior, basal anterolateral, apical lateral, apical septal, mid anteroseptal and basal anterior. The following left ventricular wall segments are akinetic: apex.    Left ventricular systolic function is moderately decreased. Left ventricular ejection fraction appears to be 36 - 40%.    Left ventricular diastolic function is consistent with (grade I) impaired relaxation.    The aortic valve is structurally normal with no regurgitation or stenosis present.    The mitral valve is structurally normal with no significant stenosis present. Mild mitral valve regurgitation is present.    Mild tricuspid valve regurgitation is present. Estimated right ventricular systolic pressure from tricuspid regurgitation is moderately elevated (45-55 mmHg).    There is no evidence of pericardial effusion. .    Comments: Patient seem to have developed a new regional wall motion normalities with decreased LV systolic function as compared to the previous study in March 2023.      STRESS TEST:  Results for orders placed during the hospital encounter of 07/17/24    Stress Test With Myocardial Perfusion One Day    Interpretation Summary  Images from the original result were not included.      A pharmacological stress test was performed using regadenoson without low-level exercise.    Findings consistent with an indeterminate ECG stress test.     Myocardial perfusion imaging indicates a moderate-sized infarct located in the inferior wall, septal wall and apex with no significant ischemia noted.    Abnormal LV wall motion consistent with apical dyskinesis.    Left ventricular ejection fraction is moderately reduced (Calculated EF = 43%).    Impressions are consistent with an intermediate risk study.       HEART CATH:  No results found for this or any previous visit.      TELEMETRY:      SR 70s with frequent PACs        I reviewed the patient's new clinical results.    ALLERGIES: Codeine and Sulfa antibiotics    Medication Review:   Current list of medications may not reflect those currently placed in orders that are not signed or are being held.     aspirin, 81 mg, Oral, Daily  Brexpiprazole, 0.5 mg, Oral, Daily  cefepime, 2,000 mg, Intravenous, Q8H  chlorhexidine, 15 mL, Mouth/Throat, Q12H  donepezil, 10 mg, Oral, Q PM  doxycycline, 100 mg, Oral, Q12H  enoxaparin, 40 mg, Subcutaneous, Nightly  hydroxychloroquine, 200 mg, Oral, BID  insulin regular, 2-7 Units, Subcutaneous, Q6H  ipratropium-albuterol, 3 mL, Nebulization, Q4H  levothyroxine, 25 mcg, Oral, Daily With Breakfast  memantine, 5 mg, Oral, Q12H  methylPREDNISolone sodium succinate, 60 mg, Intravenous, Q12H  [Held by provider] metoprolol succinate XL, 25 mg, Oral, Q24H  mupirocin, 1 application , Each Nare, BID  nystatin, 1 Application, Topical, Q12H  pantoprazole, 40 mg, Intravenous, Q AM  Phenylephrine HCl-NaCl, , ,   Phenylephrine HCl-NaCl, , ,   sodium chloride, 10 mL, Intravenous, Q12H  sodium chloride, 10 mL, Intravenous, Q12H  sodium chloride, 10 mL, Intravenous, Q12H  sodium chloride, 10 mL, Intravenous, Q12H  sodium chloride, 10 mL, Intravenous, Q12H  sodium chloride, 10 mL, Intravenous, Q12H      fentanyl 10 mcg/mL,  mcg/hr, Last Rate: 250 mcg/hr (07/24/24 1013)  Pharmacy Consult,   phenylephrine, 0.5-3 mcg/kg/min (Dosing Weight), Last Rate: 1.1 mcg/kg/min (07/24/24 1013)  propofol,  5-50 mcg/kg/min (Dosing Weight), Last Rate: 40 mcg/kg/min (07/24/24 0535)        senna-docusate sodium **AND** polyethylene glycol **AND** bisacodyl **AND** bisacodyl    busPIRone    dextrose    dextrose    glucagon (human recombinant)    guaifenesin    HYDROcodone-acetaminophen    hydrOXYzine    ipratropium    nitroglycerin    Pharmacy Consult    Phenylephrine HCl-NaCl    Phenylephrine HCl-NaCl    prochlorperazine    sodium chloride    sodium chloride    sodium chloride    sodium chloride    sodium chloride    sodium chloride    sodium chloride    sodium chloride    sodium chloride    sodium chloride    sodium chloride    vecuronium    Assessment    Acute non-ST elevation myocardial infarction likely type II due to respiratory failure with hypoxia  Advanced COPD/pulmonary fibrosis, on home oxygen.  Acute on chronic respiratory failure requiring intubation mechanical ventilation.  HFrEF, worsening with acute respiratory failure  Essential hypertension  Mild hyponatremia  Patient developed pneumothorax requiring chest tube placement            Recommendations / Plan   1.  Remains with positive fluid balance we will try to diurese  2.  Remains critically ill with complications with pneumothorax requiring chest tube placement  3.  Overall poor prognosis          I have discussed the patients findings and recommendations with the patient.    Thank you very much for asking us to be involved in this patient's care.  We will follow along with you.    Electronically signed by SG Chandler, 07/24/24, 11:47 AM EDT.   Electronically signed by Rinku Braxton MD, 07/24/24, 11:48 AM EDT.                      Please note that portions of this note were completed with a voice recognition program.    Please note that portions of this note were copied and has been reviewed and is accurate as of 7/24/2024 .

## 2024-07-24 NOTE — PLAN OF CARE
Problem: Adult Inpatient Plan of Care  Goal: Plan of Care Review  Outcome: Ongoing, Progressing  Flowsheets (Taken 7/24/2024 0525)  Progress: no change  Plan of Care Reviewed With: patient  Goal: Patient-Specific Goal (Individualized)  Outcome: Ongoing, Progressing  Goal: Absence of Hospital-Acquired Illness or Injury  Outcome: Ongoing, Progressing  Intervention: Identify and Manage Fall Risk  Recent Flowsheet Documentation  Taken 7/24/2024 0500 by Tonia Cordon RN  Safety Promotion/Fall Prevention: safety round/check completed  Taken 7/24/2024 0400 by Tonia Cordon RN  Safety Promotion/Fall Prevention: safety round/check completed  Taken 7/24/2024 0300 by Tonia Cordon RN  Safety Promotion/Fall Prevention: safety round/check completed  Taken 7/24/2024 0200 by Tonia Cordon RN  Safety Promotion/Fall Prevention: safety round/check completed  Taken 7/24/2024 0100 by Tonia Cordon RN  Safety Promotion/Fall Prevention: safety round/check completed  Taken 7/24/2024 0000 by Tonia Cordon RN  Safety Promotion/Fall Prevention: safety round/check completed  Taken 7/23/2024 2300 by Tonia Cordon RN  Safety Promotion/Fall Prevention: safety round/check completed  Taken 7/23/2024 2200 by Tonia Cordon RN  Safety Promotion/Fall Prevention: safety round/check completed  Taken 7/23/2024 2100 by Tonia Cordon RN  Safety Promotion/Fall Prevention: safety round/check completed  Taken 7/23/2024 2000 by Tonia Cordon RN  Safety Promotion/Fall Prevention: safety round/check completed  Taken 7/23/2024 1900 by Tonia Cordon RN  Safety Promotion/Fall Prevention: safety round/check completed  Intervention: Prevent Skin Injury  Recent Flowsheet Documentation  Taken 7/24/2024 0400 by Tonia Cordon RN  Body Position:   turned   side-lying   right  Taken 7/24/2024 0200 by Tonia Cordon RN  Body Position:   turned   side-lying   left  Skin Protection:   tubing/devices free from skin contact   transparent dressing  maintained   skin-to-skin areas padded   skin-to-device areas padded   incontinence pads utilized   adhesive use limited  Taken 7/24/2024 0000 by Tonia Cordon RN  Body Position:   turned   side-lying   right  Taken 7/23/2024 2200 by Tonia Cordon RN  Body Position:   turned   side-lying   left  Taken 7/23/2024 2000 by Tonia Cordon RN  Body Position:   turned   side-lying   right  Skin Protection:   tubing/devices free from skin contact   transparent dressing maintained   skin-to-skin areas padded   skin-to-device areas padded   incontinence pads utilized   adhesive use limited  Intervention: Prevent and Manage VTE (Venous Thromboembolism) Risk  Recent Flowsheet Documentation  Taken 7/24/2024 0200 by Tonia Cordon RN  Activity Management: bedrest  VTE Prevention/Management: (see MAR) other (see comments)  Taken 7/23/2024 2000 by Tonia Cordon RN  Activity Management: bedrest  VTE Prevention/Management: (see MAR) other (see comments)  Intervention: Prevent Infection  Recent Flowsheet Documentation  Taken 7/24/2024 0200 by Tonia Cordon RN  Infection Prevention:   hand hygiene promoted   rest/sleep promoted  Taken 7/23/2024 2000 by Tonia Cordon RN  Infection Prevention:   hand hygiene promoted   rest/sleep promoted  Goal: Optimal Comfort and Wellbeing  Outcome: Ongoing, Progressing  Intervention: Provide Person-Centered Care  Recent Flowsheet Documentation  Taken 7/24/2024 0200 by Tonia Cordon RN  Trust Relationship/Rapport:   care explained   choices provided  Taken 7/23/2024 2000 by Tonia Cordon RN  Trust Relationship/Rapport:   care explained   choices provided  Goal: Readiness for Transition of Care  Outcome: Ongoing, Progressing     Problem: COPD (Chronic Obstructive Pulmonary Disease) Comorbidity  Goal: Maintenance of COPD Symptom Control  Outcome: Ongoing, Progressing  Intervention: Maintain COPD-Symptom Control  Recent Flowsheet Documentation  Taken 7/24/2024 0500 by Tonia Cordon  RN  Medication Review/Management: medications reviewed  Taken 7/24/2024 0400 by Tonia Cordon RN  Medication Review/Management: medications reviewed  Taken 7/24/2024 0300 by Tonia Cordon RN  Medication Review/Management: medications reviewed  Taken 7/24/2024 0200 by Tonia Cordon RN  Medication Review/Management: medications reviewed  Taken 7/24/2024 0100 by Tonia Cordon RN  Medication Review/Management: medications reviewed  Taken 7/24/2024 0000 by Tonia Cordon RN  Medication Review/Management: medications reviewed  Taken 7/23/2024 2300 by Tonia Cordon RN  Medication Review/Management: medications reviewed  Taken 7/23/2024 2200 by Tonia Cordon RN  Medication Review/Management: medications reviewed  Taken 7/23/2024 2100 by Tonai Cordon RN  Medication Review/Management: medications reviewed  Taken 7/23/2024 2000 by Tonia Cordon RN  Medication Review/Management: medications reviewed  Taken 7/23/2024 1900 by Tonia Cordon RN  Medication Review/Management: medications reviewed     Problem: Fall Injury Risk  Goal: Absence of Fall and Fall-Related Injury  Outcome: Ongoing, Progressing  Intervention: Identify and Manage Contributors  Recent Flowsheet Documentation  Taken 7/24/2024 0500 by Tonia Cordon RN  Medication Review/Management: medications reviewed  Taken 7/24/2024 0400 by Tonia Crodon RN  Medication Review/Management: medications reviewed  Taken 7/24/2024 0300 by Tonia Cordon RN  Medication Review/Management: medications reviewed  Taken 7/24/2024 0200 by Tonia Cordon RN  Medication Review/Management: medications reviewed  Taken 7/24/2024 0100 by Tonia Cordon RN  Medication Review/Management: medications reviewed  Taken 7/24/2024 0000 by Tonia Cordon RN  Medication Review/Management: medications reviewed  Taken 7/23/2024 2300 by Tonia Cordon RN  Medication Review/Management: medications reviewed  Taken 7/23/2024 2200 by Tonia Cordon RN  Medication Review/Management:  medications reviewed  Taken 7/23/2024 2100 by Tonia Cordon RN  Medication Review/Management: medications reviewed  Taken 7/23/2024 2000 by Tonia Cordon RN  Medication Review/Management: medications reviewed  Taken 7/23/2024 1900 by Tonia Cordon RN  Medication Review/Management: medications reviewed  Intervention: Promote Injury-Free Environment  Recent Flowsheet Documentation  Taken 7/24/2024 0500 by Tonia Cordon RN  Safety Promotion/Fall Prevention: safety round/check completed  Taken 7/24/2024 0400 by Tonia Cordon RN  Safety Promotion/Fall Prevention: safety round/check completed  Taken 7/24/2024 0300 by Tonia Cordon RN  Safety Promotion/Fall Prevention: safety round/check completed  Taken 7/24/2024 0200 by Tonia Cordon RN  Safety Promotion/Fall Prevention: safety round/check completed  Taken 7/24/2024 0100 by Tonia Cordon RN  Safety Promotion/Fall Prevention: safety round/check completed  Taken 7/24/2024 0000 by Tonia Cordon RN  Safety Promotion/Fall Prevention: safety round/check completed  Taken 7/23/2024 2300 by Tonia Cordon RN  Safety Promotion/Fall Prevention: safety round/check completed  Taken 7/23/2024 2200 by Tonia Cordon RN  Safety Promotion/Fall Prevention: safety round/check completed  Taken 7/23/2024 2100 by Tonia Cordon RN  Safety Promotion/Fall Prevention: safety round/check completed  Taken 7/23/2024 2000 by Tonia Cordon RN  Safety Promotion/Fall Prevention: safety round/check completed  Taken 7/23/2024 1900 by Tonia Cordon RN  Safety Promotion/Fall Prevention: safety round/check completed     Problem: Adjustment to Illness (Sepsis/Septic Shock)  Goal: Optimal Coping  Outcome: Ongoing, Progressing  Intervention: Optimize Psychosocial Adjustment to Illness  Recent Flowsheet Documentation  Taken 7/24/2024 0200 by Tonia Cordon RN  Family/Support System Care: support provided  Taken 7/23/2024 2000 by Tonia Cordon RN  Family/Support System Care: support  provided     Problem: Bleeding (Sepsis/Septic Shock)  Goal: Absence of Bleeding  Outcome: Ongoing, Progressing     Problem: Glycemic Control Impaired (Sepsis/Septic Shock)  Goal: Blood Glucose Level Within Desired Range  Outcome: Ongoing, Progressing     Problem: Infection Progression (Sepsis/Septic Shock)  Goal: Absence of Infection Signs and Symptoms  Outcome: Ongoing, Progressing  Intervention: Initiate Sepsis Management  Recent Flowsheet Documentation  Taken 7/24/2024 0200 by Tonia Cordon RN  Infection Prevention:   hand hygiene promoted   rest/sleep promoted  Taken 7/23/2024 2000 by Tonia Cordon RN  Infection Prevention:   hand hygiene promoted   rest/sleep promoted  Intervention: Promote Recovery  Recent Flowsheet Documentation  Taken 7/24/2024 0200 by Tonia Cordon RN  Activity Management: bedrest  Taken 7/23/2024 2000 by Tonia Cordon RN  Activity Management: bedrest     Problem: Nutrition Impaired (Sepsis/Septic Shock)  Goal: Optimal Nutrition Intake  Outcome: Ongoing, Progressing     Problem: Skin Injury Risk Increased  Goal: Skin Health and Integrity  Outcome: Ongoing, Progressing  Intervention: Optimize Skin Protection  Recent Flowsheet Documentation  Taken 7/24/2024 0400 by Tonia Cordon RN  Head of Bed (HOB) Positioning: HOB at 30-45 degrees  Taken 7/24/2024 0200 by Tonia Cordon RN  Pressure Reduction Techniques:   frequent weight shift encouraged   heels elevated off bed   pressure points protected   weight shift assistance provided  Head of Bed (HOB) Positioning: HOB at 30-45 degrees  Pressure Reduction Devices:   specialty bed utilized   pressure-redistributing mattress utilized   positioning supports utilized   heel offloading device utilized  Skin Protection:   tubing/devices free from skin contact   transparent dressing maintained   skin-to-skin areas padded   skin-to-device areas padded   incontinence pads utilized   adhesive use limited  Taken 7/24/2024 0000 by Tonia Cordon  RN  Head of Bed (HOB) Positioning: HOB at 30-45 degrees  Taken 7/23/2024 2200 by Tonia Cordon RN  Head of Bed (HOB) Positioning: HOB at 30-45 degrees  Taken 7/23/2024 2000 by Tonia Cordon RN  Pressure Reduction Techniques:   frequent weight shift encouraged   heels elevated off bed   pressure points protected   weight shift assistance provided  Head of Bed (HOB) Positioning: HOB at 30-45 degrees  Pressure Reduction Devices:   specialty bed utilized   pressure-redistributing mattress utilized   positioning supports utilized   heel offloading device utilized  Skin Protection:   tubing/devices free from skin contact   transparent dressing maintained   skin-to-skin areas padded   skin-to-device areas padded   incontinence pads utilized   adhesive use limited     Problem: Noninvasive Ventilation Acute  Goal: Effective Unassisted Ventilation and Oxygenation  Outcome: Ongoing, Progressing     Problem: Communication Impairment (Mechanical Ventilation, Invasive)  Goal: Effective Communication  Outcome: Ongoing, Progressing     Problem: Device-Related Complication Risk (Mechanical Ventilation, Invasive)  Goal: Optimal Device Function  Outcome: Ongoing, Progressing  Intervention: Optimize Device Care and Function  Recent Flowsheet Documentation  Taken 7/24/2024 0200 by Tonia Cordon RN  Airway Safety Measures: suction at bedside  Taken 7/23/2024 2000 by Tonia Cordon RN  Airway Safety Measures: suction at bedside     Problem: Inability to Wean (Mechanical Ventilation, Invasive)  Goal: Mechanical Ventilation Liberation  Outcome: Ongoing, Progressing  Intervention: Promote Extubation and Mechanical Ventilation Liberation  Recent Flowsheet Documentation  Taken 7/24/2024 0500 by Tonia Cordon RN  Medication Review/Management: medications reviewed  Taken 7/24/2024 0400 by Tonia Cordon RN  Medication Review/Management: medications reviewed  Taken 7/24/2024 0300 by Tonia Cordon RN  Medication Review/Management:  medications reviewed  Taken 7/24/2024 0200 by Tonia oCrdon RN  Medication Review/Management: medications reviewed  Taken 7/24/2024 0100 by Tonia Cordon RN  Medication Review/Management: medications reviewed  Taken 7/24/2024 0000 by Tonia Cordon RN  Medication Review/Management: medications reviewed  Taken 7/23/2024 2300 by Tonia Cordon RN  Medication Review/Management: medications reviewed  Taken 7/23/2024 2200 by Tonia Cordon RN  Medication Review/Management: medications reviewed  Taken 7/23/2024 2100 by Tonia Cordon RN  Medication Review/Management: medications reviewed  Taken 7/23/2024 2000 by Tonia Cordon RN  Medication Review/Management: medications reviewed  Taken 7/23/2024 1900 by Tonia Cordon RN  Medication Review/Management: medications reviewed     Problem: Nutrition Impairment (Mechanical Ventilation, Invasive)  Goal: Optimal Nutrition Delivery  Outcome: Ongoing, Progressing     Problem: Skin and Tissue Injury (Mechanical Ventilation, Invasive)  Goal: Absence of Device-Related Skin and Tissue Injury  Outcome: Ongoing, Progressing  Intervention: Maintain Skin and Tissue Health  Recent Flowsheet Documentation  Taken 7/24/2024 0200 by Tonia Cordon RN  Device Skin Pressure Protection:   skin-to-skin areas padded   skin-to-device areas padded   positioning supports utilized   pressure points protected   adhesive use limited   absorbent pad utilized/changed  Taken 7/23/2024 2000 by Tonia Cordon RN  Device Skin Pressure Protection:   skin-to-skin areas padded   skin-to-device areas padded   pressure points protected   positioning supports utilized   adhesive use limited   absorbent pad utilized/changed     Problem: Ventilator-Induced Lung Injury (Mechanical Ventilation, Invasive)  Goal: Absence of Ventilator-Induced Lung Injury  Outcome: Ongoing, Progressing  Intervention: Prevent Ventilator-Associated Pneumonia  Recent Flowsheet Documentation  Taken 7/24/2024 0400 by Bravo  JESSIKA Randall  Head of Bed ([facility]) Positioning: [facility] at 30-45 degrees  Oral Care:   swabbed with antiseptic solution   suction provided   lip/mouth moisturizer applied  Taken 7/24/2024 0200 by Tonia Cordon RN  Head of Bed ([facility]) Positioning: HOB at 30-45 degrees  Taken 7/24/2024 0000 by Tonia Cordon RN  Head of Bed ([facility]) Positioning: HOB at 30-45 degrees  Oral Care:   swabbed with antiseptic solution   suction provided   lip/mouth moisturizer applied  Taken 7/23/2024 2200 by Tonia Cordon RN  Head of Bed ([facility]) Positioning: HOB at 30-45 degrees  Taken 7/23/2024 2000 by Tonia Cordon RN  Head of Bed ([facility]) Positioning: HOB at 30-45 degrees  VAP Prevention Bundle:   HOB elevation maintained   oral care regularly provided   readiness to extubate assessed   VTE prophylaxis provided   vent circuit breaks minimized   stress ulcer prophylaxis provided  VAP Prevention Measures: completed  Oral Care:   swabbed with antiseptic solution   suction provided   lip/mouth moisturizer applied   Goal Outcome Evaluation:  Plan of Care Reviewed With: patient        Progress: no change

## 2024-07-24 NOTE — PROGRESS NOTES
Progress Note Critical Care Pulmonary        Subjective  On vent , sedated, developed subcutaneous emphysema, x-ray chest showed pneumothorax on the left side.  Chest tube was placed by Dr. Tyree Galloway.  Residual pneumothorax was noted on a follow-up x-ray chest.    Pleur-evac showed persistent air leak.      Interval History: event overnight: As described above        Review of Systems:    On vent , sedated     Vital Signs  Temp:  [98.3 °F (36.8 °C)-98.6 °F (37 °C)] 98.6 °F (37 °C)  Heart Rate:  [62-86] 78  Resp:  [28] 28  BP: ()/(43-82) 92/51  FiO2 (%):  [30 %] 30 %  Body mass index is 23.56 kg/m².    Intake/Output Summary (Last 24 hours) at 7/24/2024 1041  Last data filed at 7/24/2024 0550  Gross per 24 hour   Intake 2678.27 ml   Output 995 ml   Net 1683.27 ml     No intake/output data recorded.    Physical Exam:  General- normal in appearance, not in any acute distress    HEENT- pupils equally reactive to light, normal in size, no scleral icterus    Neck-supple    Respiratory-bilateral air entry, bibasilar crackles.  Air leak noted in the Pleur-evac, subcutaneous emphysema noted in the neck.  Cardiovascular-  Normal S1 and S2. No S3, S4 or murmurs. No JVD, no carotid bruit and no edema, pulses normal bilaterally     GI-nontender nondistended bowel sounds positive    CNS- grossly nonfocal    Extremities-no clubbing and edema        Results Review:      Results from last 7 days   Lab Units 07/24/24  0022 07/23/24  0058 07/22/24  0012   WBC 10*3/mm3 22.14* 10.24 16.58*   HEMOGLOBIN g/dL 9.6* 9.5* 9.9*   PLATELETS 10*3/mm3 287 235 227     Results from last 7 days   Lab Units 07/24/24  0022 07/23/24  0058 07/22/24  0012 07/18/24  0420 07/17/24  2119   SODIUM mmol/L 133* 139 135*   < > 131*   POTASSIUM mmol/L 4.0 3.7 4.5   < > 4.1   CHLORIDE mmol/L 103 106 100   < > 93*   CO2 mmol/L 25.0 24.1 23.9   < > 26.2   BUN mg/dL 17 20 25*   < > 8   CREATININE mg/dL 0.38* 0.45* 0.47*   < > 0.49*   CALCIUM mg/dL 8.2*  8.2* 8.2*   < > 9.2   GLUCOSE mg/dL 164* 208* 191*   < > 177*   MAGNESIUM mg/dL 2.5* 2.4  --   --  1.7    < > = values in this interval not displayed.     Lab Results   Component Value Date    INR 0.93 07/18/2024    INR 0.98 04/26/2024    INR 1.00 03/21/2023    PROTIME 12.6 07/18/2024    PROTIME 13.5 04/26/2024    PROTIME 13.4 03/21/2023     Results from last 7 days   Lab Units 07/24/24  0022 07/23/24  0058 07/22/24  0012   ALK PHOS U/L 89 104 142*   BILIRUBIN mg/dL <0.2 0.2 0.4   ALT (SGPT) U/L 14 15 10   AST (SGOT) U/L 22 26 33*     Results from last 7 days   Lab Units 07/21/24  2106   PH, ARTERIAL pH units 7.315*   PO2 ART mm Hg 137.0*   PCO2, ARTERIAL mm Hg 48.2*   HCO3 ART mmol/L 24.5     Imaging Results (Last 24 Hours)       Procedure Component Value Units Date/Time    XR Chest 1 View [[id number]] Collected: 07/24/24 0752     Updated: 07/24/24 0755    Narrative:      EXAM:    XR Chest, 1 View     EXAM DATE:    7/24/2024 6:23 AM     CLINICAL HISTORY:    Follow-up left-sided pneumothorax; I21.4-Non-ST elevation (NSTEMI)  myocardial infarction; J93.9-Pneumothorax, unspecified     TECHNIQUE:    Frontal view of the chest.     COMPARISON:    7/23/2024     FINDINGS:    LUNGS AND PLEURAL SPACES:  Patchy bilateral airspace disease persists.   No consolidation.  No pneumothorax.    HEART:  Heart size is stable.  No cardiomegaly.    MEDIASTINUM:  Unremarkable as visualized.  Normal mediastinal contour.    BONES/JOINTS:  Unremarkable as visualized.  No acute fracture.    TUBES, LINES AND DEVICES:  The endotracheal tube (ETT) is in  satisfactory position.  Nasogastric tube tip in the stomach.  Left chest  tube remains in position with residual small left thorax measuring up to  about 9 mm and was about 9 mm.  Right internal jugular central venous  catheter tip in the superior vena cava.       Impression:      1.  Patchy bilateral airspace disease persists.  2.  Left chest tube remains in position with residual small left  thorax  measuring up to about 9 mm and was about 9 mm.        This report was finalized on 7/24/2024 7:52 AM by Dr. Dominguez Sims MD.       XR Chest 1 View [[id number]] Collected: 07/23/24 1750     Updated: 07/23/24 1753    Narrative:      INDICATION: Chest tube placement.     TECHNIQUE: One view of the chest.     COMPARISON: Radiograph from earlier today.       Impression:      FINDINGS/IMPRESSION: Interval placement of left-sided chest tube.  Decreased volume left pneumothorax. Otherwise no significant change.         This report was finalized on 7/23/2024 5:51 PM by Alex Pallas, DO.       XR Chest 1 View [[id number]] Collected: 07/23/24 1702     Updated: 07/23/24 1707    Narrative:      INDICATION: Chest pain.     TECHNIQUE: Frontal radiograph of the chest.     COMPARISON: Radiograph from yesterday       Impression:      FINDINGS/IMPRESSION:    Heart size is similar. Multifocal infiltrates/edema. Slightly worsened.  Small left apical pneumothorax, new from prior, approximately 10-20%  volume. Subcutaneous emphysema in the neck.  Enteric tube in the stomach. Endotracheal tube tip approximately 2 cm  above the irene. Right-sided central venous catheter tip in the  cavoatrial junction.     Report called to Dima ROSEN at 203PM PST 7/23/2024.        This report was finalized on 7/23/2024 5:05 PM by Alex Pallas, DO.                    aspirin, 81 mg, Oral, Daily  Brexpiprazole, 0.5 mg, Oral, Daily  cefepime, 2,000 mg, Intravenous, Q8H  chlorhexidine, 15 mL, Mouth/Throat, Q12H  donepezil, 10 mg, Oral, Q PM  doxycycline, 100 mg, Oral, Q12H  enoxaparin, 40 mg, Subcutaneous, Nightly  hydroxychloroquine, 200 mg, Oral, BID  insulin regular, 2-7 Units, Subcutaneous, Q6H  ipratropium-albuterol, 3 mL, Nebulization, Q4H  levothyroxine, 25 mcg, Oral, Daily With Breakfast  memantine, 5 mg, Oral, Q12H  methylPREDNISolone sodium succinate, 60 mg, Intravenous, Q12H  [Held by provider] metoprolol succinate XL, 25 mg, Oral,  Q24H  mupirocin, 1 application , Each Nare, BID  nystatin, 1 Application, Topical, Q12H  pantoprazole, 40 mg, Intravenous, Q AM  Phenylephrine HCl-NaCl, , ,   Phenylephrine HCl-NaCl, , ,   sodium chloride, 10 mL, Intravenous, Q12H  sodium chloride, 10 mL, Intravenous, Q12H  sodium chloride, 10 mL, Intravenous, Q12H  sodium chloride, 10 mL, Intravenous, Q12H  sodium chloride, 10 mL, Intravenous, Q12H  sodium chloride, 10 mL, Intravenous, Q12H      fentanyl 10 mcg/mL,  mcg/hr, Last Rate: 250 mcg/hr (07/24/24 1013)  Pharmacy Consult - Pharmacy to dose,   Pharmacy Consult,   Pharmacy to dose vancomycin,   phenylephrine, 0.5-3 mcg/kg/min (Dosing Weight), Last Rate: 1.1 mcg/kg/min (07/24/24 1013)  propofol, 5-50 mcg/kg/min (Dosing Weight), Last Rate: 40 mcg/kg/min (07/24/24 0535)        Medication Review:     Assessment & Plan   #1: Acute on chronic hypoxic respiratory failure    #2 interstitial lung disease, most likely a flareup of interstitial lung disease.    3.:  Pneumonia.  #4: Septic shock.  On phenylephrine has been titrated down to 1.4 mics.        5.  Non-STEMI.    Vent Settings          Resp Rate (Set): 28  Pressure Support (cm H2O): 10 cm H20  FiO2 (%): 30 %  PEEP/CPAP (cm H2O): 6 cm H20    Minute Ventilation (L/min) (Obs): 10.4 L/min  Resp Rate (Observed) Vent: 28     I:E Ratio (Obs): 1:2.2    PIP Observed (cm H2O): 38 cm H2O    Driving Pressure (cm H2O): 32.6 cm H2O     Gas exchange is improved.    Plateau pressure is 24.       Current medication list reviewed.  Latest microbiology reviewed.  Respiratory panel reviewed.  Continue nebs.  Continue oxygen to maintain saturation 88 to 92%.   Cardiology- hemodynamically -unstable.    Titrate Levophed to maintain a MAP of 65 and above.       Nephrology- Cr and BUN stable  I/O-reviewed     GI-continue current diet.  Continue aspiration precautions     Hematology- CBC  Hb  platelet  WBC-latest reviewed     ID  Culture  And Antibiotics     Endocrinology-  Maintain Blood sugar 140 -180  Blood sugars reviewed     Electrolytes-   Mag and phos         DVT prophylaxis-       Critical Care time spent in direct patient care: 45 minutes (excluding procedure time, if applicable) including high complexity decision making to assess, manipulate, and support vital organ system failure in this individual who has impairment of one or more vital organ systems such that there is a high probability of imminent or life threatening deterioration in the patient’s condition.  Patient is critically ill and is at higher risk for further mortality/morbidity. Continue ICU care .  I had a detailed discussion with patient's son who reported that patient did not want to be on ventilator for extended duration of time.    Kurt White MD  07/24/24  10:41 EDT

## 2024-07-24 NOTE — PROGRESS NOTES
[facility] HOSPITALIST PROGRESS NOTE     Patient Identification:  Name:  Lexie KEITH Valdez  Age:  64 y.o.  Sex:  female  :  1960  MRN:  [id number]  Visit Number:  [id number]  ROOM: [address]     Primary Care Provider:  Violet Pop APRN     Date of Admission: 2024    Length of stay in inpatient status:  6    Subjective     Chief Compliant:    Chief Complaint   Patient presents with    Shortness of Breath     History of Presenting Illness:  The patient remains intubated and sedated with propofol and thus I was unable to obtain a history from her.  The patient son was at bedside today and we had a long conversation about the patient's current medical status.  Please note that I also saw the patient with nurse Owens; no issues occurred overnight.  Nurse Owens has not seen an air leak yet in the chest tube.    Consults:  Arjun Mcmahon and Christopher with pulmonology  Arjun Acevedo and Fox with cardiology  Dr. Clemons with infectious disease  Arjun Soto and Jacob with general surgery     Procedures:  2024:  Oral intubated and mechanical ventilation  2024:  Right internal jugular triple lumen central line placement  2024:  Placement of a left sided chest tube due to pneumothorax    Objective     Current Hospital Meds:  aspirin, 81 mg, Oral, Daily  Brexpiprazole, 0.5 mg, Oral, Daily  cefepime, 2,000 mg, Intravenous, Q8H  chlorhexidine, 15 mL, Mouth/Throat, Q12H  donepezil, 10 mg, Oral, Q PM  doxycycline, 100 mg, Oral, Q12H  enoxaparin, 40 mg, Subcutaneous, Nightly  hydroxychloroquine, 200 mg, Oral, BID  insulin regular, 2-7 Units, Subcutaneous, Q6H  ipratropium-albuterol, 3 mL, Nebulization, Q4H  levothyroxine, 25 mcg, Oral, Daily With Breakfast  memantine, 5 mg, Oral, Q12H  methylPREDNISolone sodium succinate, 60 mg, Intravenous, Q12H  [Held by provider] metoprolol succinate XL, 25 mg, Oral, Q24H  mupirocin, 1 application , Each Nare, BID  nystatin, 1 Application, Topical,  Q12H  pantoprazole, 40 mg, Intravenous, Q AM  Phenylephrine HCl-NaCl, , ,   Phenylephrine HCl-NaCl, , ,   sodium chloride, 10 mL, Intravenous, Q12H  sodium chloride, 10 mL, Intravenous, Q12H  sodium chloride, 10 mL, Intravenous, Q12H  sodium chloride, 10 mL, Intravenous, Q12H  sodium chloride, 10 mL, Intravenous, Q12H  sodium chloride, 10 mL, Intravenous, Q12H    fentanyl 10 mcg/mL,  mcg/hr, Last Rate: 250 mcg/hr (07/24/24 1013)  Pharmacy Consult,   phenylephrine, 0.5-3 mcg/kg/min (Dosing Weight), Last Rate: 1.7 mcg/kg/min (07/24/24 1825)  propofol, 5-50 mcg/kg/min (Dosing Weight), Last Rate: 40 mcg/kg/min (07/24/24 1824)      Current Antimicrobial Therapy:  Anti-Infectives (From admission, onward)      Ordered     Dose/Rate Route Frequency Start Stop    07/21/24 2206  doxycycline (MONODOX) capsule 100 mg        Ordering Provider: John Mcmahon MD    100 mg Oral Every 12 Hours Scheduled 07/21/24 2300 07/26/24 2059    07/21/24 0424  cefepime 2000 mg IVPB in 100 mL NS (VTB)        Ordering Provider: Estuardo Charles MD    2,000 mg  over 4 Hours Intravenous Every 8 Hours 07/21/24 1400 07/28/24 1359    07/21/24 0856  vancomycin 1250 mg/250 mL 0.9% NS IVPB ([facility])        Ordering Provider: Eduardo Freeman MD    1,250 mg  over 75 Minutes Intravenous Once 07/21/24 1000 07/21/24 1114    07/21/24 0424  cefepime 2000 mg IVPB in 100 mL NS (VTB)        Ordering Provider: Estuardo Charles MD    2,000 mg  over 30 Minutes Intravenous Once 07/21/24 0600 07/21/24 0558    07/19/24 1540  hydroxychloroquine (PLAQUENIL) tablet 200 mg        Ordering Provider: Estuardo Charles MD    200 mg Oral 2 Times Daily 07/19/24 2100      07/17/24 2218  cefTRIAXone (ROCEPHIN) 2,000 mg in sodium chloride 0.9 % 100 mL IVPB-VTB        Ordering Provider: Al Lewis MD    2,000 mg  200 mL/hr over 30 Minutes Intravenous Once 07/17/24 2230 07/18/24 0035          Current Diuretic Therapy:  Diuretics (From admission,  onward)      Ordered     Dose/Rate Route Frequency Start Stop    07/24/24 1151  furosemide (LASIX) injection 20 mg        Ordering Provider: Rinku Braxton MD    20 mg Intravenous Once 07/24/24 1245 07/24/24 1244    07/21/24 0637  furosemide (LASIX) injection 40 mg        Ordering Provider: Estuardo Charles MD    40 mg Intravenous Once 07/21/24 0730 07/21/24 0914    07/20/24 2323  furosemide (LASIX) injection 40 mg        Ordering Provider: Estuardo Charles MD    40 mg Intravenous Once 07/21/24 0015 07/21/24 0008          ----------------------------------------------------------------------------------------------------------------------  Vital Signs:  Temp:  [98.1 °F (36.7 °C)-98.6 °F (37 °C)] 98.1 °F (36.7 °C)  Heart Rate:  [62-86] 74  Resp:  [28] 28  BP: ()/(44-75) 105/57  FiO2 (%):  [30 %] 30 %  SpO2:  [98 %-100 %] 98 %  on   ;   Device (Oxygen Therapy): ventilator  Body mass index is 23.56 kg/m².    Wt Readings from Last 3 Encounters:   07/24/24 62.3 kg (137 lb 5.6 oz)   04/26/24 67.7 kg (149 lb 4 oz)   04/19/24 67.1 kg (148 lb)     Intake & Output (last 3 days)         07/22 0701 07/23 0700 07/23 0701 07/24 0700 07/24 0701 07/25 0700    I.V. (mL/kg) 1414.7 (24.7) 1369.3 (23.9) 645.9 (11.3)    Other 323 517 242    NG/ 592 322    IV Piggyback 400 200     Total Intake(mL/kg) 2499.7 (43.7) 2678.3 (46.8) 1209.9 (21.2)    Urine (mL/kg/hr) 1000 (0.7) 950 (0.7)     Emesis/NG output 0 0     Stool 0 0     Chest Tube  45 35    Total Output 1000 995 35    Net +1499.7 +1683.3 +1174.9           Stool Unmeasured Occurrence 0 x 0 x     Emesis Unmeasured Occurrence 0 x 0 x           NPO Diet NPO Type: Tube Feeding  ----------------------------------------------------------------------------------------------------------------------  Physical Exam   Constitutional:       General: She is in acute distress, though it appears to be less than yesterday.      Appearance: Normal appearance. She is  well-developed. She is not toxic-appearing or diaphoretic.      Interventions: She is sedated and intubated.      Comments: Appears acutely and chronically ill.   HENT:      Head: Normocephalic and atraumatic.  The subcutaneous emphysema on the left side of the neck has improved.     Right Ear: External ear normal.      Left Ear: External ear normal.      Nose: Nose normal.   Eyes:      General: No scleral icterus.        Right eye: No discharge.         Left eye: No discharge.      Extraocular Movements: Extraocular movements intact.      Conjunctiva/sclera: Conjunctivae normal.      Pupils: Pupils are equal, round, and reactive to light.   Cardiovascular:      Rate and Rhythm: Normal rate and regular rhythm.      Pulses: Normal pulses.      Heart sounds: No murmur heard.  Pulmonary:      Effort: Respiratory distress present and appears to be about the same as yesterday. She is intubated.      Breath sounds: She has improved breath sounds on the left side; chest tube is still in place.  There are no bubbles in chamber C.  I do not hear any crackles nor any wheezes.  Abdominal:      General: Bowel sounds are normal. There is no distension.      Palpations: Abdomen is soft.   Musculoskeletal:         General: No swelling, deformity or signs of injury.   Skin:     Capillary Refill: Capillary refill takes less than 2 seconds.      Coloration: Skin is not jaundiced or pale.   Neurological:      Motor: Tremor present. No seizure activity.      Comments: Sedated by propofol and orally intubated.  Thus, I cannot perform this portion of the physical.  She is still doing the tremors but she is not following commands like she was previously.    Psychiatric:      Comments: Sedated with propofol and thus I cannot perform this portion of the physical exam.      ----------------------------------------------------------------------------------------------------------------------  Tele: Normal sinus rhythm heart rate 60s to 80s.   I personally reviewed the telemetry strips.  ----------------------------------------------------------------------------------------------------------------------  LABS:    Pending test results:  Pending Labs       Order Current Status    Aspergillus Galactomannan Antigen - Blood, Arm, Right In process    Fungal Antibodies, Quantitative Double Immunodiffusion In process    Blood Culture - Blood, Arm, Left Preliminary result    Blood Culture - Blood, Arm, Right Preliminary result          CBC and coagulation:  Results from last 7 days   Lab Units 07/24/24  0022 07/23/24  0058 07/22/24  0012 07/21/24  1309 07/21/24  0130 07/21/24  0016 07/19/24  2356 07/19/24  0046 07/18/24  0420 07/18/24  0211 07/17/24  2119   PROCALCITONIN ng/mL  --   --  0.24 0.21 0.07  --   --   --   --   --  0.06   LACTATE mmol/L 1.5 1.5  --   --  1.1  --   --   --   --   --  2.0   CRP mg/dL 3.89* 10.32*  --   --   --  6.50*  --   --   --   --  1.65*   WBC 10*3/mm3 22.14* 10.24 16.58*  --   --  20.24* 13.37* 15.07* 7.25 6.42 8.49   HEMOGLOBIN g/dL 9.6* 9.5* 9.9*  --   --  11.8* 11.5* 12.3 12.1 12.3 12.9   HEMATOCRIT % 30.0* 29.6* 30.7*  --   --  36.3 36.2 37.6 36.8 36.7 38.3   MCV fL 98.4* 97.4* 95.6  --   --  96.3 99.7* 94.0 95.1 93.1 93.2   MCHC g/dL 32.0 32.1 32.2  --   --  32.5 31.8 32.7 32.9 33.5 33.7   PLATELETS 10*3/mm3 287 235 227  --   --  227 214 269 276 252 280   INR   --   --   --   --   --   --   --   --   --  0.93  --    D DIMER QUANT MCGFEU/mL  --   --   --   --   --   --   --   --   --   --  0.29     Acid/base balance:  Results from last 7 days   Lab Units 07/21/24  2106 07/21/24  0811 07/20/24  2343 07/20/24  1255 07/17/24  2203   PH, ARTERIAL pH units 7.315* 7.367 7.403 7.410 7.458*   PCO2, ARTERIAL mm Hg 48.2* 46.6* 40.3 37.9 40.2   PO2 ART mm Hg 137.0* 101.0 107.0 49.7* 154.0*   HCO3 ART mmol/L 24.5 26.8* 25.1 24.0 28.4*     Renal and electrolytes:  Results from last 7 days   Lab Units 07/24/24  0022 07/23/24  0058  07/22/24  0012 07/21/24  0016 07/19/24  2356 07/18/24  0420 07/17/24  2119   SODIUM mmol/L 133* 139 135* 130* 133* 132* 131*   POTASSIUM mmol/L 4.0 3.7 4.5 4.7 4.5 4.0 4.1   MAGNESIUM mg/dL 2.5* 2.4  --   --   --   --  1.7   CHLORIDE mmol/L 103 106 100 99 102 96* 93*   CO2 mmol/L 25.0 24.1 23.9 20.5* 22.2 25.2 26.2   BUN mg/dL 17 20 25* 17 15 7* 8   CREATININE mg/dL 0.38* 0.45* 0.47* 0.37* 0.44* 0.37* 0.49*   CALCIUM mg/dL 8.2* 8.2* 8.2* 8.4* 8.8 8.8 9.2   PHOSPHORUS mg/dL 1.4* 1.2*  --   --   --   --   --    GLUCOSE mg/dL 164* 208* 191* 122* 114* 130* 177*   ANION GAP mmol/L 5.0 8.9 11.1 10.5 8.8 10.8 11.8     Estimated Creatinine Clearance: 145.2 mL/min (A) (by C-G formula based on SCr of 0.38 mg/dL (L)).    Liver and pancreatic function:  Results from last 7 days   Lab Units 07/24/24  0022 07/23/24  0058 07/22/24  0012 07/18/24  0420 07/17/24  2119   ALBUMIN g/dL 3.5 3.6 4.1 3.1* 3.4*   BILIRUBIN mg/dL <0.2 0.2 0.4 <0.2 0.2   ALK PHOS U/L 89 104 142* 90 106   AST (SGOT) U/L 22 26 33* 37* 43*   ALT (SGPT) U/L 14 15 10 12 13     Endocrine function:  Lab Results   Component Value Date    HGBA1C 4.60 (L) 07/18/2024     Point of care bedside glucose levels:  Results from last 7 days   Lab Units 07/24/24  1657 07/24/24  1057 07/24/24  0529 07/23/24  2355 07/23/24  1819 07/23/24  1222 07/23/24  0508 07/22/24  2301 07/22/24  1944 07/22/24  1717 07/22/24  1246 07/22/24  0615 07/21/24  2331 07/21/24  1738   GLUCOSE mg/dL 155* 166* 152* 166* 133* 165* 135* 210* 186* 207* 155* 126 166* 163*     Glucose levels from the [facility]:  Results from last 7 days   Lab Units 07/24/24  0022 07/23/24  0058 07/22/24  0012 07/21/24  0016 07/19/24  2356 07/18/24  0420 07/17/24  2119   GLUCOSE mg/dL 164* 208* 191* 122* 114* 130* 177*     Lab Results   Component Value Date    TSH 1.140 07/17/2024    FREET4 1.83 (H) 04/11/2024     Cultures:  Microbiology Results (last 10 days)       Procedure Component Value - Date/Time    Respiratory Culture -  Sputum, ET Suction [[id number]] Collected: 07/21/24 2231    Lab Status: Final result Specimen: Sputum from ET Suction Updated: 07/24/24 1119     Respiratory Culture No growth     Gram Stain Rare (1+) Mixed ammon      No WBCs seen      No Epithelial cells seen    Respiratory Culture - Sputum, ET Suction [[id number]] Collected: 07/21/24 1152    Lab Status: Final result Specimen: Sputum from ET Suction Updated: 07/23/24 1053     Respiratory Culture Rare growth Normal respiratory ammon. No S. aureus or Pseudomonas aeruginosa detected. Final report.     Gram Stain Moderate (3+) WBCs seen      Rare (1+) Epithelial cells seen      No organisms seen    Blood Culture - Blood, Arm, Left [[id number]]  (Normal) Collected: 07/21/24 0450    Lab Status: Preliminary result Specimen: Blood from Arm, Left Updated: 07/24/24 0515     Blood Culture No growth at 3 days    Blood Culture - Blood, Arm, Right [[id number]]  (Normal) Collected: 07/21/24 0448    Lab Status: Preliminary result Specimen: Blood from Arm, Right Updated: 07/24/24 0530     Blood Culture No growth at 3 days       I have personally looked at the labs and they are summarized above.  ----------------------------------------------------------------------------------------------------------------------  Detailed radiology reports for the last 24 hours:    Imaging Results (Last 24 Hours)       Procedure Component Value Units Date/Time    XR Chest 1 View [[id number]] Collected: 07/24/24 0752     Updated: 07/24/24 0755    Narrative:      EXAM:    XR Chest, 1 View     EXAM DATE:    7/24/2024 6:23 AM     CLINICAL HISTORY:    Follow-up left-sided pneumothorax; I21.4-Non-ST elevation (NSTEMI)  myocardial infarction; J93.9-Pneumothorax, unspecified     TECHNIQUE:    Frontal view of the chest.     COMPARISON:    7/23/2024     FINDINGS:    LUNGS AND PLEURAL SPACES:  Patchy bilateral airspace disease persists.   No consolidation.  No pneumothorax.    HEART:  Heart size is stable.  No  cardiomegaly.    MEDIASTINUM:  Unremarkable as visualized.  Normal mediastinal contour.    BONES/JOINTS:  Unremarkable as visualized.  No acute fracture.    TUBES, LINES AND DEVICES:  The endotracheal tube (ETT) is in  satisfactory position.  Nasogastric tube tip in the stomach.  Left chest  tube remains in position with residual small left thorax measuring up to  about 9 mm and was about 9 mm.  Right internal jugular central venous  catheter tip in the superior vena cava.       Impression:      1.  Patchy bilateral airspace disease persists.  2.  Left chest tube remains in position with residual small left thorax  measuring up to about 9 mm and was about 9 mm.        This report was finalized on 7/24/2024 7:52 AM by Dr. Dominguez Sims MD.             I have personally looked at the radiology images and I have read the available final report.    Assessment & Plan      -Acute NSTEMI, type 1 (positive stress test on 7/18/2024 without any reversible ischemia, that was present on admission  -History of IPF and COPD with chronic hypoxia (uses 3 L/min at home)  -Acute exacerbation of IPF/COPD causing acute hypoxic and hypercapnic respiratory failure on top of chronic hypoxic respiratory failure and sepsis (developed on 7/21/2024 due to a suspected aspiration episode) resulting in oral intubation and mechanical ventilation from aspiration pneumonitis  -Hypotension, septic vs sedation vs diuretic induced, developed during admission  -New onset heart failure with reduced EF, acute exacerbation that developed on 7/21/2024  -Hypocalcemia that developed during admission  -Prolonged QT that developed during the hospitalization  -History of essential hypertension  -History of hypothyroidism  -History of stress urinary incontinence with frequent UTI's, with an acute E coli UTI that was present on admission  -History of intermittent, diffuse body tremors    I had a long talk with the son, who was at bedside; the patient is   to his step-father.  The son wanted an update about the patient's current medical status.  I have asked him to gather his family sometime this week so that we can have a family conference for further goals of care discussion, as the son states that the patient never wanted prolonged life support.  In the meantime, pulmonology gave the patient a break today from performing sedation vacation and spontaneous breathing trials, as the patient does have the left-sided pneumothorax.  I did look at the chest tube and I did not see any bubbles in chambers C.  However, we will monitor the patient closely for any issues with the chest tube.  ID has recommended continuing with cefepime and doxycycline.  The patient is on low ventilator settings.  Please note that general surgery is following along so that they can monitor the chest tube.  Will repeat blood work in the morning.  Please note that her glucose levels are at goal and stable; we will continue with the low-dose regular insulin sliding scale.  The patient continues to tolerate her tube feeds.  Please note that I have ordered ionized calcium, PTH, and vitamin D 25 OH levels for in the morning to evaluate the hypocalcemia.  We will avoid QT prolonging agents and continue to monitor the electrolytes closely. In order to lessen the risk of worsening QTc, the goal potassium level is 4-4.5 and goal magnesium level is 2-2.2.     VTE Prophylaxis:  DVT dose Lovenox     The patient is high risk due to the following diagnoses/reasons:  Acute MI, IPF with acute exacerbation, new left sided pneumothorax, new onset heart failure    Disposition: Undetermined; if she survives then may need trach and PEG and LTAC, but it is too early to decide all of this     Jessica Ospina MD  [facility]  07/24/24  19:24 EDT

## 2024-07-24 NOTE — PROGRESS NOTES
PROGRESS NOTE         Patient Identification:  Name:  Lexie Valdez  Age:  64 y.o.  Sex:  female  :  1960  MRN:  [id number]  Visit Number:  [id number]  Primary Care Provider:  Violet Pop APRN         LOS: 6 days       ----------------------------------------------------------------------------------------------------------------------  Subjective       Chief Complaints:    Shortness of Breath        Interval History:      Patient remains intubated and sedated at 30% FiO2.  Son at bedside.  Status post left chest tube placement on 2024.  Lung sounds diminished bilaterally.  Abdomen soft, nontender.  Afebrile, no reported diarrhea.  WBC elevated at 22.14, likely related to steroid therapy.  CRP improving at 3.89.  Respiratory culture from 2024 finalized with no growth.  Fungal serologies remain in process.    Review of Systems:    Unable to obtain.  Intubated and sedated.    ----------------------------------------------------------------------------------------------------------------------      Objective       Current Hospital Meds:  aspirin, 81 mg, Oral, Daily  Brexpiprazole, 0.5 mg, Oral, Daily  cefepime, 2,000 mg, Intravenous, Q8H  chlorhexidine, 15 mL, Mouth/Throat, Q12H  donepezil, 10 mg, Oral, Q PM  doxycycline, 100 mg, Oral, Q12H  enoxaparin, 40 mg, Subcutaneous, Nightly  hydroxychloroquine, 200 mg, Oral, BID  insulin regular, 2-7 Units, Subcutaneous, Q6H  ipratropium-albuterol, 3 mL, Nebulization, Q4H  levothyroxine, 25 mcg, Oral, Daily With Breakfast  memantine, 5 mg, Oral, Q12H  methylPREDNISolone sodium succinate, 60 mg, Intravenous, Q12H  [Held by provider] metoprolol succinate XL, 25 mg, Oral, Q24H  mupirocin, 1 application , Each Nare, BID  nystatin, 1 Application, Topical, Q12H  pantoprazole, 40 mg, Intravenous, Q AM  Phenylephrine HCl-NaCl, , ,   Phenylephrine HCl-NaCl, , ,   sodium chloride, 10 mL, Intravenous, Q12H  sodium chloride, 10 mL, Intravenous,  Q12H  sodium chloride, 10 mL, Intravenous, Q12H  sodium chloride, 10 mL, Intravenous, Q12H  sodium chloride, 10 mL, Intravenous, Q12H  sodium chloride, 10 mL, Intravenous, Q12H      fentanyl 10 mcg/mL,  mcg/hr, Last Rate: 250 mcg/hr (07/24/24 1013)  Pharmacy Consult,   phenylephrine, 0.5-3 mcg/kg/min (Dosing Weight), Last Rate: 1.1 mcg/kg/min (07/24/24 1013)  propofol, 5-50 mcg/kg/min (Dosing Weight), Last Rate: 40 mcg/kg/min (07/24/24 0535)      ----------------------------------------------------------------------------------------------------------------------    Vital Signs:  Temp:  [98.3 °F (36.8 °C)-98.6 °F (37 °C)] 98.6 °F (37 °C)  Heart Rate:  [62-86] 78  Resp:  [28] 28  BP: ()/(43-82) 93/51  FiO2 (%):  [30 %] 30 %  Mean Arterial Pressure (Non-Invasive) for the past 24 hrs (Last 3 readings):   Noninvasive MAP (mmHg)   07/24/24 1100 66   07/24/24 1045 70   07/24/24 1030 67     SpO2 Percentage    07/24/24 1030 07/24/24 1045 07/24/24 1100   SpO2: 98% 98% 99%     SpO2:  [85 %-100 %] 99 %  on   ;   Device (Oxygen Therapy): ventilator    Body mass index is 23.56 kg/m².  Wt Readings from Last 3 Encounters:   07/24/24 62.3 kg (137 lb 5.6 oz)   04/26/24 67.7 kg (149 lb 4 oz)   04/19/24 67.1 kg (148 lb)        Intake/Output Summary (Last 24 hours) at 7/24/2024 1122  Last data filed at 7/24/2024 0550  Gross per 24 hour   Intake 2678.27 ml   Output 995 ml   Net 1683.27 ml     NPO Diet NPO Type: Tube Feeding  ----------------------------------------------------------------------------------------------------------------------      Physical Exam:    Constitutional: Thin, frail, chronically ill-appearing  female.  Currently intubated and sedated 30% FiO2.  Son at bedside.  HENT:  Head: Normocephalic and atraumatic.  Mouth:  Moist mucous membranes.    Eyes:  Conjunctivae and EOM are normal.  No scleral icterus.  Neck:  Neck supple.  No JVD present.    Cardiovascular:  Normal rate, regular rhythm and  normal heart sounds with no murmur. No edema.  Pulmonary/Chest: Diminished lung sounds bilaterally.  Intubated at 30% FiO2.  Left-sided chest tube in place.  Abdominal:  Soft.  Bowel sounds are normal.  No distension and no tenderness.   Musculoskeletal:  No edema, no tenderness, and no deformity.  No swelling or redness of joints.  Neurological: Sedate.  Skin:  Skin is warm and dry.  No rash noted.  No pallor.   Psychiatric: Sedate.        ----------------------------------------------------------------------------------------------------------------------  Results from last 7 days   Lab Units 07/23/24  0258 07/23/24  0058 07/17/24  2320   HSTROP T ng/L 66* 75* 188*     Results from last 7 days   Lab Units 07/22/24  0012 07/17/24  2119   PROBNP pg/mL 14,737.0* 3,550.0*     Results from last 7 days   Lab Units 07/18/24  0420   CHOLESTEROL mg/dL 136   TRIGLYCERIDES mg/dL 61   HDL CHOL mg/dL 67*   LDL CHOL mg/dL 56     Results from last 7 days   Lab Units 07/21/24  2106   PH, ARTERIAL pH units 7.315*   PO2 ART mm Hg 137.0*   PCO2, ARTERIAL mm Hg 48.2*   HCO3 ART mmol/L 24.5     Results from last 7 days   Lab Units 07/24/24  0022 07/23/24  0058 07/22/24  0012 07/21/24  0130 07/21/24  0016 07/18/24  0420 07/18/24  0211   CRP mg/dL 3.89* 10.32*  --   --  6.50*  --   --    LACTATE mmol/L 1.5 1.5  --  1.1  --   --   --    WBC 10*3/mm3 22.14* 10.24 16.58*  --  20.24*   < > 6.42   HEMOGLOBIN g/dL 9.6* 9.5* 9.9*  --  11.8*   < > 12.3   HEMATOCRIT % 30.0* 29.6* 30.7*  --  36.3   < > 36.7   MCV fL 98.4* 97.4* 95.6  --  96.3   < > 93.1   MCHC g/dL 32.0 32.1 32.2  --  32.5   < > 33.5   PLATELETS 10*3/mm3 287 235 227  --  227   < > 252   INR   --   --   --   --   --   --  0.93    < > = values in this interval not displayed.     Results from last 7 days   Lab Units 07/24/24  0022 07/23/24  0058 07/22/24  0012 07/18/24  0420 07/17/24  2119   SODIUM mmol/L 133* 139 135*   < > 131*   POTASSIUM mmol/L 4.0 3.7 4.5   < > 4.1   MAGNESIUM  "mg/dL 2.5* 2.4  --   --  1.7   CHLORIDE mmol/L 103 106 100   < > 93*   CO2 mmol/L 25.0 24.1 23.9   < > 26.2   BUN mg/dL 17 20 25*   < > 8   CREATININE mg/dL 0.38* 0.45* 0.47*   < > 0.49*   CALCIUM mg/dL 8.2* 8.2* 8.2*   < > 9.2   GLUCOSE mg/dL 164* 208* 191*   < > 177*   ALBUMIN g/dL 3.5 3.6 4.1   < > 3.4*   BILIRUBIN mg/dL <0.2 0.2 0.4   < > 0.2   ALK PHOS U/L 89 104 142*   < > 106   AST (SGOT) U/L 22 26 33*   < > 43*   ALT (SGPT) U/L 14 15 10   < > 13    < > = values in this interval not displayed.   Estimated Creatinine Clearance: 145.2 mL/min (A) (by C-G formula based on SCr of 0.38 mg/dL (L)).  No results found for: \"AMMONIA\"    Glucose   Date/Time Value Ref Range Status   07/24/2024 1057 166 (H) 70 - 130 mg/dL Final   07/24/2024 0529 152 (H) 70 - 130 mg/dL Final   07/23/2024 2355 166 (H) 70 - 130 mg/dL Final   07/23/2024 1819 133 (H) 70 - 130 mg/dL Final   07/23/2024 1222 165 (H) 70 - 130 mg/dL Final   07/23/2024 0508 135 (H) 70 - 130 mg/dL Final   07/22/2024 2301 210 (H) 70 - 130 mg/dL Final   07/22/2024 1944 186 (H) 70 - 130 mg/dL Final     Lab Results   Component Value Date    HGBA1C 4.60 (L) 07/18/2024     Lab Results   Component Value Date    TSH 1.140 07/17/2024    FREET4 1.83 (H) 04/11/2024       Blood Culture   Date Value Ref Range Status   07/21/2024 No growth at 2 days  Preliminary   07/21/2024 No growth at 2 days  Preliminary   07/17/2024 No growth at 5 days  Final   07/17/2024 No growth at 5 days  Final     Urine Culture   Date Value Ref Range Status   07/17/2024 >100,000 CFU/mL Escherichia coli (A)  Final     No results found for: \"WOUNDCX\"  No results found for: \"STOOLCX\"  Respiratory Culture   Date Value Ref Range Status   07/21/2024 No growth  Preliminary   07/21/2024   Final    Rare growth Normal respiratory ammon. No S. aureus or Pseudomonas aeruginosa detected. Final report.     Pain Management Panel           No data to display          "         ----------------------------------------------------------------------------------------------------------------------  Imaging Results (Last 24 Hours)       Procedure Component Value Units Date/Time    XR Chest 1 View [[id number]] Collected: 07/24/24 0752     Updated: 07/24/24 0755    Narrative:      EXAM:    XR Chest, 1 View     EXAM DATE:    7/24/2024 6:23 AM     CLINICAL HISTORY:    Follow-up left-sided pneumothorax; I21.4-Non-ST elevation (NSTEMI)  myocardial infarction; J93.9-Pneumothorax, unspecified     TECHNIQUE:    Frontal view of the chest.     COMPARISON:    7/23/2024     FINDINGS:    LUNGS AND PLEURAL SPACES:  Patchy bilateral airspace disease persists.   No consolidation.  No pneumothorax.    HEART:  Heart size is stable.  No cardiomegaly.    MEDIASTINUM:  Unremarkable as visualized.  Normal mediastinal contour.    BONES/JOINTS:  Unremarkable as visualized.  No acute fracture.    TUBES, LINES AND DEVICES:  The endotracheal tube (ETT) is in  satisfactory position.  Nasogastric tube tip in the stomach.  Left chest  tube remains in position with residual small left thorax measuring up to  about 9 mm and was about 9 mm.  Right internal jugular central venous  catheter tip in the superior vena cava.       Impression:      1.  Patchy bilateral airspace disease persists.  2.  Left chest tube remains in position with residual small left thorax  measuring up to about 9 mm and was about 9 mm.        This report was finalized on 7/24/2024 7:52 AM by Dr. Dominguez Sims MD.       XR Chest 1 View [[id number]] Collected: 07/23/24 1750     Updated: 07/23/24 1753    Narrative:      INDICATION: Chest tube placement.     TECHNIQUE: One view of the chest.     COMPARISON: Radiograph from earlier today.       Impression:      FINDINGS/IMPRESSION: Interval placement of left-sided chest tube.  Decreased volume left pneumothorax. Otherwise no significant change.         This report was finalized on 7/23/2024 5:51 PM by  Alex Pallas, DO.       XR Chest 1 View [[id number]] Collected: 07/23/24 1702     Updated: 07/23/24 1707    Narrative:      INDICATION: Chest pain.     TECHNIQUE: Frontal radiograph of the chest.     COMPARISON: Radiograph from yesterday       Impression:      FINDINGS/IMPRESSION:    Heart size is similar. Multifocal infiltrates/edema. Slightly worsened.  Small left apical pneumothorax, new from prior, approximately 10-20%  volume. Subcutaneous emphysema in the neck.  Enteric tube in the stomach. Endotracheal tube tip approximately 2 cm  above the irene. Right-sided central venous catheter tip in the  cavoatrial junction.     Report called to Dima ROSEN at 203PM PST 7/23/2024.        This report was finalized on 7/23/2024 5:05 PM by Alex Pallas, DO.               ----------------------------------------------------------------------------------------------------------------------    Pertinent Infectious Disease Results                Assessment/Plan       Assessment     Septic shock with acute hypoxic respiratory failure requiring mechanical ventilation and pressor support  Pneumonia        Plan      Patient remains intubated and sedated at 30% FiO2.  Son at bedside.  Status post left chest tube placement on 7/23/2024.  Lung sounds diminished bilaterally.  Abdomen soft, nontender.  Afebrile, no reported diarrhea.  WBC elevated at 22.14, likely related to steroid therapy.  CRP improving at 3.89.  Respiratory culture from 7/21/2024 finalized with no growth.  Fungal serologies remain in process.    For now we will continue cefepime and doxycycline for treatment of pneumonia. We will continue to follow closely and adjust antibiotic therapy as needed.      ANTIMICROBIAL THERAPY    cefepime 2000 mg IVPB in 100 mL NS (VTB)  doxycycline - 100 MG     Code Status:   Code Status and Medical Interventions:   Ordered at: 07/18/24 0202     Code Status (Patient has no pulse and is not breathing):    CPR (Attempt to Resuscitate)      Medical Interventions (Patient has pulse or is breathing):    Full Support       SG Rao  07/24/24  11:22 EDT

## 2024-07-24 NOTE — PLAN OF CARE
Problem: Communication Impairment (Mechanical Ventilation, Invasive)  Goal: Effective Communication  Outcome: Ongoing, Progressing     Problem: Device-Related Complication Risk (Mechanical Ventilation, Invasive)  Goal: Optimal Device Function  Intervention: Optimize Device Care and Function  Recent Flowsheet Documentation  Taken 7/24/2024 0617 by Tabitha Baker, RRT  Airway Safety Measures:   suction at bedside   oxygen flowmeter at bedside   manual resuscitator/mask at bedside   Goal Outcome Evaluation:

## 2024-07-24 NOTE — PLAN OF CARE
Goal Outcome Evaluation:           Progress: no change  Outcome Evaluation: Pt remains intubated/sedated. Remains on propofol, fentanyl and masha gtt. TF infusing. VSS.  UOP adequate. Will continue with plan of care.

## 2024-07-25 NOTE — PROGRESS NOTES
"Palliative Care Daily Progress Note     S: Medical record reviewed, followed up with Primary JESSIKA Owens and Dr Ospina regarding patient's condition. When I saw Lexie this am she was sedated/intubated on the vent at 28% and 4 of peep and was calm and not in distress. She appeared weak, frail and pale in appearance. There was no family at bedside this am, we will reach out to family.      O:   Palliative Performance Scale Score:     BP 92/50   Pulse 82   Temp 99.7 °F (37.6 °C)   Resp (!) 31   Ht 162.6 cm (64.02\")   Wt 62.9 kg (138 lb 10.7 oz)   SpO2 99%   BMI 23.79 kg/m²     Intake/Output Summary (Last 24 hours) at 7/25/2024 1606  Last data filed at 7/25/2024 0617  Gross per 24 hour   Intake 2897.94 ml   Output 746 ml   Net 2151.94 ml       PE:  General Appearance:    Chronically ill appearing, sedation on intubated on vent, neck is edematous   HEENT:    NC/AT, without obvious abnormality, EOMI, anicteric    Neck:   Supple slight subcutaneous air noted on neck, trachea midline, no JVD   Lungs:     Sedated on vent @ 24% and 4  of peep, left CT in place, coarse lung sounds noted    Heart:    RRR, normal S1 and S2, no M/R/G   Abdomen:     Soft, NT, ND, NABS    Extremities:   Moves all extremities weakly, trace BLL extremity edema, RUE edema/rt hand   Pulses:   Pulses palpable and equal bilaterally   Skin:   Warm, dry   Neurologic:   Sedation on unable to assess    Psych:   Sedation on calm         Meds: Reviewed and changes noted    Labs:   Results from last 7 days   Lab Units 07/25/24  0006   WBC 10*3/mm3 21.27*   HEMOGLOBIN g/dL 9.6*   HEMATOCRIT % 30.8*   PLATELETS 10*3/mm3 303     Results from last 7 days   Lab Units 07/25/24  0006   SODIUM mmol/L 144   POTASSIUM mmol/L 5.1   CHLORIDE mmol/L 114*   CO2 mmol/L 24.9   BUN mg/dL 20   CREATININE mg/dL 0.33*   GLUCOSE mg/dL 138*   CALCIUM mg/dL 8.1*     Results from last 7 days   Lab Units 07/25/24  0006 07/24/24  0022   SODIUM mmol/L 144 133*   POTASSIUM mmol/L 5.1 " 4.0   CHLORIDE mmol/L 114* 103   CO2 mmol/L 24.9 25.0   BUN mg/dL 20 17   CREATININE mg/dL 0.33* 0.38*   CALCIUM mg/dL 8.1* 8.2*   BILIRUBIN mg/dL  --  <0.2   ALK PHOS U/L  --  89   ALT (SGPT) U/L  --  14   AST (SGOT) U/L  --  22   GLUCOSE mg/dL 138* 164*     Imaging Results (Last 72 Hours)       Procedure Component Value Units Date/Time    XR Chest 1 View [[id number]] Collected: 07/24/24 0752     Updated: 07/24/24 0755    Narrative:      EXAM:    XR Chest, 1 View     EXAM DATE:    7/24/2024 6:23 AM     CLINICAL HISTORY:    Follow-up left-sided pneumothorax; I21.4-Non-ST elevation (NSTEMI)  myocardial infarction; J93.9-Pneumothorax, unspecified     TECHNIQUE:    Frontal view of the chest.     COMPARISON:    7/23/2024     FINDINGS:    LUNGS AND PLEURAL SPACES:  Patchy bilateral airspace disease persists.   No consolidation.  No pneumothorax.    HEART:  Heart size is stable.  No cardiomegaly.    MEDIASTINUM:  Unremarkable as visualized.  Normal mediastinal contour.    BONES/JOINTS:  Unremarkable as visualized.  No acute fracture.    TUBES, LINES AND DEVICES:  The endotracheal tube (ETT) is in  satisfactory position.  Nasogastric tube tip in the stomach.  Left chest  tube remains in position with residual small left thorax measuring up to  about 9 mm and was about 9 mm.  Right internal jugular central venous  catheter tip in the superior vena cava.       Impression:      1.  Patchy bilateral airspace disease persists.  2.  Left chest tube remains in position with residual small left thorax  measuring up to about 9 mm and was about 9 mm.        This report was finalized on 7/24/2024 7:52 AM by Dr. Dominguez Sims MD.       XR Chest 1 View [[id number]] Collected: 07/23/24 1750     Updated: 07/23/24 1753    Narrative:      INDICATION: Chest tube placement.     TECHNIQUE: One view of the chest.     COMPARISON: Radiograph from earlier today.       Impression:      FINDINGS/IMPRESSION: Interval placement of left-sided chest  tube.  Decreased volume left pneumothorax. Otherwise no significant change.         This report was finalized on 7/23/2024 5:51 PM by Alex Pallas, DO.       XR Chest 1 View [[id number]] Collected: 07/23/24 1702     Updated: 07/23/24 1707    Narrative:      INDICATION: Chest pain.     TECHNIQUE: Frontal radiograph of the chest.     COMPARISON: Radiograph from yesterday       Impression:      FINDINGS/IMPRESSION:    Heart size is similar. Multifocal infiltrates/edema. Slightly worsened.  Small left apical pneumothorax, new from prior, approximately 10-20%  volume. Subcutaneous emphysema in the neck.  Enteric tube in the stomach. Endotracheal tube tip approximately 2 cm  above the irene. Right-sided central venous catheter tip in the  cavoatrial junction.     Report called to Dima ROSEN at 203PM PST 7/23/2024.        This report was finalized on 7/23/2024 5:05 PM by Alex Pallas, DO.                 Diagnostics: Reviewed    A: Lexie Valdez is a 64 y.o. female admitted on 7/17/2024 due to shortness of breath. Lexie has a medical history of  anxiety/depression, arthritis, HTN, hypothyroidism, stress urinary incontinence with frequent UTI's, COPD and pulmonary fibrosis on 3L NC at home, and resting tremors in face and extremities, who presents with worsening shortness of breath for the last couple days. Pt also at time of admission c/o chest pressure associated with dyspnea stating this was chronic however had worsened in the days leading up to her admission. She also endorsed diarrhea for the last couple of days as well as dysuria and stated that she has recurring UTI's. Labs in ER revealed Troponin of 229 with repeat 188, BNP was 3550, Na 131, Cr 0.49, glucose 177, CRP 1.65, lactate 2.0, procal and WBC normal but with 88.7% neutrophils. UA showed positive nitrite, trace leuk, 6-10 WBC, 4+ bacteria and 3-6 squamous epithelial cells. She was tachycardic and tachypneic with pH of 7.458, CO2 40, Po2 154, bicarb 28. She was  admitted to PCU on admission and in the early am of 7/21 was transferred to CCU due to worsening dyspnea, bilateral infiltrates without effusion concerning for interstitial debbi disease flare, was transferred to CCU. Initially she was placed on Bipap in CCU and at 7/21 6:20 am Lexie was sedated intubated and placed on the ventilator. Palliative care consulted for GOC,ACP and for support of family.     Today 7/25/24  T 99.1, HR 84, BP 93/52, RR 28 and was saturating 97% on vent at 28% FiO2 and 4 of peep and was sedated on fentanyl, Propofol and a vasopressor, she appeared weak, frail and pale in appearance.         P:  Palliative was able to speak with pts son Faith this morning to discuss setting up a time to have a family meeting with Dr Ospina and palliative care. Received a call in the afternoon that family was all here however  Khris was not present and called to come participate in meeting.Dr Ospina and I were able to speak with pts son Faith,  Khris, sister Rosa and multiple other family members regarding Lexie current condition, the condition of her lungs and goals of care. I discussed that they all knew her better than we did and know what she would want, we discussed if she would want to be resuscitated via CPR cardiac shocks and as well talked about chest compressions being traumatic to her in her already weak frail state and that it would cause more harm to her than help her and encouraged Faith, Khris and family to discuss this as a family. Her sister Lizeth did speak up and say that Lexie had told her that she would not want a trach and peg and Dr Ospina explained that ultimately this would not help as her Interstitial lung disease would continue to progress and that she would require a Long term care hospital if she had a trach and peg. For right now I encouraged them to at least discuss her code status as a family and if they had any questions, needed support or came to a decision to  please let Graciela ROSEN know.    We will continue to follow along. Please do not hesitate to contact us regarding further sx mgmt or GOC needs, including after hours or on weekends via our on call provider at [phone].     Linh Lou, APRN    7/25/2024

## 2024-07-25 NOTE — PLAN OF CARE
Goal Outcome Evaluation:           Progress: no change  Outcome Evaluation: Pt remains intubated/sedated. Remains on propofol, fentanyl, and yair gtt. Yair titrated down.  Pt became very tachypneic during SAT and was not able to follow commands. VSS. Will continue with plan of care

## 2024-07-25 NOTE — NURSING NOTE
Skin tear to left arm.  Dressing in place.  Skin tear protocol orders in place.    Continues with [facility] to gluteals.  Treatment changed to include Magic Barrier Cream BID.    PI prevention orders in place.

## 2024-07-25 NOTE — PLAN OF CARE
Goal Outcome Evaluation:              Outcome Evaluation: patient sedated intubated. gtt propofol, masha, fentanyl. Patient remains stable at this moment. bed alarm set, room near nurses station

## 2024-07-25 NOTE — PROGRESS NOTES
[facility]  PROGRESS NOTE    Patient information:  Name: Lexie Valdez  Age/Sex: 64 y.o. female  :  1960        PCP: Violet Pop APRN  Attending: Estuardo Charles MD  MRN:  [id number]  Visit Number:  [id number]    LOS: 7  CODE STATUS:    Code Status and Medical Interventions:   Ordered at: 24 0205     Code Status (Patient has no pulse and is not breathing):    CPR (Attempt to Resuscitate)     Medical Interventions (Patient has pulse or is breathing):    Full Support       PROBLEM LIST:Principal Problem:    NSTEMI (non-ST elevated myocardial infarction)      Reason for Cardiology follow-up: NSTEMI    Subjective   ADMISSION INFORMATION:  Chief Complaint   Patient presents with    Shortness of Breath       DATE:2024    HPI:  Lexie Valdez is a 64 y.o. 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     Patient presented to [facility] ([facility]) emergency room (ER) on 2024 with complaints of increased shortness of breath x 2 weeks has become progressively worse and exacerbated with minimal activity.      Primary Cardiologist: None found per chart review.    Interval History:   AM labs reveal sodium 144, potassium 5.1, chloride 114, CO2 24.9, BUN 20, and a creatinine 0.33.  Hemoglobin 9.6, platelet count 303, and WBC 21.27.     Patient was in room CCU 10  when she was seen and examined.  Patient remains intubated. Nurse reports her sedation is turned off right now.     EVENT TIMELINE:   :ECHO w EF of 36 - 40%. Left ventricular diastolic function is consistent with (grade I) impaired relaxation, mild tricuspid valve regurgitation is present. Estimated right ventricular systolic pressure from tricuspid regurgitation is  moderately elevated (45-55 mmHg). Patient seem to have developed a new regional wall motion normalities with decreased LV systolic function as compared to the previous study in March 2023.  Please see full report attached below.  7/18: Bess ST with MPI indicating moderate-sized infarct located in the inferior wall, septal wall and apex with no significant ischemia noted. Impressions are consistent with an intermediate risk study. EF = 43%.  Please see full report attached below.  7/21: Emergently intubated orotracheally with a #7.5 endotracheal tube at approximately 22:16 PM.   7/21: CXRs indicated multifocal and worsening pneumonia coupled with some concern as well for pulmonary edema.   7/23: Chest tube inserted 2/2 left sided pneumothorax     Objective     Vital Signs  Temp:  [97.7 °F (36.5 °C)-99.1 °F (37.3 °C)] 99.1 °F (37.3 °C)  Heart Rate:  [] 98  Resp:  [28] 28  BP: ()/() 139/82  FiO2 (%):  [28 %-30 %] 28 %  Device (Oxygen Therapy): ventilator  Vital Signs (last 72 hrs)         07/22 0700  07/23 0659 07/23 0700 07/24 0659 07/24 0700 07/25 0659 07/25 0700 07/25 0836   Most Recent      Temp (°F) 98.1 -  100.1    98.2 -  98.6    97.7 -  98.9       98.9 (37.2) 07/25 0400    Heart Rate 65 -  104    62 -  102    68 -  91      72     72 07/25 0713    Resp 28 -  33    28 -  40      28       28 07/25 0656    BP 89/43 -  155/88    88/43 -  132/75    85/51 -  125/67      118/65     118/65 07/25 0713    SpO2 (%) 93 -  100    85 -  100    96 -  100       96 07/25 0645    Oxygen Concentration (%)   30      30    28 -  30       28 07/25 0656          BMI:Body mass index is 23.79 kg/m².    WEIGHT:      07/23/24  0545 07/24/24  0550 07/25/24  0530   Weight: 59.4 kg (130 lb 15.3 oz) 62.3 kg (137 lb 5.6 oz) 62.9 kg (138 lb 10.7 oz)       DIET:NPO Diet NPO Type: Tube Feeding    I&O:  Intake & Output (last 3 days)         07/22 0701 07/23 0700 07/23 0701 07/24 0700 07/24 0701 07/25 0700 07/25 0701 07/26  0700    I.V. (mL/kg) 1414.7 (24.7) 1369.3 (23.9) 1719.9 (30.1)     Other 323 517 484     NG/ 592 694     IV Piggyback 400 200      Total Intake(mL/kg) 2499.7 (43.7) 2678.3 (46.8) 2897.9 (50.7)     Urine (mL/kg/hr) 1000 (0.7) 950 (0.7) 700 (0.5)     Emesis/NG output 0 0      Stool 0 0      Chest Tube  45 46     Total Output 1000 995 746     Net +1499.7 +1683.3 +2151.9             Stool Unmeasured Occurrence 0 x 0 x      Emesis Unmeasured Occurrence 0 x 0 x               PHYSICAL EXAM:  Constitutional:       Appearance: Not in distress. Acutely ill-appearing.   Eyes:      Comments: OU are open but she does not track or follow commands at this time.    HENT:         Comments: Orally intubated.  Neck:      Vascular: No JVD. JVD normal.   Pulmonary:      Comments: Chest tube intact to left side and Intubated with mechanical ventilation with improved air entry noted   Cardiovascular:      Normal rate. Regular rhythm.      Murmurs: There is no murmur.   Edema:     Peripheral edema absent.   Abdominal:      General: Bowel sounds are normal. There is no distension.      Palpations: Abdomen is soft.      Comments: FC with BSD in use with yellow urine noted.    Musculoskeletal:      Cervical back: Neck supple.      Comments: Sedated. SCUDS in use BLE.  Skin:     General: Skin is warm and dry.   Neurological:      Comments: Intubated. OU are open but she does not track or follow commands at this time.                     Results review   Results Review:    I have reviewed the patient's new clinical results. 07/25/24 11:18 EDT    Results from last 7 days   Lab Units 07/23/24  0258 07/23/24  0058   HSTROP T ng/L 66* 75*     Lab Results   Component Value Date    PROBNP 14,737.0 (H) 07/22/2024    PROBNP 3,550.0 (H) 07/17/2024    PROBNP 235.1 04/26/2024     Results from last 7 days   Lab Units 07/25/24  0006 07/24/24  0022 07/23/24  0058 07/22/24  0012 07/21/24  0016 07/19/24  2356 07/19/24  0046   WBC 10*3/mm3 21.27* 22.14*  10.24 16.58* 20.24* 13.37* 15.07*   HEMOGLOBIN g/dL 9.6* 9.6* 9.5* 9.9* 11.8* 11.5* 12.3   PLATELETS 10*3/mm3 303 287 235 227 227 214 269     Results from last 7 days   Lab Units 07/25/24  0006 07/24/24  0022 07/23/24  0058 07/22/24  0012 07/21/24  0016 07/19/24  2356   SODIUM mmol/L 144 133* 139 135* 130* 133*   POTASSIUM mmol/L 5.1 4.0 3.7 4.5 4.7 4.5   CHLORIDE mmol/L 114* 103 106 100 99 102   CO2 mmol/L 24.9 25.0 24.1 23.9 20.5* 22.2   BUN mg/dL 20 17 20 25* 17 15   CREATININE mg/dL 0.33* 0.38* 0.45* 0.47* 0.37* 0.44*   CALCIUM mg/dL 8.1* 8.2* 8.2* 8.2* 8.4* 8.8   GLUCOSE mg/dL 138* 164* 208* 191* 122* 114*   ALT (SGPT) U/L  --  14 15 10  --   --    AST (SGOT) U/L  --  22 26 33*  --   --      Lab Results   Component Value Date    MG 2.5 (H) 07/25/2024    MG 2.5 (H) 07/24/2024    MG 2.4 07/23/2024     Estimated Creatinine Clearance: 168.8 mL/min (A) (by C-G formula based on SCr of 0.33 mg/dL (L)).    Lab Results   Component Value Date    HGBA1C 4.60 (L) 07/18/2024    HGBA1C 4.80 04/11/2024    HGBA1C 5.00 12/31/2023     Lab Results   Component Value Date    CHOL 136 07/18/2024    TRIG 61 07/18/2024    LDL 56 07/18/2024    HDL 67 (H) 07/18/2024        Lab Results   Component Value Date    INR 0.93 07/18/2024    INR 0.98 04/26/2024    INR 1.00 03/21/2023     Lab Results   Component Value Date    LABHEPA 0.27 (L) 07/20/2024    LABHEPA 0.36 07/19/2024    LABHEPA 0.33 07/19/2024    LABHEPA 0.11 (L) 07/18/2024       Lab Results   Component Value Date    TSH 1.140 07/17/2024      Pain Management Panel           No data to display              Microbiology Results (last 10 days)       Procedure Component Value - Date/Time    Mycoplasma Pneumoniae Antibody, IgM - Blood, Arm, Left [[id number]]  (Normal) Collected: 07/22/24 0012    Lab Status: Final result Specimen: Blood from Arm, Left Updated: 07/22/24 0202     Mycoplasma pneumo IgM Negative    Legionella Antigen, Urine - Urine, Urine, Clean Catch [[id number]]  (Normal)  Collected: 07/21/24 2242    Lab Status: Final result Specimen: Urine, Clean Catch Updated: 07/21/24 2308     LEGIONELLA ANTIGEN, URINE Negative    Narrative:      Presumptive negative for L. pneumophilia serogroup 1 antigen, suggesting no recent or current infection.    Respiratory Culture - Sputum, ET Suction [[id number]] Collected: 07/21/24 2231    Lab Status: Final result Specimen: Sputum from ET Suction Updated: 07/24/24 1119     Respiratory Culture No growth     Gram Stain Rare (1+) Mixed ammon      No WBCs seen      No Epithelial cells seen    MRSA Screen, PCR (Inpatient) - Swab, Nares [[id number]]  (Normal) Collected: 07/21/24 1252    Lab Status: Final result Specimen: Swab from Nares Updated: 07/21/24 1424     MRSA PCR No MRSA Detected    Narrative:      The negative predictive value of this diagnostic test is high and should only be used to consider de-escalating anti-MRSA therapy. A positive result may indicate colonization with MRSA and must be correlated clinically.    Respiratory Panel PCR w/COVID-19(SARS-CoV-2) RHONDA/HEATHER/TASNEEM/PAD/COR/CHRISTELLE In-House, NP Swab in UTM/VTM, 2 HR TAT - Swab, Nasopharynx [[id number]]  (Normal) Collected: 07/21/24 1247    Lab Status: Final result Specimen: Swab from Nasopharynx Updated: 07/21/24 1357     ADENOVIRUS, PCR Not Detected     Coronavirus 229E Not Detected     Coronavirus HKU1 Not Detected     Coronavirus NL63 Not Detected     Coronavirus OC43 Not Detected     COVID19 Not Detected     Human Metapneumovirus Not Detected     Human Rhinovirus/Enterovirus Not Detected     Influenza A PCR Not Detected     Influenza B PCR Not Detected     Parainfluenza Virus 1 Not Detected     Parainfluenza Virus 2 Not Detected     Parainfluenza Virus 3 Not Detected     Parainfluenza Virus 4 Not Detected     RSV, PCR Not Detected     Bordetella pertussis pcr Not Detected     Bordetella parapertussis PCR Not Detected     Chlamydophila pneumoniae PCR Not Detected     Mycoplasma pneumo by PCR Not  Detected    Narrative:      In the setting of a positive respiratory panel with a viral infection PLUS a negative procalcitonin without other underlying concern for bacterial infection, consider observing off antibiotics or discontinuation of antibiotics and continue supportive care. If the respiratory panel is positive for atypical bacterial infection (Bordetella pertussis, Chlamydophila pneumoniae, or Mycoplasma pneumoniae), consider antibiotic de-escalation to target atypical bacterial infection.    Respiratory Culture - Sputum, ET Suction [[id number]] Collected: 07/21/24 1152    Lab Status: Final result Specimen: Sputum from ET Suction Updated: 07/23/24 1053     Respiratory Culture Rare growth Normal respiratory ammon. No S. aureus or Pseudomonas aeruginosa detected. Final report.     Gram Stain Moderate (3+) WBCs seen      Rare (1+) Epithelial cells seen      No organisms seen    Blood Culture - Blood, Arm, Left [[id number]]  (Normal) Collected: 07/21/24 0450    Lab Status: Preliminary result Specimen: Blood from Arm, Left Updated: 07/25/24 0515     Blood Culture No growth at 4 days    Blood Culture - Blood, Arm, Right [[id number]]  (Normal) Collected: 07/21/24 0448    Lab Status: Preliminary result Specimen: Blood from Arm, Right Updated: 07/25/24 0530     Blood Culture No growth at 4 days    Gastrointestinal Panel, PCR - Stool, Per Rectum [[id number]]  (Normal) Collected: 07/19/24 0410    Lab Status: Final result Specimen: Stool from Per Rectum Updated: 07/19/24 0603     Campylobacter Not Detected     Plesiomonas shigelloides Not Detected     Salmonella Not Detected     Vibrio Not Detected     Vibrio cholerae Not Detected     Yersinia enterocolitica Not Detected     Enteroaggregative E. coli (EAEC) Not Detected     Enteropathogenic E. coli (EPEC) Not Detected     Enterotoxigenic E. coli (ETEC) lt/st Not Detected     Shiga-like toxin-producing E. coli (STEC) stx1/stx2 Not Detected     Shigella/Enteroinvasive E.  coli (EIEC) Not Detected     Cryptosporidium Not Detected     Cyclospora cayetanensis Not Detected     Entamoeba histolytica Not Detected     Giardia lamblia Not Detected     Adenovirus F40/41 Not Detected     Astrovirus Not Detected     Norovirus GI/GII Not Detected     Rotavirus A Not Detected     Sapovirus (I, II, IV or V) Not Detected    Clostridioides difficile Toxin - Stool, Per Rectum [[id number]] Collected: 07/19/24 0410    Lab Status: Final result Specimen: Stool from Per Rectum Updated: 07/19/24 0527    Narrative:      The following orders were created for panel order Clostridioides difficile Toxin - Stool, Per Rectum.  Procedure                               Abnormality         Status                     ---------                               -----------         ------                     Clostridioides difficile...[[id number]]                      Final result                 Please view results for these tests on the individual orders.    Clostridioides difficile Toxin, PCR - Stool, Per Rectum [[id number]] Collected: 07/19/24 0410    Lab Status: Final result Specimen: Stool from Per Rectum Updated: 07/19/24 0527     Toxigenic C. difficile by PCR Negative     027 Toxin Presumptive Negative    Narrative:      The result indicates the absence of toxigenic C. difficile from stool specimen.     Urine Culture - Urine, Urine, Catheter [[id number]]  (Abnormal)  (Susceptibility) Collected: 07/17/24 2144    Lab Status: Final result Specimen: Urine, Catheter Updated: 07/20/24 0959     Urine Culture >100,000 CFU/mL Escherichia coli    Narrative:      Colonization of the urinary tract without infection is common. Treatment is discouraged unless the patient is symptomatic, pregnant, or undergoing an invasive urologic procedure.    Susceptibility        Escherichia coli      MILES      Amoxicillin + Clavulanate Susceptible      Ampicillin Susceptible      Ampicillin + Sulbactam Susceptible      Cefazolin Susceptible       Cefepime Susceptible      Ceftazidime Susceptible      Ceftriaxone Susceptible      Gentamicin Susceptible      Levofloxacin Susceptible      Nitrofurantoin Susceptible      Piperacillin + Tazobactam Susceptible      Trimethoprim + Sulfamethoxazole Susceptible                           Respiratory Panel PCR w/COVID-19(SARS-CoV-2) RHONDA/HEATHER/TASNEEM/PAD/COR/CHRISTELLE In-House, NP Swab in UTM/VTM, 2 HR TAT - Swab, Nasopharynx [[id number]]  (Normal) Collected: 07/17/24 2127    Lab Status: Final result Specimen: Swab from Nasopharynx Updated: 07/17/24 2325     ADENOVIRUS, PCR Not Detected     Coronavirus 229E Not Detected     Coronavirus HKU1 Not Detected     Coronavirus NL63 Not Detected     Coronavirus OC43 Not Detected     COVID19 Not Detected     Human Metapneumovirus Not Detected     Human Rhinovirus/Enterovirus Not Detected     Influenza A PCR Not Detected     Influenza B PCR Not Detected     Parainfluenza Virus 1 Not Detected     Parainfluenza Virus 2 Not Detected     Parainfluenza Virus 3 Not Detected     Parainfluenza Virus 4 Not Detected     RSV, PCR Not Detected     Bordetella pertussis pcr Not Detected     Bordetella parapertussis PCR Not Detected     Chlamydophila pneumoniae PCR Not Detected     Mycoplasma pneumo by PCR Not Detected    Narrative:      In the setting of a positive respiratory panel with a viral infection PLUS a negative procalcitonin without other underlying concern for bacterial infection, consider observing off antibiotics or discontinuation of antibiotics and continue supportive care. If the respiratory panel is positive for atypical bacterial infection (Bordetella pertussis, Chlamydophila pneumoniae, or Mycoplasma pneumoniae), consider antibiotic de-escalation to target atypical bacterial infection.    COVID PRE-OP / PRE-PROCEDURE SCREENING ORDER (NO ISOLATION) - Swab, Nasopharynx [[id number]]  (Normal) Collected: 07/17/24 2119    Lab Status: Final result Specimen: Swab from Nasopharynx Updated:  07/17/24 2149    Narrative:      The following orders were created for panel order COVID PRE-OP / PRE-PROCEDURE SCREENING ORDER (NO ISOLATION) - Swab, Nasopharynx.  Procedure                               Abnormality         Status                     ---------                               -----------         ------                     COVID-19 and FLU A/B PCR...[[id number]]  Normal              Final result                 Please view results for these tests on the individual orders.    COVID-19 and FLU A/B PCR, 1 HR TAT - Swab, Nasopharynx [[id number]]  (Normal) Collected: 07/17/24 2119    Lab Status: Final result Specimen: Swab from Nasopharynx Updated: 07/17/24 2149     COVID19 Not Detected     Influenza A PCR Not Detected     Influenza B PCR Not Detected    Narrative:      Fact sheet for providers: https://www.fda.gov/media/506176/download    Fact sheet for patients: https://www.fda.gov/media/317475/download    Test performed by PCR.    Blood Culture - Blood, Arm, Right [[id number]]  (Normal) Collected: 07/17/24 2119    Lab Status: Final result Specimen: Blood from Arm, Right Updated: 07/22/24 2131     Blood Culture No growth at 5 days    Blood Culture - Blood, Arm, Left [[id number]]  (Normal) Collected: 07/17/24 2119    Lab Status: Final result Specimen: Blood from Arm, Left Updated: 07/22/24 2131     Blood Culture No growth at 5 days           Imaging Results (Last 24 Hours)       ** No results found for the last 24 hours. **          ECHO:  Results for orders placed during the hospital encounter of 07/17/24    Adult Transthoracic Echo Complete w/ Color, Spectral and Contrast if necessary per protocol    Interpretation Summary    Normal left ventricular cavity size and wall thickness noted.    The following left ventricular wall segments are hypokinetic: mid anterior, basal anterolateral, apical lateral, apical septal, mid anteroseptal and basal anterior. The following left ventricular wall segments are  akinetic: apex.    Left ventricular systolic function is moderately decreased. Left ventricular ejection fraction appears to be 36 - 40%.    Left ventricular diastolic function is consistent with (grade I) impaired relaxation.    The aortic valve is structurally normal with no regurgitation or stenosis present.    The mitral valve is structurally normal with no significant stenosis present. Mild mitral valve regurgitation is present.    Mild tricuspid valve regurgitation is present. Estimated right ventricular systolic pressure from tricuspid regurgitation is moderately elevated (45-55 mmHg).    There is no evidence of pericardial effusion. .    Comments: Patient seem to have developed a new regional wall motion normalities with decreased LV systolic function as compared to the previous study in March 2023.      STRESS TEST:  Results for orders placed during the hospital encounter of 07/17/24    Stress Test With Myocardial Perfusion One Day    Interpretation Summary  Images from the original result were not included.      A pharmacological stress test was performed using regadenoson without low-level exercise.    Findings consistent with an indeterminate ECG stress test.    Myocardial perfusion imaging indicates a moderate-sized infarct located in the inferior wall, septal wall and apex with no significant ischemia noted.    Abnormal LV wall motion consistent with apical dyskinesis.    Left ventricular ejection fraction is moderately reduced (Calculated EF = 43%).    Impressions are consistent with an intermediate risk study.       HEART CATH:  No results found for this or any previous visit.        TELEMETRY:            I reviewed the patient's new clinical results.    ALLERGIES: Codeine and Sulfa antibiotics    Medication Review:   Current list of medications may not reflect those currently placed in orders that are not signed or are being held.     aspirin, 81 mg, Oral, Daily  Brexpiprazole, 0.5 mg, Oral,  Daily  cefepime, 2,000 mg, Intravenous, Q8H  chlorhexidine, 15 mL, Mouth/Throat, Q12H  donepezil, 10 mg, Oral, Q PM  doxycycline, 100 mg, Oral, Q12H  enoxaparin, 40 mg, Subcutaneous, Nightly  hydroxychloroquine, 200 mg, Oral, BID  insulin regular, 2-7 Units, Subcutaneous, Q6H  ipratropium-albuterol, 3 mL, Nebulization, Q4H  levothyroxine, 25 mcg, Oral, Daily With Breakfast  memantine, 5 mg, Oral, Q12H  methylPREDNISolone sodium succinate, 60 mg, Intravenous, Q12H  [Held by provider] metoprolol succinate XL, 25 mg, Oral, Q24H  mupirocin, 1 application , Each Nare, BID  nystatin, 1 Application, Topical, Q12H  pantoprazole, 40 mg, Intravenous, Q AM  Phenylephrine HCl-NaCl, , ,   Phenylephrine HCl-NaCl, , ,   sodium chloride, 10 mL, Intravenous, Q12H  sodium chloride, 10 mL, Intravenous, Q12H  sodium chloride, 10 mL, Intravenous, Q12H  sodium chloride, 10 mL, Intravenous, Q12H  sodium chloride, 10 mL, Intravenous, Q12H  sodium chloride, 10 mL, Intravenous, Q12H      fentanyl 10 mcg/mL,  mcg/hr, Last Rate: Stopped (07/25/24 1105)  Pharmacy Consult,   phenylephrine, 0.5-3 mcg/kg/min (Dosing Weight), Last Rate: 1.4 mcg/kg/min (07/25/24 0837)  propofol, 5-50 mcg/kg/min (Dosing Weight), Last Rate: Stopped (07/25/24 1105)        senna-docusate sodium **AND** polyethylene glycol **AND** bisacodyl **AND** bisacodyl    busPIRone    Calcium Replacement - Follow Nurse / BPA Driven Protocol    dextrose    dextrose    glucagon (human recombinant)    guaifenesin    HYDROcodone-acetaminophen    hydrOXYzine    ipratropium    Magnesium Cardiology Dose Replacement - Follow Nurse / BPA Driven Protocol    nitroglycerin    Pharmacy Consult    Phenylephrine HCl-NaCl    Phenylephrine HCl-NaCl    Phosphorus Replacement - Follow Nurse / BPA Driven Protocol    Potassium Replacement - Follow Nurse / BPA Driven Protocol    prochlorperazine    sodium chloride    sodium chloride    sodium chloride    sodium chloride    sodium chloride     sodium chloride    sodium chloride    sodium chloride    sodium chloride    sodium chloride    sodium chloride    vecuronium    Assessment    Acute non-ST elevation myocardial infarction likely type II due to respiratory failure with hypoxia  Advanced COPD/pulmonary fibrosis, on home oxygen.  Acute on chronic respiratory failure requiring intubation mechanical ventilation.  HFrEF, worsening with acute respiratory failure  Essential hypertension  Mild hyponatremia  Patient developed pneumothorax requiring chest tube placement            Recommendations / Plan   1.  Lung aeration improved on the left side as well as drainage from the chest tube  2.  Patient's not in overt fluid overload we will monitor closely  3.  Prognosis is guarded          I have discussed the patients findings and recommendations with the patient.    Thank you very much for asking us to be involved in this patient's care.  We will follow along with you.    Electronically signed by SG Chandler, 07/25/24, 11:18 AM EDT.   Electronically signed by Rinku Braxton MD, 07/25/24, 11:24 AM EDT.                      Please note that portions of this note were completed with a voice recognition program.    Please note that portions of this note were copied and has been reviewed and is accurate as of 7/25/2024 .

## 2024-07-25 NOTE — PROGRESS NOTES
[facility] HOSPITALISTS CROSS COVER NOTE    Patient Identification:  Name:  Lexie KEITH Valdez  Age:  64 y.o.  Sex:  female  :  1960  MRN:  [id number]  Visit number:  [id number]  Primary Care Provider:  Violet Pop APRN    Length of stay in inpatient status:  7    Brief Update     I talked to the  and son at bedside again this evening.  They have made a joint decision to not perform chest compressions; however, all other interventions are to be given if needed for now.  Present during my conversation was bedside nurse Graciela.    Jessica Ospina MD  [facility]  24  18:13 EDT

## 2024-07-25 NOTE — PLAN OF CARE
Problem: Communication Impairment (Mechanical Ventilation, Invasive)  Goal: Effective Communication  Outcome: Ongoing, Progressing   Goal Outcome Evaluation:

## 2024-07-25 NOTE — PROGRESS NOTES
PROGRESS NOTE         Patient Identification:  Name:  Lexie Valdez  Age:  64 y.o.  Sex:  female  :  1960  MRN:  [id number]  Visit Number:  [id number]  Primary Care Provider:  Violet Pop APRN         LOS: 7 days       ----------------------------------------------------------------------------------------------------------------------  Subjective       Chief Complaints:    Shortness of Breath        Interval History:      The patient remains intubated and sedated at 28% FiO2.  Family members at the bedside.  Sedation and phenylephrine infusing.  Afebrile.  No reports of diarrhea.  Mechanical breath sounds are clear to auscultation bilaterally.  Abdomen soft and nontender with normoactive bowel sounds.  Chest tube in place to the right chest wall.  Leukocytosis slightly improved at 21.27.  Fungal serologies pending.      Review of Systems:    Unable to obtain.  Intubated and sedated.    ----------------------------------------------------------------------------------------------------------------------      Objective       Current Hospital Meds:  aspirin, 81 mg, Oral, Daily  Brexpiprazole, 0.5 mg, Oral, Daily  cefepime, 2,000 mg, Intravenous, Q8H  chlorhexidine, 15 mL, Mouth/Throat, Q12H  donepezil, 10 mg, Oral, Q PM  doxycycline, 100 mg, Oral, Q12H  enoxaparin, 40 mg, Subcutaneous, Nightly  hydroxychloroquine, 200 mg, Oral, BID  insulin regular, 2-7 Units, Subcutaneous, Q6H  ipratropium-albuterol, 3 mL, Nebulization, Q4H  levothyroxine, 25 mcg, Oral, Daily With Breakfast  memantine, 5 mg, Oral, Q12H  methylPREDNISolone sodium succinate, 60 mg, Intravenous, Q12H  [Held by provider] metoprolol succinate XL, 25 mg, Oral, Q24H  mupirocin, 1 application , Each Nare, BID  nystatin, 1 Application, Topical, Q12H  pantoprazole, 40 mg, Intravenous, Q AM  Phenylephrine HCl-NaCl, , ,   Phenylephrine HCl-NaCl, , ,   sodium chloride, 10 mL, Intravenous, Q12H  sodium chloride, 10 mL, Intravenous,  Q12H  sodium chloride, 10 mL, Intravenous, Q12H  sodium chloride, 10 mL, Intravenous, Q12H  sodium chloride, 10 mL, Intravenous, Q12H  sodium chloride, 10 mL, Intravenous, Q12H      fentanyl 10 mcg/mL,  mcg/hr, Last Rate: 100 mcg/hr (07/25/24 1125)  Pharmacy Consult,   phenylephrine, 0.5-3 mcg/kg/min (Dosing Weight), Last Rate: 1.4 mcg/kg/min (07/25/24 0837)  propofol, 5-50 mcg/kg/min (Dosing Weight), Last Rate: 20 mcg/kg/min (07/25/24 1127)      ----------------------------------------------------------------------------------------------------------------------    Vital Signs:  Temp:  [97.7 °F (36.5 °C)-99.1 °F (37.3 °C)] 99.1 °F (37.3 °C)  Heart Rate:  [] 98  Resp:  [28] 28  BP: ()/() 139/82  FiO2 (%):  [28 %-30 %] 28 %  Mean Arterial Pressure (Non-Invasive) for the past 24 hrs (Last 3 readings):   Noninvasive MAP (mmHg)   07/25/24 1100 102   07/25/24 1045 82   07/25/24 1030 88     SpO2 Percentage    07/25/24 1030 07/25/24 1045 07/25/24 1100   SpO2: 93% 97% 96%     SpO2:  [93 %-100 %] 96 %  on   ;   Device (Oxygen Therapy): ventilator    Body mass index is 23.79 kg/m².  Wt Readings from Last 3 Encounters:   07/25/24 62.9 kg (138 lb 10.7 oz)   04/26/24 67.7 kg (149 lb 4 oz)   04/19/24 67.1 kg (148 lb)        Intake/Output Summary (Last 24 hours) at 7/25/2024 1144  Last data filed at 7/25/2024 0617  Gross per 24 hour   Intake 2897.94 ml   Output 746 ml   Net 2151.94 ml     NPO Diet NPO Type: Tube Feeding  ----------------------------------------------------------------------------------------------------------------------      Physical Exam:    Constitutional: Thin, frail, chronically ill-appearing  female.  Intubated and sedated 20% FiO2.  Family numbers at the bedside.  HENT:  Head: Normocephalic and atraumatic.  Mouth:  Moist mucous membranes.    Eyes:  Conjunctivae and EOM are normal.  No scleral icterus.  Neck:  Neck supple.  No JVD present.    Cardiovascular:  Normal rate,  regular rhythm and normal heart sounds with no murmur. No edema.  Pulmonary/Chest: Mechanical breath sounds clear bilaterally.  Intubated at 28 % FiO2.  Left-sided chest tube in place.  Abdominal:  Soft.  Bowel sounds are normal.  No distension and no tenderness.   Musculoskeletal:  No edema, no tenderness, and no deformity.  No swelling or redness of joints.  Neurological: Sedate.  Skin:  Skin is warm and dry.  No rash noted.  No pallor.   Psychiatric: Sedate.        ----------------------------------------------------------------------------------------------------------------------  Results from last 7 days   Lab Units 07/23/24  0258 07/23/24  0058   HSTROP T ng/L 66* 75*     Results from last 7 days   Lab Units 07/22/24  0012   PROBNP pg/mL 14,737.0*           Results from last 7 days   Lab Units 07/21/24  2106   PH, ARTERIAL pH units 7.315*   PO2 ART mm Hg 137.0*   PCO2, ARTERIAL mm Hg 48.2*   HCO3 ART mmol/L 24.5     Results from last 7 days   Lab Units 07/25/24  0006 07/24/24  0022 07/23/24  0058 07/22/24  0012 07/21/24  0130 07/21/24  0016   CRP mg/dL  --  3.89* 10.32*  --   --  6.50*   LACTATE mmol/L  --  1.5 1.5  --  1.1  --    WBC 10*3/mm3 21.27* 22.14* 10.24   < >  --  20.24*   HEMOGLOBIN g/dL 9.6* 9.6* 9.5*   < >  --  11.8*   HEMATOCRIT % 30.8* 30.0* 29.6*   < >  --  36.3   MCV fL 101.7* 98.4* 97.4*   < >  --  96.3   MCHC g/dL 31.2* 32.0 32.1   < >  --  32.5   PLATELETS 10*3/mm3 303 287 235   < >  --  227    < > = values in this interval not displayed.     Results from last 7 days   Lab Units 07/25/24  0006 07/24/24  0022 07/23/24  0058 07/22/24  0012   SODIUM mmol/L 144 133* 139 135*   POTASSIUM mmol/L 5.1 4.0 3.7 4.5   MAGNESIUM mg/dL 2.5* 2.5* 2.4  --    CHLORIDE mmol/L 114* 103 106 100   CO2 mmol/L 24.9 25.0 24.1 23.9   BUN mg/dL 20 17 20 25*   CREATININE mg/dL 0.33* 0.38* 0.45* 0.47*   CALCIUM mg/dL 8.1* 8.2* 8.2* 8.2*   GLUCOSE mg/dL 138* 164* 208* 191*   ALBUMIN g/dL  --  3.5 3.6 4.1   BILIRUBIN  "mg/dL  --  <0.2 0.2 0.4   ALK PHOS U/L  --  89 104 142*   AST (SGOT) U/L  --  22 26 33*   ALT (SGPT) U/L  --  14 15 10   Estimated Creatinine Clearance: 168.8 mL/min (A) (by C-G formula based on SCr of 0.33 mg/dL (L)).  No results found for: \"AMMONIA\"    Glucose   Date/Time Value Ref Range Status   07/25/2024 1120 167 (H) 70 - 130 mg/dL Final   07/25/2024 0513 111 70 - 130 mg/dL Final   07/25/2024 0020 135 (H) 70 - 130 mg/dL Final   07/24/2024 1657 155 (H) 70 - 130 mg/dL Final   07/24/2024 1057 166 (H) 70 - 130 mg/dL Final   07/24/2024 0529 152 (H) 70 - 130 mg/dL Final   07/23/2024 2355 166 (H) 70 - 130 mg/dL Final   07/23/2024 1819 133 (H) 70 - 130 mg/dL Final     Lab Results   Component Value Date    HGBA1C 4.60 (L) 07/18/2024     Lab Results   Component Value Date    TSH 1.140 07/17/2024    FREET4 1.83 (H) 04/11/2024       Blood Culture   Date Value Ref Range Status   07/21/2024 No growth at 2 days  Preliminary   07/21/2024 No growth at 2 days  Preliminary   07/17/2024 No growth at 5 days  Final   07/17/2024 No growth at 5 days  Final     Urine Culture   Date Value Ref Range Status   07/17/2024 >100,000 CFU/mL Escherichia coli (A)  Final     No results found for: \"WOUNDCX\"  No results found for: \"STOOLCX\"  Respiratory Culture   Date Value Ref Range Status   07/21/2024 No growth  Preliminary   07/21/2024   Final    Rare growth Normal respiratory ammon. No S. aureus or Pseudomonas aeruginosa detected. Final report.     Pain Management Panel           No data to display                  ----------------------------------------------------------------------------------------------------------------------  Imaging Results (Last 24 Hours)       ** No results found for the last 24 hours. **            ----------------------------------------------------------------------------------------------------------------------    Pertinent Infectious Disease Results                Assessment/Plan       Assessment     Septic " shock with acute hypoxic respiratory failure requiring mechanical ventilation and pressor support  Pneumonia        Plan      The patient remains intubated and sedated at 28% FiO2.  Family members at the bedside.  Sedation and phenylephrine infusing.  Afebrile.  No reports of diarrhea.  Mechanical breath sounds are clear to auscultation bilaterally.  Abdomen soft and nontender with normoactive bowel sounds.  Chest tube in place to the right chest wall.  Leukocytosis slightly improved at 21.27.  Fungal serologies pending.    For now we will continue on cefepime and doxycycline for the treatment of pneumonia, continue to monitor closely and adjust antibiotic coverage as appropriate.      ANTIMICROBIAL THERAPY    cefepime 2000 mg IVPB in 100 mL NS (VTB)  doxycycline - 100 MG     Code Status:   Code Status and Medical Interventions:   Ordered at: 07/18/24 0205     Code Status (Patient has no pulse and is not breathing):    CPR (Attempt to Resuscitate)     Medical Interventions (Patient has pulse or is breathing):    Full Support       SG Stoner  07/25/24  11:44 EDT

## 2024-07-25 NOTE — PROGRESS NOTES
[facility] HOSPITALIST PROGRESS NOTE     Patient Identification:  Name:  Lexie KEITH Valdez  Age:  64 y.o.  Sex:  female  :  1960  MRN:  [id number]  Visit Number:  [id number]  ROOM: [address]     Primary Care Provider:  Violet Pop APRN     Date of Admission: 2024    Length of stay in inpatient status:  7    Subjective     Chief Compliant:    Chief Complaint   Patient presents with    Shortness of Breath     History of Presenting Illness:  The patient remains intubated and sedated with propofol and thus I was unable to obtain a history from her.  Multiple family members showed up today for a family meeting.  I evaluated the patient today with the , sister, NP Dasha, and nurse Shalonda at bedside; this same group of people were present during the family meeting, as well as a number of other family members such as the patient's son, Faith Davila.  Overnight, the patient became more acidotic, but the vent was not adjusted due to the high risk of another pneumothorax.  Also, the Yair-Synephrine was increased overnight but it has since been lowered again this dayshift.  Nurse Graciela stated that the patient failed her sedation vacation due to severe anxiety.    Consults:  Arjun Mcmahon and Christopher with pulmonology  Arjun Acevedo and Fox with cardiology  Dr. Clemons with infectious disease  Arjun Soto and Jacob with general surgery     Procedures:  2024:  Oral intubated and mechanical ventilation  2024:  Right internal jugular triple lumen central line placement  2024:  Placement of a left sided chest tube due to pneumothorax    Objective     Current Hospital Meds:  aspirin, 81 mg, Oral, Daily  Brexpiprazole, 0.5 mg, Oral, Daily  cefepime, 2,000 mg, Intravenous, Q8H  chlorhexidine, 15 mL, Mouth/Throat, Q12H  donepezil, 10 mg, Oral, Q PM  doxycycline, 100 mg, Oral, Q12H  enoxaparin, 40 mg, Subcutaneous, Nightly  hydroxychloroquine, 200 mg, Oral, BID  insulin regular, 2-7 Units,  Subcutaneous, Q6H  ipratropium-albuterol, 3 mL, Nebulization, Q4H  levothyroxine, 25 mcg, Oral, Daily With Breakfast  magic barrier cream, , Topical, BID  memantine, 5 mg, Oral, Q12H  methylPREDNISolone sodium succinate, 60 mg, Intravenous, Q12H  [Held by provider] metoprolol succinate XL, 25 mg, Oral, Q24H  mupirocin, 1 application , Each Nare, BID  nystatin, 1 Application, Topical, Q12H  pantoprazole, 40 mg, Intravenous, Q AM  Phenylephrine HCl-NaCl, , ,   Phenylephrine HCl-NaCl, , ,   sodium chloride, 10 mL, Intravenous, Q12H  sodium chloride, 10 mL, Intravenous, Q12H  sodium chloride, 10 mL, Intravenous, Q12H  sodium chloride, 10 mL, Intravenous, Q12H  sodium chloride, 10 mL, Intravenous, Q12H  sodium chloride, 10 mL, Intravenous, Q12H    fentanyl 10 mcg/mL,  mcg/hr, Last Rate: 150 mcg/hr (07/25/24 1430)  Pharmacy Consult,   phenylephrine, 0.5-3 mcg/kg/min (Dosing Weight), Last Rate: 0.8 mcg/kg/min (07/25/24 1430)  propofol, 5-50 mcg/kg/min (Dosing Weight), Last Rate: 30 mcg/kg/min (07/25/24 1507)      Current Antimicrobial Therapy:  Anti-Infectives (From admission, onward)      Ordered     Dose/Rate Route Frequency Start Stop    07/21/24 2206  doxycycline (MONODOX) capsule 100 mg        Ordering Provider: John Mcmahon MD    100 mg Oral Every 12 Hours Scheduled 07/21/24 2300 07/26/24 2059    07/21/24 0424  cefepime 2000 mg IVPB in 100 mL NS (VTB)        Ordering Provider: Estuardo Charles MD    2,000 mg  over 4 Hours Intravenous Every 8 Hours 07/21/24 1400 07/28/24 1359    07/21/24 0856  vancomycin 1250 mg/250 mL 0.9% NS IVPB ([facility])        Ordering Provider: Eduardo Freeman MD    1,250 mg  over 75 Minutes Intravenous Once 07/21/24 1000 07/21/24 1114    07/21/24 0424  cefepime 2000 mg IVPB in 100 mL NS (VTB)        Ordering Provider: Estuardo Charles MD    2,000 mg  over 30 Minutes Intravenous Once 07/21/24 0600 07/21/24 0558    07/19/24 1540  hydroxychloroquine (PLAQUENIL) tablet 200 mg         Ordering Provider: Estuardo Charles MD    200 mg Oral 2 Times Daily 07/19/24 2100      07/17/24 2218  cefTRIAXone (ROCEPHIN) 2,000 mg in sodium chloride 0.9 % 100 mL IVPB-VTB        Ordering Provider: Al Lewis MD    2,000 mg  200 mL/hr over 30 Minutes Intravenous Once 07/17/24 2234 07/18/24 0035          Current Diuretic Therapy:  Diuretics (From admission, onward)      Ordered     Dose/Rate Route Frequency Start Stop    07/24/24 1151  furosemide (LASIX) injection 20 mg        Ordering Provider: Rinku Braxton MD    20 mg Intravenous Once 07/24/24 1245 07/24/24 1244    07/21/24 0637  furosemide (LASIX) injection 40 mg        Ordering Provider: Estuardo Charles MD    40 mg Intravenous Once 07/21/24 0730 07/21/24 0914    07/20/24 2323  furosemide (LASIX) injection 40 mg        Ordering Provider: Estuardo Charles MD    40 mg Intravenous Once 07/21/24 0015 07/21/24 0008          ----------------------------------------------------------------------------------------------------------------------  Vital Signs:  Temp:  [97.7 °F (36.5 °C)-99.7 °F (37.6 °C)] 99.7 °F (37.6 °C)  Heart Rate:  [] 81  Resp:  [28-31] 31  BP: ()/() 93/52  FiO2 (%):  [28 %-30 %] 28 %  SpO2:  [93 %-100 %] 98 %  on   ;   Device (Oxygen Therapy): ventilator  Body mass index is 23.79 kg/m².    Wt Readings from Last 3 Encounters:   07/25/24 62.9 kg (138 lb 10.7 oz)   04/26/24 67.7 kg (149 lb 4 oz)   04/19/24 67.1 kg (148 lb)     Intake & Output (last 3 days)         07/22 0701 07/23 0700 07/23 0701 07/24 0700 07/24 0701 07/25 0700 07/25 0701 07/26 0700    I.V. (mL/kg) 1414.7 (24.7) 1369.3 (23.9) 1719.9 (30.1)     Other 323 517 484     NG/ 592 694     IV Piggyback 400 200      Total Intake(mL/kg) 2499.7 (43.7) 2678.3 (46.8) 2897.9 (50.7)     Urine (mL/kg/hr) 1000 (0.7) 950 (0.7) 700 (0.5)     Emesis/NG output 0 0      Stool 0 0      Chest Tube  45 46     Total Output 1000 995 746     Net  +1499.7 +1683.3 +2151.9             Stool Unmeasured Occurrence 0 x 0 x      Emesis Unmeasured Occurrence 0 x 0 x            NPO Diet NPO Type: Tube Feeding  ----------------------------------------------------------------------------------------------------------------------  Physical Exam; her exam is very similar to yesterday's.  Constitutional:       General: She is in acute distress, though it appears to be less than yesterday.      Appearance: Normal appearance. She is well-developed. She is not toxic-appearing or diaphoretic.      Interventions: She is sedated and intubated.      Comments: Appears acutely and chronically ill.   HENT:      Head: Normocephalic and atraumatic.  The subcutaneous emphysema on the left side of the neck has improved.     Right Ear: External ear normal.      Left Ear: External ear normal.      Nose: Nose normal.   Eyes:      General: No scleral icterus.        Right eye: No discharge.         Left eye: No discharge.      Extraocular Movements: Extraocular movements intact.      Conjunctiva/sclera: Conjunctivae normal.      Pupils: Pupils are equal, round, and reactive to light.   Cardiovascular:      Rate and Rhythm: Normal rate and regular rhythm.      Pulses: Normal pulses.      Heart sounds: No murmur heard.  Pulmonary:      Effort: Respiratory distress present and appears to be about the same as the last 2 days. She is intubated.      Breath sounds: She has fair to good breath sounds on the left side; chest tube is still in place.  There are no bubbles in chamber C.  I do not hear any crackles nor any wheezes.  Abdominal:      General: Bowel sounds are normal. There is no distension.      Palpations: Abdomen is soft.   Musculoskeletal:         General: No swelling, deformity or signs of injury.   Skin:     Capillary Refill: Capillary refill takes less than 2 seconds.      Coloration: Skin is not jaundiced or pale.   Neurological:      Motor: Tremor present. No seizure activity.       Comments: Sedated by propofol and orally intubated.  Thus, I cannot perform this portion of the physical.  She is still doing the tremors but she is not following commands like she was previously.    Psychiatric:      Comments: Sedated with propofol and thus I cannot perform this portion of the physical exam.   ----------------------------------------------------------------------------------------------------------------------  Tele: Normal sinus rhythm with heart rate 70s to 80s.  Personally reviewed the telemetry strips.  ----------------------------------------------------------------------------------------------------------------------  LABS:    Pending test results:  Pending Labs       Order Current Status    Aspergillus Galactomannan Antigen - Blood, Arm, Right In process    Fungal Antibodies, Quantitative Double Immunodiffusion In process    Blood Culture - Blood, Arm, Left Preliminary result    Blood Culture - Blood, Arm, Right Preliminary result          CBC and coagulation:  Results from last 7 days   Lab Units 07/25/24  0006 07/24/24  0022 07/23/24  0058 07/22/24  0012 07/21/24  1309 07/21/24  0130 07/21/24  0016 07/19/24  2356 07/19/24  0046   PROCALCITONIN ng/mL  --   --   --  0.24 0.21 0.07  --   --   --    LACTATE mmol/L  --  1.5 1.5  --   --  1.1  --   --   --    CRP mg/dL  --  3.89* 10.32*  --   --   --  6.50*  --   --    WBC 10*3/mm3 21.27* 22.14* 10.24 16.58*  --   --  20.24* 13.37* 15.07*   HEMOGLOBIN g/dL 9.6* 9.6* 9.5* 9.9*  --   --  11.8* 11.5* 12.3   HEMATOCRIT % 30.8* 30.0* 29.6* 30.7*  --   --  36.3 36.2 37.6   MCV fL 101.7* 98.4* 97.4* 95.6  --   --  96.3 99.7* 94.0   MCHC g/dL 31.2* 32.0 32.1 32.2  --   --  32.5 31.8 32.7   PLATELETS 10*3/mm3 303 287 235 227  --   --  227 214 269     Acid/base balance:  Results from last 7 days   Lab Units 07/21/24  2106 07/21/24  0811 07/20/24  2343 07/20/24  1255   PH, ARTERIAL pH units 7.315* 7.367 7.403 7.410   PCO2, ARTERIAL mm Hg 48.2* 46.6*  40.3 37.9   PO2 ART mm Hg 137.0* 101.0 107.0 49.7*   HCO3 ART mmol/L 24.5 26.8* 25.1 24.0     Renal and electrolytes:  Results from last 7 days   Lab Units 07/25/24  0949 07/25/24  0006 07/24/24  0022 07/23/24  0058 07/22/24  0012 07/21/24  0016 07/19/24  2356   SODIUM mmol/L  --  144 133* 139 135* 130* 133*   POTASSIUM mmol/L  --  5.1 4.0 3.7 4.5 4.7 4.5   MAGNESIUM mg/dL  --  2.5* 2.5* 2.4  --   --   --    CHLORIDE mmol/L  --  114* 103 106 100 99 102   CO2 mmol/L  --  24.9 25.0 24.1 23.9 20.5* 22.2   BUN mg/dL  --  20 17 20 25* 17 15   CREATININE mg/dL  --  0.33* 0.38* 0.45* 0.47* 0.37* 0.44*   CALCIUM mg/dL  --  8.1* 8.2* 8.2* 8.2* 8.4* 8.8   IONIZED CALCIUM mmol/L  --  1.12  --   --   --   --   --    PHOSPHORUS mg/dL 2.5 2.0* 1.4* 1.2*  --   --   --    GLUCOSE mg/dL  --  138* 164* 208* 191* 122* 114*   ANION GAP mmol/L  --  5.1 5.0 8.9 11.1 10.5 8.8     Estimated Creatinine Clearance: 168.8 mL/min (A) (by C-G formula based on SCr of 0.33 mg/dL (L)).    Liver and pancreatic function:  Results from last 7 days   Lab Units 07/24/24  0022 07/23/24  0058 07/22/24  0012   ALBUMIN g/dL 3.5 3.6 4.1   BILIRUBIN mg/dL <0.2 0.2 0.4   ALK PHOS U/L 89 104 142*   AST (SGOT) U/L 22 26 33*   ALT (SGPT) U/L 14 15 10     Endocrine function:  Lab Results   Component Value Date    HGBA1C 4.60 (L) 07/18/2024     Point of care bedside glucose levels:  Results from last 7 days   Lab Units 07/25/24  1120 07/25/24  0513 07/25/24  0020 07/24/24  1657 07/24/24  1057 07/24/24  0529 07/23/24  2355 07/23/24  1819 07/23/24  1222 07/23/24  0508 07/22/24  2301 07/22/24  1944 07/22/24  1717 07/22/24  1246   GLUCOSE mg/dL 167* 111 135* 155* 166* 152* 166* 133* 165* 135* 210* 186* 207* 155*     Glucose levels from the [facility]:  Results from last 7 days   Lab Units 07/25/24  0006 07/24/24  0022 07/23/24  0058 07/22/24  0012 07/21/24  0016 07/19/24  2356   GLUCOSE mg/dL 138* 164* 208* 191* 122* 114*     Lab Results   Component Value Date    TSH 1.140  07/17/2024    FREET4 1.83 (H) 04/11/2024     Cardiac:  Results from last 7 days   Lab Units 07/23/24  0258 07/23/24  0058 07/22/24  0012   HSTROP T ng/L 66* 75*  --    PROBNP pg/mL  --   --  14,737.0*       Cultures:  Lab Results   Component Value Date    COLORU Yellow 07/17/2024    CLARITYU Cloudy (A) 07/17/2024    SPECGRAV 1.025 09/09/2019    PHUR 6.5 07/17/2024    GLUCOSEU Negative 07/17/2024    KETONESU Negative 07/17/2024    BLOODU Negative 07/17/2024    NITRITEU Positive (A) 07/17/2024    LEUKOCYTESUR Trace (A) 07/17/2024    BILIRUBINUR Negative 07/17/2024    UROBILINOGEN 0.2 E.U./dL 07/17/2024    RBCUA 0-2 07/17/2024    WBCUA 6-10 (A) 07/17/2024    BACTERIA 4+ (A) 07/17/2024     Microbiology Results (last 10 days)       Procedure Component Value - Date/Time    Respiratory Culture - Sputum, ET Suction [[id number]] Collected: 07/21/24 2231    Lab Status: Final result Specimen: Sputum from ET Suction Updated: 07/24/24 1119     Respiratory Culture No growth     Gram Stain Rare (1+) Mixed ammon      No WBCs seen      No Epithelial cells seen    Respiratory Culture - Sputum, ET Suction [[id number]] Collected: 07/21/24 1152    Lab Status: Final result Specimen: Sputum from ET Suction Updated: 07/23/24 1053     Respiratory Culture Rare growth Normal respiratory ammon. No S. aureus or Pseudomonas aeruginosa detected. Final report.     Gram Stain Moderate (3+) WBCs seen      Rare (1+) Epithelial cells seen      No organisms seen    Blood Culture - Blood, Arm, Left [[id number]]  (Normal) Collected: 07/21/24 0450    Lab Status: Preliminary result Specimen: Blood from Arm, Left Updated: 07/25/24 0515     Blood Culture No growth at 4 days    Blood Culture - Blood, Arm, Right [[id number]]  (Normal) Collected: 07/21/24 0448    Lab Status: Preliminary result Specimen: Blood from Arm, Right Updated: 07/25/24 0530     Blood Culture No growth at 4 days    Blood Culture - Blood, Arm, Right [[id number]]  (Normal) Collected: 07/17/24  2119    Lab Status: Final result Specimen: Blood from Arm, Right Updated: 07/22/24 2131     Blood Culture No growth at 5 days    Blood Culture - Blood, Arm, Left [[id number]]  (Normal) Collected: 07/17/24 2119    Lab Status: Final result Specimen: Blood from Arm, Left Updated: 07/22/24 2131     Blood Culture No growth at 5 days          I have personally looked at the labs and they are summarized above.    Assessment & Plan      -Acute NSTEMI, type 1 (positive stress test on 7/18/2024 without any reversible ischemia, that was present on admission  -History of IPF and COPD with chronic hypoxia (uses 3 L/min at home)  -Acute exacerbation of IPF/COPD causing acute hypoxic and hypercapnic respiratory failure on top of chronic hypoxic respiratory failure and sepsis (developed on 7/21/2024 due to a suspected aspiration episode) resulting in oral intubation and mechanical ventilation from aspiration pneumonitis  -Hypotension, septic vs sedation vs diuretic induced, developed during admission  -New onset heart failure with reduced EF, acute exacerbation that developed on 7/21/2024  -Hypocalcemia that developed during admission  -Prolonged QT that developed during the hospitalization  -History of essential hypertension  -History of hypothyroidism  -History of stress urinary incontinence with frequent UTI's, with an acute E coli UTI that was present on admission  -History of intermittent, diffuse body tremors    We had a long talk with the family today; the  would like to talk to his family before making decisions about code status.   Will continue with the vasopressors, with the goal mean arterial blood pressure being 65 mmHg or above.  We will continue with the current antibiotics and steroids.  We will also give her the least amount of sedation possible to keep her comfortable and we will reassess every day about sedation vacations and spontaneous breathing trials.  However, the patient has a poor prognosis due to  her pulmonary fibrosis and I do not expect her to be able to wean off of the ventilator quickly, as I suspect that if she lives she will need a tracheostomy and a PEG tube.    VTE Prophylaxis:  Lovenox at DVT dosing    The patient is high risk due to the following diagnoses/reasons:  Acute MI, IPF with acute exacerbation, new left sided pneumothorax, new onset heart failure     Disposition:  Undetermined at this time    Jessica Ospina MD  [facility]  07/25/24  15:22 EDT

## 2024-07-25 NOTE — PAYOR COMM NOTE
"CONTACT: DINORA HILL RN  UTILIZATION MANAGEMENT DEPT.  [facility]  [address]   [place] [address]  PHONE: [phone]  FAX: [phone]        CLINICAL UPDATE    REF#[id number]       Lexie Valdez (64 y.o. Female)       Date of Birth   1960    Social Security Number       Address   [address] [place] [address] [place] [address]    Home Phone       MRN   [id number]       Muslim   [facility] [place]    Marital Status                               Admission Date   7/17/24    Admission Type   Emergency    Admitting Provider   Estuardo Charles MD    Attending Provider   Jessica Ospina MD    Department, Room/Bed   [facility] CRITICAL CARE, CC10/       Discharge Date       Discharge Disposition       Discharge Destination                                 Attending Provider: Jessica Ospina MD    Allergies: Codeine, Sulfa Antibiotics    Isolation: None   Infection: None   Code Status: CPR    Ht: 162.6 cm (64.02\")   Wt: 62.9 kg (138 lb 10.7 oz)    Admission Cmt: None   Principal Problem: NSTEMI (non-ST elevated myocardial infarction) [I21.4]                   Active Insurance as of 7/17/2024       Primary Coverage       Payor Plan Insurance Group Employer/Plan Group    ANTHEM MEDICARE REPLACEMENT ANTHEM MEDICARE ADVANTAGE [id number]       Payor Plan Address Payor Plan Phone Number Payor Plan Fax Number Effective Dates    [address] [phone]  1/1/2022 - None Entered    [facility] [address]         Subscriber Name Subscriber Birth Date Member ID       LXEIE VALDEZ 1960 [id number]               Secondary Coverage       Payor Plan Insurance Group Employer/Plan Group    [place]        Payor Plan Address Payor Plan Phone Number Payor Plan Fax Number Effective Dates    [address] [phone]  5/1/2021 - None Entered    [facility] [id number]         Subscriber Name Subscriber Birth Date Member ID       LEXIE VALDEZ 1960 [id number]               "       Emergency Contacts        (Rel.) Home Phone Work Phone Mobile Phone    Valdez,Khris (Spouse) -- -- [phone]    Faith Davila (Son) [phone] -- --    Rosa Kessler (Sister) -- -- [phone]              Current Facility-Administered Medications   Medication Dose Route Frequency Provider Last Rate Last Admin    aspirin chewable tablet 81 mg  81 mg Oral Daily Estuardo Charles MD   81 mg at 07/24/24 0809    sennosides-docusate (PERICOLACE) 8.6-50 MG per tablet 2 tablet  2 tablet Oral BID PRN Estuardo Charles MD   2 tablet at 07/24/24 0809    And    polyethylene glycol (MIRALAX) packet 17 g  17 g Oral Daily PRN Estuardo Charles MD        And    bisacodyl (DULCOLAX) EC tablet 5 mg  5 mg Oral Daily PRN Estuardo Charles MD        And    bisacodyl (DULCOLAX) suppository 10 mg  10 mg Rectal Daily PRN Estuardo Charles MD        Brexpiprazole tablet 0.5 mg  0.5 mg Oral Daily Estuardo Charles MD   0.5 mg at 07/24/24 0809    busPIRone (BUSPAR) tablet 10 mg  10 mg Oral BID PRN Estuardo Charles MD   10 mg at 07/20/24 0946    Calcium Replacement - Follow Nurse / BPA Driven Protocol   Does not apply PRN Jessica Ospina MD        cefepime 2000 mg IVPB in 100 mL NS (VTB)  2,000 mg Intravenous Q8H Estuardo Charles MD   2,000 mg at 07/25/24 0504    chlorhexidine (PERIDEX) 0.12 % solution 15 mL  15 mL Mouth/Throat Q12H Eduardo Freeman MD   15 mL at 07/24/24 2124    dextrose (D50W) (25 g/50 mL) IV injection 25 g  25 g Intravenous Q15 Min PRN Jessica Ospina MD        dextrose (GLUTOSE) oral gel 15 g  15 g Oral Q15 Min PRN Jessica Ospina MD        donepezil (ARICEPT) tablet 10 mg  10 mg Oral Q PM Estuardo Charles MD   10 mg at 07/24/24 1628    doxycycline (MONODOX) capsule 100 mg  100 mg Oral Q12H John Mcmahon MD   100 mg at 07/24/24 2126    Enoxaparin Sodium (LOVENOX) syringe 40 mg  40 mg Subcutaneous Nightly Eduardo Freeman MD   40 mg at  07/24/24 2126    fentaNYL 2500 mcg/250 mL NS infusion   mcg/hr Intravenous Titrated Sam Neves MD 25 mL/hr at 07/24/24 2030 250 mcg/hr at 07/24/24 2030    glucagon HCl (Diagnostic) injection 1 mg  1 mg Intramuscular Q15 Min PRN Jessica Ospina MD        guaifenesin (ROBITUSSIN) 100 MG/5ML liquid 200 mg  200 mg Oral Q4H PRN Estuardo Charles MD   200 mg at 07/20/24 2317    HYDROcodone-acetaminophen (NORCO) 5-325 MG per tablet 1 tablet  1 tablet Oral Q8H PRN Estuardo Charles MD        hydroxychloroquine (PLAQUENIL) tablet 200 mg  200 mg Oral BID Estuardo Charles MD   200 mg at 07/24/24 2135    hydrOXYzine (ATARAX) tablet 25 mg  25 mg Oral Q6H PRN Estuardo Charles MD   25 mg at 07/20/24 2317    insulin regular (humuLIN R,novoLIN R) injection 2-7 Units  2-7 Units Subcutaneous Q6H Jessica Ospina MD   2 Units at 07/24/24 1712    ipratropium (ATROVENT) nebulizer solution 0.5 mg  0.5 mg Nebulization Q6H PRN Estuardo Charles MD        ipratropium-albuterol (DUO-NEB) nebulizer solution 3 mL  3 mL Nebulization Q4H Eduardo Freeman MD   3 mL at 07/25/24 0359    levothyroxine (SYNTHROID, LEVOTHROID) tablet 25 mcg  25 mcg Oral Daily With Breakfast Estuardo Charles MD   25 mcg at 07/24/24 0809    Magnesium Cardiology Dose Replacement - Follow Nurse / BPA Driven Protocol   Does not apply PRJessica Napoles MD        memantine (NAMENDA) tablet 5 mg  5 mg Oral Q12H Estuardo Chalres MD   5 mg at 07/24/24 2125    methylPREDNISolone sodium succinate (SOLU-Medrol) injection 60 mg  60 mg Intravenous Q12H Kurt White MD   60 mg at 07/25/24 0504    [Held by provider] metoprolol succinate XL (TOPROL-XL) 24 hr tablet 25 mg  25 mg Oral Q24H Estuardo Charles MD   25 mg at 07/20/24 0946    mupirocin (BACTROBAN) 2 % nasal ointment 1 Application  1 application  Each Nare BID Jessica Ospina MD   1 Application at 07/24/24 9460    nitroglycerin (NITROSTAT) SL tablet 0.4 mg   0.4 mg Sublingual Q5 Min PRN Estuardo Charles MD        nystatin (MYCOSTATIN) 254787 UNIT/GM cream 1 Application  1 Application Topical Q12H Estuardo Charles MD   1 Application at 07/24/24 2124    pantoprazole (PROTONIX) injection 40 mg  40 mg Intravenous Q AM Sam Neves MD   40 mg at 07/25/24 0504    Pharmacy Consult   Does not apply Continuous PRN Estuardo Charels MD        phenylephrine (HAZEL-SYNEPHRINE) 50 mg in 250 mL NS infusion  0.5-3 mcg/kg/min (Dosing Weight) Intravenous Titrated Jessica Ospina MD 39.5 mL/hr at 07/25/24 0305 2.3 mcg/kg/min at 07/25/24 0305    Phenylephrine HCl-NaCl 1-0.9 MG/10ML-% 100 mcg/ml injection  - ADS Override Pull             Phenylephrine HCl-NaCl 1-0.9 MG/10ML-% 100 mcg/ml injection  - ADS Override Pull             Phosphorus Replacement - Follow Nurse / BPA Driven Protocol   Does not apply PRN Jessica Ospina MD        Potassium Replacement - Follow Nurse / BPA Driven Protocol   Does not apply PRN Jessica Ospina MD        prochlorperazine (COMPAZINE) injection 5 mg  5 mg Intravenous Q6H PRN Estuardo Charles MD   5 mg at 07/20/24 1138    propofol (DIPRIVAN) infusion 10 mg/mL 100 mL  5-50 mcg/kg/min (Dosing Weight) Intravenous Titrated Sam Neves MD 13.73 mL/hr at 07/25/24 0507 40 mcg/kg/min at 07/25/24 0507    sodium chloride 0.9 % flush 10 mL  10 mL Intravenous PRN Estuardo Charles MD        sodium chloride 0.9 % flush 10 mL  10 mL Intravenous PRN Estuardo Charles MD        sodium chloride 0.9 % flush 10 mL  10 mL Intravenous Q12H Estuardo Charles MD   10 mL at 07/24/24 2128    sodium chloride 0.9 % flush 10 mL  10 mL Intravenous PRN Estuardo Charles MD        sodium chloride 0.9 % flush 10 mL  10 mL Intravenous Q12H Estuardo Charles MD   10 mL at 07/24/24 2128    sodium chloride 0.9 % flush 10 mL  10 mL Intravenous Estuardo Best MD        sodium chloride 0.9 % flush 10 mL  10 mL Intravenous  Q12H Estuardo Charles MD   10 mL at 07/24/24 2128    sodium chloride 0.9 % flush 10 mL  10 mL Intravenous PRN Estuardo Charles MD        sodium chloride 0.9 % flush 10 mL  10 mL Intravenous Q12H Eduardo Freeman MD   10 mL at 07/24/24 2128    sodium chloride 0.9 % flush 10 mL  10 mL Intravenous Q12H Eduardo Freeman MD   10 mL at 07/24/24 2128    sodium chloride 0.9 % flush 10 mL  10 mL Intravenous Q12H Eduardo Freeman MD   10 mL at 07/24/24 2127    sodium chloride 0.9 % flush 10 mL  10 mL Intravenous PRN Eduardo Freeman MD        sodium chloride 0.9 % flush 20 mL  20 mL Intravenous PRN Eduardo Freeman MD        sodium chloride 0.9 % infusion 40 mL  40 mL Intravenous PRN Estuardo Charles MD        sodium chloride 0.9 % infusion 40 mL  40 mL Intravenous PRN Estuardo Charles MD        sodium chloride 0.9 % infusion 40 mL  40 mL Intravenous PRN Estuardo Charles MD        sodium chloride 0.9 % infusion 40 mL  40 mL Intravenous PRN Eduardo Freeman MD        vecuronium (NORCURON) injection 5 mg  5 mg Intravenous Q6H PRN John Mcmahon MD   5 mg at 07/21/24 2344     Orders (last 24 hrs)        Start     Ordered    07/26/24 0900  Drain Care  Daily       07/23/24 1736    07/25/24 0922  Phosphorus  Timed         07/25/24 0121    07/25/24 0600  Phosphorus  Morning Draw         07/24/24 1937 07/25/24 0600  Blood Gas, Venous -With Co-Ox Panel: Yes  Morning Draw         07/24/24 1937 07/25/24 0600  Magnesium  Morning Draw         07/24/24 1937 07/25/24 0600  CBC & Differential  Morning Draw         07/24/24 1937 07/25/24 0600  Calcium, Ionized  Morning Draw         07/24/24 1937 07/25/24 0600  Vitamin D,25-Hydroxy  Morning Draw         07/24/24 1937 07/25/24 0600  PTH, Intact  Morning Draw         07/24/24 1937 07/25/24 0600  Basic Metabolic Panel  Morning Draw         07/24/24 1937 07/25/24 0600  CBC Auto Differential  PROCEDURE ONCE         07/24/24 2202     07/25/24 0521  POC Glucose Once  PROCEDURE ONCE        Comments: Complete no more than 45 minutes prior to patient eating      07/25/24 0513    07/25/24 0517  Blood Gas, Venous With Co-Ox  PROCEDURE ONCE         07/25/24 0516    07/25/24 0215  sodium phosphate 15 mmol in 0.9% sodium chloride 100 mL IVPB  Once         07/25/24 0121    07/25/24 0027  POC Glucose Once  PROCEDURE ONCE        Comments: Complete no more than 45 minutes prior to patient eating      07/25/24 0020    07/24/24 1937  Potassium Replacement - Follow Nurse / BPA Driven Protocol  As Needed         07/24/24 1937    07/24/24 1937  Magnesium Cardiology Dose Replacement - Follow Nurse / BPA Driven Protocol  As Needed         07/24/24 1937 07/24/24 1937  Phosphorus Replacement - Follow Nurse / BPA Driven Protocol  As Needed         07/24/24 1937 07/24/24 1937  Calcium Replacement - Follow Nurse / BPA Driven Protocol  As Needed         07/24/24 1937 07/24/24 1937  fentaNYL citrate (PF) (SUBLIMAZE) injection 50 mcg  Every 1 Hour PRN         07/24/24 1937    07/24/24 1704  POC Glucose Once  PROCEDURE ONCE        Comments: Complete no more than 45 minutes prior to patient eating      07/24/24 1657    07/24/24 1245  furosemide (LASIX) injection 20 mg  Once         07/24/24 1151    07/24/24 1104  POC Glucose Once  PROCEDURE ONCE        Comments: Complete no more than 45 minutes prior to patient eating      07/24/24 1057    07/24/24 1040  Ventilator - Vent Mode: AC/VC; Rate: Other; Rate: 28; FiO2: Titrate Per SpO2; Titrate Oxygen for SpO2: 90 - 95%; PEEP: Other; PEEP: 4; Tidal Volume: mL; TV: 350  Continuous         07/24/24 1040    07/24/24 0702  Inpatient General Surgery Consult  IN AM        Specialty:  General Surgery  Provider:  Yandel James MD    07/23/24 1736    07/23/24 1700  methylPREDNISolone sodium succinate (SOLU-Medrol) injection 60 mg  Every 12 Hours         07/23/24 0918    07/23/24 0630  Weaning Sequence for Vasoactive  "Infusions  Every Shift Nursing      Comments: Weaning Sequence for Vasoactive Infusions    07/22/24 1925 07/22/24 2015  phenylephrine (HAZEL-SYNEPHRINE) 50 mg in 250 mL NS infusion  Titrated         07/22/24 1925 07/22/24 1800  No Carb Liquid Protein 15g/pkt  2 Times Daily      Comments: Prosource 1 packet bid    07/22/24 1331    07/22/24 1800  POC Glucose Q6H  Every 6 Hours      Comments: Complete no more than 45 minutes prior to patient eating      07/22/24 1735    07/22/24 1800  insulin regular (humuLIN R,novoLIN R) injection 2-7 Units  Every 6 Hours Scheduled         07/22/24 1735    07/22/24 1735  dextrose (GLUTOSE) oral gel 15 g  Every 15 Minutes PRN         07/22/24 1735    07/22/24 1735  dextrose (D50W) (25 g/50 mL) IV injection 25 g  Every 15 Minutes PRN         07/22/24 1735    07/22/24 1735  glucagon HCl (Diagnostic) injection 1 mg  Every 15 Minutes PRN         07/22/24 1735    07/22/24 0900  mupirocin (BACTROBAN) 2 % nasal ointment 1 Application  2 Times Daily         07/22/24 0750    07/22/24 0600  Daily CHG Bath While Central Line in Place  Daily       07/21/24 1108    07/21/24 2300  doxycycline (MONODOX) capsule 100 mg  Every 12 Hours Scheduled         07/21/24 2206    07/21/24 2100  Enoxaparin Sodium (LOVENOX) syringe 40 mg  Nightly         07/21/24 1702    07/21/24 1702  Daily Weights  Daily       07/21/24 1701    07/21/24 1701  Tube Feeding Assessment and I/O  Every Shift       07/21/24 1701    07/21/24 1417  Urinary Catheter Care  Every Shift      Placed in \"And\" Linked Group    07/21/24 1418    07/21/24 1400  cefepime 2000 mg IVPB in 100 mL NS (VTB)  Every 8 Hours         07/21/24 0424    07/21/24 1231  vecuronium (NORCURON) injection 5 mg  Every 6 Hours PRN         07/21/24 1235    07/21/24 1200  POC Glucose Finger Q6H  Every 6 Hours      Comments: Complete no more than 45 minutes prior to patient eating      07/21/24 0617    07/21/24 1200  chlorhexidine (PERIDEX) 0.12 % solution 15 mL  " Every 12 Hours Scheduled         07/21/24 1103    07/21/24 1200  Oral Care & Teeth Brushing - Intubated Patient  Every 4 Hours      Comments: [place] Teeth at Least 2x/day    07/21/24 1103    07/21/24 1200  sodium chloride 0.9 % flush 10 mL  Every 12 Hours Scheduled         07/21/24 1108    07/21/24 1200  sodium chloride 0.9 % flush 10 mL  Every 12 Hours Scheduled         07/21/24 1108    07/21/24 1200  sodium chloride 0.9 % flush 10 mL  Every 12 Hours Scheduled         07/21/24 1108    07/21/24 1115  ipratropium-albuterol (DUO-NEB) nebulizer solution 3 mL  Every 4 Hours         07/21/24 1108    07/21/24 1108  sodium chloride 0.9 % flush 10 mL  As Needed         07/21/24 1108    07/21/24 1108  sodium chloride 0.9 % flush 20 mL  As Needed         07/21/24 1108    07/21/24 1108  sodium chloride 0.9 % infusion 40 mL  As Needed         07/21/24 1108    07/21/24 0900  sodium chloride 0.9 % flush 10 mL  Every 12 Hours Scheduled         07/21/24 0514    07/21/24 0833  Pharmacy to dose vancomycin  Continuous PRN,   Status:  Discontinued         07/21/24 0834    07/21/24 0800  Intake & Output  Every 4 Hours       07/21/24 0514    07/21/24 0700  fentaNYL 2500 mcg/250 mL NS infusion  Titrated         07/21/24 0612    07/21/24 0700  propofol (DIPRIVAN) infusion 10 mg/mL 100 mL  Titrated         07/21/24 0612    07/21/24 0700  pantoprazole (PROTONIX) injection 40 mg  Every Early Morning         07/21/24 0616    07/21/24 0600  Vital Signs Every Hour and Per Hospital Policy Based on Patient Condition  Every Hour       07/21/24 0514    07/21/24 0515  Daily Weights  Daily       07/21/24 0514    07/21/24 0515  Oral Care - Patient Not on NPPV & Not Intubated  Every Shift       07/21/24 0514    07/21/24 0514  sodium chloride 0.9 % flush 10 mL  As Needed         07/21/24 0514    07/21/24 0514  sodium chloride 0.9 % infusion 40 mL  As Needed         07/21/24 0514    07/21/24 0417  Pharmacy Consult - Pharmacy to dose  Continuous PRN,    Status:  Discontinued         07/21/24 0418    07/20/24 0800  levothyroxine (SYNTHROID, LEVOTHROID) tablet 25 mcg  Daily With Breakfast         07/19/24 1540    07/19/24 2130  sodium chloride 0.9 % flush 10 mL  Every 12 Hours Scheduled         07/19/24 2030 07/19/24 2100  memantine (NAMENDA) tablet 5 mg  Every 12 Hours Scheduled         07/19/24 1540    07/19/24 2100  hydroxychloroquine (PLAQUENIL) tablet 200 mg  2 Times Daily         07/19/24 1540    07/19/24 2100  nystatin (MYCOSTATIN) 425146 UNIT/GM cream 1 Application  Every 12 Hours Scheduled         07/19/24 1715 07/19/24 2030  sodium chloride 0.9 % flush 10 mL  As Needed         07/19/24 2030 07/19/24 2030  sodium chloride 0.9 % infusion 40 mL  As Needed         07/19/24 2030 07/19/24 1813  prochlorperazine (COMPAZINE) injection 5 mg  Every 6 Hours PRN         07/19/24 1813    07/19/24 1700  donepezil (ARICEPT) tablet 10 mg  Every Evening         07/19/24 1540    07/19/24 1630  Brexpiprazole tablet 0.5 mg  Daily         07/19/24 1540    07/19/24 1620  Pharmacy Consult  Continuous PRN         07/19/24 1622    07/19/24 1539  busPIRone (BUSPAR) tablet 10 mg  2 Times Daily PRN         07/19/24 1540    07/19/24 1539  hydrOXYzine (ATARAX) tablet 25 mg  Every 6 Hours PRN         07/19/24 1540    07/19/24 1539  HYDROcodone-acetaminophen (NORCO) 5-325 MG per tablet 1 tablet  Every 8 Hours PRN         07/19/24 1540    07/18/24 2119  guaifenesin (ROBITUSSIN) 100 MG/5ML liquid 200 mg  Every 4 Hours PRN         07/18/24 2120    07/18/24 2000  [Held by provider]  metoprolol succinate XL (TOPROL-XL) 24 hr tablet 25 mg  Every 24 Hours Scheduled        (On hold since Mon 7/22/2024 at 1923 until manually unheld; held by Jessica Ospina MDHold Reason: Abnormal Labs)    07/18/24 1913    07/18/24 1523  Wound Care Abrasion (Patient states her dog jumped up on her at home and scratched her leg.)  Daily       07/18/24 1522    07/18/24 0900  aspirin chewable tablet 81  "mg  Daily         07/18/24 0156    07/18/24 0900  sodium chloride 0.9 % flush 10 mL  Every 12 Hours Scheduled         07/18/24 0342    07/18/24 0800  Oral Care  2 Times Daily       07/18/24 0342    07/18/24 0343  Daily Weights  Daily       07/18/24 0342    07/18/24 0342  sennosides-docusate (PERICOLACE) 8.6-50 MG per tablet 2 tablet  2 Times Daily PRN        Placed in \"And\" Linked Group    07/18/24 0342    07/18/24 0342  polyethylene glycol (MIRALAX) packet 17 g  Daily PRN        Placed in \"And\" Linked Group    07/18/24 0342    07/18/24 0342  bisacodyl (DULCOLAX) EC tablet 5 mg  Daily PRN        Placed in \"And\" Linked Group    07/18/24 0342    07/18/24 0342  bisacodyl (DULCOLAX) suppository 10 mg  Daily PRN        Placed in \"And\" Linked Group    07/18/24 0342    07/18/24 0342  ipratropium (ATROVENT) nebulizer solution 0.5 mg  Every 6 Hours PRN         07/18/24 0342    07/18/24 0342  sodium chloride 0.9 % flush 10 mL  As Needed         07/18/24 0342    07/18/24 0342  sodium chloride 0.9 % infusion 40 mL  As Needed         07/18/24 0342    07/18/24 0342  nitroglycerin (NITROSTAT) SL tablet 0.4 mg  Every 5 Minutes PRN         07/18/24 0342    07/17/24 2044  sodium chloride 0.9 % flush 10 mL  As Needed         07/17/24 2044 07/17/24 2033  sodium chloride 0.9 % flush 10 mL  As Needed         07/17/24 2034    Unscheduled  Up With Assistance  As Needed       07/18/24 0342    Unscheduled  Stage II Pressure Ulcer Care PRN  As Needed      Comments: - Gently Cleanse With Normal Saline  - Cover With Silicone Border Dressing (If Indicated)  - For Frequent Incontinence - Apply Skin Protective Barrier Cream Rather Than Silicone Border Dressing    07/18/24 0403    Unscheduled  Change Dressing to IV Site As Needed When Damp, Loose or Soiled  As Needed       07/19/24 2030    Unscheduled  Change Needleless Connectors  As Needed      Comments: Change Needleless Connectors When:  - Administration Set Changed  - Dressing Changed  - " Removed For Any Reason  - Residual Blood or Debris Within Connector  - Prior to Drawing Blood Cultures  - Contamination of Connector  - After Administration of Blood or Blood Components    07/19/24 2030    Unscheduled  Insert New Peripheral IV  As Needed      Comments: Frequency Per Facility Policy    07/19/24 2030    Unscheduled  Spontaneous Awakening Trial  Daily - SAT       07/21/24 1103    Unscheduled  Spontaneous Breathing Trial  Daily - SBT       07/21/24 1103    Unscheduled  Change Dressing to IV Site As Needed When Damp, Loose or Soiled  As Needed       07/21/24 1108    Unscheduled  Change Needleless Connectors  As Needed      Comments: Change Needleless Connectors When:  - Administration Set Changed  - Dressing Changed  - Removed For Any Reason  - Residual Blood or Debris Within Connector  - Prior to Drawing Blood Cultures  - Contamination of Connector  - After Administration of Blood or Blood Components    07/21/24 1108    Unscheduled  Jayden & Document Feeding Tube Depth (in cm)  As Needed       07/21/24 1701    Unscheduled  Verify Feeding Tube Placement Upon Insertion & As Needed  As Needed       07/21/24 1701    Unscheduled  Flush Feeding Tube With 30-50mL Water As Needed  As Needed       07/21/24 1701    Unscheduled  Follow Hypoglycemia Standing Orders For Blood Glucose <70 & Notify Provider of Treatment  As Needed      Comments: Follow Hypoglycemia Orders As Outlined in Process Instructions (Open Order Report to View Full Instructions)  Notify Provider Any Time Hypoglycemia Treatment is Administered    07/22/24 3620    --  FLUoxetine (PROzac) 40 MG capsule  Daily         07/18/24 0221    --  hydrOXYzine (ATARAX) 25 MG tablet  Every 6 Hours PRN         07/18/24 0221    --  ipratropium-albuterol (DUO-NEB) 0.5-2.5 mg/3 ml nebulizer  Every 4 Hours PRN         07/18/24 0221    --  megestrol (MEGACE) 40 MG tablet  2 Times Daily         07/18/24 0221    --  triamcinolone (KENALOG) 0.1 % cream  2 Times Daily          24 0221    --  albuterol sulfate  (90 Base) MCG/ACT inhaler  Daily PRN         24 0957    --  hydroCHLOROthiazide 12.5 MG tablet  Daily         24 1027    --  hydroxychloroquine (PLAQUENIL) 200 MG tablet  2 Times Daily         24 1027                     Physician Progress Notes (last 24 hours)        Jessica Ospina MD at 24 1924              [facility] HOSPITALIST PROGRESS NOTE     Patient Identification:  Name:  Lexie KEITH Valdez  Age:  64 y.o.  Sex:  female  :  1960  MRN:  [id number]  Visit Number:  [id number]  ROOM: [address]     Primary Care Provider:  Violet Pop APRN     Date of Admission: 2024    Length of stay in inpatient status:  6    Subjective     Chief Compliant:    Chief Complaint   Patient presents with    Shortness of Breath     History of Presenting Illness:  The patient remains intubated and sedated with propofol and thus I was unable to obtain a history from her.  The patient son was at bedside today and we had a long conversation about the patient's current medical status.  Please note that I also saw the patient with nurse Owens; no issues occurred overnight.  Nurse Owens has not seen an air leak yet in the chest tube.    Consults:  Arjun Mcmahon and Christophre with pulmonology  Arjun Acevedo and Fox with cardiology  Dr. Clemons with infectious disease  Arjun Soto and Jacob with general surgery     Procedures:  2024:  Oral intubated and mechanical ventilation  2024:  Right internal jugular triple lumen central line placement  2024:  Placement of a left sided chest tube due to pneumothorax    Objective     Current Hospital Meds:  aspirin, 81 mg, Oral, Daily  Brexpiprazole, 0.5 mg, Oral, Daily  cefepime, 2,000 mg, Intravenous, Q8H  chlorhexidine, 15 mL, Mouth/Throat, Q12H  donepezil, 10 mg, Oral, Q PM  doxycycline, 100 mg, Oral, Q12H  enoxaparin, 40 mg, Subcutaneous, Nightly  hydroxychloroquine, 200 mg, Oral,  BID  insulin regular, 2-7 Units, Subcutaneous, Q6H  ipratropium-albuterol, 3 mL, Nebulization, Q4H  levothyroxine, 25 mcg, Oral, Daily With Breakfast  memantine, 5 mg, Oral, Q12H  methylPREDNISolone sodium succinate, 60 mg, Intravenous, Q12H  [Held by provider] metoprolol succinate XL, 25 mg, Oral, Q24H  mupirocin, 1 application , Each Nare, BID  nystatin, 1 Application, Topical, Q12H  pantoprazole, 40 mg, Intravenous, Q AM  Phenylephrine HCl-NaCl, , ,   Phenylephrine HCl-NaCl, , ,   sodium chloride, 10 mL, Intravenous, Q12H  sodium chloride, 10 mL, Intravenous, Q12H  sodium chloride, 10 mL, Intravenous, Q12H  sodium chloride, 10 mL, Intravenous, Q12H  sodium chloride, 10 mL, Intravenous, Q12H  sodium chloride, 10 mL, Intravenous, Q12H    fentanyl 10 mcg/mL,  mcg/hr, Last Rate: 250 mcg/hr (07/24/24 1013)  Pharmacy Consult,   phenylephrine, 0.5-3 mcg/kg/min (Dosing Weight), Last Rate: 1.7 mcg/kg/min (07/24/24 1825)  propofol, 5-50 mcg/kg/min (Dosing Weight), Last Rate: 40 mcg/kg/min (07/24/24 1824)      Current Antimicrobial Therapy:  Anti-Infectives (From admission, onward)      Ordered     Dose/Rate Route Frequency Start Stop    07/21/24 2206  doxycycline (MONODOX) capsule 100 mg        Ordering Provider: John Mcmahon MD    100 mg Oral Every 12 Hours Scheduled 07/21/24 2300 07/26/24 2059    07/21/24 0424  cefepime 2000 mg IVPB in 100 mL NS (VTB)        Ordering Provider: Estuardo Charles MD    2,000 mg  over 4 Hours Intravenous Every 8 Hours 07/21/24 1400 07/28/24 1359    07/21/24 0856  vancomycin 1250 mg/250 mL 0.9% NS IVPB ([facility])        Ordering Provider: Eduardo Freeman MD    1,250 mg  over 75 Minutes Intravenous Once 07/21/24 1000 07/21/24 1114    07/21/24 0424  cefepime 2000 mg IVPB in 100 mL NS (VTB)        Ordering Provider: Estuardo Charles MD    2,000 mg  over 30 Minutes Intravenous Once 07/21/24 0600 07/21/24 0558    07/19/24 1540  hydroxychloroquine (PLAQUENIL) tablet 200 mg         Ordering Provider: Estuardo Charles MD    200 mg Oral 2 Times Daily 07/19/24 2100      07/17/24 2218  cefTRIAXone (ROCEPHIN) 2,000 mg in sodium chloride 0.9 % 100 mL IVPB-VTB        Ordering Provider: Al Lewis MD    2,000 mg  200 mL/hr over 30 Minutes Intravenous Once 07/17/24 2234 07/18/24 0035          Current Diuretic Therapy:  Diuretics (From admission, onward)      Ordered     Dose/Rate Route Frequency Start Stop    07/24/24 1151  furosemide (LASIX) injection 20 mg        Ordering Provider: Rinku Braxton MD    20 mg Intravenous Once 07/24/24 1245 07/24/24 1244    07/21/24 0637  furosemide (LASIX) injection 40 mg        Ordering Provider: Estuardo Charles MD    40 mg Intravenous Once 07/21/24 0730 07/21/24 0914    07/20/24 2323  furosemide (LASIX) injection 40 mg        Ordering Provider: Estuardo Charles MD    40 mg Intravenous Once 07/21/24 0015 07/21/24 0008          ----------------------------------------------------------------------------------------------------------------------  Vital Signs:  Temp:  [98.1 °F (36.7 °C)-98.6 °F (37 °C)] 98.1 °F (36.7 °C)  Heart Rate:  [62-86] 74  Resp:  [28] 28  BP: ()/(44-75) 105/57  FiO2 (%):  [30 %] 30 %  SpO2:  [98 %-100 %] 98 %  on   ;   Device (Oxygen Therapy): ventilator  Body mass index is 23.56 kg/m².    Wt Readings from Last 3 Encounters:   07/24/24 62.3 kg (137 lb 5.6 oz)   04/26/24 67.7 kg (149 lb 4 oz)   04/19/24 67.1 kg (148 lb)     Intake & Output (last 3 days)         07/22 0701 07/23 0700 07/23 0701 07/24 0700 07/24 0701 07/25 0700    I.V. (mL/kg) 1414.7 (24.7) 1369.3 (23.9) 645.9 (11.3)    Other 323 517 242    NG/ 592 322    IV Piggyback 400 200     Total Intake(mL/kg) 2499.7 (43.7) 2678.3 (46.8) 1209.9 (21.2)    Urine (mL/kg/hr) 1000 (0.7) 950 (0.7)     Emesis/NG output 0 0     Stool 0 0     Chest Tube  45 35    Total Output 1000 995 35    Net +1499.7 +1683.3 +1174.9           Stool Unmeasured Occurrence 0  x 0 x     Emesis Unmeasured Occurrence 0 x 0 x           NPO Diet NPO Type: Tube Feeding  ----------------------------------------------------------------------------------------------------------------------  Physical Exam   Constitutional:       General: She is in acute distress, though it appears to be less than yesterday.      Appearance: Normal appearance. She is well-developed. She is not toxic-appearing or diaphoretic.      Interventions: She is sedated and intubated.      Comments: Appears acutely and chronically ill.   HENT:      Head: Normocephalic and atraumatic.  The subcutaneous emphysema on the left side of the neck has improved.     Right Ear: External ear normal.      Left Ear: External ear normal.      Nose: Nose normal.   Eyes:      General: No scleral icterus.        Right eye: No discharge.         Left eye: No discharge.      Extraocular Movements: Extraocular movements intact.      Conjunctiva/sclera: Conjunctivae normal.      Pupils: Pupils are equal, round, and reactive to light.   Cardiovascular:      Rate and Rhythm: Normal rate and regular rhythm.      Pulses: Normal pulses.      Heart sounds: No murmur heard.  Pulmonary:      Effort: Respiratory distress present and appears to be about the same as yesterday. She is intubated.      Breath sounds: She has improved breath sounds on the left side; chest tube is still in place.  There are no bubbles in chamber CAD.  I do not hear any crackles nor any wheezes.  Abdominal:      General: Bowel sounds are normal. There is no distension.      Palpations: Abdomen is soft.   Musculoskeletal:         General: No swelling, deformity or signs of injury.   Skin:     Capillary Refill: Capillary refill takes less than 2 seconds.      Coloration: Skin is not jaundiced or pale.   Neurological:      Motor: Tremor present. No seizure activity.      Comments: Sedated by propofol and orally intubated.  Thus, I cannot perform this portion of the physical.  She is  still doing the tremors but she is not following commands like she was previously.    Psychiatric:      Comments: Sedated with propofol and thus I cannot perform this portion of the physical exam.      ----------------------------------------------------------------------------------------------------------------------  Tele: Normal sinus rhythm heart rate 60s to 80s.  I personally reviewed the telemetry strips.  ----------------------------------------------------------------------------------------------------------------------  LABS:    Pending test results:  Pending Labs       Order Current Status    Aspergillus Galactomannan Antigen - Blood, Arm, Right In process    Fungal Antibodies, Quantitative Double Immunodiffusion In process    Blood Culture - Blood, Arm, Left Preliminary result    Blood Culture - Blood, Arm, Right Preliminary result          CBC and coagulation:  Results from last 7 days   Lab Units 07/24/24  0022 07/23/24  0058 07/22/24  0012 07/21/24  1309 07/21/24  0130 07/21/24  0016 07/19/24  2356 07/19/24  0046 07/18/24  0420 07/18/24  0211 07/17/24  2119   PROCALCITONIN ng/mL  --   --  0.24 0.21 0.07  --   --   --   --   --  0.06   LACTATE mmol/L 1.5 1.5  --   --  1.1  --   --   --   --   --  2.0   CRP mg/dL 3.89* 10.32*  --   --   --  6.50*  --   --   --   --  1.65*   WBC 10*3/mm3 22.14* 10.24 16.58*  --   --  20.24* 13.37* 15.07* 7.25 6.42 8.49   HEMOGLOBIN g/dL 9.6* 9.5* 9.9*  --   --  11.8* 11.5* 12.3 12.1 12.3 12.9   HEMATOCRIT % 30.0* 29.6* 30.7*  --   --  36.3 36.2 37.6 36.8 36.7 38.3   MCV fL 98.4* 97.4* 95.6  --   --  96.3 99.7* 94.0 95.1 93.1 93.2   MCHC g/dL 32.0 32.1 32.2  --   --  32.5 31.8 32.7 32.9 33.5 33.7   PLATELETS 10*3/mm3 287 235 227  --   --  227 214 269 276 252 280   INR   --   --   --   --   --   --   --   --   --  0.93  --    D DIMER QUANT MCGFEU/mL  --   --   --   --   --   --   --   --   --   --  0.29     Acid/base balance:  Results from last 7 days   Lab Units  07/21/24  2106 07/21/24  0811 07/20/24  2343 07/20/24  1255 07/17/24  2203   PH, ARTERIAL pH units 7.315* 7.367 7.403 7.410 7.458*   PCO2, ARTERIAL mm Hg 48.2* 46.6* 40.3 37.9 40.2   PO2 ART mm Hg 137.0* 101.0 107.0 49.7* 154.0*   HCO3 ART mmol/L 24.5 26.8* 25.1 24.0 28.4*     Renal and electrolytes:  Results from last 7 days   Lab Units 07/24/24  0022 07/23/24  0058 07/22/24  0012 07/21/24  0016 07/19/24 2356 07/18/24 0420 07/17/24 2119   SODIUM mmol/L 133* 139 135* 130* 133* 132* 131*   POTASSIUM mmol/L 4.0 3.7 4.5 4.7 4.5 4.0 4.1   MAGNESIUM mg/dL 2.5* 2.4  --   --   --   --  1.7   CHLORIDE mmol/L 103 106 100 99 102 96* 93*   CO2 mmol/L 25.0 24.1 23.9 20.5* 22.2 25.2 26.2   BUN mg/dL 17 20 25* 17 15 7* 8   CREATININE mg/dL 0.38* 0.45* 0.47* 0.37* 0.44* 0.37* 0.49*   CALCIUM mg/dL 8.2* 8.2* 8.2* 8.4* 8.8 8.8 9.2   PHOSPHORUS mg/dL 1.4* 1.2*  --   --   --   --   --    GLUCOSE mg/dL 164* 208* 191* 122* 114* 130* 177*   ANION GAP mmol/L 5.0 8.9 11.1 10.5 8.8 10.8 11.8     Estimated Creatinine Clearance: 145.2 mL/min (A) (by C-G formula based on SCr of 0.38 mg/dL (L)).    Liver and pancreatic function:  Results from last 7 days   Lab Units 07/24/24  0022 07/23/24  0058 07/22/24  0012 07/18/24  0420 07/17/24  2119   ALBUMIN g/dL 3.5 3.6 4.1 3.1* 3.4*   BILIRUBIN mg/dL <0.2 0.2 0.4 <0.2 0.2   ALK PHOS U/L 89 104 142* 90 106   AST (SGOT) U/L 22 26 33* 37* 43*   ALT (SGPT) U/L 14 15 10 12 13     Endocrine function:  Lab Results   Component Value Date    HGBA1C 4.60 (L) 07/18/2024     Point of care bedside glucose levels:  Results from last 7 days   Lab Units 07/24/24  1657 07/24/24  1057 07/24/24  0529 07/23/24  2355 07/23/24  1819 07/23/24  1222 07/23/24  0508 07/22/24  2301 07/22/24  1944 07/22/24  1717 07/22/24  1246 07/22/24  0615 07/21/24  2331 07/21/24  1738   GLUCOSE mg/dL 155* 166* 152* 166* 133* 165* 135* 210* 186* 207* 155* 126 166* 163*     Glucose levels from the [facility]:  Results from last 7 days   Lab Units  07/24/24  0022 07/23/24  0058 07/22/24  0012 07/21/24  0016 07/19/24  2356 07/18/24  0420 07/17/24  2119   GLUCOSE mg/dL 164* 208* 191* 122* 114* 130* 177*     Lab Results   Component Value Date    TSH 1.140 07/17/2024    FREET4 1.83 (H) 04/11/2024     Cultures:  Microbiology Results (last 10 days)       Procedure Component Value - Date/Time    Respiratory Culture - Sputum, ET Suction [[id number]] Collected: 07/21/24 2231    Lab Status: Final result Specimen: Sputum from ET Suction Updated: 07/24/24 1119     Respiratory Culture No growth     Gram Stain Rare (1+) Mixed ammon      No WBCs seen      No Epithelial cells seen    Respiratory Culture - Sputum, ET Suction [[id number]] Collected: 07/21/24 1152    Lab Status: Final result Specimen: Sputum from ET Suction Updated: 07/23/24 1053     Respiratory Culture Rare growth Normal respiratory ammon. No S. aureus or Pseudomonas aeruginosa detected. Final report.     Gram Stain Moderate (3+) WBCs seen      Rare (1+) Epithelial cells seen      No organisms seen    Blood Culture - Blood, Arm, Left [[id number]]  (Normal) Collected: 07/21/24 0450    Lab Status: Preliminary result Specimen: Blood from Arm, Left Updated: 07/24/24 0515     Blood Culture No growth at 3 days    Blood Culture - Blood, Arm, Right [[id number]]  (Normal) Collected: 07/21/24 0448    Lab Status: Preliminary result Specimen: Blood from Arm, Right Updated: 07/24/24 0530     Blood Culture No growth at 3 days       I have personally looked at the labs and they are summarized above.  ----------------------------------------------------------------------------------------------------------------------  Detailed radiology reports for the last 24 hours:    Imaging Results (Last 24 Hours)       Procedure Component Value Units Date/Time    XR Chest 1 View [[id number]] Collected: 07/24/24 0752     Updated: 07/24/24 0755    Narrative:      EXAM:    XR Chest, 1 View     EXAM DATE:    7/24/2024 6:23 AM     CLINICAL  HISTORY:    Follow-up left-sided pneumothorax; I21.4-Non-ST elevation (NSTEMI)  myocardial infarction; J93.9-Pneumothorax, unspecified     TECHNIQUE:    Frontal view of the chest.     COMPARISON:    7/23/2024     FINDINGS:    LUNGS AND PLEURAL SPACES:  Patchy bilateral airspace disease persists.   No consolidation.  No pneumothorax.    HEART:  Heart size is stable.  No cardiomegaly.    MEDIASTINUM:  Unremarkable as visualized.  Normal mediastinal contour.    BONES/JOINTS:  Unremarkable as visualized.  No acute fracture.    TUBES, LINES AND DEVICES:  The endotracheal tube (ETT) is in  satisfactory position.  Nasogastric tube tip in the stomach.  Left chest  tube remains in position with residual small left thorax measuring up to  about 9 mm and was about 9 mm.  Right internal jugular central venous  catheter tip in the superior vena cava.       Impression:      1.  Patchy bilateral airspace disease persists.  2.  Left chest tube remains in position with residual small left thorax  measuring up to about 9 mm and was about 9 mm.        This report was finalized on 7/24/2024 7:52 AM by Dr. Dominguez Sims MD.             I have personally looked at the radiology images and I have read the available final report.    Assessment & Plan      -Acute NSTEMI, type 1 (positive stress test on 7/18/2024 without any reversible ischemia, that was present on admission  -History of IPF and COPD with chronic hypoxia (uses 3 L/min at home)  -Acute exacerbation of IPF/COPD causing acute hypoxic and hypercapnic respiratory failure on top of chronic hypoxic respiratory failure and sepsis (developed on 7/21/2024 due to a suspected aspiration episode) resulting in oral intubation and mechanical ventilation from aspiration pneumonitis  -Hypotension, septic vs sedation vs diuretic induced, developed during admission  -New onset heart failure with reduced EF, acute exacerbation that developed on 7/21/2024  -Hypocalcemia that developed during  admission  -Prolonged QT that developed during the hospitalization  -History of essential hypertension  -History of hypothyroidism  -History of stress urinary incontinence with frequent UTI's, with an acute E coli UTI that was present on admission  -History of intermittent, diffuse body tremors    I had a long talk with the son, who was at bedside; the patient is  to his step-father.  The son wanted an update about the patient's current medical status.  I have asked him to gather his family sometime this week so that we can have a family conference for further goals of care discussion, as the son states that the patient never wanted prolonged life support.  In the meantime, pulmonology gave the patient a break today from performing sedation vacation and spontaneous breathing trials, as the patient does have the left-sided pneumothorax.  I did look at the chest tube and I did not see any bubbles in chambers C.  However, we will monitor the patient closely for any issues with the chest tube.  ID has recommended continuing with cefepime and doxycycline.  The patient is on low ventilator settings.  Please note that general surgery is following along so that they can monitor the chest tube.  Will repeat blood work in the morning.  Please note that her glucose levels are at goal and stable; we will continue with the low-dose regular insulin sliding scale.  The patient continues to tolerate her tube feeds.  Please note that I have ordered ionized calcium, PTH, and vitamin D 25 OH levels for in the morning to evaluate the hypocalcemia.  We will avoid QT prolonging agents and continue to monitor the electrolytes closely. In order to lessen the risk of worsening QTc, the goal potassium level is 4-4.5 and goal magnesium level is 2-2.2.     VTE Prophylaxis:  DVT dose Lovenox     The patient is high risk due to the following diagnoses/reasons:  Acute MI, IPF with acute exacerbation, new left sided pneumothorax, new onset  heart failure    Disposition: Undetermined; if she survives then may need trach and PEG and LTAC, but it is too early to decide all of this     Jessica Ospina MD  [facility]  24  19:24 EDT      Electronically signed by Jessica Ospina MD at 24       Yandel James MD at 24          Chief complaint: Left pneumothorax    Chest tube in place without airleak, small pneumothorax persist but patient's respiratory status appears improved with minimal PEEP and FiO2 currently.    64-year-old female with respiratory failure and development of left pneumothorax status post chest tube placement.  Chest tube in place, pneumothorax persists but subcutaneous emphysema improved and patient's pulmonary function is improving with decrease PEEP and FiO2.  -Continue chest tube to suction for now  -Chest x-ray daily  -Surgery will continue to follow    Electronically signed by Yandel James MD at 24       Ene Macias APRN at 24 1119                     PROGRESS NOTE         Patient Identification:  Name:  Lexie Valdez  Age:  64 y.o.  Sex:  female  :  1960  MRN:  [id number]  Visit Number:  [id number]  Primary Care Provider:  Violet Pop APRN         LOS: 6 days       ----------------------------------------------------------------------------------------------------------------------  Subjective       Chief Complaints:    Shortness of Breath        Interval History:      Patient remains intubated and sedated at 30% FiO2.  Son at bedside.  Status post left chest tube placement on 2024.  Lung sounds diminished bilaterally.  Abdomen soft, nontender.  Afebrile, no reported diarrhea.  WBC elevated at 22.14, likely related to steroid therapy.  CRP improving at 3.89.  Respiratory culture from 2024 finalized with no growth.  Fungal serologies remain in process.    Review of Systems:    Unable to obtain.  Intubated and  sedated.    ----------------------------------------------------------------------------------------------------------------------      Objective       [facility] Meds:  aspirin, 81 mg, Oral, Daily  Brexpiprazole, 0.5 mg, Oral, Daily  cefepime, 2,000 mg, Intravenous, Q8H  chlorhexidine, 15 mL, Mouth/Throat, Q12H  donepezil, 10 mg, Oral, Q PM  doxycycline, 100 mg, Oral, Q12H  enoxaparin, 40 mg, Subcutaneous, Nightly  hydroxychloroquine, 200 mg, Oral, BID  insulin regular, 2-7 Units, Subcutaneous, Q6H  ipratropium-albuterol, 3 mL, Nebulization, Q4H  levothyroxine, 25 mcg, Oral, Daily With Breakfast  memantine, 5 mg, Oral, Q12H  methylPREDNISolone sodium succinate, 60 mg, Intravenous, Q12H  [Held by provider] metoprolol succinate XL, 25 mg, Oral, Q24H  mupirocin, 1 application , Each Nare, BID  nystatin, 1 Application, Topical, Q12H  pantoprazole, 40 mg, Intravenous, Q AM  Phenylephrine HCl-NaCl, , ,   Phenylephrine HCl-NaCl, , ,   sodium chloride, 10 mL, Intravenous, Q12H  sodium chloride, 10 mL, Intravenous, Q12H  sodium chloride, 10 mL, Intravenous, Q12H  sodium chloride, 10 mL, Intravenous, Q12H  sodium chloride, 10 mL, Intravenous, Q12H  sodium chloride, 10 mL, Intravenous, Q12H      fentanyl 10 mcg/mL,  mcg/hr, Last Rate: 250 mcg/hr (07/24/24 1013)  Pharmacy Consult,   phenylephrine, 0.5-3 mcg/kg/min (Dosing Weight), Last Rate: 1.1 mcg/kg/min (07/24/24 1013)  propofol, 5-50 mcg/kg/min (Dosing Weight), Last Rate: 40 mcg/kg/min (07/24/24 0535)      ----------------------------------------------------------------------------------------------------------------------    Vital Signs:  Temp:  [98.3 °F (36.8 °C)-98.6 °F (37 °C)] 98.6 °F (37 °C)  Heart Rate:  [62-86] 78  Resp:  [28] 28  BP: ()/(43-82) 93/51  FiO2 (%):  [30 %] 30 %  Mean Arterial Pressure (Non-Invasive) for the past 24 hrs (Last 3 readings):   Noninvasive MAP (mmHg)   07/24/24 1100 66   07/24/24 1045 70   07/24/24 1030 67     SpO2  Percentage    07/24/24 1030 07/24/24 1045 07/24/24 1100   SpO2: 98% 98% 99%     SpO2:  [85 %-100 %] 99 %  on   ;   Device (Oxygen Therapy): ventilator    Body mass index is 23.56 kg/m².  Wt Readings from Last 3 Encounters:   07/24/24 62.3 kg (137 lb 5.6 oz)   04/26/24 67.7 kg (149 lb 4 oz)   04/19/24 67.1 kg (148 lb)        Intake/Output Summary (Last 24 hours) at 7/24/2024 1122  Last data filed at 7/24/2024 0550  Gross per 24 hour   Intake 2678.27 ml   Output 995 ml   Net 1683.27 ml     NPO Diet NPO Type: Tube Feeding  ----------------------------------------------------------------------------------------------------------------------      Physical Exam:    Constitutional: Thin, frail, chronically ill-appearing  female.  Currently intubated and sedated 30% FiO2.  Son at bedside.  HENT:  Head: Normocephalic and atraumatic.  Mouth:  Moist mucous membranes.    Eyes:  Conjunctivae and EOM are normal.  No scleral icterus.  Neck:  Neck supple.  No JVD present.    Cardiovascular:  Normal rate, regular rhythm and normal heart sounds with no murmur. No edema.  Pulmonary/Chest: Diminished lung sounds bilaterally.  Intubated at 30% FiO2.  Left-sided chest tube in place.  Abdominal:  Soft.  Bowel sounds are normal.  No distension and no tenderness.   Musculoskeletal:  No edema, no tenderness, and no deformity.  No swelling or redness of joints.  Neurological: Sedate.  Skin:  Skin is warm and dry.  No rash noted.  No pallor.   Psychiatric: Sedate.        ----------------------------------------------------------------------------------------------------------------------  Results from last 7 days   Lab Units 07/23/24  0258 07/23/24  0058 07/17/24  2320   HSTROP T ng/L 66* 75* 188*     Results from last 7 days   Lab Units 07/22/24  0012 07/17/24  2119   PROBNP pg/mL 14,737.0* 3,550.0*     Results from last 7 days   Lab Units 07/18/24  0420   CHOLESTEROL mg/dL 136   TRIGLYCERIDES mg/dL 61   HDL CHOL mg/dL 67*   LDL CHOL  "mg/dL 56     Results from last 7 days   Lab Units 07/21/24  2106   PH, ARTERIAL pH units 7.315*   PO2 ART mm Hg 137.0*   PCO2, ARTERIAL mm Hg 48.2*   HCO3 ART mmol/L 24.5     Results from last 7 days   Lab Units 07/24/24  0022 07/23/24  0058 07/22/24  0012 07/21/24  0130 07/21/24  0016 07/18/24  0420 07/18/24  0211   CRP mg/dL 3.89* 10.32*  --   --  6.50*  --   --    LACTATE mmol/L 1.5 1.5  --  1.1  --   --   --    WBC 10*3/mm3 22.14* 10.24 16.58*  --  20.24*   < > 6.42   HEMOGLOBIN g/dL 9.6* 9.5* 9.9*  --  11.8*   < > 12.3   HEMATOCRIT % 30.0* 29.6* 30.7*  --  36.3   < > 36.7   MCV fL 98.4* 97.4* 95.6  --  96.3   < > 93.1   MCHC g/dL 32.0 32.1 32.2  --  32.5   < > 33.5   PLATELETS 10*3/mm3 287 235 227  --  227   < > 252   INR   --   --   --   --   --   --  0.93    < > = values in this interval not displayed.     Results from last 7 days   Lab Units 07/24/24  0022 07/23/24  0058 07/22/24  0012 07/18/24  0420 07/17/24  2119   SODIUM mmol/L 133* 139 135*   < > 131*   POTASSIUM mmol/L 4.0 3.7 4.5   < > 4.1   MAGNESIUM mg/dL 2.5* 2.4  --   --  1.7   CHLORIDE mmol/L 103 106 100   < > 93*   CO2 mmol/L 25.0 24.1 23.9   < > 26.2   BUN mg/dL 17 20 25*   < > 8   CREATININE mg/dL 0.38* 0.45* 0.47*   < > 0.49*   CALCIUM mg/dL 8.2* 8.2* 8.2*   < > 9.2   GLUCOSE mg/dL 164* 208* 191*   < > 177*   ALBUMIN g/dL 3.5 3.6 4.1   < > 3.4*   BILIRUBIN mg/dL <0.2 0.2 0.4   < > 0.2   ALK PHOS U/L 89 104 142*   < > 106   AST (SGOT) U/L 22 26 33*   < > 43*   ALT (SGPT) U/L 14 15 10   < > 13    < > = values in this interval not displayed.   Estimated Creatinine Clearance: 145.2 mL/min (A) (by C-G formula based on SCr of 0.38 mg/dL (L)).  No results found for: \"AMMONIA\"    Glucose   Date/Time Value Ref Range Status   07/24/2024 1057 166 (H) 70 - 130 mg/dL Final   07/24/2024 0529 152 (H) 70 - 130 mg/dL Final   07/23/2024 2355 166 (H) 70 - 130 mg/dL Final   07/23/2024 1819 133 (H) 70 - 130 mg/dL Final   07/23/2024 1222 165 (H) 70 - 130 mg/dL " "Final   07/23/2024 0508 135 (H) 70 - 130 mg/dL Final   07/22/2024 2301 210 (H) 70 - 130 mg/dL Final   07/22/2024 1944 186 (H) 70 - 130 mg/dL Final     Lab Results   Component Value Date    HGBA1C 4.60 (L) 07/18/2024     Lab Results   Component Value Date    TSH 1.140 07/17/2024    FREET4 1.83 (H) 04/11/2024       Blood Culture   Date Value Ref Range Status   07/21/2024 No growth at 2 days  Preliminary   07/21/2024 No growth at 2 days  Preliminary   07/17/2024 No growth at 5 days  Final   07/17/2024 No growth at 5 days  Final     Urine Culture   Date Value Ref Range Status   07/17/2024 >100,000 CFU/mL Escherichia coli (A)  Final     No results found for: \"WOUNDCX\"  No results found for: \"STOOLCX\"  Respiratory Culture   Date Value Ref Range Status   07/21/2024 No growth  Preliminary   07/21/2024   Final    Rare growth Normal respiratory ammon. No S. aureus or Pseudomonas aeruginosa detected. Final report.     Pain Management Panel           No data to display                  ----------------------------------------------------------------------------------------------------------------------  Imaging Results (Last 24 Hours)       Procedure Component Value Units Date/Time    XR Chest 1 View [[id number]] Collected: 07/24/24 0752     Updated: 07/24/24 0755    Narrative:      EXAM:    XR Chest, 1 View     EXAM DATE:    7/24/2024 6:23 AM     CLINICAL HISTORY:    Follow-up left-sided pneumothorax; I21.4-Non-ST elevation (NSTEMI)  myocardial infarction; J93.9-Pneumothorax, unspecified     TECHNIQUE:    Frontal view of the chest.     COMPARISON:    7/23/2024     FINDINGS:    LUNGS AND PLEURAL SPACES:  Patchy bilateral airspace disease persists.   No consolidation.  No pneumothorax.    HEART:  Heart size is stable.  No cardiomegaly.    MEDIASTINUM:  Unremarkable as visualized.  Normal mediastinal contour.    BONES/JOINTS:  Unremarkable as visualized.  No acute fracture.    TUBES, LINES AND DEVICES:  The endotracheal tube (ETT) " is in  satisfactory position.  Nasogastric tube tip in the stomach.  Left chest  tube remains in position with residual small left thorax measuring up to  about 9 mm and was about 9 mm.  Right internal jugular central venous  catheter tip in the superior vena cava.       Impression:      1.  Patchy bilateral airspace disease persists.  2.  Left chest tube remains in position with residual small left thorax  measuring up to about 9 mm and was about 9 mm.        This report was finalized on 7/24/2024 7:52 AM by Dr. Dominguez Sims MD.       XR Chest 1 View [[id number]] Collected: 07/23/24 1750     Updated: 07/23/24 1753    Narrative:      INDICATION: Chest tube placement.     TECHNIQUE: One view of the chest.     COMPARISON: Radiograph from earlier today.       Impression:      FINDINGS/IMPRESSION: Interval placement of left-sided chest tube.  Decreased volume left pneumothorax. Otherwise no significant change.         This report was finalized on 7/23/2024 5:51 PM by Alex Pallas, DO.       XR Chest 1 View [[id number]] Collected: 07/23/24 1702     Updated: 07/23/24 1707    Narrative:      INDICATION: Chest pain.     TECHNIQUE: Frontal radiograph of the chest.     COMPARISON: Radiograph from yesterday       Impression:      FINDINGS/IMPRESSION:    Heart size is similar. Multifocal infiltrates/edema. Slightly worsened.  Small left apical pneumothorax, new from prior, approximately 10-20%  volume. Subcutaneous emphysema in the neck.  Enteric tube in the stomach. Endotracheal tube tip approximately 2 cm  above the irene. Right-sided central venous catheter tip in the  cavoatrial junction.     Report called to Dima ROSEN at 203PM PST 7/23/2024.        This report was finalized on 7/23/2024 5:05 PM by Alex Pallas, DO.               ----------------------------------------------------------------------------------------------------------------------    Pertinent Infectious Disease Results                Assessment/Plan        Assessment     Septic shock with acute hypoxic respiratory failure requiring mechanical ventilation and pressor support  Pneumonia        Plan      Patient remains intubated and sedated at 30% FiO2.  Son at bedside.  Status post left chest tube placement on 7/23/2024.  Lung sounds diminished bilaterally.  Abdomen soft, nontender.  Afebrile, no reported diarrhea.  WBC elevated at 22.14, likely related to steroid therapy.  CRP improving at 3.89.  Respiratory culture from 7/21/2024 finalized with no growth.  Fungal serologies remain in process.    For now we will continue cefepime and doxycycline for treatment of pneumonia. We will continue to follow closely and adjust antibiotic therapy as needed.      ANTIMICROBIAL THERAPY    cefepime 2000 mg IVPB in 100 mL NS (VTB)  doxycycline - 100 MG     Code Status:   Code Status and Medical Interventions:   Ordered at: 07/18/24 0205     Code Status (Patient has no pulse and is not breathing):    CPR (Attempt to Resuscitate)     Medical Interventions (Patient has pulse or is breathing):    Full Support       SG Rao  07/24/24  11:22 EDT    Electronically signed by Ene Macias APRN at 07/24/24 1126       Kurt White MD at 07/24/24 1041          Progress Note Critical Care Pulmonary        Subjective  On vent , sedated, developed subcutaneous emphysema, x-ray chest showed pneumothorax on the left side.  Chest tube was placed by Dr. Tyree Galloway.  Residual pneumothorax was noted on a follow-up x-ray chest.    Pleur-evac showed persistent air leak.      Interval History: event overnight: As described above        Review of Systems:    On vent , sedated     Vital Signs  Temp:  [98.3 °F (36.8 °C)-98.6 °F (37 °C)] 98.6 °F (37 °C)  Heart Rate:  [62-86] 78  Resp:  [28] 28  BP: ()/(43-82) 92/51  FiO2 (%):  [30 %] 30 %  Body mass index is 23.56 kg/m².    Intake/Output Summary (Last 24 hours) at 7/24/2024 1041  Last data filed at 7/24/2024 0550  Gross per 24  hour   Intake 2678.27 ml   Output 995 ml   Net 1683.27 ml     No intake/output data recorded.    Physical Exam:  General- normal in appearance, not in any acute distress    HEENT- pupils equally reactive to light, normal in size, no scleral icterus    Neck-supple    Respiratory-bilateral air entry, bibasilar crackles.  Air leak noted in the Pleur-evac, subcutaneous emphysema noted in the neck.  Cardiovascular-  Normal S1 and S2. No S3, S4 or murmurs. No JVD, no carotid bruit and no edema, pulses normal bilaterally     GI-nontender nondistended bowel sounds positive    CNS- grossly nonfocal    Extremities-no clubbing and edema        Results Review:      Results from last 7 days   Lab Units 07/24/24  0022 07/23/24  0058 07/22/24  0012   WBC 10*3/mm3 22.14* 10.24 16.58*   HEMOGLOBIN g/dL 9.6* 9.5* 9.9*   PLATELETS 10*3/mm3 287 235 227     Results from last 7 days   Lab Units 07/24/24  0022 07/23/24  0058 07/22/24  0012 07/18/24  0420 07/17/24  2119   SODIUM mmol/L 133* 139 135*   < > 131*   POTASSIUM mmol/L 4.0 3.7 4.5   < > 4.1   CHLORIDE mmol/L 103 106 100   < > 93*   CO2 mmol/L 25.0 24.1 23.9   < > 26.2   BUN mg/dL 17 20 25*   < > 8   CREATININE mg/dL 0.38* 0.45* 0.47*   < > 0.49*   CALCIUM mg/dL 8.2* 8.2* 8.2*   < > 9.2   GLUCOSE mg/dL 164* 208* 191*   < > 177*   MAGNESIUM mg/dL 2.5* 2.4  --   --  1.7    < > = values in this interval not displayed.     Lab Results   Component Value Date    INR 0.93 07/18/2024    INR 0.98 04/26/2024    INR 1.00 03/21/2023    PROTIME 12.6 07/18/2024    PROTIME 13.5 04/26/2024    PROTIME 13.4 03/21/2023     Results from last 7 days   Lab Units 07/24/24  0022 07/23/24  0058 07/22/24  0012   ALK PHOS U/L 89 104 142*   BILIRUBIN mg/dL <0.2 0.2 0.4   ALT (SGPT) U/L 14 15 10   AST (SGOT) U/L 22 26 33*     Results from last 7 days   Lab Units 07/21/24  2106   PH, ARTERIAL pH units 7.315*   PO2 ART mm Hg 137.0*   PCO2, ARTERIAL mm Hg 48.2*   HCO3 ART mmol/L 24.5     Imaging Results (Last 24  Hours)       Procedure Component Value Units Date/Time    XR Chest 1 View [[id number]] Collected: 07/24/24 0752     Updated: 07/24/24 0755    Narrative:      EXAM:    XR Chest, 1 View     EXAM DATE:    7/24/2024 6:23 AM     CLINICAL HISTORY:    Follow-up left-sided pneumothorax; I21.4-Non-ST elevation (NSTEMI)  myocardial infarction; J93.9-Pneumothorax, unspecified     TECHNIQUE:    Frontal view of the chest.     COMPARISON:    7/23/2024     FINDINGS:    LUNGS AND PLEURAL SPACES:  Patchy bilateral airspace disease persists.   No consolidation.  No pneumothorax.    HEART:  Heart size is stable.  No cardiomegaly.    MEDIASTINUM:  Unremarkable as visualized.  Normal mediastinal contour.    BONES/JOINTS:  Unremarkable as visualized.  No acute fracture.    TUBES, LINES AND DEVICES:  The endotracheal tube (ETT) is in  satisfactory position.  Nasogastric tube tip in the stomach.  Left chest  tube remains in position with residual small left thorax measuring up to  about 9 mm and was about 9 mm.  Right internal jugular central venous  catheter tip in the superior vena cava.       Impression:      1.  Patchy bilateral airspace disease persists.  2.  Left chest tube remains in position with residual small left thorax  measuring up to about 9 mm and was about 9 mm.        This report was finalized on 7/24/2024 7:52 AM by Dr. Dominguez Sims MD.       XR Chest 1 View [[id number]] Collected: 07/23/24 1750     Updated: 07/23/24 1753    Narrative:      INDICATION: Chest tube placement.     TECHNIQUE: One view of the chest.     COMPARISON: Radiograph from earlier today.       Impression:      FINDINGS/IMPRESSION: Interval placement of left-sided chest tube.  Decreased volume left pneumothorax. Otherwise no significant change.         This report was finalized on 7/23/2024 5:51 PM by Alex Pallas, DO.       XR Chest 1 View [[id number]] Collected: 07/23/24 1702     Updated: 07/23/24 1707    Narrative:      INDICATION: Chest pain.      TECHNIQUE: Frontal radiograph of the chest.     COMPARISON: Radiograph from yesterday       Impression:      FINDINGS/IMPRESSION:    Heart size is similar. Multifocal infiltrates/edema. Slightly worsened.  Small left apical pneumothorax, new from prior, approximately 10-20%  volume. Subcutaneous emphysema in the neck.  Enteric tube in the stomach. Endotracheal tube tip approximately 2 cm  above the irene. Right-sided central venous catheter tip in the  cavoatrial junction.     Report called to Dima ROSEN at 203PM PST 7/23/2024.        This report was finalized on 7/23/2024 5:05 PM by Alex Pallas, DO.                    aspirin, 81 mg, Oral, Daily  Brexpiprazole, 0.5 mg, Oral, Daily  cefepime, 2,000 mg, Intravenous, Q8H  chlorhexidine, 15 mL, Mouth/Throat, Q12H  donepezil, 10 mg, Oral, Q PM  doxycycline, 100 mg, Oral, Q12H  enoxaparin, 40 mg, Subcutaneous, Nightly  hydroxychloroquine, 200 mg, Oral, BID  insulin regular, 2-7 Units, Subcutaneous, Q6H  ipratropium-albuterol, 3 mL, Nebulization, Q4H  levothyroxine, 25 mcg, Oral, Daily With Breakfast  memantine, 5 mg, Oral, Q12H  methylPREDNISolone sodium succinate, 60 mg, Intravenous, Q12H  [Held by provider] metoprolol succinate XL, 25 mg, Oral, Q24H  mupirocin, 1 application , Each Nare, BID  nystatin, 1 Application, Topical, Q12H  pantoprazole, 40 mg, Intravenous, Q AM  Phenylephrine HCl-NaCl, , ,   Phenylephrine HCl-NaCl, , ,   sodium chloride, 10 mL, Intravenous, Q12H  sodium chloride, 10 mL, Intravenous, Q12H  sodium chloride, 10 mL, Intravenous, Q12H  sodium chloride, 10 mL, Intravenous, Q12H  sodium chloride, 10 mL, Intravenous, Q12H  sodium chloride, 10 mL, Intravenous, Q12H      fentanyl 10 mcg/mL,  mcg/hr, Last Rate: 250 mcg/hr (07/24/24 1013)  Pharmacy Consult - Pharmacy to dose,   Pharmacy Consult,   Pharmacy to dose vancomycin,   phenylephrine, 0.5-3 mcg/kg/min (Dosing Weight), Last Rate: 1.1 mcg/kg/min (07/24/24 1013)  propofol, 5-50 mcg/kg/min  (Dosing Weight), Last Rate: 40 mcg/kg/min (07/24/24 7290)        Medication Review:     Assessment & Plan   #1: Acute on chronic hypoxic respiratory failure    #2 interstitial lung disease, most likely a flareup of interstitial lung disease.    3.:  Pneumonia.  #4: Septic shock.  On phenylephrine has been titrated down to 1.4 mics.        5.  Non-STEMI.    Vent Settings          Resp Rate (Set): 28  Pressure Support (cm H2O): 10 cm H20  FiO2 (%): 30 %  PEEP/CPAP (cm H2O): 6 cm H20    Minute Ventilation (L/min) (Obs): 10.4 L/min  Resp Rate (Observed) Vent: 28     I:E Ratio (Obs): 1:2.2    PIP Observed (cm H2O): 38 cm H2O    Driving Pressure (cm H2O): 32.6 cm H2O     Gas exchange is improved.    Plateau pressure is 24.       Current medication list reviewed.  Latest microbiology reviewed.  Respiratory panel reviewed.  Continue nebs.  Continue oxygen to maintain saturation 88 to 92%.   Cardiology- hemodynamically -unstable.    Titrate Levophed to maintain a MAP of 65 and above.       Nephrology- Cr and BUN stable  I/O-reviewed     GI-continue current diet.  Continue aspiration precautions     Hematology- CBC  Hb  platelet  WBC-latest reviewed     ID  Culture  And Antibiotics     Endocrinology- Maintain Blood sugar 140 -180  Blood sugars reviewed     Electrolytes-   Mag and phos         DVT prophylaxis-       Critical Care time spent in direct patient care: 45 minutes (excluding procedure time, if applicable) including high complexity decision making to assess, manipulate, and support vital organ system failure in this individual who has impairment of one or more vital organ systems such that there is a high probability of imminent or life threatening deterioration in the patient’s condition.  Patient is critically ill and is at higher risk for further mortality/morbidity. Continue ICU care .  I had a detailed discussion with patient's son who reported that patient did not want to be on ventilator for extended duration  "of time.    Kurt White MD  07/24/24  10:41 EDT    Electronically signed by Kurt White MD at 07/24/24 1055       Linh Lou APRN at 07/24/24 1015          Palliative Care Daily Progress Note     S: Medical record reviewed, followed up with Primary RN Graciela and Dr Ospina regarding patient's condition. Per conversation with Dr Ospina pt had developed subcutaneous emphysema with xray noting pneumothorax through the night and ended up receiving a Chest tube. This morning when I saw pt she was awake and was anxious with tremor(baseline per family) son Faith was at bedside holding her hand trying to calm and reassure her. She actually was able to shake her head no when I asked her if she was in pain and id not appear to be.       O:   Palliative Performance Scale Score:     /61   Pulse 73   Temp 98.5 °F (36.9 °C)   Resp 28   Ht 162.6 cm (64.02\")   Wt 62.3 kg (137 lb 5.6 oz)   SpO2 100%   BMI 23.56 kg/m²     Intake/Output Summary (Last 24 hours) at 7/24/2024 1546  Last data filed at 7/24/2024 0550  Gross per 24 hour   Intake 2678.27 ml   Output 995 ml   Net 1683.27 ml       PE:  General Appearance:    Chronically ill appearing, sedation off intubated on vent   HEENT:    NC/AT, without obvious abnormality, EOMI, anicteric    Neck:   Supple slight subcutaneous air noted on neck, trachea midline, no JVD   Lungs:     Sedated on vent @ 30% and 8  of peep, left CT in place, coarse lung sounds noted    Heart:    RRR, normal S1 and S2, no M/R/G   Abdomen:     Soft, NT, ND, NABS    Extremities:   Moves all extremities weakly, trace BLL extremity edema   Pulses:   Pulses palpable and equal bilaterally   Skin:   Warm, dry   Neurologic:   Awake alert to self, unable to assess further   Psych:   Restless/anxious, sedation on hold          Meds: Reviewed and changes noted    Labs:   Results from last 7 days   Lab Units 07/24/24  0022   WBC 10*3/mm3 22.14*   HEMOGLOBIN g/dL 9.6*   HEMATOCRIT % 30.0* "   PLATELETS 10*3/mm3 287     Results from last 7 days   Lab Units 07/24/24  0022   SODIUM mmol/L 133*   POTASSIUM mmol/L 4.0   CHLORIDE mmol/L 103   CO2 mmol/L 25.0   BUN mg/dL 17   CREATININE mg/dL 0.38*   GLUCOSE mg/dL 164*   CALCIUM mg/dL 8.2*     Results from last 7 days   Lab Units 07/24/24  0022   SODIUM mmol/L 133*   POTASSIUM mmol/L 4.0   CHLORIDE mmol/L 103   CO2 mmol/L 25.0   BUN mg/dL 17   CREATININE mg/dL 0.38*   CALCIUM mg/dL 8.2*   BILIRUBIN mg/dL <0.2   ALK PHOS U/L 89   ALT (SGPT) U/L 14   AST (SGOT) U/L 22   GLUCOSE mg/dL 164*     Imaging Results (Last 72 Hours)       Procedure Component Value Units Date/Time    XR Chest 1 View [[id number]] Collected: 07/24/24 0752     Updated: 07/24/24 0755    Narrative:      EXAM:    XR Chest, 1 View     EXAM DATE:    7/24/2024 6:23 AM     CLINICAL HISTORY:    Follow-up left-sided pneumothorax; I21.4-Non-ST elevation (NSTEMI)  myocardial infarction; J93.9-Pneumothorax, unspecified     TECHNIQUE:    Frontal view of the chest.     COMPARISON:    7/23/2024     FINDINGS:    LUNGS AND PLEURAL SPACES:  Patchy bilateral airspace disease persists.   No consolidation.  No pneumothorax.    HEART:  Heart size is stable.  No cardiomegaly.    MEDIASTINUM:  Unremarkable as visualized.  Normal mediastinal contour.    BONES/JOINTS:  Unremarkable as visualized.  No acute fracture.    TUBES, LINES AND DEVICES:  The endotracheal tube (ETT) is in  satisfactory position.  Nasogastric tube tip in the stomach.  Left chest  tube remains in position with residual small left thorax measuring up to  about 9 mm and was about 9 mm.  Right internal jugular central venous  catheter tip in the superior vena cava.       Impression:      1.  Patchy bilateral airspace disease persists.  2.  Left chest tube remains in position with residual small left thorax  measuring up to about 9 mm and was about 9 mm.        This report was finalized on 7/24/2024 7:52 AM by Dr. Dominguez Sims MD.       XR Chest 1  View [[id number]] Collected: 07/23/24 1750     Updated: 07/23/24 1753    Narrative:      INDICATION: Chest tube placement.     TECHNIQUE: One view of the chest.     COMPARISON: Radiograph from earlier today.       Impression:      FINDINGS/IMPRESSION: Interval placement of left-sided chest tube.  Decreased volume left pneumothorax. Otherwise no significant change.         This report was finalized on 7/23/2024 5:51 PM by Alex Pallas, DO.       XR Chest 1 View [[id number]] Collected: 07/23/24 1702     Updated: 07/23/24 1707    Narrative:      INDICATION: Chest pain.     TECHNIQUE: Frontal radiograph of the chest.     COMPARISON: Radiograph from yesterday       Impression:      FINDINGS/IMPRESSION:    Heart size is similar. Multifocal infiltrates/edema. Slightly worsened.  Small left apical pneumothorax, new from prior, approximately 10-20%  volume. Subcutaneous emphysema in the neck.  Enteric tube in the stomach. Endotracheal tube tip approximately 2 cm  above the irene. Right-sided central venous catheter tip in the  cavoatrial junction.     Report called to Dima ROSEN at 203PM PST 7/23/2024.        This report was finalized on 7/23/2024 5:05 PM by Alex Pallas, DO.       XR Chest 1 View [[id number]] Collected: 07/22/24 0710     Updated: 07/22/24 0713    Narrative:      CLINICAL HISTORY: Intubated.     COMPARISON: 7/21/2024.     TECHNIQUE:  Single AP view of the chest       Impression:      FINDINGS AND IMPRESSION:    Endotracheal tube tip is 2.1 cm above the irene.  Nasogastric tube tip is not seen but is below the gastroesophageal  junction.  Right internal jugular vein central venous catheter tip is at the  cavoatrial junction.  Low lung volumes with fibrotic changes are again seen.  Upper limit normal heart size.  No pleural effusion or pneumothorax.  Status post lap banding.  Surgical clips are also seen in the right  upper quadrant.        This report was finalized on 7/22/2024 7:11 AM by Violette Wilcox MD.        CT Chest Without Contrast Diagnostic [[id number]] Collected: 07/21/24 1613     Updated: 07/21/24 1617    Narrative:      PROCEDURE: CT CHEST WO CONTRAST DIAGNOSTIC-     HISTORY: hypoxia, progressive fibrosis; I21.4-Non-ST elevation (NSTEMI)  myocardial infarction     COMPARISON: 5/8/2024.     PROCEDURE:  Multiple axial CT images were obtained from the thoracic  inlet through the upper abdomen without the use of contrast. This study  was performed with techniques to keep radiation doses as low as  reasonably achievable (ALARA). Individualized dose reduction techniques  using automated exposure control or adjustment of mA and/or kV according  to the patient size were employed.     FINDINGS:  An endotracheal tube is in place with the tip well positioned above the  irene. An nasogastric tube is present in the body of the stomach. There  is no adenopathy within the chest. The heart is normal in size. The  aorta is normal in caliber. There is a small right pleural effusion.  Lung windows reveal interlobular septal thickening and subpleural lines  in the upper and lower lobes with bronchiectasis in the left lower lobe.  There are diffuse bilateral groundglass opacities. The visualized upper  abdomen is unremarkable. Bone windows reveal no acute osseous  abnormalities.       Impression:      1. Groundglass opacities and small right pleural effusion which may  reflect pulmonary edema or pneumonia.  2. Fibrotic lung changes consistent with usual interstitial pneumonitis.        This report was finalized on 7/21/2024 4:15 PM by Urban Arzola M.D..                 Diagnostics: Reviewed    A: Lexie Valdez is a 64 y.o. female admitted on 7/17/2024 due to shortness of breath. Lexie has a medical history of  anxiety/depression, arthritis, HTN, hypothyroidism, stress urinary incontinence with frequent UTI's, COPD and pulmonary fibrosis on 3L NC at home, and resting tremors in face and extremities, who presents with worsening  shortness of breath for the last couple days. Pt also at time of admission c/o chest pressure associated with dyspnea stating this was chronic however had worsened in the days leading up to her admission. She also endorsed diarrhea for the last couple of days as well as dysuria and stated that she has recurring UTI's. Labs in ER revealed Troponin of 229 with repeat 188, BNP was 3550, Na 131, Cr 0.49, glucose 177, CRP 1.65, lactate 2.0, procal and WBC normal but with 88.7% neutrophils. UA showed positive nitrite, trace leuk, 6-10 WBC, 4+ bacteria and 3-6 squamous epithelial cells. She was tachycardic and tachypneic with pH of 7.458, CO2 40, Po2 154, bicarb 28. She was admitted to PCU on admission and in the early am of 7/21 was transferred to CCU due to worsening dyspnea, bilateral infiltrates without effusion concerning for interstitial debbi disease flare, was transferred to CCU. Initially she was placed on Bipap in CCU and at 7/21 6:20 am Lexie was sedated intubated and placed on the ventilator. Palliative care consulted for GOC,ACP and for support of family.          P:  I was able to speak with pt son Faith and other family in CCU waiting area to provide support, answer questions reviewed today's xray. I discussed Lexie with Dr Anai White. I told family that we are here for support and if they had questions the RN could find us. I spoke with Dr Ospina later and she stated she had spoke with son and are planning to try and get pts  sister other family to come in for a family meeting tomorrow am. I will touch base with Faith in the am to inquire about a time.    We will continue to follow along. Please do not hesitate to contact us regarding further sx mgmt or GOC needs, including after hours or on weekends via our on call provider at [phone].     SG Javier    7/24/2024    Electronically signed by Linh Lou APRN at 07/24/24 2136       Rinku Braxton MD at 07/24/24 3354               [facility]  PROGRESS NOTE    Patient information:  Name: Lexie Valdez  Age/Sex: 64 y.o. female  :  1960        PCP: Violet Pop APRN  Attending: Estuardo Charles MD  MRN:  [id number]  Visit Number:  [id number]    LOS: 6  CODE STATUS:    Code Status and Medical Interventions:   Ordered at: 24 0205     Code Status (Patient has no pulse and is not breathing):    CPR (Attempt to Resuscitate)     Medical Interventions (Patient has pulse or is breathing):    Full Support       PROBLEM LIST:Principal Problem:    NSTEMI (non-ST elevated myocardial infarction)      Reason for Cardiology follow-up: NSTEMI     Subjective   ADMISSION INFORMATION:  Chief Complaint   Patient presents with    Shortness of Breath       DATE:2024    HPI:  Lexie Valdez is a 64 y.o. 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     Patient presented to [facility] emergency room (ER) on 2024 with complaints of increased shortness of breath x 2 weeks has become progressively worse and exacerbated with minimal activity.      Primary Cardiologist: None found per chart review.    Interval History:   Telemetry reveals  SR 70s with frequent PACs. According to patient's I & O flow chart it does not reflect a negative balance of diuresing overnight. AM labs reveal potassium 4.0, creatinine 0.38, hemoglobin 9.6, platelet count 287, and WBC is 22.14.     Patient was in room CCU 10  when she was seen and examined.  Patient remains intubated and sedated. Nurse and Aide are at bedside performing AM care.       EVENT TIMELINE:   :ECHO w EF of 36 - 40%. Left ventricular diastolic function is consistent with (grade I) impaired relaxation, mild tricuspid  valve regurgitation is present. Estimated right ventricular systolic pressure from tricuspid regurgitation is moderately elevated (45-55 mmHg). Patient seem to have developed a new regional wall motion normalities with decreased LV systolic function as compared to the previous study in March 2023.  Please see full report attached below.  7/18: Bess ST with MPI indicating moderate-sized infarct located in the inferior wall, septal wall and apex with no significant ischemia noted. Impressions are consistent with an intermediate risk study. EF = 43%.  Please see full report attached below.  7/21: Emergently intubated orotracheally with a #7.5 endotracheal tube at approximately 22:16 PM.   7/21: CXRs indicated multifocal and worsening pneumonia coupled with some concern as well for pulmonary edema.   7/23: Chest tube inserted 2/2 left sided pneumothorax      Objective     Vital Signs  Temp:  [98.3 °F (36.8 °C)-98.6 °F (37 °C)] 98.6 °F (37 °C)  Heart Rate:  [62-86] 78  Resp:  [28] 28  BP: ()/(43-82) 93/51  FiO2 (%):  [30 %] 30 %  Device (Oxygen Therapy): ventilator  Vital Signs (last 72 hrs)         07/21 0700  07/22 0659 07/22 0700 07/23 0659 07/23 0700 07/24 0659 07/24 0700 07/24 0911   Most Recent      Temp (°F) 98.1 -  99.8    98.1 -  100.1    98.2 -  98.6      98.6     98.6 (37) 07/24 0800    Heart Rate 75 -  106    65 -  104    62 -  102    70 -  82     72 07/24 0830    Resp 26 -  42    28 -  33    28 -  40      28     28 07/24 0815    BP 62/42 -  143/83    89/43 -  155/88    88/43 -  132/75    105/66 -  125/67     125/67 07/24 0830    SpO2 (%) 95 -  100    93 -  100    85 -  100      100     100 07/24 0830    Oxygen Concentration (%) 40 -  70      30      30      30     30 07/24 0815          BMI:Body mass index is 23.56 kg/m².    WEIGHT:      07/22/24  0500 07/23/24  0545 07/24/24  0550   Weight: 58.6 kg (129 lb 3 oz) 59.4 kg (130 lb 15.3 oz) 62.3 kg (137 lb 5.6 oz)       DIET:NPO Diet NPO Type: Tube  Feeding    I&O:  Intake & Output (last 3 days)         07/21 0701  07/22 0700 07/22 0701  07/23 0700 07/23 0701  07/24 0700 07/24 0701 07/25 0700    P.O.        I.V. (mL/kg) 1354.5 (23.7) 1414.7 (24.7) 1369.3 (23.9)     Other 0 323 517     NG/ 362 592     IV Piggyback 600 400 200     Total Intake(mL/kg) 2126.5 (37.2) 2499.7 (43.7) 2678.3 (46.8)     Urine (mL/kg/hr) 775 (0.6) 1000 (0.7) 950 (0.7)     Emesis/NG output 0 0 0     Stool 0 0 0     Chest Tube   45     Total Output 775 1000 995     Net +1351.5 +1499.7 +1683.3             Stool Unmeasured Occurrence 0 x 0 x 0 x     Emesis Unmeasured Occurrence 0 x 0 x 0 x              PHYSICAL EXAM:  Constitutional:       Appearance: Not in distress. Acutely ill-appearing.   HENT:         Comments: Orally intubated.  Neck:      Vascular: No JVD. JVD normal.   Pulmonary:      Breath sounds: Decreased air movement present.      Comments: Chest tube in place L side  and Intubated with Mechanical ventilation decreased air entry noted to left side   Cardiovascular:      Normal rate. Regular rhythm.      Murmurs: There is no murmur.   Edema:     Peripheral edema absent.   Abdominal:      General: Bowel sounds are normal. There is no distension.      Palpations: Abdomen is soft.   Musculoskeletal:      Cervical back: Neck supple.      Comments: Sedated. SCUDS in use BLE.  Skin:     General: Skin is warm and dry.   Genitourinary:     Comments: FC with BSD in use with clear yellow urine noted.  Neurological:      Comments: Intubated and sedated.                   Results review   Results Review:    I have reviewed the patient's new clinical results. 07/24/24 11:47 EDT    Results from last 7 days   Lab Units 07/23/24  0258 07/23/24  0058 07/17/24  2320 07/17/24  2119   HSTROP T ng/L 66* 75* 188* 229*     Lab Results   Component Value Date    PROBNP 14,737.0 (H) 07/22/2024    PROBNP 3,550.0 (H) 07/17/2024    PROBNP 235.1 04/26/2024     Results from last 7 days   Lab Units  07/24/24  0022 07/23/24  0058 07/22/24  0012 07/21/24  0016 07/19/24  2356 07/19/24  0046 07/18/24  0420   WBC 10*3/mm3 22.14* 10.24 16.58* 20.24* 13.37* 15.07* 7.25   HEMOGLOBIN g/dL 9.6* 9.5* 9.9* 11.8* 11.5* 12.3 12.1   PLATELETS 10*3/mm3 287 235 227 227 214 269 276     Results from last 7 days   Lab Units 07/24/24  0022 07/23/24  0058 07/22/24  0012 07/21/24  0016 07/19/24  2356 07/18/24  0420 07/17/24  2119   SODIUM mmol/L 133* 139 135* 130* 133* 132* 131*   POTASSIUM mmol/L 4.0 3.7 4.5 4.7 4.5 4.0 4.1   CHLORIDE mmol/L 103 106 100 99 102 96* 93*   CO2 mmol/L 25.0 24.1 23.9 20.5* 22.2 25.2 26.2   BUN mg/dL 17 20 25* 17 15 7* 8   CREATININE mg/dL 0.38* 0.45* 0.47* 0.37* 0.44* 0.37* 0.49*   CALCIUM mg/dL 8.2* 8.2* 8.2* 8.4* 8.8 8.8 9.2   GLUCOSE mg/dL 164* 208* 191* 122* 114* 130* 177*   ALT (SGPT) U/L 14 15 10  --   --  12 13   AST (SGOT) U/L 22 26 33*  --   --  37* 43*     Lab Results   Component Value Date    MG 2.5 (H) 07/24/2024    MG 2.4 07/23/2024    MG 1.7 07/17/2024     Estimated Creatinine Clearance: 145.2 mL/min (A) (by C-G formula based on SCr of 0.38 mg/dL (L)).    Lab Results   Component Value Date    HGBA1C 4.60 (L) 07/18/2024    HGBA1C 4.80 04/11/2024    HGBA1C 5.00 12/31/2023     Lab Results   Component Value Date    CHOL 136 07/18/2024    TRIG 61 07/18/2024    LDL 56 07/18/2024    HDL 67 (H) 07/18/2024        Lab Results   Component Value Date    INR 0.93 07/18/2024    INR 0.98 04/26/2024    INR 1.00 03/21/2023     Lab Results   Component Value Date    LABHEPA 0.27 (L) 07/20/2024    LABHEPA 0.36 07/19/2024    LABHEPA 0.33 07/19/2024    LABHEPA 0.11 (L) 07/18/2024       Lab Results   Component Value Date    TSH 1.140 07/17/2024      Pain Management Panel           No data to display              Microbiology Results (last 10 days)       Procedure Component Value - Date/Time    Mycoplasma Pneumoniae Antibody, IgM - Blood, Arm, Left [[id number]]  (Normal) Collected: 07/22/24 0012    Lab Status:  Final result Specimen: Blood from Arm, Left Updated: 07/22/24 0202     Mycoplasma pneumo IgM Negative    Legionella Antigen, Urine - Urine, Urine, Clean Catch [[id number]]  (Normal) Collected: 07/21/24 2242    Lab Status: Final result Specimen: Urine, Clean Catch Updated: 07/21/24 2308     LEGIONELLA ANTIGEN, URINE Negative    Narrative:      Presumptive negative for L. pneumophilia serogroup 1 antigen, suggesting no recent or current infection.    Respiratory Culture - Sputum, ET Suction [[id number]] Collected: 07/21/24 2231    Lab Status: Final result Specimen: Sputum from ET Suction Updated: 07/24/24 1119     Respiratory Culture No growth     Gram Stain Rare (1+) Mixed ammon      No WBCs seen      No Epithelial cells seen    MRSA Screen, PCR (Inpatient) - Swab, Nares [[id number]]  (Normal) Collected: 07/21/24 1252    Lab Status: Final result Specimen: Swab from Nares Updated: 07/21/24 1424     MRSA PCR No MRSA Detected    Narrative:      The negative predictive value of this diagnostic test is high and should only be used to consider de-escalating anti-MRSA therapy. A positive result may indicate colonization with MRSA and must be correlated clinically.    Respiratory Panel PCR w/COVID-19(SARS-CoV-2) RHONDA/HEATHER/TASNEEM/PAD/COR/CHRISTELLE In-House, NP Swab in UTM/VTM, 2 HR TAT - Swab, Nasopharynx [[id number]]  (Normal) Collected: 07/21/24 1247    Lab Status: Final result Specimen: Swab from Nasopharynx Updated: 07/21/24 1357     ADENOVIRUS, PCR Not Detected     Coronavirus 229E Not Detected     Coronavirus HKU1 Not Detected     Coronavirus NL63 Not Detected     Coronavirus OC43 Not Detected     COVID19 Not Detected     Human Metapneumovirus Not Detected     Human Rhinovirus/Enterovirus Not Detected     Influenza A PCR Not Detected     Influenza B PCR Not Detected     Parainfluenza Virus 1 Not Detected     Parainfluenza Virus 2 Not Detected     Parainfluenza Virus 3 Not Detected     Parainfluenza Virus 4 Not Detected     RSV, PCR  Not Detected     Bordetella pertussis pcr Not Detected     Bordetella parapertussis PCR Not Detected     Chlamydophila pneumoniae PCR Not Detected     Mycoplasma pneumo by PCR Not Detected    Narrative:      In the setting of a positive respiratory panel with a viral infection PLUS a negative procalcitonin without other underlying concern for bacterial infection, consider observing off antibiotics or discontinuation of antibiotics and continue supportive care. If the respiratory panel is positive for atypical bacterial infection (Bordetella pertussis, Chlamydophila pneumoniae, or Mycoplasma pneumoniae), consider antibiotic de-escalation to target atypical bacterial infection.    Respiratory Culture - Sputum, ET Suction [[id number]] Collected: 07/21/24 1152    Lab Status: Final result Specimen: Sputum from ET Suction Updated: 07/23/24 1053     Respiratory Culture Rare growth Normal respiratory ammon. No S. aureus or Pseudomonas aeruginosa detected. Final report.     Gram Stain Moderate (3+) WBCs seen      Rare (1+) Epithelial cells seen      No organisms seen    Blood Culture - Blood, Arm, Left [[id number]]  (Normal) Collected: 07/21/24 0450    Lab Status: Preliminary result Specimen: Blood from Arm, Left Updated: 07/24/24 0515     Blood Culture No growth at 3 days    Blood Culture - Blood, Arm, Right [[id number]]  (Normal) Collected: 07/21/24 0448    Lab Status: Preliminary result Specimen: Blood from Arm, Right Updated: 07/24/24 0530     Blood Culture No growth at 3 days    Gastrointestinal Panel, PCR - Stool, Per Rectum [[id number]]  (Normal) Collected: 07/19/24 0410    Lab Status: Final result Specimen: Stool from Per Rectum Updated: 07/19/24 0603     Campylobacter Not Detected     Plesiomonas shigelloides Not Detected     Salmonella Not Detected     Vibrio Not Detected     Vibrio cholerae Not Detected     Yersinia enterocolitica Not Detected     Enteroaggregative E. coli (EAEC) Not Detected     Enteropathogenic E.  coli (EPEC) Not Detected     Enterotoxigenic E. coli (ETEC) lt/st Not Detected     Shiga-like toxin-producing E. coli (STEC) stx1/stx2 Not Detected     Shigella/Enteroinvasive E. coli (EIEC) Not Detected     Cryptosporidium Not Detected     Cyclospora cayetanensis Not Detected     Entamoeba histolytica Not Detected     Giardia lamblia Not Detected     Adenovirus F40/41 Not Detected     Astrovirus Not Detected     Norovirus GI/GII Not Detected     Rotavirus A Not Detected     Sapovirus (I, II, IV or V) Not Detected    Clostridioides difficile Toxin - Stool, Per Rectum [[id number]] Collected: 07/19/24 0410    Lab Status: Final result Specimen: Stool from Per Rectum Updated: 07/19/24 0527    Narrative:      The following orders were created for panel order Clostridioides difficile Toxin - Stool, Per Rectum.  Procedure                               Abnormality         Status                     ---------                               -----------         ------                     Clostridioides difficile...[[id number]]                      Final result                 Please view results for these tests on the individual orders.    Clostridioides difficile Toxin, PCR - Stool, Per Rectum [[id number]] Collected: 07/19/24 0410    Lab Status: Final result Specimen: Stool from Per Rectum Updated: 07/19/24 0527     Toxigenic C. difficile by PCR Negative     027 Toxin Presumptive Negative    Narrative:      The result indicates the absence of toxigenic C. difficile from stool specimen.     Urine Culture - Urine, Urine, Catheter [[id number]]  (Abnormal)  (Susceptibility) Collected: 07/17/24 3593    Lab Status: Final result Specimen: Urine, Catheter Updated: 07/20/24 0959     Urine Culture >100,000 CFU/mL Escherichia coli    Narrative:      Colonization of the urinary tract without infection is common. Treatment is discouraged unless the patient is symptomatic, pregnant, or undergoing an invasive urologic procedure.     Susceptibility        Escherichia coli      MILES      Amoxicillin + Clavulanate Susceptible      Ampicillin Susceptible      Ampicillin + Sulbactam Susceptible      Cefazolin Susceptible      Cefepime Susceptible      Ceftazidime Susceptible      Ceftriaxone Susceptible      Gentamicin Susceptible      Levofloxacin Susceptible      Nitrofurantoin Susceptible      Piperacillin + Tazobactam Susceptible      Trimethoprim + Sulfamethoxazole Susceptible                           Respiratory Panel PCR w/COVID-19(SARS-CoV-2) RHONDA/HEATHER/TASNEEM/PAD/COR/CHRISTELLE In-House, NP Swab in UTM/VTM, 2 HR TAT - Swab, Nasopharynx [[id number]]  (Normal) Collected: 07/17/24 2127    Lab Status: Final result Specimen: Swab from Nasopharynx Updated: 07/17/24 2325     ADENOVIRUS, PCR Not Detected     Coronavirus 229E Not Detected     Coronavirus HKU1 Not Detected     Coronavirus NL63 Not Detected     Coronavirus OC43 Not Detected     COVID19 Not Detected     Human Metapneumovirus Not Detected     Human Rhinovirus/Enterovirus Not Detected     Influenza A PCR Not Detected     Influenza B PCR Not Detected     Parainfluenza Virus 1 Not Detected     Parainfluenza Virus 2 Not Detected     Parainfluenza Virus 3 Not Detected     Parainfluenza Virus 4 Not Detected     RSV, PCR Not Detected     Bordetella pertussis pcr Not Detected     Bordetella parapertussis PCR Not Detected     Chlamydophila pneumoniae PCR Not Detected     Mycoplasma pneumo by PCR Not Detected    Narrative:      In the setting of a positive respiratory panel with a viral infection PLUS a negative procalcitonin without other underlying concern for bacterial infection, consider observing off antibiotics or discontinuation of antibiotics and continue supportive care. If the respiratory panel is positive for atypical bacterial infection (Bordetella pertussis, Chlamydophila pneumoniae, or Mycoplasma pneumoniae), consider antibiotic de-escalation to target atypical bacterial infection.    COVID  PRE-OP / PRE-PROCEDURE SCREENING ORDER (NO ISOLATION) - Swab, Nasopharynx [[id number]]  (Normal) Collected: 07/17/24 2119    Lab Status: Final result Specimen: Swab from Nasopharynx Updated: 07/17/24 2149    Narrative:      The following orders were created for panel order COVID PRE-OP / PRE-PROCEDURE SCREENING ORDER (NO ISOLATION) - Swab, Nasopharynx.  Procedure                               Abnormality         Status                     ---------                               -----------         ------                     COVID-19 and FLU A/B PCR...[[id number]]  Normal              Final result                 Please view results for these tests on the individual orders.    COVID-19 and FLU A/B PCR, 1 HR TAT - Swab, Nasopharynx [[id number]]  (Normal) Collected: 07/17/24 2119    Lab Status: Final result Specimen: Swab from Nasopharynx Updated: 07/17/24 2149     COVID19 Not Detected     Influenza A PCR Not Detected     Influenza B PCR Not Detected    Narrative:      Fact sheet for providers: https://www.fda.gov/media/842013/download    Fact sheet for patients: https://www.fda.gov/media/178142/download    Test performed by PCR.    Blood Culture - Blood, Arm, Right [[id number]]  (Normal) Collected: 07/17/24 2119    Lab Status: Final result Specimen: Blood from Arm, Right Updated: 07/22/24 2131     Blood Culture No growth at 5 days    Blood Culture - Blood, Arm, Left [[id number]]  (Normal) Collected: 07/17/24 2119    Lab Status: Final result Specimen: Blood from Arm, Left Updated: 07/22/24 2131     Blood Culture No growth at 5 days           Imaging Results (Last 24 Hours)       Procedure Component Value Units Date/Time    XR Chest 1 View [[id number]] Collected: 07/24/24 0752     Updated: 07/24/24 0755    Narrative:      EXAM:    XR Chest, 1 View     EXAM DATE:    7/24/2024 6:23 AM     CLINICAL HISTORY:    Follow-up left-sided pneumothorax; I21.4-Non-ST elevation (NSTEMI)  myocardial infarction; J93.9-Pneumothorax,  unspecified     TECHNIQUE:    Frontal view of the chest.     COMPARISON:    7/23/2024     FINDINGS:    LUNGS AND PLEURAL SPACES:  Patchy bilateral airspace disease persists.   No consolidation.  No pneumothorax.    HEART:  Heart size is stable.  No cardiomegaly.    MEDIASTINUM:  Unremarkable as visualized.  Normal mediastinal contour.    BONES/JOINTS:  Unremarkable as visualized.  No acute fracture.    TUBES, LINES AND DEVICES:  The endotracheal tube (ETT) is in  satisfactory position.  Nasogastric tube tip in the stomach.  Left chest  tube remains in position with residual small left thorax measuring up to  about 9 mm and was about 9 mm.  Right internal jugular central venous  catheter tip in the superior vena cava.       Impression:      1.  Patchy bilateral airspace disease persists.  2.  Left chest tube remains in position with residual small left thorax  measuring up to about 9 mm and was about 9 mm.        This report was finalized on 7/24/2024 7:52 AM by Dr. Dominguez Sims MD.       XR Chest 1 View [[id number]] Collected: 07/23/24 1750     Updated: 07/23/24 1753    Narrative:      INDICATION: Chest tube placement.     TECHNIQUE: One view of the chest.     COMPARISON: Radiograph from earlier today.       Impression:      FINDINGS/IMPRESSION: Interval placement of left-sided chest tube.  Decreased volume left pneumothorax. Otherwise no significant change.         This report was finalized on 7/23/2024 5:51 PM by Alex Pallas, DO.       XR Chest 1 View [[id number]] Collected: 07/23/24 1702     Updated: 07/23/24 1707    Narrative:      INDICATION: Chest pain.     TECHNIQUE: Frontal radiograph of the chest.     COMPARISON: Radiograph from yesterday       Impression:      FINDINGS/IMPRESSION:    Heart size is similar. Multifocal infiltrates/edema. Slightly worsened.  Small left apical pneumothorax, new from prior, approximately 10-20%  volume. Subcutaneous emphysema in the neck.  Enteric tube in the stomach.  Endotracheal tube tip approximately 2 cm  above the irene. Right-sided central venous catheter tip in the  cavoatrial junction.     Report called to Dima ROSEN at 203PM PST 7/23/2024.        This report was finalized on 7/23/2024 5:05 PM by Alex Pallas, DO.             ECHO:  Results for orders placed during the hospital encounter of 07/17/24    Adult Transthoracic Echo Complete w/ Color, Spectral and Contrast if necessary per protocol    Interpretation Summary    Normal left ventricular cavity size and wall thickness noted.    The following left ventricular wall segments are hypokinetic: mid anterior, basal anterolateral, apical lateral, apical septal, mid anteroseptal and basal anterior. The following left ventricular wall segments are akinetic: apex.    Left ventricular systolic function is moderately decreased. Left ventricular ejection fraction appears to be 36 - 40%.    Left ventricular diastolic function is consistent with (grade I) impaired relaxation.    The aortic valve is structurally normal with no regurgitation or stenosis present.    The mitral valve is structurally normal with no significant stenosis present. Mild mitral valve regurgitation is present.    Mild tricuspid valve regurgitation is present. Estimated right ventricular systolic pressure from tricuspid regurgitation is moderately elevated (45-55 mmHg).    There is no evidence of pericardial effusion. .    Comments: Patient seem to have developed a new regional wall motion normalities with decreased LV systolic function as compared to the previous study in March 2023.      STRESS TEST:  Results for orders placed during the hospital encounter of 07/17/24    Stress Test With Myocardial Perfusion One Day    Interpretation Summary  Images from the original result were not included.      A pharmacological stress test was performed using regadenoson without low-level exercise.    Findings consistent with an indeterminate ECG stress test.     Myocardial perfusion imaging indicates a moderate-sized infarct located in the inferior wall, septal wall and apex with no significant ischemia noted.    Abnormal LV wall motion consistent with apical dyskinesis.    Left ventricular ejection fraction is moderately reduced (Calculated EF = 43%).    Impressions are consistent with an intermediate risk study.       HEART CATH:  No results found for this or any previous visit.      TELEMETRY:      SR 70s with frequent PACs        I reviewed the patient's new clinical results.    ALLERGIES: Codeine and Sulfa antibiotics    Medication Review:   Current list of medications may not reflect those currently placed in orders that are not signed or are being held.     aspirin, 81 mg, Oral, Daily  Brexpiprazole, 0.5 mg, Oral, Daily  cefepime, 2,000 mg, Intravenous, Q8H  chlorhexidine, 15 mL, Mouth/Throat, Q12H  donepezil, 10 mg, Oral, Q PM  doxycycline, 100 mg, Oral, Q12H  enoxaparin, 40 mg, Subcutaneous, Nightly  hydroxychloroquine, 200 mg, Oral, BID  insulin regular, 2-7 Units, Subcutaneous, Q6H  ipratropium-albuterol, 3 mL, Nebulization, Q4H  levothyroxine, 25 mcg, Oral, Daily With Breakfast  memantine, 5 mg, Oral, Q12H  methylPREDNISolone sodium succinate, 60 mg, Intravenous, Q12H  [Held by provider] metoprolol succinate XL, 25 mg, Oral, Q24H  mupirocin, 1 application , Each Nare, BID  nystatin, 1 Application, Topical, Q12H  pantoprazole, 40 mg, Intravenous, Q AM  Phenylephrine HCl-NaCl, , ,   Phenylephrine HCl-NaCl, , ,   sodium chloride, 10 mL, Intravenous, Q12H  sodium chloride, 10 mL, Intravenous, Q12H  sodium chloride, 10 mL, Intravenous, Q12H  sodium chloride, 10 mL, Intravenous, Q12H  sodium chloride, 10 mL, Intravenous, Q12H  sodium chloride, 10 mL, Intravenous, Q12H      fentanyl 10 mcg/mL,  mcg/hr, Last Rate: 250 mcg/hr (07/24/24 1013)  Pharmacy Consult,   phenylephrine, 0.5-3 mcg/kg/min (Dosing Weight), Last Rate: 1.1 mcg/kg/min (07/24/24 1013)  propofol,  5-50 mcg/kg/min (Dosing Weight), Last Rate: 40 mcg/kg/min (07/24/24 0535)        senna-docusate sodium **AND** polyethylene glycol **AND** bisacodyl **AND** bisacodyl    busPIRone    dextrose    dextrose    glucagon (human recombinant)    guaifenesin    HYDROcodone-acetaminophen    hydrOXYzine    ipratropium    nitroglycerin    Pharmacy Consult    Phenylephrine HCl-NaCl    Phenylephrine HCl-NaCl    prochlorperazine    sodium chloride    sodium chloride    sodium chloride    sodium chloride    sodium chloride    sodium chloride    sodium chloride    sodium chloride    sodium chloride    sodium chloride    sodium chloride    vecuronium    Assessment    Acute non-ST elevation myocardial infarction likely type II due to respiratory failure with hypoxia  Advanced COPD/pulmonary fibrosis, on home oxygen.  Acute on chronic respiratory failure requiring intubation mechanical ventilation.  HFrEF, worsening with acute respiratory failure  Essential hypertension  Mild hyponatremia  Patient developed pneumothorax requiring chest tube placement            Recommendations / Plan   1.  Remains with positive fluid balance we will try to diurese  2.  Remains critically ill with complications with pneumothorax requiring chest tube placement  3.  Overall poor prognosis          I have discussed the patients findings and recommendations with the patient.    Thank you very much for asking us to be involved in this patient's care.  We will follow along with you.    Electronically signed by SG Chandler, 07/24/24, 11:47 AM EDT.   Electronically signed by Rinku Braxton MD, 07/24/24, 11:48 AM EDT.                      Please note that portions of this note were completed with a voice recognition program.    Please note that portions of this note were copied and has been reviewed and is accurate as of 7/24/2024 .       Electronically signed by Rinku Braxton MD at 07/24/24 1150       Consult Notes (last 24 hours)  Notes from  07/24/24 0609 through 07/25/24 0609   No notes of this type exist for this encounter.

## 2024-07-25 NOTE — PROGRESS NOTES
Progress Note Critical Care Pulmonary        Subjective  On vent , sedated, continue to have air leak in Pleur-evac.    Gas exchange is stable.      Interval History: event overnight: As described above        Review of Systems:    On vent , sedated     Vital Signs  Temp:  [97.7 °F (36.5 °C)-99.1 °F (37.3 °C)] 99.1 °F (37.3 °C)  Heart Rate:  [] 100  Resp:  [28] 28  BP: ()/() 125/67  FiO2 (%):  [28 %-30 %] 28 %  Body mass index is 23.79 kg/m².    Intake/Output Summary (Last 24 hours) at 7/25/2024 1045  Last data filed at 7/25/2024 0617  Gross per 24 hour   Intake 2897.94 ml   Output 746 ml   Net 2151.94 ml     No intake/output data recorded.    Physical Exam:  General- normal in appearance, not in any acute distress    HEENT- pupils equally reactive to light, normal in size, no scleral icterus    Neck-supple    Respiratory-bilateral air entry, bibasilar crackles.  Air leak noted in the Pleur-evac, improving subcutaneous emphysema noted in the neck.  Cardiovascular-  Normal S1 and S2. No S3, S4 or murmurs. No JVD, no carotid bruit and no edema, pulses normal bilaterally     GI-nontender nondistended bowel sounds positive    CNS- grossly nonfocal    Extremities-no clubbing and edema        Results Review:      Results from last 7 days   Lab Units 07/25/24  0006 07/24/24  0022 07/23/24  0058   WBC 10*3/mm3 21.27* 22.14* 10.24   HEMOGLOBIN g/dL 9.6* 9.6* 9.5*   PLATELETS 10*3/mm3 303 287 235     Results from last 7 days   Lab Units 07/25/24  0006 07/24/24  0022 07/23/24  0058   SODIUM mmol/L 144 133* 139   POTASSIUM mmol/L 5.1 4.0 3.7   CHLORIDE mmol/L 114* 103 106   CO2 mmol/L 24.9 25.0 24.1   BUN mg/dL 20 17 20   CREATININE mg/dL 0.33* 0.38* 0.45*   CALCIUM mg/dL 8.1* 8.2* 8.2*   GLUCOSE mg/dL 138* 164* 208*   MAGNESIUM mg/dL 2.5* 2.5* 2.4     Lab Results   Component Value Date    INR 0.93 07/18/2024    INR 0.98 04/26/2024    INR 1.00 03/21/2023    PROTIME 12.6 07/18/2024    PROTIME 13.5 04/26/2024     PROTIME 13.4 03/21/2023     Results from last 7 days   Lab Units 07/24/24  0022 07/23/24  0058 07/22/24  0012   ALK PHOS U/L 89 104 142*   BILIRUBIN mg/dL <0.2 0.2 0.4   ALT (SGPT) U/L 14 15 10   AST (SGOT) U/L 22 26 33*     Results from last 7 days   Lab Units 07/21/24  2106   PH, ARTERIAL pH units 7.315*   PO2 ART mm Hg 137.0*   PCO2, ARTERIAL mm Hg 48.2*   HCO3 ART mmol/L 24.5     Imaging Results (Last 24 Hours)       ** No results found for the last 24 hours. **                 aspirin, 81 mg, Oral, Daily  Brexpiprazole, 0.5 mg, Oral, Daily  cefepime, 2,000 mg, Intravenous, Q8H  chlorhexidine, 15 mL, Mouth/Throat, Q12H  donepezil, 10 mg, Oral, Q PM  doxycycline, 100 mg, Oral, Q12H  enoxaparin, 40 mg, Subcutaneous, Nightly  hydroxychloroquine, 200 mg, Oral, BID  insulin regular, 2-7 Units, Subcutaneous, Q6H  ipratropium-albuterol, 3 mL, Nebulization, Q4H  levothyroxine, 25 mcg, Oral, Daily With Breakfast  memantine, 5 mg, Oral, Q12H  methylPREDNISolone sodium succinate, 60 mg, Intravenous, Q12H  [Held by provider] metoprolol succinate XL, 25 mg, Oral, Q24H  mupirocin, 1 application , Each Nare, BID  nystatin, 1 Application, Topical, Q12H  pantoprazole, 40 mg, Intravenous, Q AM  Phenylephrine HCl-NaCl, , ,   Phenylephrine HCl-NaCl, , ,   sodium chloride, 10 mL, Intravenous, Q12H  sodium chloride, 10 mL, Intravenous, Q12H  sodium chloride, 10 mL, Intravenous, Q12H  sodium chloride, 10 mL, Intravenous, Q12H  sodium chloride, 10 mL, Intravenous, Q12H  sodium chloride, 10 mL, Intravenous, Q12H      fentanyl 10 mcg/mL,  mcg/hr, Last Rate: 250 mcg/hr (07/25/24 0716)  Pharmacy Consult,   phenylephrine, 0.5-3 mcg/kg/min (Dosing Weight), Last Rate: 1.4 mcg/kg/min (07/25/24 4992)  propofol, 5-50 mcg/kg/min (Dosing Weight), Last Rate: 40 mcg/kg/min (07/25/24 0443)        Medication Review:     Assessment & Plan   #1: Acute on chronic hypoxic respiratory failure    #2 interstitial lung disease, most likely a flareup of  interstitial lung disease.    3.:  Pneumonia.  #4: Septic shock.  On phenylephrine has been titrated down to 1.4 mics.    Medical course complicated by spontaneous pneumothorax.  I would like to adjust the ventilator setting and decrease the tidal volume to 300./Permissive hypercapnia to avoid volume trauma.            5.  Non-STEMI.    Vent Settings          Resp Rate (Set): 28  Pressure Support (cm H2O): 10 cm H20  FiO2 (%): 28 %  PEEP/CPAP (cm H2O): 4 cm H20    Minute Ventilation (L/min) (Obs): 10 L/min  Resp Rate (Observed) Vent: 29     I:E Ratio (Obs): 1:2.4    PIP Observed (cm H2O): 30 cm H2O    Driving Pressure (cm H2O): 32.6 cm H2O     Gas exchange is improved.    Plateau pressure is 28       Current medication list reviewed.  Latest microbiology reviewed.  Respiratory panel reviewed.  Continue nebs.  Continue oxygen to maintain saturation 88 to 92%.   Cardiology- hemodynamically -unstable.    Titrate Levophed to maintain a MAP of 65 and above.       Nephrology- Cr and BUN stable  I/O-reviewed     GI-continue current diet.  Continue aspiration precautions     Hematology- CBC  Hb  platelet  WBC-latest reviewed     ID  Culture  And Antibiotics     Endocrinology- Maintain Blood sugar 140 -180  Blood sugars reviewed     Electrolytes-   Mag and phos         DVT prophylaxis-       Critical Care time spent in direct patient care: 45 minutes (excluding procedure time, if applicable) including high complexity decision making to assess, manipulate, and support vital organ system failure in this individual who has impairment of one or more vital organ systems such that there is a high probability of imminent or life threatening deterioration in the patient’s condition.  Patient is critically ill and is at higher risk for further mortality/morbidity. Continue ICU care .      Kurt White MD  07/25/24  10:45 EDT

## 2024-07-25 NOTE — PLAN OF CARE
Problem: Adult Inpatient Plan of Care  Goal: Plan of Care Review  Outcome: Ongoing, Progressing     Problem: Communication Impairment (Mechanical Ventilation, Invasive)  Goal: Effective Communication  Outcome: Ongoing, Progressing   Goal Outcome Evaluation:

## 2024-07-26 NOTE — CASE MANAGEMENT/SOCIAL WORK
Continued Stay Note  NEIDA Alex     Patient Name: Lexie KEITH Valdez  MRN: [id number]  Today's Date: 7/26/2024    Admit Date: 7/17/2024     Discharge Plan       Row Name 07/26/24 1223       Plan    Plan Pt remains intubated. Palliative Care following for GOC. Pt had chest tube placed on 7/23/24. Pt lives with spouse. Patient does not utilize Home Health. Pt utilizes BP Cuff, SC, BSC, Nebulizer, O2 @ 3L, Portable Concentrator, Pulse Ox, Rollator, Shower Chair & WC from [facility]. SS to follow up with discharge planning once pt has been extubated and is medically stable. SS to follow.          NEIL GarciaW

## 2024-07-26 NOTE — PLAN OF CARE
Goal Outcome Evaluation:               Patient sedated. No weaning trial today

## 2024-07-26 NOTE — PROGRESS NOTES
PROGRESS NOTE         Patient Identification:  Name:  Lxeie Valdez  Age:  64 y.o.  Sex:  female  :  1960  MRN:  [id number]  Visit Number:  [id number]  Primary Care Provider:  Violet Pop APRN         LOS: 8 days       ----------------------------------------------------------------------------------------------------------------------  Subjective       Chief Complaints:    Shortness of Breath        Interval History:      The patient remains intubated and sedated 28% FiO2, which is stable.  Sedation and phenylephrine infusing.  Tmax 100.1 °F.  No reports of diarrhea.  Left chest tube in place.  Mechanical lung sounds are clear to auscultation bilaterally.  Abdomen is soft with hypoactive bowel sounds.  Tube feed infusing.  Leukocytosis improving at 17.54.  Chest x-ray reports coarsened interstitial markings and patchy bilateral airspace disease again noted.  Left chest tube remain in position.      Review of Systems:    Unable to obtain.  Intubated and sedated.    ----------------------------------------------------------------------------------------------------------------------      Objective       Current Hospital Meds:  aspirin, 81 mg, Oral, Daily  Brexpiprazole, 0.5 mg, Oral, Daily  cefepime, 2,000 mg, Intravenous, Q8H  chlorhexidine, 15 mL, Mouth/Throat, Q12H  donepezil, 10 mg, Oral, Q PM  doxycycline, 100 mg, Oral, Q12H  enoxaparin, 40 mg, Subcutaneous, Nightly  hydroxychloroquine, 200 mg, Oral, BID  insulin regular, 2-7 Units, Subcutaneous, Q6H  ipratropium-albuterol, 3 mL, Nebulization, Q4H  levothyroxine, 25 mcg, Oral, Daily With Breakfast  magic barrier cream, , Topical, BID  memantine, 5 mg, Oral, Q12H  methylPREDNISolone sodium succinate, 60 mg, Intravenous, Q12H  [Held by provider] metoprolol succinate XL, 25 mg, Oral, Q24H  mupirocin, 1 application , Each Nare, BID  nystatin, 5 mL, Oral, 4x Daily  nystatin, 1 Application, Topical, Q12H  pantoprazole, 40 mg, Intravenous, Q  AM  Phenylephrine HCl-NaCl, , ,   Phenylephrine HCl-NaCl, , ,   sodium chloride, 10 mL, Intravenous, Q12H  sodium chloride, 10 mL, Intravenous, Q12H  sodium chloride, 10 mL, Intravenous, Q12H  sodium chloride, 10 mL, Intravenous, Q12H  sodium chloride, 10 mL, Intravenous, Q12H  sodium chloride, 10 mL, Intravenous, Q12H      dexmedetomidine, 0.2-1.5 mcg/kg/hr, Last Rate: 0.4 mcg/kg/hr (07/26/24 1050)  fentanyl 10 mcg/mL,  mcg/hr, Last Rate: Stopped (07/26/24 0917)  Pharmacy Consult,   phenylephrine, 0.5-3 mcg/kg/min (Dosing Weight), Last Rate: 0.5 mcg/kg/min (07/26/24 1132)  propofol, 5-50 mcg/kg/min (Dosing Weight), Last Rate: Stopped (07/26/24 0917)      ----------------------------------------------------------------------------------------------------------------------    Vital Signs:  Temp:  [99.3 °F (37.4 °C)-100.1 °F (37.8 °C)] 100.1 °F (37.8 °C)  Heart Rate:  [] 95  Resp:  [28-38] 34  BP: ()/(40-85) 134/85  FiO2 (%):  [28 %] 28 %  Mean Arterial Pressure (Non-Invasive) for the past 24 hrs (Last 3 readings):   Noninvasive MAP (mmHg)   07/26/24 1132 95   07/26/24 1031 106   07/26/24 0941 99     SpO2 Percentage    07/26/24 0906 07/26/24 1022 07/26/24 1034   SpO2: 99% 94% 97%     SpO2:  [94 %-99 %] 97 %  on   ;   Device (Oxygen Therapy): ventilator    Body mass index is 23.79 kg/m².  Wt Readings from Last 3 Encounters:   07/25/24 62.9 kg (138 lb 10.7 oz)   04/26/24 67.7 kg (149 lb 4 oz)   04/19/24 67.1 kg (148 lb)        Intake/Output Summary (Last 24 hours) at 7/26/2024 1136  Last data filed at 7/26/2024 0615  Gross per 24 hour   Intake 2587.51 ml   Output 845 ml   Net 1742.51 ml     NPO Diet NPO Type: Tube Feeding  ----------------------------------------------------------------------------------------------------------------------      Physical Exam:    Constitutional: Thin, frail, chronically ill-appearing  female.  Intubated and sedated at 28% FiO2, which is stable.  HENT:  Head:  Normocephalic and atraumatic.  Mouth:  Moist mucous membranes.    Eyes:  Conjunctivae and EOM are normal.  No scleral icterus.  Neck:  Neck supple.  No JVD present.    Cardiovascular:  Normal rate, regular rhythm and normal heart sounds with no murmur. No edema.  Pulmonary/Chest: Mechanical breath sounds clear bilaterally.  Intubated at 28 % FiO2.  Left-sided chest tube in place.  Abdominal:  Soft.  Bowel sounds are normal.  No distension and no tenderness.   Musculoskeletal:  No edema, no tenderness, and no deformity.  No swelling or redness of joints.  Neurological: Sedate.  Skin:  Skin is warm and dry.  No rash noted.  No pallor.   Psychiatric: Sedate.        ----------------------------------------------------------------------------------------------------------------------  Results from last 7 days   Lab Units 07/23/24  0258 07/23/24  0058   HSTROP T ng/L 66* 75*     Results from last 7 days   Lab Units 07/22/24  0012   PROBNP pg/mL 14,737.0*           Results from last 7 days   Lab Units 07/26/24  0428   PH, ARTERIAL pH units 7.380   PO2 ART mm Hg 95.0   PCO2, ARTERIAL mm Hg 47.5*   HCO3 ART mmol/L 28.1*     Results from last 7 days   Lab Units 07/26/24  0441 07/25/24  0006 07/24/24  0022 07/23/24  0058 07/22/24  0012 07/21/24  0130 07/21/24  0016   CRP mg/dL  --   --  3.89* 10.32*  --   --  6.50*   LACTATE mmol/L  --   --  1.5 1.5  --  1.1  --    WBC 10*3/mm3 17.54* 21.27* 22.14* 10.24   < >  --  20.24*   HEMOGLOBIN g/dL 9.1* 9.6* 9.6* 9.5*   < >  --  11.8*   HEMATOCRIT % 29.4* 30.8* 30.0* 29.6*   < >  --  36.3   MCV fL 101.7* 101.7* 98.4* 97.4*   < >  --  96.3   MCHC g/dL 31.0* 31.2* 32.0 32.1   < >  --  32.5   PLATELETS 10*3/mm3 225 303 287 235   < >  --  227   INR  1.09  --   --   --   --   --   --     < > = values in this interval not displayed.     Results from last 7 days   Lab Units 07/26/24  0441 07/25/24  0006 07/24/24  0022 07/23/24  0058   SODIUM mmol/L 142 144 133* 139   POTASSIUM mmol/L 4.7 5.1  "4.0 3.7   MAGNESIUM mg/dL 2.5* 2.5* 2.5* 2.4   CHLORIDE mmol/L 112* 114* 103 106   CO2 mmol/L 25.6 24.9 25.0 24.1   BUN mg/dL 29* 20 17 20   CREATININE mg/dL 0.31* 0.33* 0.38* 0.45*   CALCIUM mg/dL 8.0* 8.1* 8.2* 8.2*   GLUCOSE mg/dL 115* 138* 164* 208*   ALBUMIN g/dL 3.1*  --  3.5 3.6   BILIRUBIN mg/dL <0.2  --  <0.2 0.2   ALK PHOS U/L 70  --  89 104   AST (SGOT) U/L 21  --  22 26   ALT (SGPT) U/L 17  --  14 15   Estimated Creatinine Clearance: 179.7 mL/min (A) (by C-G formula based on SCr of 0.31 mg/dL (L)).  No results found for: \"AMMONIA\"    Glucose   Date/Time Value Ref Range Status   07/26/2024 0559 117 70 - 130 mg/dL Final   07/26/2024 0024 145 (H) 70 - 130 mg/dL Final   07/25/2024 1658 109 70 - 130 mg/dL Final   07/25/2024 1120 167 (H) 70 - 130 mg/dL Final   07/25/2024 0513 111 70 - 130 mg/dL Final   07/25/2024 0020 135 (H) 70 - 130 mg/dL Final   07/24/2024 1657 155 (H) 70 - 130 mg/dL Final   07/24/2024 1057 166 (H) 70 - 130 mg/dL Final     Lab Results   Component Value Date    HGBA1C 4.60 (L) 07/18/2024     Lab Results   Component Value Date    TSH 1.140 07/17/2024    FREET4 1.83 (H) 04/11/2024       Blood Culture   Date Value Ref Range Status   07/21/2024 No growth at 2 days  Preliminary   07/21/2024 No growth at 2 days  Preliminary   07/17/2024 No growth at 5 days  Final   07/17/2024 No growth at 5 days  Final     Urine Culture   Date Value Ref Range Status   07/17/2024 >100,000 CFU/mL Escherichia coli (A)  Final     No results found for: \"WOUNDCX\"  No results found for: \"STOOLCX\"  Respiratory Culture   Date Value Ref Range Status   07/21/2024 No growth  Preliminary   07/21/2024   Final    Rare growth Normal respiratory ammon. No S. aureus or Pseudomonas aeruginosa detected. Final report.     Pain Management Panel           No data to display                  ----------------------------------------------------------------------------------------------------------------------  Imaging Results (Last 24 " Hours)       Procedure Component Value Units Date/Time    XR Chest 1 View [[id number]] Collected: 07/26/24 1047     Updated: 07/26/24 1050    Narrative:      EXAM:    XR Chest, 1 View     EXAM DATE:    7/26/2024 10:05 AM     CLINICAL HISTORY:    chest tube in place; I21.4-Non-ST elevation (NSTEMI) myocardial  infarction; J93.9-Pneumothorax, unspecified     TECHNIQUE:    Frontal view of the chest.     COMPARISON:    7/24/2024     FINDINGS:    LUNGS AND PLEURAL SPACES:  Coarsened interstitial markings and patchy  bilateral airspace disease again noted.  No consolidation.  No  pneumothorax.    HEART:  Unremarkable as visualized.  No cardiomegaly.    MEDIASTINUM:  Unremarkable as visualized.  Normal mediastinal contour.    BONES/JOINTS:  Unremarkable as visualized.  No acute fracture.    TUBES, LINES AND DEVICES:  Right central line with tip in SVC.  The  endotracheal tube (ETT) is in satisfactory position.  Nasogastric tube  tip in the stomach.  Left chest tube remains in position.       Impression:      1.  Coarsened interstitial markings and patchy bilateral airspace  disease again noted.  2.  Left chest tube remains in position.        This report was finalized on 7/26/2024 10:48 AM by Dr. Dominguez Sims MD.               ----------------------------------------------------------------------------------------------------------------------    Pertinent Infectious Disease Results                Assessment/Plan       Assessment     Septic shock with acute hypoxic respiratory failure requiring mechanical ventilation and pressor support  Pneumonia        Plan      The patient remains intubated and sedated 28% FiO2, which is stable.  Sedation and phenylephrine infusing.  Tmax 100.1 °F.  No reports of diarrhea.  Left chest tube in place.  Mechanical lung sounds are clear to auscultation bilaterally.  Abdomen is soft with hypoactive bowel sounds.  Tube feed infusing.  Leukocytosis improving at 17.54.  Chest x-ray reports  coarsened interstitial markings and patchy bilateral airspace disease again noted.  Left chest tube remain in position.    For now continue with cefepime and doxycycline courses for the treatment of pneumonia, we will continue to monitor closely and adjust antibiotic coverage as necessary.  Leukocytosis improving.      ANTIMICROBIAL THERAPY    cefepime 2000 mg IVPB in 100 mL NS (VTB)  doxycycline - 100 MG     Code Status:   Code Status and Medical Interventions: No CPR (Do Not Attempt to Resuscitate); Full Support   Ordered at: 07/25/24 6071     Level Of Support Discussed With:    Next of Kin (If No Surrogate)    Health Care Surrogate     Code Status (Patient has no pulse and is not breathing):    No CPR (Do Not Attempt to Resuscitate)     Medical Interventions (Patient has pulse or is breathing):    Full Support       SG Stoner  07/26/24  11:36 EDT

## 2024-07-26 NOTE — PROGRESS NOTES
Progress Note Critical Care Pulmonary        Subjective  On vent , sedated, no more air leak in Pleur-evac.  Chest reviewed, official report pending.  My read, pneumomediastinum.  Pneumothorax has resolved.      Gas exchange is stable.      Interval History: event overnight: As described above        Review of Systems:    On vent , sedated     Vital Signs  Temp:  [99.3 °F (37.4 °C)-100.1 °F (37.8 °C)] 100.1 °F (37.8 °C)  Heart Rate:  [] 106  Resp:  [28-38] 38  BP: ()/(40-89) 142/76  FiO2 (%):  [28 %] 28 %  Body mass index is 23.79 kg/m².    Intake/Output Summary (Last 24 hours) at 7/26/2024 1038  Last data filed at 7/26/2024 0615  Gross per 24 hour   Intake 2587.51 ml   Output 845 ml   Net 1742.51 ml     No intake/output data recorded.    Physical Exam:  General- normal in appearance, not in any acute distress    HEENT- pupils equally reactive to light, normal in size, no scleral icterus    Neck-supple    Respiratory-bilateral air entry, bibasilar crackles.    No more Air leak in the chest.  Pleur-evac, improving subcutaneous emphysema noted in the neck.  Cardiovascular-  Normal S1 and S2. No S3, S4 or murmurs. No JVD, no carotid bruit and no edema, pulses normal bilaterally     GI-nontender nondistended bowel sounds positive    CNS- grossly nonfocal    Extremities-no clubbing and edema        Results Review:      Results from last 7 days   Lab Units 07/26/24  0441 07/25/24  0006 07/24/24  0022   WBC 10*3/mm3 17.54* 21.27* 22.14*   HEMOGLOBIN g/dL 9.1* 9.6* 9.6*   PLATELETS 10*3/mm3 225 303 287     Results from last 7 days   Lab Units 07/26/24  0441 07/25/24  0006 07/24/24  0022   SODIUM mmol/L 142 144 133*   POTASSIUM mmol/L 4.7 5.1 4.0   CHLORIDE mmol/L 112* 114* 103   CO2 mmol/L 25.6 24.9 25.0   BUN mg/dL 29* 20 17   CREATININE mg/dL 0.31* 0.33* 0.38*   CALCIUM mg/dL 8.0* 8.1* 8.2*   GLUCOSE mg/dL 115* 138* 164*   MAGNESIUM mg/dL 2.5* 2.5* 2.5*     Lab Results   Component Value Date    INR 1.09  07/26/2024    INR 0.93 07/18/2024    INR 0.98 04/26/2024    PROTIME 14.2 07/26/2024    PROTIME 12.6 07/18/2024    PROTIME 13.5 04/26/2024     Results from last 7 days   Lab Units 07/26/24  0441 07/24/24  0022 07/23/24  0058   ALK PHOS U/L 70 89 104   BILIRUBIN mg/dL <0.2 <0.2 0.2   ALT (SGPT) U/L 17 14 15   AST (SGOT) U/L 21 22 26     Results from last 7 days   Lab Units 07/26/24  0428   PH, ARTERIAL pH units 7.380   PO2 ART mm Hg 95.0   PCO2, ARTERIAL mm Hg 47.5*   HCO3 ART mmol/L 28.1*     Imaging Results (Last 24 Hours)       Procedure Component Value Units Date/Time    XR Chest 1 View [[id number]] Resulted: 07/26/24 1005     Updated: 07/26/24 1027                 aspirin, 81 mg, Oral, Daily  Brexpiprazole, 0.5 mg, Oral, Daily  cefepime, 2,000 mg, Intravenous, Q8H  chlorhexidine, 15 mL, Mouth/Throat, Q12H  donepezil, 10 mg, Oral, Q PM  doxycycline, 100 mg, Oral, Q12H  enoxaparin, 40 mg, Subcutaneous, Nightly  hydroxychloroquine, 200 mg, Oral, BID  insulin regular, 2-7 Units, Subcutaneous, Q6H  ipratropium-albuterol, 3 mL, Nebulization, Q4H  levothyroxine, 25 mcg, Oral, Daily With Breakfast  magic barrier cream, , Topical, BID  memantine, 5 mg, Oral, Q12H  methylPREDNISolone sodium succinate, 60 mg, Intravenous, Q12H  [Held by provider] metoprolol succinate XL, 25 mg, Oral, Q24H  mupirocin, 1 application , Each Nare, BID  nystatin, 5 mL, Oral, 4x Daily  nystatin, 1 Application, Topical, Q12H  pantoprazole, 40 mg, Intravenous, Q AM  Phenylephrine HCl-NaCl, , ,   Phenylephrine HCl-NaCl, , ,   sodium chloride, 10 mL, Intravenous, Q12H  sodium chloride, 10 mL, Intravenous, Q12H  sodium chloride, 10 mL, Intravenous, Q12H  sodium chloride, 10 mL, Intravenous, Q12H  sodium chloride, 10 mL, Intravenous, Q12H  sodium chloride, 10 mL, Intravenous, Q12H      dexmedetomidine, 0.2-1.5 mcg/kg/hr, Last Rate: 0.2 mcg/kg/hr (07/26/24 1032)  fentanyl 10 mcg/mL,  mcg/hr, Last Rate: Stopped (07/26/24 0917)  Pharmacy Consult,    phenylephrine, 0.5-3 mcg/kg/min (Dosing Weight), Last Rate: 0.8 mcg/kg/min (07/26/24 1031)  propofol, 5-50 mcg/kg/min (Dosing Weight), Last Rate: Stopped (07/26/24 0917)        Medication Review:     Assessment & Plan   #1: Acute on chronic hypoxic respiratory failure    #2 interstitial lung disease, most likely a flareup of interstitial lung disease.    3.:  Pneumonia.  #4: Septic shock.  On phenylephrine has been titrated down to 1.4 mics.    Medical course complicated by spontaneous pneumothorax.  I would like to adjust the ventilator setting and decrease the tidal volume to 300./Permissive hypercapnia to avoid volume trauma.            5.  Non-STEMI.      #6 spontaneous pneumothorax.  Chest tube in place.  Vent Settings          Resp Rate (Set): 28  Pressure Support (cm H2O): 10 cm H20  FiO2 (%): 28 %  PEEP/CPAP (cm H2O): 4 cm H20    Minute Ventilation (L/min) (Obs): 10.7 L/min  Resp Rate (Observed) Vent: 33     I:E Ratio (Obs): 1:3    PIP Observed (cm H2O): 23 cm H2O    Driving Pressure (cm H2O): 32.6 cm H2O     Gas exchange is improved.    Decrease the tidal volume to 350.  PEEP remains poor.  Oxygen requirement 30 to 35%.    Will hold sedation.  May consider putting her on IV Precedex.  Awakening trial.  Possible weaning trial.  Patient is DNR     Current medication list reviewed.  Latest microbiology reviewed.  Respiratory panel reviewed.  Continue nebs.  Continue oxygen to maintain saturation 88 to 92%.   Cardiology- hemodynamically -unstable.    Titrate Levophed to maintain a MAP of 65 and above.       Nephrology- Cr and BUN stable  I/O-reviewed     GI-continue current diet.  Continue aspiration precautions     Hematology- CBC  Hb  platelet  WBC-latest reviewed     ID  Culture  And Antibiotics     Endocrinology- Maintain Blood sugar 140 -180  Blood sugars reviewed     Electrolytes-   Mag and phos         DVT prophylaxis-       Critical Care time spent in direct patient care: 45 minutes (excluding  procedure time, if applicable) including high complexity decision making to assess, manipulate, and support vital organ system failure in this individual who has impairment of one or more vital organ systems such that there is a high probability of imminent or life threatening deterioration in the patient’s condition.  Patient is critically ill and is at higher risk for further mortality/morbidity. Continue ICU care .      Kurt White MD  07/26/24  10:38 EDT

## 2024-07-26 NOTE — PROGRESS NOTES
I visited with Faith for a while this morning. He is struggling with having sheba that God will heal his mother and accepting the fact that she may not be healed. I listened to his concerns and reminded him that Piyush did not heal everyone he encountered while he was here on earth and that our sheba does not decide whether or not he does, but that he is sovereign. He is coming to terms with things slowly. Will continue to follow and listen.

## 2024-07-26 NOTE — PLAN OF CARE
Problem: Adult Inpatient Plan of Care  Goal: Plan of Care Review  Outcome: Ongoing, Progressing  Flowsheets (Taken 7/26/2024 0642)  Progress: no change  Plan of Care Reviewed With: patient  Goal: Patient-Specific Goal (Individualized)  Outcome: Ongoing, Progressing  Goal: Absence of Hospital-Acquired Illness or Injury  Outcome: Ongoing, Progressing  Intervention: Identify and Manage Fall Risk  Recent Flowsheet Documentation  Taken 7/26/2024 0600 by Palomo Kam RN  Safety Promotion/Fall Prevention:   safety round/check completed   fall prevention program maintained  Taken 7/26/2024 0500 by Palomo Kam RN  Safety Promotion/Fall Prevention:   safety round/check completed   fall prevention program maintained  Taken 7/26/2024 0400 by Palomo Kam RN  Safety Promotion/Fall Prevention:   safety round/check completed   fall prevention program maintained  Taken 7/26/2024 0300 by Palomo Kam RN  Safety Promotion/Fall Prevention:   safety round/check completed   fall prevention program maintained  Taken 7/26/2024 0200 by Palomo Kam RN  Safety Promotion/Fall Prevention:   safety round/check completed   fall prevention program maintained  Taken 7/26/2024 0100 by Palomo Kam RN  Safety Promotion/Fall Prevention:   safety round/check completed   fall prevention program maintained  Taken 7/26/2024 0000 by Palomo Kam RN  Safety Promotion/Fall Prevention:   safety round/check completed   fall prevention program maintained  Taken 7/25/2024 2300 by Palomo Kam, RN  Safety Promotion/Fall Prevention:   safety round/check completed   fall prevention program maintained  Taken 7/25/2024 2200 by Palomo Kam, RN  Safety Promotion/Fall Prevention:   safety round/check completed   fall prevention program maintained  Taken 7/25/2024 2100 by Palomo Kam, RN  Safety Promotion/Fall Prevention:   safety round/check completed   fall prevention program  maintained  Taken 7/25/2024 2000 by Palomo Kam RN  Safety Promotion/Fall Prevention:   safety round/check completed   fall prevention program maintained  Taken 7/25/2024 1900 by Palomo Kam RN  Safety Promotion/Fall Prevention:   safety round/check completed   fall prevention program maintained  Intervention: Prevent Skin Injury  Recent Flowsheet Documentation  Taken 7/26/2024 0600 by Palomo Kam RN  Body Position:   turned   left  Taken 7/26/2024 0400 by Palomo Kam RN  Body Position:   turned   right  Taken 7/26/2024 0200 by Palomo Kam RN  Body Position:   turned   left  Taken 7/26/2024 0000 by Palomo Kam RN  Body Position:   turned   right  Taken 7/25/2024 2200 by Palomo Kam RN  Body Position:   turned   left  Taken 7/25/2024 2000 by Palomo Kam RN  Body Position:   turned   right  Intervention: Prevent and Manage VTE (Venous Thromboembolism) Risk  Recent Flowsheet Documentation  Taken 7/25/2024 2000 by Palomo Kam RN  Activity Management: bedrest  Goal: Optimal Comfort and Wellbeing  Outcome: Ongoing, Progressing  Intervention: Provide Person-Centered Care  Recent Flowsheet Documentation  Taken 7/25/2024 2000 by Palomo Kam RN  Trust Relationship/Rapport:   care explained   reassurance provided   thoughts/feelings acknowledged  Goal: Readiness for Transition of Care  Outcome: Ongoing, Progressing     Problem: COPD (Chronic Obstructive Pulmonary Disease) Comorbidity  Goal: Maintenance of COPD Symptom Control  Outcome: Ongoing, Progressing  Intervention: Maintain COPD-Symptom Control  Recent Flowsheet Documentation  Taken 7/26/2024 0600 by Palomo Kam RN  Medication Review/Management: medications reviewed  Taken 7/26/2024 0500 by Palomo Kam RN  Medication Review/Management: medications reviewed  Taken 7/26/2024 0400 by Palomo Kam RN  Medication Review/Management: medications  reviewed  Taken 7/26/2024 0300 by Palomo Kam, RN  Medication Review/Management: medications reviewed  Taken 7/26/2024 0200 by Palomo Kam, RN  Medication Review/Management: medications reviewed  Taken 7/26/2024 0100 by Palomo Kam RN  Medication Review/Management: medications reviewed  Taken 7/26/2024 0000 by Palomo Kam, RN  Medication Review/Management: medications reviewed  Taken 7/25/2024 2300 by Palomo Kam RN  Medication Review/Management: medications reviewed  Taken 7/25/2024 2200 by Palomo Kam RN  Medication Review/Management: medications reviewed  Taken 7/25/2024 2100 by Palomo Kam RN  Medication Review/Management: medications reviewed  Taken 7/25/2024 2000 by Palomo Kam RN  Medication Review/Management: medications reviewed  Taken 7/25/2024 1900 by Palomo Kam, RN  Medication Review/Management: medications reviewed     Problem: Fall Injury Risk  Goal: Absence of Fall and Fall-Related Injury  Outcome: Ongoing, Progressing  Intervention: Identify and Manage Contributors  Recent Flowsheet Documentation  Taken 7/26/2024 0600 by Palomo Kam, RN  Medication Review/Management: medications reviewed  Taken 7/26/2024 0500 by Palomo Kam, RN  Medication Review/Management: medications reviewed  Taken 7/26/2024 0400 by Palomo Kam, RN  Medication Review/Management: medications reviewed  Taken 7/26/2024 0300 by Palomo Kam, RN  Medication Review/Management: medications reviewed  Taken 7/26/2024 0200 by Palomo Kam, RN  Medication Review/Management: medications reviewed  Taken 7/26/2024 0100 by Palomo Kam, RN  Medication Review/Management: medications reviewed  Taken 7/26/2024 0000 by Palomo Kam, RN  Medication Review/Management: medications reviewed  Taken 7/25/2024 2300 by Palomo Kam, RN  Medication Review/Management: medications reviewed  Taken 7/25/2024  2200 by Palomo Kam, RN  Medication Review/Management: medications reviewed  Taken 7/25/2024 2100 by Palomo Kam, RN  Medication Review/Management: medications reviewed  Taken 7/25/2024 2000 by Palomo Kam, RN  Medication Review/Management: medications reviewed  Taken 7/25/2024 1900 by Palomo Kam, RN  Medication Review/Management: medications reviewed  Intervention: Promote Injury-Free Environment  Recent Flowsheet Documentation  Taken 7/26/2024 0600 by Palomo Kam, RN  Safety Promotion/Fall Prevention:   safety round/check completed   fall prevention program maintained  Taken 7/26/2024 0500 by Palomo Kam, RN  Safety Promotion/Fall Prevention:   safety round/check completed   fall prevention program maintained  Taken 7/26/2024 0400 by Palomo Kam RN  Safety Promotion/Fall Prevention:   safety round/check completed   fall prevention program maintained  Taken 7/26/2024 0300 by Palomo Kam, RN  Safety Promotion/Fall Prevention:   safety round/check completed   fall prevention program maintained  Taken 7/26/2024 0200 by Palomo Kam RN  Safety Promotion/Fall Prevention:   safety round/check completed   fall prevention program maintained  Taken 7/26/2024 0100 by Palomo Kam, RN  Safety Promotion/Fall Prevention:   safety round/check completed   fall prevention program maintained  Taken 7/26/2024 0000 by Palomo Kam, RN  Safety Promotion/Fall Prevention:   safety round/check completed   fall prevention program maintained  Taken 7/25/2024 2300 by Palomo Kam, RN  Safety Promotion/Fall Prevention:   safety round/check completed   fall prevention program maintained  Taken 7/25/2024 2200 by Palomo Kam, RN  Safety Promotion/Fall Prevention:   safety round/check completed   fall prevention program maintained  Taken 7/25/2024 2100 by Palomo Kam, RN  Safety Promotion/Fall Prevention:   safety  round/check completed   fall prevention program maintained  Taken 7/25/2024 2000 by Palomo Kam RN  Safety Promotion/Fall Prevention:   safety round/check completed   fall prevention program maintained  Taken 7/25/2024 1900 by Palomo Kam RN  Safety Promotion/Fall Prevention:   safety round/check completed   fall prevention program maintained     Problem: Adjustment to Illness (Sepsis/Septic Shock)  Goal: Optimal Coping  Outcome: Ongoing, Progressing  Intervention: Optimize Psychosocial Adjustment to Illness  Recent Flowsheet Documentation  Taken 7/25/2024 2000 by Palomo Kam RN  Family/Support System Care: support provided     Problem: Bleeding (Sepsis/Septic Shock)  Goal: Absence of Bleeding  Outcome: Ongoing, Progressing     Problem: Glycemic Control Impaired (Sepsis/Septic Shock)  Goal: Blood Glucose Level Within Desired Range  Outcome: Ongoing, Progressing     Problem: Infection Progression (Sepsis/Septic Shock)  Goal: Absence of Infection Signs and Symptoms  Outcome: Ongoing, Progressing  Intervention: Promote Recovery  Recent Flowsheet Documentation  Taken 7/25/2024 2000 by Palomo Kam RN  Activity Management: bedrest     Problem: Nutrition Impaired (Sepsis/Septic Shock)  Goal: Optimal Nutrition Intake  Outcome: Ongoing, Progressing     Problem: Skin Injury Risk Increased  Goal: Skin Health and Integrity  Outcome: Ongoing, Progressing  Intervention: Optimize Skin Protection  Recent Flowsheet Documentation  Taken 7/26/2024 0600 by Palomo Kam RN  Head of Bed (HOB) Positioning: HOB elevated  Taken 7/26/2024 0400 by Palomo Kam RN  Head of Bed (HOB) Positioning: HOB elevated  Taken 7/26/2024 0200 by Palomo Kam RN  Head of Bed (HOB) Positioning: HOB elevated  Taken 7/26/2024 0000 by Palomo Kam RN  Head of Bed (HOB) Positioning: HOB elevated  Taken 7/25/2024 2200 by Palomo Kam RN  Head of Bed (HOB) Positioning: HOB  elevated  Taken 7/25/2024 2000 by Palomo Kam, RN  Head of Bed (HOB) Positioning: HOB elevated     Problem: Noninvasive Ventilation Acute  Goal: Effective Unassisted Ventilation and Oxygenation  Outcome: Ongoing, Progressing     Problem: Communication Impairment (Mechanical Ventilation, Invasive)  Goal: Effective Communication  Outcome: Ongoing, Progressing     Problem: Device-Related Complication Risk (Mechanical Ventilation, Invasive)  Goal: Optimal Device Function  Outcome: Ongoing, Progressing     Problem: Inability to Wean (Mechanical Ventilation, Invasive)  Goal: Mechanical Ventilation Liberation  Outcome: Ongoing, Progressing  Intervention: Promote Extubation and Mechanical Ventilation Liberation  Recent Flowsheet Documentation  Taken 7/26/2024 0600 by Palomo Kam, RN  Medication Review/Management: medications reviewed  Taken 7/26/2024 0500 by Palomo Kam RN  Medication Review/Management: medications reviewed  Taken 7/26/2024 0400 by Palomo Kam RN  Medication Review/Management: medications reviewed  Taken 7/26/2024 0300 by Palmoo Kam, RN  Medication Review/Management: medications reviewed  Taken 7/26/2024 0200 by Palomo Kam, RN  Medication Review/Management: medications reviewed  Taken 7/26/2024 0100 by Palomo Kam, RN  Medication Review/Management: medications reviewed  Taken 7/26/2024 0000 by Palomo Kam, RN  Medication Review/Management: medications reviewed  Taken 7/25/2024 2300 by Palomo Kam, RN  Medication Review/Management: medications reviewed  Taken 7/25/2024 2200 by Palomo Kam, RN  Medication Review/Management: medications reviewed  Taken 7/25/2024 2100 by Palomo Kam, RN  Medication Review/Management: medications reviewed  Taken 7/25/2024 2000 by Palomo Kam, RN  Medication Review/Management: medications reviewed  Taken 7/25/2024 1900 by Palomo Kam, JESSIKA  Medication  Review/Management: medications reviewed     Problem: Nutrition Impairment (Mechanical Ventilation, Invasive)  Goal: Optimal Nutrition Delivery  Outcome: Ongoing, Progressing     Problem: Skin and Tissue Injury (Mechanical Ventilation, Invasive)  Goal: Absence of Device-Related Skin and Tissue Injury  Outcome: Ongoing, Progressing     Problem: Ventilator-Induced Lung Injury (Mechanical Ventilation, Invasive)  Goal: Absence of Ventilator-Induced Lung Injury  Outcome: Ongoing, Progressing  Intervention: Prevent Ventilator-Associated Pneumonia  Recent Flowsheet Documentation  Taken 7/26/2024 0600 by Palomo Kam RN  Head of Bed (HOB) Positioning: HOB elevated  Taken 7/26/2024 0515 by Palomo Kam RN  Oral Care:   swabbed with antiseptic solution   suction provided  Taken 7/26/2024 0400 by Palomo Kam RN  Head of Bed ([facility]) Positioning: HOB elevated  Oral Care:   suction provided   swabbed with antiseptic solution  Taken 7/26/2024 0200 by Palomo Kam RN  Head of Bed (HOB) Positioning: HOB elevated  Taken 7/26/2024 0000 by Palomo Kam RN  Head of Bed (HOB) Positioning: HOB elevated  Taken 7/25/2024 2200 by Palomo Kam RN  Head of Bed (HOB) Positioning: HOB elevated  Taken 7/25/2024 2000 by Palomo Kam RN  Head of Bed ([facility]) Positioning: HOB elevated  Oral Care:   swabbed with antiseptic solution   suction provided   Goal Outcome Evaluation:  Plan of Care Reviewed With: patient        Progress: no change

## 2024-07-26 NOTE — PROGRESS NOTES
"Palliative Care Daily Progress Note     S: Medical record reviewed, followed up with Primary RN Steph Santillan and Dr Ospina regarding patient's condition. When I saw Lexie today she was sedated, intubated on vent, she was not opening her eyes and appeared calm and in no distress at this time. Her son Faith and I discussed that the swelling in her neck and on her left hand had decreased significantly.      O:   Palliative Performance Scale Score:     BP 95/48   Pulse 86   Temp 97.2 °F (36.2 °C) (Bladder)   Resp (!) 34   Ht 162.6 cm (64.02\")   Wt 62.9 kg (138 lb 10.7 oz)   SpO2 98%   BMI 23.79 kg/m²     Intake/Output Summary (Last 24 hours) at 7/26/2024 1657  Last data filed at 7/26/2024 1626  Gross per 24 hour   Intake 2587.51 ml   Output 1400 ml   Net 1187.51 ml       PE:  General Appearance:    Chronically ill appearing, sedation on intubated on vent, neck is edema has decreased   HEENT:    NC/AT, without obvious abnormality, EOMI, anicteric    Neck:   Supple slight subcutaneous air noted on neck, trachea midline, no JVD   Lungs:     Sedated on vent @ 24% and 4  of peep, left CT in place, coarse lung sounds noted    Heart:    RRR, normal S1 and S2, no M/R/G   Abdomen:     Soft, NT, ND, NABS    Extremities:   Moves all extremities weakly, trace BLL extremity edema, RUE edema/rt hand decreasing   Pulses:   Pulses palpable and equal bilaterally   Skin:   Warm, dry   Neurologic:   Sedation on unable to assess    Psych:   Sedation on, calm         Meds: Reviewed and changes noted    Labs:   Results from last 7 days   Lab Units 07/26/24  0441   WBC 10*3/mm3 17.54*   HEMOGLOBIN g/dL 9.1*   HEMATOCRIT % 29.4*   PLATELETS 10*3/mm3 225     Results from last 7 days   Lab Units 07/26/24  0441   SODIUM mmol/L 142   POTASSIUM mmol/L 4.7   CHLORIDE mmol/L 112*   CO2 mmol/L 25.6   BUN mg/dL 29*   CREATININE mg/dL 0.31*   GLUCOSE mg/dL 115*   CALCIUM mg/dL 8.0*     Results from last 7 days   Lab Units 07/26/24  0441   SODIUM " mmol/L 142   POTASSIUM mmol/L 4.7   CHLORIDE mmol/L 112*   CO2 mmol/L 25.6   BUN mg/dL 29*   CREATININE mg/dL 0.31*   CALCIUM mg/dL 8.0*   BILIRUBIN mg/dL <0.2   ALK PHOS U/L 70   ALT (SGPT) U/L 17   AST (SGOT) U/L 21   GLUCOSE mg/dL 115*     Imaging Results (Last 72 Hours)       Procedure Component Value Units Date/Time    XR Chest 1 View [[id number]] Collected: 07/26/24 1047     Updated: 07/26/24 1050    Narrative:      EXAM:    XR Chest, 1 View     EXAM DATE:    7/26/2024 10:05 AM     CLINICAL HISTORY:    chest tube in place; I21.4-Non-ST elevation (NSTEMI) myocardial  infarction; J93.9-Pneumothorax, unspecified     TECHNIQUE:    Frontal view of the chest.     COMPARISON:    7/24/2024     FINDINGS:    LUNGS AND PLEURAL SPACES:  Coarsened interstitial markings and patchy  bilateral airspace disease again noted.  No consolidation.  No  pneumothorax.    HEART:  Unremarkable as visualized.  No cardiomegaly.    MEDIASTINUM:  Unremarkable as visualized.  Normal mediastinal contour.    BONES/JOINTS:  Unremarkable as visualized.  No acute fracture.    TUBES, LINES AND DEVICES:  Right central line with tip in SVC.  The  endotracheal tube (ETT) is in satisfactory position.  Nasogastric tube  tip in the stomach.  Left chest tube remains in position.       Impression:      1.  Coarsened interstitial markings and patchy bilateral airspace  disease again noted.  2.  Left chest tube remains in position.        This report was finalized on 7/26/2024 10:48 AM by Dr. Dominguez Sims MD.       XR Chest 1 View [[id number]] Collected: 07/24/24 0752     Updated: 07/24/24 0755    Narrative:      EXAM:    XR Chest, 1 View     EXAM DATE:    7/24/2024 6:23 AM     CLINICAL HISTORY:    Follow-up left-sided pneumothorax; I21.4-Non-ST elevation (NSTEMI)  myocardial infarction; J93.9-Pneumothorax, unspecified     TECHNIQUE:    Frontal view of the chest.     COMPARISON:    7/23/2024     FINDINGS:    LUNGS AND PLEURAL SPACES:  Patchy bilateral  airspace disease persists.   No consolidation.  No pneumothorax.    HEART:  Heart size is stable.  No cardiomegaly.    MEDIASTINUM:  Unremarkable as visualized.  Normal mediastinal contour.    BONES/JOINTS:  Unremarkable as visualized.  No acute fracture.    TUBES, LINES AND DEVICES:  The endotracheal tube (ETT) is in  satisfactory position.  Nasogastric tube tip in the stomach.  Left chest  tube remains in position with residual small left thorax measuring up to  about 9 mm and was about 9 mm.  Right internal jugular central venous  catheter tip in the superior vena cava.       Impression:      1.  Patchy bilateral airspace disease persists.  2.  Left chest tube remains in position with residual small left thorax  measuring up to about 9 mm and was about 9 mm.        This report was finalized on 7/24/2024 7:52 AM by Dr. Dominguez Sims MD.       XR Chest 1 View [[id number]] Collected: 07/23/24 1750     Updated: 07/23/24 1753    Narrative:      INDICATION: Chest tube placement.     TECHNIQUE: One view of the chest.     COMPARISON: Radiograph from earlier today.       Impression:      FINDINGS/IMPRESSION: Interval placement of left-sided chest tube.  Decreased volume left pneumothorax. Otherwise no significant change.         This report was finalized on 7/23/2024 5:51 PM by Alex Pallas, DO.       XR Chest 1 View [[id number]] Collected: 07/23/24 1702     Updated: 07/23/24 1707    Narrative:      INDICATION: Chest pain.     TECHNIQUE: Frontal radiograph of the chest.     COMPARISON: Radiograph from yesterday       Impression:      FINDINGS/IMPRESSION:    Heart size is similar. Multifocal infiltrates/edema. Slightly worsened.  Small left apical pneumothorax, new from prior, approximately 10-20%  volume. Subcutaneous emphysema in the neck.  Enteric tube in the stomach. Endotracheal tube tip approximately 2 cm  above the irene. Right-sided central venous catheter tip in the  cavoatrial junction.     Report called to Dima  RN at 203PM PST 7/23/2024.        This report was finalized on 7/23/2024 5:05 PM by Alex Pallas, DO.                 Diagnostics: Reviewed    A: Lexie Valdez is a 64 y.o. yo female  admitted on 7/17/2024 due to shortness of breath. Lexie has a medical history of  anxiety/depression, arthritis, HTN, hypothyroidism, stress urinary incontinence with frequent UTI's, COPD and pulmonary fibrosis on 3L NC at home, and resting tremors in face and extremities, who presents with worsening shortness of breath for the last couple days. Pt also at time of admission c/o chest pressure associated with dyspnea stating this was chronic however had worsened in the days leading up to her admission. She also endorsed diarrhea for the last couple of days as well as dysuria and stated that she has recurring UTI's. Labs in ER revealed Troponin of 229 with repeat 188, BNP was 3550, Na 131, Cr 0.49, glucose 177, CRP 1.65, lactate 2.0, procal and WBC normal but with 88.7% neutrophils. UA showed positive nitrite, trace leuk, 6-10 WBC, 4+ bacteria and 3-6 squamous epithelial cells. She was tachycardic and tachypneic with pH of 7.458, CO2 40, Po2 154, bicarb 28. She was admitted to PCU on admission and in the early am of 7/21 was transferred to CCU due to worsening dyspnea, bilateral infiltrates without effusion concerning for interstitial debbi disease flare, was transferred to CCU. Initially she was placed on Bipap in CCU and at 7/21 6:20 am Lexie was sedated intubated and placed on the ventilator. Palliative care consulted for GOC,ACP and for support of family.      Today 7/26/24  T 99.2, , BP of 135/71 RR 34 O2 sat at 98% and was sedated on the vent and on 28% FiO2, her swelling on her neck and in her left hand had significantly decreased and she was not in distress at this time.    P:  I was able to speak with pts son Faith at bedside this morning to provide support, he stated that he was sorry to have been upset yesterday however I told  him that it was not an issue that I understood that all of this has been overwhelming for him and his family and that we wanted to continue to be here for support for all of them. I discussed Lexie with Dr Ospina. Per Dr White noted pneumothorax has resolved.  Plan is to continue to treat and to have ongoing goals of care conversations with family as necessary.    We will continue to follow along. Please do not hesitate to contact us regarding further sx mgmt or GOC needs, including after hours or on weekends via our on call provider at [phone].     Linh Lou, APRN    7/26/2024

## 2024-07-26 NOTE — PROGRESS NOTES
[facility]  PROGRESS NOTE    Patient information:  Name: Lexie Valdez  Age/Sex: 64 y.o. female  :  1960        PCP: Violet Pop APRN  Attending: Estuardo Charles MD  MRN:  [id number]  Visit Number:  [id number]    LOS: 8  CODE STATUS:    Code Status and Medical Interventions: No CPR (Do Not Attempt to Resuscitate); Full Support   Ordered at: 24 1813     Level Of Support Discussed With:    Next of Kin (If No Surrogate)    Health Care Surrogate     Code Status (Patient has no pulse and is not breathing):    No CPR (Do Not Attempt to Resuscitate)     Medical Interventions (Patient has pulse or is breathing):    Full Support       PROBLEM LIST:Principal Problem:    NSTEMI (non-ST elevated myocardial infarction)      Reason for Cardiology follow-up: NSTEMI     Subjective   ADMISSION INFORMATION:  Chief Complaint   Patient presents with    Shortness of Breath       DATE:2024    HPI:  Lexie Valdez is a 64 y.o. 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     Patient presented to [facility] ([facility] emergency room (ER) on 2024 with complaints of increased shortness of breath x 2 weeks has become progressively worse and exacerbated with minimal activity.      Primary Cardiologist: None found per chart review.    Interval History:   Telemetry reveals SR 70s with PACs. According to patient's I & O flow chart she did not have a negative balance of diuresing overnight. AM labs reveal potassium 4.7, creatinine 0.31, hemoglobin 9.1, platelet count 225. proBNP has improved to 11,410.0 from 14,737.0.    Patient was in room CCU 10 when she was seen and examined.  Patient remains intubated. She is lying in bed resting quietly with her  eyes open and she is blinking but does not seem to be tracking.  Her son is at bedside and reports she has been off sedation for about 1 hour now and he has not been able to get her to focus on him or follow simple commands at this time.     ORDERS:   proBNP today   Bumex 1 mg IV X 1 dose   Nurse Communication: Please call Dr. Acevedo with UOP 3 hours after the Bumex is given.     EVENT TIMELINE:   7/18:ECHO w EF of 36 - 40%. Left ventricular diastolic function is consistent with (grade I) impaired relaxation, mild tricuspid valve regurgitation is present. Estimated right ventricular systolic pressure from tricuspid regurgitation is moderately elevated (45-55 mmHg). Patient seem to have developed a new regional wall motion normalities with decreased LV systolic function as compared to the previous study in March 2023.  Please see full report attached below.  7/18: Bess ST with MPI indicating moderate-sized infarct located in the inferior wall, septal wall and apex with no significant ischemia noted. Impressions are consistent with an intermediate risk study. EF = 43%.  Please see full report attached below.  7/21: Emergently intubated orotracheally with a #7.5 endotracheal tube at approximately 22:16 PM.   7/21: CXRs indicated multifocal and worsening pneumonia coupled with some concern as well for pulmonary edema.   7/23: Chest tube inserted 2/2 left sided pneumothorax.       Objective     Vital Signs  Temp:  [99.2 °F (37.3 °C)-100.1 °F (37.8 °C)] 99.2 °F (37.3 °C)  Heart Rate:  [] 91  Resp:  [28-38] 34  BP: ()/(40-88) 124/65  FiO2 (%):  [28 %] 28 %  Device (Oxygen Therapy): ventilator  Vital Signs (last 72 hrs)         07/23 0700  07/24 0659 07/24 0700 07/25 0659 07/25 0700 07/26 0659 07/26 0700 07/26 0813   Most Recent      Temp (°F) 98.2 -  98.6    97.7 -  98.9    99.1 -  100.1       100.1 (37.8) 07/26 0400    Heart Rate 62 -  102    68 -  91    66 -  105      78     78 07/26 0802    Resp 28 -  40       28    28 -  33      37     37 07/26 0703    BP 88/43 -  132/75    85/51 -  125/67    90/40 -  152/77      104/58     104/58 07/26 0802    SpO2 (%) 85 -  100    96 -  100    93 -  100       98 07/26 0500    Oxygen Concentration (%)   30    28 -  30      28      28     28 07/26 0703          BMI:Body mass index is 23.79 kg/m².    WEIGHT:      07/23/24  0545 07/24/24  0550 07/25/24  0530   Weight: 59.4 kg (130 lb 15.3 oz) 62.3 kg (137 lb 5.6 oz) 62.9 kg (138 lb 10.7 oz)       DIET:NPO Diet NPO Type: Tube Feeding    I&O:  Intake & Output (last 3 days)         07/23 0701 07/24 0700 07/24 0701 07/25 0700 07/25 0701 07/26 0700 07/26 0701 07/27 0700    I.V. (mL/kg) 1369.3 (23.9) 1719.9 (30.1) 1414.5 (24.7)     Other 517 484 484     NG/ 694 689     IV Piggyback 200       Total Intake(mL/kg) 2678.3 (46.8) 2897.9 (50.7) 2587.5 (45.2)     Urine (mL/kg/hr) 950 (0.7) 700 (0.5) 800 (0.6)     Emesis/NG output 0       Stool 0       Chest Tube 45 46 45     Total Output 995 746 845     Net +1683.3 +2151.9 +1742.5             Stool Unmeasured Occurrence 0 x       Emesis Unmeasured Occurrence 0 x                PHYSICAL EXAM:  Constitutional:       Appearance: Not in distress. Acutely ill-appearing.   HENT:         Comments: Orally intubated.  Neck:      Vascular: No JVD. JVD normal.   Pulmonary:      Comments: Chest tube intact to left side and Intubated with mechanical ventilation with improved air entry noted  and Intubated with Mechanical ventilation with good air exchange noted bibasilar.   Cardiovascular:      Normal rate. Regular rhythm.      Murmurs: There is no murmur.   Edema:     Peripheral edema absent.   Abdominal:      General: Bowel sounds are normal. There is no distension.      Palpations: Abdomen is soft.      Comments: FC with BSD in use.    Musculoskeletal:      Cervical back: Neck supple.      Comments: Sedated. SCUDS in use BLE.  Skin:     General: Skin is warm and dry.   Neurological:      Comments:  Intubated. Lying in bed with her eyes open and blinking noted. She is not tracking with her eyes at this time.                    Results review   Results Review:    I have reviewed the patient's new clinical results. 07/26/24 12:16 EDT    Results from last 7 days   Lab Units 07/23/24  0258 07/23/24  0058   HSTROP T ng/L 66* 75*     Lab Results   Component Value Date    PROBNP 11,410.0 (H) 07/26/2024    PROBNP 14,737.0 (H) 07/22/2024    PROBNP 3,550.0 (H) 07/17/2024     Results from last 7 days   Lab Units 07/26/24  0441 07/25/24  0006 07/24/24  0022 07/23/24  0058 07/22/24  0012 07/21/24  0016 07/19/24  2356   WBC 10*3/mm3 17.54* 21.27* 22.14* 10.24 16.58* 20.24* 13.37*   HEMOGLOBIN g/dL 9.1* 9.6* 9.6* 9.5* 9.9* 11.8* 11.5*   PLATELETS 10*3/mm3 225 303 287 235 227 227 214     Results from last 7 days   Lab Units 07/26/24  0441 07/25/24  0006 07/24/24  0022 07/23/24  0058 07/22/24  0012 07/21/24  0016 07/19/24  2356   SODIUM mmol/L 142 144 133* 139 135* 130* 133*   POTASSIUM mmol/L 4.7 5.1 4.0 3.7 4.5 4.7 4.5   CHLORIDE mmol/L 112* 114* 103 106 100 99 102   CO2 mmol/L 25.6 24.9 25.0 24.1 23.9 20.5* 22.2   BUN mg/dL 29* 20 17 20 25* 17 15   CREATININE mg/dL 0.31* 0.33* 0.38* 0.45* 0.47* 0.37* 0.44*   CALCIUM mg/dL 8.0* 8.1* 8.2* 8.2* 8.2* 8.4* 8.8   GLUCOSE mg/dL 115* 138* 164* 208* 191* 122* 114*   ALT (SGPT) U/L 17  --  14 15 10  --   --    AST (SGOT) U/L 21  --  22 26 33*  --   --      Lab Results   Component Value Date    MG 2.5 (H) 07/26/2024    MG 2.5 (H) 07/25/2024    MG 2.5 (H) 07/24/2024     Estimated Creatinine Clearance: 179.7 mL/min (A) (by C-G formula based on SCr of 0.31 mg/dL (L)).    Lab Results   Component Value Date    HGBA1C 4.60 (L) 07/18/2024    HGBA1C 4.80 04/11/2024    HGBA1C 5.00 12/31/2023     Lab Results   Component Value Date    CHOL 136 07/18/2024    TRIG 61 07/18/2024    LDL 56 07/18/2024    HDL 67 (H) 07/18/2024        Lab Results   Component Value Date    INR 1.09 07/26/2024    INR 0.93  07/18/2024    INR 0.98 04/26/2024    INR 1.00 03/21/2023     Lab Results   Component Value Date    LABHEPA 0.27 (L) 07/20/2024    LABHEPA 0.36 07/19/2024    LABHEPA 0.33 07/19/2024    LABHEPA 0.11 (L) 07/18/2024       Lab Results   Component Value Date    TSH 1.140 07/17/2024      Pain Management Panel           No data to display              Microbiology Results (last 10 days)       Procedure Component Value - Date/Time    Aspergillus Galactomannan Antigen - Blood, Arm, Right [[id number]] Collected: 07/22/24 1410    Lab Status: Final result Specimen: Blood from Arm, Right Updated: 07/26/24 0254     Aspergillus Ag, BAL/Serum 0.04 Index     Narrative:      Performed at:  [facility] - [facility]  [address] [place], [place]  [id number]  : Lorraine Ruelas MD, Phone:  [phone]  Performed at:  02 - [facility]  [address] [place], [place]  [id number]  : Haroon Walls [facility], Phone:  [phone]    Mycoplasma Pneumoniae Antibody, IgM - Blood, Arm, Left [[id number]]  (Normal) Collected: 07/22/24 0012    Lab Status: Final result Specimen: Blood from Arm, Left Updated: 07/22/24 0202     Mycoplasma pneumo IgM Negative    Legionella Antigen, Urine - Urine, Urine, Clean Catch [[id number]]  (Normal) Collected: 07/21/24 2242    Lab Status: Final result Specimen: Urine, Clean Catch Updated: 07/21/24 2308     LEGIONELLA ANTIGEN, URINE Negative    Narrative:      Presumptive negative for L. pneumophilia serogroup 1 antigen, suggesting no recent or current infection.    Respiratory Culture - Sputum, ET Suction [[id number]] Collected: 07/21/24 2231    Lab Status: Final result Specimen: Sputum from ET Suction Updated: 07/24/24 1119     Respiratory Culture No growth     Gram Stain Rare (1+) Mixed ammon      No WBCs seen      No Epithelial cells seen    MRSA Screen, PCR (Inpatient) - Swab, Nares [[id number]]  (Normal) Collected: 07/21/24 1252    Lab Status: Final result Specimen: Swab from Nares Updated: 07/21/24  1424     MRSA PCR No MRSA Detected    Narrative:      The negative predictive value of this diagnostic test is high and should only be used to consider de-escalating anti-MRSA therapy. A positive result may indicate colonization with MRSA and must be correlated clinically.    Respiratory Panel PCR w/COVID-19(SARS-CoV-2) RHONDA/HEATHER/TASNEEM/PAD/COR/CHRISTELLE In-House, NP Swab in UTM/VTM, 2 HR TAT - Swab, Nasopharynx [[id number]]  (Normal) Collected: 07/21/24 1247    Lab Status: Final result Specimen: Swab from Nasopharynx Updated: 07/21/24 1357     ADENOVIRUS, PCR Not Detected     Coronavirus 229E Not Detected     Coronavirus HKU1 Not Detected     Coronavirus NL63 Not Detected     Coronavirus OC43 Not Detected     COVID19 Not Detected     Human Metapneumovirus Not Detected     Human Rhinovirus/Enterovirus Not Detected     Influenza A PCR Not Detected     Influenza B PCR Not Detected     Parainfluenza Virus 1 Not Detected     Parainfluenza Virus 2 Not Detected     Parainfluenza Virus 3 Not Detected     Parainfluenza Virus 4 Not Detected     RSV, PCR Not Detected     Bordetella pertussis pcr Not Detected     Bordetella parapertussis PCR Not Detected     Chlamydophila pneumoniae PCR Not Detected     Mycoplasma pneumo by PCR Not Detected    Narrative:      In the setting of a positive respiratory panel with a viral infection PLUS a negative procalcitonin without other underlying concern for bacterial infection, consider observing off antibiotics or discontinuation of antibiotics and continue supportive care. If the respiratory panel is positive for atypical bacterial infection (Bordetella pertussis, Chlamydophila pneumoniae, or Mycoplasma pneumoniae), consider antibiotic de-escalation to target atypical bacterial infection.    Respiratory Culture - Sputum, ET Suction [[id number]] Collected: 07/21/24 1152    Lab Status: Final result Specimen: Sputum from ET Suction Updated: 07/23/24 1053     Respiratory Culture Rare growth Normal  respiratory ammon. No S. aureus or Pseudomonas aeruginosa detected. Final report.     Gram Stain Moderate (3+) WBCs seen      Rare (1+) Epithelial cells seen      No organisms seen    Blood Culture - Blood, Arm, Left [[id number]]  (Normal) Collected: 07/21/24 0450    Lab Status: Final result Specimen: Blood from Arm, Left Updated: 07/26/24 0515     Blood Culture No growth at 5 days    Blood Culture - Blood, Arm, Right [[id number]]  (Normal) Collected: 07/21/24 0448    Lab Status: Final result Specimen: Blood from Arm, Right Updated: 07/26/24 0515     Blood Culture No growth at 5 days    Gastrointestinal Panel, PCR - Stool, Per Rectum [[id number]]  (Normal) Collected: 07/19/24 0410    Lab Status: Final result Specimen: Stool from Per Rectum Updated: 07/19/24 0603     Campylobacter Not Detected     Plesiomonas shigelloides Not Detected     Salmonella Not Detected     Vibrio Not Detected     Vibrio cholerae Not Detected     Yersinia enterocolitica Not Detected     Enteroaggregative E. coli (EAEC) Not Detected     Enteropathogenic E. coli (EPEC) Not Detected     Enterotoxigenic E. coli (ETEC) lt/st Not Detected     Shiga-like toxin-producing E. coli (STEC) stx1/stx2 Not Detected     Shigella/Enteroinvasive E. coli (EIEC) Not Detected     Cryptosporidium Not Detected     Cyclospora cayetanensis Not Detected     Entamoeba histolytica Not Detected     Giardia lamblia Not Detected     Adenovirus F40/41 Not Detected     Astrovirus Not Detected     Norovirus GI/GII Not Detected     Rotavirus A Not Detected     Sapovirus (I, II, IV or V) Not Detected    Clostridioides difficile Toxin - Stool, Per Rectum [[id number]] Collected: 07/19/24 0410    Lab Status: Final result Specimen: Stool from Per Rectum Updated: 07/19/24 0527    Narrative:      The following orders were created for panel order Clostridioides difficile Toxin - Stool, Per Rectum.  Procedure                               Abnormality         Status                      ---------                               -----------         ------                     Clostridioides difficile...[[id number]]                      Final result                 Please view results for these tests on the individual orders.    Clostridioides difficile Toxin, PCR - Stool, Per Rectum [[id number]] Collected: 07/19/24 0410    Lab Status: Final result Specimen: Stool from Per Rectum Updated: 07/19/24 0527     Toxigenic C. difficile by PCR Negative     027 Toxin Presumptive Negative    Narrative:      The result indicates the absence of toxigenic C. difficile from stool specimen.     Urine Culture - Urine, Urine, Catheter [[id number]]  (Abnormal)  (Susceptibility) Collected: 07/17/24 2145    Lab Status: Final result Specimen: Urine, Catheter Updated: 07/20/24 0959     Urine Culture >100,000 CFU/mL Escherichia coli    Narrative:      Colonization of the urinary tract without infection is common. Treatment is discouraged unless the patient is symptomatic, pregnant, or undergoing an invasive urologic procedure.    Susceptibility        Escherichia coli      MILES      Amoxicillin + Clavulanate Susceptible      Ampicillin Susceptible      Ampicillin + Sulbactam Susceptible      Cefazolin Susceptible      Cefepime Susceptible      Ceftazidime Susceptible      Ceftriaxone Susceptible      Gentamicin Susceptible      Levofloxacin Susceptible      Nitrofurantoin Susceptible      Piperacillin + Tazobactam Susceptible      Trimethoprim + Sulfamethoxazole Susceptible                           Respiratory Panel PCR w/COVID-19(SARS-CoV-2) RHONDA/HEATHER/TASNEEM/PAD/COR/CHRISTELLE In-House, NP Swab in UTM/VTM, 2 HR TAT - Swab, Nasopharynx [[id number]]  (Normal) Collected: 07/17/24 2127    Lab Status: Final result Specimen: Swab from Nasopharynx Updated: 07/17/24 2323     ADENOVIRUS, PCR Not Detected     Coronavirus 229E Not Detected     Coronavirus HKU1 Not Detected     Coronavirus NL63 Not Detected     Coronavirus OC43 Not Detected      COVID19 Not Detected     Human Metapneumovirus Not Detected     Human Rhinovirus/Enterovirus Not Detected     Influenza A PCR Not Detected     Influenza B PCR Not Detected     Parainfluenza Virus 1 Not Detected     Parainfluenza Virus 2 Not Detected     Parainfluenza Virus 3 Not Detected     Parainfluenza Virus 4 Not Detected     RSV, PCR Not Detected     Bordetella pertussis pcr Not Detected     Bordetella parapertussis PCR Not Detected     Chlamydophila pneumoniae PCR Not Detected     Mycoplasma pneumo by PCR Not Detected    Narrative:      In the setting of a positive respiratory panel with a viral infection PLUS a negative procalcitonin without other underlying concern for bacterial infection, consider observing off antibiotics or discontinuation of antibiotics and continue supportive care. If the respiratory panel is positive for atypical bacterial infection (Bordetella pertussis, Chlamydophila pneumoniae, or Mycoplasma pneumoniae), consider antibiotic de-escalation to target atypical bacterial infection.    COVID PRE-OP / PRE-PROCEDURE SCREENING ORDER (NO ISOLATION) - Swab, Nasopharynx [[id number]]  (Normal) Collected: 07/17/24 2119    Lab Status: Final result Specimen: Swab from Nasopharynx Updated: 07/17/24 2149    Narrative:      The following orders were created for panel order COVID PRE-OP / PRE-PROCEDURE SCREENING ORDER (NO ISOLATION) - Swab, Nasopharynx.  Procedure                               Abnormality         Status                     ---------                               -----------         ------                     COVID-19 and FLU A/B PCR...[[id number]]  Normal              Final result                 Please view results for these tests on the individual orders.    COVID-19 and FLU A/B PCR, 1 HR TAT - Swab, Nasopharynx [[id number]]  (Normal) Collected: 07/17/24 2119    Lab Status: Final result Specimen: Swab from Nasopharynx Updated: 07/17/24 2149     COVID19 Not Detected     Influenza A PCR  Not Detected     Influenza B PCR Not Detected    Narrative:      Fact sheet for providers: https://www.fda.gov/media/771731/download    Fact sheet for patients: https://www.fda.gov/media/574176/download    Test performed by PCR.    Blood Culture - Blood, Arm, Right [[id number]]  (Normal) Collected: 07/17/24 2119    Lab Status: Final result Specimen: Blood from Arm, Right Updated: 07/22/24 2131     Blood Culture No growth at 5 days    Blood Culture - Blood, Arm, Left [[id number]]  (Normal) Collected: 07/17/24 2119    Lab Status: Final result Specimen: Blood from Arm, Left Updated: 07/22/24 2131     Blood Culture No growth at 5 days           Imaging Results (Last 24 Hours)       Procedure Component Value Units Date/Time    XR Chest 1 View [[id number]] Collected: 07/26/24 1047     Updated: 07/26/24 1050    Narrative:      EXAM:    XR Chest, 1 View     EXAM DATE:    7/26/2024 10:05 AM     CLINICAL HISTORY:    chest tube in place; I21.4-Non-ST elevation (NSTEMI) myocardial  infarction; J93.9-Pneumothorax, unspecified     TECHNIQUE:    Frontal view of the chest.     COMPARISON:    7/24/2024     FINDINGS:    LUNGS AND PLEURAL SPACES:  Coarsened interstitial markings and patchy  bilateral airspace disease again noted.  No consolidation.  No  pneumothorax.    HEART:  Unremarkable as visualized.  No cardiomegaly.    MEDIASTINUM:  Unremarkable as visualized.  Normal mediastinal contour.    BONES/JOINTS:  Unremarkable as visualized.  No acute fracture.    TUBES, LINES AND DEVICES:  Right central line with tip in SVC.  The  endotracheal tube (ETT) is in satisfactory position.  Nasogastric tube  tip in the stomach.  Left chest tube remains in position.       Impression:      1.  Coarsened interstitial markings and patchy bilateral airspace  disease again noted.  2.  Left chest tube remains in position.        This report was finalized on 7/26/2024 10:48 AM by Dr. Dominguez Sims MD.             ECHO:  Results for orders placed during  the hospital encounter of 07/17/24    Adult Transthoracic Echo Complete w/ Color, Spectral and Contrast if necessary per protocol    Interpretation Summary    Normal left ventricular cavity size and wall thickness noted.    The following left ventricular wall segments are hypokinetic: mid anterior, basal anterolateral, apical lateral, apical septal, mid anteroseptal and basal anterior. The following left ventricular wall segments are akinetic: apex.    Left ventricular systolic function is moderately decreased. Left ventricular ejection fraction appears to be 36 - 40%.    Left ventricular diastolic function is consistent with (grade I) impaired relaxation.    The aortic valve is structurally normal with no regurgitation or stenosis present.    The mitral valve is structurally normal with no significant stenosis present. Mild mitral valve regurgitation is present.    Mild tricuspid valve regurgitation is present. Estimated right ventricular systolic pressure from tricuspid regurgitation is moderately elevated (45-55 mmHg).    There is no evidence of pericardial effusion. .    Comments: Patient seem to have developed a new regional wall motion normalities with decreased LV systolic function as compared to the previous study in March 2023.      STRESS TEST:  Results for orders placed during the hospital encounter of 07/17/24    Stress Test With Myocardial Perfusion One Day    Interpretation Summary  Images from the original result were not included.      A pharmacological stress test was performed using regadenoson without low-level exercise.    Findings consistent with an indeterminate ECG stress test.    Myocardial perfusion imaging indicates a moderate-sized infarct located in the inferior wall, septal wall and apex with no significant ischemia noted.    Abnormal LV wall motion consistent with apical dyskinesis.    Left ventricular ejection fraction is moderately reduced (Calculated EF = 43%).    Impressions are  consistent with an intermediate risk study.       HEART CATH:  No results found for this or any previous visit.      TELEMETRY:     SR 70s with PACs        I reviewed the patient's new clinical results.    ALLERGIES: Codeine and Sulfa antibiotics    Medication Review:   Current list of medications may not reflect those currently placed in orders that are not signed or are being held.     aspirin, 81 mg, Oral, Daily  Brexpiprazole, 0.5 mg, Oral, Daily  cefepime, 2,000 mg, Intravenous, Q8H  chlorhexidine, 15 mL, Mouth/Throat, Q12H  donepezil, 10 mg, Oral, Q PM  doxycycline, 100 mg, Oral, Q12H  enoxaparin, 40 mg, Subcutaneous, Nightly  hydroxychloroquine, 200 mg, Oral, BID  insulin regular, 2-7 Units, Subcutaneous, Q6H  ipratropium-albuterol, 3 mL, Nebulization, Q4H  levothyroxine, 25 mcg, Oral, Daily With Breakfast  magic barrier cream, , Topical, BID  memantine, 5 mg, Oral, Q12H  methylPREDNISolone sodium succinate, 60 mg, Intravenous, Q12H  [Held by provider] metoprolol succinate XL, 25 mg, Oral, Q24H  mupirocin, 1 application , Each Nare, BID  nystatin, 5 mL, Oral, 4x Daily  nystatin, 1 Application, Topical, Q12H  pantoprazole, 40 mg, Intravenous, Q AM  Phenylephrine HCl-NaCl, , ,   Phenylephrine HCl-NaCl, , ,   sodium chloride, 10 mL, Intravenous, Q12H  sodium chloride, 10 mL, Intravenous, Q12H  sodium chloride, 10 mL, Intravenous, Q12H  sodium chloride, 10 mL, Intravenous, Q12H  sodium chloride, 10 mL, Intravenous, Q12H  sodium chloride, 10 mL, Intravenous, Q12H      dexmedetomidine, 0.2-1.5 mcg/kg/hr, Last Rate: 0.4 mcg/kg/hr (07/26/24 1050)  fentanyl 10 mcg/mL,  mcg/hr, Last Rate: Stopped (07/26/24 0917)  Pharmacy Consult,   phenylephrine, 0.5-3 mcg/kg/min (Dosing Weight), Last Rate: 0.5 mcg/kg/min (07/26/24 1132)  propofol, 5-50 mcg/kg/min (Dosing Weight), Last Rate: Stopped (07/26/24 0917)        senna-docusate sodium **AND** polyethylene glycol **AND** bisacodyl **AND** bisacodyl    busPIRone     Calcium Replacement - Follow Nurse / BPA Driven Protocol    dextrose    dextrose    glucagon (human recombinant)    guaifenesin    HYDROcodone-acetaminophen    hydrOXYzine    ipratropium    Magnesium Cardiology Dose Replacement - Follow Nurse / BPA Driven Protocol    nitroglycerin    Pharmacy Consult    Phenylephrine HCl-NaCl    Phenylephrine HCl-NaCl    Phosphorus Replacement - Follow Nurse / BPA Driven Protocol    Potassium Replacement - Follow Nurse / BPA Driven Protocol    prochlorperazine    sodium chloride    sodium chloride    sodium chloride    sodium chloride    sodium chloride    sodium chloride    sodium chloride    sodium chloride    sodium chloride    sodium chloride    sodium chloride    vecuronium    Assessment      Acute non-ST elevation myocardial infarction, most likely type II due to acute respiratory failure with hypoxia.  Acute on chronic respiratory failure requiring endotracheal intubation and mechanical ventilation.  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  Ischemic cardiomyopathy with LV ejection fraction of 36 to 40% on recent echo Doppler study.  Advanced COPD/pulmonary fibrosis, on home oxygen.  Acute on chronic HFrEF.  Left-sided spontaneous pneumothorax requiring chest tube placement.  Left lung seems to be reexpanding.  Shock, multifactorial.    Recommendations / Plan     Will obtain proBNP and consider IV diuretic therapy as needed.  Try to keep the potassium levels between 4 and 5 and magnesium levels between 2 and 2.2  Will try to optimize GDMT for her cardiomyopathy once her blood pressure remains stable.    I have discussed the patients findings and recommendations with patient's son at bedside.    Thank you very much for asking us to be involved in this patient's care.  We will follow along with you.    Electronically signed by SG Chandler, 07/26/24, 12:21 PM EDT.     Electronically signed by Ramo Acevedo MD,  07/26/24, 1:10 PM EDT.          Please note that portions of this note were completed with a voice recognition program.    Please note that portions of this note were copied and has been reviewed and is accurate as of 7/26/2024 .

## 2024-07-26 NOTE — PLAN OF CARE
Problem: Communication Impairment (Mechanical Ventilation, Invasive)  Goal: Effective Communication  Outcome: Ongoing, Progressing     Problem: Device-Related Complication Risk (Mechanical Ventilation, Invasive)  Goal: Optimal Device Function  Outcome: Ongoing, Progressing  Intervention: Optimize Device Care and Function  Recent Flowsheet Documentation  Taken 7/25/2024 1818 by Gissel Coker RRT  Airway Safety Measures:   mask valve resuscitator at bedside   suction at bedside     Problem: Inability to Wean (Mechanical Ventilation, Invasive)  Goal: Mechanical Ventilation Liberation  Outcome: Ongoing, Progressing     Problem: Skin and Tissue Injury (Mechanical Ventilation, Invasive)  Goal: Absence of Device-Related Skin and Tissue Injury  Outcome: Ongoing, Progressing     Problem: Ventilator-Induced Lung Injury (Mechanical Ventilation, Invasive)  Goal: Absence of Ventilator-Induced Lung Injury  Outcome: Ongoing, Progressing   Goal Outcome Evaluation:

## 2024-07-27 NOTE — PROGRESS NOTES
[facility]  PROGRESS NOTE    Patient information:  Name: Lexie Valdez  Age/Sex: 64 y.o. female  :  1960        PCP: Violet Pop APRN  Attending: Estuardo Charles MD  MRN:  [id number]  Visit Number:  [id number]    LOS: 9  CODE STATUS:    Code Status and Medical Interventions: No CPR (Do Not Attempt to Resuscitate); Full Support   Ordered at: 24 1813     Level Of Support Discussed With:    Next of Kin (If No Surrogate)    Health Care Surrogate     Code Status (Patient has no pulse and is not breathing):    No CPR (Do Not Attempt to Resuscitate)     Medical Interventions (Patient has pulse or is breathing):    Full Support       PROBLEM LIST:Principal Problem:    NSTEMI (non-ST elevated myocardial infarction)      Reason for Cardiology follow-up: NSTEMI    Subjective   ADMISSION INFORMATION:  Chief Complaint   Patient presents with    Shortness of Breath       DATE:2024    HPI:  Lexie Valdez is a 64 y.o. 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     Patient presented to [facility] ([facility] [place]) emergency room (ER) on 2024 with complaints of increased shortness of breath x 2 weeks has become progressively worse and exacerbated with minimal activity.     Interval History:   Patient was in room CCU 10 when she was seen and examined.  Patient remains intubated. Resting comfortably in bed with eyes fixed.  BP this a.m. 125/67, heart rate 57.  A.m. labs with potassium at 5.2, creatinine 0.29, hemoglobin at 10.0 (stable). Family at bedside. History and ROS limited       Objective     Vital Signs  Temp:  [97.2 °F (36.2 °C)-99.9 °F (37.7 °C)] 98.2 °F (36.8 °C)  Heart Rate:  [] 76  Resp:  [28-39] 39  BP: ()/(44-88)  96/63  FiO2 (%):  [28 %] 28 %  Device (Oxygen Therapy): ventilator  Vital Signs (last 72 hrs)         07/24 0700 07/25 0659 07/25 0700 07/26 0659 07/26 0700 07/27 0659 07/27 0700 07/27 0836   Most Recent      Temp (°F) 97.7 -  98.9    99.1 -  100.1    97.2 -  99.9       98.2 (36.8) 07/27 0400    Heart Rate 68 -  91    66 -  105    55 -  108    71 -  76     76 07/27 0715    Resp   28    28 -  33    28 -  38      39     39 07/27 0702    BP 85/51 -  125/67    90/40 -  152/77    81/47 -  153/88    96/63 -  103/57     96/63 07/27 0715    SpO2 (%) 96 -  100    93 -  100    94 -  100    98 -  99     98 07/27 0715    Oxygen Concentration (%) 28 - 30      28      28      28     28 07/27 0702          BMI:Body mass index is 23.79 kg/m².    WEIGHT:      07/23/24  0545 07/24/24  0550 07/25/24  0530   Weight: 59.4 kg (130 lb 15.3 oz) 62.3 kg (137 lb 5.6 oz) 62.9 kg (138 lb 10.7 oz)       DIET:NPO Diet NPO Type: Tube Feeding    I&O:  Intake & Output (last 3 days)         07/24 0701 07/25 0700 07/25 0701 07/26 0700 07/26 0701 07/27 0700 07/27 0701 07/28 0700    I.V. (mL/kg) 1719.9 (30.1) 1414.5 (24.7) 698.3 (12.2)     Other 484 484 444     NG/ 689 671     IV Piggyback   100     Total Intake(mL/kg) 2897.9 (50.7) 2587.5 (45.2) 1913.3 (33.4)     Urine (mL/kg/hr) 700 (0.5) 800 (0.6) 1140 (0.8)     Emesis/NG output        Stool        Chest Tube 46 45 25     Total Output      Net +2151.9 +1742.5 +748.3                      PHYSICAL EXAM:  Vitals reviewed.   Constitutional:       Interventions: Intubated.      Comments: Sedated and intubated    HENT:      Head: Normocephalic.   Neck:      Thyroid: No thyromegaly.      Vascular: No carotid bruit or JVD.   Pulmonary:      Effort: Pulmonary effort is normal. Intubated.      Comments: Chest tube present  Cardiovascular:      Normal rate. Regular rhythm.   Edema:     Peripheral edema absent.   Abdominal:      General: Bowel sounds are normal.      Palpations:  Abdomen is soft.   Musculoskeletal: Normal range of motion.      Cervical back: Neck supple. Skin:     General: Skin is warm and dry.                        Results review   Results Review:    I have reviewed the patient's new clinical results. 07/27/24 08:36 EDT    Results from last 7 days   Lab Units 07/23/24  0258 07/23/24  0058   HSTROP T ng/L 66* 75*     Lab Results   Component Value Date    PROBNP 11,410.0 (H) 07/26/2024    PROBNP 14,737.0 (H) 07/22/2024    PROBNP 3,550.0 (H) 07/17/2024     Results from last 7 days   Lab Units 07/26/24  0441 07/25/24  0006 07/24/24  0022 07/23/24  0058 07/22/24  0012 07/21/24  0016   WBC 10*3/mm3 17.54* 21.27* 22.14* 10.24 16.58* 20.24*   HEMOGLOBIN g/dL 9.1* 9.6* 9.6* 9.5* 9.9* 11.8*   PLATELETS 10*3/mm3 225 303 287 235 227 227     Results from last 7 days   Lab Units 07/26/24  0441 07/25/24  0006 07/24/24  0022 07/23/24  0058 07/22/24  0012 07/21/24  0016   SODIUM mmol/L 142 144 133* 139 135* 130*   POTASSIUM mmol/L 4.7 5.1 4.0 3.7 4.5 4.7   CHLORIDE mmol/L 112* 114* 103 106 100 99   CO2 mmol/L 25.6 24.9 25.0 24.1 23.9 20.5*   BUN mg/dL 29* 20 17 20 25* 17   CREATININE mg/dL 0.31* 0.33* 0.38* 0.45* 0.47* 0.37*   CALCIUM mg/dL 8.0* 8.1* 8.2* 8.2* 8.2* 8.4*   GLUCOSE mg/dL 115* 138* 164* 208* 191* 122*   ALT (SGPT) U/L 17  --  14 15 10  --    AST (SGOT) U/L 21  --  22 26 33*  --      Lab Results   Component Value Date    MG 2.5 (H) 07/26/2024    MG 2.5 (H) 07/25/2024    MG 2.5 (H) 07/24/2024     Estimated Creatinine Clearance: 179.7 mL/min (A) (by C-G formula based on SCr of 0.31 mg/dL (L)).    Lab Results   Component Value Date    HGBA1C 4.60 (L) 07/18/2024    HGBA1C 4.80 04/11/2024    HGBA1C 5.00 12/31/2023     Lab Results   Component Value Date    CHOL 136 07/18/2024    TRIG 61 07/18/2024    LDL 56 07/18/2024    HDL 67 (H) 07/18/2024        Lab Results   Component Value Date    INR 1.09 07/26/2024    INR 0.93 07/18/2024    INR 0.98 04/26/2024    INR 1.00 03/21/2023     Lab  Results   Component Value Date    LABHEPA 0.27 (L) 07/20/2024    LABHEPA 0.36 07/19/2024    LABHEPA 0.33 07/19/2024    LABHEPA 0.11 (L) 07/18/2024       Lab Results   Component Value Date    TSH 1.140 07/17/2024      Pain Management Panel           No data to display              Microbiology Results (last 10 days)       Procedure Component Value - Date/Time    Aspergillus Galactomannan Antigen - Blood, Arm, Right [[id number]] Collected: 07/22/24 1410    Lab Status: Final result Specimen: Blood from Arm, Right Updated: 07/26/24 0254     Aspergillus Ag, BAL/Serum 0.04 Index     Narrative:      Performed at:  01 - [facility]  [address], [place]  [id number]  : Lorraine Ruelas MD, Phone:  [phone]  Performed at:  02 - [facility]  [address], [place]  [id number]  : Haroon Walls [facility], Phone:  [phone]    Mycoplasma Pneumoniae Antibody, IgM - Blood, Arm, Left [[id number]]  (Normal) Collected: 07/22/24 0012    Lab Status: Final result Specimen: Blood from Arm, Left Updated: 07/22/24 0202     Mycoplasma pneumo IgM Negative    Legionella Antigen, Urine - Urine, Urine, Clean Catch [[id number]]  (Normal) Collected: 07/21/24 2242    Lab Status: Final result Specimen: Urine, Clean Catch Updated: 07/21/24 2308     LEGIONELLA ANTIGEN, URINE Negative    Narrative:      Presumptive negative for L. pneumophilia serogroup 1 antigen, suggesting no recent or current infection.    Respiratory Culture - Sputum, ET Suction [[id number]] Collected: 07/21/24 2231    Lab Status: Final result Specimen: Sputum from ET Suction Updated: 07/24/24 1119     Respiratory Culture No growth     Gram Stain Rare (1+) Mixed ammon      No WBCs seen      No Epithelial cells seen    MRSA Screen, PCR (Inpatient) - Swab, Nares [[id number]]  (Normal) Collected: 07/21/24 1252    Lab Status: Final result Specimen: Swab from Nares Updated: 07/21/24 1424     MRSA PCR No MRSA Detected    Narrative:      The  negative predictive value of this diagnostic test is high and should only be used to consider de-escalating anti-MRSA therapy. A positive result may indicate colonization with MRSA and must be correlated clinically.    Respiratory Panel PCR w/COVID-19(SARS-CoV-2) RHONDA/HEATHER/TASNEEM/PAD/COR/CHRISTELLE In-House, NP Swab in UTM/VTM, 2 HR TAT - Swab, Nasopharynx [[id number]]  (Normal) Collected: 07/21/24 1247    Lab Status: Final result Specimen: Swab from Nasopharynx Updated: 07/21/24 1357     ADENOVIRUS, PCR Not Detected     Coronavirus 229E Not Detected     Coronavirus HKU1 Not Detected     Coronavirus NL63 Not Detected     Coronavirus OC43 Not Detected     COVID19 Not Detected     Human Metapneumovirus Not Detected     Human Rhinovirus/Enterovirus Not Detected     Influenza A PCR Not Detected     Influenza B PCR Not Detected     Parainfluenza Virus 1 Not Detected     Parainfluenza Virus 2 Not Detected     Parainfluenza Virus 3 Not Detected     Parainfluenza Virus 4 Not Detected     RSV, PCR Not Detected     Bordetella pertussis pcr Not Detected     Bordetella parapertussis PCR Not Detected     Chlamydophila pneumoniae PCR Not Detected     Mycoplasma pneumo by PCR Not Detected    Narrative:      In the setting of a positive respiratory panel with a viral infection PLUS a negative procalcitonin without other underlying concern for bacterial infection, consider observing off antibiotics or discontinuation of antibiotics and continue supportive care. If the respiratory panel is positive for atypical bacterial infection (Bordetella pertussis, Chlamydophila pneumoniae, or Mycoplasma pneumoniae), consider antibiotic de-escalation to target atypical bacterial infection.    Respiratory Culture - Sputum, ET Suction [[id number]] Collected: 07/21/24 1152    Lab Status: Final result Specimen: Sputum from ET Suction Updated: 07/23/24 1053     Respiratory Culture Rare growth Normal respiratory ammon. No S. aureus or Pseudomonas aeruginosa  detected. Final report.     Gram Stain Moderate (3+) WBCs seen      Rare (1+) Epithelial cells seen      No organisms seen    Blood Culture - Blood, Arm, Left [[id number]]  (Normal) Collected: 07/21/24 0450    Lab Status: Final result Specimen: Blood from Arm, Left Updated: 07/26/24 0515     Blood Culture No growth at 5 days    Blood Culture - Blood, Arm, Right [[id number]]  (Normal) Collected: 07/21/24 0448    Lab Status: Final result Specimen: Blood from Arm, Right Updated: 07/26/24 0515     Blood Culture No growth at 5 days    Gastrointestinal Panel, PCR - Stool, Per Rectum [[id number]]  (Normal) Collected: 07/19/24 0410    Lab Status: Final result Specimen: Stool from Per Rectum Updated: 07/19/24 0603     Campylobacter Not Detected     Plesiomonas shigelloides Not Detected     Salmonella Not Detected     Vibrio Not Detected     Vibrio cholerae Not Detected     Yersinia enterocolitica Not Detected     Enteroaggregative E. coli (EAEC) Not Detected     Enteropathogenic E. coli (EPEC) Not Detected     Enterotoxigenic E. coli (ETEC) lt/st Not Detected     Shiga-like toxin-producing E. coli (STEC) stx1/stx2 Not Detected     Shigella/Enteroinvasive E. coli (EIEC) Not Detected     Cryptosporidium Not Detected     Cyclospora cayetanensis Not Detected     Entamoeba histolytica Not Detected     Giardia lamblia Not Detected     Adenovirus F40/41 Not Detected     Astrovirus Not Detected     Norovirus GI/GII Not Detected     Rotavirus A Not Detected     Sapovirus (I, II, IV or V) Not Detected    Clostridioides difficile Toxin - Stool, Per Rectum [[id number]] Collected: 07/19/24 0410    Lab Status: Final result Specimen: Stool from Per Rectum Updated: 07/19/24 0527    Narrative:      The following orders were created for panel order Clostridioides difficile Toxin - Stool, Per Rectum.  Procedure                               Abnormality         Status                     ---------                               -----------          ------                     Clostridioides difficile...[[id number]]                      Final result                 Please view results for these tests on the individual orders.    Clostridioides difficile Toxin, PCR - Stool, Per Rectum [[id number]] Collected: 07/19/24 0410    Lab Status: Final result Specimen: Stool from Per Rectum Updated: 07/19/24 0527     Toxigenic C. difficile by PCR Negative     027 Toxin Presumptive Negative    Narrative:      The result indicates the absence of toxigenic C. difficile from stool specimen.     Urine Culture - Urine, Urine, Catheter [[id number]]  (Abnormal)  (Susceptibility) Collected: 07/17/24 2145    Lab Status: Final result Specimen: Urine, Catheter Updated: 07/20/24 0959     Urine Culture >100,000 CFU/mL Escherichia coli    Narrative:      Colonization of the urinary tract without infection is common. Treatment is discouraged unless the patient is symptomatic, pregnant, or undergoing an invasive urologic procedure.    Susceptibility        Escherichia coli      MILES      Amoxicillin + Clavulanate Susceptible      Ampicillin Susceptible      Ampicillin + Sulbactam Susceptible      Cefazolin Susceptible      Cefepime Susceptible      Ceftazidime Susceptible      Ceftriaxone Susceptible      Gentamicin Susceptible      Levofloxacin Susceptible      Nitrofurantoin Susceptible      Piperacillin + Tazobactam Susceptible      Trimethoprim + Sulfamethoxazole Susceptible                           Respiratory Panel PCR w/COVID-19(SARS-CoV-2) RHONDA/HEATHER/TASNEEM/PAD/COR/CHRISTELLE In-House, NP Swab in UTM/VTM, 2 HR TAT - Swab, Nasopharynx [[id number]]  (Normal) Collected: 07/17/24 2127    Lab Status: Final result Specimen: Swab from Nasopharynx Updated: 07/17/24 4897     ADENOVIRUS, PCR Not Detected     Coronavirus 229E Not Detected     Coronavirus HKU1 Not Detected     Coronavirus NL63 Not Detected     Coronavirus OC43 Not Detected     COVID19 Not Detected     Human Metapneumovirus Not Detected      Human Rhinovirus/Enterovirus Not Detected     Influenza A PCR Not Detected     Influenza B PCR Not Detected     Parainfluenza Virus 1 Not Detected     Parainfluenza Virus 2 Not Detected     Parainfluenza Virus 3 Not Detected     Parainfluenza Virus 4 Not Detected     RSV, PCR Not Detected     Bordetella pertussis pcr Not Detected     Bordetella parapertussis PCR Not Detected     Chlamydophila pneumoniae PCR Not Detected     Mycoplasma pneumo by PCR Not Detected    Narrative:      In the setting of a positive respiratory panel with a viral infection PLUS a negative procalcitonin without other underlying concern for bacterial infection, consider observing off antibiotics or discontinuation of antibiotics and continue supportive care. If the respiratory panel is positive for atypical bacterial infection (Bordetella pertussis, Chlamydophila pneumoniae, or Mycoplasma pneumoniae), consider antibiotic de-escalation to target atypical bacterial infection.    COVID PRE-OP / PRE-PROCEDURE SCREENING ORDER (NO ISOLATION) - Swab, Nasopharynx [[id number]]  (Normal) Collected: 07/17/24 2119    Lab Status: Final result Specimen: Swab from Nasopharynx Updated: 07/17/24 2149    Narrative:      The following orders were created for panel order COVID PRE-OP / PRE-PROCEDURE SCREENING ORDER (NO ISOLATION) - Swab, Nasopharynx.  Procedure                               Abnormality         Status                     ---------                               -----------         ------                     COVID-19 and FLU A/B PCR...[[id number]]  Normal              Final result                 Please view results for these tests on the individual orders.    COVID-19 and FLU A/B PCR, 1 HR TAT - Swab, Nasopharynx [[id number]]  (Normal) Collected: 07/17/24 2119    Lab Status: Final result Specimen: Swab from Nasopharynx Updated: 07/17/24 2149     COVID19 Not Detected     Influenza A PCR Not Detected     Influenza B PCR Not Detected    Narrative:       Fact sheet for providers: https://www.fda.gov/media/440864/download    Fact sheet for patients: https://www.fda.gov/media/075041/download    Test performed by PCR.    Blood Culture - Blood, Arm, Right [[id number]]  (Normal) Collected: 07/17/24 2119    Lab Status: Final result Specimen: Blood from Arm, Right Updated: 07/22/24 2131     Blood Culture No growth at 5 days    Blood Culture - Blood, Arm, Left [[id number]]  (Normal) Collected: 07/17/24 2119    Lab Status: Final result Specimen: Blood from Arm, Left Updated: 07/22/24 2131     Blood Culture No growth at 5 days           Imaging Results (Last 24 Hours)       Procedure Component Value Units Date/Time    XR Chest 1 View [[id number]] Collected: 07/26/24 1047     Updated: 07/26/24 1050    Narrative:      EXAM:    XR Chest, 1 View     EXAM DATE:    7/26/2024 10:05 AM     CLINICAL HISTORY:    chest tube in place; I21.4-Non-ST elevation (NSTEMI) myocardial  infarction; J93.9-Pneumothorax, unspecified     TECHNIQUE:    Frontal view of the chest.     COMPARISON:    7/24/2024     FINDINGS:    LUNGS AND PLEURAL SPACES:  Coarsened interstitial markings and patchy  bilateral airspace disease again noted.  No consolidation.  No  pneumothorax.    HEART:  Unremarkable as visualized.  No cardiomegaly.    MEDIASTINUM:  Unremarkable as visualized.  Normal mediastinal contour.    BONES/JOINTS:  Unremarkable as visualized.  No acute fracture.    TUBES, LINES AND DEVICES:  Right central line with tip in SVC.  The  endotracheal tube (ETT) is in satisfactory position.  Nasogastric tube  tip in the stomach.  Left chest tube remains in position.       Impression:      1.  Coarsened interstitial markings and patchy bilateral airspace  disease again noted.  2.  Left chest tube remains in position.        This report was finalized on 7/26/2024 10:48 AM by Dr. Dominguez Sims MD.             ECHO:  Results for orders placed during the hospital encounter of 07/17/24    Adult Transthoracic Echo  Complete w/ Color, Spectral and Contrast if necessary per protocol    Interpretation Summary    Normal left ventricular cavity size and wall thickness noted.    The following left ventricular wall segments are hypokinetic: mid anterior, basal anterolateral, apical lateral, apical septal, mid anteroseptal and basal anterior. The following left ventricular wall segments are akinetic: apex.    Left ventricular systolic function is moderately decreased. Left ventricular ejection fraction appears to be 36 - 40%.    Left ventricular diastolic function is consistent with (grade I) impaired relaxation.    The aortic valve is structurally normal with no regurgitation or stenosis present.    The mitral valve is structurally normal with no significant stenosis present. Mild mitral valve regurgitation is present.    Mild tricuspid valve regurgitation is present. Estimated right ventricular systolic pressure from tricuspid regurgitation is moderately elevated (45-55 mmHg).    There is no evidence of pericardial effusion. .    Comments: Patient seem to have developed a new regional wall motion normalities with decreased LV systolic function as compared to the previous study in March 2023.      STRESS TEST:  Results for orders placed during the hospital encounter of 07/17/24    Stress Test With Myocardial Perfusion One Day    Interpretation Summary  Images from the original result were not included.      A pharmacological stress test was performed using regadenoson without low-level exercise.    Findings consistent with an indeterminate ECG stress test.    Myocardial perfusion imaging indicates a moderate-sized infarct located in the inferior wall, septal wall and apex with no significant ischemia noted.    Abnormal LV wall motion consistent with apical dyskinesis.    Left ventricular ejection fraction is moderately reduced (Calculated EF = 43%).    Impressions are consistent with an intermediate risk study.       HEART CATH:  No  results found for this or any previous visit.        TELEMETRY:            I reviewed the patient's new clinical results.    ALLERGIES: Codeine and Sulfa antibiotics    Medication Review:   Current list of medications may not reflect those currently placed in orders that are not signed or are being held.     aspirin, 81 mg, Oral, Daily  Brexpiprazole, 0.5 mg, Oral, Daily  cefepime, 2,000 mg, Intravenous, Q8H  chlorhexidine, 15 mL, Mouth/Throat, Q12H  donepezil, 10 mg, Oral, Q PM  doxycycline, 100 mg, Oral, Q12H  enoxaparin, 40 mg, Subcutaneous, Nightly  hydroxychloroquine, 200 mg, Oral, BID  insulin regular, 2-7 Units, Subcutaneous, Q6H  ipratropium-albuterol, 3 mL, Nebulization, Q6H - RT  levothyroxine, 25 mcg, Oral, Daily With Breakfast  magic barrier cream, , Topical, BID  memantine, 5 mg, Oral, Q12H  methylPREDNISolone sodium succinate, 60 mg, Intravenous, Q12H  [Held by provider] metoprolol succinate XL, 25 mg, Oral, Q24H  nystatin, 5 mL, Oral, 4x Daily  nystatin, 1 Application, Topical, Q12H  pantoprazole, 40 mg, Intravenous, Q AM  Phenylephrine HCl-NaCl, , ,   Phenylephrine HCl-NaCl, , ,   sodium chloride, 10 mL, Intravenous, Q12H  sodium chloride, 10 mL, Intravenous, Q12H  sodium chloride, 10 mL, Intravenous, Q12H  sodium chloride, 10 mL, Intravenous, Q12H  sodium chloride, 10 mL, Intravenous, Q12H  sodium chloride, 10 mL, Intravenous, Q12H      dexmedetomidine, 0.2-1.5 mcg/kg/hr, Last Rate: 1.5 mcg/kg/hr (07/27/24 0219)  fentanyl 10 mcg/mL,  mcg/hr, Last Rate: Stopped (07/26/24 0917)  Pharmacy Consult,   phenylephrine, 0.5-3 mcg/kg/min (Dosing Weight), Last Rate: Stopped (07/27/24 0553)  propofol, 5-50 mcg/kg/min (Dosing Weight), Last Rate: Stopped (07/27/24 0824)        senna-docusate sodium **AND** polyethylene glycol **AND** bisacodyl **AND** bisacodyl    busPIRone    Calcium Replacement - Follow Nurse / BPA Driven Protocol    dextrose    dextrose    glucagon (human recombinant)    guaifenesin     HYDROcodone-acetaminophen    hydrOXYzine    ipratropium    Magnesium Cardiology Dose Replacement - Follow Nurse / BPA Driven Protocol    nitroglycerin    Pharmacy Consult    Phenylephrine HCl-NaCl    Phenylephrine HCl-NaCl    Phosphorus Replacement - Follow Nurse / BPA Driven Protocol    Potassium Replacement - Follow Nurse / BPA Driven Protocol    prochlorperazine    sodium chloride    sodium chloride    sodium chloride    sodium chloride    sodium chloride    sodium chloride    sodium chloride    sodium chloride    sodium chloride    sodium chloride    sodium chloride    vecuronium    Assessment    Acute NSTEMI, likely type II due to acute respiratory failure with hypoxia  Acute on chronic respiratory failure requiring endotracheal tracheal intubation and mechanical ventilation  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     Thank you very much for asking us to be involved in this patient's care.  We will follow along with you.          Electronically signed by SG Nobles, 07/27/24, 2:56 PM EDT.                Please note that portions of this note were completed with a voice recognition program.    Please note that portions of this note were copied and has been reviewed and is accurate as of 7/27/2024 .

## 2024-07-27 NOTE — PLAN OF CARE
Goal Outcome Evaluation:              Outcome Evaluation: Pt remains intubated/sedated. SAT done today. Pt lasted several hours. Precedex gtt started. Propofol restarted. UOP adequate after Bumex. Yair turned off. Remains in NSR. VSS.

## 2024-07-27 NOTE — PROGRESS NOTES
Progress Note Critical Care Pulmonary        Subjective  On vent , sedated, no more air leak in Pleur-evac.  X-ray chest showed resolution of pneumothorax.  Chronic changes noted.        Gas exchange is stable.  Oxygen requirement down to 28% on present ventilator settings      Interval History: event overnight: As described above        Review of Systems:    On vent , sedated     Vital Signs  Temp:  [97.2 °F (36.2 °C)-99.9 °F (37.7 °C)] 98.2 °F (36.8 °C)  Heart Rate:  [] 66  Resp:  [28-39] 37  BP: ()/(44-88) 126/64  FiO2 (%):  [28 %] 28 %  Body mass index is 23.79 kg/m².    Intake/Output Summary (Last 24 hours) at 7/27/2024 0942  Last data filed at 7/27/2024 0547  Gross per 24 hour   Intake 1913.29 ml   Output 1165 ml   Net 748.29 ml     No intake/output data recorded.    Physical Exam:  General- normal in appearance, not in any acute distress    HEENT- pupils equally reactive to light, normal in size, no scleral icterus    Neck-supple    Respiratory-bilateral air entry, bibasilar crackles.    No more Air leak in the chest tube/ Pleur-evac, improving subcutaneous emphysema noted in the neck.  Cardiovascular-  Normal S1 and S2. No S3, S4 or murmurs. No JVD, no carotid bruit and no edema, pulses normal bilaterally     GI-nontender nondistended bowel sounds positive    CNS- grossly nonfocal    Extremities-no clubbing and edema        Results Review:      Results from last 7 days   Lab Units 07/26/24  0441 07/25/24  0006 07/24/24  0022   WBC 10*3/mm3 17.54* 21.27* 22.14*   HEMOGLOBIN g/dL 9.1* 9.6* 9.6*   PLATELETS 10*3/mm3 225 303 287     Results from last 7 days   Lab Units 07/26/24  0441 07/25/24  0006 07/24/24  0022   SODIUM mmol/L 142 144 133*   POTASSIUM mmol/L 4.7 5.1 4.0   CHLORIDE mmol/L 112* 114* 103   CO2 mmol/L 25.6 24.9 25.0   BUN mg/dL 29* 20 17   CREATININE mg/dL 0.31* 0.33* 0.38*   CALCIUM mg/dL 8.0* 8.1* 8.2*   GLUCOSE mg/dL 115* 138* 164*   MAGNESIUM mg/dL 2.5* 2.5* 2.5*     Lab Results    Component Value Date    INR 1.09 07/26/2024    INR 0.93 07/18/2024    INR 0.98 04/26/2024    PROTIME 14.2 07/26/2024    PROTIME 12.6 07/18/2024    PROTIME 13.5 04/26/2024     Results from last 7 days   Lab Units 07/26/24  0441 07/24/24  0022 07/23/24  0058   ALK PHOS U/L 70 89 104   BILIRUBIN mg/dL <0.2 <0.2 0.2   ALT (SGPT) U/L 17 14 15   AST (SGOT) U/L 21 22 26     Results from last 7 days   Lab Units 07/26/24  0428   PH, ARTERIAL pH units 7.380   PO2 ART mm Hg 95.0   PCO2, ARTERIAL mm Hg 47.5*   HCO3 ART mmol/L 28.1*     Imaging Results (Last 24 Hours)       Procedure Component Value Units Date/Time    XR Chest 1 View [[id number]] Collected: 07/26/24 1047     Updated: 07/26/24 1050    Narrative:      EXAM:    XR Chest, 1 View     EXAM DATE:    7/26/2024 10:05 AM     CLINICAL HISTORY:    chest tube in place; I21.4-Non-ST elevation (NSTEMI) myocardial  infarction; J93.9-Pneumothorax, unspecified     TECHNIQUE:    Frontal view of the chest.     COMPARISON:    7/24/2024     FINDINGS:    LUNGS AND PLEURAL SPACES:  Coarsened interstitial markings and patchy  bilateral airspace disease again noted.  No consolidation.  No  pneumothorax.    HEART:  Unremarkable as visualized.  No cardiomegaly.    MEDIASTINUM:  Unremarkable as visualized.  Normal mediastinal contour.    BONES/JOINTS:  Unremarkable as visualized.  No acute fracture.    TUBES, LINES AND DEVICES:  Right central line with tip in SVC.  The  endotracheal tube (ETT) is in satisfactory position.  Nasogastric tube  tip in the stomach.  Left chest tube remains in position.       Impression:      1.  Coarsened interstitial markings and patchy bilateral airspace  disease again noted.  2.  Left chest tube remains in position.        This report was finalized on 7/26/2024 10:48 AM by Dr. Dominguez Sims MD.                    aspirin, 81 mg, Oral, Daily  Brexpiprazole, 0.5 mg, Oral, Daily  cefepime, 2,000 mg, Intravenous, Q8H  chlorhexidine, 15 mL, Mouth/Throat,  Q12H  donepezil, 10 mg, Oral, Q PM  doxycycline, 100 mg, Oral, Q12H  enoxaparin, 40 mg, Subcutaneous, Nightly  hydroxychloroquine, 200 mg, Oral, BID  insulin regular, 2-7 Units, Subcutaneous, Q6H  ipratropium-albuterol, 3 mL, Nebulization, Q6H - RT  levothyroxine, 25 mcg, Oral, Daily With Breakfast  magic barrier cream, , Topical, BID  memantine, 5 mg, Oral, Q12H  methylPREDNISolone sodium succinate, 60 mg, Intravenous, Q12H  [Held by provider] metoprolol succinate XL, 25 mg, Oral, Q24H  nystatin, 5 mL, Oral, 4x Daily  nystatin, 1 Application, Topical, Q12H  pantoprazole, 40 mg, Intravenous, Q AM  Phenylephrine HCl-NaCl, , ,   Phenylephrine HCl-NaCl, , ,   sodium chloride, 10 mL, Intravenous, Q12H  sodium chloride, 10 mL, Intravenous, Q12H  sodium chloride, 10 mL, Intravenous, Q12H  sodium chloride, 10 mL, Intravenous, Q12H  sodium chloride, 10 mL, Intravenous, Q12H  sodium chloride, 10 mL, Intravenous, Q12H      dexmedetomidine, 0.2-1.5 mcg/kg/hr, Last Rate: 1.5 mcg/kg/hr (07/27/24 0219)  fentanyl 10 mcg/mL,  mcg/hr, Last Rate: Stopped (07/26/24 0917)  Pharmacy Consult,   phenylephrine, 0.5-3 mcg/kg/min (Dosing Weight), Last Rate: Stopped (07/27/24 0553)  propofol, 5-50 mcg/kg/min (Dosing Weight), Last Rate: Stopped (07/27/24 0824)        Medication Review:     Assessment & Plan   #1: Acute on chronic hypoxic respiratory failure    #2 interstitial lung disease, most likely a flareup of interstitial lung disease.    3.:  Pneumonia.  #4: Septic shock.  Off pressors.          5.  Non-STEMI.      #6 spontaneous pneumothorax.  Chest tube in place.  Vent Settings          Resp Rate (Set): 28  Pressure Support (cm H2O): 10 cm H20  FiO2 (%): 28 %  PEEP/CPAP (cm H2O): 4 cm H20    Minute Ventilation (L/min) (Obs): 11.9 L/min  Resp Rate (Observed) Vent: 35     I:E Ratio (Obs): 1:2.2    PIP Observed (cm H2O): 28 cm H2O    Driving Pressure (cm H2O): 32.6 cm H2O     Gas exchange is improved.      Will hold sedation.   Awakening trial.  Possible weaning trial.  Patient is DNR.  Seems like patient may ultimately require tracheostomy unless patient's family decided for comfort measure     Current medication list reviewed.  Latest microbiology reviewed.  Respiratory panel reviewed.  Continue nebs.  Continue oxygen to maintain saturation 88 to 92%.   Cardiology- hemodynamically -stable  Off pressor.    Nephrology- Cr and BUN stable  I/O-reviewed     GI-continue current diet.  Continue aspiration precautions     Hematology- CBC  Hb  platelet  WBC-latest reviewed     ID  Culture  And Antibiotics     Endocrinology- Maintain Blood sugar 140 -180  Blood sugars reviewed     Electrolytes-   Mag and phos         DVT prophylaxis-       Critical Care time spent in direct patient care: 45 minutes (excluding procedure time, if applicable) including high complexity decision making to assess, manipulate, and support vital organ system failure in this individual who has impairment of one or more vital organ systems such that there is a high probability of imminent or life threatening deterioration in the patient’s condition.  Patient is critically ill and is at higher risk for further mortality/morbidity. Continue ICU care .      Kurt White MD  07/27/24  09:42 EDT   monthly or less

## 2024-07-27 NOTE — PLAN OF CARE
Goal Outcome Evaluation:               No weaning trials attempted today

## 2024-07-27 NOTE — PROGRESS NOTES
[facility] HOSPITALIST PROGRESS NOTE     Patient Identification:  Name:  Lexie KEITH Valdez  Age:  64 y.o.  Sex:  female  :  1960  MRN:  [id number]  Visit Number:  [id number]  ROOM: [address]     Primary Care Provider:  Violet Pop APRN     Date of Admission: 2024    Length of stay in inpatient status:  8    Subjective     Chief Compliant:    Chief Complaint   Patient presents with    Shortness of Breath     History of Presenting Illness: The patient remains intubated and on light sedation; she has been transitioned from propofol to Precedex.  Due to the patient's inability to communicate, I was unable to obtain a history from her.  Please note that no family members were at bedside during my evaluation.  Bedside nurse Steph Santillan and unit clejosse Lee were at bedside during my evaluation.    Objective     Current Hospital Meds:  aspirin, 81 mg, Oral, Daily  Brexpiprazole, 0.5 mg, Oral, Daily  cefepime, 2,000 mg, Intravenous, Q8H  chlorhexidine, 15 mL, Mouth/Throat, Q12H  donepezil, 10 mg, Oral, Q PM  doxycycline, 100 mg, Oral, Q12H  enoxaparin, 40 mg, Subcutaneous, Nightly  hydroxychloroquine, 200 mg, Oral, BID  insulin regular, 2-7 Units, Subcutaneous, Q6H  ipratropium-albuterol, 3 mL, Nebulization, Q6H - RT  levothyroxine, 25 mcg, Oral, Daily With Breakfast  magic barrier cream, , Topical, BID  memantine, 5 mg, Oral, Q12H  methylPREDNISolone sodium succinate, 60 mg, Intravenous, Q12H  [Held by provider] metoprolol succinate XL, 25 mg, Oral, Q24H  nystatin, 5 mL, Oral, 4x Daily  nystatin, 1 Application, Topical, Q12H  pantoprazole, 40 mg, Intravenous, Q AM  Phenylephrine HCl-NaCl, , ,   Phenylephrine HCl-NaCl, , ,   sodium chloride, 10 mL, Intravenous, Q12H  sodium chloride, 10 mL, Intravenous, Q12H  sodium chloride, 10 mL, Intravenous, Q12H  sodium chloride, 10 mL, Intravenous, Q12H  sodium chloride, 10 mL, Intravenous, Q12H  sodium chloride, 10 mL, Intravenous, Q12H    dexmedetomidine,  0.2-1.5 mcg/kg/hr, Last Rate: 1.5 mcg/kg/hr (07/26/24 1845)  fentanyl 10 mcg/mL,  mcg/hr, Last Rate: Stopped (07/26/24 0917)  Pharmacy Consult,   phenylephrine, 0.5-3 mcg/kg/min (Dosing Weight), Last Rate: Stopped (07/26/24 1248)  propofol, 5-50 mcg/kg/min (Dosing Weight), Last Rate: 20 mcg/kg/min (07/26/24 2052)      Current Antimicrobial Therapy:  Anti-Infectives (From admission, onward)      Ordered     Dose/Rate Route Frequency Start Stop    07/21/24 2206  doxycycline (MONODOX) capsule 100 mg        Ordering Provider: Jessica Ospina MD    100 mg Oral Every 12 Hours Scheduled 07/21/24 2300 07/30/24 1641    07/21/24 0424  cefepime 2000 mg IVPB in 100 mL NS (VTB)        Ordering Provider: Estuardo Charles MD    2,000 mg  over 4 Hours Intravenous Every 8 Hours 07/21/24 1400 07/28/24 1359    07/21/24 0856  vancomycin 1250 mg/250 mL 0.9% NS IVPB ([facility])        Ordering Provider: Eduardo Freeman MD    1,250 mg  over 75 Minutes Intravenous Once 07/21/24 1000 07/21/24 1114    07/21/24 0424  cefepime 2000 mg IVPB in 100 mL NS (VTB)        Ordering Provider: Estuardo Charles MD    2,000 mg  over 30 Minutes Intravenous Once 07/21/24 0600 07/21/24 0558    07/19/24 1540  hydroxychloroquine (PLAQUENIL) tablet 200 mg        Ordering Provider: Estuardo Charles MD    200 mg Oral 2 Times Daily 07/19/24 2100      07/17/24 2218  cefTRIAXone (ROCEPHIN) 2,000 mg in sodium chloride 0.9 % 100 mL IVPB-VTB        Ordering Provider: Al Lewis MD    2,000 mg  200 mL/hr over 30 Minutes Intravenous Once 07/17/24 2234 07/18/24 0035          Current Diuretic Therapy:  Diuretics (From admission, onward)      Ordered     Dose/Rate Route Frequency Start Stop    07/26/24 1220  bumetanide (BUMEX) injection 1 mg        Ordering Provider: Yamel Lorenzo APRN    1 mg Intravenous Once 07/26/24 1245 07/26/24 1320    07/24/24 1151  furosemide (LASIX) injection 20 mg        Ordering Provider: Rinku Braxton MD     20 mg Intravenous Once 07/24/24 1245 07/24/24 1244    07/21/24 0637  furosemide (LASIX) injection 40 mg        Ordering Provider: Estuardo Charles MD    40 mg Intravenous Once 07/21/24 0730 07/21/24 0914    07/20/24 2323  furosemide (LASIX) injection 40 mg        Ordering Provider: Estuardo Charles MD    40 mg Intravenous Once 07/21/24 0015 07/21/24 0008          ----------------------------------------------------------------------------------------------------------------------  Vital Signs:  Temp:  [97.2 °F (36.2 °C)-100.1 °F (37.8 °C)] 97.2 °F (36.2 °C)  Heart Rate:  [] 66  Resp:  [28-38] 28  BP: ()/(40-88) 99/56  FiO2 (%):  [28 %] 28 %  SpO2:  [94 %-100 %] 100 %  on   ;   Device (Oxygen Therapy): ventilator  Body mass index is 23.79 kg/m².    Wt Readings from Last 3 Encounters:   07/25/24 62.9 kg (138 lb 10.7 oz)   04/26/24 67.7 kg (149 lb 4 oz)   04/19/24 67.1 kg (148 lb)     Intake & Output (last 3 days)         07/24 0701 07/25 0700 07/25 0701 07/26 0700 07/26 0701 07/27 0700    I.V. (mL/kg) 1719.9 (30.1) 1414.5 (24.7) 343.8 (6)    Other 484 484 222    NG/ 689 325    IV Piggyback   100    Total Intake(mL/kg) 2897.9 (50.7) 2587.5 (45.2) 990.8 (17.3)    Urine (mL/kg/hr) 700 (0.5) 800 (0.6) 615 (0.7)    Emesis/NG output       Stool       Chest Tube 46 45 25    Total Output 746 845 640    Net +2151.9 +1742.5 +350.8                 NPO Diet NPO Type: Tube Feeding  ----------------------------------------------------------------------------------------------------------------------  Physical Exam   The patient remains intubated and lightly sedated.  She appears to be in the same amount of acute distress as she was yesterday.  She has good air movement on the right lung and fair air movement on the left lung.  The left sided chest tube still in place and I do not see an air leak.  She does not have edema.  Her bowel sounds are active and her abdomen is soft and  nondistended.  ----------------------------------------------------------------------------------------------------------------------  Tele: Normal sinus rhythm with heart rates in the 70s; there were also numerous PACs.  I personally reviewed the telemetry strips.  ----------------------------------------------------------------------------------------------------------------------  LABS:    Pending test results:  None    CBC and coagulation:  Results from last 7 days   Lab Units 07/26/24  0441 07/25/24  0006 07/24/24  0022 07/23/24  0058 07/22/24  0012 07/21/24  1309 07/21/24  0130 07/21/24  0016 07/19/24  2356   PROCALCITONIN ng/mL  --   --   --   --  0.24 0.21 0.07  --   --    LACTATE mmol/L  --   --  1.5 1.5  --   --  1.1  --   --    CRP mg/dL  --   --  3.89* 10.32*  --   --   --  6.50*  --    WBC 10*3/mm3 17.54* 21.27* 22.14* 10.24 16.58*  --   --  20.24* 13.37*   HEMOGLOBIN g/dL 9.1* 9.6* 9.6* 9.5* 9.9*  --   --  11.8* 11.5*   HEMATOCRIT % 29.4* 30.8* 30.0* 29.6* 30.7*  --   --  36.3 36.2   MCV fL 101.7* 101.7* 98.4* 97.4* 95.6  --   --  96.3 99.7*   MCHC g/dL 31.0* 31.2* 32.0 32.1 32.2  --   --  32.5 31.8   PLATELETS 10*3/mm3 225 303 287 235 227  --   --  227 214   INR  1.09  --   --   --   --   --   --   --   --      Acid/base balance:  Results from last 7 days   Lab Units 07/26/24  0428 07/21/24  2106 07/21/24  0811 07/20/24  2343 07/20/24  1255   PH, ARTERIAL pH units 7.380 7.315* 7.367 7.403 7.410   PCO2, ARTERIAL mm Hg 47.5* 48.2* 46.6* 40.3 37.9   PO2 ART mm Hg 95.0 137.0* 101.0 107.0 49.7*   HCO3 ART mmol/L 28.1* 24.5 26.8* 25.1 24.0     Renal and electrolytes:  Results from last 7 days   Lab Units 07/26/24  1501 07/26/24  0441 07/25/24  0949 07/25/24  0006 07/24/24  0022 07/23/24  0058 07/22/24  0012 07/21/24  0016 07/19/24  2356   SODIUM mmol/L  --  142  --  144 133* 139 135* 130* 133*   POTASSIUM mmol/L  --  4.7  --  5.1 4.0 3.7 4.5 4.7 4.5   MAGNESIUM mg/dL  --  2.5*  --  2.5* 2.5* 2.4  --   --    --    CHLORIDE mmol/L  --  112*  --  114* 103 106 100 99 102   CO2 mmol/L  --  25.6  --  24.9 25.0 24.1 23.9 20.5* 22.2   BUN mg/dL  --  29*  --  20 17 20 25* 17 15   CREATININE mg/dL  --  0.31*  --  0.33* 0.38* 0.45* 0.47* 0.37* 0.44*   CALCIUM mg/dL  --  8.0*  --  8.1* 8.2* 8.2* 8.2* 8.4* 8.8   IONIZED CALCIUM mmol/L  --   --   --  1.12  --   --   --   --   --    PHOSPHORUS mg/dL 2.7 2.2* 2.5 2.0* 1.4* 1.2*  --   --   --    GLUCOSE mg/dL  --  115*  --  138* 164* 208* 191* 122* 114*   ANION GAP mmol/L  --  4.4*  --  5.1 5.0 8.9 11.1 10.5 8.8     Estimated Creatinine Clearance: 179.7 mL/min (A) (by C-G formula based on SCr of 0.31 mg/dL (L)).    Liver and pancreatic function:  Results from last 7 days   Lab Units 07/26/24  0441 07/24/24  0022 07/23/24  0058 07/22/24  0012   ALBUMIN g/dL 3.1* 3.5 3.6 4.1   BILIRUBIN mg/dL <0.2 <0.2 0.2 0.4   ALK PHOS U/L 70 89 104 142*   AST (SGOT) U/L 21 22 26 33*   ALT (SGPT) U/L 17 14 15 10         Endocrine function:  Lab Results   Component Value Date    HGBA1C 4.60 (L) 07/18/2024     Point of care bedside glucose levels:  Results from last 7 days   Lab Units 07/26/24  1748 07/26/24  1246 07/26/24  0559 07/26/24  0024 07/25/24  1658 07/25/24  1120 07/25/24  0513 07/25/24  0020 07/24/24  1657 07/24/24  1057 07/24/24  0529 07/23/24  2355 07/23/24  1819 07/23/24  1222   GLUCOSE mg/dL 172* 166* 117 145* 109 167* 111 135* 155* 166* 152* 166* 133* 165*     Glucose levels from the CMP:  Results from last 7 days   Lab Units 07/26/24  0441 07/25/24  0006 07/24/24  0022 07/23/24  0058 07/22/24  0012 07/21/24  0016 07/19/24  2356   GLUCOSE mg/dL 115* 138* 164* 208* 191* 122* 114*     Lab Results   Component Value Date    TSH 1.140 07/17/2024    FREET4 1.83 (H) 04/11/2024     Cardiac:  Results from last 7 days   Lab Units 07/26/24  0441 07/23/24  0258 07/23/24  0058 07/22/24  0012   HSTROP T ng/L  --  66* 75*  --    PROBNP pg/mL 11,410.0*  --   --  14,737.0*       Cultures:  Lab Results    Component Value Date    COLORU Yellow 07/17/2024    CLARITYU Cloudy (A) 07/17/2024    SPECGRAV 1.025 09/09/2019    PHUR 6.5 07/17/2024    GLUCOSEU Negative 07/17/2024    KETONESU Negative 07/17/2024    BLOODU Negative 07/17/2024    NITRITEU Positive (A) 07/17/2024    LEUKOCYTESUR Trace (A) 07/17/2024    BILIRUBINUR Negative 07/17/2024    UROBILINOGEN 0.2 E.U./dL 07/17/2024    RBCUA 0-2 07/17/2024    WBCUA 6-10 (A) 07/17/2024    BACTERIA 4+ (A) 07/17/2024     Microbiology Results (last 10 days)       Procedure Component Value - Date/Time    Aspergillus Galactomannan Antigen - Blood, Arm, Right [[id number]] Collected: 07/22/24 1410    Lab Status: Final result Specimen: Blood from Arm, Right Updated: 07/26/24 0254     Aspergillus Ag, BAL/Serum 0.04 Index     Blood Culture - Blood, Arm, Left [[id number]]  (Normal) Collected: 07/21/24 0450    Lab Status: Final result Specimen: Blood from Arm, Left Updated: 07/26/24 0515     Blood Culture No growth at 5 days    Blood Culture - Blood, Arm, Right [[id number]]  (Normal) Collected: 07/21/24 0448    Lab Status: Final result Specimen: Blood from Arm, Right Updated: 07/26/24 0515     Blood Culture No growth at 5 days    Blood Culture - Blood, Arm, Right [[id number]]  (Normal) Collected: 07/17/24 2119    Lab Status: Final result Specimen: Blood from Arm, Right Updated: 07/22/24 2131     Blood Culture No growth at 5 days    Blood Culture - Blood, Arm, Left [[id number]]  (Normal) Collected: 07/17/24 2119    Lab Status: Final result Specimen: Blood from Arm, Left Updated: 07/22/24 2131     Blood Culture No growth at 5 days       I have personally looked at the labs and they are summarized above.  ----------------------------------------------------------------------------------------------------------------------  Detailed radiology reports for the last 24 hours:    Imaging Results (Last 24 Hours)       Procedure Component Value Units Date/Time    XR Chest 1 View [[id number]] Collected:  07/26/24 1047     Updated: 07/26/24 1050    Narrative:      EXAM:    XR Chest, 1 View     EXAM DATE:    7/26/2024 10:05 AM     CLINICAL HISTORY:    chest tube in place; I21.4-Non-ST elevation (NSTEMI) myocardial  infarction; J93.9-Pneumothorax, unspecified     TECHNIQUE:    Frontal view of the chest.     COMPARISON:    7/24/2024     FINDINGS:    LUNGS AND PLEURAL SPACES:  Coarsened interstitial markings and patchy  bilateral airspace disease again noted.  No consolidation.  No  pneumothorax.    HEART:  Unremarkable as visualized.  No cardiomegaly.    MEDIASTINUM:  Unremarkable as visualized.  Normal mediastinal contour.    BONES/JOINTS:  Unremarkable as visualized.  No acute fracture.    TUBES, LINES AND DEVICES:  Right central line with tip in SVC.  The  endotracheal tube (ETT) is in satisfactory position.  Nasogastric tube  tip in the stomach.  Left chest tube remains in position.       Impression:      1.  Coarsened interstitial markings and patchy bilateral airspace  disease again noted.  2.  Left chest tube remains in position.        This report was finalized on 7/26/2024 10:48 AM by Dr. Dominguez Sims MD.             I have personally looked at the radiology images and I have read the available final report.    Assessment & Plan      -Acute NSTEMI, type 1 (positive stress test on 7/18/2024 without any reversible ischemia, that was present on admission  -History of IPF and COPD with chronic hypoxia (uses 3 L/min at home)  -Acute exacerbation of IPF/COPD causing acute hypoxic and hypercapnic respiratory failure on top of chronic hypoxic respiratory failure and sepsis (developed on 7/21/2024 due to a suspected aspiration episode) resulting in oral intubation and mechanical ventilation from aspiration pneumonitis  -Hypotension, septic vs sedation vs diuretic induced, developed during admission  -New onset heart failure with reduced EF, acute exacerbation that developed on 7/21/2024  -Hypocalcemia that developed  during admission  -Prolonged QT that developed during the hospitalization  -History of essential hypertension  -History of hypothyroidism  -History of stress urinary incontinence with frequent UTI's, with an acute E coli UTI that was present on admission  -History of intermittent, diffuse body tremors    We will continue with the current care; the family would like to try a few more days of the current treatment to see if the patient improves enough to be taken off of the ventilator.  I did talk to Dr. James and he would like to continue the continuous suction on the chest tube as the patient is still on the ventilator.  We will repeat the patient's blood work tomorrow.    VTE Prophylaxis:  Lovenox at DVT dosing    The patient is high risk due to the following diagnoses/reasons:  Acute MI, IPF with acute exacerbation, new left sided pneumothorax, new onset heart failure      Disposition:  Undetermined at this time    Jessica Ospina MD  [facility] Hospitalist  07/26/24  21:37 EDT

## 2024-07-27 NOTE — PROGRESS NOTES
PROGRESS NOTE         Patient Identification:  Name:  Lexie Valdez  Age:  64 y.o.  Sex:  female  :  1960  MRN:  [id number]  Visit Number:  [id number]  Primary Care Provider:  Violet Pop APRN         LOS: 9 days       ----------------------------------------------------------------------------------------------------------------------  Subjective       Chief Complaints:    Shortness of Breath        Interval History:      The patient remains intubated and sedated at 28% FiO2, which is stable.  Sedation infusing.  Phenylephrine has been weaned off.  Tmax 99.9 °F.  No reports of diarrhea, however nursing reports 1 large mucoid BM this morning.  Mechanical breath sounds are clear to auscultation bilaterally.  Chest tube to left chest wall.  Abdomen is soft and rounded, with normoactive bowel sounds.  Leukocytosis continues to improve at 16.39.      Review of Systems:    Unable to obtain.  Intubated and sedated.    ----------------------------------------------------------------------------------------------------------------------      Objective       Current Hospital Meds:  aspirin, 81 mg, Oral, Daily  Brexpiprazole, 0.5 mg, Oral, Daily  cefepime, 2,000 mg, Intravenous, Q8H  chlorhexidine, 15 mL, Mouth/Throat, Q12H  donepezil, 10 mg, Oral, Q PM  doxycycline, 100 mg, Oral, Q12H  enoxaparin, 40 mg, Subcutaneous, Nightly  hydroxychloroquine, 200 mg, Oral, BID  insulin regular, 2-7 Units, Subcutaneous, Q6H  ipratropium-albuterol, 3 mL, Nebulization, Q6H - RT  levothyroxine, 25 mcg, Oral, Daily With Breakfast  magic barrier cream, , Topical, BID  memantine, 5 mg, Oral, Q12H  methylPREDNISolone sodium succinate, 60 mg, Intravenous, Q12H  [Held by provider] metoprolol succinate XL, 25 mg, Oral, Q24H  nystatin, 5 mL, Oral, 4x Daily  nystatin, 1 Application, Topical, Q12H  pantoprazole, 40 mg, Intravenous, Q AM  Phenylephrine HCl-NaCl, , ,   Phenylephrine HCl-NaCl, , ,   sodium chloride, 10 mL,  Intravenous, Q12H  sodium chloride, 10 mL, Intravenous, Q12H  sodium chloride, 10 mL, Intravenous, Q12H  sodium chloride, 10 mL, Intravenous, Q12H  sodium chloride, 10 mL, Intravenous, Q12H  sodium chloride, 10 mL, Intravenous, Q12H      dexmedetomidine, 0.2-1.5 mcg/kg/hr, Last Rate: 1.3 mcg/kg/hr (07/27/24 1140)  fentanyl 10 mcg/mL,  mcg/hr, Last Rate: Stopped (07/26/24 0917)  Pharmacy Consult,   phenylephrine, 0.5-3 mcg/kg/min (Dosing Weight), Last Rate: Stopped (07/27/24 0553)  propofol, 5-50 mcg/kg/min (Dosing Weight), Last Rate: Stopped (07/27/24 0824)      ----------------------------------------------------------------------------------------------------------------------    Vital Signs:  Temp:  [97.2 °F (36.2 °C)-99.9 °F (37.7 °C)] 98.4 °F (36.9 °C)  Heart Rate:  [] 66  Resp:  [28-39] 32  BP: ()/(44-80) 134/80  FiO2 (%):  [28 %] 28 %  Mean Arterial Pressure (Non-Invasive) for the past 24 hrs (Last 3 readings):   Noninvasive MAP (mmHg)   07/27/24 0930 90   07/27/24 0915 93   07/27/24 0900 99     SpO2 Percentage    07/27/24 0915 07/27/24 0930 07/27/24 1031   SpO2: 99% 100% 100%     SpO2:  [95 %-100 %] 100 %  on   ;   Device (Oxygen Therapy): ventilator    Body mass index is 23.79 kg/m².  Wt Readings from Last 3 Encounters:   07/25/24 62.9 kg (138 lb 10.7 oz)   04/26/24 67.7 kg (149 lb 4 oz)   04/19/24 67.1 kg (148 lb)        Intake/Output Summary (Last 24 hours) at 7/27/2024 1243  Last data filed at 7/27/2024 0547  Gross per 24 hour   Intake 1913.29 ml   Output 1165 ml   Net 748.29 ml     NPO Diet NPO Type: Tube Feeding  ----------------------------------------------------------------------------------------------------------------------      Physical Exam:    Constitutional: Thin, frail, chronically ill-appearing  female.  Intubated and sedated 28% FiO2.  Phenylephrine has been weaned off.  HENT:  Head: Normocephalic and atraumatic.  Mouth:  Moist mucous membranes.    Eyes:   Conjunctivae and EOM are normal.  No scleral icterus.  Neck:  Neck supple.  No JVD present.    Cardiovascular:  Normal rate, regular rhythm and normal heart sounds with no murmur. No edema.  Pulmonary/Chest: Mechanical breath sounds clear bilaterally.   Left-sided chest tube in place.  Abdominal:  Soft.  Bowel sounds are normal.  No distension and no tenderness.   Musculoskeletal:  No edema, no tenderness, and no deformity.  No swelling or redness of joints.  Neurological: Sedate.  Skin:  Skin is warm and dry.  No rash noted.  No pallor.   Psychiatric: Sedate.        ----------------------------------------------------------------------------------------------------------------------  Results from last 7 days   Lab Units 07/23/24  0258 07/23/24  0058   HSTROP T ng/L 66* 75*     Results from last 7 days   Lab Units 07/26/24  0441 07/22/24  0012   PROBNP pg/mL 11,410.0* 14,737.0*           Results from last 7 days   Lab Units 07/26/24  0428   PH, ARTERIAL pH units 7.380   PO2 ART mm Hg 95.0   PCO2, ARTERIAL mm Hg 47.5*   HCO3 ART mmol/L 28.1*     Results from last 7 days   Lab Units 07/27/24  1143 07/26/24  0441 07/25/24  0006 07/24/24  0022 07/23/24  0058 07/22/24  0012 07/21/24  0130 07/21/24  0016   CRP mg/dL  --   --   --  3.89* 10.32*  --   --  6.50*   LACTATE mmol/L  --   --   --  1.5 1.5  --  1.1  --    WBC 10*3/mm3 16.39* 17.54* 21.27* 22.14* 10.24   < >  --  20.24*   HEMOGLOBIN g/dL 10.0* 9.1* 9.6* 9.6* 9.5*   < >  --  11.8*   HEMATOCRIT % 31.9* 29.4* 30.8* 30.0* 29.6*   < >  --  36.3   MCV fL 101.3* 101.7* 101.7* 98.4* 97.4*   < >  --  96.3   MCHC g/dL 31.3* 31.0* 31.2* 32.0 32.1   < >  --  32.5   PLATELETS 10*3/mm3 205 225 303 287 235   < >  --  227   INR  1.00 1.09  --   --   --   --   --   --     < > = values in this interval not displayed.     Results from last 7 days   Lab Units 07/26/24  0441 07/25/24  0006 07/24/24  0022 07/23/24  0058   SODIUM mmol/L 142 144 133* 139   POTASSIUM mmol/L 4.7 5.1 4.0  "3.7   MAGNESIUM mg/dL 2.5* 2.5* 2.5* 2.4   CHLORIDE mmol/L 112* 114* 103 106   CO2 mmol/L 25.6 24.9 25.0 24.1   BUN mg/dL 29* 20 17 20   CREATININE mg/dL 0.31* 0.33* 0.38* 0.45*   CALCIUM mg/dL 8.0* 8.1* 8.2* 8.2*   GLUCOSE mg/dL 115* 138* 164* 208*   ALBUMIN g/dL 3.1*  --  3.5 3.6   BILIRUBIN mg/dL <0.2  --  <0.2 0.2   ALK PHOS U/L 70  --  89 104   AST (SGOT) U/L 21  --  22 26   ALT (SGPT) U/L 17  --  14 15   Estimated Creatinine Clearance: 179.7 mL/min (A) (by C-G formula based on SCr of 0.31 mg/dL (L)).  No results found for: \"AMMONIA\"    Glucose   Date/Time Value Ref Range Status   07/27/2024 1146 156 (H) 70 - 130 mg/dL Final   07/27/2024 0533 141 (H) 70 - 130 mg/dL Final   07/27/2024 0017 172 (H) 70 - 130 mg/dL Final   07/26/2024 1748 172 (H) 70 - 130 mg/dL Final   07/26/2024 1246 166 (H) 70 - 130 mg/dL Final   07/26/2024 0559 117 70 - 130 mg/dL Final   07/26/2024 0024 145 (H) 70 - 130 mg/dL Final   07/25/2024 1658 109 70 - 130 mg/dL Final     Lab Results   Component Value Date    HGBA1C 4.60 (L) 07/18/2024     Lab Results   Component Value Date    TSH 1.140 07/17/2024    FREET4 1.83 (H) 04/11/2024       Blood Culture   Date Value Ref Range Status   07/21/2024 No growth at 2 days  Preliminary   07/21/2024 No growth at 2 days  Preliminary   07/17/2024 No growth at 5 days  Final   07/17/2024 No growth at 5 days  Final     Urine Culture   Date Value Ref Range Status   07/17/2024 >100,000 CFU/mL Escherichia coli (A)  Final     No results found for: \"WOUNDCX\"  No results found for: \"STOOLCX\"  Respiratory Culture   Date Value Ref Range Status   07/21/2024 No growth  Preliminary   07/21/2024   Final    Rare growth Normal respiratory ammon. No S. aureus or Pseudomonas aeruginosa detected. Final report.     Pain Management Panel           No data to display                  ----------------------------------------------------------------------------------------------------------------------  Imaging Results (Last 24 " Hours)       ** No results found for the last 24 hours. **            ----------------------------------------------------------------------------------------------------------------------    Pertinent Infectious Disease Results                Assessment/Plan       Assessment     Septic shock with acute hypoxic respiratory failure requiring mechanical ventilation and pressor support  Pneumonia        Plan      The patient remains intubated and sedated at 28% FiO2, which is stable.  Sedation infusing.  Phenylephrine has been weaned off.  Tmax 99.9 °F.  No reports of diarrhea, however nursing reports 1 large mucoid BM this morning.  Mechanical breath sounds are clear to auscultation bilaterally.  Chest tube to left chest wall.  Abdomen is soft and rounded, with normoactive bowel sounds.  Leukocytosis continues to improve at 16.39.    Leukocytosis continues to improve.  Pressors weaned off.  Continue with cefepime and doxycycline courses as scheduled for the treatment of pneumonia.  Will continue to monitor closely.      ANTIMICROBIAL THERAPY    cefepime 2000 mg IVPB in 100 mL NS (VTB)  doxycycline - 100 MG     Code Status:   Code Status and Medical Interventions: No CPR (Do Not Attempt to Resuscitate); Full Support   Ordered at: 07/25/24 9868     Level Of Support Discussed With:    Next of Kin (If No Surrogate)    Health Care Surrogate     Code Status (Patient has no pulse and is not breathing):    No CPR (Do Not Attempt to Resuscitate)     Medical Interventions (Patient has pulse or is breathing):    Full Support       Deirdre Davila, APRN  07/27/24  12:43 EDT

## 2024-07-27 NOTE — PROGRESS NOTES
Surgery follow-up for chest tube management.    Patient is sedated on vent.  Per pulmonary note, weaning trial in process    Exam:  Lungs: Coarse breath sounds bilaterally  Left chest tube: Minimal drainage, no airleak    Status post left chest tube  Plan: As long as patient on positive pressure will leave chest tube in

## 2024-07-27 NOTE — PROGRESS NOTES
[facility] HOSPITALIST PROGRESS NOTE     Patient Identification:  Name:  Lexie KEITH Valdez  Age:  64 y.o.  Sex:  female  :  1960  MRN:  [id number]  Visit Number:  [id number]  ROOM: [address]     Primary Care Provider:  Violet Pop APRN     Date of Admission: 2024    Length of stay in inpatient status:  9    Subjective     Chief Compliant:    Chief Complaint   Patient presents with    Shortness of Breath     History of Presenting Illness:  The patient remains intubated and sedated with propofol and thus I was unable to obtain a history from her.  The Yair-Synephrine was stopped this AM and she is now not on any vasopressors.  Precedex was started 2024.  She was doing well until the Precedex was dropped down to 0.5 mcg/kg/hour at around 15:11; she had high peak pressures and desats down to 57%.  Afterwards, nurse Steph Santillan was able to suction out a lot of secretions and the peak pressures went from 50-80s to 30s.  She also started with liquid stools today and a rectal tube had to be placed.    Consults:  Arjun Mcmahon and Christopher with pulmonology  Arjun Acevedo and Fox with cardiology  Dr. Clemons with infectious disease  Arjun Soto and Jacob with general surgery     Procedures:  2024:  Oral intubated and mechanical ventilation  2024:  Right internal jugular triple lumen central line placement  2024:  Placement of a left sided chest tube due to pneumothorax    Objective     Current Hospital Meds:  aspirin, 81 mg, Oral, Daily  Brexpiprazole, 0.5 mg, Oral, Daily  cefepime, 2,000 mg, Intravenous, Q8H  chlorhexidine, 15 mL, Mouth/Throat, Q12H  donepezil, 10 mg, Oral, Q PM  doxycycline, 100 mg, Oral, Q12H  enoxaparin, 40 mg, Subcutaneous, Nightly  hydroxychloroquine, 200 mg, Oral, BID  insulin regular, 2-7 Units, Subcutaneous, Q6H  ipratropium-albuterol, 3 mL, Nebulization, Q6H - RT  levothyroxine, 25 mcg, Oral, Daily With Breakfast  magic barrier cream, , Topical,  BID  memantine, 5 mg, Oral, Q12H  methylPREDNISolone sodium succinate, 60 mg, Intravenous, Q12H  [Held by provider] metoprolol succinate XL, 25 mg, Oral, Q24H  nystatin, 5 mL, Oral, 4x Daily  nystatin, 1 Application, Topical, Q12H  pantoprazole, 40 mg, Intravenous, Q AM  Phenylephrine HCl-NaCl, , ,   Phenylephrine HCl-NaCl, , ,   sodium chloride, 10 mL, Intravenous, Q12H  sodium chloride, 10 mL, Intravenous, Q12H  sodium chloride, 10 mL, Intravenous, Q12H  sodium chloride, 10 mL, Intravenous, Q12H  sodium chloride, 10 mL, Intravenous, Q12H  sodium chloride, 10 mL, Intravenous, Q12H    dexmedetomidine, 0.2-1.5 mcg/kg/hr, Last Rate: 1.5 mcg/kg/hr (07/27/24 1617)  fentanyl 10 mcg/mL,  mcg/hr, Last Rate: 150 mcg/hr (07/27/24 1751)  Pharmacy Consult,   phenylephrine, 0.5-3 mcg/kg/min (Dosing Weight), Last Rate: 0.5 mcg/kg/min (07/27/24 1749)  propofol, 5-50 mcg/kg/min (Dosing Weight), Last Rate: 50 mcg/kg/min (07/27/24 1700)      Current Antimicrobial Therapy:  Anti-Infectives (From admission, onward)      Ordered     Dose/Rate Route Frequency Start Stop    07/21/24 2206  doxycycline (MONODOX) capsule 100 mg        Ordering Provider: Jessica Ospina MD    100 mg Oral Every 12 Hours Scheduled 07/21/24 2300 07/30/24 1641    07/21/24 0424  cefepime 2000 mg IVPB in 100 mL NS (VTB)        Ordering Provider: Estuardo Charles MD    2,000 mg  over 4 Hours Intravenous Every 8 Hours 07/21/24 1400 07/28/24 1359    07/21/24 0856  vancomycin 1250 mg/250 mL 0.9% NS IVPB ([facility])        Ordering Provider: Eduardo Freeman MD    1,250 mg  over 75 Minutes Intravenous Once 07/21/24 1000 07/21/24 1114    07/21/24 0424  cefepime 2000 mg IVPB in 100 mL NS (VTB)        Ordering Provider: Estuardo Charles MD    2,000 mg  over 30 Minutes Intravenous Once 07/21/24 0600 07/21/24 0558    07/19/24 1540  hydroxychloroquine (PLAQUENIL) tablet 200 mg        Ordering Provider: Estuardo Charels MD    200 mg Oral 2 Times Daily  07/19/24 2100      07/17/24 2218  cefTRIAXone (ROCEPHIN) 2,000 mg in sodium chloride 0.9 % 100 mL IVPB-VTB        Ordering Provider: Al Lewis MD    2,000 mg  200 mL/hr over 30 Minutes Intravenous Once 07/17/24 2234 07/18/24 0035          Current Diuretic Therapy:  Diuretics (From admission, onward)      Ordered     Dose/Rate Route Frequency Start Stop    07/26/24 1220  bumetanide (BUMEX) injection 1 mg        Ordering Provider: Yamel Lorenzo APRN    1 mg Intravenous Once 07/26/24 1245 07/26/24 1320    07/24/24 1151  furosemide (LASIX) injection 20 mg        Ordering Provider: Rinku Braxton MD    20 mg Intravenous Once 07/24/24 1245 07/24/24 1244    07/21/24 0637  furosemide (LASIX) injection 40 mg        Ordering Provider: Estuardo Charles MD    40 mg Intravenous Once 07/21/24 0730 07/21/24 0914    07/20/24 2323  furosemide (LASIX) injection 40 mg        Ordering Provider: Estuardo Charles MD    40 mg Intravenous Once 07/21/24 0015 07/21/24 0008          ----------------------------------------------------------------------------------------------------------------------  Vital Signs:  Temp:  [96.8 °F (36 °C)-99.9 °F (37.7 °C)] 96.8 °F (36 °C)  Heart Rate:  [55-92] 75  Resp:  [28-39] 33  BP: ()/(39-95) 146/72  FiO2 (%):  [28 %] 28 %  SpO2:  [95 %-100 %] 96 %  on   ;   Device (Oxygen Therapy): ventilator  Body mass index is 23.79 kg/m².    Wt Readings from Last 3 Encounters:   07/25/24 62.9 kg (138 lb 10.7 oz)   04/26/24 67.7 kg (149 lb 4 oz)   04/19/24 67.1 kg (148 lb)     Intake & Output (last 3 days)         07/24 0701 07/25 0700 07/25 0701 07/26 0700 07/26 0701 07/27 0700 07/27 0701 07/28 0700    I.V. (mL/kg) 1719.9 (30.1) 1414.5 (24.7) 698.3 (12.2)     Other 484 484 444     NG/ 689 671     IV Piggyback   100     Total Intake(mL/kg) 2897.9 (50.7) 2587.5 (45.2) 1913.3 (33.4)     Urine (mL/kg/hr) 700 (0.5) 800 (0.6) 1140 (0.8)     Emesis/NG output        Stool         Chest Tube 46 45 25     Total Output      Net +2151.9 +1742.5 +748.3             Stool Unmeasured Occurrence    1 x          NPO Diet NPO Type: Tube Feeding  ----------------------------------------------------------------------------------------------------------------------  Physical Exam  Constitutional:       General: She is in acute distress, more so today prior to removal of the mucus in the ETT.     Appearance: Normal appearance. She is well-developed. She is not toxic-appearing or diaphoretic.      Interventions: She is sedated and intubated.      Comments: Appears acutely and chronically ill.   HENT:      Head: Normocephalic and atraumatic.  The subcutaneous emphysema on the left side of the neck has improved.     Right Ear: External ear normal.      Left Ear: External ear normal.      Nose: Nose normal.   Eyes:      General: No scleral icterus.        Right eye: No discharge.         Left eye: No discharge.      Extraocular Movements: Extraocular movements intact.      Conjunctiva/sclera: Conjunctivae normal.      Pupils: Pupils are equal, round, and reactive to light.   Cardiovascular:      Rate and Rhythm: Normal rate and regular rhythm.      Pulses: Normal pulses.      Heart sounds: No murmur heard.  Pulmonary:      Effort: Respiratory distress present and appears to be worse than yesterday. She remains intubated.      Breath sounds: She has fair to good breath sounds on the left side; chest tube is still in place, still on suction, and no bubbles are in chamber C.  I do not hear any crackles nor any wheezes.  Abdominal:      General: Bowel sounds are normal. There is no distension.      Palpations: Abdomen is soft.      Rectal tube in place with green and liquid stool in the container.  Musculoskeletal:         General: No swelling, deformity or signs of injury.   Skin:     Capillary Refill: Capillary refill takes less than 2 seconds.      Coloration: Skin is not jaundiced or pale.    Neurological:      Motor: No tremors today; I did not see the patient move her arms and legs today and she did not follow commands for me. No seizure activity.      Comments: Sedated by Precedex and orally intubated.  Thus, I cannot perform this portion of the physical exam.    Psychiatric:      Comments: Sedated with Precedex and thus I cannot perform this portion of the physical exam.   ----------------------------------------------------------------------------------------------------------------------  Tele:  NS with heart rates 60-70s, with a few rates down to the high 50s.  I have personally reviewed/looked at the telemetry strips.   ----------------------------------------------------------------------------------------------------------------------  LABS:    Pending test results:  None    CBC and coagulation:  Results from last 7 days   Lab Units 07/27/24  1143 07/26/24  0441 07/25/24  0006 07/24/24  0022 07/23/24  0058 07/22/24  0012 07/21/24  1309 07/21/24  0130 07/21/24  0016   PROCALCITONIN ng/mL  --   --   --   --   --  0.24 0.21 0.07  --    LACTATE mmol/L  --   --   --  1.5 1.5  --   --  1.1  --    CRP mg/dL  --   --   --  3.89* 10.32*  --   --   --  6.50*   WBC 10*3/mm3 16.39* 17.54* 21.27* 22.14* 10.24 16.58*  --   --  20.24*   HEMOGLOBIN g/dL 10.0* 9.1* 9.6* 9.6* 9.5* 9.9*  --   --  11.8*   HEMATOCRIT % 31.9* 29.4* 30.8* 30.0* 29.6* 30.7*  --   --  36.3   MCV fL 101.3* 101.7* 101.7* 98.4* 97.4* 95.6  --   --  96.3   MCHC g/dL 31.3* 31.0* 31.2* 32.0 32.1 32.2  --   --  32.5   PLATELETS 10*3/mm3 205 225 303 287 235 227  --   --  227   INR  1.00 1.09  --   --   --   --   --   --   --      Acid/base balance:  Results from last 7 days   Lab Units 07/27/24  1726 07/26/24  0428 07/21/24  2106 07/21/24  0811 07/20/24  2343   PH, ARTERIAL pH units 7.345* 7.380 7.315* 7.367 7.403   PCO2, ARTERIAL mm Hg 52.1* 47.5* 48.2* 46.6* 40.3   PO2 ART mm Hg 76.7* 95.0 137.0* 101.0 107.0   HCO3 ART mmol/L 28.4* 28.1*  24.5 26.8* 25.1      Vent Settings for yesterday and today:    FiO2 (%)  28  FiO2 (%)     Resp Rate (Set)  28  Resp Rate (Set)     Vt (Set, mL)  300  Vt (Set, mL)     PEEP/CPAP (cm H2O)  4  PEEP/CPAP (cm H2O)     Peak Flow (L/min)  60  Peak Flow (L/min)     Trigger Sensitivity Flow (L/min)  2  Trigger Sensitivity Flow (L/min)     Humidification  Heat and mois...  Humidification      Renal and electrolytes:  Results from last 7 days   Lab Units 07/27/24  1143 07/26/24  1501 07/26/24  0441 07/25/24  0949 07/25/24  0006 07/24/24  0022 07/23/24  0058 07/22/24  0012 07/21/24  0016   SODIUM mmol/L 142  --  142  --  144 133* 139 135* 130*   POTASSIUM mmol/L 5.2  --  4.7  --  5.1 4.0 3.7 4.5 4.7   MAGNESIUM mg/dL 2.4  --  2.5*  --  2.5* 2.5* 2.4  --   --    CHLORIDE mmol/L 109*  --  112*  --  114* 103 106 100 99   CO2 mmol/L 26.1  --  25.6  --  24.9 25.0 24.1 23.9 20.5*   BUN mg/dL 29*  --  29*  --  20 17 20 25* 17   CREATININE mg/dL 0.29*  --  0.31*  --  0.33* 0.38* 0.45* 0.47* 0.37*   CALCIUM mg/dL 8.1*  --  8.0*  --  8.1* 8.2* 8.2* 8.2* 8.4*   IONIZED CALCIUM mmol/L  --   --   --   --  1.12  --   --   --   --    PHOSPHORUS mg/dL 3.4 2.7 2.2* 2.5 2.0* 1.4* 1.2*  --   --    GLUCOSE mg/dL 161*  --  115*  --  138* 164* 208* 191* 122*   ANION GAP mmol/L 6.9  --  4.4*  --  5.1 5.0 8.9 11.1 10.5     Estimated Creatinine Clearance: 192 mL/min (A) (by C-G formula based on SCr of 0.29 mg/dL (L)).     Latest Reference Range & Units 12/29/14 23:46 07/26/24 04:41   Osmolality 280 - 301 mOsm/kg 286 309 (H)     Liver and pancreatic function:  Results from last 7 days   Lab Units 07/27/24  1143 07/26/24  0441 07/24/24  0022 07/23/24  0058 07/22/24  0012   ALBUMIN g/dL 3.1* 3.1* 3.5 3.6 4.1   BILIRUBIN mg/dL 0.2 <0.2 <0.2 0.2 0.4   ALK PHOS U/L 78 70 89 104 142*   AST (SGOT) U/L 23 21 22 26 33*   ALT (SGPT) U/L 26 17 14 15 10     Endocrine function:  Point of care bedside glucose levels:  Results from last 7 days   Lab Units  07/27/24  0533 07/27/24  0017 07/26/24  1748 07/26/24  1246 07/26/24  0559 07/26/24  0024 07/25/24  1658 07/25/24  1120 07/25/24  0513 07/25/24  0020 07/24/24  1657 07/24/24  1057 07/24/24  0529 07/23/24  2355   GLUCOSE mg/dL 141* 172* 172* 166* 117 145* 109 167* 111 135* 155* 166* 152* 166*     Glucose levels from the CMP:  Results from last 7 days   Lab Units 07/27/24  1143 07/26/24  0441 07/25/24  0006 07/24/24  0022 07/23/24  0058 07/22/24  0012 07/21/24  0016   GLUCOSE mg/dL 161* 115* 138* 164* 208* 191* 122*     Cardiac:  Results from last 7 days   Lab Units 07/26/24  0441 07/23/24  0258 07/23/24  0058 07/22/24  0012   HSTROP T ng/L  --  66* 75*  --    PROBNP pg/mL 11,410.0*  --   --  14,737.0*       Cultures:  Microbiology Results (last 10 days)       Procedure Component Value - Date/Time    Aspergillus Galactomannan Antigen - Blood, Arm, Right [[id number]] Collected: 07/22/24 1410    Lab Status: Final result Specimen: Blood from Arm, Right Updated: 07/26/24 0254     Aspergillus Ag, BAL/Serum 0.04 Index     Mycoplasma Pneumoniae Antibody, IgM - Blood, Arm, Left [[id number]]  (Normal) Collected: 07/22/24 0012    Lab Status: Final result Specimen: Blood from Arm, Left Updated: 07/22/24 0202     Mycoplasma pneumo IgM Negative    Legionella Antigen, Urine - Urine, Urine, Clean Catch [[id number]]  (Normal) Collected: 07/21/24 2242    Lab Status: Final result Specimen: Urine, Clean Catch Updated: 07/21/24 2308     LEGIONELLA ANTIGEN, URINE Negative    Narrative:      Presumptive negative for L. pneumophilia serogroup 1 antigen, suggesting no recent or current infection.    Respiratory Culture - Sputum, ET Suction [[id number]] Collected: 07/21/24 2231    Lab Status: Final result Specimen: Sputum from ET Suction Updated: 07/24/24 1118     Respiratory Culture No growth     Gram Stain Rare (1+) Mixed ammon      No WBCs seen      No Epithelial cells seen    Respiratory Culture - Sputum, ET Suction [[id number]] Collected:  07/21/24 1152    Lab Status: Final result Specimen: Sputum from ET Suction Updated: 07/23/24 1053     Respiratory Culture Rare growth Normal respiratory ammon. No S. aureus or Pseudomonas aeruginosa detected. Final report.     Gram Stain Moderate (3+) WBCs seen      Rare (1+) Epithelial cells seen      No organisms seen    Blood Culture - Blood, Arm, Left [[id number]]  (Normal) Collected: 07/21/24 0450    Lab Status: Final result Specimen: Blood from Arm, Left Updated: 07/26/24 0515     Blood Culture No growth at 5 days    Blood Culture - Blood, Arm, Right [[id number]]  (Normal) Collected: 07/21/24 0448    Lab Status: Final result Specimen: Blood from Arm, Right Updated: 07/26/24 0515     Blood Culture No growth at 5 days    COVID PRE-OP / PRE-PROCEDURE SCREENING ORDER (NO ISOLATION) - Swab, Nasopharynx [[id number]]  (Normal) Collected: 07/17/24 2119    Lab Status: Final result Specimen: Swab from Nasopharynx Updated: 07/17/24 2149    COVID-19 and FLU A/B PCR, 1 HR TAT - Swab, Nasopharynx [[id number]]  (Normal) Collected: 07/17/24 2119    Lab Status: Final result Specimen: Swab from Nasopharynx Updated: 07/17/24 2149     COVID19 Not Detected     Influenza A PCR Not Detected     Influenza B PCR Not Detected    Blood Culture - Blood, Arm, Right [[id number]]  (Normal) Collected: 07/17/24 2119    Lab Status: Final result Specimen: Blood from Arm, Right Updated: 07/22/24 2131     Blood Culture No growth at 5 days    Blood Culture - Blood, Arm, Left [[id number]]  (Normal) Collected: 07/17/24 2119    Lab Status: Final result Specimen: Blood from Arm, Left Updated: 07/22/24 2131     Blood Culture No growth at 5 days       I have personally looked at the labs and they are summarized above.  ----------------------------------------------------------------------------------------------------------------------  Detailed radiology reports for the last 24 hours:    Imaging Results (Last 24 Hours)       Procedure Component Value Units  Date/Time    XR Chest 1 View [[id number]] Collected: 07/26/24 1047     Updated: 07/26/24 1050    Narrative:      EXAM:    XR Chest, 1 View     EXAM DATE:    7/26/2024 10:05 AM     CLINICAL HISTORY:    chest tube in place; I21.4-Non-ST elevation (NSTEMI) myocardial  infarction; J93.9-Pneumothorax, unspecified     TECHNIQUE:    Frontal view of the chest.     COMPARISON:    7/24/2024     FINDINGS:    LUNGS AND PLEURAL SPACES:  Coarsened interstitial markings and patchy  bilateral airspace disease again noted.  No consolidation.  No  pneumothorax.    HEART:  Unremarkable as visualized.  No cardiomegaly.    MEDIASTINUM:  Unremarkable as visualized.  Normal mediastinal contour.    BONES/JOINTS:  Unremarkable as visualized.  No acute fracture.    TUBES, LINES AND DEVICES:  Right central line with tip in SVC.  The  endotracheal tube (ETT) is in satisfactory position.  Nasogastric tube  tip in the stomach.  Left chest tube remains in position.       Impression:      1.  Coarsened interstitial markings and patchy bilateral airspace  disease again noted.  2.  Left chest tube remains in position.      This report was finalized on 7/26/2024 10:48 AM by Dr. Dominguez Sims MD.          I have personally looked at the radiology images and I have read the available final report.    Assessment & Plan      -Acute NSTEMI, type 1 (positive stress test on 7/18/2024 without any reversible ischemia, that was present on admission  -History of IPF and COPD with chronic hypoxia (uses 3 L/min at home)  -Acute exacerbation of IPF/COPD causing acute hypoxic and hypercapnic respiratory failure on top of chronic hypoxic respiratory failure and sepsis (developed on 7/21/2024 due to a suspected aspiration episode) resulting in oral intubation and mechanical ventilation from aspiration pneumonitis  -Hypotension, septic vs sedation vs diuretic induced, developed during admission  -New onset heart failure with reduced EF, acute exacerbation that  developed on 7/21/2024  -Hypocalcemia that developed during admission  -Prolonged QT that developed during the hospitalization  -History of essential hypertension  -History of hypothyroidism  -History of stress urinary incontinence with frequent UTI's, with an acute E coli UTI that was present on admission  -History of intermittent, diffuse body tremors    Will stop with the weaning of sedation today and add back the fentanyl as she is high risk for another pneumothorax.  This episode seems to have been brought on by a mucus plug in the ETT.  Will continue with the same antibiotics as listed above.  Will continue the tube feeds at goal.  Will continue with the suction on the left chest tube.  Will repeat labs in the am.  I am concerned that the patient will not survive this hospitalization and if she does then she will need a tracheostomy and a PEG.  I am not sure what is causing the liquid stools; they do not smell like C diff; they could be due to the tube feeds.  Will continue the rectal tube for now and monitor the input and output closely.    VTE Prophylaxis:  Lovenox at DVT dosing    The patient is high risk due to the following diagnoses/reasons:  Acute MI, IPF with acute exacerbation, new left sided pneumothorax, new onset heart failure      Disposition:  Undetermined at this time    Jessica Ospina MD  [facility]  07/27/24  18:01 EDT

## 2024-07-27 NOTE — PLAN OF CARE
Problem: Adult Inpatient Plan of Care  Goal: Plan of Care Review  Outcome: Ongoing, Progressing  Flowsheets (Taken 7/27/2024 0619)  Progress: no change  Plan of Care Reviewed With: patient  Goal: Patient-Specific Goal (Individualized)  Outcome: Ongoing, Progressing  Goal: Absence of Hospital-Acquired Illness or Injury  Outcome: Ongoing, Progressing  Intervention: Identify and Manage Fall Risk  Recent Flowsheet Documentation  Taken 7/27/2024 0600 by Palomo Kam RN  Safety Promotion/Fall Prevention:   safety round/check completed   fall prevention program maintained  Taken 7/27/2024 0500 by Palomo Kam RN  Safety Promotion/Fall Prevention:   safety round/check completed   fall prevention program maintained  Taken 7/27/2024 0400 by Palomo Kam RN  Safety Promotion/Fall Prevention:   safety round/check completed   fall prevention program maintained  Taken 7/27/2024 0300 by Palomo Kam RN  Safety Promotion/Fall Prevention:   safety round/check completed   fall prevention program maintained  Taken 7/27/2024 0200 by Palomo Kam RN  Safety Promotion/Fall Prevention:   safety round/check completed   fall prevention program maintained  Taken 7/27/2024 0100 by Palomo Kam RN  Safety Promotion/Fall Prevention:   safety round/check completed   fall prevention program maintained  Taken 7/27/2024 0000 by Palomo Kam RN  Safety Promotion/Fall Prevention:   safety round/check completed   fall prevention program maintained  Taken 7/26/2024 2300 by Palomo Kam, RN  Safety Promotion/Fall Prevention:   safety round/check completed   fall prevention program maintained  Taken 7/26/2024 2200 by Palomo Kam, RN  Safety Promotion/Fall Prevention:   safety round/check completed   fall prevention program maintained  Taken 7/26/2024 2100 by Palomo Kam, RN  Safety Promotion/Fall Prevention:   safety round/check completed   fall prevention program  maintained  Taken 7/26/2024 2000 by Palomo Kam RN  Safety Promotion/Fall Prevention:   safety round/check completed   fall prevention program maintained  Taken 7/26/2024 1900 by Palomo Kam RN  Safety Promotion/Fall Prevention:   safety round/check completed   fall prevention program maintained  Intervention: Prevent Skin Injury  Recent Flowsheet Documentation  Taken 7/27/2024 0600 by Palomo Kam RN  Body Position:   turned   left  Taken 7/27/2024 0400 by Palomo Kam RN  Body Position:   turned   right  Taken 7/27/2024 0200 by Palomo Kam RN  Body Position:   turned   left  Taken 7/27/2024 0000 by Palomo Kam RN  Body Position:   turned   right  Taken 7/26/2024 2200 by Palomo Kam RN  Body Position:   turned   left  Taken 7/26/2024 2000 by Palomo Kam RN  Body Position:   turned   right  Intervention: Prevent and Manage VTE (Venous Thromboembolism) Risk  Recent Flowsheet Documentation  Taken 7/27/2024 0200 by Palomo Kam RN  VTE Prevention/Management: (see mar) other (see comments)  Range of Motion: ROM (range of motion) performed  Taken 7/26/2024 2000 by Palomo Kam RN  VTE Prevention/Management: (see MAR) other (see comments)  Range of Motion: ROM (range of motion) performed  Goal: Optimal Comfort and Wellbeing  Outcome: Ongoing, Progressing  Intervention: Provide Person-Centered Care  Recent Flowsheet Documentation  Taken 7/27/2024 0200 by Palomo Kam RN  Trust Relationship/Rapport:   care explained   reassurance provided  Taken 7/26/2024 2000 by Palomo Kam RN  Trust Relationship/Rapport:   care explained   reassurance provided   thoughts/feelings acknowledged  Goal: Readiness for Transition of Care  Outcome: Ongoing, Progressing     Problem: COPD (Chronic Obstructive Pulmonary Disease) Comorbidity  Goal: Maintenance of COPD Symptom Control  Outcome: Ongoing, Progressing  Intervention:  Maintain COPD-Symptom Control  Recent Flowsheet Documentation  Taken 7/27/2024 0600 by Palomo Kam, RN  Medication Review/Management: medications reviewed  Taken 7/27/2024 0500 by Palomo Kam, RN  Medication Review/Management: medications reviewed  Taken 7/27/2024 0400 by Palomo Kam RN  Medication Review/Management: medications reviewed  Taken 7/27/2024 0300 by Palomo Kam, RN  Medication Review/Management: medications reviewed  Taken 7/27/2024 0200 by Palomo Kam RN  Medication Review/Management: medications reviewed  Taken 7/27/2024 0100 by Palomo Kma, RN  Medication Review/Management: medications reviewed  Taken 7/27/2024 0000 by Palomo Kam RN  Medication Review/Management: medications reviewed  Taken 7/26/2024 2300 by Palomo Kam RN  Medication Review/Management: medications reviewed  Taken 7/26/2024 2200 by Palomo Kam, RN  Medication Review/Management: medications reviewed  Taken 7/26/2024 2100 by Palomo Kam, RN  Medication Review/Management: medications reviewed  Taken 7/26/2024 2000 by Palomo Kam, RN  Medication Review/Management: medications reviewed  Taken 7/26/2024 1900 by Palomo Kam, RN  Medication Review/Management: medications reviewed     Problem: Fall Injury Risk  Goal: Absence of Fall and Fall-Related Injury  Outcome: Ongoing, Progressing  Intervention: Identify and Manage Contributors  Recent Flowsheet Documentation  Taken 7/27/2024 0600 by Palomo Kam, RN  Medication Review/Management: medications reviewed  Taken 7/27/2024 0500 by Palomo Kam, RN  Medication Review/Management: medications reviewed  Taken 7/27/2024 0400 by Palomo Kam, RN  Medication Review/Management: medications reviewed  Taken 7/27/2024 0300 by Palomo Kam, RN  Medication Review/Management: medications reviewed  Taken 7/27/2024 0200 by Palomo Kam, JESSIKA  Medication  Review/Management: medications reviewed  Taken 7/27/2024 0100 by Palomo Kam, RN  Medication Review/Management: medications reviewed  Taken 7/27/2024 0000 by Palomo Kam, RN  Medication Review/Management: medications reviewed  Taken 7/26/2024 2300 by Palomo Kam, RN  Medication Review/Management: medications reviewed  Taken 7/26/2024 2200 by Palomo Kam, RN  Medication Review/Management: medications reviewed  Taken 7/26/2024 2100 by Palomo Kam, RN  Medication Review/Management: medications reviewed  Taken 7/26/2024 2000 by Palomo Kam, RN  Medication Review/Management: medications reviewed  Taken 7/26/2024 1900 by Palomo Kam, RN  Medication Review/Management: medications reviewed  Intervention: Promote Injury-Free Environment  Recent Flowsheet Documentation  Taken 7/27/2024 0600 by Palomo Kam, RN  Safety Promotion/Fall Prevention:   safety round/check completed   fall prevention program maintained  Taken 7/27/2024 0500 by Palomo Kam RN  Safety Promotion/Fall Prevention:   safety round/check completed   fall prevention program maintained  Taken 7/27/2024 0400 by Palomo Kam, RN  Safety Promotion/Fall Prevention:   safety round/check completed   fall prevention program maintained  Taken 7/27/2024 0300 by Palomo Kam, RN  Safety Promotion/Fall Prevention:   safety round/check completed   fall prevention program maintained  Taken 7/27/2024 0200 by Palomo Kam, RN  Safety Promotion/Fall Prevention:   safety round/check completed   fall prevention program maintained  Taken 7/27/2024 0100 by Palomo Kam, RN  Safety Promotion/Fall Prevention:   safety round/check completed   fall prevention program maintained  Taken 7/27/2024 0000 by Palomo Kam, RN  Safety Promotion/Fall Prevention:   safety round/check completed   fall prevention program maintained  Taken 7/26/2024 2300 by Palomo Kam  Kory, JESSIKA  Safety Promotion/Fall Prevention:   safety round/check completed   fall prevention program maintained  Taken 7/26/2024 2200 by Palomo Kam, RN  Safety Promotion/Fall Prevention:   safety round/check completed   fall prevention program maintained  Taken 7/26/2024 2100 by Palomo Kam RN  Safety Promotion/Fall Prevention:   safety round/check completed   fall prevention program maintained  Taken 7/26/2024 2000 by Palomo Kam, RN  Safety Promotion/Fall Prevention:   safety round/check completed   fall prevention program maintained  Taken 7/26/2024 1900 by Palomo Kam RN  Safety Promotion/Fall Prevention:   safety round/check completed   fall prevention program maintained     Problem: Adjustment to Illness (Sepsis/Septic Shock)  Goal: Optimal Coping  Outcome: Ongoing, Progressing  Intervention: Optimize Psychosocial Adjustment to Illness  Recent Flowsheet Documentation  Taken 7/27/2024 0200 by Palomo Kam RN  Family/Support System Care: support provided     Problem: Bleeding (Sepsis/Septic Shock)  Goal: Absence of Bleeding  Outcome: Ongoing, Progressing     Problem: Glycemic Control Impaired (Sepsis/Septic Shock)  Goal: Blood Glucose Level Within Desired Range  Outcome: Ongoing, Progressing     Problem: Infection Progression (Sepsis/Septic Shock)  Goal: Absence of Infection Signs and Symptoms  Outcome: Ongoing, Progressing     Problem: Nutrition Impaired (Sepsis/Septic Shock)  Goal: Optimal Nutrition Intake  Outcome: Ongoing, Progressing     Problem: Skin Injury Risk Increased  Goal: Skin Health and Integrity  Outcome: Ongoing, Progressing  Intervention: Optimize Skin Protection  Recent Flowsheet Documentation  Taken 7/27/2024 0600 by Palomo Kam, RN  Head of Bed (HOB) Positioning: HOB elevated  Taken 7/27/2024 0400 by Palomo Kam, RN  Head of Bed (HOB) Positioning: HOB elevated  Taken 7/27/2024 0200 by Palomo Kam, JESSIKA  Head of Bed  (HOB) Positioning: HOB elevated  Taken 7/27/2024 0000 by Palomo Kam, RN  Head of Bed (HOB) Positioning: HOB elevated  Taken 7/26/2024 2200 by Palomo Kam, RN  Head of Bed (HOB) Positioning: HOB elevated  Taken 7/26/2024 2000 by Palomo Kam, RN  Head of Bed (HOB) Positioning: HOB elevated     Problem: Noninvasive Ventilation Acute  Goal: Effective Unassisted Ventilation and Oxygenation  Outcome: Ongoing, Progressing     Problem: Communication Impairment (Mechanical Ventilation, Invasive)  Goal: Effective Communication  Outcome: Ongoing, Progressing  Goal: Effective Communication  Outcome: Ongoing, Progressing     Problem: Device-Related Complication Risk (Mechanical Ventilation, Invasive)  Goal: Optimal Device Function  Outcome: Ongoing, Progressing  Goal: Optimal Device Function  Outcome: Ongoing, Progressing     Problem: Inability to Wean (Mechanical Ventilation, Invasive)  Goal: Mechanical Ventilation Liberation  Outcome: Ongoing, Progressing  Intervention: Promote Extubation and Mechanical Ventilation Liberation  Recent Flowsheet Documentation  Taken 7/27/2024 0600 by Palomo Kam, RN  Medication Review/Management: medications reviewed  Taken 7/27/2024 0500 by Palomo Kam, RN  Medication Review/Management: medications reviewed  Taken 7/27/2024 0400 by Palomo Kam, RN  Medication Review/Management: medications reviewed  Taken 7/27/2024 0300 by Palomo Kam, RN  Medication Review/Management: medications reviewed  Taken 7/27/2024 0200 by Palomo Kam, RN  Medication Review/Management: medications reviewed  Taken 7/27/2024 0100 by Palomo Kam, RN  Medication Review/Management: medications reviewed  Taken 7/27/2024 0000 by Palomo Kam, RN  Medication Review/Management: medications reviewed  Taken 7/26/2024 2300 by Palomo Kam, RN  Medication Review/Management: medications reviewed  Taken 7/26/2024 2200 by Palomo Kam  Kory, JESSIKA  Medication Review/Management: medications reviewed  Taken 7/26/2024 2100 by Palomo Kam, RN  Medication Review/Management: medications reviewed  Taken 7/26/2024 2000 by Palomo Kam, RN  Medication Review/Management: medications reviewed  Taken 7/26/2024 1900 by Palomo Kam RN  Medication Review/Management: medications reviewed  Goal: Mechanical Ventilation Liberation  Outcome: Ongoing, Progressing  Intervention: Promote Extubation and Mechanical Ventilation Liberation  Recent Flowsheet Documentation  Taken 7/27/2024 0600 by Palomo Kam, RN  Medication Review/Management: medications reviewed  Taken 7/27/2024 0500 by Palomo Kam, RN  Medication Review/Management: medications reviewed  Taken 7/27/2024 0400 by Palomo Kam RN  Medication Review/Management: medications reviewed  Taken 7/27/2024 0300 by Palomo Kam RN  Medication Review/Management: medications reviewed  Taken 7/27/2024 0200 by Palomo Kam, RN  Medication Review/Management: medications reviewed  Taken 7/27/2024 0100 by Palomo Kam, RN  Medication Review/Management: medications reviewed  Taken 7/27/2024 0000 by Palomo Kam, RN  Medication Review/Management: medications reviewed  Taken 7/26/2024 2300 by Palomo Kam, RN  Medication Review/Management: medications reviewed  Taken 7/26/2024 2200 by Palomo Kam, RN  Medication Review/Management: medications reviewed  Taken 7/26/2024 2100 by Palomo Kam, RN  Medication Review/Management: medications reviewed  Taken 7/26/2024 2000 by Palomo Kam, RN  Medication Review/Management: medications reviewed  Taken 7/26/2024 1900 by Palomo Kam, RN  Medication Review/Management: medications reviewed     Problem: Nutrition Impairment (Mechanical Ventilation, Invasive)  Goal: Optimal Nutrition Delivery  Outcome: Ongoing, Progressing  Goal: Optimal Nutrition Delivery  Outcome:  Ongoing, Progressing     Problem: Skin and Tissue Injury (Mechanical Ventilation, Invasive)  Goal: Absence of Device-Related Skin and Tissue Injury  Outcome: Ongoing, Progressing  Goal: Absence of Device-Related Skin and Tissue Injury  Outcome: Ongoing, Progressing     Problem: Ventilator-Induced Lung Injury (Mechanical Ventilation, Invasive)  Goal: Absence of Ventilator-Induced Lung Injury  Outcome: Ongoing, Progressing  Intervention: Prevent Ventilator-Associated Pneumonia  Recent Flowsheet Documentation  Taken 7/27/2024 0600 by Palomo Kam RN  Head of Bed ([facility]) Positioning: HOB elevated  Taken 7/27/2024 0515 by Palomo Kam RN  Oral Care:   suction provided   swabbed with antiseptic solution  Taken 7/27/2024 0400 by Palomo Kam RN  Head of Bed ([facility]) Positioning: HOB elevated  Oral Care:   suction provided   swabbed with antiseptic solution  Taken 7/27/2024 0200 by Palomo Kam RN  Head of Bed ([facility]) Positioning: HOB elevated  Taken 7/27/2024 0000 by Palomo Kam RN  Head of Bed ([facility]) Positioning: HOB elevated  Oral Care:   suction provided   swabbed with antiseptic solution  Taken 7/26/2024 2200 by Palomo Kam RN  Head of Bed ([facility]) Positioning: HOB elevated  Taken 7/26/2024 2000 by Palomo Kam RN  Head of Bed ([facility]) Positioning: HOB elevated  Oral Care:   suction provided   swabbed with antiseptic solution  Goal: Absence of Ventilator-Induced Lung Injury  Outcome: Ongoing, Progressing  Intervention: Prevent Ventilator-Associated Pneumonia  Recent Flowsheet Documentation  Taken 7/27/2024 0600 by Palomo Kam RN  Head of Bed ([facility]) Positioning: HOB elevated  Taken 7/27/2024 0515 by Palomo Kam, RN  Oral Care:   suction provided   swabbed with antiseptic solution  Taken 7/27/2024 0400 by Palomo Kam RN  Head of Bed ([facility]) Positioning: HOB elevated  Oral Care:   suction provided   swabbed with antiseptic solution  Taken  7/27/2024 0200 by Palomo Kam, RN  Head of Bed (\[facility]") Positioning: HOB elevated  Taken 7/27/2024 0000 by Palomo Kam RN  Head of Bed (\[facility]\"") Positioning: HOB elevated  Oral Care:   suction provided   swabbed with antiseptic solution  Taken 7/26/2024 2200 by Palomo Kam, RN  Head of Bed (\[facility]\"") Positioning: HOB elevated  Taken 7/26/2024 2000 by Palomo Kam, RN  Head of Bed (\[facility]\"") Positioning: HOB elevated  Oral Care:   suction provided   swabbed with antiseptic solution   Goal Outcome Evaluation:  Plan of Care Reviewed With: patient        Progress: no change

## 2024-07-27 NOTE — PLAN OF CARE
Goal Outcome Evaluation:              Outcome Evaluation: Pt remains intubated/sedated. SAT done today-pt failed. Had to be manually ventilated with ambu bag due to pt desatting and asynchronous with the vent. Propofol and Fentanyl restarted. Yair started. Rectal tube inserted. UOP adequate. VSS.

## 2024-07-28 NOTE — PROGRESS NOTES
Surgery follow-up for chest tube management.    Patient is sedated on vent.  Per pulmonary note, weaning trial in process    Exam:  Lungs: Coarse breath sounds bilaterally  Left chest tube: Minimal drainage, no airleak    Status post left chest tube  Plan: As long as patient on positive pressure will leave chest tube in place.    If patient has not made comfort measures may need tracheostomy.

## 2024-07-28 NOTE — PLAN OF CARE
Goal Outcome Evaluation:               Patient still intubated, No weaning trial today.

## 2024-07-28 NOTE — PROGRESS NOTES
[facility] HOSPITALIST PROGRESS NOTE     Patient Identification:  Name:  Lexie KEITH Valdez  Age:  64 y.o.  Sex:  female  :  1960  MRN:  [id number]  Visit Number:  [id number]  ROOM: [address]     Primary Care Provider:  Violet Pop APRN     Date of Admission: 2024    Length of stay in inpatient status:  10    Subjective     Chief Compliant:    Chief Complaint   Patient presents with    Shortness of Breath     History of Presenting Illness:  The patient remains intubated and sedated with propofol and thus I was unable to obtain a history from her.  No family members were present during my evaluation.  Nurses Andreas and Shavon were present during my rounds.  The patient remains on low vent settings, though she continues to breathe dyssynchronous with the ventilator despite propofol, fentanyl, and Precedex.  She was restarted on Yair-synephrine but at a low dose.  The     Consults:  Arjun Mcmahon and Christopher with pulmonology  Arjun Acevedo and Fox with cardiology  Dr. Clemons with infectious disease  Arjun Soto and Jacob with general surgery     Procedures:  2024:  Oral intubated and mechanical ventilation  2024:  Right internal jugular triple lumen central line placement  2024:  Placement of a left sided chest tube due to pneumothorax    Objective     Current Hospital Meds:  aspirin, 81 mg, Oral, Daily  Brexpiprazole, 0.5 mg, Oral, Daily  chlorhexidine, 15 mL, Mouth/Throat, Q12H  donepezil, 10 mg, Oral, Q PM  doxycycline, 100 mg, Oral, Q12H  enoxaparin, 40 mg, Subcutaneous, Nightly  hydroxychloroquine, 200 mg, Oral, BID  insulin regular, 2-7 Units, Subcutaneous, Q6H  ipratropium-albuterol, 3 mL, Nebulization, Q6H - RT  levothyroxine, 25 mcg, Oral, Daily With Breakfast  magic barrier cream, , Topical, BID  memantine, 5 mg, Oral, Q12H  methylPREDNISolone sodium succinate, 60 mg, Intravenous, Q12H  [Held by provider] metoprolol succinate XL, 25 mg, Oral, Q24H  nystatin, 5 mL, Oral, 4x  Daily  nystatin, 1 Application, Topical, Q12H  pantoprazole, 40 mg, Intravenous, Q AM  Phenylephrine HCl-NaCl, , ,   Phenylephrine HCl-NaCl, , ,   sodium chloride, 10 mL, Intravenous, Q12H  sodium chloride, 10 mL, Intravenous, Q12H  sodium chloride, 10 mL, Intravenous, Q12H  sodium chloride, 10 mL, Intravenous, Q12H  sodium chloride, 10 mL, Intravenous, Q12H  sodium chloride, 10 mL, Intravenous, Q12H    dexmedetomidine, 0.2-1.5 mcg/kg/hr, Last Rate: 1.3 mcg/kg/hr (07/28/24 0429)  fentanyl 10 mcg/mL,  mcg/hr, Last Rate: 100 mcg/hr (07/28/24 0950)  Pharmacy Consult,   phenylephrine, 0.5-3 mcg/kg/min (Dosing Weight), Last Rate: 0.5 mcg/kg/min (07/28/24 0903)  propofol, 5-50 mcg/kg/min (Dosing Weight), Last Rate: Stopped (07/28/24 0919)      Current Antimicrobial Therapy:  Anti-Infectives (From admission, onward)      Ordered     Dose/Rate Route Frequency Start Stop    07/21/24 2206  doxycycline (MONODOX) capsule 100 mg        Ordering Provider: Jessica Ospina MD    100 mg Oral Every 12 Hours Scheduled 07/21/24 2300 07/30/24 1641    07/21/24 0424  cefepime 2000 mg IVPB in 100 mL NS (VTB)        Ordering Provider: Estuardo Charles MD    2,000 mg  over 4 Hours Intravenous Every 8 Hours 07/21/24 1400 07/28/24 0942    07/21/24 0856  vancomycin 1250 mg/250 mL 0.9% NS IVPB ([facility])        Ordering Provider: Eduardo Freeman MD    1,250 mg  over 75 Minutes Intravenous Once 07/21/24 1000 07/21/24 1114    07/21/24 0424  cefepime 2000 mg IVPB in 100 mL NS (VTB)        Ordering Provider: Estuardo Charles MD    2,000 mg  over 30 Minutes Intravenous Once 07/21/24 0600 07/21/24 0558    07/19/24 1540  hydroxychloroquine (PLAQUENIL) tablet 200 mg        Ordering Provider: Estuardo Charles MD    200 mg Oral 2 Times Daily 07/19/24 2100      07/17/24 7439  cefTRIAXone (ROCEPHIN) 2,000 mg in sodium chloride 0.9 % 100 mL IVPB-VTB        Ordering Provider: Al Lewis MD    2,000 mg  200 mL/hr over 30  Minutes Intravenous Once 07/17/24 2234 07/18/24 0035          Current Diuretic Therapy:  Diuretics (From admission, onward)      Ordered     Dose/Rate Route Frequency Start Stop    07/26/24 1220  bumetanide (BUMEX) injection 1 mg        Ordering Provider: Yamel Lorenzo APRN    1 mg Intravenous Once 07/26/24 1245 07/26/24 1320    07/24/24 1151  furosemide (LASIX) injection 20 mg        Ordering Provider: Rinku Braxton MD    20 mg Intravenous Once 07/24/24 1245 07/24/24 1244    07/21/24 0637  furosemide (LASIX) injection 40 mg        Ordering Provider: Estuardo Charles MD    40 mg Intravenous Once 07/21/24 0730 07/21/24 0914    07/20/24 2323  furosemide (LASIX) injection 40 mg        Ordering Provider: Estuardo Charles MD    40 mg Intravenous Once 07/21/24 0015 07/21/24 0008          ----------------------------------------------------------------------------------------------------------------------  Vital Signs:  Temp:  [96.8 °F (36 °C)-100 °F (37.8 °C)] 99.7 °F (37.6 °C)  Heart Rate:  [] 62  Resp:  [28-33] 29  BP: ()/(32-95) 125/66  FiO2 (%):  [28 %] 28 %  SpO2:  [91 %-100 %] 99 %  on   ;   Device (Oxygen Therapy): ventilator  Body mass index is 24.89 kg/m².    Wt Readings from Last 3 Encounters:   07/28/24 65.8 kg (145 lb 1 oz)   04/26/24 67.7 kg (149 lb 4 oz)   04/19/24 67.1 kg (148 lb)     Intake & Output (last 3 days)         07/25 0701 07/26 0700 07/26 0701 07/27 0700 07/27 0701 07/28 0700 07/28 0701 07/29 0700    I.V. (mL/kg) 1414.5 (24.7) 698.3 (12.2) 1024.4 (17.9)     Other 484 444 241     NG/ 671 327     IV Piggyback  100 200     Total Intake(mL/kg) 2587.5 (45.2) 1913.3 (33.4) 1792.4 (31.3)     Urine (mL/kg/hr) 800 (0.6) 1140 (0.8) 1700 (1.2)     Stool   300     Chest Tube 45 25 13     Total Output 845 1165 2013     Net +1742.5 +748.3 -220.6             Stool Unmeasured Occurrence   4 x           NPO Diet NPO Type: Tube  Feeding  ----------------------------------------------------------------------------------------------------------------------  Physical Exam; the exam is the same as yesterday.  Constitutional:       General: She is in acute distress, more so today prior to removal of the mucus in the ETT.     Appearance: Normal appearance. She is well-developed. She is not toxic-appearing or diaphoretic.      Interventions: She is sedated and intubated.      Comments: Appears acutely and chronically ill.   HENT:      Head: Normocephalic and atraumatic.  The subcutaneous emphysema on the left side of the neck has almost resolved.     Right Ear: External ear normal.      Left Ear: External ear normal.      Nose: Nose normal.   Eyes:      General: No scleral icterus.        Right eye: No discharge.         Left eye: No discharge.      Conjunctiva/sclera: Conjunctivae normal.      Pupils: Pupils are equal, round, and reactive to light.   Cardiovascular:      Rate and Rhythm: Normal rate and regular rhythm.      Pulses: Normal pulses.      Heart sounds: No murmur heard.  Pulmonary:      Effort: Respiratory distress present and appears to be the same as yesterday. She remains intubated.      Breath sounds: She has fair to good breath sounds on the left side; chest tube is still in place, still on suction, and no bubbles are in chamber C.  I do not hear any crackles nor any wheezes.  Abdominal:      General: Bowel sounds are normal. There is no distension.      Palpations: Abdomen is soft.      Rectal tube in place with green and liquid stool in the container.  Musculoskeletal:         General: No swelling, deformity or signs of injury.   Skin:     Capillary Refill: Capillary refill takes less than 2 seconds.      Coloration: Skin is not jaundiced or pale.  She has bruising on her bilateral arms from procedures.  Neurological:      Motor: No tremors again today; I did not see the patient move her arms and legs today due to the  sedation.     Comments: Sedated by three medications and orally intubated.  Thus, I cannot perform this portion of the physical exam.    Psychiatric:      Comments: Sedated by three medications and thus I cannot perform this portion of the physical exam.   ----------------------------------------------------------------------------------------------------------------------  Tele:  NS with heart rates 50-60s.  I have personally reviewed/looked at the telemetry strips.   ----------------------------------------------------------------------------------------------------------------------  LABS:    Pending test results:  None    CBC and coagulation:  Results from last 7 days   Lab Units 07/28/24  0053 07/27/24  1143 07/26/24  0441 07/25/24  0006 07/24/24  0022 07/23/24  0058 07/22/24  0012 07/21/24  1309   PROCALCITONIN ng/mL  --   --   --   --   --   --  0.24 0.21   LACTATE mmol/L  --   --   --   --  1.5 1.5  --   --    CRP mg/dL  --   --   --   --  3.89* 10.32*  --   --    WBC 10*3/mm3 22.69* 16.39* 17.54* 21.27* 22.14* 10.24 16.58*  --    HEMOGLOBIN g/dL 10.3* 10.0* 9.1* 9.6* 9.6* 9.5* 9.9*  --    HEMATOCRIT % 32.8* 31.9* 29.4* 30.8* 30.0* 29.6* 30.7*  --    MCV fL 101.9* 101.3* 101.7* 101.7* 98.4* 97.4* 95.6  --    MCHC g/dL 31.4* 31.3* 31.0* 31.2* 32.0 32.1 32.2  --    PLATELETS 10*3/mm3 224 205 225 303 287 235 227  --    INR   --  1.00 1.09  --   --   --   --   --      Renal and electrolytes:  Results from last 7 days   Lab Units 07/28/24  0053 07/27/24  1143 07/26/24  1501 07/26/24  0441 07/25/24  0949 07/25/24  0006 07/24/24  0022 07/23/24  0058 07/23/24  0058 07/22/24  0012   SODIUM mmol/L 141 142  --  142  --  144 133*  --  139 135*   POTASSIUM mmol/L 4.9 5.2  --  4.7  --  5.1 4.0  --  3.7 4.5   MAGNESIUM mg/dL 2.5* 2.4  --  2.5*  --  2.5* 2.5*  --  2.4  --    CHLORIDE mmol/L 107 109*  --  112*  --  114* 103  --  106 100   CO2 mmol/L 24.2 26.1  --  25.6  --  24.9 25.0  --  24.1 23.9   BUN mg/dL 23 29*  --   29*  --  20 17  --  20 25*   CREATININE mg/dL 0.26* 0.29*  --  0.31*  --  0.33* 0.38*  --  0.45* 0.47*   CALCIUM mg/dL 8.3* 8.1*  --  8.0*  --  8.1* 8.2*  --  8.2* 8.2*   IONIZED CALCIUM mmol/L  --   --   --   --   --  1.12  --   --   --   --    PHOSPHORUS mg/dL 3.5 3.4 2.7 2.2* 2.5 2.0* 1.4*   < > 1.2*  --    GLUCOSE mg/dL 159* 161*  --  115*  --  138* 164*  --  208* 191*   ANION GAP mmol/L 9.8 6.9  --  4.4*  --  5.1 5.0  --  8.9 11.1    < > = values in this interval not displayed.     Estimated Creatinine Clearance: 201.3 mL/min (A) (by C-G formula based on SCr of 0.26 mg/dL (L)).    Liver and pancreatic function:  Results from last 7 days   Lab Units 07/28/24  0053 07/27/24  1143 07/26/24  0441 07/24/24  0022 07/23/24  0058 07/22/24  0012   ALBUMIN g/dL 3.1* 3.1* 3.1* 3.5 3.6 4.1   BILIRUBIN mg/dL 0.3 0.2 <0.2 <0.2 0.2 0.4   ALK PHOS U/L 83 78 70 89 104 142*   AST (SGOT) U/L 23 23 21 22 26 33*   ALT (SGPT) U/L 24 26 17 14 15 10     Endocrine function:  Point of care bedside glucose levels:  Results from last 7 days   Lab Units 07/28/24  0535 07/27/24  2350 07/27/24  1801 07/27/24  1146 07/27/24  0533 07/27/24  0017 07/26/24  1748 07/26/24  1246 07/26/24  0559 07/26/24  0024 07/25/24  1658 07/25/24  1120 07/25/24  0513 07/25/24  0020   GLUCOSE mg/dL 126 154* 128 156* 141* 172* 172* 166* 117 145* 109 167* 111 135*     Glucose levels from the [facility]:  Results from last 7 days   Lab Units 07/28/24  0053 07/27/24  1143 07/26/24  0441 07/25/24  0006 07/24/24  0022 07/23/24  0058 07/22/24  0012   GLUCOSE mg/dL 159* 161* 115* 138* 164* 208* 191*       Microbiology Results (last 10 days)       Procedure Component Value - Date/Time    Aspergillus Galactomannan Antigen - Blood, Arm, Right [[id number]] Collected: 07/22/24 1410    Lab Status: Final result Specimen: Blood from Arm, Right Updated: 07/26/24 0254     Aspergillus Ag, BAL/Serum 0.04 Index     Mycoplasma Pneumoniae Antibody, IgM - Blood, Arm, Left [[id number]]  (Normal)  Collected: 07/22/24 0012    Lab Status: Final result Specimen: Blood from Arm, Left Updated: 07/22/24 0202     Mycoplasma pneumo IgM Negative    Legionella Antigen, Urine - Urine, Urine, Clean Catch [[id number]]  (Normal) Collected: 07/21/24 2242    Lab Status: Final result Specimen: Urine, Clean Catch Updated: 07/21/24 2308     LEGIONELLA ANTIGEN, URINE Negative    Narrative:      Presumptive negative for L. pneumophilia serogroup 1 antigen, suggesting no recent or current infection.    Respiratory Culture - Sputum, ET Suction [[id number]] Collected: 07/21/24 2231    Lab Status: Final result Specimen: Sputum from ET Suction Updated: 07/24/24 1119     Respiratory Culture No growth     Gram Stain Rare (1+) Mixed ammon      No WBCs seen      No Epithelial cells seen    MRSA Screen, PCR (Inpatient) - Swab, Nares [[id number]]  (Normal) Collected: 07/21/24 1252    Lab Status: Final result Specimen: Swab from Nares Updated: 07/21/24 1424     MRSA PCR No MRSA Detected    Blood Culture - Blood, Arm, Left [[id number]]  (Normal) Collected: 07/21/24 0450    Lab Status: Final result Specimen: Blood from Arm, Left Updated: 07/26/24 0515     Blood Culture No growth at 5 days    Blood Culture - Blood, Arm, Right [[id number]]  (Normal) Collected: 07/21/24 0448    Lab Status: Final result Specimen: Blood from Arm, Right Updated: 07/26/24 0515     Blood Culture No growth at 5 days       I have personally looked at the labs and they are summarized above.  ----------------------------------------------------------------------------------------------------------------------  Detailed radiology reports for the last 24 hours:    Imaging Results (Last 24 Hours)       Procedure Component Value Units Date/Time    XR Chest 1 View [[id number]] Collected: 07/27/24 1746     Updated: 07/27/24 1749    Narrative:      INDICATION: Difficulty breathing.     TECHNIQUE: Frontal radiograph of the chest.     COMPARISON: Radiograph from yesterday     FINDINGS:    Cardiomegaly. Multifocal infiltrates/edema, unchanged. No pleural  effusion or pneumothorax. Endotracheal tube, enteric tube, left-sided  chest tube and central venous catheter in similar position.       Impression:      No significant interval change allowing for differences in technique.        This report was finalized on 7/27/2024 5:47 PM by Alex Pallas, DO.             I have personally looked at the radiology images and I have read the available final report.    Assessment & Plan      -Acute NSTEMI, type 1 (positive stress test on 7/18/2024 without any reversible ischemia, that was present on admission  -History of IPF and COPD with chronic hypoxia (uses 3 L/min at home)  -Acute exacerbation of IPF/COPD causing acute hypoxic and hypercapnic respiratory failure on top of chronic hypoxic respiratory failure and sepsis (developed on 7/21/2024 due to a suspected aspiration episode) resulting in oral intubation and mechanical ventilation from aspiration pneumonitis  -Hypotension, septic vs sedation vs diuretic induced, developed during admission  -New onset heart failure with reduced EF, acute exacerbation that developed on 7/21/2024  -Hypocalcemia that developed during admission  -Prolonged QT that developed during the hospitalization  -History of essential hypertension  -History of hypothyroidism  -History of stress urinary incontinence with frequent UTI's, with an acute E coli UTI that was present on admission  -History of intermittent, diffuse body tremors    She still has a poor prognosis.  She is not going to be able to wean off the ventilator quickly and thus she will likely need a tracheostomy and PEG tube soon.  However, she is on low ventilator settings.  Will continue to wean the Yair-synephrine for MAP of 65 mmHg or above.  Will repeat the labs in the am.  Will continue the chest tube on suction.  The antibiotics will end soon.    VTE Prophylaxis:  Lovenox at DVT dosing    The patient is high risk due  to the following diagnoses/reasons:  Acute MI, IPF with acute exacerbation, new left sided pneumothorax, new onset heart failure      Disposition:  Undetermined at this time    Jessica Ospina MD  [facility]  07/28/24  09:53 EDT

## 2024-07-28 NOTE — PROGRESS NOTES
PROGRESS NOTE         Patient Identification:  Name:  Lexie Valdez  Age:  64 y.o.  Sex:  female  :  1960  MRN:  [id number]  Visit Number:  [id number]  Primary Care Provider:  Violet Pop APRN         LOS: 10 days       ----------------------------------------------------------------------------------------------------------------------  Subjective       Chief Complaints:    Shortness of Breath        Interval History:      The patient remains intubated and sedated at 28% FiO2.  Sedation infusing.  Phenylephrine has been restarted.  Tmax 9 9.9 °F.  No reports of diarrhea.  Mechanical breath sounds remain clear to auscultation bilaterally.  Chest tube to the left chest wall.  Abdomen remains soft and rounded, normoactive bowel sounds.  Leukocytosis is worsened today at 22.69.      Review of Systems:    Unable to obtain.  Intubated and sedated.    ----------------------------------------------------------------------------------------------------------------------      Objective       Current Hospital Meds:  aspirin, 81 mg, Oral, Daily  Brexpiprazole, 0.5 mg, Oral, Daily  chlorhexidine, 15 mL, Mouth/Throat, Q12H  donepezil, 10 mg, Oral, Q PM  doxycycline, 100 mg, Oral, Q12H  enoxaparin, 40 mg, Subcutaneous, Nightly  hydroxychloroquine, 200 mg, Oral, BID  insulin regular, 2-7 Units, Subcutaneous, Q6H  ipratropium-albuterol, 3 mL, Nebulization, Q6H - RT  levothyroxine, 25 mcg, Oral, Daily With Breakfast  magic barrier cream, , Topical, BID  memantine, 5 mg, Oral, Q12H  methylPREDNISolone sodium succinate, 60 mg, Intravenous, Q12H  [Held by provider] metoprolol succinate XL, 25 mg, Oral, Q24H  nystatin, 5 mL, Oral, 4x Daily  nystatin, 1 Application, Topical, Q12H  pantoprazole, 40 mg, Intravenous, Q AM  Phenylephrine HCl-NaCl, , ,   Phenylephrine HCl-NaCl, , ,   sodium chloride, 10 mL, Intravenous, Q12H  sodium chloride, 10 mL, Intravenous, Q12H  sodium chloride, 10 mL, Intravenous, Q12H  sodium  chloride, 10 mL, Intravenous, Q12H  sodium chloride, 10 mL, Intravenous, Q12H  sodium chloride, 10 mL, Intravenous, Q12H      dexmedetomidine, 0.2-1.5 mcg/kg/hr, Last Rate: 1.3 mcg/kg/hr (07/28/24 1153)  fentanyl 10 mcg/mL,  mcg/hr, Last Rate: 200 mcg/hr (07/28/24 1121)  Pharmacy Consult,   phenylephrine, 0.5-3 mcg/kg/min (Dosing Weight), Last Rate: 0.5 mcg/kg/min (07/28/24 0903)  propofol, 5-50 mcg/kg/min (Dosing Weight), Last Rate: 45 mcg/kg/min (07/28/24 1153)      ----------------------------------------------------------------------------------------------------------------------    Vital Signs:  Temp:  [96.8 °F (36 °C)-100 °F (37.8 °C)] 99.5 °F (37.5 °C)  Heart Rate:  [] 53  Resp:  [28-33] 29  BP: ()/(32-94) 138/75  FiO2 (%):  [28 %] 28 %  Mean Arterial Pressure (Non-Invasive) for the past 24 hrs (Last 3 readings):   Noninvasive MAP (mmHg)   07/28/24 1200 95   07/28/24 1145 95   07/28/24 1130 99     SpO2 Percentage    07/28/24 1130 07/28/24 1145 07/28/24 1200   SpO2: 99% 100% 100%     SpO2:  [91 %-100 %] 100 %  on   ;   Device (Oxygen Therapy): ventilator    Body mass index is 24.89 kg/m².  Wt Readings from Last 3 Encounters:   07/28/24 65.8 kg (145 lb 1 oz)   04/26/24 67.7 kg (149 lb 4 oz)   04/19/24 67.1 kg (148 lb)        Intake/Output Summary (Last 24 hours) at 7/28/2024 1219  Last data filed at 7/28/2024 0542  Gross per 24 hour   Intake 1792.44 ml   Output 2013 ml   Net -220.56 ml     NPO Diet NPO Type: Tube Feeding  ----------------------------------------------------------------------------------------------------------------------      Physical Exam:    Constitutional: Thin, frail, chronically ill-appearing  female.  Remains intubated and sedated 28% FiO2.  Phenylephrine has been reinitiated.    HENT:  Head: Normocephalic and atraumatic.  Mouth:  Moist mucous membranes.    Eyes:  Conjunctivae and EOM are normal.  No scleral icterus.  Neck:  Neck supple.  No JVD present.     Cardiovascular:  Normal rate, regular rhythm and normal heart sounds with no murmur. No edema.  Pulmonary/Chest: Mechanical breath sounds remain clear bilaterally.   Left-sided chest tube in place.  Abdominal:  Soft.  Bowel sounds are normal.  No distension and no tenderness.   Musculoskeletal:  No edema, no tenderness, and no deformity.  No swelling or redness of joints.  Neurological: Sedate.  Skin:  Skin is warm and dry.  No rash noted.  No pallor.   Psychiatric: Sedate.        ----------------------------------------------------------------------------------------------------------------------  Results from last 7 days   Lab Units 07/23/24  0258 07/23/24  0058   HSTROP T ng/L 66* 75*     Results from last 7 days   Lab Units 07/26/24  0441 07/22/24  0012   PROBNP pg/mL 11,410.0* 14,737.0*           Results from last 7 days   Lab Units 07/27/24  1726   PH, ARTERIAL pH units 7.345*   PO2 ART mm Hg 76.7*   PCO2, ARTERIAL mm Hg 52.1*   HCO3 ART mmol/L 28.4*     Results from last 7 days   Lab Units 07/28/24  0053 07/27/24  1143 07/26/24  0441 07/25/24  0006 07/24/24  0022 07/23/24  0058   CRP mg/dL  --   --   --   --  3.89* 10.32*   LACTATE mmol/L  --   --   --   --  1.5 1.5   WBC 10*3/mm3 22.69* 16.39* 17.54*   < > 22.14* 10.24   HEMOGLOBIN g/dL 10.3* 10.0* 9.1*   < > 9.6* 9.5*   HEMATOCRIT % 32.8* 31.9* 29.4*   < > 30.0* 29.6*   MCV fL 101.9* 101.3* 101.7*   < > 98.4* 97.4*   MCHC g/dL 31.4* 31.3* 31.0*   < > 32.0 32.1   PLATELETS 10*3/mm3 224 205 225   < > 287 235   INR   --  1.00 1.09  --   --   --     < > = values in this interval not displayed.     Results from last 7 days   Lab Units 07/28/24  0053 07/27/24  1143 07/26/24  0441   SODIUM mmol/L 141 142 142   POTASSIUM mmol/L 4.9 5.2 4.7   MAGNESIUM mg/dL 2.5* 2.4 2.5*   CHLORIDE mmol/L 107 109* 112*   CO2 mmol/L 24.2 26.1 25.6   BUN mg/dL 23 29* 29*   CREATININE mg/dL 0.26* 0.29* 0.31*   CALCIUM mg/dL 8.3* 8.1* 8.0*   GLUCOSE mg/dL 159* 161* 115*   ALBUMIN  "g/dL 3.1* 3.1* 3.1*   BILIRUBIN mg/dL 0.3 0.2 <0.2   ALK PHOS U/L 83 78 70   AST (SGOT) U/L 23 23 21   ALT (SGPT) U/L 24 26 17   Estimated Creatinine Clearance: 201.3 mL/min (A) (by C-G formula based on SCr of 0.26 mg/dL (L)).  No results found for: \"AMMONIA\"    Glucose   Date/Time Value Ref Range Status   07/28/2024 1121 180 (H) 70 - 130 mg/dL Final   07/28/2024 0535 126 70 - 130 mg/dL Final   07/27/2024 2350 154 (H) 70 - 130 mg/dL Final   07/27/2024 1801 128 70 - 130 mg/dL Final   07/27/2024 1146 156 (H) 70 - 130 mg/dL Final   07/27/2024 0533 141 (H) 70 - 130 mg/dL Final   07/27/2024 0017 172 (H) 70 - 130 mg/dL Final   07/26/2024 1748 172 (H) 70 - 130 mg/dL Final     Lab Results   Component Value Date    HGBA1C 4.60 (L) 07/18/2024     Lab Results   Component Value Date    TSH 1.140 07/17/2024    FREET4 1.83 (H) 04/11/2024       Blood Culture   Date Value Ref Range Status   07/21/2024 No growth at 2 days  Preliminary   07/21/2024 No growth at 2 days  Preliminary   07/17/2024 No growth at 5 days  Final   07/17/2024 No growth at 5 days  Final     Urine Culture   Date Value Ref Range Status   07/17/2024 >100,000 CFU/mL Escherichia coli (A)  Final     No results found for: \"WOUNDCX\"  No results found for: \"STOOLCX\"  Respiratory Culture   Date Value Ref Range Status   07/21/2024 No growth  Preliminary   07/21/2024   Final    Rare growth Normal respiratory ammon. No S. aureus or Pseudomonas aeruginosa detected. Final report.     Pain Management Panel           No data to display                  ----------------------------------------------------------------------------------------------------------------------  Imaging Results (Last 24 Hours)       Procedure Component Value Units Date/Time    XR Chest 1 View [[id number]] Collected: 07/27/24 1746     Updated: 07/27/24 1749    Narrative:      INDICATION: Difficulty breathing.     TECHNIQUE: Frontal radiograph of the chest.     COMPARISON: Radiograph from yesterday   "   FINDINGS:   Cardiomegaly. Multifocal infiltrates/edema, unchanged. No pleural  effusion or pneumothorax. Endotracheal tube, enteric tube, left-sided  chest tube and central venous catheter in similar position.       Impression:      No significant interval change allowing for differences in technique.        This report was finalized on 7/27/2024 5:47 PM by Alex Pallas, DO.               ----------------------------------------------------------------------------------------------------------------------    Pertinent Infectious Disease Results                Assessment/Plan       Assessment     Septic shock with acute hypoxic respiratory failure requiring mechanical ventilation and pressor support  Pneumonia        Plan      The patient remains intubated and sedated at 28% FiO2.  Sedation infusing.  Phenylephrine has been restarted.  Tmax 9 9.9 °F.  No reports of diarrhea.  Mechanical breath sounds remain clear to auscultation bilaterally.  Chest tube to the left chest wall.  Abdomen remains soft and rounded, normoactive bowel sounds.  Leukocytosis is worsened today at 22.69.    In the setting of worsening leukocytosis and low-grade fever with reinitiation of vasopressors, chest x-ray and blood cultures x 2 have been ordered.  For now we will continue with cefepime and doxycycline courses but will have a low threshold to escalate antibiotic coverage.  We will monitor closely.      ANTIMICROBIAL THERAPY    doxycycline - 100 MG     Code Status:   Code Status and Medical Interventions: No CPR (Do Not Attempt to Resuscitate); Full Support   Ordered at: 07/25/24 3412     Level Of Support Discussed With:    Next of Kin (If No Surrogate)    Health Care Surrogate     Code Status (Patient has no pulse and is not breathing):    No CPR (Do Not Attempt to Resuscitate)     Medical Interventions (Patient has pulse or is breathing):    Full Support       Deirdre Davila, SG  07/28/24  12:19 EDT

## 2024-07-28 NOTE — PLAN OF CARE
Goal Outcome Evaluation:  Plan of Care Reviewed With: patient        Progress: no change  Outcome Evaluation: pt VSS, remains intubated and sedated, with propofol, precedex, and fentanyl. pt on a low dose of HAZEL. adequate UOP with low grade tempts throughout the day. Pt. has chest tube still intact and with minimal output.

## 2024-07-28 NOTE — PLAN OF CARE
Goal Outcome Evaluation:  Plan of Care Reviewed With: patient        Progress: no change         Problem: Adult Inpatient Plan of Care  Goal: Plan of Care Review  Outcome: Ongoing, Progressing  Flowsheets (Taken 7/28/2024 0406)  Progress: no change  Plan of Care Reviewed With: patient  Goal: Patient-Specific Goal (Individualized)  Outcome: Ongoing, Progressing  Goal: Absence of Hospital-Acquired Illness or Injury  Outcome: Ongoing, Progressing  Intervention: Identify and Manage Fall Risk  Recent Flowsheet Documentation  Taken 7/28/2024 0400 by Elma Ames RN  Safety Promotion/Fall Prevention:   safety round/check completed   fall prevention program maintained  Taken 7/28/2024 0300 by Elma Ames RN  Safety Promotion/Fall Prevention:   safety round/check completed   fall prevention program maintained  Taken 7/28/2024 0200 by Elma Ames RN  Safety Promotion/Fall Prevention:   safety round/check completed   fall prevention program maintained  Intervention: Prevent Skin Injury  Recent Flowsheet Documentation  Taken 7/28/2024 0400 by Elma Ames RN  Body Position:   turned   right  Taken 7/28/2024 0200 by Elma Ames RN  Body Position:   turned   left  Skin Protection:   tubing/devices free from skin contact   adhesive use limited  Goal: Optimal Comfort and Wellbeing  Outcome: Ongoing, Progressing  Intervention: Provide Person-Centered Care  Recent Flowsheet Documentation  Taken 7/28/2024 0200 by Elma Ames RN  Trust Relationship/Rapport:   care explained   reassurance provided  Goal: Readiness for Transition of Care  Outcome: Ongoing, Progressing     Problem: COPD (Chronic Obstructive Pulmonary Disease) Comorbidity  Goal: Maintenance of COPD Symptom Control  Outcome: Ongoing, Progressing  Intervention: Maintain COPD-Symptom Control  Recent Flowsheet Documentation  Taken 7/28/2024 0400 by Elma Ames RN  Medication Review/Management: medications reviewed  Taken 7/28/2024 0300 by Kwaku  JESSIKA Wills  Medication Review/Management: medications reviewed  Taken 7/28/2024 0200 by Elma Ames RN  Medication Review/Management: medications reviewed     Problem: Fall Injury Risk  Goal: Absence of Fall and Fall-Related Injury  Outcome: Ongoing, Progressing  Intervention: Identify and Manage Contributors  Recent Flowsheet Documentation  Taken 7/28/2024 0400 by Elma Ames RN  Medication Review/Management: medications reviewed  Taken 7/28/2024 0300 by Elma Ames RN  Medication Review/Management: medications reviewed  Taken 7/28/2024 0200 by Elma Ames RN  Medication Review/Management: medications reviewed  Intervention: Promote Injury-Free Environment  Recent Flowsheet Documentation  Taken 7/28/2024 0400 by Elma Ames RN  Safety Promotion/Fall Prevention:   safety round/check completed   fall prevention program maintained  Taken 7/28/2024 0300 by Elma Ames RN  Safety Promotion/Fall Prevention:   safety round/check completed   fall prevention program maintained  Taken 7/28/2024 0200 by Elma Ames RN  Safety Promotion/Fall Prevention:   safety round/check completed   fall prevention program maintained     Problem: Adjustment to Illness (Sepsis/Septic Shock)  Goal: Optimal Coping  Outcome: Ongoing, Progressing  Intervention: Optimize Psychosocial Adjustment to Illness  Recent Flowsheet Documentation  Taken 7/28/2024 0200 by Elma Ames RN  Family/Support System Care: support provided     Problem: Skin Injury Risk Increased  Goal: Skin Health and Integrity  Outcome: Ongoing, Progressing  Intervention: Optimize Skin Protection  Recent Flowsheet Documentation  Taken 7/28/2024 0400 by Elma Ames RN  Head of Bed (HOB) Positioning: HOB at 30-45 degrees  Taken 7/28/2024 0200 by Elma Ames RN  Pressure Reduction Techniques:   weight shift assistance provided   pressure points protected   heels elevated off bed  Head of Bed (HOB) Positioning: HOB at 30-45 degrees  Pressure  Reduction Devices:   pressure-redistributing mattress utilized   positioning supports utilized   heel offloading device utilized  Skin Protection:   tubing/devices free from skin contact   adhesive use limited     Problem: Inability to Wean (Mechanical Ventilation, Invasive)  Goal: Mechanical Ventilation Liberation  Outcome: Ongoing, Progressing  Intervention: Promote Extubation and Mechanical Ventilation Liberation  Recent Flowsheet Documentation  Taken 7/28/2024 0400 by Elma Ames RN  Medication Review/Management: medications reviewed  Taken 7/28/2024 0300 by Elma Ames RN  Medication Review/Management: medications reviewed  Taken 7/28/2024 0200 by Elma Ames RN  Medication Review/Management: medications reviewed  Goal: Mechanical Ventilation Liberation  Outcome: Ongoing, Progressing  Intervention: Promote Extubation and Mechanical Ventilation Liberation  Recent Flowsheet Documentation  Taken 7/28/2024 0400 by Elma Ames RN  Medication Review/Management: medications reviewed  Taken 7/28/2024 0300 by Elma Ames RN  Medication Review/Management: medications reviewed  Taken 7/28/2024 0200 by Elma Ames RN  Medication Review/Management: medications reviewed     Problem: Skin and Tissue Injury (Mechanical Ventilation, Invasive)  Goal: Absence of Device-Related Skin and Tissue Injury  Outcome: Ongoing, Progressing  Intervention: Maintain Skin and Tissue Health  Recent Flowsheet Documentation  Taken 7/28/2024 0200 by Elma Ames RN  Device Skin Pressure Protection:   absorbent pad utilized/changed   positioning supports utilized   pressure points protected  Goal: Absence of Device-Related Skin and Tissue Injury  Outcome: Ongoing, Progressing  Intervention: Maintain Skin and Tissue Health  Recent Flowsheet Documentation  Taken 7/28/2024 0200 by Elma Ames RN  Device Skin Pressure Protection:   absorbent pad utilized/changed   positioning supports utilized   pressure points  protected

## 2024-07-28 NOTE — PROGRESS NOTES
[facility]  PROGRESS NOTE    Patient information:  Name: Lexie Valdez  Age/Sex: 64 y.o. female  :  1960        PCP: Violet Pop APRN  Attending: Estuardo Charles MD  MRN:  [id number]  Visit Number:  [id number]    LOS:  LOS: 10 days     CODE STATUS:    Code Status and Medical Interventions: No CPR (Do Not Attempt to Resuscitate); Full Support   Ordered at: 24 1813     Level Of Support Discussed With:    Next of Kin (If No Surrogate)    Health Care Surrogate     Code Status (Patient has no pulse and is not breathing):    No CPR (Do Not Attempt to Resuscitate)     Medical Interventions (Patient has pulse or is breathing):    Full Support       PROBLEM LIST:Principal Problem:    NSTEMI (non-ST elevated myocardial infarction)      Reason for Cardiology follow-up: NSTEMI    Subjective   ADMISSION INFORMATION:  Chief Complaint   Patient presents with    Shortness of Breath     HPI:  Lexie Valdez is a 64 y.o. 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     Patient presented to [facility] ([facility] emergency room (ER) on 2024 with complaints of increased shortness of breath x 2 weeks has become progressively worse and exacerbated with minimal activity.     Interval History:   Patient is in room CCU 10 and was examined.  Family at bedside.  Patient remains intubated and sedated.  History and review of systems limited.  /67, heart rate 56.  A.m. labs with potassium at 4.9, creatinine 0.26 and hemoglobin stable at 10.3.       Objective     Vital Signs:  Temp:  [96.8 °F (36 °C)-100 °F (37.8 °C)] 99.5 °F (37.5 °C)  Heart Rate:  [] 57  Resp:  [28-33] 28  BP: ()/(32-94) 133/67  FiO2 (%):  [28 %] 28 %  Vital  Signs (last 72 hrs)         07/25 0700 07/26 0659 07/26 0700 07/27 0659 07/27 0700 07/28 0659 07/28 0700 07/28 1355   Most Recent      Temp (°F) 99.1 -  100.1    97.2 -  99.9    96.8 -  99.9    99.5 -  100     99.5 (37.5) 07/28 1200    Heart Rate 66 -  105    55 -  108    52 -  104    53 -  66     57 07/28 1257    Resp 28 - 33    28 - 38    28 -  39 28 - 29 28 07/28 1257    BP 90/40 -  152/77    81/47 -  153/88    71/38 -  169/94    82/44 -  148/74     133/67 07/28 1252    SpO2 (%) 93 -  100    94 -  100    91 -  100    92 -  100     92 07/28 1257    Oxygen Concentration (%)   28 28 28 28 28 07/28 1257          BMI:  Body mass index is 24.89 kg/m².    WEIGHT:      07/25/24  0530 07/28/24  0440   Weight: 62.9 kg (138 lb 10.7 oz) 65.8 kg (145 lb 1 oz)       DIET:  NPO Diet NPO Type: Tube Feeding    I&O:  Intake & Output (last 3 days)         07/25 0701 07/26 0700 07/26 0701 07/27 0700 07/27 0701 07/28 0700 07/28 0701 07/29 0700    I.V. (mL/kg) 1414.5 (24.7) 698.3 (12.2) 1024.4 (17.9)     Other 484 444 241     NG/ 671 327     IV Piggyback  100 200     Total Intake(mL/kg) 2587.5 (45.2) 1913.3 (33.4) 1792.4 (31.3)     Urine (mL/kg/hr) 800 (0.6) 1140 (0.8) 1700 (1.2)     Stool   300     Chest Tube 45 25 13     Total Output 845 1165 2013     Net +1742.5 +748.3 -220.6             Stool Unmeasured Occurrence   4 x             PHYSICAL EXAM:  Vitals reviewed.   Constitutional:       Interventions: Sedated and intubated.   HENT:      Head: Normocephalic.   Neck:      Vascular: No JVD.   Pulmonary:      Effort: Pulmonary effort is normal. Intubated.      Comments: Mechanical breath sounds, chest tube  Cardiovascular:      Normal rate. Regular rhythm.   Edema:     Peripheral edema absent.   Abdominal:      General: Bowel sounds are normal.      Palpations: Abdomen is soft.   Musculoskeletal: Normal range of motion.      Cervical back: Normal range of motion and neck supple. Skin:      General: Skin is warm and dry.                     Results review   Results Review:    I have reviewed the patient's new clinical results. 07/28/24 13:55 EDT    Results Review:   Results from last 7 days   Lab Units 07/28/24  0053 07/27/24  1143 07/26/24  0441 07/25/24  0006 07/24/24  0022 07/23/24  0058 07/22/24  0012   WBC 10*3/mm3 22.69* 16.39* 17.54* 21.27* 22.14* 10.24 16.58*   HEMOGLOBIN g/dL 10.3* 10.0* 9.1* 9.6* 9.6* 9.5* 9.9*   PLATELETS 10*3/mm3 224 205 225 303 287 235 227     Results from last 7 days   Lab Units 07/28/24  0053 07/27/24  1143 07/26/24  0441 07/25/24  0006 07/24/24  0022 07/23/24  0058 07/22/24  0012   SODIUM mmol/L 141 142 142 144 133* 139 135*   POTASSIUM mmol/L 4.9 5.2 4.7 5.1 4.0 3.7 4.5   CHLORIDE mmol/L 107 109* 112* 114* 103 106 100   CO2 mmol/L 24.2 26.1 25.6 24.9 25.0 24.1 23.9   BUN mg/dL 23 29* 29* 20 17 20 25*   CREATININE mg/dL 0.26* 0.29* 0.31* 0.33* 0.38* 0.45* 0.47*   CALCIUM mg/dL 8.3* 8.1* 8.0* 8.1* 8.2* 8.2* 8.2*   GLUCOSE mg/dL 159* 161* 115* 138* 164* 208* 191*   ALT (SGPT) U/L 24 26 17  --  14 15 10   AST (SGOT) U/L 23 23 21  --  22 26 33*     Results from last 7 days   Lab Units 07/23/24  0258 07/23/24 0058   HSTROP T ng/L 66* 75*     Lab Results   Component Value Date    PROBNP 11,410.0 (H) 07/26/2024    PROBNP 14,737.0 (H) 07/22/2024    PROBNP 3,550.0 (H) 07/17/2024     Estimated Creatinine Clearance: 201.3 mL/min (A) (by C-G formula based on SCr of 0.26 mg/dL (L)).     Lab Results   Component Value Date    INR 1.00 07/27/2024    INR 1.09 07/26/2024    INR 0.93 07/18/2024    INR 0.98 04/26/2024    INR 1.00 03/21/2023     Lab Results   Component Value Date    LABHEPA 0.27 (L) 07/20/2024    LABHEPA 0.36 07/19/2024    LABHEPA 0.33 07/19/2024    LABHEPA 0.11 (L) 07/18/2024     Lab Results   Component Value Date    MG 2.5 (H) 07/28/2024    MG 2.4 07/27/2024    MG 2.5 (H) 07/26/2024     Lab Results   Component Value Date    TSH 1.140 07/17/2024      No results found for:  "\"CHOL\", \"TRIG\", \"HDL\", \"LDL\"  Pain Management Panel           No data to display              Microbiology Results (last 10 days)       Procedure Component Value - Date/Time    Aspergillus Galactomannan Antigen - Blood, Arm, Right [[id number]] Collected: 07/22/24 1410    Lab Status: Final result Specimen: Blood from Arm, Right Updated: 07/26/24 0254     Aspergillus Ag, BAL/Serum 0.04 Index     Narrative:      Performed at:  01 - [facility]  [address], [place]  [id number]  : Lorraine Ruelas MD, Phone:  [phone]  Performed at:  02 - [facility]  [address] [place], [place]  [id number]  : Haroon Walls [facility], Phone:  [phone]    Mycoplasma Pneumoniae Antibody, IgM - Blood, Arm, Left [[id number]]  (Normal) Collected: 07/22/24 0012    Lab Status: Final result Specimen: Blood from Arm, Left Updated: 07/22/24 0202     Mycoplasma pneumo IgM Negative    Legionella Antigen, Urine - Urine, Urine, Clean Catch [[id number]]  (Normal) Collected: 07/21/24 2242    Lab Status: Final result Specimen: Urine, Clean Catch Updated: 07/21/24 2308     LEGIONELLA ANTIGEN, URINE Negative    Narrative:      Presumptive negative for L. pneumophilia serogroup 1 antigen, suggesting no recent or current infection.    Respiratory Culture - Sputum, ET Suction [[id number]] Collected: 07/21/24 2231    Lab Status: Final result Specimen: Sputum from ET Suction Updated: 07/24/24 1119     Respiratory Culture No growth     Gram Stain Rare (1+) Mixed ammon      No WBCs seen      No Epithelial cells seen    MRSA Screen, PCR (Inpatient) - Swab, Nares [[id number]]  (Normal) Collected: 07/21/24 1252    Lab Status: Final result Specimen: Swab from Nares Updated: 07/21/24 1424     MRSA PCR No MRSA Detected    Narrative:      The negative predictive value of this diagnostic test is high and should only be used to consider de-escalating anti-MRSA therapy. A positive result may indicate colonization with MRSA and must be " correlated clinically.    Respiratory Panel PCR w/COVID-19(SARS-CoV-2) RHONDA/HEATHER/TASNEEM/PAD/COR/CHRISTELLE In-House, NP Swab in UTM/VTM, 2 HR TAT - Swab, Nasopharynx [[id number]]  (Normal) Collected: 07/21/24 1247    Lab Status: Final result Specimen: Swab from Nasopharynx Updated: 07/21/24 1357     ADENOVIRUS, PCR Not Detected     Coronavirus 229E Not Detected     Coronavirus HKU1 Not Detected     Coronavirus NL63 Not Detected     Coronavirus OC43 Not Detected     COVID19 Not Detected     Human Metapneumovirus Not Detected     Human Rhinovirus/Enterovirus Not Detected     Influenza A PCR Not Detected     Influenza B PCR Not Detected     Parainfluenza Virus 1 Not Detected     Parainfluenza Virus 2 Not Detected     Parainfluenza Virus 3 Not Detected     Parainfluenza Virus 4 Not Detected     RSV, PCR Not Detected     Bordetella pertussis pcr Not Detected     Bordetella parapertussis PCR Not Detected     Chlamydophila pneumoniae PCR Not Detected     Mycoplasma pneumo by PCR Not Detected    Narrative:      In the setting of a positive respiratory panel with a viral infection PLUS a negative procalcitonin without other underlying concern for bacterial infection, consider observing off antibiotics or discontinuation of antibiotics and continue supportive care. If the respiratory panel is positive for atypical bacterial infection (Bordetella pertussis, Chlamydophila pneumoniae, or Mycoplasma pneumoniae), consider antibiotic de-escalation to target atypical bacterial infection.    Respiratory Culture - Sputum, ET Suction [[id number]] Collected: 07/21/24 1152    Lab Status: Final result Specimen: Sputum from ET Suction Updated: 07/23/24 1053     Respiratory Culture Rare growth Normal respiratory ammon. No S. aureus or Pseudomonas aeruginosa detected. Final report.     Gram Stain Moderate (3+) WBCs seen      Rare (1+) Epithelial cells seen      No organisms seen    Blood Culture - Blood, Arm, Left [[id number]]  (Normal) Collected: 07/21/24  0450    Lab Status: Final result Specimen: Blood from Arm, Left Updated: 07/26/24 0515     Blood Culture No growth at 5 days    Blood Culture - Blood, Arm, Right [[id number]]  (Normal) Collected: 07/21/24 0448    Lab Status: Final result Specimen: Blood from Arm, Right Updated: 07/26/24 0515     Blood Culture No growth at 5 days    Gastrointestinal Panel, PCR - Stool, Per Rectum [[id number]]  (Normal) Collected: 07/19/24 0410    Lab Status: Final result Specimen: Stool from Per Rectum Updated: 07/19/24 0603     Campylobacter Not Detected     Plesiomonas shigelloides Not Detected     Salmonella Not Detected     Vibrio Not Detected     Vibrio cholerae Not Detected     Yersinia enterocolitica Not Detected     Enteroaggregative E. coli (EAEC) Not Detected     Enteropathogenic E. coli (EPEC) Not Detected     Enterotoxigenic E. coli (ETEC) lt/st Not Detected     Shiga-like toxin-producing E. coli (STEC) stx1/stx2 Not Detected     Shigella/Enteroinvasive E. coli (EIEC) Not Detected     Cryptosporidium Not Detected     Cyclospora cayetanensis Not Detected     Entamoeba histolytica Not Detected     Giardia lamblia Not Detected     Adenovirus F40/41 Not Detected     Astrovirus Not Detected     Norovirus GI/GII Not Detected     Rotavirus A Not Detected     Sapovirus (I, II, IV or V) Not Detected    Clostridioides difficile Toxin - Stool, Per Rectum [[id number]] Collected: 07/19/24 0410    Lab Status: Final result Specimen: Stool from Per Rectum Updated: 07/19/24 0527    Narrative:      The following orders were created for panel order Clostridioides difficile Toxin - Stool, Per Rectum.  Procedure                               Abnormality         Status                     ---------                               -----------         ------                     Clostridioides difficile...[[id number]]                      Final result                 Please view results for these tests on the individual orders.    Clostridioides  difficile Toxin, PCR - Stool, Per Rectum [[id number]] Collected: 07/19/24 0410    Lab Status: Final result Specimen: Stool from Per Rectum Updated: 07/19/24 0527     Toxigenic C. difficile by PCR Negative     027 Toxin Presumptive Negative    Narrative:      The result indicates the absence of toxigenic C. difficile from stool specimen.            Imaging Results (Last 24 Hours)       Procedure Component Value Units Date/Time    XR Chest 1 View [[id number]] Collected: 07/28/24 1346     Updated: 07/28/24 1348    Narrative:      TECHNIQUE: X-ray of the chest single view was performed per as low as  reasonably achievable (ALARA) protocols.     COMPARISON: 7/27/2024     FINDINGS:     There is an endotracheal tube with tip 3.7 cm above the irene. A  gastric drainage tube courses into the stomach, with tip not seen. There  is a left lung base chest tube and a gastric lap band device. No  pneumothorax. There is a right neck central venous catheter with tip  likely near the cavoatrial junction.     Subjectively, minimally more pronounced ARDS like lung  opacities/reticulation diffusely. No definite pleural effusion.     Redemonstrated subcutaneous emphysema in the supraclavicular region  bilaterally.     Nonenlarged cardiomediastinal silhouette.       Impression:         1. Subjectively minimally increased ARDS-like airspace  opacities/pulmonary edema compared to prior. No definite pleural  effusion or pneumothorax.     2. Stable lines and tubes.        To TALK to On Call [phone]     This report was finalized on 7/28/2024 1:46 PM by Andrea Medina MD.       XR Chest 1 View [[id number]] Collected: 07/27/24 1746     Updated: 07/27/24 1749    Narrative:      INDICATION: Difficulty breathing.     TECHNIQUE: Frontal radiograph of the chest.     COMPARISON: Radiograph from yesterday     FINDINGS:   Cardiomegaly. Multifocal infiltrates/edema, unchanged. No pleural  effusion or pneumothorax. Endotracheal tube,  enteric tube, left-sided  chest tube and central venous catheter in similar position.       Impression:      No significant interval change allowing for differences in technique.        This report was finalized on 7/27/2024 5:47 PM by Alex Pallas, DO.               ECHO:  Results for orders placed during the hospital encounter of 07/17/24    Adult Transthoracic Echo Complete w/ Color, Spectral and Contrast if necessary per protocol    Interpretation Summary    Normal left ventricular cavity size and wall thickness noted.    The following left ventricular wall segments are hypokinetic: mid anterior, basal anterolateral, apical lateral, apical septal, mid anteroseptal and basal anterior. The following left ventricular wall segments are akinetic: apex.    Left ventricular systolic function is moderately decreased. Left ventricular ejection fraction appears to be 36 - 40%.    Left ventricular diastolic function is consistent with (grade I) impaired relaxation.    The aortic valve is structurally normal with no regurgitation or stenosis present.    The mitral valve is structurally normal with no significant stenosis present. Mild mitral valve regurgitation is present.    Mild tricuspid valve regurgitation is present. Estimated right ventricular systolic pressure from tricuspid regurgitation is moderately elevated (45-55 mmHg).    There is no evidence of pericardial effusion. .    Comments: Patient seem to have developed a new regional wall motion normalities with decreased LV systolic function as compared to the previous study in March 2023.      STRESS TEST:  Results for orders placed during the hospital encounter of 07/17/24    Stress Test With Myocardial Perfusion One Day    Interpretation Summary  Images from the original result were not included.      A pharmacological stress test was performed using regadenoson without low-level exercise.    Findings consistent with an indeterminate ECG stress test.    Myocardial  perfusion imaging indicates a moderate-sized infarct located in the inferior wall, septal wall and apex with no significant ischemia noted.    Abnormal LV wall motion consistent with apical dyskinesis.    Left ventricular ejection fraction is moderately reduced (Calculated EF = 43%).    Impressions are consistent with an intermediate risk study.       HEART CATH:  No results found for this or any previous visit.    TELEMETRY:          I reviewed the patient's new clinical results.    ALLERGIES: Codeine and Sulfa antibiotics    Medication Review:   Current list of medications may not reflect those currently placed in orders that are not signed or are being held.     aspirin, 81 mg, Oral, Daily  Brexpiprazole, 0.5 mg, Oral, Daily  chlorhexidine, 15 mL, Mouth/Throat, Q12H  donepezil, 10 mg, Oral, Q PM  doxycycline, 100 mg, Oral, Q12H  enoxaparin, 40 mg, Subcutaneous, Nightly  hydroxychloroquine, 200 mg, Oral, BID  insulin regular, 2-7 Units, Subcutaneous, Q6H  ipratropium-albuterol, 3 mL, Nebulization, Q6H - RT  levothyroxine, 25 mcg, Oral, Daily With Breakfast  magic barrier cream, , Topical, BID  memantine, 5 mg, Oral, Q12H  methylPREDNISolone sodium succinate, 60 mg, Intravenous, Q12H  [Held by provider] metoprolol succinate XL, 25 mg, Oral, Q24H  nystatin, 5 mL, Oral, 4x Daily  nystatin, 1 Application, Topical, Q12H  pantoprazole, 40 mg, Intravenous, Q AM  Phenylephrine HCl-NaCl, , ,   Phenylephrine HCl-NaCl, , ,   sodium chloride, 10 mL, Intravenous, Q12H  sodium chloride, 10 mL, Intravenous, Q12H  sodium chloride, 10 mL, Intravenous, Q12H  sodium chloride, 10 mL, Intravenous, Q12H  sodium chloride, 10 mL, Intravenous, Q12H  sodium chloride, 10 mL, Intravenous, Q12H      dexmedetomidine, 0.2-1.5 mcg/kg/hr, Last Rate: 1.3 mcg/kg/hr (07/28/24 1153)  fentanyl 10 mcg/mL,  mcg/hr, Last Rate: 200 mcg/hr (07/28/24 1252)  Pharmacy Consult,   phenylephrine, 0.5-3 mcg/kg/min (Dosing Weight), Last Rate: 0.5 mcg/kg/min  (07/28/24 1252)  propofol, 5-50 mcg/kg/min (Dosing Weight), Last Rate: 45 mcg/kg/min (07/28/24 1252)        senna-docusate sodium **AND** polyethylene glycol **AND** bisacodyl **AND** bisacodyl    busPIRone    Calcium Replacement - Follow Nurse / BPA Driven Protocol    dextrose    dextrose    glucagon (human recombinant)    guaifenesin    HYDROcodone-acetaminophen    hydrOXYzine    ipratropium    Magnesium Cardiology Dose Replacement - Follow Nurse / BPA Driven Protocol    nitroglycerin    Pharmacy Consult    Phenylephrine HCl-NaCl    Phenylephrine HCl-NaCl    Phosphorus Replacement - Follow Nurse / BPA Driven Protocol    Potassium Replacement - Follow Nurse / BPA Driven Protocol    prochlorperazine    sodium chloride    sodium chloride    sodium chloride    sodium chloride    sodium chloride    sodium chloride    sodium chloride    sodium chloride    sodium chloride    sodium chloride    sodium chloride    vecuronium    Assessment    Acute NSTEMI, likely type II due to acute respiratory failure with hypoxia  Acute on chronic respiratory failure requiring endotracheal tracheal intubation and mechanical ventilation  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  IV diuresis therapy as needed  2.  Potassium levels between 4 and 5, magnesium between 2 and 2.2  3.  Optimize guideline directed medical therapy for cardiomyopathy when stable              I have discussed this patient with Dr. Vasquez and together, we have formed a plan of care for this patient as stated above in the recommendations.     I have discussed the patients findings and recommendations with the patient.    Thank you very much for asking us to be involved in this  patient's care.  We will follow along with you.        Electronically signed by SG Nobles, 07/28/24, 1:55 PM EDT.              Please note that portions of this note were completed with a voice recognition program.    Please note that portions of this note were copied and has been reviewed and is accurate as of 7/28/2024 .

## 2024-07-28 NOTE — PROGRESS NOTES
Progress Note Critical Care Pulmonary        Subjective  On vent , sedated, no more air leak in Pleur-evac.  X-ray chest showed resolution of pneumothorax.  Chronic changes noted.        Gas exchange is stable.  Oxygen requirement down to 28% on present ventilator settings      Interval History: event overnight: As described above        Review of Systems:    On vent , sedated     Vital Signs  Temp:  [96.8 °F (36 °C)-100 °F (37.8 °C)] 99.5 °F (37.5 °C)  Heart Rate:  [] 53  Resp:  [28-33] 28  BP: ()/(32-94) 149/75  FiO2 (%):  [28 %] 28 %  Body mass index is 24.89 kg/m².    Intake/Output Summary (Last 24 hours) at 7/28/2024 1524  Last data filed at 7/28/2024 0542  Gross per 24 hour   Intake 1792.44 ml   Output 2013 ml   Net -220.56 ml     No intake/output data recorded.    Physical Exam:  General- normal in appearance, not in any acute distress    HEENT- pupils equally reactive to light, normal in size, no scleral icterus    Neck-supple    Respiratory-bilateral air entry, bibasilar crackles.    No more Air leak in the chest tube/ Pleur-evac, no obvious subcu emphysema.    Cardiovascular-  Normal S1 and S2. No S3, S4 or murmurs.     GI-nontender nondistended bowel sounds positive    CNS- grossly nonfocal    Extremities-no clubbing and edema        Results Review:      Results from last 7 days   Lab Units 07/28/24  0053 07/27/24  1143 07/26/24  0441   WBC 10*3/mm3 22.69* 16.39* 17.54*   HEMOGLOBIN g/dL 10.3* 10.0* 9.1*   PLATELETS 10*3/mm3 224 205 225     Results from last 7 days   Lab Units 07/28/24  0053 07/27/24  1143 07/26/24  0441   SODIUM mmol/L 141 142 142   POTASSIUM mmol/L 4.9 5.2 4.7   CHLORIDE mmol/L 107 109* 112*   CO2 mmol/L 24.2 26.1 25.6   BUN mg/dL 23 29* 29*   CREATININE mg/dL 0.26* 0.29* 0.31*   CALCIUM mg/dL 8.3* 8.1* 8.0*   GLUCOSE mg/dL 159* 161* 115*   MAGNESIUM mg/dL 2.5* 2.4 2.5*     Lab Results   Component Value Date    INR 1.00 07/27/2024    INR 1.09 07/26/2024    INR 0.93  07/18/2024    PROTIME 13.3 07/27/2024    PROTIME 14.2 07/26/2024    PROTIME 12.6 07/18/2024     Results from last 7 days   Lab Units 07/28/24  0053 07/27/24  1143 07/26/24  0441   ALK PHOS U/L 83 78 70   BILIRUBIN mg/dL 0.3 0.2 <0.2   ALT (SGPT) U/L 24 26 17   AST (SGOT) U/L 23 23 21     Results from last 7 days   Lab Units 07/27/24  1726   PH, ARTERIAL pH units 7.345*   PO2 ART mm Hg 76.7*   PCO2, ARTERIAL mm Hg 52.1*   HCO3 ART mmol/L 28.4*     Imaging Results (Last 24 Hours)       Procedure Component Value Units Date/Time    XR Chest 1 View [[id number]] Collected: 07/28/24 1346     Updated: 07/28/24 1348    Narrative:      TECHNIQUE: X-ray of the chest single view was performed per as low as  reasonably achievable (ALARA) protocols.     COMPARISON: 7/27/2024     FINDINGS:     There is an endotracheal tube with tip 3.7 cm above the irene. A  gastric drainage tube courses into the stomach, with tip not seen. There  is a left lung base chest tube and a gastric lap band device. No  pneumothorax. There is a right neck central venous catheter with tip  likely near the cavoatrial junction.     Subjectively, minimally more pronounced ARDS like lung  opacities/reticulation diffusely. No definite pleural effusion.     Redemonstrated subcutaneous emphysema in the supraclavicular region  bilaterally.     Nonenlarged cardiomediastinal silhouette.       Impression:         1. Subjectively minimally increased ARDS-like airspace  opacities/pulmonary edema compared to prior. No definite pleural  effusion or pneumothorax.     2. Stable lines and tubes.        To TALK to On Call [phone]     This report was finalized on 7/28/2024 1:46 PM by Andrea Medina MD.       XR Chest 1 View [[id number]] Collected: 07/27/24 1746     Updated: 07/27/24 1749    Narrative:      INDICATION: Difficulty breathing.     TECHNIQUE: Frontal radiograph of the chest.     COMPARISON: Radiograph from yesterday     FINDINGS:   Cardiomegaly.  Multifocal infiltrates/edema, unchanged. No pleural  effusion or pneumothorax. Endotracheal tube, enteric tube, left-sided  chest tube and central venous catheter in similar position.       Impression:      No significant interval change allowing for differences in technique.        This report was finalized on 7/27/2024 5:47 PM by Alex Pallas, DO.                    aspirin, 81 mg, Oral, Daily  Brexpiprazole, 0.5 mg, Oral, Daily  chlorhexidine, 15 mL, Mouth/Throat, Q12H  donepezil, 10 mg, Oral, Q PM  doxycycline, 100 mg, Oral, Q12H  enoxaparin, 40 mg, Subcutaneous, Nightly  hydroxychloroquine, 200 mg, Oral, BID  insulin regular, 2-7 Units, Subcutaneous, Q6H  ipratropium-albuterol, 3 mL, Nebulization, Q6H - RT  levothyroxine, 25 mcg, Oral, Daily With Breakfast  magic barrier cream, , Topical, BID  memantine, 5 mg, Oral, Q12H  methylPREDNISolone sodium succinate, 60 mg, Intravenous, Q12H  [Held by provider] metoprolol succinate XL, 25 mg, Oral, Q24H  nystatin, 5 mL, Oral, 4x Daily  nystatin, 1 Application, Topical, Q12H  pantoprazole, 40 mg, Intravenous, Q AM  Phenylephrine HCl-NaCl, , ,   Phenylephrine HCl-NaCl, , ,   sodium chloride, 10 mL, Intravenous, Q12H  sodium chloride, 10 mL, Intravenous, Q12H  sodium chloride, 10 mL, Intravenous, Q12H  sodium chloride, 10 mL, Intravenous, Q12H  sodium chloride, 10 mL, Intravenous, Q12H  sodium chloride, 10 mL, Intravenous, Q12H      dexmedetomidine, 0.2-1.5 mcg/kg/hr, Last Rate: 1.3 mcg/kg/hr (07/28/24 1153)  fentanyl 10 mcg/mL,  mcg/hr, Last Rate: 200 mcg/hr (07/28/24 1252)  Pharmacy Consult,   phenylephrine, 0.5-3 mcg/kg/min (Dosing Weight), Last Rate: 0.5 mcg/kg/min (07/28/24 1252)  propofol, 5-50 mcg/kg/min (Dosing Weight), Last Rate: 45 mcg/kg/min (07/28/24 1252)        Medication Review:     Assessment & Plan   #1: Acute on chronic hypoxic respiratory failure    #2 interstitial lung disease, most likely a flareup of interstitial lung disease.    3.:   Pneumonia.  #4: Septic shock.  Off pressors.          5.  Non-STEMI.      #6 spontaneous pneumothorax.  Chest tube in place.  No more air leak.  Vent Settings          Resp Rate (Set): 28  Pressure Support (cm H2O): 10 cm H20  FiO2 (%): 28 %  PEEP/CPAP (cm H2O): 4 cm H20    Minute Ventilation (L/min) (Obs): 8.93 L/min  Resp Rate (Observed) Vent: 28     I:E Ratio (Obs): 1:3    PIP Observed (cm H2O): 25 cm H2O    Driving Pressure (cm H2O): 32.6 cm H2O     Gas exchange is improved.    Daily SWT, SBT.    Patient is DNR.  Seems like patient may ultimately require tracheostomy unless patient's family decided for comfort measure     Current medication list reviewed.  Latest microbiology reviewed.  Respiratory panel reviewed.  Continue nebs.  Continue oxygen to maintain saturation 88 to 92%.   Cardiology- hemodynamically -stable  Off pressor.    Nephrology- Cr and BUN stable  I/O-reviewed     GI-continue current diet.  Continue aspiration precautions     Hematology- CBC  Hb  platelet  WBC-latest reviewed     ID  Culture  And Antibiotics     Endocrinology- Maintain Blood sugar 140 -180  Blood sugars reviewed     Electrolytes-   Mag and phos         DVT prophylaxis-       Critical Care time spent in direct patient care: 45 minutes (excluding procedure time, if applicable) including high complexity decision making to assess, manipulate, and support vital organ system failure in this individual who has impairment of one or more vital organ systems such that there is a high probability of imminent or life threatening deterioration in the patient’s condition.  Patient is critically ill and is at higher risk for further mortality/morbidity. Continue ICU care .      Kurt White MD  07/28/24  15:24 EDT

## 2024-07-29 NOTE — PROGRESS NOTES
THC Physician - Brief Progress Note  PERMANENT  07/29/2024 06:08    [facility] - [facility] - CCU - [facility] - [place], [place] ([facility])    NAGY, DAVE LUCIANO    Date of Service 07/29/2024 06:08    HPI/Events of Note [facility] Provider Intervention Note    Pt's ABG and CXR reviewed.  No change.  Continue current management.  I have added versed pushes prn as she seems slightly improved after a one time dose.    ___n__   Video Assessment performed            Contact [employer] for any needs if bedside physician is not present.      Interventions Major-Respiratory failure - evaluation and management        Electronically Signed by: Alisson Moreno) on 07/29/2024 06:09

## 2024-07-29 NOTE — CASE MANAGEMENT/SOCIAL WORK
Continued Stay Note  NEIDA Alex     Patient Name: Lexie KEITH Valdez  MRN: [id number]  Today's Date: 7/29/2024    Admit Date: 7/17/2024     Discharge Plan       Row Name 07/29/24 1834       Plan    Plan Pt remains intubated. SS noted pt failed SAT on this date. Pallative Care following for GOC. Pt lives with spouse. Patient does not utilize Home Health. Pt utilizes BP Cuff, SC, BSC, Nebulizer, O2 @ 3L, Portable Concentrator, Pulse Ox, Rollator, Shower Chair & WC from [facility]. SS to follow up with discharge planning once pt has been extubated and is medically stable.  SS to follow.             ADIN Garcia

## 2024-07-29 NOTE — PROGRESS NOTES
"Palliative Care Daily Progress Note     S: Medical record reviewed, followed up with Primary JESSIKA Johns and Dr Freeman regarding patient's condition. When I saw Lexie this morning she was sedated on the vent at 24% and 4 of peep.She was on propofol, fentanyl, precedex and phenylephrine as well as antibiotic. She appeared calm and did not appear to be in distress at this time.      O:   Palliative Performance Scale Score:     /78   Pulse 61   Temp 99.5 °F (37.5 °C)   Resp (!) 30   Ht 162.6 cm (64.02\")   Wt 61.4 kg (135 lb 5.8 oz)   SpO2 100%   BMI 23.22 kg/m²     Intake/Output Summary (Last 24 hours) at 7/29/2024 1703  Last data filed at 7/29/2024 0443  Gross per 24 hour   Intake 3281.42 ml   Output 2803 ml   Net 478.42 ml       PE:  General Appearance:    Chronically ill appearing, sedation on intubated on vent, neck is edema has decreased   HEENT:    NC/AT, without obvious abnormality, EOMI, anicteric    Neck:   Supple slight subcutaneous air noted on neck, trachea midline, no JVD   Lungs:     Sedated on vent @ 24% and 4  of peep, left CT in place, coarse lung sounds noted    Heart:    RRR, normal S1 and S2, no M/R/G   Abdomen:     Soft, NT, ND, NABS    Extremities:   Moves all extremities weakly(not to command) trace BLL extremity edema, RUE edema/rt hand decreasing   Pulses:   Pulses palpable and equal bilaterally   Skin:   Warm, dry   Neurologic:   Sedation on unable to assess    Psych:   Sedation on, calm          Meds: Reviewed and changes noted    Labs:   Results from last 7 days   Lab Units 07/29/24  0020   WBC 10*3/mm3 24.89*   HEMOGLOBIN g/dL 10.5*   HEMATOCRIT % 33.2*   PLATELETS 10*3/mm3 207     Results from last 7 days   Lab Units 07/29/24  0020   SODIUM mmol/L 141   POTASSIUM mmol/L 4.7   CHLORIDE mmol/L 106   CO2 mmol/L 28.2   BUN mg/dL 15   CREATININE mg/dL 0.23*   GLUCOSE mg/dL 163*   CALCIUM mg/dL 8.4*     Results from last 7 days   Lab Units 07/29/24  0020   SODIUM mmol/L 141   POTASSIUM " mmol/L 4.7   CHLORIDE mmol/L 106   CO2 mmol/L 28.2   BUN mg/dL 15   CREATININE mg/dL 0.23*   CALCIUM mg/dL 8.4*   BILIRUBIN mg/dL 0.2   ALK PHOS U/L 103   ALT (SGPT) U/L 58*   AST (SGOT) U/L 52*   GLUCOSE mg/dL 163*     Imaging Results (Last 72 Hours)       Procedure Component Value Units Date/Time    XR Chest 1 View [[id number]] Collected: 07/29/24 1027     Updated: 07/29/24 1031    Narrative:      XR CHEST 1 VW-     CLINICAL INDICATION: et tube position; I21.4-Non-ST elevation (NSTEMI)  myocardial infarction; J93.9-Pneumothorax, unspecified        COMPARISON: 7/29/2024     TECHNIQUE: Single frontal view of the chest.     FINDINGS:     LUNGS: Patchy bilateral airspace disease.  Equivocal small left apical pneumothorax. Left-sided chest tube is  present.  Endotracheal tube overlies the tracheal air column well above the  irene.     HEART AND MEDIASTINUM: Heart and mediastinal contours are unremarkable        SKELETON: Bony and soft tissue structures are unremarkable.             Impression:         1. Small left apical pneumothorax  2. Endotracheal tube appears to be well above the irene           This report was finalized on 7/29/2024 10:29 AM by Dr. Devan Sahni MD.       XR Chest 1 View [[id number]] Collected: 07/29/24 0631     Updated: 07/29/24 0635    Narrative:       VERIFICATION OBSERVER NAME: Kai Magana MD.     TECHNIQUE, ADDITIONAL HISTORY, FINDINGS: Single frontal view of the  chest was obtained.   Comparison study date : Prior day. Time : 4:26 AM.     HISTORY: Respiratory failure     Findings:      ET TUBE is located 1 cm above the irene.   NG TUBE is located in the abdomen.  RIGHT central line with the tip in the region of the atriocaval  junction.  LEFT chest tube in place.  Diffuse interstitial changes noted, diminished from prior.  Minimal cardiac enlargement.  Trace LEFT pleural effusion.       Impression:      ET tube is too low in position.  Diminishing interstitial changes.               [id number]     ZIP Code [id number].              This report was finalized on 7/29/2024 6:33 AM by Dr. Kai Magana MD.       XR Chest 1 View [[id number]] Collected: 07/28/24 1346     Updated: 07/28/24 1348    Narrative:      TECHNIQUE: X-ray of the chest single view was performed per as low as  reasonably achievable (ALARA) protocols.     COMPARISON: 7/27/2024     FINDINGS:     There is an endotracheal tube with tip 3.7 cm above the irene. A  gastric drainage tube courses into the stomach, with tip not seen. There  is a left lung base chest tube and a gastric lap band device. No  pneumothorax. There is a right neck central venous catheter with tip  likely near the cavoatrial junction.     Subjectively, minimally more pronounced ARDS like lung  opacities/reticulation diffusely. No definite pleural effusion.     Redemonstrated subcutaneous emphysema in the supraclavicular region  bilaterally.     Nonenlarged cardiomediastinal silhouette.       Impression:         1. Subjectively minimally increased ARDS-like airspace  opacities/pulmonary edema compared to prior. No definite pleural  effusion or pneumothorax.     2. Stable lines and tubes.        To TALK to On Call [phone]     This report was finalized on 7/28/2024 1:46 PM by Andrea Medina MD.       XR Chest 1 View [[id number]] Collected: 07/27/24 1746     Updated: 07/27/24 1749    Narrative:      INDICATION: Difficulty breathing.     TECHNIQUE: Frontal radiograph of the chest.     COMPARISON: Radiograph from yesterday     FINDINGS:   Cardiomegaly. Multifocal infiltrates/edema, unchanged. No pleural  effusion or pneumothorax. Endotracheal tube, enteric tube, left-sided  chest tube and central venous catheter in similar position.       Impression:      No significant interval change allowing for differences in technique.        This report was finalized on 7/27/2024 5:47 PM by Alex Pallas, DO.                 Diagnostics: Reviewed    A: Lexie Valdez is a  64 y.o.  female admitted on 7/17/2024 due to shortness of breath. Lexie has a medical history of  anxiety/depression, arthritis, HTN, hypothyroidism, stress urinary incontinence with frequent UTI's, COPD and pulmonary fibrosis on 3L NC at home, and resting tremors in face and extremities, who presents with worsening shortness of breath for the last couple days. Pt also at time of admission c/o chest pressure associated with dyspnea stating this was chronic however had worsened in the days leading up to her admission. She also endorsed diarrhea for the last couple of days as well as dysuria and stated that she has recurring UTI's. Labs in ER revealed Troponin of 229 with repeat 188, BNP was 3550, Na 131, Cr 0.49, glucose 177, CRP 1.65, lactate 2.0, procal and WBC normal but with 88.7% neutrophils. UA showed positive nitrite, trace leuk, 6-10 WBC, 4+ bacteria and 3-6 squamous epithelial cells. She was tachycardic and tachypneic with pH of 7.458, CO2 40, Po2 154, bicarb 28. She was admitted to PCU on admission and in the early am of 7/21 was transferred to CCU due to worsening dyspnea, bilateral infiltrates without effusion concerning for interstitial debbi disease flare, was transferred to CCU. Initially she was placed on Bipap in CCU and at 7/21 6:20 am Lexie was sedated intubated and placed on the ventilator. Palliative care consulted for GOC,ACP and for support of family.     Today 7/29/24  T 99.5, HR 61, RR 25, BP of 105/58 and was sedated with Precedex, Propofol, Fentanyl on vent at 28% FiO2 and 4 of peep and on phenylephrine.    P:  Palliative was able to be present with Dr Freeman to speak with Lexie's family,  [employer], son Faith, sister carl and brother Francis as well as other family to discuss Lexie's condition and what the options were at this point. He discussed if they would want her to have trach and peg and explained what that would look like, having to be at a LTACH with her T/P and also expressed that  there was rally know way to definitively say how she would do with this, understanding her lung disease and that it would progress. We talked about what her quality of life would look like and if this is something they felt she would even want. We also discussed the comfort approach and what that may look like, again could not say how she would do with extubation, however during that time we would keep her comfortable and have available whatever she may need. After lengthy conversation family has decided to talk about it amongst one another.    We will continue to follow along. Please do not hesitate to contact us regarding further sx mgmt or GOC needs, including after hours or on weekends via our on call provider at [phone].     Linh Lou, APRN    7/29/2024

## 2024-07-29 NOTE — PROGRESS NOTES
Progress Note Critical Care Pulmonary        Subjective    Overnight events reviewed.  All the labs medications ins and outs and vitals reviewed.  Remains critically ill and remains on ventilator.  Tidal volume adjusted.  Peak and plateau pressures reviewed.  DNR DNI   SAT   28 % fio2   Failing SAT   Was paralyzed for vent synchrony  MDR done at bed side with RN, pharmacist, nutrition, , hospitalist manager  and RT  All the drips,other meds,  labs,ins and outs , abg, fio2 requirement reviewed and dicussed in rounds.           Review of Systems:    Could not be obtained as patient is sedated on ventilator.    Vital Signs  Temp:  [99.3 °F (37.4 °C)-99.9 °F (37.7 °C)] 99.7 °F (37.6 °C)  Heart Rate:  [48-64] 59  Resp:  [28-36] 28  BP: ()/(39-80) 108/56  FiO2 (%):  [28 %] 28 %  Body mass index is 23.22 kg/m².    Intake/Output Summary (Last 24 hours) at 7/29/2024 0934  Last data filed at 7/29/2024 0443  Gross per 24 hour   Intake 3281.42 ml   Output 2803 ml   Net 478.42 ml     No intake/output data recorded.    Physical Exam:  General-chronically ill appearance, not in any acute distress, sedated    HEENT-PERRLA s    Neck-supple    Respiratory-bilateral air entry, bibasilar crackles.    Chest tube in place  Cardiovascular-NSR  GI-nontender nondistended bowel sounds positive    CNS- grossly nonfocal    Extremities-no clubbing and edema        Results Review:      Results from last 7 days   Lab Units 07/29/24  0020 07/28/24  0053 07/27/24  1143   WBC 10*3/mm3 24.89* 22.69* 16.39*   HEMOGLOBIN g/dL 10.5* 10.3* 10.0*   PLATELETS 10*3/mm3 207 224 205     Results from last 7 days   Lab Units 07/29/24  0020 07/28/24  0053 07/27/24  1143   SODIUM mmol/L 141 141 142   POTASSIUM mmol/L 4.7 4.9 5.2   CHLORIDE mmol/L 106 107 109*   CO2 mmol/L 28.2 24.2 26.1   BUN mg/dL 15 23 29*   CREATININE mg/dL 0.23* 0.26* 0.29*   CALCIUM mg/dL 8.4* 8.3* 8.1*   GLUCOSE mg/dL 163* 159* 161*   MAGNESIUM mg/dL 2.4 2.5* 2.4     Lab  Results   Component Value Date    INR 1.00 07/27/2024    INR 1.09 07/26/2024    INR 0.93 07/18/2024    PROTIME 13.3 07/27/2024    PROTIME 14.2 07/26/2024    PROTIME 12.6 07/18/2024     Results from last 7 days   Lab Units 07/29/24  0020 07/28/24  0053 07/27/24  1143   ALK PHOS U/L 103 83 78   BILIRUBIN mg/dL 0.2 0.3 0.2   ALT (SGPT) U/L 58* 24 26   AST (SGOT) U/L 52* 23 23     Results from last 7 days   Lab Units 07/29/24  0431   PH, ARTERIAL pH units 7.409   PO2 ART mm Hg 105.0   PCO2, ARTERIAL mm Hg 51.8*   HCO3 ART mmol/L 32.8*     Imaging Results (Last 24 Hours)       Procedure Component Value Units Date/Time    XR Chest 1 View [[id number]] Collected: 07/29/24 0631     Updated: 07/29/24 0635    Narrative:       VERIFICATION OBSERVER NAME: Kai Magana MD.     TECHNIQUE, ADDITIONAL HISTORY, FINDINGS: Single frontal view of the  chest was obtained.   Comparison study date : Prior day. Time : 4:26 AM.     HISTORY: Respiratory failure     Findings:      ET TUBE is located 1 cm above the irene.   NG TUBE is located in the abdomen.  RIGHT central line with the tip in the region of the atriocaval  junction.  LEFT chest tube in place.  Diffuse interstitial changes noted, diminished from prior.  Minimal cardiac enlargement.  Trace LEFT pleural effusion.       Impression:      ET tube is too low in position.  Diminishing interstitial changes.              [id number]     ZIP Code [id number].              This report was finalized on 7/29/2024 6:33 AM by Dr. Kai Magana MD.       XR Chest 1 View [[id number]] Collected: 07/28/24 1346     Updated: 07/28/24 1348    Narrative:      TECHNIQUE: X-ray of the chest single view was performed per as low as  reasonably achievable (ALARA) protocols.     COMPARISON: 7/27/2024     FINDINGS:     There is an endotracheal tube with tip 3.7 cm above the irene. A  gastric drainage tube courses into the stomach, with tip not seen. There  is a left lung base chest tube and a gastric lap band  device. No  pneumothorax. There is a right neck central venous catheter with tip  likely near the cavoatrial junction.     Subjectively, minimally more pronounced ARDS like lung  opacities/reticulation diffusely. No definite pleural effusion.     Redemonstrated subcutaneous emphysema in the supraclavicular region  bilaterally.     Nonenlarged cardiomediastinal silhouette.       Impression:         1. Subjectively minimally increased ARDS-like airspace  opacities/pulmonary edema compared to prior. No definite pleural  effusion or pneumothorax.     2. Stable lines and tubes.        To TALK to On Call [phone]     This report was finalized on 7/28/2024 1:46 PM by Andrea Medina MD.                    aspirin, 81 mg, Oral, Daily  Brexpiprazole, 0.5 mg, Oral, Daily  cefepime, 2,000 mg, Intravenous, Q8H  chlorhexidine, 15 mL, Mouth/Throat, Q12H  donepezil, 10 mg, Oral, Q PM  doxycycline, 100 mg, Oral, Q12H  enoxaparin, 40 mg, Subcutaneous, Nightly  [Held by provider] hydroxychloroquine, 200 mg, Oral, BID  insulin regular, 2-7 Units, Subcutaneous, Q6H  ipratropium-albuterol, 3 mL, Nebulization, Q6H - RT  levothyroxine, 25 mcg, Oral, Daily With Breakfast  magic barrier cream, , Topical, BID  memantine, 5 mg, Oral, Q12H  methylPREDNISolone sodium succinate, 60 mg, Intravenous, Q12H  [Held by provider] metoprolol succinate XL, 25 mg, Oral, Q24H  mupirocin, 1 Application, Topical, Q12H  nystatin, 5 mL, Oral, 4x Daily  nystatin, 1 Application, Topical, Q12H  pantoprazole, 40 mg, Intravenous, Q AM  Phenylephrine HCl-NaCl, , ,   Phenylephrine HCl-NaCl, , ,   sodium chloride, 10 mL, Intravenous, Q12H  sodium chloride, 10 mL, Intravenous, Q12H  sodium chloride, 10 mL, Intravenous, Q12H  sodium chloride, 10 mL, Intravenous, Q12H  sodium chloride, 10 mL, Intravenous, Q12H  sodium chloride, 10 mL, Intravenous, Q12H      dexmedetomidine, 0.2-1.5 mcg/kg/hr, Last Rate: 0.9 mcg/kg/hr (07/29/24 0703)  fentanyl 10 mcg/mL,   mcg/hr, Last Rate: 150 mcg/hr (07/29/24 0929)  Pharmacy Consult,   phenylephrine, 0.5-3 mcg/kg/min (Dosing Weight), Last Rate: 0.8 mcg/kg/min (07/29/24 0246)  propofol, 5-50 mcg/kg/min (Dosing Weight), Last Rate: 20 mcg/kg/min (07/29/24 0929)        Medication Review:    Latest Reference Range & Units 07/26/24 04:28 07/27/24 17:26 07/29/24 04:31   pH, Arterial 7.350 - 7.450 pH units 7.380 7.345 (L) 7.409   pCO2, Arterial 35.0 - 45.0 mm Hg 47.5 (H) 52.1 (H) 51.8 (H)   pO2, Arterial 83.0 - 108.0 mm Hg 95.0 76.7 (L) 105.0   HCO3, Arterial 20.0 - 26.0 mmol/L 28.1 (H) 28.4 (H) 32.8 (H)   Base Excess 0.0 - 2.0 mmol/L 2.5 (H) 2.1 (H) 7.0 (H)   O2 Saturation, Arterial 94.0 - 99.0 % 98.8 96.3 >99.2 (H)   CO2 Content 22 - 33 mmol/L 29.5 30.0 34.4 (H)   A-a DO2 0.0 - 300.0 mmHg 43.0 56.0 28.3   Carboxyhemoglobin 0 - 5 % 2.0 2.2 2.2   Methemoglobin 0.00 - 3.00 % 0.50 0.10 0.00   Oxyhemoglobin 94 - 99 % 96.3 94.1 97.1   Hematocrit, Blood Gas 38.0 - 51.0 % 28.4 (L) 30.8 (L) 32.6 (L)   Hemoglobin, Blood Gas 13.5 - 17.5 g/dL 9.3 (L) 10.0 (L) 10.6 (L)   Site  Left Brachial Right Radial Right Radial   Andrés's Test  N/A Positive N/A   Modality  Ventilator Ventilator Ventilator   FIO2 % 28 28 28   Ventilator Mode  VC/AC VC/AC VC/AC   Set Tidal Volume  300.00 300.00 300.00   Set Mech Resp Rate  28.0 28.0 28.0   PEEP  4.0 4.0 4.0   Rate Breaths/minute   32   Barometric Pressure for Blood Gas mmHg 730 729 728   (L): Data is abnormally low  (H): Data is abnormally high  Procalitonin Results:       Assessment & Plan     Neuro-- sedated drips reviewed.  Adjusted for RASS goal of -1 to -2.    Patient has been getting multiple doses of diuretics to achieve patient ventilator synchrony.  Will start on Xanax as patient is likely very anxious due to chronic comorbidities and what is going on right now.  Will attempt SAT today.    Cardiovascular-septic shock-continue vasopressors to maintain a MAP of 65 and above.  Vasopressor requirements  reviewed and adjusted for a MAP goal of 65 and above.  Continue steroids.  Non-STEMI-likely due to ongoing sepsis and septic shock.  Continue current treatment.  Latest echo reviewed  Results for orders placed during the hospital encounter of 07/17/24    Adult Transthoracic Echo Complete w/ Color, Spectral and Contrast if necessary per protocol    Interpretation Summary    Normal left ventricular cavity size and wall thickness noted.    The following left ventricular wall segments are hypokinetic: mid anterior, basal anterolateral, apical lateral, apical septal, mid anteroseptal and basal anterior. The following left ventricular wall segments are akinetic: apex.    Left ventricular systolic function is moderately decreased. Left ventricular ejection fraction appears to be 36 - 40%.    Left ventricular diastolic function is consistent with (grade I) impaired relaxation.    The aortic valve is structurally normal with no regurgitation or stenosis present.    The mitral valve is structurally normal with no significant stenosis present. Mild mitral valve regurgitation is present.    Mild tricuspid valve regurgitation is present. Estimated right ventricular systolic pressure from tricuspid regurgitation is moderately elevated (45-55 mmHg).    There is no evidence of pericardial effusion. .    Comments: Patient seem to have developed a new regional wall motion normalities with decreased LV systolic function as compared to the previous study in March 2023.     Acute on chronic hypoxic respiratory failure-likely due to interstitial lung disease flareup.  Continue steroids    Cannot give diuretics to improve lung compliance and diffusion capacity as patient's blood pressure is on the lower side and patient is on vasopressors.  Peak and plateau pressures reviewed.  On my assessment Plateau pressure was around 25.  Increase tidal volume  Latest blood gas reviewed.  Vent Settings          Resp Rate (Set): 28  Pressure Support  (cm H2O): 10 cm H20  FiO2 (%): 28 %  PEEP/CPAP (cm H2O): 4 cm H20    Minute Ventilation (L/min) (Obs): 10.1 L/min  Resp Rate (Observed) Vent: 30     I:E Ratio (Obs): 1:2.1    PIP Observed (cm H2O): 29 cm H2O  Plateau Pressure (cm H2O): 41 cm H2O  Driving Pressure (cm H2O): 32.6 cm H2O   Chest x-ray reviewed.    Pneumothorax ex likely due to barotrauma from increased plateau pressures and ILD.  Latest chest x-ray reviewed.  Will repeat chest x-ray tomorrow morning.    VBG in the morning.    Continue nebs.  Goals of care discussion today.      Nephrology- Cr and BUN stable  I/O-reviewed     GI-continue current diet.  Continue aspiration precautions     Hematology- CBC  Hb  platelet  WBC-latest reviewed    ID  Culture-reviewed  And Antibiotics   Increasing white count-microbiology reviewed.  Will get procalcitonin level in the morning.  Endocrinology- Maintain Blood sugar 140 -180  Blood sugars reviewed     Electrolytes-   Mag and phos         DVT prophylaxis--continue  Currently patient is critically ill due to hypoxic respiratory failure due to flareup of her interstitial lung disease, pneumothorax, septic shock and non-ST elevation MI.  Adjusted patient's drips and ventilator settings.  MDR done at bedside.  Cc 31 mins    John Mcmahon MD  07/29/24  09:34 EDT

## 2024-07-29 NOTE — PAYOR COMM NOTE
"CONTACT: DINORA HILL RN  UTILIZATION MANAGEMENT DEPT.  [facility]  [address] [place] [address]  PHONE: [phone]  FAX: [phone]        CLINICAL UPDATE      REQUEST FOR ADDITIONAL DAYS , THANK YOU    REF#[id number]       Lexie Valdez (64 y.o. Female)       Date of Birth   1960    Social Security Number       Address   [address] [place] [id number]    Home Phone       MRN   [id number]       Jain   [facility]    Marital Status                               Admission Date   7/17/24    Admission Type   Emergency    Admitting Provider   Estuardo Charles MD    Attending Provider   Eduardo Freeman MD    Department, Room/Bed   [facility] CRITICAL CARE, 10/       Discharge Date       Discharge Disposition       Discharge Destination                                 Attending Provider: Eduardo Freeman MD    Allergies: Codeine, Sulfa Antibiotics    Isolation: None   Infection: None   Code Status: No CPR    Ht: 162.6 cm (64.02\")   Wt: 61.4 kg (135 lb 5.8 oz)    Admission Cmt: None   Principal Problem: NSTEMI (non-ST elevated myocardial infarction) [I21.4]                   Active Insurance as of 7/17/2024       Primary Coverage       Payor Plan Insurance Group Employer/Plan Group    ANTHEM MEDICARE REPLACEMENT ANTHEM MEDICARE ADVANTAGE [id number]       Payor Plan Address Payor Plan Phone Number Payor Plan Fax Number Effective Dates    [address] [phone]  1/1/2022 - None Entered    [facility] [address]         Subscriber Name Subscriber Birth Date Member ID       LEXIE VALDEZ 1960 [id number]               Secondary Coverage       Payor Plan Insurance Group Employer/Plan Group    [place]        Payor Plan Address Payor Plan Phone Number Payor Plan Fax Number Effective Dates    [address] [phone]  5/1/2021 - None Entered    [facility] [id number]         Subscriber Name Subscriber Birth Date Member ID       " DAVE VALDEZ 1960 [id number]                     Emergency Contacts        (Rel.) Home Phone Work Phone Mobile Phone    Khris Valdez (Spouse) -- -- [phone]    Faith Davila (Son) [phone] -- --    Rosa Kessler (Sister) -- -- [phone]              Current Facility-Administered Medications   Medication Dose Route Frequency Provider Last Rate Last Admin    aspirin chewable tablet 81 mg  81 mg Oral Daily Estuardo Charles MD   81 mg at 07/29/24 0807    sennosides-docusate (PERICOLACE) 8.6-50 MG per tablet 2 tablet  2 tablet Oral BID PRN Estuardo Charles MD   2 tablet at 07/24/24 0809    And    polyethylene glycol (MIRALAX) packet 17 g  17 g Oral Daily PRN Estuardo Charles MD        And    bisacodyl (DULCOLAX) EC tablet 5 mg  5 mg Oral Daily PRN Estuardo Charles MD        And    bisacodyl (DULCOLAX) suppository 10 mg  10 mg Rectal Daily PRN Estuardo Charles MD        Brexpiprazole tablet 0.5 mg  0.5 mg Oral Daily Estuardo Charles MD   0.5 mg at 07/29/24 0806    busPIRone (BUSPAR) tablet 10 mg  10 mg Oral BID PRN Estuardo Charles MD   10 mg at 07/20/24 0946    Calcium Replacement - Follow Nurse / BPA Driven Protocol   Does not apply PRN Jessica Ospina MD        cefepime 2000 mg IVPB in 100 mL NS (VTB)  2,000 mg Intravenous Q8H Deirdre Davila APRN   2,000 mg at 07/29/24 0804    chlorhexidine (PERIDEX) 0.12 % solution 15 mL  15 mL Mouth/Throat Q12H Eduardo Freeman MD   15 mL at 07/29/24 0804    DEXMEDETOMIDINE 1000 MCG/250ML INFUSION infusion  0.2-1.5 mcg/kg/hr Intravenous Titrated Kurt White MD 14.2 mL/hr at 07/29/24 0703 0.9 mcg/kg/hr at 07/29/24 0703    dextrose (D50W) (25 g/50 mL) IV injection 25 g  25 g Intravenous Q15 Min PRN Jessica Ospina MD        dextrose (GLUTOSE) oral gel 15 g  15 g Oral Q15 Min PRN Jessica Ospina MD        donepezil (ARICEPT) tablet 10 mg  10 mg Oral Q PM Estuardo Charles MD   10 mg at 07/28/24 5591     doxycycline (MONODOX) capsule 100 mg  100 mg Oral Q12H Jessica Ospina MD   100 mg at 07/29/24 0807    Enoxaparin Sodium (LOVENOX) syringe 40 mg  40 mg Subcutaneous Nightly Eduardo Freeman MD   40 mg at 07/28/24 2102    fentaNYL 2500 mcg/250 mL NS infusion   mcg/hr Intravenous Titrated Tanner Ron MD 30 mL/hr at 07/29/24 0350 300 mcg/hr at 07/29/24 0350    glucagon HCl (Diagnostic) injection 1 mg  1 mg Intramuscular Q15 Min PRN Jessica Ospina MD        guaifenesin (ROBITUSSIN) 100 MG/5ML liquid 200 mg  200 mg Oral Q4H PRN Estuardo Charles MD   200 mg at 07/20/24 2317    HYDROcodone-acetaminophen (NORCO) 5-325 MG per tablet 1 tablet  1 tablet Oral Q8H PRN Estuardo Charles MD        [Held by provider] hydroxychloroquine (PLAQUENIL) tablet 200 mg  200 mg Oral BID Estuardo Charles MD   200 mg at 07/28/24 0811    hydrOXYzine (ATARAX) tablet 25 mg  25 mg Oral Q6H PRN Estuardo Charles MD   25 mg at 07/20/24 2317    insulin regular (humuLIN R,novoLIN R) injection 2-7 Units  2-7 Units Subcutaneous Q6H Jessica Ospina MD   2 Units at 07/29/24 0015    ipratropium (ATROVENT) nebulizer solution 0.5 mg  0.5 mg Nebulization Q6H PRN Estuardo Charles MD        ipratropium-albuterol (DUO-NEB) nebulizer solution 3 mL  3 mL Nebulization Q6H - RT Jessica Ospina MD   3 mL at 07/29/24 0618    levothyroxine (SYNTHROID, LEVOTHROID) tablet 25 mcg  25 mcg Oral Daily With Breakfast Estuardo Charles MD   25 mcg at 07/29/24 0807    magic barrier cream   Topical BID Jessica Ospina MD   Given at 07/29/24 0808    Magnesium Cardiology Dose Replacement - Follow Nurse / BPA Driven Protocol   Does not apply PRN Jessica Ospina MD        memantine (NAMENDA) tablet 5 mg  5 mg Oral Q12H Estuardo Charles MD   5 mg at 07/29/24 0806    methylPREDNISolone sodium succinate (SOLU-Medrol) injection 60 mg  60 mg Intravenous Q12H Kurt White MD   60 mg at 07/29/24 0440    [Held by  provider] metoprolol succinate XL (TOPROL-XL) 24 hr tablet 25 mg  25 mg Oral Q24H Estuardo Charles MD   25 mg at 07/20/24 0946    midazolam (VERSED) injection 2 mg  2 mg Intravenous Q2H PRN Alisson Moreno MD   2 mg at 07/29/24 0620    mupirocin (BACTROBAN) 2 % ointment 1 Application  1 Application Topical Q12H Jessica Ospina MD   1 Application at 07/29/24 0804    nitroglycerin (NITROSTAT) SL tablet 0.4 mg  0.4 mg Sublingual Q5 Min PRN Estuardo Charles MD        nystatin (MYCOSTATIN) 100,000 unit/mL suspension 500,000 Units  5 mL Oral 4x Daily Jessica Ospina MD   500,000 Units at 07/29/24 0804    nystatin (MYCOSTATIN) 760695 UNIT/GM cream 1 Application  1 Application Topical Q12H Estuardo Charles MD   1 Application at 07/29/24 0804    pantoprazole (PROTONIX) injection 40 mg  40 mg Intravenous Q AM Sam Neves MD   40 mg at 07/29/24 0543    Pharmacy Consult   Does not apply Continuous PRN Estuardo Charles MD        phenylephrine (HAZEL-SYNEPHRINE) 50 mg in 250 mL NS infusion  0.5-3 mcg/kg/min (Dosing Weight) Intravenous Titrated Jessica Ospina MD 13.73 mL/hr at 07/29/24 0246 0.8 mcg/kg/min at 07/29/24 0246    Phenylephrine HCl-NaCl 1-0.9 MG/10ML-% 100 mcg/ml injection  - ADS Override Pull             Phenylephrine HCl-NaCl 1-0.9 MG/10ML-% 100 mcg/ml injection  - ADS Override Pull             Phosphorus Replacement - Follow Nurse / BPA Driven Protocol   Does not apply PRN Jessica Ospina MD        Potassium Replacement - Follow Nurse / BPA Driven Protocol   Does not apply PRN Jessica Ospina MD        prochlorperazine (COMPAZINE) injection 5 mg  5 mg Intravenous Q6H PRN Estuardo Charles MD   5 mg at 07/20/24 1138    propofol (DIPRIVAN) infusion 10 mg/mL 100 mL  5-50 mcg/kg/min (Dosing Weight) Intravenous Titrated Sam Neves MD 17.16 mL/hr at 07/29/24 0438 50 mcg/kg/min at 07/29/24 0438    sodium chloride 0.9 % flush 10 mL  10 mL Intravenous PRN Estuardo Charles  MD Tommy        sodium chloride 0.9 % flush 10 mL  10 mL Intravenous PRN Estuardo Charles MD        sodium chloride 0.9 % flush 10 mL  10 mL Intravenous Q12H Estuardo Charles MD   10 mL at 07/29/24 0804    sodium chloride 0.9 % flush 10 mL  10 mL Intravenous PRN Estuardo Charles MD        sodium chloride 0.9 % flush 10 mL  10 mL Intravenous Q12H Estuardo Charles MD   10 mL at 07/29/24 0804    sodium chloride 0.9 % flush 10 mL  10 mL Intravenous PRN Estuardo Charles MD        sodium chloride 0.9 % flush 10 mL  10 mL Intravenous Q12H Estuardo Charles MD   10 mL at 07/29/24 0804    sodium chloride 0.9 % flush 10 mL  10 mL Intravenous PRN Estuardo Charles MD        sodium chloride 0.9 % flush 10 mL  10 mL Intravenous Q12H Eduardo Freeman MD   10 mL at 07/29/24 0804    sodium chloride 0.9 % flush 10 mL  10 mL Intravenous Q12H Eduardo Freeman MD   10 mL at 07/29/24 0804    sodium chloride 0.9 % flush 10 mL  10 mL Intravenous Q12H Eduardo Freeman MD   10 mL at 07/29/24 0804    sodium chloride 0.9 % flush 10 mL  10 mL Intravenous PRN Eduardo Freeman MD        sodium chloride 0.9 % flush 20 mL  20 mL Intravenous Eduardo Wilkins MD        sodium chloride 0.9 % infusion 40 mL  40 mL Intravenous PRN Estuardo Charles MD        sodium chloride 0.9 % infusion 40 mL  40 mL Intravenous PRN Estuardo Charles MD        sodium chloride 0.9 % infusion 40 mL  40 mL Intravenous PRN Estuardo Charles MD        sodium chloride 0.9 % infusion 40 mL  40 mL Intravenous PRN Eduardo Freeman MD        vecuronium (NORCURON) injection 5 mg  5 mg Intravenous Q6H PRN John Mcmahon MD   5 mg at 07/29/24 0306     Orders (last 48 hrs)        Start     Ordered    07/29/24 0806  Respiratory communication  Once        Comments: Withdraw ETT 2cm, is at irene    07/29/24 0805    07/29/24 0800  cefepime 2000 mg IVPB in 100 mL NS (VTB)  Every 8 Hours         07/29/24 0743     07/29/24 0605  midazolam (VERSED) injection 2 mg  Every 2 Hours PRN         07/29/24 0607    07/29/24 0601  POC Glucose Once  PROCEDURE ONCE        Comments: Complete no more than 45 minutes prior to patient eating      07/29/24 0544    07/29/24 0600  Phosphorus  Morning Draw         07/28/24 1952 07/29/24 0600  Magnesium  Morning Draw         07/28/24 1952 07/29/24 0600  Comprehensive Metabolic Panel  Morning Draw         07/28/24 1952 07/29/24 0600  CBC & Differential  Morning Draw         07/28/24 1952 07/29/24 0600  High Sensitivity Troponin T  Morning Draw         07/28/24 1952 07/29/24 0600  Lactic Acid, Plasma  Morning Draw         07/28/24 1952 07/29/24 0600  CBC Auto Differential  PROCEDURE ONCE         07/28/24 2202 07/29/24 0515  midazolam (VERSED) injection 2 mg  Once         07/29/24 0416    07/29/24 0435  Blood Gas, Arterial With Co-Ox  PROCEDURE ONCE         07/29/24 0431    07/29/24 0416  Blood Gas, Arterial -With Co-Ox Panel: Yes  STAT         07/29/24 0416    07/29/24 0415  XR Chest 1 View  1 Time Imaging         07/29/24 0416    07/29/24 0220  High Sensitivity Troponin T 2Hr  PROCEDURE ONCE         07/29/24 0111    07/29/24 0027  POC Glucose Once  PROCEDURE ONCE        Comments: Complete no more than 45 minutes prior to patient eating      07/29/24 0010    07/28/24 2348  ECG 12 Lead Bradycardia  STAT         07/28/24 2348    07/28/24 2345  mupirocin (BACTROBAN) 2 % ointment 1 Application  Every 12 Hours Scheduled         07/28/24 2255    07/28/24 1754  POC Glucose Once  PROCEDURE ONCE        Comments: Complete no more than 45 minutes prior to patient eating      07/28/24 1747    07/28/24 1526  Ventilator - Vent Mode: AC/VC; Rate: Other; Rate: 28; FiO2: Titrate Per SpO2; Titrate Oxygen for SpO2: 90 - 95%; PEEP: Other; PEEP: 4; Tidal Volume: mL; TV: 300  Continuous         07/28/24 1526    07/28/24 1128  POC Glucose Once  PROCEDURE ONCE        Comments: Complete no more than 45  "minutes prior to patient eating      07/28/24 1121    07/28/24 0958  Blood Culture - Blood, Arm, Left  Once        Placed in \"And\" Linked Group    07/28/24 0958    07/28/24 0958  Blood Culture - Blood, Arm, Right  Once        Comments: From a different site than #1.     Placed in \"And\" Linked Group    07/28/24 0958    07/28/24 0958  XR Chest 1 View  1 Time Imaging         07/28/24 0958    07/28/24 0730  fentaNYL 2500 mcg/250 mL NS infusion  Titrated         07/28/24 0633    07/28/24 0600  Phosphorus  Morning Draw         07/27/24 1805    07/28/24 0600  Magnesium  Morning Draw         07/27/24 1805    07/28/24 0600  CBC & Differential  Morning Draw         07/27/24 1805    07/28/24 0600  Comprehensive Metabolic Panel  Morning Draw         07/27/24 1805    07/28/24 0600  CBC Auto Differential  PROCEDURE ONCE         07/27/24 2202    07/28/24 0552  POC Glucose Once  PROCEDURE ONCE        Comments: Complete no more than 45 minutes prior to patient eating      07/28/24 0535    07/28/24 0255  Manual Differential  Once         07/28/24 0254    07/28/24 0205  Scan Slide  Once,   Status:  Canceled         07/28/24 0204    07/27/24 2356  POC Glucose Once  PROCEDURE ONCE        Comments: Complete no more than 45 minutes prior to patient eating      07/27/24 2350    07/27/24 1808  POC Glucose Once  PROCEDURE ONCE        Comments: Complete no more than 45 minutes prior to patient eating      07/27/24 1801    07/27/24 1729  Blood Gas, Arterial With Co-Ox  PROCEDURE ONCE         07/27/24 1726    07/27/24 1710  Blood Gas, Arterial With Co-Ox  STAT         07/27/24 1709    07/27/24 1709  XR Chest 1 View  1 Time Imaging         07/27/24 1709    07/27/24 1153  POC Glucose Once  PROCEDURE ONCE        Comments: Complete no more than 45 minutes prior to patient eating      07/27/24 1146    07/27/24 1051  Phosphorus  Morning Draw         07/27/24 1051    07/27/24 1051  Magnesium  Morning Draw         07/27/24 1051    07/27/24 1051  CBC & " Differential  Morning Draw         07/27/24 1051    07/27/24 1051  Comprehensive Metabolic Panel  Morning Draw         07/27/24 1051    07/27/24 1051  aPTT  Morning Draw         07/27/24 1051    07/27/24 1051  Protime-INR  Morning Draw         07/27/24 1051    07/27/24 1051  Blood Gas, Venous -With Co-Ox Panel: Yes  Morning Draw         07/27/24 1051    07/27/24 1051  CBC Auto Differential  PROCEDURE ONCE         07/27/24 1051    07/26/24 1900  ipratropium-albuterol (DUO-NEB) nebulizer solution 3 mL  Every 6 Hours - RT         07/26/24 1601    07/26/24 1100  DEXMEDETOMIDINE 1000 MCG/250ML INFUSION infusion  Titrated         07/26/24 1028    07/26/24 0900  Drain Care  Daily       07/23/24 1736    07/25/24 2100  nystatin (MYCOSTATIN) 100,000 unit/mL suspension 500,000 Units  4 Times Daily         07/25/24 1905    07/25/24 1500  magic barrier cream  2 Times Daily         07/25/24 1409    07/24/24 1937  Potassium Replacement - Follow Nurse / BPA Driven Protocol  As Needed         07/24/24 1937    07/24/24 1937  Magnesium Cardiology Dose Replacement - Follow Nurse / BPA Driven Protocol  As Needed         07/24/24 1937    07/24/24 1937  Phosphorus Replacement - Follow Nurse / BPA Driven Protocol  As Needed         07/24/24 1937    07/24/24 1937  Calcium Replacement - Follow Nurse / BPA Driven Protocol  As Needed         07/24/24 1937    07/23/24 1700  methylPREDNISolone sodium succinate (SOLU-Medrol) injection 60 mg  Every 12 Hours         07/23/24 0918    07/23/24 0630  Weaning Sequence for Vasoactive Infusions  Every Shift Nursing      Comments: Weaning Sequence for Vasoactive Infusions    07/22/24 1925 07/22/24 2015  phenylephrine (HAZEL-SYNEPHRINE) 50 mg in 250 mL NS infusion  Titrated         07/22/24 1925    07/22/24 1800  No Carb Liquid Protein 15g/pkt  2 Times Daily      Comments: Prosource 1 packet bid    07/22/24 1331    07/22/24 1800  POC Glucose Q6H  Every 6 Hours,   Status:  Canceled      Comments: Complete  "no more than 45 minutes prior to patient eating      07/22/24 1735    07/22/24 1800  insulin regular (humuLIN R,novoLIN R) injection 2-7 Units  Every 6 Hours Scheduled         07/22/24 1735    07/22/24 1735  dextrose (GLUTOSE) oral gel 15 g  Every 15 Minutes PRN         07/22/24 1735    07/22/24 1735  dextrose (D50W) (25 g/50 mL) IV injection 25 g  Every 15 Minutes PRN         07/22/24 1735    07/22/24 1735  glucagon HCl (Diagnostic) injection 1 mg  Every 15 Minutes PRN         07/22/24 1735    07/22/24 0600  Daily CHG Bath While Central Line in Place  Daily       07/21/24 1108    07/21/24 2300  doxycycline (MONODOX) capsule 100 mg  Every 12 Hours Scheduled         07/21/24 2206    07/21/24 2100  Enoxaparin Sodium (LOVENOX) syringe 40 mg  Nightly         07/21/24 1702    07/21/24 1702  Daily Weights  Daily       07/21/24 1701    07/21/24 1701  Tube Feeding Assessment and I/O  Every Shift       07/21/24 1701    07/21/24 1417  Urinary Catheter Care  Every Shift      Placed in \"And\" Linked Group    07/21/24 1418    07/21/24 1400  cefepime 2000 mg IVPB in 100 mL NS (VTB)  Every 8 Hours         07/21/24 0424    07/21/24 1231  vecuronium (NORCURON) injection 5 mg  Every 6 Hours PRN         07/21/24 1235    07/21/24 1200  POC Glucose Finger Q6H  Every 6 Hours      Comments: Complete no more than 45 minutes prior to patient eating      07/21/24 0617    07/21/24 1200  chlorhexidine (PERIDEX) 0.12 % solution 15 mL  Every 12 Hours Scheduled         07/21/24 1103    07/21/24 1200  Oral Care & Teeth Brushing - Intubated Patient  Every 4 Hours      Comments: [place] Teeth at Least 2x/day    07/21/24 1103    07/21/24 1200  sodium chloride 0.9 % flush 10 mL  Every 12 Hours Scheduled         07/21/24 1108    07/21/24 1200  sodium chloride 0.9 % flush 10 mL  Every 12 Hours Scheduled         07/21/24 1108    07/21/24 1200  sodium chloride 0.9 % flush 10 mL  Every 12 Hours Scheduled         07/21/24 1108    07/21/24 1108  sodium chloride " 0.9 % flush 10 mL  As Needed         07/21/24 1108    07/21/24 1108  sodium chloride 0.9 % flush 20 mL  As Needed         07/21/24 1108    07/21/24 1108  sodium chloride 0.9 % infusion 40 mL  As Needed         07/21/24 1108    07/21/24 0900  sodium chloride 0.9 % flush 10 mL  Every 12 Hours Scheduled         07/21/24 0514    07/21/24 0800  Intake & Output  Every 4 Hours       07/21/24 0514    07/21/24 0700  fentaNYL 2500 mcg/250 mL NS infusion  Titrated         07/21/24 0612    07/21/24 0700  propofol (DIPRIVAN) infusion 10 mg/mL 100 mL  Titrated         07/21/24 0612    07/21/24 0700  pantoprazole (PROTONIX) injection 40 mg  Every Early Morning         07/21/24 0616    07/21/24 0600  Vital Signs Every Hour and Per Hospital Policy Based on Patient Condition  Every Hour       07/21/24 0514    07/21/24 0515  Oral Care - Patient Not on NPPV & Not Intubated  Every Shift       07/21/24 0514    07/21/24 0514  sodium chloride 0.9 % flush 10 mL  As Needed         07/21/24 0514    07/21/24 0514  sodium chloride 0.9 % infusion 40 mL  As Needed         07/21/24 0514    07/20/24 0800  levothyroxine (SYNTHROID, LEVOTHROID) tablet 25 mcg  Daily With Breakfast         07/19/24 1540    07/19/24 2130  sodium chloride 0.9 % flush 10 mL  Every 12 Hours Scheduled         07/19/24 2030    07/19/24 2100  memantine (NAMENDA) tablet 5 mg  Every 12 Hours Scheduled         07/19/24 1540    07/19/24 2100  [Held by provider]  hydroxychloroquine (PLAQUENIL) tablet 200 mg  2 Times Daily        (On hold since yesterday at 1952 until manually unheld; held by Jessica Ospina MDHold Reason: Other (Comment Required))    07/19/24 1540    07/19/24 2100  nystatin (MYCOSTATIN) 346393 UNIT/GM cream 1 Application  Every 12 Hours Scheduled         07/19/24 1715    07/19/24 2030  sodium chloride 0.9 % flush 10 mL  As Needed         07/19/24 2030 07/19/24 2030  sodium chloride 0.9 % infusion 40 mL  As Needed         07/19/24 2030 07/19/24 1814   "prochlorperazine (COMPAZINE) injection 5 mg  Every 6 Hours PRN         07/19/24 1813    07/19/24 1700  donepezil (ARICEPT) tablet 10 mg  Every Evening         07/19/24 1540    07/19/24 1630  Brexpiprazole tablet 0.5 mg  Daily         07/19/24 1540    07/19/24 1620  Pharmacy Consult  Continuous PRN         07/19/24 1622    07/19/24 1539  busPIRone (BUSPAR) tablet 10 mg  2 Times Daily PRN         07/19/24 1540    07/19/24 1539  hydrOXYzine (ATARAX) tablet 25 mg  Every 6 Hours PRN         07/19/24 1540    07/19/24 1539  HYDROcodone-acetaminophen (NORCO) 5-325 MG per tablet 1 tablet  Every 8 Hours PRN         07/19/24 1540    07/18/24 2119  guaifenesin (ROBITUSSIN) 100 MG/5ML liquid 200 mg  Every 4 Hours PRN         07/18/24 2120    07/18/24 2000  [Held by provider]  metoprolol succinate XL (TOPROL-XL) 24 hr tablet 25 mg  Every 24 Hours Scheduled        (On hold since Mon 7/22/2024 at 1923 until manually unheld; held by Jessica Ospina MDHold Reason: Abnormal Labs)    07/18/24 1913    07/18/24 1523  Wound Care Abrasion (Patient states her dog jumped up on her at home and scratched her leg.)  Daily       07/18/24 1522    07/18/24 0900  aspirin chewable tablet 81 mg  Daily         07/18/24 0156    07/18/24 0900  sodium chloride 0.9 % flush 10 mL  Every 12 Hours Scheduled         07/18/24 0342    07/18/24 0800  Oral Care  2 Times Daily       07/18/24 0342    07/18/24 0342  sennosides-docusate (PERICOLACE) 8.6-50 MG per tablet 2 tablet  2 Times Daily PRN        Placed in \"And\" Linked Group    07/18/24 0342    07/18/24 0342  polyethylene glycol (MIRALAX) packet 17 g  Daily PRN        Placed in \"And\" Linked Group    07/18/24 0342    07/18/24 0342  bisacodyl (DULCOLAX) EC tablet 5 mg  Daily PRN        Placed in \"And\" Linked Group    07/18/24 0342    07/18/24 0342  bisacodyl (DULCOLAX) suppository 10 mg  Daily PRN        Placed in \"And\" Linked Group    07/18/24 0342    07/18/24 0342  ipratropium (ATROVENT) nebulizer solution " 0.5 mg  Every 6 Hours PRN         07/18/24 0342    07/18/24 0342  sodium chloride 0.9 % flush 10 mL  As Needed         07/18/24 0342    07/18/24 0342  sodium chloride 0.9 % infusion 40 mL  As Needed         07/18/24 0342    07/18/24 0342  nitroglycerin (NITROSTAT) SL tablet 0.4 mg  Every 5 Minutes PRN         07/18/24 0342    07/17/24 2044  sodium chloride 0.9 % flush 10 mL  As Needed         07/17/24 2044 07/17/24 2033  sodium chloride 0.9 % flush 10 mL  As Needed         07/17/24 2034    Unscheduled  Up With Assistance  As Needed       07/18/24 0342    Unscheduled  Change Dressing to IV Site As Needed When Damp, Loose or Soiled  As Needed       07/19/24 2030    Unscheduled  Change Needleless Connectors  As Needed      Comments: Change Needleless Connectors When:  - Administration Set Changed  - Dressing Changed  - Removed For Any Reason  - Residual Blood or Debris Within Connector  - Prior to Drawing Blood Cultures  - Contamination of Connector  - After Administration of Blood or Blood Components    07/19/24 2030    Unscheduled  Insert New Peripheral IV  As Needed      Comments: Frequency Per Facility Policy    07/19/24 2030    Unscheduled  Spontaneous Awakening Trial  Daily - SAT       07/21/24 1103    Unscheduled  Spontaneous Breathing Trial  Daily - SBT       07/21/24 1103    Unscheduled  Change Dressing to IV Site As Needed When Damp, Loose or Soiled  As Needed       07/21/24 1108    Unscheduled  Change Needleless Connectors  As Needed      Comments: Change Needleless Connectors When:  - Administration Set Changed  - Dressing Changed  - Removed For Any Reason  - Residual Blood or Debris Within Connector  - Prior to Drawing Blood Cultures  - Contamination of Connector  - After Administration of Blood or Blood Components    07/21/24 1108    Unscheduled  Jayden & Document Feeding Tube Depth (in cm)  As Needed       07/21/24 1701    Unscheduled  Verify Feeding Tube Placement Upon Insertion & As Needed  As Needed        07/21/24 1701    Unscheduled  Flush Feeding Tube With 30-50mL Water As Needed  As Needed       07/21/24 1701    Unscheduled  Follow Hypoglycemia Standing Orders For Blood Glucose <70 & Notify Provider of Treatment  As Needed      Comments: Follow Hypoglycemia Orders As Outlined in Process Instructions (Open Order Report to View Full Instructions)  Notify Provider Any Time Hypoglycemia Treatment is Administered    07/22/24 1735    Unscheduled  Skin Tear Care - Minimal Drainage PRN  As Needed      Comments: - Realign Skin Flap (if Viable) Gently Ease Flap Into Place Using a Dampened Cotton-Tipped Applicator or Gloved Finger  - Gently Cleanse Area & Pat Dry  - Apply Hydrogel & Non-Adherent Layer (Silicone Sheet, Oil Emulsion Dressing or Vaseline Gauze)  - Secure With Silicone Border Dressing (Unless Skin Fragile)  - If Skin is Fragile Secure With Roll Gauze - Do NOT Put Tape Directly on Skin  - Change Every 3 Days & As Needed    07/25/24 1046    --  FLUoxetine (PROzac) 40 MG capsule  Daily         07/18/24 0221    --  hydrOXYzine (ATARAX) 25 MG tablet  Every 6 Hours PRN         07/18/24 0221    --  ipratropium-albuterol (DUO-NEB) 0.5-2.5 mg/3 ml nebulizer  Every 4 Hours PRN         07/18/24 0221    --  megestrol (MEGACE) 40 MG tablet  2 Times Daily         07/18/24 0221    --  triamcinolone (KENALOG) 0.1 % cream  2 Times Daily         07/18/24 0221    --  albuterol sulfate  (90 Base) MCG/ACT inhaler  Daily PRN         07/18/24 0957    --  hydroCHLOROthiazide 12.5 MG tablet  Daily         07/18/24 1027    --  hydroxychloroquine (PLAQUENIL) 200 MG tablet  2 Times Daily         07/18/24 1027                     Physician Progress Notes (last 48 hours)        Progress Notes signed by Alisson Moreno MD at 07/29/24 0609           [facility] Physician - Brief Progress Note  PERMANENT  07/29/2024 06:08    [facility] - CCU - 10 - [place], [place] ([facility])    DAVE NAGY    Date of Service  07/29/2024 06:08    HPI/Events of Note [facility] Provider Intervention Note    Pt's ABG and CXR reviewed.  No change.  Continue current management.  I have added versed pushes prn as she seems slightly improved after a one time dose.    ___n__   Video Assessment performed            Contact [facility] for any needs if bedside physician is not present.      Interventions Major-Respiratory failure - evaluation and management        Electronically Signed by: Alisson Moreno) on 07/29/2024 06:09    Electronically signed by Alisson Moreno MD at 07/29/24 0609       Progress Notes signed by Alisson Moreno MD at 07/29/24 0422           [facility] Physician - Brief Progress Note  PERMANENT  07/29/2024 04:18    [facility] - U - 10 - [place], [place] ([facility])    NAGY, DAVE KEITH.    Date of Service 07/29/2024 04:18    HPI/Events of Note [facility] Provider Intervention Note    Pt has been stacking breaths and has increased peak pressures.  Precedex had to be decreased due to bradycardia.  She remains on propofol and fentanyl.  Intermittent paralytics given.  Will obtain a stat CXR as she did have a spontaneous pneumothorax and   has a chest tube.  No issues with CT per nursing.  Will also obtain an ABG as hers was slightly worse than the day before.  Will also give a dose of versed x1.    __y___   Video Assessment performed            Contact [facility] for any needs if bedside physician is not present.      Interventions Major-Respiratory failure - evaluation and management        Electronically Signed by: Alisson Moreno) on 07/29/2024 04:22    Electronically signed by Alisson Moreno MD at 07/29/24 0422       Kurt White MD at 07/28/24 1524          Progress Note Critical Care Pulmonary        Subjective  On vent , sedated, no more air leak in Pleur-evac.  X-ray chest showed resolution of pneumothorax.  Chronic changes noted.        Gas exchange is stable.  Oxygen requirement down to  28% on present ventilator settings      Interval History: event overnight: As described above        Review of Systems:    On vent , sedated     Vital Signs  Temp:  [96.8 °F (36 °C)-100 °F (37.8 °C)] 99.5 °F (37.5 °C)  Heart Rate:  [] 53  Resp:  [28-33] 28  BP: ()/(32-94) 149/75  FiO2 (%):  [28 %] 28 %  Body mass index is 24.89 kg/m².    Intake/Output Summary (Last 24 hours) at 7/28/2024 1524  Last data filed at 7/28/2024 0542  Gross per 24 hour   Intake 1792.44 ml   Output 2013 ml   Net -220.56 ml     No intake/output data recorded.    Physical Exam:  General- normal in appearance, not in any acute distress    HEENT- pupils equally reactive to light, normal in size, no scleral icterus    Neck-supple    Respiratory-bilateral air entry, bibasilar crackles.    No more Air leak in the chest tube/ Pleur-evac, no obvious subcu emphysema.    Cardiovascular-  Normal S1 and S2. No S3, S4 or murmurs.     GI-nontender nondistended bowel sounds positive    CNS- grossly nonfocal    Extremities-no clubbing and edema        Results Review:      Results from last 7 days   Lab Units 07/28/24  0053 07/27/24  1143 07/26/24  0441   WBC 10*3/mm3 22.69* 16.39* 17.54*   HEMOGLOBIN g/dL 10.3* 10.0* 9.1*   PLATELETS 10*3/mm3 224 205 225     Results from last 7 days   Lab Units 07/28/24  0053 07/27/24  1143 07/26/24  0441   SODIUM mmol/L 141 142 142   POTASSIUM mmol/L 4.9 5.2 4.7   CHLORIDE mmol/L 107 109* 112*   CO2 mmol/L 24.2 26.1 25.6   BUN mg/dL 23 29* 29*   CREATININE mg/dL 0.26* 0.29* 0.31*   CALCIUM mg/dL 8.3* 8.1* 8.0*   GLUCOSE mg/dL 159* 161* 115*   MAGNESIUM mg/dL 2.5* 2.4 2.5*     Lab Results   Component Value Date    INR 1.00 07/27/2024    INR 1.09 07/26/2024    INR 0.93 07/18/2024    PROTIME 13.3 07/27/2024    PROTIME 14.2 07/26/2024    PROTIME 12.6 07/18/2024     Results from last 7 days   Lab Units 07/28/24  0053 07/27/24  1143 07/26/24  0441   ALK PHOS U/L 83 78 70   BILIRUBIN mg/dL 0.3 0.2 <0.2   ALT (SGPT)  U/L 24 26 17   AST (SGOT) U/L 23 23 21     Results from last 7 days   Lab Units 07/27/24  1726   PH, ARTERIAL pH units 7.345*   PO2 ART mm Hg 76.7*   PCO2, ARTERIAL mm Hg 52.1*   HCO3 ART mmol/L 28.4*     Imaging Results (Last 24 Hours)       Procedure Component Value Units Date/Time    XR Chest 1 View [[id number]] Collected: 07/28/24 1346     Updated: 07/28/24 1348    Narrative:      TECHNIQUE: X-ray of the chest single view was performed per as low as  reasonably achievable (ALARA) protocols.     COMPARISON: 7/27/2024     FINDINGS:     There is an endotracheal tube with tip 3.7 cm above the irene. A  gastric drainage tube courses into the stomach, with tip not seen. There  is a left lung base chest tube and a gastric lap band device. No  pneumothorax. There is a right neck central venous catheter with tip  likely near the cavoatrial junction.     Subjectively, minimally more pronounced ARDS like lung  opacities/reticulation diffusely. No definite pleural effusion.     Redemonstrated subcutaneous emphysema in the supraclavicular region  bilaterally.     Nonenlarged cardiomediastinal silhouette.       Impression:         1. Subjectively minimally increased ARDS-like airspace  opacities/pulmonary edema compared to prior. No definite pleural  effusion or pneumothorax.     2. Stable lines and tubes.        To TALK to On Call [phone]     This report was finalized on 7/28/2024 1:46 PM by Andrea Medina MD.       XR Chest 1 View [[id number]] Collected: 07/27/24 1746     Updated: 07/27/24 1749    Narrative:      INDICATION: Difficulty breathing.     TECHNIQUE: Frontal radiograph of the chest.     COMPARISON: Radiograph from yesterday     FINDINGS:   Cardiomegaly. Multifocal infiltrates/edema, unchanged. No pleural  effusion or pneumothorax. Endotracheal tube, enteric tube, left-sided  chest tube and central venous catheter in similar position.       Impression:      No significant interval change allowing  for differences in technique.        This report was finalized on 7/27/2024 5:47 PM by Alex Pallas, DO.                    aspirin, 81 mg, Oral, Daily  Brexpiprazole, 0.5 mg, Oral, Daily  chlorhexidine, 15 mL, Mouth/Throat, Q12H  donepezil, 10 mg, Oral, Q PM  doxycycline, 100 mg, Oral, Q12H  enoxaparin, 40 mg, Subcutaneous, Nightly  hydroxychloroquine, 200 mg, Oral, BID  insulin regular, 2-7 Units, Subcutaneous, Q6H  ipratropium-albuterol, 3 mL, Nebulization, Q6H - RT  levothyroxine, 25 mcg, Oral, Daily With Breakfast  magic barrier cream, , Topical, BID  memantine, 5 mg, Oral, Q12H  methylPREDNISolone sodium succinate, 60 mg, Intravenous, Q12H  [Held by provider] metoprolol succinate XL, 25 mg, Oral, Q24H  nystatin, 5 mL, Oral, 4x Daily  nystatin, 1 Application, Topical, Q12H  pantoprazole, 40 mg, Intravenous, Q AM  Phenylephrine HCl-NaCl, , ,   Phenylephrine HCl-NaCl, , ,   sodium chloride, 10 mL, Intravenous, Q12H  sodium chloride, 10 mL, Intravenous, Q12H  sodium chloride, 10 mL, Intravenous, Q12H  sodium chloride, 10 mL, Intravenous, Q12H  sodium chloride, 10 mL, Intravenous, Q12H  sodium chloride, 10 mL, Intravenous, Q12H      dexmedetomidine, 0.2-1.5 mcg/kg/hr, Last Rate: 1.3 mcg/kg/hr (07/28/24 1153)  fentanyl 10 mcg/mL,  mcg/hr, Last Rate: 200 mcg/hr (07/28/24 1252)  Pharmacy Consult,   phenylephrine, 0.5-3 mcg/kg/min (Dosing Weight), Last Rate: 0.5 mcg/kg/min (07/28/24 1252)  propofol, 5-50 mcg/kg/min (Dosing Weight), Last Rate: 45 mcg/kg/min (07/28/24 1252)        Medication Review:     Assessment & Plan   #1: Acute on chronic hypoxic respiratory failure    #2 interstitial lung disease, most likely a flareup of interstitial lung disease.    3.:  Pneumonia.  #4: Septic shock.  Off pressors.          5.  Non-STEMI.      #6 spontaneous pneumothorax.  Chest tube in place.  No more air leak.  Vent Settings          Resp Rate (Set): 28  Pressure Support (cm H2O): 10 cm H20  FiO2 (%): 28 %  PEEP/CPAP (cm  H2O): 4 cm H20    Minute Ventilation (L/min) (Obs): 8.93 L/min  Resp Rate (Observed) Vent: 28     I:E Ratio (Obs): 1:3    PIP Observed (cm H2O): 25 cm H2O    Driving Pressure (cm H2O): 32.6 cm H2O     Gas exchange is improved.    Daily SWT, SBT.    Patient is DNR.  Seems like patient may ultimately require tracheostomy unless patient's family decided for comfort measure     Current medication list reviewed.  Latest microbiology reviewed.  Respiratory panel reviewed.  Continue nebs.  Continue oxygen to maintain saturation 88 to 92%.   Cardiology- hemodynamically -stable  Off pressor.    Nephrology- Cr and BUN stable  I/O-reviewed     GI-continue current diet.  Continue aspiration precautions     Hematology- CBC  Hb  platelet  WBC-latest reviewed     ID  Culture  And Antibiotics     Endocrinology- Maintain Blood sugar 140 -180  Blood sugars reviewed     Electrolytes-   Mag and phos         DVT prophylaxis-       Critical Care time spent in direct patient care: 45 minutes (excluding procedure time, if applicable) including high complexity decision making to assess, manipulate, and support vital organ system failure in this individual who has impairment of one or more vital organ systems such that there is a high probability of imminent or life threatening deterioration in the patient’s condition.  Patient is critically ill and is at higher risk for further mortality/morbidity. Continue ICU care .      Kurt White MD  24  15:24 EDT    Electronically signed by Kurt White MD at 24 1526       Violette Cuevas APRN at 24 1354       Attestation signed by Leighann Vasquez MD at 24 [phone]    I have reviewed this documentation and agree.                      [facility]  PROGRESS NOTE    Patient information:  Name: Lexie KEITH Valdez  Age/Sex: 64 y.o. female  :  1960        PCP: Violet Pop APRN  Attending: Estuardo Charles MD  MRN:   [id number]  Visit Number:  [id number]    LOS:  LOS: 10 days     CODE STATUS:    Code Status and Medical Interventions: No CPR (Do Not Attempt to Resuscitate); Full Support   Ordered at: 07/25/24 1813     Level Of Support Discussed With:    Next of Kin (If No Surrogate)    Health Care Surrogate     Code Status (Patient has no pulse and is not breathing):    No CPR (Do Not Attempt to Resuscitate)     Medical Interventions (Patient has pulse or is breathing):    Full Support       PROBLEM LIST:Principal Problem:    NSTEMI (non-ST elevated myocardial infarction)      Reason for Cardiology follow-up: NSTEMI    Subjective   ADMISSION INFORMATION:  Chief Complaint   Patient presents with    Shortness of Breath     HPI:  Lexie Valdez is a 64 y.o. 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     Patient presented to [facility]) emergency room (ER) on 7/17/2024 with complaints of increased shortness of breath x 2 weeks has become progressively worse and exacerbated with minimal activity.     Interval History:   Patient is in room CCU 10 and was examined.  Family at bedside.  Patient remains intubated and sedated.  History and review of systems limited.  /67, heart rate 56.  A.m. labs with potassium at 4.9, creatinine 0.26 and hemoglobin stable at 10.3.       Objective     Vital Signs:  Temp:  [96.8 °F (36 °C)-100 °F (37.8 °C)] 99.5 °F (37.5 °C)  Heart Rate:  [] 57  Resp:  [28-33] 28  BP: ()/(32-94) 133/67  FiO2 (%):  [28 %] 28 %  Vital Signs (last 72 hrs)         07/25 0700 07/26 0659 07/26 0700 07/27 0659 07/27 0700 07/28 0659 07/28 0700 07/28 1355   Most Recent      Temp (°F) 99.1 -  100.1    97.2 -  99.9    96.8 -  99.9    99.5 -  100     99.5 (37.5) 07/28 1200    Heart  Rate 66 -  105    55 -  108    52 -  104    53 -  66     57 07/28 1257    Resp 28 -  33    28 -  38    28 -  39    28 -  29     28 07/28 1257    BP 90/40 -  152/77    81/47 -  153/88    71/38 -  169/94    82/44 -  148/74     133/67 07/28 1252    SpO2 (%) 93 -  100    94 -  100    91 -  100    92 -  100     92 07/28 1257    Oxygen Concentration (%)   28 28 28      28     28 07/28 1257          BMI:  Body mass index is 24.89 kg/m².    WEIGHT:      07/25/24  0530 07/28/24  0440   Weight: 62.9 kg (138 lb 10.7 oz) 65.8 kg (145 lb 1 oz)       DIET:  NPO Diet NPO Type: Tube Feeding    I&O:  Intake & Output (last 3 days)         07/25 0701 07/26 0700 07/26 0701 07/27 0700 07/27 0701 07/28 0700 07/28 0701 07/29 0700    I.V. (mL/kg) 1414.5 (24.7) 698.3 (12.2) 1024.4 (17.9)     Other 484 444 241     NG/ 671 327     IV Piggyback  100 200     Total Intake(mL/kg) 2587.5 (45.2) 1913.3 (33.4) 1792.4 (31.3)     Urine (mL/kg/hr) 800 (0.6) 1140 (0.8) 1700 (1.2)     Stool   300     Chest Tube 45 25 13     Total Output 845 1165 2013     Net +1742.5 +748.3 -220.6             Stool Unmeasured Occurrence   4 x             PHYSICAL EXAM:  Vitals reviewed.   Constitutional:       Interventions: Sedated and intubated.   HENT:      Head: Normocephalic.   Neck:      Vascular: No JVD.   Pulmonary:      Effort: Pulmonary effort is normal. Intubated.      Comments: Mechanical breath sounds, chest tube  Cardiovascular:      Normal rate. Regular rhythm.   Edema:     Peripheral edema absent.   Abdominal:      General: Bowel sounds are normal.      Palpations: Abdomen is soft.   Musculoskeletal: Normal range of motion.      Cervical back: Normal range of motion and neck supple. Skin:     General: Skin is warm and dry.                     Results review   Results Review:    I have reviewed the patient's new clinical results. 07/28/24 13:55 EDT    Results Review:   Results from last 7 days   Lab Units 07/28/24  0053 07/27/24  1143  "07/26/24  0441 07/25/24  0006 07/24/24  0022 07/23/24  0058 07/22/24  0012   WBC 10*3/mm3 22.69* 16.39* 17.54* 21.27* 22.14* 10.24 16.58*   HEMOGLOBIN g/dL 10.3* 10.0* 9.1* 9.6* 9.6* 9.5* 9.9*   PLATELETS 10*3/mm3 224 205 225 303 287 235 227     Results from last 7 days   Lab Units 07/28/24  0053 07/27/24  1143 07/26/24  0441 07/25/24  0006 07/24/24  0022 07/23/24  0058 07/22/24  0012   SODIUM mmol/L 141 142 142 144 133* 139 135*   POTASSIUM mmol/L 4.9 5.2 4.7 5.1 4.0 3.7 4.5   CHLORIDE mmol/L 107 109* 112* 114* 103 106 100   CO2 mmol/L 24.2 26.1 25.6 24.9 25.0 24.1 23.9   BUN mg/dL 23 29* 29* 20 17 20 25*   CREATININE mg/dL 0.26* 0.29* 0.31* 0.33* 0.38* 0.45* 0.47*   CALCIUM mg/dL 8.3* 8.1* 8.0* 8.1* 8.2* 8.2* 8.2*   GLUCOSE mg/dL 159* 161* 115* 138* 164* 208* 191*   ALT (SGPT) U/L 24 26 17  --  14 15 10   AST (SGOT) U/L 23 23 21  --  22 26 33*     Results from last 7 days   Lab Units 07/23/24  0258 07/23/24 0058   HSTROP T ng/L 66* 75*     Lab Results   Component Value Date    PROBNP 11,410.0 (H) 07/26/2024    PROBNP 14,737.0 (H) 07/22/2024    PROBNP 3,550.0 (H) 07/17/2024     Estimated Creatinine Clearance: 201.3 mL/min (A) (by C-G formula based on SCr of 0.26 mg/dL (L)).     Lab Results   Component Value Date    INR 1.00 07/27/2024    INR 1.09 07/26/2024    INR 0.93 07/18/2024    INR 0.98 04/26/2024    INR 1.00 03/21/2023     Lab Results   Component Value Date    LABHEPA 0.27 (L) 07/20/2024    LABHEPA 0.36 07/19/2024    LABHEPA 0.33 07/19/2024    LABHEPA 0.11 (L) 07/18/2024     Lab Results   Component Value Date    MG 2.5 (H) 07/28/2024    MG 2.4 07/27/2024    MG 2.5 (H) 07/26/2024     Lab Results   Component Value Date    TSH 1.140 07/17/2024      No results found for: \"CHOL\", \"TRIG\", \"HDL\", \"LDL\"  Pain Management Panel           No data to display              Microbiology Results (last 10 days)       Procedure Component Value - Date/Time    Aspergillus Galactomannan Antigen - Blood, Arm, Right [[id number]] " Collected: 07/22/24 1410    Lab Status: Final result Specimen: Blood from Arm, Right Updated: 07/26/24 0254     Aspergillus Ag, BAL/Serum 0.04 Index     Narrative:      Performed at:  01 - [facility]  [address], [place]  [id number]  : Lorraine Ruelas MD, Phone:  [phone]  Performed at:  02 - [facility]  [address] [place], [place]  [id number]  : Haroon Walls [facility], Phone:  [phone]    Mycoplasma Pneumoniae Antibody, IgM - Blood, Arm, Left [[id number]]  (Normal) Collected: 07/22/24 0012    Lab Status: Final result Specimen: Blood from Arm, Left Updated: 07/22/24 0202     Mycoplasma pneumo IgM Negative    Legionella Antigen, Urine - Urine, Urine, Clean Catch [[id number]]  (Normal) Collected: 07/21/24 2242    Lab Status: Final result Specimen: Urine, Clean Catch Updated: 07/21/24 2308     LEGIONELLA ANTIGEN, URINE Negative    Narrative:      Presumptive negative for L. pneumophilia serogroup 1 antigen, suggesting no recent or current infection.    Respiratory Culture - Sputum, ET Suction [[id number]] Collected: 07/21/24 2231    Lab Status: Final result Specimen: Sputum from ET Suction Updated: 07/24/24 1119     Respiratory Culture No growth     Gram Stain Rare (1+) Mixed ammon      No WBCs seen      No Epithelial cells seen    MRSA Screen, PCR (Inpatient) - Swab, Nares [[id number]]  (Normal) Collected: 07/21/24 1252    Lab Status: Final result Specimen: Swab from Nares Updated: 07/21/24 1424     MRSA PCR No MRSA Detected    Narrative:      The negative predictive value of this diagnostic test is high and should only be used to consider de-escalating anti-MRSA therapy. A positive result may indicate colonization with MRSA and must be correlated clinically.    Respiratory Panel PCR w/COVID-19(SARS-CoV-2) RHONDA/HEATHER/TASNEEM/PAD/COR/CHRISTELLE In-House, NP Swab in UTM/VTM, 2 HR TAT - Swab, Nasopharynx [[id number]]  (Normal) Collected: 07/21/24 1247    Lab Status: Final result Specimen: Swab  from Nasopharynx Updated: 07/21/24 1357     ADENOVIRUS, PCR Not Detected     Coronavirus 229E Not Detected     Coronavirus HKU1 Not Detected     Coronavirus NL63 Not Detected     Coronavirus OC43 Not Detected     COVID19 Not Detected     Human Metapneumovirus Not Detected     Human Rhinovirus/Enterovirus Not Detected     Influenza A PCR Not Detected     Influenza B PCR Not Detected     Parainfluenza Virus 1 Not Detected     Parainfluenza Virus 2 Not Detected     Parainfluenza Virus 3 Not Detected     Parainfluenza Virus 4 Not Detected     RSV, PCR Not Detected     Bordetella pertussis pcr Not Detected     Bordetella parapertussis PCR Not Detected     Chlamydophila pneumoniae PCR Not Detected     Mycoplasma pneumo by PCR Not Detected    Narrative:      In the setting of a positive respiratory panel with a viral infection PLUS a negative procalcitonin without other underlying concern for bacterial infection, consider observing off antibiotics or discontinuation of antibiotics and continue supportive care. If the respiratory panel is positive for atypical bacterial infection (Bordetella pertussis, Chlamydophila pneumoniae, or Mycoplasma pneumoniae), consider antibiotic de-escalation to target atypical bacterial infection.    Respiratory Culture - Sputum, ET Suction [[id number]] Collected: 07/21/24 1152    Lab Status: Final result Specimen: Sputum from ET Suction Updated: 07/23/24 1053     Respiratory Culture Rare growth Normal respiratory ammon. No S. aureus or Pseudomonas aeruginosa detected. Final report.     Gram Stain Moderate (3+) WBCs seen      Rare (1+) Epithelial cells seen      No organisms seen    Blood Culture - Blood, Arm, Left [[id number]]  (Normal) Collected: 07/21/24 0450    Lab Status: Final result Specimen: Blood from Arm, Left Updated: 07/26/24 0515     Blood Culture No growth at 5 days    Blood Culture - Blood, Arm, Right [[id number]]  (Normal) Collected: 07/21/24 0448    Lab Status: Final result  Specimen: Blood from Arm, Right Updated: 07/26/24 0515     Blood Culture No growth at 5 days    Gastrointestinal Panel, PCR - Stool, Per Rectum [[id number]]  (Normal) Collected: 07/19/24 0410    Lab Status: Final result Specimen: Stool from Per Rectum Updated: 07/19/24 0603     Campylobacter Not Detected     Plesiomonas shigelloides Not Detected     Salmonella Not Detected     Vibrio Not Detected     Vibrio cholerae Not Detected     Yersinia enterocolitica Not Detected     Enteroaggregative E. coli (EAEC) Not Detected     Enteropathogenic E. coli (EPEC) Not Detected     Enterotoxigenic E. coli (ETEC) lt/st Not Detected     Shiga-like toxin-producing E. coli (STEC) stx1/stx2 Not Detected     Shigella/Enteroinvasive E. coli (EIEC) Not Detected     Cryptosporidium Not Detected     Cyclospora cayetanensis Not Detected     Entamoeba histolytica Not Detected     Giardia lamblia Not Detected     Adenovirus F40/41 Not Detected     Astrovirus Not Detected     Norovirus GI/GII Not Detected     Rotavirus A Not Detected     Sapovirus (I, II, IV or V) Not Detected    Clostridioides difficile Toxin - Stool, Per Rectum [[id number]] Collected: 07/19/24 0410    Lab Status: Final result Specimen: Stool from Per Rectum Updated: 07/19/24 0527    Narrative:      The following orders were created for panel order Clostridioides difficile Toxin - Stool, Per Rectum.  Procedure                               Abnormality         Status                     ---------                               -----------         ------                     Clostridioides difficile...[[id number]]                      Final result                 Please view results for these tests on the individual orders.    Clostridioides difficile Toxin, PCR - Stool, Per Rectum [[id number]] Collected: 07/19/24 0410    Lab Status: Final result Specimen: Stool from Per Rectum Updated: 07/19/24 0527     Toxigenic C. difficile by PCR Negative     027 Toxin Presumptive Negative     Narrative:      The result indicates the absence of toxigenic C. difficile from stool specimen.            Imaging Results (Last 24 Hours)       Procedure Component Value Units Date/Time    XR Chest 1 View [[id number]] Collected: 07/28/24 1346     Updated: 07/28/24 1348    Narrative:      TECHNIQUE: X-ray of the chest single view was performed per as low as  reasonably achievable (ALARA) protocols.     COMPARISON: 7/27/2024     FINDINGS:     There is an endotracheal tube with tip 3.7 cm above the irene. A  gastric drainage tube courses into the stomach, with tip not seen. There  is a left lung base chest tube and a gastric lap band device. No  pneumothorax. There is a right neck central venous catheter with tip  likely near the cavoatrial junction.     Subjectively, minimally more pronounced ARDS like lung  opacities/reticulation diffusely. No definite pleural effusion.     Redemonstrated subcutaneous emphysema in the supraclavicular region  bilaterally.     Nonenlarged cardiomediastinal silhouette.       Impression:         1. Subjectively minimally increased ARDS-like airspace  opacities/pulmonary edema compared to prior. No definite pleural  effusion or pneumothorax.     2. Stable lines and tubes.        To TALK to On Call [phone]     This report was finalized on 7/28/2024 1:46 PM by Andrea Medina MD.       XR Chest 1 View [[id number]] Collected: 07/27/24 1746     Updated: 07/27/24 1749    Narrative:      INDICATION: Difficulty breathing.     TECHNIQUE: Frontal radiograph of the chest.     COMPARISON: Radiograph from yesterday     FINDINGS:   Cardiomegaly. Multifocal infiltrates/edema, unchanged. No pleural  effusion or pneumothorax. Endotracheal tube, enteric tube, left-sided  chest tube and central venous catheter in similar position.       Impression:      No significant interval change allowing for differences in technique.        This report was finalized on 7/27/2024 5:47 PM by Alex Pallas,  DO.               ECHO:  Results for orders placed during the hospital encounter of 07/17/24    Adult Transthoracic Echo Complete w/ Color, Spectral and Contrast if necessary per protocol    Interpretation Summary    Normal left ventricular cavity size and wall thickness noted.    The following left ventricular wall segments are hypokinetic: mid anterior, basal anterolateral, apical lateral, apical septal, mid anteroseptal and basal anterior. The following left ventricular wall segments are akinetic: apex.    Left ventricular systolic function is moderately decreased. Left ventricular ejection fraction appears to be 36 - 40%.    Left ventricular diastolic function is consistent with (grade I) impaired relaxation.    The aortic valve is structurally normal with no regurgitation or stenosis present.    The mitral valve is structurally normal with no significant stenosis present. Mild mitral valve regurgitation is present.    Mild tricuspid valve regurgitation is present. Estimated right ventricular systolic pressure from tricuspid regurgitation is moderately elevated (45-55 mmHg).    There is no evidence of pericardial effusion. .    Comments: Patient seem to have developed a new regional wall motion normalities with decreased LV systolic function as compared to the previous study in March 2023.      STRESS TEST:  Results for orders placed during the hospital encounter of 07/17/24    Stress Test With Myocardial Perfusion One Day    Interpretation Summary  Images from the original result were not included.      A pharmacological stress test was performed using regadenoson without low-level exercise.    Findings consistent with an indeterminate ECG stress test.    Myocardial perfusion imaging indicates a moderate-sized infarct located in the inferior wall, septal wall and apex with no significant ischemia noted.    Abnormal LV wall motion consistent with apical dyskinesis.    Left ventricular ejection fraction is  moderately reduced (Calculated EF = 43%).    Impressions are consistent with an intermediate risk study.       HEART CATH:  No results found for this or any previous visit.    TELEMETRY:          I reviewed the patient's new clinical results.    ALLERGIES: Codeine and Sulfa antibiotics    Medication Review:   Current list of medications may not reflect those currently placed in orders that are not signed or are being held.     aspirin, 81 mg, Oral, Daily  Brexpiprazole, 0.5 mg, Oral, Daily  chlorhexidine, 15 mL, Mouth/Throat, Q12H  donepezil, 10 mg, Oral, Q PM  doxycycline, 100 mg, Oral, Q12H  enoxaparin, 40 mg, Subcutaneous, Nightly  hydroxychloroquine, 200 mg, Oral, BID  insulin regular, 2-7 Units, Subcutaneous, Q6H  ipratropium-albuterol, 3 mL, Nebulization, Q6H - RT  levothyroxine, 25 mcg, Oral, Daily With Breakfast  magic barrier cream, , Topical, BID  memantine, 5 mg, Oral, Q12H  methylPREDNISolone sodium succinate, 60 mg, Intravenous, Q12H  [Held by provider] metoprolol succinate XL, 25 mg, Oral, Q24H  nystatin, 5 mL, Oral, 4x Daily  nystatin, 1 Application, Topical, Q12H  pantoprazole, 40 mg, Intravenous, Q AM  Phenylephrine HCl-NaCl, , ,   Phenylephrine HCl-NaCl, , ,   sodium chloride, 10 mL, Intravenous, Q12H  sodium chloride, 10 mL, Intravenous, Q12H  sodium chloride, 10 mL, Intravenous, Q12H  sodium chloride, 10 mL, Intravenous, Q12H  sodium chloride, 10 mL, Intravenous, Q12H  sodium chloride, 10 mL, Intravenous, Q12H      dexmedetomidine, 0.2-1.5 mcg/kg/hr, Last Rate: 1.3 mcg/kg/hr (07/28/24 1153)  fentanyl 10 mcg/mL,  mcg/hr, Last Rate: 200 mcg/hr (07/28/24 1252)  Pharmacy Consult,   phenylephrine, 0.5-3 mcg/kg/min (Dosing Weight), Last Rate: 0.5 mcg/kg/min (07/28/24 1252)  propofol, 5-50 mcg/kg/min (Dosing Weight), Last Rate: 45 mcg/kg/min (07/28/24 1252)        senna-docusate sodium **AND** polyethylene glycol **AND** bisacodyl **AND** bisacodyl    busPIRone    Calcium Replacement - Follow  Nurse / BPA Driven Protocol    dextrose    dextrose    glucagon (human recombinant)    guaifenesin    HYDROcodone-acetaminophen    hydrOXYzine    ipratropium    Magnesium Cardiology Dose Replacement - Follow Nurse / BPA Driven Protocol    nitroglycerin    Pharmacy Consult    Phenylephrine HCl-NaCl    Phenylephrine HCl-NaCl    Phosphorus Replacement - Follow Nurse / BPA Driven Protocol    Potassium Replacement - Follow Nurse / BPA Driven Protocol    prochlorperazine    sodium chloride    sodium chloride    sodium chloride    sodium chloride    sodium chloride    sodium chloride    sodium chloride    sodium chloride    sodium chloride    sodium chloride    sodium chloride    vecuronium    Assessment    Acute NSTEMI, likely type II due to acute respiratory failure with hypoxia  Acute on chronic respiratory failure requiring endotracheal tracheal intubation and mechanical ventilation  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  IV diuresis therapy as needed  2.  Potassium levels between 4 and 5, magnesium between 2 and 2.2  3.  Optimize guideline directed medical therapy for cardiomyopathy when stable              I have discussed this patient with Dr. Vasquez and together, we have formed a plan of care for this patient as stated above in the recommendations.     I have discussed the patients findings and recommendations with the patient.    Thank you very much for asking us to be involved in this patient's care.  We will follow along with you.        Electronically signed by SG Nobles, 07/28/24, 1:55 PM EDT.              Please note that portions of this note were completed with a voice recognition program.    Please  note that portions of this note were copied and has been reviewed and is accurate as of 2024 .      Electronically signed by Leighann Vasquez MD at 24 [phone]       Nichole Gil MD at 24 1255          Surgery follow-up for chest tube management.    Patient is sedated on vent.  Per pulmonary note, weaning trial in process    Exam:  Lungs: Coarse breath sounds bilaterally  Left chest tube: Minimal drainage, no airleak    Status post left chest tube  Plan: As long as patient on positive pressure will leave chest tube in place.    If patient has not made comfort measures may need tracheostomy.    Electronically signed by Nichole Gil MD at 24 1256       Deirdre Davila APRN at 24 1219                     PROGRESS NOTE         Patient Identification:  Name:  Lexie Valdez  Age:  64 y.o.  Sex:  female  :  1960  MRN:  [id number]  Visit Number:  [id number]  Primary Care Provider:  Violet Pop APRN         LOS: 10 days       ----------------------------------------------------------------------------------------------------------------------  Subjective       Chief Complaints:    Shortness of Breath        Interval History:      The patient remains intubated and sedated at 28% FiO2.  Sedation infusing.  Phenylephrine has been restarted.  Tmax 9 9.9 °F.  No reports of diarrhea.  Mechanical breath sounds remain clear to auscultation bilaterally.  Chest tube to the left chest wall.  Abdomen remains soft and rounded, normoactive bowel sounds.  Leukocytosis is worsened today at 22.69.      Review of Systems:    Unable to obtain.  Intubated and sedated.    ----------------------------------------------------------------------------------------------------------------------      Objective       [facility] Meds:  aspirin, 81 mg, Oral, Daily  Brexpiprazole, 0.5 mg, Oral, Daily  chlorhexidine, 15 mL, Mouth/Throat, Q12H  donepezil, 10 mg, Oral, Q PM  doxycycline, 100 mg, Oral,  Q12H  enoxaparin, 40 mg, Subcutaneous, Nightly  hydroxychloroquine, 200 mg, Oral, BID  insulin regular, 2-7 Units, Subcutaneous, Q6H  ipratropium-albuterol, 3 mL, Nebulization, Q6H - RT  levothyroxine, 25 mcg, Oral, Daily With Breakfast  magic barrier cream, , Topical, BID  memantine, 5 mg, Oral, Q12H  methylPREDNISolone sodium succinate, 60 mg, Intravenous, Q12H  [Held by provider] metoprolol succinate XL, 25 mg, Oral, Q24H  nystatin, 5 mL, Oral, 4x Daily  nystatin, 1 Application, Topical, Q12H  pantoprazole, 40 mg, Intravenous, Q AM  Phenylephrine HCl-NaCl, , ,   Phenylephrine HCl-NaCl, , ,   sodium chloride, 10 mL, Intravenous, Q12H  sodium chloride, 10 mL, Intravenous, Q12H  sodium chloride, 10 mL, Intravenous, Q12H  sodium chloride, 10 mL, Intravenous, Q12H  sodium chloride, 10 mL, Intravenous, Q12H  sodium chloride, 10 mL, Intravenous, Q12H      dexmedetomidine, 0.2-1.5 mcg/kg/hr, Last Rate: 1.3 mcg/kg/hr (07/28/24 1153)  fentanyl 10 mcg/mL,  mcg/hr, Last Rate: 200 mcg/hr (07/28/24 1121)  Pharmacy Consult,   phenylephrine, 0.5-3 mcg/kg/min (Dosing Weight), Last Rate: 0.5 mcg/kg/min (07/28/24 0903)  propofol, 5-50 mcg/kg/min (Dosing Weight), Last Rate: 45 mcg/kg/min (07/28/24 1153)      ----------------------------------------------------------------------------------------------------------------------    Vital Signs:  Temp:  [96.8 °F (36 °C)-100 °F (37.8 °C)] 99.5 °F (37.5 °C)  Heart Rate:  [] 53  Resp:  [28-33] 29  BP: ()/(32-94) 138/75  FiO2 (%):  [28 %] 28 %  Mean Arterial Pressure (Non-Invasive) for the past 24 hrs (Last 3 readings):   Noninvasive MAP (mmHg)   07/28/24 1200 95   07/28/24 1145 95   07/28/24 1130 99     SpO2 Percentage    07/28/24 1130 07/28/24 1145 07/28/24 1200   SpO2: 99% 100% 100%     SpO2:  [91 %-100 %] 100 %  on   ;   Device (Oxygen Therapy): ventilator    Body mass index is 24.89 kg/m².  Wt Readings from Last 3 Encounters:   07/28/24 65.8 kg (145 lb 1 oz)   04/26/24  67.7 kg (149 lb 4 oz)   04/19/24 67.1 kg (148 lb)        Intake/Output Summary (Last 24 hours) at 7/28/2024 1219  Last data filed at 7/28/2024 0542  Gross per 24 hour   Intake 1792.44 ml   Output 2013 ml   Net -220.56 ml     NPO Diet NPO Type: Tube Feeding  ----------------------------------------------------------------------------------------------------------------------      Physical Exam:    Constitutional: Thin, frail, chronically ill-appearing  female.  Remains intubated and sedated 28% FiO2.  Phenylephrine has been reinitiated.    HENT:  Head: Normocephalic and atraumatic.  Mouth:  Moist mucous membranes.    Eyes:  Conjunctivae and EOM are normal.  No scleral icterus.  Neck:  Neck supple.  No JVD present.    Cardiovascular:  Normal rate, regular rhythm and normal heart sounds with no murmur. No edema.  Pulmonary/Chest: Mechanical breath sounds remain clear bilaterally.   Left-sided chest tube in place.  Abdominal:  Soft.  Bowel sounds are normal.  No distension and no tenderness.   Musculoskeletal:  No edema, no tenderness, and no deformity.  No swelling or redness of joints.  Neurological: Sedate.  Skin:  Skin is warm and dry.  No rash noted.  No pallor.   Psychiatric: Sedate.        ----------------------------------------------------------------------------------------------------------------------  Results from last 7 days   Lab Units 07/23/24  0258 07/23/24  0058   HSTROP T ng/L 66* 75*     Results from last 7 days   Lab Units 07/26/24  0441 07/22/24  0012   PROBNP pg/mL 11,410.0* 14,737.0*           Results from last 7 days   Lab Units 07/27/24  1726   PH, ARTERIAL pH units 7.345*   PO2 ART mm Hg 76.7*   PCO2, ARTERIAL mm Hg 52.1*   HCO3 ART mmol/L 28.4*     Results from last 7 days   Lab Units 07/28/24  0053 07/27/24  1143 07/26/24  0441 07/25/24  0006 07/24/24  0022 07/23/24  0058   CRP mg/dL  --   --   --   --  3.89* 10.32*   LACTATE mmol/L  --   --   --   --  1.5 1.5   WBC 10*3/mm3 22.69*  "16.39* 17.54*   < > 22.14* 10.24   HEMOGLOBIN g/dL 10.3* 10.0* 9.1*   < > 9.6* 9.5*   HEMATOCRIT % 32.8* 31.9* 29.4*   < > 30.0* 29.6*   MCV fL 101.9* 101.3* 101.7*   < > 98.4* 97.4*   MCHC g/dL 31.4* 31.3* 31.0*   < > 32.0 32.1   PLATELETS 10*3/mm3 224 205 225   < > 287 235   INR   --  1.00 1.09  --   --   --     < > = values in this interval not displayed.     Results from last 7 days   Lab Units 07/28/24  0053 07/27/24  1143 07/26/24  0441   SODIUM mmol/L 141 142 142   POTASSIUM mmol/L 4.9 5.2 4.7   MAGNESIUM mg/dL 2.5* 2.4 2.5*   CHLORIDE mmol/L 107 109* 112*   CO2 mmol/L 24.2 26.1 25.6   BUN mg/dL 23 29* 29*   CREATININE mg/dL 0.26* 0.29* 0.31*   CALCIUM mg/dL 8.3* 8.1* 8.0*   GLUCOSE mg/dL 159* 161* 115*   ALBUMIN g/dL 3.1* 3.1* 3.1*   BILIRUBIN mg/dL 0.3 0.2 <0.2   ALK PHOS U/L 83 78 70   AST (SGOT) U/L 23 23 21   ALT (SGPT) U/L 24 26 17   Estimated Creatinine Clearance: 201.3 mL/min (A) (by C-G formula based on SCr of 0.26 mg/dL (L)).  No results found for: \"AMMONIA\"    Glucose   Date/Time Value Ref Range Status   07/28/2024 1121 180 (H) 70 - 130 mg/dL Final   07/28/2024 0535 126 70 - 130 mg/dL Final   07/27/2024 2350 154 (H) 70 - 130 mg/dL Final   07/27/2024 1801 128 70 - 130 mg/dL Final   07/27/2024 1146 156 (H) 70 - 130 mg/dL Final   07/27/2024 0533 141 (H) 70 - 130 mg/dL Final   07/27/2024 0017 172 (H) 70 - 130 mg/dL Final   07/26/2024 1748 172 (H) 70 - 130 mg/dL Final     Lab Results   Component Value Date    HGBA1C 4.60 (L) 07/18/2024     Lab Results   Component Value Date    TSH 1.140 07/17/2024    FREET4 1.83 (H) 04/11/2024       Blood Culture   Date Value Ref Range Status   07/21/2024 No growth at 2 days  Preliminary   07/21/2024 No growth at 2 days  Preliminary   07/17/2024 No growth at 5 days  Final   07/17/2024 No growth at 5 days  Final     Urine Culture   Date Value Ref Range Status   07/17/2024 >100,000 CFU/mL Escherichia coli (A)  Final     No results found for: \"WOUNDCX\"  No results found " "for: \"STOOLCX\"  Respiratory Culture   Date Value Ref Range Status   07/21/2024 No growth  Preliminary   07/21/2024   Final    Rare growth Normal respiratory ammon. No S. aureus or Pseudomonas aeruginosa detected. Final report.     Pain Management Panel           No data to display                  ----------------------------------------------------------------------------------------------------------------------  Imaging Results (Last 24 Hours)       Procedure Component Value Units Date/Time    XR Chest 1 View [[id number]] Collected: 07/27/24 1746     Updated: 07/27/24 1749    Narrative:      INDICATION: Difficulty breathing.     TECHNIQUE: Frontal radiograph of the chest.     COMPARISON: Radiograph from yesterday     FINDINGS:   Cardiomegaly. Multifocal infiltrates/edema, unchanged. No pleural  effusion or pneumothorax. Endotracheal tube, enteric tube, left-sided  chest tube and central venous catheter in similar position.       Impression:      No significant interval change allowing for differences in technique.        This report was finalized on 7/27/2024 5:47 PM by Alex Pallas, DO.               ----------------------------------------------------------------------------------------------------------------------    Pertinent Infectious Disease Results                Assessment/Plan       Assessment     Septic shock with acute hypoxic respiratory failure requiring mechanical ventilation and pressor support  Pneumonia        Plan      The patient remains intubated and sedated at 28% FiO2.  Sedation infusing.  Phenylephrine has been restarted.  Tmax 9 9.9 °F.  No reports of diarrhea.  Mechanical breath sounds remain clear to auscultation bilaterally.  Chest tube to the left chest wall.  Abdomen remains soft and rounded, normoactive bowel sounds.  Leukocytosis is worsened today at 22.69.    In the setting of worsening leukocytosis and low-grade fever with reinitiation of vasopressors, chest x-ray and blood " cultures x 2 have been ordered.  For now we will continue with cefepime and doxycycline courses but will have a low threshold to escalate antibiotic coverage.  We will monitor closely.      ANTIMICROBIAL THERAPY    doxycycline - 100 MG     Code Status:   Code Status and Medical Interventions: No CPR (Do Not Attempt to Resuscitate); Full Support   Ordered at: 24 1813     Level Of Support Discussed With:    Next of Kin (If No Surrogate)    Health Care Surrogate     Code Status (Patient has no pulse and is not breathing):    No CPR (Do Not Attempt to Resuscitate)     Medical Interventions (Patient has pulse or is breathing):    Full Support       SG Stoner  24  12:19 EDT    Electronically signed by Deirdre Davila APRN at 24 1221       Jessica Ospina MD at 24 0953              [facility]     Patient Identification:  Name:  Lexie KEITH Valdez  Age:  64 y.o.  Sex:  female  :  1960  MRN:  [id number]  Visit Number:  [id number]  ROOM: [address]     Primary Care Provider:  Violet Pop APRN     Date of Admission: 2024    Length of stay in inpatient status:  10    Subjective     Chief Compliant:    Chief Complaint   Patient presents with    Shortness of Breath     History of Presenting Illness:  The patient remains intubated and sedated with propofol and thus I was unable to obtain a history from her.  No family members were present during my evaluation.  Nurses Andreas and Shavon were present during my rounds.  The patient remains on low vent settings, though she continues to breathe dyssynchronous with the ventilator despite propofol, fentanyl, and Precedex.  She was restarted on Yair-synephrine but at a low dose.  The     Consults:  Arjun Mcmahon and Christopher with pulmonology  Arjun Acevedo and Fox with cardiology  Dr. Clemons with infectious disease  Arjun Soto and Jacob with general surgery     Procedures:  2024:  Oral intubated and  mechanical ventilation  7/21/2024:  Right internal jugular triple lumen central line placement  7/23/2024:  Placement of a left sided chest tube due to pneumothorax    Objective     Current Hospital Meds:  aspirin, 81 mg, Oral, Daily  Brexpiprazole, 0.5 mg, Oral, Daily  chlorhexidine, 15 mL, Mouth/Throat, Q12H  donepezil, 10 mg, Oral, Q PM  doxycycline, 100 mg, Oral, Q12H  enoxaparin, 40 mg, Subcutaneous, Nightly  hydroxychloroquine, 200 mg, Oral, BID  insulin regular, 2-7 Units, Subcutaneous, Q6H  ipratropium-albuterol, 3 mL, Nebulization, Q6H - RT  levothyroxine, 25 mcg, Oral, Daily With Breakfast  magic barrier cream, , Topical, BID  memantine, 5 mg, Oral, Q12H  methylPREDNISolone sodium succinate, 60 mg, Intravenous, Q12H  [Held by provider] metoprolol succinate XL, 25 mg, Oral, Q24H  nystatin, 5 mL, Oral, 4x Daily  nystatin, 1 Application, Topical, Q12H  pantoprazole, 40 mg, Intravenous, Q AM  Phenylephrine HCl-NaCl, , ,   Phenylephrine HCl-NaCl, , ,   sodium chloride, 10 mL, Intravenous, Q12H  sodium chloride, 10 mL, Intravenous, Q12H  sodium chloride, 10 mL, Intravenous, Q12H  sodium chloride, 10 mL, Intravenous, Q12H  sodium chloride, 10 mL, Intravenous, Q12H  sodium chloride, 10 mL, Intravenous, Q12H    dexmedetomidine, 0.2-1.5 mcg/kg/hr, Last Rate: 1.3 mcg/kg/hr (07/28/24 0429)  fentanyl 10 mcg/mL,  mcg/hr, Last Rate: 100 mcg/hr (07/28/24 0950)  Pharmacy Consult,   phenylephrine, 0.5-3 mcg/kg/min (Dosing Weight), Last Rate: 0.5 mcg/kg/min (07/28/24 0903)  propofol, 5-50 mcg/kg/min (Dosing Weight), Last Rate: Stopped (07/28/24 0919)      Current Antimicrobial Therapy:  Anti-Infectives (From admission, onward)      Ordered     Dose/Rate Route Frequency Start Stop    07/21/24 2206  doxycycline (MONODOX) capsule 100 mg        Ordering Provider: Jessica Ospina MD    100 mg Oral Every 12 Hours Scheduled 07/21/24 2300 07/30/24 1641    07/21/24 0424  cefepime 2000 mg IVPB in 100 mL NS (VTB)         Ordering Provider: Estuardo Charles MD    2,000 mg  over 4 Hours Intravenous Every 8 Hours 07/21/24 1400 07/28/24 0942    07/21/24 0856  vancomycin 1250 mg/250 mL 0.9% NS IVPB ([facility])        Ordering Provider: Eduardo Freeman MD    1,250 mg  over 75 Minutes Intravenous Once 07/21/24 1000 07/21/24 1114    07/21/24 0424  cefepime 2000 mg IVPB in 100 mL NS (VTB)        Ordering Provider: Estuardo Charles MD    2,000 mg  over 30 Minutes Intravenous Once 07/21/24 0600 07/21/24 0558    07/19/24 1540  hydroxychloroquine (PLAQUENIL) tablet 200 mg        Ordering Provider: Estuardo Charles MD    200 mg Oral 2 Times Daily 07/19/24 2100      07/17/24 2218  cefTRIAXone (ROCEPHIN) 2,000 mg in sodium chloride 0.9 % 100 mL IVPB-VTB        Ordering Provider: Al Lewis MD    2,000 mg  200 mL/hr over 30 Minutes Intravenous Once 07/17/24 2234 07/18/24 0035          Current Diuretic Therapy:  Diuretics (From admission, onward)      Ordered     Dose/Rate Route Frequency Start Stop    07/26/24 1220  bumetanide (BUMEX) injection 1 mg        Ordering Provider: Yamel Lorenzo APRN    1 mg Intravenous Once 07/26/24 1245 07/26/24 1320    07/24/24 1151  furosemide (LASIX) injection 20 mg        Ordering Provider: Rinku Braxton MD    20 mg Intravenous Once 07/24/24 1245 07/24/24 1244    07/21/24 0637  furosemide (LASIX) injection 40 mg        Ordering Provider: Estuardo Charles MD    40 mg Intravenous Once 07/21/24 0730 07/21/24 0914    07/20/24 2323  furosemide (LASIX) injection 40 mg        Ordering Provider: Estuardo Charles MD    40 mg Intravenous Once 07/21/24 0015 07/21/24 0008          ----------------------------------------------------------------------------------------------------------------------  Vital Signs:  Temp:  [96.8 °F (36 °C)-100 °F (37.8 °C)] 99.7 °F (37.6 °C)  Heart Rate:  [] 62  Resp:  [28-33] 29  BP: ()/(32-95) 125/66  FiO2 (%):  [28 %] 28 %  SpO2:  [91 %-100  %] 99 %  on   ;   Device (Oxygen Therapy): ventilator  Body mass index is 24.89 kg/m².    Wt Readings from Last 3 Encounters:   07/28/24 65.8 kg (145 lb 1 oz)   04/26/24 67.7 kg (149 lb 4 oz)   04/19/24 67.1 kg (148 lb)     Intake & Output (last 3 days)         07/25 0701 07/26 0700 07/26 0701 07/27 0700 07/27 0701 07/28 0700 07/28 0701 07/29 0700    I.V. (mL/kg) 1414.5 (24.7) 698.3 (12.2) 1024.4 (17.9)     Other 484 444 241     NG/ 671 327     IV Piggyback  100 200     Total Intake(mL/kg) 2587.5 (45.2) 1913.3 (33.4) 1792.4 (31.3)     Urine (mL/kg/hr) 800 (0.6) 1140 (0.8) 1700 (1.2)     Stool   300     Chest Tube 45 25 13     Total Output 845 1165 2013     Net +1742.5 +748.3 -220.6             Stool Unmeasured Occurrence   4 x           NPO Diet NPO Type: Tube Feeding  ----------------------------------------------------------------------------------------------------------------------  Physical Exam; the exam is the same as yesterday.  Constitutional:       General: She is in acute distress, more so today prior to removal of the mucus in the ETT.     Appearance: Normal appearance. She is well-developed. She is not toxic-appearing or diaphoretic.      Interventions: She is sedated and intubated.      Comments: Appears acutely and chronically ill.   HENT:      Head: Normocephalic and atraumatic.  The subcutaneous emphysema on the left side of the neck has almost resolved.     Right Ear: External ear normal.      Left Ear: External ear normal.      Nose: Nose normal.   Eyes:      General: No scleral icterus.        Right eye: No discharge.         Left eye: No discharge.      Conjunctiva/sclera: Conjunctivae normal.      Pupils: Pupils are equal, round, and reactive to light.   Cardiovascular:      Rate and Rhythm: Normal rate and regular rhythm.      Pulses: Normal pulses.      Heart sounds: No murmur heard.  Pulmonary:      Effort: Respiratory distress present and appears to be the same as yesterday. She  remains intubated.      Breath sounds: She has fair to good breath sounds on the left side; chest tube is still in place, still on suction, and no bubbles are in chamber C.  I do not hear any crackles nor any wheezes.  Abdominal:      General: Bowel sounds are normal. There is no distension.      Palpations: Abdomen is soft.      Rectal tube in place with green and liquid stool in the container.  Musculoskeletal:         General: No swelling, deformity or signs of injury.   Skin:     Capillary Refill: Capillary refill takes less than 2 seconds.      Coloration: Skin is not jaundiced or pale.  She has bruising on her bilateral arms from procedures.  Neurological:      Motor: No tremors again today; I did not see the patient move her arms and legs today due to the sedation.     Comments: Sedated by three medications and orally intubated.  Thus, I cannot perform this portion of the physical exam.    Psychiatric:      Comments: Sedated by three medications and thus I cannot perform this portion of the physical exam.   ----------------------------------------------------------------------------------------------------------------------  Tele:  NS with heart rates 50-60s.  I have personally reviewed/looked at the telemetry strips.   ----------------------------------------------------------------------------------------------------------------------  LABS:    Pending test results:  None    CBC and coagulation:  Results from last 7 days   Lab Units 07/28/24  0053 07/27/24  1143 07/26/24  0441 07/25/24  0006 07/24/24  0022 07/23/24  0058 07/22/24  0012 07/21/24  1309   PROCALCITONIN ng/mL  --   --   --   --   --   --  0.24 0.21   LACTATE mmol/L  --   --   --   --  1.5 1.5  --   --    CRP mg/dL  --   --   --   --  3.89* 10.32*  --   --    WBC 10*3/mm3 22.69* 16.39* 17.54* 21.27* 22.14* 10.24 16.58*  --    HEMOGLOBIN g/dL 10.3* 10.0* 9.1* 9.6* 9.6* 9.5* 9.9*  --    HEMATOCRIT % 32.8* 31.9* 29.4* 30.8* 30.0* 29.6* 30.7*  --     MCV fL 101.9* 101.3* 101.7* 101.7* 98.4* 97.4* 95.6  --    MCHC g/dL 31.4* 31.3* 31.0* 31.2* 32.0 32.1 32.2  --    PLATELETS 10*3/mm3 224 205 225 303 287 235 227  --    INR   --  1.00 1.09  --   --   --   --   --      Renal and electrolytes:  Results from last 7 days   Lab Units 07/28/24  0053 07/27/24  1143 07/26/24  1501 07/26/24  0441 07/25/24  0949 07/25/24  0006 07/24/24  0022 07/23/24  0058 07/23/24  0058 07/22/24  0012   SODIUM mmol/L 141 142  --  142  --  144 133*  --  139 135*   POTASSIUM mmol/L 4.9 5.2  --  4.7  --  5.1 4.0  --  3.7 4.5   MAGNESIUM mg/dL 2.5* 2.4  --  2.5*  --  2.5* 2.5*  --  2.4  --    CHLORIDE mmol/L 107 109*  --  112*  --  114* 103  --  106 100   CO2 mmol/L 24.2 26.1  --  25.6  --  24.9 25.0  --  24.1 23.9   BUN mg/dL 23 29*  --  29*  --  20 17  --  20 25*   CREATININE mg/dL 0.26* 0.29*  --  0.31*  --  0.33* 0.38*  --  0.45* 0.47*   CALCIUM mg/dL 8.3* 8.1*  --  8.0*  --  8.1* 8.2*  --  8.2* 8.2*   IONIZED CALCIUM mmol/L  --   --   --   --   --  1.12  --   --   --   --    PHOSPHORUS mg/dL 3.5 3.4 2.7 2.2* 2.5 2.0* 1.4*   < > 1.2*  --    GLUCOSE mg/dL 159* 161*  --  115*  --  138* 164*  --  208* 191*   ANION GAP mmol/L 9.8 6.9  --  4.4*  --  5.1 5.0  --  8.9 11.1    < > = values in this interval not displayed.     Estimated Creatinine Clearance: 201.3 mL/min (A) (by C-G formula based on SCr of 0.26 mg/dL (L)).    Liver and pancreatic function:  Results from last 7 days   Lab Units 07/28/24  0053 07/27/24  1143 07/26/24  0441 07/24/24  0022 07/23/24  0058 07/22/24  0012   ALBUMIN g/dL 3.1* 3.1* 3.1* 3.5 3.6 4.1   BILIRUBIN mg/dL 0.3 0.2 <0.2 <0.2 0.2 0.4   ALK PHOS U/L 83 78 70 89 104 142*   AST (SGOT) U/L 23 23 21 22 26 33*   ALT (SGPT) U/L 24 26 17 14 15 10     Endocrine function:  Point of care bedside glucose levels:  Results from last 7 days   Lab Units 07/28/24  0535 07/27/24  2350 07/27/24  1801 07/27/24  1146 07/27/24  0533 07/27/24  0017 07/26/24  1748 07/26/24  1246  07/26/24  0559 07/26/24  0024 07/25/24  1658 07/25/24  1120 07/25/24  0513 07/25/24  0020   GLUCOSE mg/dL 126 154* 128 156* 141* 172* 172* 166* 117 145* 109 167* 111 135*     Glucose levels from the CMP:  Results from last 7 days   Lab Units 07/28/24  0053 07/27/24  1143 07/26/24  0441 07/25/24  0006 07/24/24  0022 07/23/24  0058 07/22/24  0012   GLUCOSE mg/dL 159* 161* 115* 138* 164* 208* 191*       Microbiology Results (last 10 days)       Procedure Component Value - Date/Time    Aspergillus Galactomannan Antigen - Blood, Arm, Right [[id number]] Collected: 07/22/24 1410    Lab Status: Final result Specimen: Blood from Arm, Right Updated: 07/26/24 0254     Aspergillus Ag, BAL/Serum 0.04 Index     Mycoplasma Pneumoniae Antibody, IgM - Blood, Arm, Left [[id number]]  (Normal) Collected: 07/22/24 0012    Lab Status: Final result Specimen: Blood from Arm, Left Updated: 07/22/24 0202     Mycoplasma pneumo IgM Negative    Legionella Antigen, Urine - Urine, Urine, Clean Catch [[id number]]  (Normal) Collected: 07/21/24 2242    Lab Status: Final result Specimen: Urine, Clean Catch Updated: 07/21/24 2308     LEGIONELLA ANTIGEN, URINE Negative    Narrative:      Presumptive negative for L. pneumophilia serogroup 1 antigen, suggesting no recent or current infection.    Respiratory Culture - Sputum, ET Suction [[id number]] Collected: 07/21/24 2231    Lab Status: Final result Specimen: Sputum from ET Suction Updated: 07/24/24 1119     Respiratory Culture No growth     Gram Stain Rare (1+) Mixed ammon      No WBCs seen      No Epithelial cells seen    MRSA Screen, PCR (Inpatient) - Swab, Nares [[id number]]  (Normal) Collected: 07/21/24 1252    Lab Status: Final result Specimen: Swab from Nares Updated: 07/21/24 1424     MRSA PCR No MRSA Detected    Blood Culture - Blood, Arm, Left [[id number]]  (Normal) Collected: 07/21/24 0450    Lab Status: Final result Specimen: Blood from Arm, Left Updated: 07/26/24 0515     Blood Culture No growth  at 5 days    Blood Culture - Blood, Arm, Right [[id number]]  (Normal) Collected: 07/21/24 0448    Lab Status: Final result Specimen: Blood from Arm, Right Updated: 07/26/24 0515     Blood Culture No growth at 5 days       I have personally looked at the labs and they are summarized above.  ----------------------------------------------------------------------------------------------------------------------  Detailed radiology reports for the last 24 hours:    Imaging Results (Last 24 Hours)       Procedure Component Value Units Date/Time    XR Chest 1 View [[id number]] Collected: 07/27/24 1746     Updated: 07/27/24 1749    Narrative:      INDICATION: Difficulty breathing.     TECHNIQUE: Frontal radiograph of the chest.     COMPARISON: Radiograph from yesterday     FINDINGS:   Cardiomegaly. Multifocal infiltrates/edema, unchanged. No pleural  effusion or pneumothorax. Endotracheal tube, enteric tube, left-sided  chest tube and central venous catheter in similar position.       Impression:      No significant interval change allowing for differences in technique.        This report was finalized on 7/27/2024 5:47 PM by Alex Pallas, DO.             I have personally looked at the radiology images and I have read the available final report.    Assessment & Plan      -Acute NSTEMI, type 1 (positive stress test on 7/18/2024 without any reversible ischemia, that was present on admission  -History of IPF and COPD with chronic hypoxia (uses 3 L/min at home)  -Acute exacerbation of IPF/COPD causing acute hypoxic and hypercapnic respiratory failure on top of chronic hypoxic respiratory failure and sepsis (developed on 7/21/2024 due to a suspected aspiration episode) resulting in oral intubation and mechanical ventilation from aspiration pneumonitis  -Hypotension, septic vs sedation vs diuretic induced, developed during admission  -New onset heart failure with reduced EF, acute exacerbation that developed on  2024  -Hypocalcemia that developed during admission  -Prolonged QT that developed during the hospitalization  -History of essential hypertension  -History of hypothyroidism  -History of stress urinary incontinence with frequent UTI's, with an acute E coli UTI that was present on admission  -History of intermittent, diffuse body tremors    She still has a poor prognosis.  She is not going to be able to wean off the ventilator quickly and thus she will likely need a tracheostomy and PEG tube soon.  However, she is on low ventilator settings.  Will continue to wean the Yair-synephrine for MAP of 65 mmHg or above.  Will repeat the labs in the am.  Will continue the chest tube on suction.  The antibiotics will end soon.    VTE Prophylaxis:  Lovenox at DVT dosing    The patient is high risk due to the following diagnoses/reasons:  Acute MI, IPF with acute exacerbation, new left sided pneumothorax, new onset heart failure      Disposition:  Undetermined at this time    Jessica Ospina MD  [facility] [place]  24  09:53 EDT      Electronically signed by Jessica Ospina MD at 24 1950       Deirdre Davila APRN at 24 1243                     PROGRESS NOTE         Patient Identification:  Name:  Lexie KEITH Vandiver  Age:  64 y.o.  Sex:  female  :  1960  MRN:  [id number]  Visit Number:  [id number]  Primary Care Provider:  Violet Pop APRN         LOS: 9 days       ----------------------------------------------------------------------------------------------------------------------  Subjective       Chief Complaints:    Shortness of Breath        Interval History:      The patient remains intubated and sedated at 28% FiO2, which is stable.  Sedation infusing.  Phenylephrine has been weaned off.  Tmax 99.9 °F.  No reports of diarrhea, however nursing reports 1 large mucoid BM this morning.  Mechanical breath sounds are clear to auscultation bilaterally.  Chest tube to left chest  wall.  Abdomen is soft and rounded, with normoactive bowel sounds.  Leukocytosis continues to improve at 16.39.      Review of Systems:    Unable to obtain.  Intubated and sedated.    ----------------------------------------------------------------------------------------------------------------------      Objective       Current [facility] Meds:  aspirin, 81 mg, Oral, Daily  Brexpiprazole, 0.5 mg, Oral, Daily  cefepime, 2,000 mg, Intravenous, Q8H  chlorhexidine, 15 mL, Mouth/Throat, Q12H  donepezil, 10 mg, Oral, Q PM  doxycycline, 100 mg, Oral, Q12H  enoxaparin, 40 mg, Subcutaneous, Nightly  hydroxychloroquine, 200 mg, Oral, BID  insulin regular, 2-7 Units, Subcutaneous, Q6H  ipratropium-albuterol, 3 mL, Nebulization, Q6H - RT  levothyroxine, 25 mcg, Oral, Daily With Breakfast  magic barrier cream, , Topical, BID  memantine, 5 mg, Oral, Q12H  methylPREDNISolone sodium succinate, 60 mg, Intravenous, Q12H  [Held by provider] metoprolol succinate XL, 25 mg, Oral, Q24H  nystatin, 5 mL, Oral, 4x Daily  nystatin, 1 Application, Topical, Q12H  pantoprazole, 40 mg, Intravenous, Q AM  Phenylephrine HCl-NaCl, , ,   Phenylephrine HCl-NaCl, , ,   sodium chloride, 10 mL, Intravenous, Q12H  sodium chloride, 10 mL, Intravenous, Q12H  sodium chloride, 10 mL, Intravenous, Q12H  sodium chloride, 10 mL, Intravenous, Q12H  sodium chloride, 10 mL, Intravenous, Q12H  sodium chloride, 10 mL, Intravenous, Q12H      dexmedetomidine, 0.2-1.5 mcg/kg/hr, Last Rate: 1.3 mcg/kg/hr (07/27/24 1140)  fentanyl 10 mcg/mL,  mcg/hr, Last Rate: Stopped (07/26/24 0917)  Pharmacy Consult,   phenylephrine, 0.5-3 mcg/kg/min (Dosing Weight), Last Rate: Stopped (07/27/24 0553)  propofol, 5-50 mcg/kg/min (Dosing Weight), Last Rate: Stopped (07/27/24 4424)      ----------------------------------------------------------------------------------------------------------------------    Vital Signs:  Temp:  [97.2 °F (36.2 °C)-99.9 °F (37.7 °C)] 98.4 °F (36.9  °C)  Heart Rate:  [] 66  Resp:  [28-39] 32  BP: ()/(44-80) 134/80  FiO2 (%):  [28 %] 28 %  Mean Arterial Pressure (Non-Invasive) for the past 24 hrs (Last 3 readings):   Noninvasive MAP (mmHg)   07/27/24 0930 90   07/27/24 0915 93   07/27/24 0900 99     SpO2 Percentage    07/27/24 0915 07/27/24 0930 07/27/24 1031   SpO2: 99% 100% 100%     SpO2:  [95 %-100 %] 100 %  on   ;   Device (Oxygen Therapy): ventilator    Body mass index is 23.79 kg/m².  Wt Readings from Last 3 Encounters:   07/25/24 62.9 kg (138 lb 10.7 oz)   04/26/24 67.7 kg (149 lb 4 oz)   04/19/24 67.1 kg (148 lb)        Intake/Output Summary (Last 24 hours) at 7/27/2024 1243  Last data filed at 7/27/2024 0547  Gross per 24 hour   Intake 1913.29 ml   Output 1165 ml   Net 748.29 ml     NPO Diet NPO Type: Tube Feeding  ----------------------------------------------------------------------------------------------------------------------      Physical Exam:    Constitutional: Thin, frail, chronically ill-appearing  female.  Intubated and sedated 28% FiO2.  Phenylephrine has been weaned off.  HENT:  Head: Normocephalic and atraumatic.  Mouth:  Moist mucous membranes.    Eyes:  Conjunctivae and EOM are normal.  No scleral icterus.  Neck:  Neck supple.  No JVD present.    Cardiovascular:  Normal rate, regular rhythm and normal heart sounds with no murmur. No edema.  Pulmonary/Chest: Mechanical breath sounds clear bilaterally.   Left-sided chest tube in place.  Abdominal:  Soft.  Bowel sounds are normal.  No distension and no tenderness.   Musculoskeletal:  No edema, no tenderness, and no deformity.  No swelling or redness of joints.  Neurological: Sedate.  Skin:  Skin is warm and dry.  No rash noted.  No pallor.   Psychiatric: Sedate.        ----------------------------------------------------------------------------------------------------------------------  Results from last 7 days   Lab Units 07/23/24  0258 07/23/24  0058   HSTROP T ng/L  "66* 75*     Results from last 7 days   Lab Units 07/26/24 0441 07/22/24  0012   PROBNP pg/mL 11,410.0* 14,737.0*           Results from last 7 days   Lab Units 07/26/24  0428   PH, ARTERIAL pH units 7.380   PO2 ART mm Hg 95.0   PCO2, ARTERIAL mm Hg 47.5*   HCO3 ART mmol/L 28.1*     Results from last 7 days   Lab Units 07/27/24  1143 07/26/24 0441 07/25/24  0006 07/24/24  0022 07/23/24  0058 07/22/24  0012 07/21/24  0130 07/21/24  0016   CRP mg/dL  --   --   --  3.89* 10.32*  --   --  6.50*   LACTATE mmol/L  --   --   --  1.5 1.5  --  1.1  --    WBC 10*3/mm3 16.39* 17.54* 21.27* 22.14* 10.24   < >  --  20.24*   HEMOGLOBIN g/dL 10.0* 9.1* 9.6* 9.6* 9.5*   < >  --  11.8*   HEMATOCRIT % 31.9* 29.4* 30.8* 30.0* 29.6*   < >  --  36.3   MCV fL 101.3* 101.7* 101.7* 98.4* 97.4*   < >  --  96.3   MCHC g/dL 31.3* 31.0* 31.2* 32.0 32.1   < >  --  32.5   PLATELETS 10*3/mm3 205 225 303 287 235   < >  --  227   INR  1.00 1.09  --   --   --   --   --   --     < > = values in this interval not displayed.     Results from last 7 days   Lab Units 07/26/24  0441 07/25/24  0006 07/24/24  0022 07/23/24  0058   SODIUM mmol/L 142 144 133* 139   POTASSIUM mmol/L 4.7 5.1 4.0 3.7   MAGNESIUM mg/dL 2.5* 2.5* 2.5* 2.4   CHLORIDE mmol/L 112* 114* 103 106   CO2 mmol/L 25.6 24.9 25.0 24.1   BUN mg/dL 29* 20 17 20   CREATININE mg/dL 0.31* 0.33* 0.38* 0.45*   CALCIUM mg/dL 8.0* 8.1* 8.2* 8.2*   GLUCOSE mg/dL 115* 138* 164* 208*   ALBUMIN g/dL 3.1*  --  3.5 3.6   BILIRUBIN mg/dL <0.2  --  <0.2 0.2   ALK PHOS U/L 70  --  89 104   AST (SGOT) U/L 21  --  22 26   ALT (SGPT) U/L 17  --  14 15   Estimated Creatinine Clearance: 179.7 mL/min (A) (by C-G formula based on SCr of 0.31 mg/dL (L)).  No results found for: \"AMMONIA\"    Glucose   Date/Time Value Ref Range Status   07/27/2024 1146 156 (H) 70 - 130 mg/dL Final   07/27/2024 0533 141 (H) 70 - 130 mg/dL Final   07/27/2024 0017 172 (H) 70 - 130 mg/dL Final   07/26/2024 1748 172 (H) 70 - 130 mg/dL Final " "  07/26/2024 1246 166 (H) 70 - 130 mg/dL Final   07/26/2024 0559 117 70 - 130 mg/dL Final   07/26/2024 0024 145 (H) 70 - 130 mg/dL Final   07/25/2024 1658 109 70 - 130 mg/dL Final     Lab Results   Component Value Date    HGBA1C 4.60 (L) 07/18/2024     Lab Results   Component Value Date    TSH 1.140 07/17/2024    FREET4 1.83 (H) 04/11/2024       Blood Culture   Date Value Ref Range Status   07/21/2024 No growth at 2 days  Preliminary   07/21/2024 No growth at 2 days  Preliminary   07/17/2024 No growth at 5 days  Final   07/17/2024 No growth at 5 days  Final     Urine Culture   Date Value Ref Range Status   07/17/2024 >100,000 CFU/mL Escherichia coli (A)  Final     No results found for: \"WOUNDCX\"  No results found for: \"STOOLCX\"  Respiratory Culture   Date Value Ref Range Status   07/21/2024 No growth  Preliminary   07/21/2024   Final    Rare growth Normal respiratory ammon. No S. aureus or Pseudomonas aeruginosa detected. Final report.     Pain Management Panel           No data to display                  ----------------------------------------------------------------------------------------------------------------------  Imaging Results (Last 24 Hours)       ** No results found for the last 24 hours. **            ----------------------------------------------------------------------------------------------------------------------    Pertinent Infectious Disease Results                Assessment/Plan       Assessment     Septic shock with acute hypoxic respiratory failure requiring mechanical ventilation and pressor support  Pneumonia        Plan      The patient remains intubated and sedated at 28% FiO2, which is stable.  Sedation infusing.  Phenylephrine has been weaned off.  Tmax 99.9 °F.  No reports of diarrhea, however nursing reports 1 large mucoid BM this morning.  Mechanical breath sounds are clear to auscultation bilaterally.  Chest tube to left chest wall.  Abdomen is soft and rounded, with normoactive " bowel sounds.  Leukocytosis continues to improve at 16.39.    Leukocytosis continues to improve.  Pressors weaned off.  Continue with cefepime and doxycycline courses as scheduled for the treatment of pneumonia.  Will continue to monitor closely.      ANTIMICROBIAL THERAPY    cefepime 2000 mg IVPB in 100 mL NS (VTB)  doxycycline - 100 MG     Code Status:   Code Status and Medical Interventions: No CPR (Do Not Attempt to Resuscitate); Full Support   Ordered at: 24 1813     Level Of Support Discussed With:    Next of Kin (If No Surrogate)    Health Care Surrogate     Code Status (Patient has no pulse and is not breathing):    No CPR (Do Not Attempt to Resuscitate)     Medical Interventions (Patient has pulse or is breathing):    Full Support       SG Stoner  24  12:43 EDT    Electronically signed by Deirdre Davila APRN at 24 1246       Nichole Gil MD at 24 1127          Surgery follow-up for chest tube management.    Patient is sedated on vent.  Per pulmonary note, weaning trial in process    Exam:  Lungs: Coarse breath sounds bilaterally  Left chest tube: Minimal drainage, no airleak    Status post left chest tube  Plan: As long as patient on positive pressure will leave chest tube in    Electronically signed by Nichole Gil MD at 24 1131       Jessica Ospina MD at 24 0951              [facility] HOSPITALIST PROGRESS NOTE     Patient Identification:  Name:  Lexie KEITH Valdez  Age:  64 y.o.  Sex:  female  :  1960  MRN:  [id number]  Visit Number:  [id number]  ROOM: [address]     Primary Care Provider:  Violet Pop APRN     Date of Admission: 2024    Length of stay in inpatient status:  9    Subjective     Chief Compliant:    Chief Complaint   Patient presents with    Shortness of Breath     History of Presenting Illness:  The patient remains intubated and sedated with propofol and thus I was unable to obtain a history from her.   The Yair-Synephrine was stopped this AM and she is now not on any vasopressors.  Precedex was started 7/26/2024.  She was doing well until the Precedex was dropped down to 0.5 mcg/kg/hour at around 15:11; she had high peak pressures and desats down to 57%.  Afterwards, nurse Steph Santillan was able to suction out a lot of secretions and the peak pressures went from 50-80s to 30s.  She also started with liquid stools today and a rectal tube had to be placed.    Consults:  Arjun Mcmahon and Christopher with pulmonology  Arjun Acevedo and Fox with cardiology  Dr. Clemons with infectious disease  Arjun Soto and Jacob with general surgery     Procedures:  7/21/2024:  Oral intubated and mechanical ventilation  7/21/2024:  Right internal jugular triple lumen central line placement  7/23/2024:  Placement of a left sided chest tube due to pneumothorax    Objective     Current Hospital Meds:  aspirin, 81 mg, Oral, Daily  Brexpiprazole, 0.5 mg, Oral, Daily  cefepime, 2,000 mg, Intravenous, Q8H  chlorhexidine, 15 mL, Mouth/Throat, Q12H  donepezil, 10 mg, Oral, Q PM  doxycycline, 100 mg, Oral, Q12H  enoxaparin, 40 mg, Subcutaneous, Nightly  hydroxychloroquine, 200 mg, Oral, BID  insulin regular, 2-7 Units, Subcutaneous, Q6H  ipratropium-albuterol, 3 mL, Nebulization, Q6H - RT  levothyroxine, 25 mcg, Oral, Daily With Breakfast  magic barrier cream, , Topical, BID  memantine, 5 mg, Oral, Q12H  methylPREDNISolone sodium succinate, 60 mg, Intravenous, Q12H  [Held by provider] metoprolol succinate XL, 25 mg, Oral, Q24H  nystatin, 5 mL, Oral, 4x Daily  nystatin, 1 Application, Topical, Q12H  pantoprazole, 40 mg, Intravenous, Q AM  Phenylephrine HCl-NaCl, , ,   Phenylephrine HCl-NaCl, , ,   sodium chloride, 10 mL, Intravenous, Q12H  sodium chloride, 10 mL, Intravenous, Q12H  sodium chloride, 10 mL, Intravenous, Q12H  sodium chloride, 10 mL, Intravenous, Q12H  sodium chloride, 10 mL, Intravenous, Q12H  sodium chloride, 10 mL, Intravenous,  Q12H    dexmedetomidine, 0.2-1.5 mcg/kg/hr, Last Rate: 1.5 mcg/kg/hr (07/27/24 1617)  fentanyl 10 mcg/mL,  mcg/hr, Last Rate: 150 mcg/hr (07/27/24 1751)  Pharmacy Consult,   phenylephrine, 0.5-3 mcg/kg/min (Dosing Weight), Last Rate: 0.5 mcg/kg/min (07/27/24 1749)  propofol, 5-50 mcg/kg/min (Dosing Weight), Last Rate: 50 mcg/kg/min (07/27/24 1700)      Current Antimicrobial Therapy:  Anti-Infectives (From admission, onward)      Ordered     Dose/Rate Route Frequency Start Stop    07/21/24 2206  doxycycline (MONODOX) capsule 100 mg        Ordering Provider: Jessica Ospina MD    100 mg Oral Every 12 Hours Scheduled 07/21/24 2300 07/30/24 1641    07/21/24 0424  cefepime 2000 mg IVPB in 100 mL NS (VTB)        Ordering Provider: Estuardo Charles MD    2,000 mg  over 4 Hours Intravenous Every 8 Hours 07/21/24 1400 07/28/24 1359    07/21/24 0856  vancomycin 1250 mg/250 mL 0.9% NS IVPB ([facility])        Ordering Provider: Eduardo Fremean MD    1,250 mg  over 75 Minutes Intravenous Once 07/21/24 1000 07/21/24 1114    07/21/24 0424  cefepime 2000 mg IVPB in 100 mL NS (VTB)        Ordering Provider: Estuardo Charles MD    2,000 mg  over 30 Minutes Intravenous Once 07/21/24 0600 07/21/24 0558    07/19/24 1540  hydroxychloroquine (PLAQUENIL) tablet 200 mg        Ordering Provider: Estuardo Charles MD    200 mg Oral 2 Times Daily 07/19/24 2100      07/17/24 2218  cefTRIAXone (ROCEPHIN) 2,000 mg in sodium chloride 0.9 % 100 mL IVPB-VTB        Ordering Provider: Al Lewis MD    2,000 mg  200 mL/hr over 30 Minutes Intravenous Once 07/17/24 2234 07/18/24 0035          Current Diuretic Therapy:  Diuretics (From admission, onward)      Ordered     Dose/Rate Route Frequency Start Stop    07/26/24 1220  bumetanide (BUMEX) injection 1 mg        Ordering Provider: Yamel Lorenzo APRN    1 mg Intravenous Once 07/26/24 1245 07/26/24 1320    07/24/24 1151  furosemide (LASIX) injection 20 mg         Ordering Provider: Rinku Braxton MD    20 mg Intravenous Once 07/24/24 1245 07/24/24 1244    07/21/24 0637  furosemide (LASIX) injection 40 mg        Ordering Provider: Estuardo Charles MD    40 mg Intravenous Once 07/21/24 0730 07/21/24 0914    07/20/24 2323  furosemide (LASIX) injection 40 mg        Ordering Provider: Estuardo Charles MD    40 mg Intravenous Once 07/21/24 0015 07/21/24 0008          ----------------------------------------------------------------------------------------------------------------------  Vital Signs:  Temp:  [96.8 °F (36 °C)-99.9 °F (37.7 °C)] 96.8 °F (36 °C)  Heart Rate:  [55-92] 75  Resp:  [28-39] 33  BP: ()/(39-95) 146/72  FiO2 (%):  [28 %] 28 %  SpO2:  [95 %-100 %] 96 %  on   ;   Device (Oxygen Therapy): ventilator  Body mass index is 23.79 kg/m².    Wt Readings from Last 3 Encounters:   07/25/24 62.9 kg (138 lb 10.7 oz)   04/26/24 67.7 kg (149 lb 4 oz)   04/19/24 67.1 kg (148 lb)     Intake & Output (last 3 days)         07/24 0701 07/25 0700 07/25 0701 07/26 0700 07/26 0701 07/27 0700 07/27 0701 07/28 0700    I.V. (mL/kg) 1719.9 (30.1) 1414.5 (24.7) 698.3 (12.2)     Other 504 984 444     NG/ 352 271     IV Piggyback   100     Total Intake(mL/kg) 2897.9 (50.7) 2587.5 (45.2) 1913.3 (33.4)     Urine (mL/kg/hr) 700 (0.5) 800 (0.6) 1140 (0.8)     Emesis/NG output        Stool        Chest Tube 46 45 25     Total Output      Net +2151.9 +1742.5 +748.3             Stool Unmeasured Occurrence    1 x          NPO Diet NPO Type: Tube Feeding  ----------------------------------------------------------------------------------------------------------------------  Physical Exam  Constitutional:       General: She is in acute distress, more so today prior to removal of the mucus in the ETT.     Appearance: Normal appearance. She is well-developed. She is not toxic-appearing or diaphoretic.      Interventions: She is sedated and intubated.       Comments: Appears acutely and chronically ill.   HENT:      Head: Normocephalic and atraumatic.  The subcutaneous emphysema on the left side of the neck has improved.     Right Ear: External ear normal.      Left Ear: External ear normal.      Nose: Nose normal.   Eyes:      General: No scleral icterus.        Right eye: No discharge.         Left eye: No discharge.      Extraocular Movements: Extraocular movements intact.      Conjunctiva/sclera: Conjunctivae normal.      Pupils: Pupils are equal, round, and reactive to light.   Cardiovascular:      Rate and Rhythm: Normal rate and regular rhythm.      Pulses: Normal pulses.      Heart sounds: No murmur heard.  Pulmonary:      Effort: Respiratory distress present and appears to be worse than yesterday. She remains intubated.      Breath sounds: She has fair to good breath sounds on the left side; chest tube is still in place, still on suction, and no bubbles are in chamber C.  I do not hear any crackles nor any wheezes.  Abdominal:      General: Bowel sounds are normal. There is no distension.      Palpations: Abdomen is soft.      Rectal tube in place with green and liquid stool in the container.  Musculoskeletal:         General: No swelling, deformity or signs of injury.   Skin:     Capillary Refill: Capillary refill takes less than 2 seconds.      Coloration: Skin is not jaundiced or pale.   Neurological:      Motor: No tremors today; I did not see the patient move her arms and legs today and she did not follow commands for me. No seizure activity.      Comments: Sedated by Precedex and orally intubated.  Thus, I cannot perform this portion of the physical exam.    Psychiatric:      Comments: Sedated with Precedex and thus I cannot perform this portion of the physical exam.   ----------------------------------------------------------------------------------------------------------------------  Tele:  NS with heart rates 60-70s, with a few rates down to the high  50s.  I have personally reviewed/looked at the telemetry strips.   ----------------------------------------------------------------------------------------------------------------------  LABS:    Pending test results:  None    CBC and coagulation:  Results from last 7 days   Lab Units 07/27/24  1143 07/26/24  0441 07/25/24  0006 07/24/24  0022 07/23/24  0058 07/22/24  0012 07/21/24  1309 07/21/24  0130 07/21/24  0016   PROCALCITONIN ng/mL  --   --   --   --   --  0.24 0.21 0.07  --    LACTATE mmol/L  --   --   --  1.5 1.5  --   --  1.1  --    CRP mg/dL  --   --   --  3.89* 10.32*  --   --   --  6.50*   WBC 10*3/mm3 16.39* 17.54* 21.27* 22.14* 10.24 16.58*  --   --  20.24*   HEMOGLOBIN g/dL 10.0* 9.1* 9.6* 9.6* 9.5* 9.9*  --   --  11.8*   HEMATOCRIT % 31.9* 29.4* 30.8* 30.0* 29.6* 30.7*  --   --  36.3   MCV fL 101.3* 101.7* 101.7* 98.4* 97.4* 95.6  --   --  96.3   MCHC g/dL 31.3* 31.0* 31.2* 32.0 32.1 32.2  --   --  32.5   PLATELETS 10*3/mm3 205 225 303 287 235 227  --   --  227   INR  1.00 1.09  --   --   --   --   --   --   --      Acid/base balance:  Results from last 7 days   Lab Units 07/27/24  1726 07/26/24  0428 07/21/24  2106 07/21/24  0811 07/20/24  2343   PH, ARTERIAL pH units 7.345* 7.380 7.315* 7.367 7.403   PCO2, ARTERIAL mm Hg 52.1* 47.5* 48.2* 46.6* 40.3   PO2 ART mm Hg 76.7* 95.0 137.0* 101.0 107.0   HCO3 ART mmol/L 28.4* 28.1* 24.5 26.8* 25.1      Vent Settings for yesterday and today:    FiO2 (%)  28  FiO2 (%)     Resp Rate (Set)  28  Resp Rate (Set)     Vt (Set, mL)  300  Vt (Set, mL)     PEEP/CPAP (cm H2O)  4  PEEP/CPAP (cm H2O)     Peak Flow (L/min)  60  Peak Flow (L/min)     Trigger Sensitivity Flow (L/min)  2  Trigger Sensitivity Flow (L/min)     Humidification  Heat and mois...  Humidification      Renal and electrolytes:  Results from last 7 days   Lab Units 07/27/24  1143 07/26/24  1501 07/26/24  0441 07/25/24  0949 07/25/24  0006 07/24/24  0022 07/23/24  0058 07/22/24  0012 07/21/24  0016    SODIUM mmol/L 142  --  142  --  144 133* 139 135* 130*   POTASSIUM mmol/L 5.2  --  4.7  --  5.1 4.0 3.7 4.5 4.7   MAGNESIUM mg/dL 2.4  --  2.5*  --  2.5* 2.5* 2.4  --   --    CHLORIDE mmol/L 109*  --  112*  --  114* 103 106 100 99   CO2 mmol/L 26.1  --  25.6  --  24.9 25.0 24.1 23.9 20.5*   BUN mg/dL 29*  --  29*  --  20 17 20 25* 17   CREATININE mg/dL 0.29*  --  0.31*  --  0.33* 0.38* 0.45* 0.47* 0.37*   CALCIUM mg/dL 8.1*  --  8.0*  --  8.1* 8.2* 8.2* 8.2* 8.4*   IONIZED CALCIUM mmol/L  --   --   --   --  1.12  --   --   --   --    PHOSPHORUS mg/dL 3.4 2.7 2.2* 2.5 2.0* 1.4* 1.2*  --   --    GLUCOSE mg/dL 161*  --  115*  --  138* 164* 208* 191* 122*   ANION GAP mmol/L 6.9  --  4.4*  --  5.1 5.0 8.9 11.1 10.5     Estimated Creatinine Clearance: 192 mL/min (A) (by C-G formula based on SCr of 0.29 mg/dL (L)).     Latest Reference Range & Units 12/29/14 23:46 07/26/24 04:41   Osmolality 280 - 301 mOsm/kg 286 309 (H)     Liver and pancreatic function:  Results from last 7 days   Lab Units 07/27/24  1143 07/26/24  0441 07/24/24  0022 07/23/24  0058 07/22/24  0012   ALBUMIN g/dL 3.1* 3.1* 3.5 3.6 4.1   BILIRUBIN mg/dL 0.2 <0.2 <0.2 0.2 0.4   ALK PHOS U/L 78 70 89 104 142*   AST (SGOT) U/L 23 21 22 26 33*   ALT (SGPT) U/L 26 17 14 15 10     Endocrine function:  Point of care bedside glucose levels:  Results from last 7 days   Lab Units 07/27/24  0533 07/27/24  0017 07/26/24  1748 07/26/24  1246 07/26/24  0559 07/26/24  0024 07/25/24  1658 07/25/24  1120 07/25/24  0513 07/25/24  0020 07/24/24  1657 07/24/24  1057 07/24/24  0529 07/23/24  2355   GLUCOSE mg/dL 141* 172* 172* 166* 117 145* 109 167* 111 135* 155* 166* 152* 166*     Glucose levels from the [facility]:  Results from last 7 days   Lab Units 07/27/24  1143 07/26/24  0441 07/25/24  0006 07/24/24  0022 07/23/24  0058 07/22/24  0012 07/21/24  0016   GLUCOSE mg/dL 161* 115* 138* 164* 208* 191* 122*     Cardiac:  Results from last 7 days   Lab Units 07/26/24  0441  07/23/24  0258 07/23/24  0058 07/22/24  0012   HSTROP T ng/L  --  66* 75*  --    PROBNP pg/mL 11,410.0*  --   --  14,737.0*       Cultures:  Microbiology Results (last 10 days)       Procedure Component Value - Date/Time    Aspergillus Galactomannan Antigen - Blood, Arm, Right [[id number]] Collected: 07/22/24 1410    Lab Status: Final result Specimen: Blood from Arm, Right Updated: 07/26/24 0254     Aspergillus Ag, BAL/Serum 0.04 Index     Mycoplasma Pneumoniae Antibody, IgM - Blood, Arm, Left [[id number]]  (Normal) Collected: 07/22/24 0012    Lab Status: Final result Specimen: Blood from Arm, Left Updated: 07/22/24 0202     Mycoplasma pneumo IgM Negative    Legionella Antigen, Urine - Urine, Urine, Clean Catch [[id number]]  (Normal) Collected: 07/21/24 2242    Lab Status: Final result Specimen: Urine, Clean Catch Updated: 07/21/24 2308     LEGIONELLA ANTIGEN, URINE Negative    Narrative:      Presumptive negative for L. pneumophilia serogroup 1 antigen, suggesting no recent or current infection.    Respiratory Culture - Sputum, ET Suction [[id number]] Collected: 07/21/24 2231    Lab Status: Final result Specimen: Sputum from ET Suction Updated: 07/24/24 1119     Respiratory Culture No growth     Gram Stain Rare (1+) Mixed ammon      No WBCs seen      No Epithelial cells seen    Respiratory Culture - Sputum, ET Suction [[id number]] Collected: 07/21/24 1152    Lab Status: Final result Specimen: Sputum from ET Suction Updated: 07/23/24 1053     Respiratory Culture Rare growth Normal respiratory ammon. No S. aureus or Pseudomonas aeruginosa detected. Final report.     Gram Stain Moderate (3+) WBCs seen      Rare (1+) Epithelial cells seen      No organisms seen    Blood Culture - Blood, Arm, Left [[id number]]  (Normal) Collected: 07/21/24 0450    Lab Status: Final result Specimen: Blood from Arm, Left Updated: 07/26/24 0515     Blood Culture No growth at 5 days    Blood Culture - Blood, Arm, Right [[id number]]  (Normal)  Collected: 07/21/24 0448    Lab Status: Final result Specimen: Blood from Arm, Right Updated: 07/26/24 0515     Blood Culture No growth at 5 days    COVID PRE-OP / PRE-PROCEDURE SCREENING ORDER (NO ISOLATION) - Swab, Nasopharynx [[id number]]  (Normal) Collected: 07/17/24 2119    Lab Status: Final result Specimen: Swab from Nasopharynx Updated: 07/17/24 2149    COVID-19 and FLU A/B PCR, 1 HR TAT - Swab, Nasopharynx [[id number]]  (Normal) Collected: 07/17/24 2119    Lab Status: Final result Specimen: Swab from Nasopharynx Updated: 07/17/24 2149     COVID19 Not Detected     Influenza A PCR Not Detected     Influenza B PCR Not Detected    Blood Culture - Blood, Arm, Right [[id number]]  (Normal) Collected: 07/17/24 2119    Lab Status: Final result Specimen: Blood from Arm, Right Updated: 07/22/24 2131     Blood Culture No growth at 5 days    Blood Culture - Blood, Arm, Left [[id number]]  (Normal) Collected: 07/17/24 2119    Lab Status: Final result Specimen: Blood from Arm, Left Updated: 07/22/24 2131     Blood Culture No growth at 5 days       I have personally looked at the labs and they are summarized above.  ----------------------------------------------------------------------------------------------------------------------  Detailed radiology reports for the last 24 hours:    Imaging Results (Last 24 Hours)       Procedure Component Value Units Date/Time    XR Chest 1 View [[id number]] Collected: 07/26/24 1047     Updated: 07/26/24 1050    Narrative:      EXAM:    XR Chest, 1 View     EXAM DATE:    7/26/2024 10:05 AM     CLINICAL HISTORY:    chest tube in place; I21.4-Non-ST elevation (NSTEMI) myocardial  infarction; J93.9-Pneumothorax, unspecified     TECHNIQUE:    Frontal view of the chest.     COMPARISON:    7/24/2024     FINDINGS:    LUNGS AND PLEURAL SPACES:  Coarsened interstitial markings and patchy  bilateral airspace disease again noted.  No consolidation.  No  pneumothorax.    HEART:  Unremarkable as  visualized.  No cardiomegaly.    MEDIASTINUM:  Unremarkable as visualized.  Normal mediastinal contour.    BONES/JOINTS:  Unremarkable as visualized.  No acute fracture.    TUBES, LINES AND DEVICES:  Right central line with tip in SVC.  The  endotracheal tube (ETT) is in satisfactory position.  Nasogastric tube  tip in the stomach.  Left chest tube remains in position.       Impression:      1.  Coarsened interstitial markings and patchy bilateral airspace  disease again noted.  2.  Left chest tube remains in position.      This report was finalized on 7/26/2024 10:48 AM by Dr. Dominguez Sims MD.          I have personally looked at the radiology images and I have read the available final report.    Assessment & Plan      -Acute NSTEMI, type 1 (positive stress test on 7/18/2024 without any reversible ischemia, that was present on admission  -History of IPF and COPD with chronic hypoxia (uses 3 L/min at home)  -Acute exacerbation of IPF/COPD causing acute hypoxic and hypercapnic respiratory failure on top of chronic hypoxic respiratory failure and sepsis (developed on 7/21/2024 due to a suspected aspiration episode) resulting in oral intubation and mechanical ventilation from aspiration pneumonitis  -Hypotension, septic vs sedation vs diuretic induced, developed during admission  -New onset heart failure with reduced EF, acute exacerbation that developed on 7/21/2024  -Hypocalcemia that developed during admission  -Prolonged QT that developed during the hospitalization  -History of essential hypertension  -History of hypothyroidism  -History of stress urinary incontinence with frequent UTI's, with an acute E coli UTI that was present on admission  -History of intermittent, diffuse body tremors    Will stop with the weaning of sedation today and add back the fentanyl as she is high risk for another pneumothorax.  This episode seems to have been brought on by a mucus plug in the ETT.  Will continue with the same  antibiotics as listed above.  Will continue the tube feeds at goal.  Will continue with the suction on the left chest tube.  Will repeat labs in the am.  I am concerned that the patient will not survive this hospitalization and if she does then she will need a tracheostomy and a PEG.  I am not sure what is causing the liquid stools; they do not smell like C diff; they could be due to the tube feeds.  Will continue the rectal tube for now and monitor the input and output closely.    VTE Prophylaxis:  Lovenox at DVT dosing    The patient is high risk due to the following diagnoses/reasons:  Acute MI, IPF with acute exacerbation, new left sided pneumothorax, new onset heart failure      Disposition:  Undetermined at this time    Jessica Ospina MD  [facility]  07/27/24  18:01 EDT      Electronically signed by Jessica Ospina MD at 07/27/24 1804       Kurt White MD at 07/27/24 0942          Progress Note Critical Care Pulmonary        Subjective  On vent , sedated, no more air leak in Pleur-evac.  X-ray chest showed resolution of pneumothorax.  Chronic changes noted.        Gas exchange is stable.  Oxygen requirement down to 28% on present ventilator settings      Interval History: event overnight: As described above        Review of Systems:    On vent , sedated     Vital Signs  Temp:  [97.2 °F (36.2 °C)-99.9 °F (37.7 °C)] 98.2 °F (36.8 °C)  Heart Rate:  [] 66  Resp:  [28-39] 37  BP: ()/(44-88) 126/64  FiO2 (%):  [28 %] 28 %  Body mass index is 23.79 kg/m².    Intake/Output Summary (Last 24 hours) at 7/27/2024 0942  Last data filed at 7/27/2024 0547  Gross per 24 hour   Intake 1913.29 ml   Output 1165 ml   Net 748.29 ml     No intake/output data recorded.    Physical Exam:  General- normal in appearance, not in any acute distress    HEENT- pupils equally reactive to light, normal in size, no scleral icterus    Neck-supple    Respiratory-bilateral air entry, bibasilar  crackles.    No more Air leak in the chest tube/ Pleur-evac, improving subcutaneous emphysema noted in the neck.  Cardiovascular-  Normal S1 and S2. No S3, S4 or murmurs. No JVD, no carotid bruit and no edema, pulses normal bilaterally     GI-nontender nondistended bowel sounds positive    CNS- grossly nonfocal    Extremities-no clubbing and edema        Results Review:      Results from last 7 days   Lab Units 07/26/24  0441 07/25/24  0006 07/24/24  0022   WBC 10*3/mm3 17.54* 21.27* 22.14*   HEMOGLOBIN g/dL 9.1* 9.6* 9.6*   PLATELETS 10*3/mm3 225 303 287     Results from last 7 days   Lab Units 07/26/24  0441 07/25/24  0006 07/24/24  0022   SODIUM mmol/L 142 144 133*   POTASSIUM mmol/L 4.7 5.1 4.0   CHLORIDE mmol/L 112* 114* 103   CO2 mmol/L 25.6 24.9 25.0   BUN mg/dL 29* 20 17   CREATININE mg/dL 0.31* 0.33* 0.38*   CALCIUM mg/dL 8.0* 8.1* 8.2*   GLUCOSE mg/dL 115* 138* 164*   MAGNESIUM mg/dL 2.5* 2.5* 2.5*     Lab Results   Component Value Date    INR 1.09 07/26/2024    INR 0.93 07/18/2024    INR 0.98 04/26/2024    PROTIME 14.2 07/26/2024    PROTIME 12.6 07/18/2024    PROTIME 13.5 04/26/2024     Results from last 7 days   Lab Units 07/26/24  0441 07/24/24  0022 07/23/24  0058   ALK PHOS U/L 70 89 104   BILIRUBIN mg/dL <0.2 <0.2 0.2   ALT (SGPT) U/L 17 14 15   AST (SGOT) U/L 21 22 26     Results from last 7 days   Lab Units 07/26/24  0428   PH, ARTERIAL pH units 7.380   PO2 ART mm Hg 95.0   PCO2, ARTERIAL mm Hg 47.5*   HCO3 ART mmol/L 28.1*     Imaging Results (Last 24 Hours)       Procedure Component Value Units Date/Time    XR Chest 1 View [[id number]] Collected: 07/26/24 1047     Updated: 07/26/24 1050    Narrative:      EXAM:    XR Chest, 1 View     EXAM DATE:    7/26/2024 10:05 AM     CLINICAL HISTORY:    chest tube in place; I21.4-Non-ST elevation (NSTEMI) myocardial  infarction; J93.9-Pneumothorax, unspecified     TECHNIQUE:    Frontal view of the chest.     COMPARISON:    7/24/2024     FINDINGS:    LUNGS AND  PLEURAL SPACES:  Coarsened interstitial markings and patchy  bilateral airspace disease again noted.  No consolidation.  No  pneumothorax.    HEART:  Unremarkable as visualized.  No cardiomegaly.    MEDIASTINUM:  Unremarkable as visualized.  Normal mediastinal contour.    BONES/JOINTS:  Unremarkable as visualized.  No acute fracture.    TUBES, LINES AND DEVICES:  Right central line with tip in SVC.  The  endotracheal tube (ETT) is in satisfactory position.  Nasogastric tube  tip in the stomach.  Left chest tube remains in position.       Impression:      1.  Coarsened interstitial markings and patchy bilateral airspace  disease again noted.  2.  Left chest tube remains in position.        This report was finalized on 7/26/2024 10:48 AM by Dr. Dominguez Sims MD.                    aspirin, 81 mg, Oral, Daily  Brexpiprazole, 0.5 mg, Oral, Daily  cefepime, 2,000 mg, Intravenous, Q8H  chlorhexidine, 15 mL, Mouth/Throat, Q12H  donepezil, 10 mg, Oral, Q PM  doxycycline, 100 mg, Oral, Q12H  enoxaparin, 40 mg, Subcutaneous, Nightly  hydroxychloroquine, 200 mg, Oral, BID  insulin regular, 2-7 Units, Subcutaneous, Q6H  ipratropium-albuterol, 3 mL, Nebulization, Q6H - RT  levothyroxine, 25 mcg, Oral, Daily With Breakfast  magic barrier cream, , Topical, BID  memantine, 5 mg, Oral, Q12H  methylPREDNISolone sodium succinate, 60 mg, Intravenous, Q12H  [Held by provider] metoprolol succinate XL, 25 mg, Oral, Q24H  nystatin, 5 mL, Oral, 4x Daily  nystatin, 1 Application, Topical, Q12H  pantoprazole, 40 mg, Intravenous, Q AM  Phenylephrine HCl-NaCl, , ,   Phenylephrine HCl-NaCl, , ,   sodium chloride, 10 mL, Intravenous, Q12H  sodium chloride, 10 mL, Intravenous, Q12H  sodium chloride, 10 mL, Intravenous, Q12H  sodium chloride, 10 mL, Intravenous, Q12H  sodium chloride, 10 mL, Intravenous, Q12H  sodium chloride, 10 mL, Intravenous, Q12H      dexmedetomidine, 0.2-1.5 mcg/kg/hr, Last Rate: 1.5 mcg/kg/hr (07/27/24 0219)  fentanyl 10  mcg/mL,  mcg/hr, Last Rate: Stopped (07/26/24 0917)  Pharmacy Consult,   phenylephrine, 0.5-3 mcg/kg/min (Dosing Weight), Last Rate: Stopped (07/27/24 0553)  propofol, 5-50 mcg/kg/min (Dosing Weight), Last Rate: Stopped (07/27/24 0824)        Medication Review:     Assessment & Plan   #1: Acute on chronic hypoxic respiratory failure    #2 interstitial lung disease, most likely a flareup of interstitial lung disease.    3.:  Pneumonia.  #4: Septic shock.  Off pressors.          5.  Non-STEMI.      #6 spontaneous pneumothorax.  Chest tube in place.  Vent Settings          Resp Rate (Set): 28  Pressure Support (cm H2O): 10 cm H20  FiO2 (%): 28 %  PEEP/CPAP (cm H2O): 4 cm H20    Minute Ventilation (L/min) (Obs): 11.9 L/min  Resp Rate (Observed) Vent: 35     I:E Ratio (Obs): 1:2.2    PIP Observed (cm H2O): 28 cm H2O    Driving Pressure (cm H2O): 32.6 cm H2O     Gas exchange is improved.      Will hold sedation.  Awakening trial.  Possible weaning trial.  Patient is DNR.  Seems like patient may ultimately require tracheostomy unless patient's family decided for comfort measure     Current medication list reviewed.  Latest microbiology reviewed.  Respiratory panel reviewed.  Continue nebs.  Continue oxygen to maintain saturation 88 to 92%.   Cardiology- hemodynamically -stable  Off pressor.    Nephrology- Cr and BUN stable  I/O-reviewed     GI-continue current diet.  Continue aspiration precautions     Hematology- CBC  Hb  platelet  WBC-latest reviewed     ID  Culture  And Antibiotics     Endocrinology- Maintain Blood sugar 140 -180  Blood sugars reviewed     Electrolytes-   Mag and phos         DVT prophylaxis-       Critical Care time spent in direct patient care: 45 minutes (excluding procedure time, if applicable) including high complexity decision making to assess, manipulate, and support vital organ system failure in this individual who has impairment of one or more vital organ systems such that there is a high  probability of imminent or life threatening deterioration in the patient’s condition.  Patient is critically ill and is at higher risk for further mortality/morbidity. Continue ICU care .      Kurt White MD  07/27/24  09:42 EDT    Electronically signed by Kurt White MD at 07/27/24 0946       Consult Notes (last 48 hours)  Notes from 07/27/24 0857 through 07/29/24 0857   No notes of this type exist for this encounter.

## 2024-07-29 NOTE — PLAN OF CARE
Goal Outcome Evaluation:  Plan of Care Reviewed With: patient        Progress: no change  Outcome Evaluation: pt VSS remains intubated and sedated, on pressor and propofol, precedex, and fentanyl. pt subcutaneous air worsening at this time. pt still has chest tube present. afebrile and did well on SAT for multiple hours.

## 2024-07-29 NOTE — PROGRESS NOTES
THC Physician - Brief Progress Note  PERMANENT  07/29/2024 04:18    [facility] - [facility] - Alex - CCU - [facility] - [place], [place] ([facility])    NAGY, DAVE KEITH.    Date of Service 07/29/2024 04:18    HPI/Events of Note [facility] Provider Intervention Note    Pt has been stacking breaths and has increased peak pressures.  Precedex had to be decreased due to bradycardia.  She remains on propofol and fentanyl.  Intermittent paralytics given.  Will obtain a stat CXR as she did have a spontaneous pneumothorax and   has a chest tube.  No issues with CT per nursing.  Will also obtain an ABG as hers was slightly worse than the day before.  Will also give a dose of versed x1.    __y___   Video Assessment performed            [facility] if bedside physician is not present.      Interventions Major-Respiratory failure - evaluation and management        Electronically Signed by: Alisson Moreno) on 07/29/2024 04:22

## 2024-07-29 NOTE — PROGRESS NOTES
[facility]  PROGRESS NOTE    Patient information:  Name: Lexie Valdez  Age/Sex: 64 y.o. female  :  1960        PCP: Violet Pop APRN  Attending: Estuardo Charles MD  MRN:  [id number]  Visit Number:  [id number]    LOS: 11  CODE STATUS:    Code Status and Medical Interventions: No CPR (Do Not Attempt to Resuscitate); Full Support   Ordered at: 24 1813     Level Of Support Discussed With:    Next of Kin (If No Surrogate)    Health Care Surrogate     Code Status (Patient has no pulse and is not breathing):    No CPR (Do Not Attempt to Resuscitate)     Medical Interventions (Patient has pulse or is breathing):    Full Support       PROBLEM LIST:Principal Problem:    NSTEMI (non-ST elevated myocardial infarction)      Reason for Cardiology follow-up: NSTEMI     Subjective   ADMISSION INFORMATION:  Chief Complaint   Patient presents with    Shortness of Breath       DATE:2024    HPI:  Lexie Valdez is a 64 y.o. 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     Patient presented to [facility] ([facility]) emergency room (ER) on 2024 with complaints of increased shortness of breath x 2 weeks has become progressively worse and exacerbated with minimal activity.        Interval History:   Telemetry reveals SR 60s with T wave inversion noted. According to patient's I & O flow chart does not reflect a negative diuresing balance overnight. AM labs reveal potassium 4.7, creatinine 0.23, hemoglobin is 10.5, platelet count is 207, and WBC is 24.89.     Patient was in room CCU 10 when she was seen and examined.  Patient remains intubated and off sedation at this time per patient's family and nurse Dima.  She is resting quietly with eyes  closed.  Respirations are even and unlabored.  Telemetry reveals sinus rhythm 60s while at bedside.  Last recorded blood pressure was 133/81.    EVENT TIMELINE:   7/18:ECHO w EF of 36 - 40%. Left ventricular diastolic function is consistent with (grade I) impaired relaxation, mild tricuspid valve regurgitation is present. Estimated right ventricular systolic pressure from tricuspid regurgitation is moderately elevated (45-55 mmHg). Patient seem to have developed a new regional wall motion normalities with decreased LV systolic function as compared to the previous study in March 2023.  Please see full report attached below.  7/18: Bess ST with MPI indicating moderate-sized infarct located in the inferior wall, septal wall and apex with no significant ischemia noted. Impressions are consistent with an intermediate risk study. EF = 43%.  Please see full report attached below.  7/21: Emergently intubated orotracheally with a #7.5 endotracheal tube at approximately 22:16 PM.   7/21: CXRs indicated multifocal and worsening pneumonia coupled with some concern as well for pulmonary edema.   7/23: Chest tube inserted 2/2 left sided pneumothorax.     Objective     Vital Signs  Temp:  [99.3 °F (37.4 °C)-99.9 °F (37.7 °C)] 99.3 °F (37.4 °C)  Heart Rate:  [48-64] 60  Resp:  [28-36] 28  BP: ()/(39-81) 127/70  FiO2 (%):  [28 %] 28 %  Device (Oxygen Therapy): ventilator  Vital Signs (last 72 hrs)         07/26 0700  07/27 0659 07/27 0700 07/28 0659 07/28 0700 07/29 0659 07/29 0700 07/29 0906   Most Recent      Temp (°F) 97.2 -  99.9    96.8 -  99.9    99.3 -  100    99.5 -  99.9     99.7 (37.6) 07/29 0830    Heart Rate 55 -  108    52 -  104    48 -  66    58 -  63     60 07/29 0830    Resp 28 -  38    28 -  39    28 -  36       28 07/29 0352    BP 81/47 -  153/88    71/38 -  169/94    71/43 -  156/80    108/56 -  122/66     108/56 07/29 0815    SpO2 (%) 94 -  100    91 -  100    92 -  100      99     99 07/29 0882     Oxygen Concentration (%)   28 28 28 28 07/29 0352          BMI:Body mass index is 23.22 kg/m².    WEIGHT:      07/25/24  0530 07/28/24  0440 07/29/24  0443   Weight: 62.9 kg (138 lb 10.7 oz) 65.8 kg (145 lb 1 oz) 61.4 kg (135 lb 5.8 oz)       DIET:NPO Diet NPO Type: Tube Feeding    I&O:  Intake & Output (last 3 days)         07/26 0701 07/27 0700 07/27 0701 07/28 0700 07/28 0701 07/29 0700 07/29 0701 07/30 0700    I.V. (mL/kg) 698.3 (12.2) 1024.4 (17.9) 1481.4 (25.9)     Other 444 241 767     NG/ 437 3218     IV Piggyback 100 200      Total Intake(mL/kg) 1913.3 (33.4) 1792.4 (31.3) 3281.4 (57.4)     Urine (mL/kg/hr) 1140 (0.8) 1700 (1.2) 2700 (2)     Stool  300 100     Chest Tube 25 13 3     Total Output 1165 2013 2803     Net +748.3 -220.6 +478.4             Stool Unmeasured Occurrence  4 x               PHYSICAL EXAM:  Constitutional:       Appearance: Not in distress. Acutely ill-appearing.   HENT:         Comments: Orally intubated.  Neck:      Vascular: No JVD. JVD normal.   Pulmonary:      Effort: Pulmonary effort is normal.      Comments: Intubated with mechanical ventilation with decreased breath sounds noted bilaterally.   Cardiovascular:      Normal rate. Regular rhythm.      Murmurs: There is no murmur.   Edema:     Peripheral edema present.  Abdominal:      General: Bowel sounds are normal. There is no distension.      Palpations: Abdomen is soft.      Comments: FC with BSD in use.    Musculoskeletal:      Cervical back: Neck supple.      Comments: SCUDS in use BLE.  Skin:     General: Skin is warm and dry.   Neurological:      Comments: Intubated and resting quietly with eyes closed. Does not follow simple commands at this time.                   Results review   Results Review:    I have reviewed the patient's new clinical results. 07/29/24 12:17 EDT    Results from last 7 days   Lab Units 07/29/24  0156 07/29/24  0020 07/23/24  0258 07/23/24  0058   HSTROP T ng/L 31* 29* 66*  75*     Lab Results   Component Value Date    PROBNP 11,410.0 (H) 07/26/2024    PROBNP 14,737.0 (H) 07/22/2024    PROBNP 3,550.0 (H) 07/17/2024     Results from last 7 days   Lab Units 07/29/24  0020 07/28/24  0053 07/27/24  1143 07/26/24  0441 07/25/24  0006 07/24/24  0022 07/23/24  0058   WBC 10*3/mm3 24.89* 22.69* 16.39* 17.54* 21.27* 22.14* 10.24   HEMOGLOBIN g/dL 10.5* 10.3* 10.0* 9.1* 9.6* 9.6* 9.5*   PLATELETS 10*3/mm3 207 224 205 225 303 287 235     Results from last 7 days   Lab Units 07/29/24  0020 07/28/24  0053 07/27/24  1143 07/26/24  0441 07/25/24  0006 07/24/24  0022 07/23/24  0058   SODIUM mmol/L 141 141 142 142 144 133* 139   POTASSIUM mmol/L 4.7 4.9 5.2 4.7 5.1 4.0 3.7   CHLORIDE mmol/L 106 107 109* 112* 114* 103 106   CO2 mmol/L 28.2 24.2 26.1 25.6 24.9 25.0 24.1   BUN mg/dL 15 23 29* 29* 20 17 20   CREATININE mg/dL 0.23* 0.26* 0.29* 0.31* 0.33* 0.38* 0.45*   CALCIUM mg/dL 8.4* 8.3* 8.1* 8.0* 8.1* 8.2* 8.2*   GLUCOSE mg/dL 163* 159* 161* 115* 138* 164* 208*   ALT (SGPT) U/L 58* 24 26 17  --  14 15   AST (SGOT) U/L 52* 23 23 21  --  22 26     Lab Results   Component Value Date    MG 2.4 07/29/2024    MG 2.5 (H) 07/28/2024    MG 2.4 07/27/2024     Estimated Creatinine Clearance: 236.4 mL/min (A) (by C-G formula based on SCr of 0.23 mg/dL (L)).    Lab Results   Component Value Date    HGBA1C 4.60 (L) 07/18/2024    HGBA1C 4.80 04/11/2024    HGBA1C 5.00 12/31/2023     Lab Results   Component Value Date    CHOL 136 07/18/2024    TRIG 61 07/18/2024    LDL 56 07/18/2024    HDL 67 (H) 07/18/2024        Lab Results   Component Value Date    INR 1.00 07/27/2024    INR 1.09 07/26/2024    INR 0.93 07/18/2024    INR 0.98 04/26/2024    INR 1.00 03/21/2023     Lab Results   Component Value Date    LABHEPA 0.27 (L) 07/20/2024    LABHEPA 0.36 07/19/2024    LABHEPA 0.33 07/19/2024    LABHEPA 0.11 (L) 07/18/2024       Lab Results   Component Value Date    TSH 1.140 07/17/2024      Pain Management Panel           No  data to display              Microbiology Results (last 10 days)       Procedure Component Value - Date/Time    Blood Culture - Blood, Arm, Left [[id number]]  (Normal) Collected: 07/28/24 1040    Lab Status: Preliminary result Specimen: Blood from Arm, Left Updated: 07/29/24 1101     Blood Culture No growth at 24 hours    Blood Culture - Blood, Arm, Right [[id number]]  (Normal) Collected: 07/28/24 1000    Lab Status: Preliminary result Specimen: Blood from Arm, Right Updated: 07/29/24 1101     Blood Culture No growth at 24 hours    Aspergillus Galactomannan Antigen - Blood, Arm, Right [[id number]] Collected: 07/22/24 1410    Lab Status: Final result Specimen: Blood from Arm, Right Updated: 07/26/24 0254     Aspergillus Ag, BAL/Serum 0.04 Index     Narrative:      Performed at:  01 - [facility] [address] [place], [place]  [id number]  : Lorraine Ruelas MD, Phone:  [phone]  Performed at:  [facility]  [facility]  [address] [place], [place]  [id number]  : Haroon Walls [facility], Phone:  [phone]    Mycoplasma Pneumoniae Antibody, IgM - Blood, Arm, Left [[id number]]  (Normal) Collected: 07/22/24 0012    Lab Status: Final result Specimen: Blood from Arm, Left Updated: 07/22/24 0202     Mycoplasma pneumo IgM Negative    Legionella Antigen, Urine - Urine, Urine, Clean Catch [[id number]]  (Normal) Collected: 07/21/24 2242    Lab Status: Final result Specimen: Urine, Clean Catch Updated: 07/21/24 2308     LEGIONELLA ANTIGEN, URINE Negative    Narrative:      Presumptive negative for L. pneumophilia serogroup 1 antigen, suggesting no recent or current infection.    Respiratory Culture - Sputum, ET Suction [[id number]] Collected: 07/21/24 2231    Lab Status: Final result Specimen: Sputum from ET Suction Updated: 07/24/24 1119     Respiratory Culture No growth     Gram Stain Rare (1+) Mixed ammon      No WBCs seen      No Epithelial cells seen    MRSA Screen, PCR (Inpatient) - Swab, Nares [[id number]]   (Normal) Collected: 07/21/24 1252    Lab Status: Final result Specimen: Swab from Nares Updated: 07/21/24 1424     MRSA PCR No MRSA Detected    Narrative:      The negative predictive value of this diagnostic test is high and should only be used to consider de-escalating anti-MRSA therapy. A positive result may indicate colonization with MRSA and must be correlated clinically.    Respiratory Panel PCR w/COVID-19(SARS-CoV-2) RHONDA/HEATHER/TSANEEM/PAD/COR/CHRISTELLE In-House, NP Swab in UTM/VTM, 2 HR TAT - Swab, Nasopharynx [[id number]]  (Normal) Collected: 07/21/24 1247    Lab Status: Final result Specimen: Swab from Nasopharynx Updated: 07/21/24 1357     ADENOVIRUS, PCR Not Detected     Coronavirus 229E Not Detected     Coronavirus HKU1 Not Detected     Coronavirus NL63 Not Detected     Coronavirus OC43 Not Detected     COVID19 Not Detected     Human Metapneumovirus Not Detected     Human Rhinovirus/Enterovirus Not Detected     Influenza A PCR Not Detected     Influenza B PCR Not Detected     Parainfluenza Virus 1 Not Detected     Parainfluenza Virus 2 Not Detected     Parainfluenza Virus 3 Not Detected     Parainfluenza Virus 4 Not Detected     RSV, PCR Not Detected     Bordetella pertussis pcr Not Detected     Bordetella parapertussis PCR Not Detected     Chlamydophila pneumoniae PCR Not Detected     Mycoplasma pneumo by PCR Not Detected    Narrative:      In the setting of a positive respiratory panel with a viral infection PLUS a negative procalcitonin without other underlying concern for bacterial infection, consider observing off antibiotics or discontinuation of antibiotics and continue supportive care. If the respiratory panel is positive for atypical bacterial infection (Bordetella pertussis, Chlamydophila pneumoniae, or Mycoplasma pneumoniae), consider antibiotic de-escalation to target atypical bacterial infection.    Respiratory Culture - Sputum, ET Suction [[id number]] Collected: 07/21/24 1152    Lab Status: Final result  Specimen: Sputum from ET Suction Updated: 07/23/24 1053     Respiratory Culture Rare growth Normal respiratory ammon. No S. aureus or Pseudomonas aeruginosa detected. Final report.     Gram Stain Moderate (3+) WBCs seen      Rare (1+) Epithelial cells seen      No organisms seen    Blood Culture - Blood, Arm, Left [[id number]]  (Normal) Collected: 07/21/24 0450    Lab Status: Final result Specimen: Blood from Arm, Left Updated: 07/26/24 0515     Blood Culture No growth at 5 days    Blood Culture - Blood, Arm, Right [[id number]]  (Normal) Collected: 07/21/24 0448    Lab Status: Final result Specimen: Blood from Arm, Right Updated: 07/26/24 0515     Blood Culture No growth at 5 days           Imaging Results (Last 24 Hours)       Procedure Component Value Units Date/Time    XR Chest 1 View [[id number]] Collected: 07/29/24 1027     Updated: 07/29/24 1031    Narrative:      XR CHEST 1 VW-     CLINICAL INDICATION: et tube position; I21.4-Non-ST elevation (NSTEMI)  myocardial infarction; J93.9-Pneumothorax, unspecified        COMPARISON: 7/29/2024     TECHNIQUE: Single frontal view of the chest.     FINDINGS:     LUNGS: Patchy bilateral airspace disease.  Equivocal small left apical pneumothorax. Left-sided chest tube is  present.  Endotracheal tube overlies the tracheal air column well above the  irene.     HEART AND MEDIASTINUM: Heart and mediastinal contours are unremarkable        SKELETON: Bony and soft tissue structures are unremarkable.             Impression:         1. Small left apical pneumothorax  2. Endotracheal tube appears to be well above the irene           This report was finalized on 7/29/2024 10:29 AM by Dr. Devan Sahni MD.       XR Chest 1 View [[id number]] Collected: 07/29/24 0631     Updated: 07/29/24 0635    Narrative:       VERIFICATION OBSERVER NAME: Kai Magana MD.     TECHNIQUE, ADDITIONAL HISTORY, FINDINGS: Single frontal view of the  chest was obtained.   Comparison study date : Prior day.  Time : 4:26 AM.     HISTORY: Respiratory failure     Findings:      ET TUBE is located 1 cm above the irene.   NG TUBE is located in the abdomen.  RIGHT central line with the tip in the region of the atriocaval  junction.  LEFT chest tube in place.  Diffuse interstitial changes noted, diminished from prior.  Minimal cardiac enlargement.  Trace LEFT pleural effusion.       Impression:      ET tube is too low in position.  Diminishing interstitial changes.              [id number]     ZIP Code [id number].              This report was finalized on 7/29/2024 6:33 AM by Dr. Kai Magana MD.       XR Chest 1 View [[id number]] Collected: 07/28/24 1346     Updated: 07/28/24 1348    Narrative:      TECHNIQUE: X-ray of the chest single view was performed per as low as  reasonably achievable (ALARA) protocols.     COMPARISON: 7/27/2024     FINDINGS:     There is an endotracheal tube with tip 3.7 cm above the irene. A  gastric drainage tube courses into the stomach, with tip not seen. There  is a left lung base chest tube and a gastric lap band device. No  pneumothorax. There is a right neck central venous catheter with tip  likely near the cavoatrial junction.     Subjectively, minimally more pronounced ARDS like lung  opacities/reticulation diffusely. No definite pleural effusion.     Redemonstrated subcutaneous emphysema in the supraclavicular region  bilaterally.     Nonenlarged cardiomediastinal silhouette.       Impression:         1. Subjectively minimally increased ARDS-like airspace  opacities/pulmonary edema compared to prior. No definite pleural  effusion or pneumothorax.     2. Stable lines and tubes.        To TALK to On Call [phone]     This report was finalized on 7/28/2024 1:46 PM by Andrea Medina MD.             ECHO:  Results for orders placed during the hospital encounter of 07/17/24    Adult Transthoracic Echo Complete w/ Color, Spectral and Contrast if necessary per protocol    Interpretation  Summary    Normal left ventricular cavity size and wall thickness noted.    The following left ventricular wall segments are hypokinetic: mid anterior, basal anterolateral, apical lateral, apical septal, mid anteroseptal and basal anterior. The following left ventricular wall segments are akinetic: apex.    Left ventricular systolic function is moderately decreased. Left ventricular ejection fraction appears to be 36 - 40%.    Left ventricular diastolic function is consistent with (grade I) impaired relaxation.    The aortic valve is structurally normal with no regurgitation or stenosis present.    The mitral valve is structurally normal with no significant stenosis present. Mild mitral valve regurgitation is present.    Mild tricuspid valve regurgitation is present. Estimated right ventricular systolic pressure from tricuspid regurgitation is moderately elevated (45-55 mmHg).    There is no evidence of pericardial effusion. .    Comments: Patient seem to have developed a new regional wall motion normalities with decreased LV systolic function as compared to the previous study in 2023.      STRESS TEST:  Results for orders placed during the hospital encounter of 24    Stress Test With Myocardial Perfusion One Day    Interpretation Summary  Images from the original result were not included.      A pharmacological stress test was performed using regadenoson without low-level exercise.    Findings consistent with an indeterminate ECG stress test.    Myocardial perfusion imaging indicates a moderate-sized infarct located in the inferior wall, septal wall and apex with no significant ischemia noted.    Abnormal LV wall motion consistent with apical dyskinesis.    Left ventricular ejection fraction is moderately reduced (Calculated EF = 43%).    Impressions are consistent with an intermediate risk study.       HEART CATH:  No results found for this or any previous visit.      [id number]      TELEMETRY:      SR 60s with T wave inversion noted        I reviewed the patient's new clinical results.    ALLERGIES: Codeine and Sulfa antibiotics    Medication Review:   Current list of medications may not reflect those currently placed in orders that are not signed or are being held.     ALPRAZolam, 0.5 mg, Oral, TID  aspirin, 81 mg, Oral, Daily  Brexpiprazole, 0.5 mg, Oral, Daily  cefepime, 2,000 mg, Intravenous, Q8H  chlorhexidine, 15 mL, Mouth/Throat, Q12H  donepezil, 10 mg, Oral, Q PM  doxycycline, 100 mg, Oral, Q12H  enoxaparin, 40 mg, Subcutaneous, Nightly  [Held by provider] hydroxychloroquine, 200 mg, Oral, BID  insulin regular, 2-7 Units, Subcutaneous, Q6H  ipratropium-albuterol, 3 mL, Nebulization, Q6H - RT  levothyroxine, 25 mcg, Oral, Daily With Breakfast  magic barrier cream, , Topical, BID  memantine, 5 mg, Oral, Q12H  methylPREDNISolone sodium succinate, 60 mg, Intravenous, Q12H  [Held by provider] metoprolol succinate XL, 25 mg, Oral, Q24H  mupirocin, 1 Application, Topical, Q12H  nystatin, 5 mL, Oral, 4x Daily  nystatin, 1 Application, Topical, Q12H  pantoprazole, 40 mg, Intravenous, Q AM  Phenylephrine HCl-NaCl, , ,   Phenylephrine HCl-NaCl, , ,   sodium chloride, 10 mL, Intravenous, Q12H  sodium chloride, 10 mL, Intravenous, Q12H  sodium chloride, 10 mL, Intravenous, Q12H  sodium chloride, 10 mL, Intravenous, Q12H  sodium chloride, 10 mL, Intravenous, Q12H  sodium chloride, 10 mL, Intravenous, Q12H      dexmedetomidine, 0.2-1.5 mcg/kg/hr, Last Rate: 1.1 mcg/kg/hr (07/29/24 1024)  fentanyl 10 mcg/mL,  mcg/hr, Last Rate: Stopped (07/29/24 1024)  Pharmacy Consult,   phenylephrine, 0.5-3 mcg/kg/min (Dosing Weight), Last Rate: 0.8 mcg/kg/min (07/29/24 0246)  propofol, 5-50 mcg/kg/min (Dosing Weight), Last Rate: Stopped (07/29/24 1008)        senna-docusate sodium **AND** polyethylene glycol **AND** bisacodyl **AND** bisacodyl    busPIRone    Calcium Replacement - Follow Nurse / BPA Driven Protocol     dextrose    dextrose    glucagon (human recombinant)    guaifenesin    HYDROcodone-acetaminophen    hydrOXYzine    ipratropium    Magnesium Cardiology Dose Replacement - Follow Nurse / BPA Driven Protocol    midazolam    nitroglycerin    Pharmacy Consult    Phenylephrine HCl-NaCl    Phenylephrine HCl-NaCl    Phosphorus Replacement - Follow Nurse / BPA Driven Protocol    Potassium Replacement - Follow Nurse / BPA Driven Protocol    prochlorperazine    sodium chloride    sodium chloride    sodium chloride    sodium chloride    sodium chloride    sodium chloride    sodium chloride    sodium chloride    sodium chloride    sodium chloride    sodium chloride    vecuronium    Assessment    Acute NSTEMI, likely type II due to acute respiratory failure with hypoxia  Acute on chronic respiratory failure requiring endotracheal tracheal intubation and mechanical ventilation  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   Beta Blockers continue to be HELD at this time. Patient remains on Phenylephrine.  Currently unable to advance GDMT with ACEi, ARB, ARNI or MAR due to soft/ low BP requiring Phenylephrine. Creatine and K+ levels are stable.  Gentle IV diuresis therapy as needed. Monitor and maintain Potassium levels between 4 and 5, magnesium between 2 and 2.2 Supplement as needed per Hospital Cardiology Protocol. Plan to optimize GDMT for cardiomyopathy when patient is stable.  COPD, Pulmonary Fibrosis, Pneumothorax, and Shock managed by Primary Care Team and Pulmonologist.       I have discussed this patient's case with Dr. Tineo and together formed a plan of care as stated above in the  recommendations/plan.      I have discussed the patients findings and recommendations with the patient.    Thank you very much for asking us to be involved in this patient's care.  We will follow along with you.    Electronically signed by SG Chandler, 07/29/24, 12:18 PM EDT.                     Please note that portions of this note were completed with a voice recognition program.    Please note that portions of this note were copied and has been reviewed and is accurate as of 7/29/2024 .       .Electronically signed by Justo Tineo MD, 07/29/24, 5:55 PM EDT.

## 2024-07-29 NOTE — PLAN OF CARE
Goal Outcome Evaluation:  Plan of Care Reviewed With: patient        Progress: no change         Problem: Adult Inpatient Plan of Care  Goal: Plan of Care Review  Outcome: Ongoing, Progressing  Flowsheets (Taken 7/29/2024 0329)  Progress: no change  Plan of Care Reviewed With: patient  Goal: Patient-Specific Goal (Individualized)  Outcome: Ongoing, Progressing  Goal: Absence of Hospital-Acquired Illness or Injury  Outcome: Ongoing, Progressing  Intervention: Identify and Manage Fall Risk  Recent Flowsheet Documentation  Taken 7/29/2024 0300 by Elma Ames RN  Safety Promotion/Fall Prevention:   safety round/check completed   fall prevention program maintained  Taken 7/29/2024 0200 by Elma Ames RN  Safety Promotion/Fall Prevention:   safety round/check completed   fall prevention program maintained  Taken 7/29/2024 0100 by Elma Ames RN  Safety Promotion/Fall Prevention:   safety round/check completed   fall prevention program maintained  Taken 7/29/2024 0000 by Elma Ames RN  Safety Promotion/Fall Prevention:   safety round/check completed   fall prevention program maintained  Taken 7/28/2024 2300 by Elma Ames RN  Safety Promotion/Fall Prevention:   safety round/check completed   fall prevention program maintained  Taken 7/28/2024 2200 by Elma Ames RN  Safety Promotion/Fall Prevention:   safety round/check completed   fall prevention program maintained  Taken 7/28/2024 2100 by Elma Ames RN  Safety Promotion/Fall Prevention:   safety round/check completed   fall prevention program maintained  Taken 7/28/2024 2000 by Elma Ames RN  Safety Promotion/Fall Prevention:   safety round/check completed   fall prevention program maintained  Taken 7/28/2024 1900 by Elma Ames RN  Safety Promotion/Fall Prevention:   safety round/check completed   fall prevention program maintained  Intervention: Prevent Skin Injury  Recent Flowsheet Documentation  Taken 7/29/2024 0200 by Kwaku  JESSIKA Wills  Body Position:   turned   left  Skin Protection:   tubing/devices free from skin contact   adhesive use limited  Taken 7/29/2024 0000 by Elma Ames RN  Body Position:   turned   right  Taken 7/28/2024 2200 by Elma Ames RN  Body Position:   turned   left  Taken 7/28/2024 2000 by Elma Ames RN  Body Position:   turned   right  Skin Protection:   tubing/devices free from skin contact   adhesive use limited  Intervention: Prevent and Manage VTE (Venous Thromboembolism) Risk  Recent Flowsheet Documentation  Taken 7/28/2024 2000 by Elma Ames RN  Activity Management: bedrest  VTE Prevention/Management: (see MAR) other (see comments)  Goal: Optimal Comfort and Wellbeing  Outcome: Ongoing, Progressing  Intervention: Provide Person-Centered Care  Recent Flowsheet Documentation  Taken 7/29/2024 0200 by Elma Ames RN  Trust Relationship/Rapport:   care explained   reassurance provided  Taken 7/28/2024 2000 by Elma Ames RN  Trust Relationship/Rapport:   care explained   reassurance provided  Goal: Readiness for Transition of Care  Outcome: Ongoing, Progressing     Problem: COPD (Chronic Obstructive Pulmonary Disease) Comorbidity  Goal: Maintenance of COPD Symptom Control  Outcome: Ongoing, Progressing  Intervention: Maintain COPD-Symptom Control  Recent Flowsheet Documentation  Taken 7/29/2024 0300 by Elma Ames RN  Medication Review/Management: medications reviewed  Taken 7/29/2024 0200 by Elma Ames RN  Medication Review/Management: medications reviewed  Taken 7/29/2024 0100 by Elma Ames RN  Medication Review/Management: medications reviewed  Taken 7/29/2024 0000 by Elma Ames RN  Medication Review/Management: medications reviewed  Taken 7/28/2024 2300 by Elma Ames RN  Medication Review/Management: medications reviewed  Taken 7/28/2024 2200 by lEma Ames RN  Medication Review/Management: medications reviewed  Taken 7/28/2024 2100 by Elma Ames,  RN  Medication Review/Management: medications reviewed  Taken 7/28/2024 2000 by Elma Ames RN  Medication Review/Management: medications reviewed  Taken 7/28/2024 1900 by Elma Ames RN  Medication Review/Management: medications reviewed     Problem: Fall Injury Risk  Goal: Absence of Fall and Fall-Related Injury  Outcome: Ongoing, Progressing  Intervention: Identify and Manage Contributors  Recent Flowsheet Documentation  Taken 7/29/2024 0300 by Elma Ames RN  Medication Review/Management: medications reviewed  Taken 7/29/2024 0200 by Elma Ames RN  Medication Review/Management: medications reviewed  Taken 7/29/2024 0100 by Elma Ames RN  Medication Review/Management: medications reviewed  Taken 7/29/2024 0000 by Elma Ames RN  Medication Review/Management: medications reviewed  Taken 7/28/2024 2300 by Elma Ames RN  Medication Review/Management: medications reviewed  Taken 7/28/2024 2200 by Elma Ames RN  Medication Review/Management: medications reviewed  Taken 7/28/2024 2100 by Elma Ames RN  Medication Review/Management: medications reviewed  Taken 7/28/2024 2000 by Elma Ames RN  Medication Review/Management: medications reviewed  Taken 7/28/2024 1900 by Elma Ames RN  Medication Review/Management: medications reviewed  Intervention: Promote Injury-Free Environment  Recent Flowsheet Documentation  Taken 7/29/2024 0300 by Elma Ames RN  Safety Promotion/Fall Prevention:   safety round/check completed   fall prevention program maintained  Taken 7/29/2024 0200 by Elma Ames RN  Safety Promotion/Fall Prevention:   safety round/check completed   fall prevention program maintained  Taken 7/29/2024 0100 by Elma Ames RN  Safety Promotion/Fall Prevention:   safety round/check completed   fall prevention program maintained  Taken 7/29/2024 0000 by Elma Ames RN  Safety Promotion/Fall Prevention:   safety round/check completed   fall prevention  program maintained  Taken 7/28/2024 2300 by Elma Ames RN  Safety Promotion/Fall Prevention:   safety round/check completed   fall prevention program maintained  Taken 7/28/2024 2200 by Elma Ames RN  Safety Promotion/Fall Prevention:   safety round/check completed   fall prevention program maintained  Taken 7/28/2024 2100 by Elma Ames RN  Safety Promotion/Fall Prevention:   safety round/check completed   fall prevention program maintained  Taken 7/28/2024 2000 by Elma Ames RN  Safety Promotion/Fall Prevention:   safety round/check completed   fall prevention program maintained  Taken 7/28/2024 1900 by Elma Ames RN  Safety Promotion/Fall Prevention:   safety round/check completed   fall prevention program maintained     Problem: Skin Injury Risk Increased  Goal: Skin Health and Integrity  Outcome: Ongoing, Progressing  Intervention: Optimize Skin Protection  Recent Flowsheet Documentation  Taken 7/29/2024 0200 by Elma Ames RN  Pressure Reduction Techniques:   weight shift assistance provided   pressure points protected   positioned off wounds   heels elevated off bed  Head of Bed (HOB) Positioning: HOB at 30-45 degrees  Pressure Reduction Devices:   pressure-redistributing mattress utilized   positioning supports utilized   heel offloading device utilized  Skin Protection:   tubing/devices free from skin contact   adhesive use limited  Taken 7/29/2024 0000 by Elma Ames RN  Head of Bed ([facility]) Positioning: HOB at 30-45 degrees  Taken 7/28/2024 2200 by Elma Ames RN  Head of Bed (HOB) Positioning: HOB at 30-45 degrees  Taken 7/28/2024 2000 by Elma Ames RN  Pressure Reduction Techniques:   weight shift assistance provided   pressure points protected   positioned off wounds   heels elevated off bed  Head of Bed (HOB) Positioning: HOB at 30-45 degrees  Pressure Reduction Devices:   pressure-redistributing mattress utilized   positioning supports utilized   heel offloading  device utilized  Skin Protection:   tubing/devices free from skin contact   adhesive use limited     Problem: Inability to Wean (Mechanical Ventilation, Invasive)  Goal: Mechanical Ventilation Liberation  Outcome: Ongoing, Progressing  Intervention: Promote Extubation and Mechanical Ventilation Liberation  Recent Flowsheet Documentation  Taken 7/29/2024 0300 by Elma Ames RN  Medication Review/Management: medications reviewed  Taken 7/29/2024 0200 by Elma Ames RN  Medication Review/Management: medications reviewed  Taken 7/29/2024 0100 by Elma Ames RN  Medication Review/Management: medications reviewed  Taken 7/29/2024 0000 by Elma Ames RN  Medication Review/Management: medications reviewed  Taken 7/28/2024 2300 by Elma Ames RN  Medication Review/Management: medications reviewed  Taken 7/28/2024 2200 by Elma Ames RN  Medication Review/Management: medications reviewed  Taken 7/28/2024 2100 by Elma Ames RN  Medication Review/Management: medications reviewed  Taken 7/28/2024 2000 by Elma Ames RN  Medication Review/Management: medications reviewed  Taken 7/28/2024 1900 by Elma Ames RN  Medication Review/Management: medications reviewed  Goal: Mechanical Ventilation Liberation  Outcome: Ongoing, Progressing  Intervention: Promote Extubation and Mechanical Ventilation Liberation  Recent Flowsheet Documentation  Taken 7/29/2024 0300 by Elma Ames RN  Medication Review/Management: medications reviewed  Taken 7/29/2024 0200 by Elma Ames RN  Medication Review/Management: medications reviewed  Taken 7/29/2024 0100 by Elma Ames RN  Medication Review/Management: medications reviewed  Taken 7/29/2024 0000 by Elma Ames RN  Medication Review/Management: medications reviewed  Taken 7/28/2024 2300 by Elma Ames RN  Medication Review/Management: medications reviewed  Taken 7/28/2024 2200 by Elma Ames RN  Medication Review/Management: medications  reviewed  Taken 7/28/2024 2100 by Elma Ames RN  Medication Review/Management: medications reviewed  Taken 7/28/2024 2000 by Elma Ames RN  Medication Review/Management: medications reviewed  Taken 7/28/2024 1900 by Elma Ames RN  Medication Review/Management: medications reviewed     Problem: Skin and Tissue Injury (Mechanical Ventilation, Invasive)  Goal: Absence of Device-Related Skin and Tissue Injury  Outcome: Ongoing, Progressing  Intervention: Maintain Skin and Tissue Health  Recent Flowsheet Documentation  Taken 7/29/2024 0200 by Elma Ames RN  Device Skin Pressure Protection:   absorbent pad utilized/changed   positioning supports utilized   pressure points protected  Taken 7/28/2024 2000 by Elma Ames RN  Device Skin Pressure Protection:   absorbent pad utilized/changed   positioning supports utilized   pressure points protected  Goal: Absence of Device-Related Skin and Tissue Injury  Outcome: Ongoing, Progressing  Intervention: Maintain Skin and Tissue Health  Recent Flowsheet Documentation  Taken 7/29/2024 0200 by Elma Ames RN  Device Skin Pressure Protection:   absorbent pad utilized/changed   positioning supports utilized   pressure points protected  Taken 7/28/2024 2000 by Elma Ames RN  Device Skin Pressure Protection:   absorbent pad utilized/changed   positioning supports utilized   pressure points protected

## 2024-07-29 NOTE — PROGRESS NOTES
PROGRESS NOTE         Patient Identification:  Name:  Lexie KEITH Valdez  Age:  64 y.o.  Sex:  female  :  1960  MRN:  [id number]  Visit Number:  [id number]  Primary Care Provider:  Violet Pop APRN         LOS: 11 days       ----------------------------------------------------------------------------------------------------------------------  Subjective       Chief Complaints:    Shortness of Breath        Interval History:      The patient remains intubated and sedated 28% FiO2.  Sedation infusing.  Phenylephrine infusing.  Tmax 99.9 °F.  Fecal management system in place with decreased output.  RN reports possibly removing today.  Lung exam is remarkable for coarse breath sounds bilaterally with scattered wheezing.  Abdomen is soft with hypoactive bowel sounds.  Tube feed infusing.  CVL to the right IJ with no erythema or drainage.  Generalized edema.  Scattered bruising to the bilateral upper extremities.  Leukocytosis at 24.89.  Blood cultures from  preliminarily report no growth at 24 hours.      Review of Systems:    Unable to obtain.  Intubated and sedated.    ----------------------------------------------------------------------------------------------------------------------      Objective       Current Hospital Meds:  ALPRAZolam, 0.5 mg, Oral, TID  aspirin, 81 mg, Oral, Daily  Brexpiprazole, 0.5 mg, Oral, Daily  cefepime, 2,000 mg, Intravenous, Q8H  chlorhexidine, 15 mL, Mouth/Throat, Q12H  donepezil, 10 mg, Oral, Q PM  doxycycline, 100 mg, Oral, Q12H  enoxaparin, 40 mg, Subcutaneous, Nightly  [Held by provider] hydroxychloroquine, 200 mg, Oral, BID  insulin regular, 2-7 Units, Subcutaneous, Q6H  ipratropium-albuterol, 3 mL, Nebulization, Q6H - RT  levothyroxine, 25 mcg, Oral, Daily With Breakfast  magic barrier cream, , Topical, BID  memantine, 5 mg, Oral, Q12H  methylPREDNISolone sodium succinate, 60 mg, Intravenous, Q12H  [Held by provider] metoprolol succinate XL, 25 mg, Oral,  Q24H  mupirocin, 1 Application, Topical, Q12H  nystatin, 5 mL, Oral, 4x Daily  nystatin, 1 Application, Topical, Q12H  pantoprazole, 40 mg, Intravenous, Q AM  Phenylephrine HCl-NaCl, , ,   Phenylephrine HCl-NaCl, , ,   sodium chloride, 10 mL, Intravenous, Q12H  sodium chloride, 10 mL, Intravenous, Q12H  sodium chloride, 10 mL, Intravenous, Q12H  sodium chloride, 10 mL, Intravenous, Q12H  sodium chloride, 10 mL, Intravenous, Q12H  sodium chloride, 10 mL, Intravenous, Q12H      dexmedetomidine, 0.2-1.5 mcg/kg/hr, Last Rate: 1.1 mcg/kg/hr (07/29/24 1024)  fentanyl 10 mcg/mL,  mcg/hr, Last Rate: Stopped (07/29/24 1024)  Pharmacy Consult,   phenylephrine, 0.5-3 mcg/kg/min (Dosing Weight), Last Rate: 0.8 mcg/kg/min (07/29/24 1359)  propofol, 5-50 mcg/kg/min (Dosing Weight), Last Rate: Stopped (07/29/24 1008)      ----------------------------------------------------------------------------------------------------------------------    Vital Signs:  Temp:  [99.3 °F (37.4 °C)-99.9 °F (37.7 °C)] 99.3 °F (37.4 °C)  Heart Rate:  [48-64] 64  Resp:  [28-36] 30  BP: ()/(39-81) 118/66  FiO2 (%):  [28 %] 28 %  Mean Arterial Pressure (Non-Invasive) for the past 24 hrs (Last 3 readings):   Noninvasive MAP (mmHg)   07/29/24 1359 85   07/29/24 1200 93   07/29/24 1145 102     SpO2 Percentage    07/29/24 1145 07/29/24 1200 07/29/24 1202   SpO2: 100% 99% 99%     SpO2:  [95 %-100 %] 99 %  on   ;   Device (Oxygen Therapy): ventilator    Body mass index is 23.22 kg/m².  Wt Readings from Last 3 Encounters:   07/29/24 61.4 kg (135 lb 5.8 oz)   04/26/24 67.7 kg (149 lb 4 oz)   04/19/24 67.1 kg (148 lb)        Intake/Output Summary (Last 24 hours) at 7/29/2024 1401  Last data filed at 7/29/2024 0443  Gross per 24 hour   Intake 3281.42 ml   Output 2803 ml   Net 478.42 ml     NPO Diet NPO Type: Tube  Feeding  ----------------------------------------------------------------------------------------------------------------------      Physical Exam:    Constitutional: Thin, frail, chronically ill-appearing  female.  Remains intubated and sedated 28% FiO2.  Pressors infusing.  HENT:  Head: Normocephalic and atraumatic.  Mouth:  Moist mucous membranes.    Eyes:  Conjunctivae and EOM are normal.  No scleral icterus.  Neck:  Neck supple.  No JVD present.    Cardiovascular:  Normal rate, regular rhythm and normal heart sounds with no murmur. No edema.  Pulmonary/Chest: Coarse with wheezing bilaterally.  Left-sided chest tube in place.  Abdominal:  Soft.  Bowel sounds are normal.  No distension and no tenderness.   Musculoskeletal:  No edema, no tenderness, and no deformity.  No swelling or redness of joints.  Neurological: Sedate.  Skin:  Skin is warm and dry.  No rash noted.  No pallor.   Psychiatric: Sedate.        ----------------------------------------------------------------------------------------------------------------------  Results from last 7 days   Lab Units 07/29/24  0156 07/29/24  0020 07/23/24  0258   HSTROP T ng/L 31* 29* 66*     Results from last 7 days   Lab Units 07/26/24  0441   PROBNP pg/mL 11,410.0*           Results from last 7 days   Lab Units 07/29/24  0431   PH, ARTERIAL pH units 7.409   PO2 ART mm Hg 105.0   PCO2, ARTERIAL mm Hg 51.8*   HCO3 ART mmol/L 32.8*     Results from last 7 days   Lab Units 07/29/24  0020 07/28/24  0053 07/27/24  1143 07/26/24  0441 07/25/24  0006 07/24/24  0022 07/23/24  0058   CRP mg/dL  --   --   --   --   --  3.89* 10.32*   LACTATE mmol/L 1.3  --   --   --   --  1.5 1.5   WBC 10*3/mm3 24.89* 22.69* 16.39* 17.54*   < > 22.14* 10.24   HEMOGLOBIN g/dL 10.5* 10.3* 10.0* 9.1*   < > 9.6* 9.5*   HEMATOCRIT % 33.2* 32.8* 31.9* 29.4*   < > 30.0* 29.6*   MCV fL 100.3* 101.9* 101.3* 101.7*   < > 98.4* 97.4*   MCHC g/dL 31.6 31.4* 31.3* 31.0*   < > 32.0 32.1  "  PLATELETS 10*3/mm3 207 224 205 225   < > 287 235   INR   --   --  1.00 1.09  --   --   --     < > = values in this interval not displayed.     Results from last 7 days   Lab Units 07/29/24  0020 07/28/24  0053 07/27/24  1143   SODIUM mmol/L 141 141 142   POTASSIUM mmol/L 4.7 4.9 5.2   MAGNESIUM mg/dL 2.4 2.5* 2.4   CHLORIDE mmol/L 106 107 109*   CO2 mmol/L 28.2 24.2 26.1   BUN mg/dL 15 23 29*   CREATININE mg/dL 0.23* 0.26* 0.29*   CALCIUM mg/dL 8.4* 8.3* 8.1*   GLUCOSE mg/dL 163* 159* 161*   ALBUMIN g/dL 3.0* 3.1* 3.1*   BILIRUBIN mg/dL 0.2 0.3 0.2   ALK PHOS U/L 103 83 78   AST (SGOT) U/L 52* 23 23   ALT (SGPT) U/L 58* 24 26   Estimated Creatinine Clearance: 236.4 mL/min (A) (by C-G formula based on SCr of 0.23 mg/dL (L)).  No results found for: \"AMMONIA\"    Glucose   Date/Time Value Ref Range Status   07/29/2024 1147 176 (H) 70 - 130 mg/dL Final   07/29/2024 0544 121 70 - 130 mg/dL Final   07/29/2024 0010 170 (H) 70 - 130 mg/dL Final   07/28/2024 1747 144 (H) 70 - 130 mg/dL Final   07/28/2024 1121 180 (H) 70 - 130 mg/dL Final   07/28/2024 0535 126 70 - 130 mg/dL Final   07/27/2024 2350 154 (H) 70 - 130 mg/dL Final   07/27/2024 1801 128 70 - 130 mg/dL Final     Lab Results   Component Value Date    HGBA1C 4.60 (L) 07/18/2024     Lab Results   Component Value Date    TSH 1.140 07/17/2024    FREET4 1.83 (H) 04/11/2024       Blood Culture   Date Value Ref Range Status   07/21/2024 No growth at 2 days  Preliminary   07/21/2024 No growth at 2 days  Preliminary   07/17/2024 No growth at 5 days  Final   07/17/2024 No growth at 5 days  Final     Urine Culture   Date Value Ref Range Status   07/17/2024 >100,000 CFU/mL Escherichia coli (A)  Final     No results found for: \"WOUNDCX\"  No results found for: \"STOOLCX\"  Respiratory Culture   Date Value Ref Range Status   07/21/2024 No growth  Preliminary   07/21/2024   Final    Rare growth Normal respiratory ammon. No S. aureus or Pseudomonas aeruginosa detected. Final report. "     Pain Management Panel           No data to display                  ----------------------------------------------------------------------------------------------------------------------  Imaging Results (Last 24 Hours)       Procedure Component Value Units Date/Time    XR Chest 1 View [[id number]] Collected: 07/29/24 1027     Updated: 07/29/24 1031    Narrative:      XR CHEST 1 VW-     CLINICAL INDICATION: et tube position; I21.4-Non-ST elevation (NSTEMI)  myocardial infarction; J93.9-Pneumothorax, unspecified        COMPARISON: 7/29/2024     TECHNIQUE: Single frontal view of the chest.     FINDINGS:     LUNGS: Patchy bilateral airspace disease.  Equivocal small left apical pneumothorax. Left-sided chest tube is  present.  Endotracheal tube overlies the tracheal air column well above the  irene.     HEART AND MEDIASTINUM: Heart and mediastinal contours are unremarkable        SKELETON: Bony and soft tissue structures are unremarkable.             Impression:         1. Small left apical pneumothorax  2. Endotracheal tube appears to be well above the irene           This report was finalized on 7/29/2024 10:29 AM by Dr. Devan Sahni MD.       XR Chest 1 View [[id number]] Collected: 07/29/24 0631     Updated: 07/29/24 0635    Narrative:       VERIFICATION OBSERVER NAME: Kai Magana MD.     TECHNIQUE, ADDITIONAL HISTORY, FINDINGS: Single frontal view of the  chest was obtained.   Comparison study date : Prior day. Time : 4:26 AM.     HISTORY: Respiratory failure     Findings:      ET TUBE is located 1 cm above the irene.   NG TUBE is located in the abdomen.  RIGHT central line with the tip in the region of the atriocaval  junction.  LEFT chest tube in place.  Diffuse interstitial changes noted, diminished from prior.  Minimal cardiac enlargement.  Trace LEFT pleural effusion.       Impression:      ET tube is too low in position.  Diminishing interstitial changes.              [id number]     ZIP Code [id number].               This report was finalized on 7/29/2024 6:33 AM by Dr. Kai Magana MD.               ----------------------------------------------------------------------------------------------------------------------    Pertinent Infectious Disease Results                Assessment/Plan       Assessment     Septic shock with acute hypoxic respiratory failure requiring mechanical ventilation and pressor support  Pneumonia        Plan      The patient remains intubated and sedated 28% FiO2.  Sedation infusing.  Phenylephrine infusing.  Tmax 99.9 °F.  Fecal management system in place with decreased output.  RN reports possibly removing today.  Lung exam is remarkable for coarse breath sounds bilaterally with scattered wheezing.  Abdomen is soft with hypoactive bowel sounds.  Tube feed infusing.  CVL to the right IJ with no erythema or drainage.  Generalized edema.  Scattered bruising to the bilateral upper extremities.  Leukocytosis at 24.89.  Blood cultures from 7/28 preliminarily report no growth at 24 hours.    Although the patient continues with leukocytosis, she is receiving steroid course.  For now we will continue with cefepime and doxycycline courses but will continue with low threshold to escalate antibiotic coverage.  We will continue to monitor closely.      ANTIMICROBIAL THERAPY    cefepime 2000 mg IVPB in 100 mL NS (VTB)  doxycycline - 100 MG     Code Status:   Code Status and Medical Interventions: No CPR (Do Not Attempt to Resuscitate); Full Support   Ordered at: 07/25/24 181     Level Of Support Discussed With:    Next of Kin (If No Surrogate)    Health Care Surrogate     Code Status (Patient has no pulse and is not breathing):    No CPR (Do Not Attempt to Resuscitate)     Medical Interventions (Patient has pulse or is breathing):    Full Support       SG Stoner  07/29/24  14:01 EDT

## 2024-07-30 NOTE — PROGRESS NOTES
[facility] HOSPITALIST PROGRESS NOTE     Patient Identification:  Name:  Lexie KEITH Valdez  Age:  64 y.o.  Sex:  female  :  1960  MRN:  [id number]  Visit Number:  [id number]  ROOM: [address]     Primary Care Provider:  Violet Pop APRN     Date of Admission: 2024    Length of stay in inpatient status:  12    Subjective     Chief Compliant:    Chief Complaint   Patient presents with    Shortness of Breath       Patient on stable vent settings. Unfortunately had significant progression of subQ emphysema overnight and into am. Surgery updated and new chest tube placed with some improvement. Family discussion again this am and they shared agreed decision for no trach or peg and to avoid escalation of care (no further procedures, no tube exchange, no additional vasopressor, no additional abx added).         Objective       Vital Signs:  Temp:  [98.6 °F (37 °C)-99.9 °F (37.7 °C)] 99.1 °F (37.3 °C)  Heart Rate:  [53-78] 56  Resp:  [28-35] 30  BP: ()/(44-93) 172/91  FiO2 (%):  [28 %] 28 %  SpO2:  [95 %-100 %] 99 %  on   ;   Device (Oxygen Therapy): ventilator  Body mass index is 24.43 kg/m².      ----------------------------------------------------------------------------------------------------------------------  Physical Exam  Vitals and nursing note reviewed.   Constitutional:       Comments: Intubated/sedated   HENT:      Mouth/Throat:      Comments: ETT in place  Neck:      Comments: Significant diffuse subQ emphysema from jawline to mid sternum  Cardiovascular:      Rate and Rhythm: Normal rate and regular rhythm.   Pulmonary:      Comments: B/l mechanical breath sounds, left sided subQ emphysema  Abdominal:      General: There is no distension.      Palpations: Abdomen is soft.   Musculoskeletal:      Right lower leg: No edema.      Left lower leg: No edema.   Skin:     General: Skin is warm.      Coloration: Skin is pale.   Neurological:      Comments: Not withdrawing to pain or  following commands       ----------------------------------------------------------------------------------------------------------------------          Assessment & Plan      -Acute NSTEMI, type 1 (positive stress test on 7/18/2024 without any reversible ischemia, that was present on admission  -History of IPF and COPD with chronic hypoxia (uses 3 L/min at home)  -Acute exacerbation of IPF/COPD causing acute hypoxic and hypercapnic respiratory failure on top of chronic hypoxic respiratory failure and sepsis (developed on 7/21/2024 due to a suspected aspiration episode) resulting in oral intubation and mechanical ventilation from aspiration pneumonitis  -Hypotension, septic vs sedation vs diuretic induced, developed during admission  -New onset heart failure with reduced EF, acute exacerbation that developed on 7/21/2024  -Hypocalcemia that developed during admission  -Prolonged QT that developed during the hospitalization  -History of essential hypertension  -History of hypothyroidism  -History of stress urinary incontinence with frequent UTI's, with an acute E coli UTI that was present on admission  -History of intermittent, diffuse body tremors    -Will cont current management after new chest tube placed with tubes removed as safely able. Once patient's subQ emphysema or overall condition no longer improving or should additional complication develop, at that time we will terminally extubate to comfort measures only. For now cont current abx coverage, regular labs, chest tube, MV and daily SAT/SBT in hopes of more controlled extubation in coming days. Once patient reaches maximal benefit will extubate to comfort measures with family at bedside    I have spent 50 minutes of critical care time with > 50% of time spent in direct patient care, evaluating the patient at bedside, reviewing all labs and images, reviewing ABG and adjusting vent settings, reviewing pain and sedation gtt's, communicating plan of care w/  nursing staff and consultants, and utilizing high complexity medical decision making to assess and treat vital organ system failure in an individual who has impairment of one or more vital organ systems such that there is a high probability of imminent or life threatening deterioration in the patient’s condition. Failure to initiate the above interventions on an urgent basis would likely result in sudden, clinically significant or life threatening deterioration in the patient's condition.        Code Status and Medical Interventions: No CPR (Do Not Attempt to Resuscitate); Limited Support; No intubation (DNI), No vasopressors, No antibiotics, No cardioversion, No antiarrhythmic drugs; ok for current vasopressor no escalation, no escalatio...   Ordered at: 07/30/24 1151     Medical Intervention Limits:    No intubation (DNI)    No vasopressors    No antibiotics    No cardioversion    No antiarrhythmic drugs     Level Of Support Discussed With:    Next of Kin (If No Surrogate)    Health Care Surrogate     Code Status (Patient has no pulse and is not breathing):    No CPR (Do Not Attempt to Resuscitate)     Medical Interventions (Patient has pulse or is breathing):    Limited Support     Comments:    ok for current vasopressor no escalation, no escalation of antibiotic, if ET tube dislodged do not replace, no additional antiarrhythmic drugs         Disposition: cont ccu management    I have reviewed any copied/forwarded text or data, verified its accuracy, and updated as necessary above.    Eduardo Freeman MD  [facility] Hospitalist  07/30/24  19:56 EDT

## 2024-07-30 NOTE — PLAN OF CARE
Goal Outcome Evaluation:  Plan of Care Reviewed With: patient        Progress: declining  Outcome Evaluation: pt remains intubated and sedated this shift, subcutaneous emphsema worsened this shift and pt had second chest tube placed. pt now DNR/DNI with no advancement of care. remans on fentanyl, propofol, and Yair gtt's. family at bedside.

## 2024-07-30 NOTE — PROGRESS NOTES
[facility]     Patient Identification:  Name:  Lexie KEITH Valdez  Age:  64 y.o.  Sex:  female  :  1960  MRN:  [id number]  Visit Number:  [id number]  ROOM: [address]     Primary Care Provider:  Violet Pop APRN     Date of Admission: 2024    Length of stay in inpatient status:  11    Subjective     Chief Compliant:    Chief Complaint   Patient presents with    Shortness of Breath       Patient intubated/sedated with SAT weaned to precedex only today. Still on masha. Family discussion today with  and sons present, for now will cont current [facility].        Objective       Vital Signs:  Temp:  [99.3 °F (37.4 °C)-99.9 °F (37.7 °C)] 99.3 °F (37.4 °C)  Heart Rate:  [48-69] 57  Resp:  [28-32] 28  BP: ()/(39-81) 124/70  FiO2 (%):  [28 %] 28 %  SpO2:  [95 %-100 %] 100 %  on   ;   Device (Oxygen Therapy): ventilator  Body mass index is 23.22 kg/m².      ----------------------------------------------------------------------------------------------------------------------  Physical Exam  Vitals and nursing note reviewed.   Constitutional:       Comments: Intubated/sedated   HENT:      Mouth/Throat:      Comments: ETT in place  Cardiovascular:      Rate and Rhythm: Normal rate and regular rhythm.   Pulmonary:      Comments: B/l mechanical breath sounds, left sided subQ emphysema  Abdominal:      General: There is no distension.      Palpations: Abdomen is soft.   Musculoskeletal:      Right lower leg: No edema.      Left lower leg: No edema.   Skin:     General: Skin is warm.      Coloration: Skin is pale.   Neurological:      Comments: Not withdrawing to pain or following commands       ----------------------------------------------------------------------------------------------------------------------          Assessment & Plan      -Acute NSTEMI, type 1 (positive stress test on 2024 without any reversible ischemia, that was present on admission  -History of IPF  and COPD with chronic hypoxia (uses 3 L/min at home)  -Acute exacerbation of IPF/COPD causing acute hypoxic and hypercapnic respiratory failure on top of chronic hypoxic respiratory failure and sepsis (developed on 7/21/2024 due to a suspected aspiration episode) resulting in oral intubation and mechanical ventilation from aspiration pneumonitis  -Hypotension, septic vs sedation vs diuretic induced, developed during admission  -New onset heart failure with reduced EF, acute exacerbation that developed on 7/21/2024  -Hypocalcemia that developed during admission  -Prolonged QT that developed during the hospitalization  -History of essential hypertension  -History of hypothyroidism  -History of stress urinary incontinence with frequent UTI's, with an acute E coli UTI that was present on admission  -History of intermittent, diffuse body tremors    Discussed GOC with family today, will cont current plan but will need trach/peg if not extubated within 48hrs most likely. Patient still with period of vent dyssynchrony, unable to safelty attempted extubation currently. Cont daily SAT and SBT as possible.  Cont current abx coverage, afebrile  Left sided subQ emphysema slightly worse, chest tube in place in thoracic cavity without clear leak, will repeat CXR in am to monitor. Left PTX small and stable in size  Prognosis remains poor overall    I have spent 105 minutes of critical care time with > 50% of time spent in direct patient care, evaluating the patient at bedside, reviewing all labs and images, reviewing ABG and adjusting vent settings, reviewing pain and sedation gtt's, communicating plan of care w/ nursing staff and consultants, and utilizing high complexity medical decision making to assess and treat vital organ system failure in an individual who has impairment of one or more vital organ systems such that there is a high probability of imminent or life threatening deterioration in the patient’s condition. Failure  to initiate the above interventions on an urgent basis would likely result in sudden, clinically significant or life threatening deterioration in the patient's condition.        Code Status and Medical Interventions: No CPR (Do Not Attempt to Resuscitate); Full Support   Ordered at: 07/25/24 7860     Level Of Support Discussed With:    Next of Kin (If No Surrogate)    Health Care Surrogate     Code Status (Patient has no pulse and is not breathing):    No CPR (Do Not Attempt to Resuscitate)     Medical Interventions (Patient has pulse or is breathing):    Full Support         Disposition: cont ccu management    I have reviewed any copied/forwarded text or data, verified its accuracy, and updated as necessary above.    Eduardo Freeman MD  [facility]  07/29/24  20:51 EDT

## 2024-07-30 NOTE — PROGRESS NOTES
PROGRESS NOTE         Patient Identification:  Name:  Lexie KEITH Valdez  Age:  64 y.o.  Sex:  female  :  1960  MRN:  [id number]  Visit Number:  [id number]  Primary Care Provider:  Violet Pop APRN         LOS: 12 days       ----------------------------------------------------------------------------------------------------------------------  Subjective       Chief Complaints:    Shortness of Breath        Interval History:      The patient remains intubated and sedated at 28% FiO2.  Continues with sedation and phenylephrine infusing.  Tmax 99.7 °F.  Fecal management system in place with no output noted to collection container.  Staff reports development of subcutaneous air to the anterior chest.  Left chest tube in place.  Lung exam is diminished to auscultation bilaterally.  Abdomen is soft with hypoactive bowel sounds.  Tube feed infusing.  Right IJ triple-lumen CVL with no surrounding erythema or drainage.  Procalcitonin 0.07.  Leukocytosis improving and 20.31.  Blood cultures from  preliminarily reported growth at 2 days.  Chest x-ray from this morning reports left apical pneumothorax.  Diffuse subcutaneous air.  Repeat chest x-ray from this morning reported second left-sided thoracostomy tube has been placed.  No pneumothorax seen on the present image.      Review of Systems:    Unable to obtain.  Intubated and sedated.    ----------------------------------------------------------------------------------------------------------------------      Objective       Current [facility] Meds:  ALPRAZolam, 0.5 mg, Oral, TID  aspirin, 81 mg, Oral, Daily  Brexpiprazole, 0.5 mg, Oral, Daily  cefepime, 2,000 mg, Intravenous, Q8H  chlorhexidine, 15 mL, Mouth/Throat, Q12H  donepezil, 10 mg, Oral, Q PM  enoxaparin, 40 mg, Subcutaneous, Nightly  [Held by provider] hydroxychloroquine, 200 mg, Oral, BID  insulin regular, 2-7 Units, Subcutaneous, Q6H  ipratropium-albuterol, 3 mL, Nebulization, Q6H -  RT  levothyroxine, 25 mcg, Oral, Daily With Breakfast  magic barrier cream, , Topical, BID  memantine, 5 mg, Oral, Q12H  methylPREDNISolone sodium succinate, 60 mg, Intravenous, Q12H  [Held by provider] metoprolol succinate XL, 25 mg, Oral, Q24H  mupirocin, 1 Application, Topical, Q12H  nystatin, 5 mL, Oral, 4x Daily  nystatin, 1 Application, Topical, Q12H  pantoprazole, 40 mg, Intravenous, Q AM  Phenylephrine HCl-NaCl, , ,   Phenylephrine HCl-NaCl, , ,   sodium chloride, 10 mL, Intravenous, Q12H  sodium chloride, 10 mL, Intravenous, Q12H  sodium chloride, 10 mL, Intravenous, Q12H  sodium chloride, 10 mL, Intravenous, Q12H  sodium chloride, 10 mL, Intravenous, Q12H  sodium chloride, 10 mL, Intravenous, Q12H      dexmedetomidine, 0.2-1.5 mcg/kg/hr, Last Rate: 1.1 mcg/kg/hr (07/30/24 0005)  fentanyl 10 mcg/mL,  mcg/hr, Last Rate: 150 mcg/hr (07/30/24 0954)  Pharmacy Consult,   phenylephrine, 0.5-3 mcg/kg/min (Dosing Weight), Last Rate: 0.5 mcg/kg/min (07/30/24 1045)  propofol, 5-50 mcg/kg/min (Dosing Weight), Last Rate: 30 mcg/kg/min (07/30/24 1058)      ----------------------------------------------------------------------------------------------------------------------    Vital Signs:  Temp:  [99 °F (37.2 °C)-99.9 °F (37.7 °C)] 99.9 °F (37.7 °C)  Heart Rate:  [53-78] 66  Resp:  [28-35] 35  BP: ()/(44-93) 119/76  FiO2 (%):  [28 %] 28 %  Mean Arterial Pressure (Non-Invasive) for the past 24 hrs (Last 3 readings):   Noninvasive MAP (mmHg)   07/30/24 1045 84   07/30/24 0845 83   07/30/24 0830 99     SpO2 Percentage    07/30/24 0900 07/30/24 0915 07/30/24 1020   SpO2: 99% 98% 100%     SpO2:  [95 %-100 %] 100 %  on   ;   Device (Oxygen Therapy): ventilator    Body mass index is 24.43 kg/m².  Wt Readings from Last 3 Encounters:   07/30/24 64.6 kg (142 lb 6.7 oz)   04/26/24 67.7 kg (149 lb 4 oz)   04/19/24 67.1 kg (148 lb)        Intake/Output Summary (Last 24 hours) at 7/30/2024 1109  Last data filed at  7/30/2024 0604  Gross per 24 hour   Intake 2465.82 ml   Output 1779 ml   Net 686.82 ml     NPO Diet NPO Type: Tube Feeding  ----------------------------------------------------------------------------------------------------------------------      Physical Exam:    Constitutional: Thin, frail, chronically ill-appearing  female.  Intubated and sedated at 28% FiO2.  Pressors infusing.  HENT:  Head: Normocephalic and atraumatic.  Mouth:  Moist mucous membranes.    Eyes:  Conjunctivae and EOM are normal.  No scleral icterus.  Neck:  Neck supple.  No JVD present.    Cardiovascular:  Normal rate, regular rhythm and normal heart sounds with no murmur. No edema.  Pulmonary/Chest: Diminished bilaterally.  Left-sided chest tube in place.  Abdominal:  Soft.  Bowel sounds are normal.  No distension and no tenderness.   Musculoskeletal:  No edema, no tenderness, and no deformity.  No swelling or redness of joints.  Neurological: Sedate.  Skin:  Skin is warm and dry.  No rash noted.  No pallor.   Psychiatric: Sedate.        ----------------------------------------------------------------------------------------------------------------------  Results from last 7 days   Lab Units 07/29/24  0156 07/29/24  0020   HSTROP T ng/L 31* 29*     Results from last 7 days   Lab Units 07/26/24  0441   PROBNP pg/mL 11,410.0*           Results from last 7 days   Lab Units 07/29/24  0431   PH, ARTERIAL pH units 7.409   PO2 ART mm Hg 105.0   PCO2, ARTERIAL mm Hg 51.8*   HCO3 ART mmol/L 32.8*     Results from last 7 days   Lab Units 07/30/24  0005 07/29/24  0020 07/28/24  0053 07/27/24  1143 07/26/24  0441 07/25/24  0006 07/24/24  0022   CRP mg/dL  --   --   --   --   --   --  3.89*   LACTATE mmol/L  --  1.3  --   --   --   --  1.5   WBC 10*3/mm3 20.31* 24.89* 22.69* 16.39* 17.54*   < > 22.14*   HEMOGLOBIN g/dL 10.5* 10.5* 10.3* 10.0* 9.1*   < > 9.6*   HEMATOCRIT % 33.6* 33.2* 32.8* 31.9* 29.4*   < > 30.0*   MCV fL 99.4* 100.3* 101.9*  "101.3* 101.7*   < > 98.4*   MCHC g/dL 31.3* 31.6 31.4* 31.3* 31.0*   < > 32.0   PLATELETS 10*3/mm3 206 207 224 205 225   < > 287   INR   --   --   --  1.00 1.09  --   --     < > = values in this interval not displayed.     Results from last 7 days   Lab Units 07/30/24  0005 07/29/24  0020 07/28/24  0053 07/27/24  1143   SODIUM mmol/L 137 141 141 142   POTASSIUM mmol/L 4.8 4.7 4.9 5.2   MAGNESIUM mg/dL  --  2.4 2.5* 2.4   CHLORIDE mmol/L 101 106 107 109*   CO2 mmol/L 31.0* 28.2 24.2 26.1   BUN mg/dL 19 15 23 29*   CREATININE mg/dL 0.23* 0.23* 0.26* 0.29*   CALCIUM mg/dL 8.2* 8.4* 8.3* 8.1*   GLUCOSE mg/dL 170* 163* 159* 161*   ALBUMIN g/dL 3.0* 3.0* 3.1* 3.1*   BILIRUBIN mg/dL 0.2 0.2 0.3 0.2   ALK PHOS U/L 94 103 83 78   AST (SGOT) U/L 24 52* 23 23   ALT (SGPT) U/L 45* 58* 24 26   Estimated Creatinine Clearance: 248.7 mL/min (A) (by C-G formula based on SCr of 0.23 mg/dL (L)).  No results found for: \"AMMONIA\"    Glucose   Date/Time Value Ref Range Status   07/30/2024 0555 108 70 - 130 mg/dL Final   07/30/2024 0002 162 (H) 70 - 130 mg/dL Final   07/29/2024 1821 108 70 - 130 mg/dL Final   07/29/2024 1147 176 (H) 70 - 130 mg/dL Final   07/29/2024 0544 121 70 - 130 mg/dL Final   07/29/2024 0010 170 (H) 70 - 130 mg/dL Final   07/28/2024 1747 144 (H) 70 - 130 mg/dL Final   07/28/2024 1121 180 (H) 70 - 130 mg/dL Final     Lab Results   Component Value Date    HGBA1C 4.60 (L) 07/18/2024     Lab Results   Component Value Date    TSH 1.140 07/17/2024    FREET4 1.83 (H) 04/11/2024       Blood Culture   Date Value Ref Range Status   07/21/2024 No growth at 2 days  Preliminary   07/21/2024 No growth at 2 days  Preliminary   07/17/2024 No growth at 5 days  Final   07/17/2024 No growth at 5 days  Final     Urine Culture   Date Value Ref Range Status   07/17/2024 >100,000 CFU/mL Escherichia coli (A)  Final     No results found for: \"WOUNDCX\"  No results found for: \"STOOLCX\"  Respiratory Culture   Date Value Ref Range Status "   07/21/2024 No growth  Preliminary   07/21/2024   Final    Rare growth Normal respiratory ammon. No S. aureus or Pseudomonas aeruginosa detected. Final report.     Pain Management Panel           No data to display                  ----------------------------------------------------------------------------------------------------------------------  Imaging Results (Last 24 Hours)       Procedure Component Value Units Date/Time    XR Chest AP [[id number]] Collected: 07/30/24 1049     Updated: 07/30/24 1052    Narrative:      XR CHEST AP-     CLINICAL INDICATION: chest tube insertion; I21.4-Non-ST elevation  (NSTEMI) myocardial infarction; J93.9-Pneumothorax, unspecified        COMPARISON: 7/30/2024     TECHNIQUE: Single frontal view of the chest.     FINDINGS:     LUNGS: Second left-sided thoracostomy tube is been placed. No  pneumothorax seen on the presented study.     HEART AND MEDIASTINUM: Heart and mediastinal contours are unremarkable        SKELETON: Bony and soft tissue structures are unremarkable.             Impression:      Second left-sided thoracostomy tube has been placed. No pneumothorax  seen on the presented image.           This report was finalized on 7/30/2024 10:49 AM by Dr. Devan Sahni MD.       XR Chest 1 View [[id number]] Collected: 07/30/24 0805     Updated: 07/30/24 0810    Narrative:      XR CHEST 1 VW-     CLINICAL INDICATION: ptx f/u; I21.4-Non-ST elevation (NSTEMI) myocardial  infarction; J93.9-Pneumothorax, unspecified        COMPARISON: 7/29/2024     TECHNIQUE: Single frontal view of the chest.     FINDINGS:     LUNGS: Left apical pneumothorax.  Diffuse subcutaneous air.  No interval line change     HEART AND MEDIASTINUM: Heart and mediastinal contours are unremarkable        SKELETON: Bony and soft tissue structures are unremarkable.             Impression:      Left apical pneumothorax.  Diffuse subcutaneous air.  No interval line change           This report was finalized on  7/30/2024 8:08 AM by Dr. Devan Sahni MD.       XR Chest 1 View [[id number]] Collected: 07/29/24 1726     Updated: 07/29/24 1729    Narrative:      XR CHEST 1 VW-     CLINICAL INDICATION: subQ air worsening; I21.4-Non-ST elevation (NSTEMI)  myocardial infarction; J93.9-Pneumothorax, unspecified        COMPARISON: 7/29/2024     TECHNIQUE: Single frontal view of the chest.     FINDINGS:     LUNGS: Left apical pneumothorax. Left-sided thoracostomy tube persists.  Right-sided airspace disease.     HEART AND MEDIASTINUM: Heart and mediastinal contours are unremarkable        SKELETON: Bony and soft tissue structures are unremarkable.             Impression:      Left-sided pneumothorax.           This report was finalized on 7/29/2024 5:27 PM by Dr. Devan Sahni MD.               ----------------------------------------------------------------------------------------------------------------------    Pertinent Infectious Disease Results                Assessment/Plan       Assessment     Septic shock with acute hypoxic respiratory failure requiring mechanical ventilation and pressor support  Pneumonia        Plan      The patient remains intubated and sedated at 28% FiO2.  Continues with sedation and phenylephrine infusing.  Tmax 99.7 °F.  Fecal management system in place with no output noted to collection container.  Staff reports development of subcutaneous air to the anterior chest.  Left chest tube in place.  Lung exam is diminished to auscultation bilaterally.  Abdomen is soft with hypoactive bowel sounds.  Tube feed infusing.  Right IJ triple-lumen CVL with no surrounding erythema or drainage.  Procalcitonin 0.07.  Leukocytosis improving and 20.31.  Blood cultures from 7/28 preliminarily reported growth at 2 days.  Chest x-ray from this morning reports left apical pneumothorax.  Diffuse subcutaneous air.  Repeat chest x-ray from this morning reported second left-sided thoracostomy tube has been placed.  No  pneumothorax seen on the present image.    The patient does continue on steroid course, leukocytosis improving.  Continue with cefepime and doxycycline courses for now.  We will continue to monitor closely.      ANTIMICROBIAL THERAPY    cefepime 2000 mg IVPB in 100 mL NS (VTB)     Code Status:   Code Status and Medical Interventions: No CPR (Do Not Attempt to Resuscitate); Full Support   Ordered at: 07/25/24 8455     Level Of Support Discussed With:    Next of Kin (If No Surrogate)    Health Care Surrogate     Code Status (Patient has no pulse and is not breathing):    No CPR (Do Not Attempt to Resuscitate)     Medical Interventions (Patient has pulse or is breathing):    Full Support       SG Stoner  07/30/24  11:09 EDT

## 2024-07-30 NOTE — PROGRESS NOTES
Progress Note Critical Care Pulmonary        Subjective      Overnight events reviewed.  All the labs medications ins and outs and vitals reviewed.    Patient have subcutaneous emphysema.  Chest tube was adjusted and surgery on the case and they were notified.  All the labs medications ins and outs and vitals reviewed.  Goals of care discussion happened with family and they are likely going to withdraw care in coming days.  Drips reviewed.  Ventilator settings and graphics reviewed.  Drips and ventilator settings adjusted.        Review of Systems:    Could not be obtained as patient is sedated on ventilator.    Vital Signs  Temp:  [98.6 °F (37 °C)-99.9 °F (37.7 °C)] 98.8 °F (37.1 °C)  Heart Rate:  [53-78] 66  Resp:  [28-35] 28  BP: ()/(44-93) 84/53  FiO2 (%):  [28 %] 28 %  Body mass index is 24.43 kg/m².    Intake/Output Summary (Last 24 hours) at 7/30/2024 1555  Last data filed at 7/30/2024 1400  Gross per 24 hour   Intake 2465.82 ml   Output 1789 ml   Net 676.82 ml     I/O this shift:  In: -   Out: 10 [Chest Tube:10]    Physical Exam:  General-chronically ill appearance, not in any acute distress, sedated    HEENT-PERRLA s    Neck-supple    Respiratory-bilateral air entry, bibasilar crackles.    Chest tube in place  Cardiovascular-NSR  GI-nontender nondistended bowel sounds positive    CNS- grossly nonfocal    Extremities-no clubbing and edema        Results Review:      Results from last 7 days   Lab Units 07/30/24  0005 07/29/24  0020 07/28/24  0053   WBC 10*3/mm3 20.31* 24.89* 22.69*   HEMOGLOBIN g/dL 10.5* 10.5* 10.3*   PLATELETS 10*3/mm3 206 207 224     Results from last 7 days   Lab Units 07/30/24  0005 07/29/24  0020 07/28/24  0053 07/27/24  1143   SODIUM mmol/L 137 141 141 142   POTASSIUM mmol/L 4.8 4.7 4.9 5.2   CHLORIDE mmol/L 101 106 107 109*   CO2 mmol/L 31.0* 28.2 24.2 26.1   BUN mg/dL 19 15 23 29*   CREATININE mg/dL 0.23* 0.23* 0.26* 0.29*   CALCIUM mg/dL 8.2* 8.4* 8.3* 8.1*   GLUCOSE mg/dL  170* 163* 159* 161*   MAGNESIUM mg/dL  --  2.4 2.5* 2.4     Lab Results   Component Value Date    INR 1.00 07/27/2024    INR 1.09 07/26/2024    INR 0.93 07/18/2024    PROTIME 13.3 07/27/2024    PROTIME 14.2 07/26/2024    PROTIME 12.6 07/18/2024     Results from last 7 days   Lab Units 07/30/24  0005 07/29/24  0020 07/28/24  0053   ALK PHOS U/L 94 103 83   BILIRUBIN mg/dL 0.2 0.2 0.3   ALT (SGPT) U/L 45* 58* 24   AST (SGOT) U/L 24 52* 23     Results from last 7 days   Lab Units 07/29/24  0431   PH, ARTERIAL pH units 7.409   PO2 ART mm Hg 105.0   PCO2, ARTERIAL mm Hg 51.8*   HCO3 ART mmol/L 32.8*     Imaging Results (Last 24 Hours)       Procedure Component Value Units Date/Time    XR Chest AP [[id number]] Collected: 07/30/24 1049     Updated: 07/30/24 1052    Narrative:      XR CHEST AP-     CLINICAL INDICATION: chest tube insertion; I21.4-Non-ST elevation  (NSTEMI) myocardial infarction; J93.9-Pneumothorax, unspecified        COMPARISON: 7/30/2024     TECHNIQUE: Single frontal view of the chest.     FINDINGS:     LUNGS: Second left-sided thoracostomy tube is been placed. No  pneumothorax seen on the presented study.     HEART AND MEDIASTINUM: Heart and mediastinal contours are unremarkable        SKELETON: Bony and soft tissue structures are unremarkable.             Impression:      Second left-sided thoracostomy tube has been placed. No pneumothorax  seen on the presented image.           This report was finalized on 7/30/2024 10:49 AM by Dr. Devan Sahni MD.       XR Chest 1 View [[id number]] Collected: 07/30/24 0805     Updated: 07/30/24 0810    Narrative:      XR CHEST 1 VW-     CLINICAL INDICATION: ptx f/u; I21.4-Non-ST elevation (NSTEMI) myocardial  infarction; J93.9-Pneumothorax, unspecified        COMPARISON: 7/29/2024     TECHNIQUE: Single frontal view of the chest.     FINDINGS:     LUNGS: Left apical pneumothorax.  Diffuse subcutaneous air.  No interval line change     HEART AND MEDIASTINUM: Heart and  mediastinal contours are unremarkable        SKELETON: Bony and soft tissue structures are unremarkable.             Impression:      Left apical pneumothorax.  Diffuse subcutaneous air.  No interval line change           This report was finalized on 7/30/2024 8:08 AM by Dr. Devan Sahni MD.       XR Chest 1 View [[id number]] Collected: 07/29/24 1726     Updated: 07/29/24 1729    Narrative:      XR CHEST 1 VW-     CLINICAL INDICATION: subQ air worsening; I21.4-Non-ST elevation (NSTEMI)  myocardial infarction; J93.9-Pneumothorax, unspecified        COMPARISON: 7/29/2024     TECHNIQUE: Single frontal view of the chest.     FINDINGS:     LUNGS: Left apical pneumothorax. Left-sided thoracostomy tube persists.  Right-sided airspace disease.     HEART AND MEDIASTINUM: Heart and mediastinal contours are unremarkable        SKELETON: Bony and soft tissue structures are unremarkable.             Impression:      Left-sided pneumothorax.           This report was finalized on 7/29/2024 5:27 PM by Dr. Devan Sahni MD.                    ALPRAZolam, 0.5 mg, Oral, TID  aspirin, 81 mg, Oral, Daily  Brexpiprazole, 0.5 mg, Oral, Daily  cefepime, 2,000 mg, Intravenous, Q8H  chlorhexidine, 15 mL, Mouth/Throat, Q12H  donepezil, 10 mg, Oral, Q PM  enoxaparin, 40 mg, Subcutaneous, Nightly  [Held by provider] hydroxychloroquine, 200 mg, Oral, BID  insulin regular, 2-7 Units, Subcutaneous, Q6H  ipratropium-albuterol, 3 mL, Nebulization, Q6H - RT  levothyroxine, 25 mcg, Oral, Daily With Breakfast  magic barrier cream, , Topical, BID  memantine, 5 mg, Oral, Q12H  methylPREDNISolone sodium succinate, 60 mg, Intravenous, Q12H  [Held by provider] metoprolol succinate XL, 25 mg, Oral, Q24H  mupirocin, 1 Application, Topical, Q12H  nystatin, 5 mL, Oral, 4x Daily  nystatin, 1 Application, Topical, Q12H  pantoprazole, 40 mg, Intravenous, Q AM  Phenylephrine HCl-NaCl, , ,   Phenylephrine HCl-NaCl, , ,   sodium chloride, 10 mL, Intravenous,  Q12H  sodium chloride, 10 mL, Intravenous, Q12H  sodium chloride, 10 mL, Intravenous, Q12H  sodium chloride, 10 mL, Intravenous, Q12H  sodium chloride, 10 mL, Intravenous, Q12H  sodium chloride, 10 mL, Intravenous, Q12H      dexmedetomidine, 0.2-1.5 mcg/kg/hr, Last Rate: 1.1 mcg/kg/hr (07/30/24 0005)  fentanyl 10 mcg/mL,  mcg/hr, Last Rate: 150 mcg/hr (07/30/24 0954)  Pharmacy Consult,   phenylephrine, 0.5-3 mcg/kg/min (Dosing Weight), Last Rate: 0.8 mcg/kg/min (07/30/24 1530)  propofol, 5-50 mcg/kg/min (Dosing Weight), Last Rate: 30 mcg/kg/min (07/30/24 1058)        Medication Review:       Lab 07/30/24  0005   PROCALCITONIN 0.07       Latest Reference Range & Units 07/30/24 00:11   CO2 Content 22 - 33 mmol/L 36.3 (H)   Carboxyhemoglobin Venous 0.0 - 5.0 % 2.4   Methemoglobin Venous 0.0 - 3.0 % 0.4   Oxyhemoglobin Venous 45.0 - 75.0 % 66.9   Hemoglobin, Blood Gas 13.5 - 17.5 g/dL 10.6 (L)   Site  Nurse/Dr Draw   Modality  Ventilator   FIO2 % 28   Ventilator Mode  VC/AC   Set Tidal Volume  330.00   Set Mech Resp Rate  28.0   PEEP  4.0   Barometric Pressure for Blood Gas mmHg 726   pH, Venous 7.320 - 7.420 pH Units 7.402   pCO2, Venous 41.0 - 51.0 mm Hg 55.6 (H)   pO2, Venous 27.0 - 53.0 mm Hg 35.1   HCO3, Venous 22.0 - 28.0 mmol/L 34.6 (H)   Base Excess 0.0 - 2.0 mmol/L 8.4 (H)   O2 Saturation, Venous 45.0 - 75.0 % 68.8   Collected by  [id number]   (H): Data is abnormally high  (L): Data is abnormally low  Assessment & Plan     Neuro-- sedated drips reviewed.  Adjusted drip for a RASS of -1 to -2.    Continue Xanax.  Continue drips.    Cardiovascular-septic shock-continue vasopressors to maintain a MAP of 65 and above.  Vasopressor requirements reviewed and adjusted for a MAP goal of 65 and above.  Continue steroids.  Non-STEMI-likely due to ongoing sepsis and septic shock.  Continue current treatment.  Latest echo reviewed  Results for orders placed during the hospital encounter of 07/17/24    Adult  Transthoracic Echo Complete w/ Color, Spectral and Contrast if necessary per protocol    Interpretation Summary    Normal left ventricular cavity size and wall thickness noted.    The following left ventricular wall segments are hypokinetic: mid anterior, basal anterolateral, apical lateral, apical septal, mid anteroseptal and basal anterior. The following left ventricular wall segments are akinetic: apex.    Left ventricular systolic function is moderately decreased. Left ventricular ejection fraction appears to be 36 - 40%.    Left ventricular diastolic function is consistent with (grade I) impaired relaxation.    The aortic valve is structurally normal with no regurgitation or stenosis present.    The mitral valve is structurally normal with no significant stenosis present. Mild mitral valve regurgitation is present.    Mild tricuspid valve regurgitation is present. Estimated right ventricular systolic pressure from tricuspid regurgitation is moderately elevated (45-55 mmHg).    There is no evidence of pericardial effusion. .    Comments: Patient seem to have developed a new regional wall motion normalities with decreased LV systolic function as compared to the previous study in March 2023.     Acute on chronic hypoxic respiratory failure-likely due to interstitial lung disease flareup.  Continue steroids    Vent Settings          Resp Rate (Set): 28  Pressure Support (cm H2O): 10 cm H20  FiO2 (%): 28 %  PEEP/CPAP (cm H2O): 4 cm H20    Minute Ventilation (L/min) (Obs): 9.86 L/min  Resp Rate (Observed) Vent: 32     I:E Ratio (Obs): 1:2.6    PIP Observed (cm H2O): 31 cm H2O  Plateau Pressure (cm H2O): 41 cm H2O  Driving Pressure (cm H2O): 32.6 cm H2O   Chest x-ray reviewed.    Pneumothorax ex likely due to barotrauma from increased plateau pressures and ILD.  Latest chest x-ray reviewed.  Latest chest x-ray reviewed and discussed with team.]    Had subcutaneous emphysema.  Surgery on case.  Chest tube was inserted  and subcutaneous emphysema better.  Goals of care discussion happened with family and family is to make the patient comfort measures.  Latest blood gas reviewed    Continue nebs.  Goals of care discussion today.      Nephrology- Cr and BUN stable  I/O-reviewed     GI-continue current diet.  Continue aspiration precautions     Hematology- CBC  Hb  platelet  WBC-latest reviewed    ID  Culture-reviewed  And Antibiotics   Increasing white count-microbiology reviewed.  Will get procalcitonin level in the morning.  Endocrinology- Maintain Blood sugar 140 -180  Blood sugars reviewed     Electrolytes-   Mag and phos         DVT prophylaxis--continue    Currently patient is critically ill due to acute hypoxic respiratory failure, pneumothorax-chest tube in place and sedated drips.  I adjusted ventilator settings and drips  John Mcmahon MD  07/30/24  15:55 EDT

## 2024-07-30 NOTE — PLAN OF CARE
Goal Outcome Evaluation:     Problem: Inability to Wean (Mechanical Ventilation, Invasive)  Goal: Mechanical Ventilation Liberation  Outcome: Ongoing, Not Progressing       Problem: Communication Impairment (Mechanical Ventilation, Invasive)  Goal: Effective Communication  Outcome: Ongoing, Progressing     Problem: Device-Related Complication Risk (Mechanical Ventilation, Invasive)  Goal: Optimal Device Function  Outcome: Ongoing, Progressing  Intervention: Optimize Device Care and Function  Recent Flowsheet Documentation  Taken 7/30/2024 0824 by Ene Monterroso, RRT  Airway Safety Measures:   manual resuscitator/mask at bedside   suction at bedside  Taken 7/30/2024 0643 by Ene Monterroso, RRT  Airway Safety Measures:   manual resuscitator/mask at bedside   suction at bedside     Problem: Skin and Tissue Injury (Mechanical Ventilation, Invasive)  Goal: Absence of Device-Related Skin and Tissue Injury  Outcome: Ongoing, Progressing     Problem: Ventilator-Induced Lung Injury (Mechanical Ventilation, Invasive)  Goal: Absence of Ventilator-Induced Lung Injury  Outcome: Ongoing, Progressing  Intervention: Prevent Ventilator-Associated Pneumonia  Flowsheets  Taken 7/30/2024 0824  Head of Bed (HOB) Positioning: HOB at 30-45 degrees  Taken 7/30/2024 0643  Head of Bed (HOB) Positioning: HOB at 30-45 degrees  VAP Prevention Bundle: HOB elevation maintained  VAP Prevention Measures: completed  Intervention: Facilitate Lung-Protection Measures  Flowsheets (Taken 7/30/2024 0643)  Lung Protection Measures:   low tidal volume provided   lung compliance monitored   plateau/inspiratory pressure monitored   ventilator synchrony promoted   ventilator waveforms monitored

## 2024-07-30 NOTE — PLAN OF CARE
Goal Outcome Evaluation:  Plan of Care Reviewed With: patient        Progress: no change         Problem: Adult Inpatient Plan of Care  Goal: Plan of Care Review  Outcome: Ongoing, Progressing  Flowsheets (Taken 7/30/2024 0247)  Progress: no change  Plan of Care Reviewed With: patient  Goal: Patient-Specific Goal (Individualized)  Outcome: Ongoing, Progressing  Goal: Absence of Hospital-Acquired Illness or Injury  Outcome: Ongoing, Progressing  Intervention: Identify and Manage Fall Risk  Recent Flowsheet Documentation  Taken 7/30/2024 0200 by Elma Ames RN  Safety Promotion/Fall Prevention:   safety round/check completed   fall prevention program maintained  Taken 7/30/2024 0100 by Elma Ames RN  Safety Promotion/Fall Prevention:   safety round/check completed   fall prevention program maintained  Taken 7/30/2024 0000 by Elma Ames RN  Safety Promotion/Fall Prevention:   safety round/check completed   fall prevention program maintained  Taken 7/29/2024 2300 by Elma Ames RN  Safety Promotion/Fall Prevention:   safety round/check completed   fall prevention program maintained  Taken 7/29/2024 2200 by Elma Ames RN  Safety Promotion/Fall Prevention:   safety round/check completed   fall prevention program maintained  Taken 7/29/2024 2100 by Elma Ames RN  Safety Promotion/Fall Prevention:   safety round/check completed   fall prevention program maintained  Taken 7/29/2024 2000 by Elma Ames RN  Safety Promotion/Fall Prevention:   safety round/check completed   fall prevention program maintained  Taken 7/29/2024 1900 by Elma Ames RN  Safety Promotion/Fall Prevention:   safety round/check completed   fall prevention program maintained  Intervention: Prevent Skin Injury  Recent Flowsheet Documentation  Taken 7/30/2024 0200 by Elma Ames RN  Body Position:   turned   left  Skin Protection:   tubing/devices free from skin contact   adhesive use limited  Taken 7/30/2024 0000 by  Elma Ames RN  Body Position:   turned   right  Taken 7/29/2024 2200 by Elma Ames RN  Body Position:   turned   left  Taken 7/29/2024 2000 by Elma Ames RN  Body Position:   turned   right  Skin Protection:   tubing/devices free from skin contact   adhesive use limited  Intervention: Prevent and Manage VTE (Venous Thromboembolism) Risk  Recent Flowsheet Documentation  Taken 7/29/2024 2000 by Elma Ames RN  Activity Management: bedrest  VTE Prevention/Management: (see MAR) other (see comments)  Goal: Optimal Comfort and Wellbeing  Outcome: Ongoing, Progressing  Intervention: Provide Person-Centered Care  Recent Flowsheet Documentation  Taken 7/30/2024 0200 by Elma Ames RN  Trust Relationship/Rapport:   care explained   reassurance provided  Taken 7/29/2024 2000 by Elma Ames RN  Trust Relationship/Rapport:   care explained   reassurance provided  Goal: Readiness for Transition of Care  Outcome: Ongoing, Progressing     Problem: COPD (Chronic Obstructive Pulmonary Disease) Comorbidity  Goal: Maintenance of COPD Symptom Control  Outcome: Ongoing, Progressing  Intervention: Maintain COPD-Symptom Control  Recent Flowsheet Documentation  Taken 7/30/2024 0200 by Elma Ames RN  Medication Review/Management: medications reviewed  Taken 7/30/2024 0100 by Elma Ames RN  Medication Review/Management: medications reviewed  Taken 7/30/2024 0000 by Elma Ames RN  Medication Review/Management: medications reviewed  Taken 7/29/2024 2300 by Elma Ames RN  Medication Review/Management: medications reviewed  Taken 7/29/2024 2200 by Elma Ames RN  Medication Review/Management: medications reviewed  Taken 7/29/2024 2100 by Elma Ames RN  Medication Review/Management: medications reviewed  Taken 7/29/2024 2000 by Elma Ames RN  Medication Review/Management: medications reviewed  Taken 7/29/2024 1900 by Elma Ames RN  Medication Review/Management: medications  reviewed     Problem: Fall Injury Risk  Goal: Absence of Fall and Fall-Related Injury  Outcome: Ongoing, Progressing  Intervention: Identify and Manage Contributors  Recent Flowsheet Documentation  Taken 7/30/2024 0200 by Elma Ames RN  Medication Review/Management: medications reviewed  Taken 7/30/2024 0100 by Elma Ames RN  Medication Review/Management: medications reviewed  Taken 7/30/2024 0000 by Elma Ames RN  Medication Review/Management: medications reviewed  Taken 7/29/2024 2300 by Elma Ames RN  Medication Review/Management: medications reviewed  Taken 7/29/2024 2200 by Elma Ames RN  Medication Review/Management: medications reviewed  Taken 7/29/2024 2100 by Elma Ames RN  Medication Review/Management: medications reviewed  Taken 7/29/2024 2000 by Elma Amse RN  Medication Review/Management: medications reviewed  Taken 7/29/2024 1900 by Elma Ames RN  Medication Review/Management: medications reviewed  Intervention: Promote Injury-Free Environment  Recent Flowsheet Documentation  Taken 7/30/2024 0200 by Elma Ames RN  Safety Promotion/Fall Prevention:   safety round/check completed   fall prevention program maintained  Taken 7/30/2024 0100 by Elma Ames RN  Safety Promotion/Fall Prevention:   safety round/check completed   fall prevention program maintained  Taken 7/30/2024 0000 by Elma Ames RN  Safety Promotion/Fall Prevention:   safety round/check completed   fall prevention program maintained  Taken 7/29/2024 2300 by Elma Ames RN  Safety Promotion/Fall Prevention:   safety round/check completed   fall prevention program maintained  Taken 7/29/2024 2200 by Elma Ames RN  Safety Promotion/Fall Prevention:   safety round/check completed   fall prevention program maintained  Taken 7/29/2024 2100 by Elma Ames RN  Safety Promotion/Fall Prevention:   safety round/check completed   fall prevention program maintained  Taken 7/29/2024 2000  by Ames, Elma, RN  Safety Promotion/Fall Prevention:   safety round/check completed   fall prevention program maintained  Taken 7/29/2024 1900 by Elma Ames RN  Safety Promotion/Fall Prevention:   safety round/check completed   fall prevention program maintained     Problem: Skin Injury Risk Increased  Goal: Skin Health and Integrity  Outcome: Ongoing, Progressing  Intervention: Optimize Skin Protection  Recent Flowsheet Documentation  Taken 7/30/2024 0200 by Elma Ames RN  Pressure Reduction Techniques:   weight shift assistance provided   pressure points protected   heels elevated off bed   positioned off wounds  Head of Bed (HOB) Positioning: HOB at 30-45 degrees  Pressure Reduction Devices:   pressure-redistributing mattress utilized   positioning supports utilized   heel offloading device utilized  Skin Protection:   tubing/devices free from skin contact   adhesive use limited  Taken 7/30/2024 0000 by Elma Ames RN  Head of Bed (HOB) Positioning: HOB at 30-45 degrees  Taken 7/29/2024 2200 by Elma Ames RN  Head of Bed (HOB) Positioning: HOB at 30-45 degrees  Taken 7/29/2024 2000 by Elma Ames RN  Pressure Reduction Techniques:   weight shift assistance provided   pressure points protected   heels elevated off bed   positioned off wounds  Head of Bed (HOB) Positioning: HOB at 30-45 degrees  Pressure Reduction Devices:   pressure-redistributing mattress utilized   positioning supports utilized   heel offloading device utilized  Skin Protection:   tubing/devices free from skin contact   adhesive use limited

## 2024-07-30 NOTE — PROGRESS NOTES
Surgery follow-up for chest tube management.    Patient having interval development of large amount of subcu air.    Exam: On exam of the left chest tube there does appear to be a clot right below the skin level which could not be dislodged.    Procedure Note    Procedure: Insertion 28 Somali chest tube    Location: Left lateral chest, superior to previous chest tube    Anesthesia: 1% lidocaine    Description:  The risks and benefits of the procedure were discussed with the family.  After prep with Betadine and infiltration with lidocaine a transverse incision was made approximately 3 cm superior to the prior.  Blunt dissection was carried out with a hemostat and after this was done 1 could see air escaping from the subcu and thoracic cavity.  A 28 Somali chest tube which was directed posteriorly was then placed.  This was sutured in with 0 Prolene.  motion of the waterseal chamber with respirations with the new chest tube.    A pullout stitch of horizontal mattress 0 Prolene was placed on the other 36 Somali chest tube.    Complications:  none    Follow-up: CXR pending

## 2024-07-30 NOTE — PROGRESS NOTES
"Palliative Care Daily Progress Note     S: Medical record reviewed, followed up with JESSIKA Johns and Dr Freeman regarding patient's condition. Per RN overnight pt developed new pneumothorax d/y question of current CT out of place. Lexie had significant subcutaneous air on her neck and chest. She was sedated on propofol, fentanyl, Precedex, and on phenylephrine, she did not appear to be labored or in pain at this time. Dr Gil at bedside to try and unclog current CT however was unable so a second Left chest tube placed.      O:   Palliative Performance Scale Score:     /82   Pulse 62   Temp 98.8 °F (37.1 °C)   Resp (!) 32   Ht 162.6 cm (64.02\")   Wt 64.6 kg (142 lb 6.7 oz)   SpO2 99%   BMI 24.43 kg/m²     Intake/Output Summary (Last 24 hours) at 7/30/2024 1733  Last data filed at 7/30/2024 1400  Gross per 24 hour   Intake 2465.82 ml   Output 1789 ml   Net 676.82 ml       PE:  General Appearance:    Chronically ill appearing, sedation on intubated on vent, neck is edema has decreased   HEENT:    NC/AT, without obvious abnormality, EOMI, anicteric    Neck:   Increased subcutaneous air noted on neck, trachea midline, no JVD   Lungs:     Sedated on vent @ 24% and 4  of peep, left CT in place, coarse lung sounds noted    Heart:    RRR, normal S1 and S2, no M/R/G   Abdomen:     Soft, NT, ND, NABS    Extremities:   Moves all extremities weakly(not to command) trace BLL extremity edema, RUE edema/rt hand decreasing   Pulses:   Pulses palpable and equal bilaterally   Skin:   Warm, dry, subcutaneous air noted on neck and chest   Neurologic:   Sedation on unable to assess    Psych:   Sedation on, calm         Meds: Reviewed and changes noted    Labs:   Results from last 7 days   Lab Units 07/30/24  0005   WBC 10*3/mm3 20.31*   HEMOGLOBIN g/dL 10.5*   HEMATOCRIT % 33.6*   PLATELETS 10*3/mm3 206     Results from last 7 days   Lab Units 07/30/24  0005   SODIUM mmol/L 137   POTASSIUM mmol/L 4.8   CHLORIDE mmol/L 101 "   CO2 mmol/L 31.0*   BUN mg/dL 19   CREATININE mg/dL 0.23*   GLUCOSE mg/dL 170*   CALCIUM mg/dL 8.2*     Results from last 7 days   Lab Units 07/30/24  0005   SODIUM mmol/L 137   POTASSIUM mmol/L 4.8   CHLORIDE mmol/L 101   CO2 mmol/L 31.0*   BUN mg/dL 19   CREATININE mg/dL 0.23*   CALCIUM mg/dL 8.2*   BILIRUBIN mg/dL 0.2   ALK PHOS U/L 94   ALT (SGPT) U/L 45*   AST (SGOT) U/L 24   GLUCOSE mg/dL 170*     Imaging Results (Last 72 Hours)       Procedure Component Value Units Date/Time    XR Chest AP [[id number]] Collected: 07/30/24 1049     Updated: 07/30/24 1052    Narrative:      XR CHEST AP-     CLINICAL INDICATION: chest tube insertion; I21.4-Non-ST elevation  (NSTEMI) myocardial infarction; J93.9-Pneumothorax, unspecified        COMPARISON: 7/30/2024     TECHNIQUE: Single frontal view of the chest.     FINDINGS:     LUNGS: Second left-sided thoracostomy tube is been placed. No  pneumothorax seen on the presented study.     HEART AND MEDIASTINUM: Heart and mediastinal contours are unremarkable        SKELETON: Bony and soft tissue structures are unremarkable.             Impression:      Second left-sided thoracostomy tube has been placed. No pneumothorax  seen on the presented image.           This report was finalized on 7/30/2024 10:49 AM by Dr. Devan Sahni MD.       XR Chest 1 View [[id number]] Collected: 07/30/24 0805     Updated: 07/30/24 0810    Narrative:      XR CHEST 1 VW-     CLINICAL INDICATION: ptx f/u; I21.4-Non-ST elevation (NSTEMI) myocardial  infarction; J93.9-Pneumothorax, unspecified        COMPARISON: 7/29/2024     TECHNIQUE: Single frontal view of the chest.     FINDINGS:     LUNGS: Left apical pneumothorax.  Diffuse subcutaneous air.  No interval line change     HEART AND MEDIASTINUM: Heart and mediastinal contours are unremarkable        SKELETON: Bony and soft tissue structures are unremarkable.             Impression:      Left apical pneumothorax.  Diffuse subcutaneous air.  No interval  line change           This report was finalized on 7/30/2024 8:08 AM by Dr. Devan Sahni MD.       XR Chest 1 View [[id number]] Collected: 07/29/24 1726     Updated: 07/29/24 1729    Narrative:      XR CHEST 1 VW-     CLINICAL INDICATION: subQ air worsening; I21.4-Non-ST elevation (NSTEMI)  myocardial infarction; J93.9-Pneumothorax, unspecified        COMPARISON: 7/29/2024     TECHNIQUE: Single frontal view of the chest.     FINDINGS:     LUNGS: Left apical pneumothorax. Left-sided thoracostomy tube persists.  Right-sided airspace disease.     HEART AND MEDIASTINUM: Heart and mediastinal contours are unremarkable        SKELETON: Bony and soft tissue structures are unremarkable.             Impression:      Left-sided pneumothorax.           This report was finalized on 7/29/2024 5:27 PM by Dr. Devan Sahni MD.       XR Chest 1 View [[id number]] Collected: 07/29/24 1027     Updated: 07/29/24 1031    Narrative:      XR CHEST 1 VW-     CLINICAL INDICATION: et tube position; I21.4-Non-ST elevation (NSTEMI)  myocardial infarction; J93.9-Pneumothorax, unspecified        COMPARISON: 7/29/2024     TECHNIQUE: Single frontal view of the chest.     FINDINGS:     LUNGS: Patchy bilateral airspace disease.  Equivocal small left apical pneumothorax. Left-sided chest tube is  present.  Endotracheal tube overlies the tracheal air column well above the  irene.     HEART AND MEDIASTINUM: Heart and mediastinal contours are unremarkable        SKELETON: Bony and soft tissue structures are unremarkable.             Impression:         1. Small left apical pneumothorax  2. Endotracheal tube appears to be well above the irene           This report was finalized on 7/29/2024 10:29 AM by Dr. Devan Sahni MD.       XR Chest 1 View [[id number]] Collected: 07/29/24 0631     Updated: 07/29/24 0635    Narrative:       VERIFICATION OBSERVER NAME: Kai Magana MD.     TECHNIQUE, ADDITIONAL HISTORY, FINDINGS: Single frontal view of the  chest was  obtained.   Comparison study date : Prior day. Time : 4:26 AM.     HISTORY: Respiratory failure     Findings:      ET TUBE is located 1 cm above the irene.   NG TUBE is located in the abdomen.  RIGHT central line with the tip in the region of the atriocaval  junction.  LEFT chest tube in place.  Diffuse interstitial changes noted, diminished from prior.  Minimal cardiac enlargement.  Trace LEFT pleural effusion.       Impression:      ET tube is too low in position.  Diminishing interstitial changes.              [id number]     ZIP Code [id number].              This report was finalized on 7/29/2024 6:33 AM by Dr. Kai Magana MD.       XR Chest 1 View [[id number]] Collected: 07/28/24 1346     Updated: 07/28/24 1348    Narrative:      TECHNIQUE: X-ray of the chest single view was performed per as low as  reasonably achievable (ALARA) protocols.     COMPARISON: 7/27/2024     FINDINGS:     There is an endotracheal tube with tip 3.7 cm above the irene. A  gastric drainage tube courses into the stomach, with tip not seen. There  is a left lung base chest tube and a gastric lap band device. No  pneumothorax. There is a right neck central venous catheter with tip  likely near the cavoatrial junction.     Subjectively, minimally more pronounced ARDS like lung  opacities/reticulation diffusely. No definite pleural effusion.     Redemonstrated subcutaneous emphysema in the supraclavicular region  bilaterally.     Nonenlarged cardiomediastinal silhouette.       Impression:         1. Subjectively minimally increased ARDS-like airspace  opacities/pulmonary edema compared to prior. No definite pleural  effusion or pneumothorax.     2. Stable lines and tubes.        To TALK to On Call [phone]     This report was finalized on 7/28/2024 1:46 PM by Andrea Medina MD.       XR Chest 1 View [[id number]] Collected: 07/27/24 1746     Updated: 07/27/24 1749    Narrative:      INDICATION: Difficulty breathing.     TECHNIQUE:  Frontal radiograph of the chest.     COMPARISON: Radiograph from yesterday     FINDINGS:   Cardiomegaly. Multifocal infiltrates/edema, unchanged. No pleural  effusion or pneumothorax. Endotracheal tube, enteric tube, left-sided  chest tube and central venous catheter in similar position.       Impression:      No significant interval change allowing for differences in technique.        This report was finalized on 7/27/2024 5:47 PM by Alex Pallas, DO.                 Diagnostics: Reviewed    A: Lexie Valdez is a 64 y.o. female admitted on 7/17/2024 due to shortness of breath. Lexie has a medical history of  anxiety/depression, arthritis, HTN, hypothyroidism, stress urinary incontinence with frequent UTI's, COPD and pulmonary fibrosis on 3L NC at home, and resting tremors in face and extremities, who presents with worsening shortness of breath for the last couple days. Pt also at time of admission c/o chest pressure associated with dyspnea stating this was chronic however had worsened in the days leading up to her admission. She also endorsed diarrhea for the last couple of days as well as dysuria and stated that she has recurring UTI's. Labs in ER revealed Troponin of 229 with repeat 188, BNP was 3550, Na 131, Cr 0.49, glucose 177, CRP 1.65, lactate 2.0, procal and WBC normal but with 88.7% neutrophils. UA showed positive nitrite, trace leuk, 6-10 WBC, 4+ bacteria and 3-6 squamous epithelial cells. She was tachycardic and tachypneic with pH of 7.458, CO2 40, Po2 154, bicarb 28. She was admitted to PCU on admission and in the early am of 7/21 was transferred to CCU due to worsening dyspnea, bilateral infiltrates without effusion concerning for interstitial debbi disease flare, was transferred to CCU. Initially she was placed on Bipap in CCU and at 7/21 6:20 am Lexie was sedated intubated and placed on the ventilator. Palliative care consulted for GOC,ACP and for support of family.   Today 7/30/24  T 98.6, HR 60, BP  102/65 sedated on vent at 24% and 4 of peep 2 left CT in place and on propofol fentanyl, precedex, and 1 vasopressor    P:  Palliative care was able to be present with Dr Freeman to discuss with pts family her condition, events from overnight and to discuss GOC.  Khris stated that they had talked and decided together that they did not want for Lexie to have a trach and peg. After discussion plan at current time is to give CT time to relieve subq air over the next day or so, to not escalate care in any way, maintain current treatment plan until such time family/DR are ready to make Lexie full comfort and remove her from ventilator.    We will continue to follow along. Please do not hesitate to contact us regarding further sx mgmt or GOC needs, including after hours or on weekends via our on call provider at [phone].     Linh Lou, APRN    7/30/2024

## 2024-07-30 NOTE — PROGRESS NOTES
[facility]  PROGRESS NOTE    Patient information:  Name: Lexie Valdez  Age/Sex: 64 y.o. female  :  1960        PCP: Violet Pop APRN  Attending: Estuardo Charles MD  MRN:  [id number]  Visit Number:  [id number]    LOS: 12  CODE STATUS:    Code Status and Medical Interventions: No CPR (Do Not Attempt to Resuscitate); Limited Support; No intubation (DNI), No vasopressors, No antibiotics, No cardioversion, No antiarrhythmic drugs; ok for current vasopressor no escalation, no escalatio...   Ordered at: 24 1151     Medical Intervention Limits:    No intubation (DNI)    No vasopressors    No antibiotics    No cardioversion    No antiarrhythmic drugs     Level Of Support Discussed With:    Next of Kin (If No Surrogate)    Health Care Surrogate     Code Status (Patient has no pulse and is not breathing):    No CPR (Do Not Attempt to Resuscitate)     Medical Interventions (Patient has pulse or is breathing):    Limited Support     Comments:    ok for current vasopressor no escalation, no escalation of antibiotic, if ET tube dislodged do not replace, no additional antiarrhythmic drugs       PROBLEM LIST:Principal Problem:    NSTEMI (non-ST elevated myocardial infarction)      Reason for Cardiology follow-up: NSTEMI     Subjective   ADMISSION INFORMATION:  Chief Complaint   Patient presents with    Shortness of Breath       DATE:2024    HPI:  Lexie Valdez is a 64 y.o. 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     Patient presented to [facility] ([facility]) emergency room (ER) on 2024 with complaints of increased shortness of breath x 2 weeks has become progressively worse and exacerbated with minimal activity.     Interval  History:   Telemetry reveals SR 80s with a 4 beat run of wide complex ventricular beats as seen in telemetry strips attached below. According to patient's I & O flow chart  does not reflect a negative balance of diuresis overnight. AM labs reveal potassium 4.8, creatinine 0.23, hemoglobin 10.5, platelet count 206, and WBC of 20.31 which has improved from 24.89.     Patient was in room CCU 10 when she was seen and examined.  Patient remains intubated and sedated. No family at bedside during her exam today.     EVENT TIMELINE:   7/18:ECHO w EF of 36 - 40%. Left ventricular diastolic function is consistent with (grade I) impaired relaxation, mild tricuspid valve regurgitation is present. Estimated right ventricular systolic pressure from tricuspid regurgitation is moderately elevated (45-55 mmHg). Patient seem to have developed a new regional wall motion normalities with decreased LV systolic function as compared to the previous study in March 2023.  Please see full report attached below.  7/18: Bess ST with MPI indicating moderate-sized infarct located in the inferior wall, septal wall and apex with no significant ischemia noted. Impressions are consistent with an intermediate risk study. EF = 43%.  Please see full report attached below.  7/21: Emergently intubated orotracheally with a #7.5 endotracheal tube at approximately 22:16 PM.   7/21: CXRs indicated multifocal and worsening pneumonia coupled with some concern as well for pulmonary edema.   7/23: Chest tube inserted 2/2 left sided pneumothorax.   7/29: Palliative Care involved and discussing GOC with family.   7/30: Second chest tube inserted superior to previous one today.     Objective     Vital Signs  Temp:  [98.8 °F (37.1 °C)-99.9 °F (37.7 °C)] 98.8 °F (37.1 °C)  Heart Rate:  [53-78] 61  Resp:  [28-35] 28  BP: ()/(44-93) 122/79  FiO2 (%):  [28 %] 28 %  Device (Oxygen Therapy): ventilator  Vital Signs (last 72 hrs)         07/27 0700  07/28 0659 07/28  0700 07/29 0659 07/29 0700 07/30 0659 07/30 0700 07/30 0920   Most Recent      Temp (°F) 96.8 -  99.9    99.3 -  100    99 -  99.9    99.5 -  99.9     99.9 (37.7) 07/30 0915    Heart Rate 52 -  104    48 -  66    53 -  78    57 -  68     62 07/30 0915    Resp 28 -  39    28 -  36    28 -  32      28     28 07/30 0824    BP 71/38 -  169/94    71/43 -  156/80    78/44 -  167/91    99/60 -  146/86     110/69 07/30 0845    SpO2 (%) 91 -  100    92 -  100    95 -  100    98 -  100     98 07/30 0915    Oxygen Concentration (%)   28 28 28 28 28 07/30 0824          BMI:Body mass index is 24.43 kg/m².    WEIGHT:      07/28/24  0440 07/29/24  0443 07/30/24  0604   Weight: 65.8 kg (145 lb 1 oz) 61.4 kg (135 lb 5.8 oz) 64.6 kg (142 lb 6.7 oz)       DIET:NPO Diet NPO Type: Tube Feeding    I&O:  Intake & Output (last 3 days)         07/27 0701 07/28 0700 07/28 0701 07/29 0700 07/29 0701 07/30 0700 07/30 0701 07/31 0700    I.V. (mL/kg) 1024.4 (17.9) 1481.4 (25.9) 1155.8 (20.2)     Other 241 767 505     NG/ 1033 705     IV Piggyback 200  100     Total Intake(mL/kg) 1792.4 (31.3) 3281.4 (57.4) 2465.8 (43.1)     Urine (mL/kg/hr) 1700 (1.2) 2700 (2) 1675 (1.2)     Stool 300 100 100     Chest Tube 13 3 4     Total Output 2013 2803 1779     Net -220.6 +478.4 +686.8             Stool Unmeasured Occurrence 4 x                PHYSICAL EXAM:  Constitutional:       Appearance: Not in distress. Acutely ill-appearing.   HENT:         Comments: Orally intubated.  Neck:      Vascular: No JVD. JVD normal.   Pulmonary:      Comments: Intubated with mechanical ventilation with decreased breath sounds noted to left side. Sub Q emphysema noted. X 2 left sided chest tubes in place as well.   Cardiovascular:      Normal rate. Regular rhythm.      Murmurs: There is no murmur.      Comments: Generalized edema note to BUE and BLE.   Edema:     Peripheral edema present.  Abdominal:      General: Bowel sounds are normal.  There is no distension.      Palpations: Abdomen is soft.      Comments: FC with BSD in use.    Musculoskeletal:      Cervical back: Neck supple.      Comments: Sedated. SCUDS in use BLE.  Skin:     General: Skin is warm and dry.      Findings: Bruising present.      Comments: Diffuse bruising noted to BUE, anterior chest and neck.    Neurological:      Comments: Intubated and sedated.                   Results review   Results Review:    I have reviewed the patient's new clinical results. 07/30/24 13:11 EDT    Results from last 7 days   Lab Units 07/29/24  0156 07/29/24  0020   HSTROP T ng/L 31* 29*     Lab Results   Component Value Date    PROBNP 11,410.0 (H) 07/26/2024    PROBNP 14,737.0 (H) 07/22/2024    PROBNP 3,550.0 (H) 07/17/2024     Results from last 7 days   Lab Units 07/30/24  0005 07/29/24  0020 07/28/24  0053 07/27/24  1143 07/26/24  0441 07/25/24  0006 07/24/24  0022   WBC 10*3/mm3 20.31* 24.89* 22.69* 16.39* 17.54* 21.27* 22.14*   HEMOGLOBIN g/dL 10.5* 10.5* 10.3* 10.0* 9.1* 9.6* 9.6*   PLATELETS 10*3/mm3 206 207 224 205 225 303 287     Results from last 7 days   Lab Units 07/30/24  0005 07/29/24  0020 07/28/24  0053 07/27/24  1143 07/26/24  0441 07/25/24  0006 07/24/24  0022   SODIUM mmol/L 137 141 141 142 142 144 133*   POTASSIUM mmol/L 4.8 4.7 4.9 5.2 4.7 5.1 4.0   CHLORIDE mmol/L 101 106 107 109* 112* 114* 103   CO2 mmol/L 31.0* 28.2 24.2 26.1 25.6 24.9 25.0   BUN mg/dL 19 15 23 29* 29* 20 17   CREATININE mg/dL 0.23* 0.23* 0.26* 0.29* 0.31* 0.33* 0.38*   CALCIUM mg/dL 8.2* 8.4* 8.3* 8.1* 8.0* 8.1* 8.2*   GLUCOSE mg/dL 170* 163* 159* 161* 115* 138* 164*   ALT (SGPT) U/L 45* 58* 24 26 17  --  14   AST (SGOT) U/L 24 52* 23 23 21  --  22     Lab Results   Component Value Date    MG 2.4 07/29/2024    MG 2.5 (H) 07/28/2024    MG 2.4 07/27/2024     Estimated Creatinine Clearance: 248.7 mL/min (A) (by C-G formula based on SCr of 0.23 mg/dL (L)).    Lab Results   Component Value Date    HGBA1C 4.60 (L)  07/18/2024    HGBA1C 4.80 04/11/2024    HGBA1C 5.00 12/31/2023     Lab Results   Component Value Date    CHOL 136 07/18/2024    TRIG 61 07/18/2024    LDL 56 07/18/2024    HDL 67 (H) 07/18/2024        Lab Results   Component Value Date    INR 1.00 07/27/2024    INR 1.09 07/26/2024    INR 0.93 07/18/2024    INR 0.98 04/26/2024    INR 1.00 03/21/2023     Lab Results   Component Value Date    LABHEPA 0.27 (L) 07/20/2024    LABHEPA 0.36 07/19/2024    LABHEPA 0.33 07/19/2024    LABHEPA 0.11 (L) 07/18/2024       Lab Results   Component Value Date    TSH 1.140 07/17/2024      Pain Management Panel           No data to display              Microbiology Results (last 10 days)       Procedure Component Value - Date/Time    Blood Culture - Blood, Arm, Left [[id number]]  (Normal) Collected: 07/28/24 1040    Lab Status: Preliminary result Specimen: Blood from Arm, Left Updated: 07/30/24 1046     Blood Culture No growth at 2 days    Blood Culture - Blood, Arm, Right [[id number]]  (Normal) Collected: 07/28/24 1000    Lab Status: Preliminary result Specimen: Blood from Arm, Right Updated: 07/30/24 1102     Blood Culture No growth at 2 days    Aspergillus Galactomannan Antigen - Blood, Arm, Right [[id number]] Collected: 07/22/24 1410    Lab Status: Final result Specimen: Blood from Arm, Right Updated: 07/26/24 0254     Aspergillus Ag, BAL/Serum 0.04 Index     Narrative:      Performed at:  [facility] - [facility]  [address], [place]  [id number]  : Lorraine Ruelas MD, Phone:  [phone]  Performed at:  [facility] - [facility]  [address] [place], [place]  [id number]  : Haroon Walls [facility], Phone:  [phone]    Mycoplasma Pneumoniae Antibody, IgM - Blood, Arm, Left [[id number]]  (Normal) Collected: 07/22/24 0012    Lab Status: Final result Specimen: Blood from Arm, Left Updated: 07/22/24 0202     Mycoplasma pneumo IgM Negative    Legionella Antigen, Urine - Urine, Urine, Clean Catch [[id number]]  (Normal)  Collected: 07/21/24 2242    Lab Status: Final result Specimen: Urine, Clean Catch Updated: 07/21/24 2308     LEGIONELLA ANTIGEN, URINE Negative    Narrative:      Presumptive negative for L. pneumophilia serogroup 1 antigen, suggesting no recent or current infection.    Respiratory Culture - Sputum, ET Suction [[id number]] Collected: 07/21/24 2231    Lab Status: Final result Specimen: Sputum from ET Suction Updated: 07/24/24 1119     Respiratory Culture No growth     Gram Stain Rare (1+) Mixed ammon      No WBCs seen      No Epithelial cells seen    MRSA Screen, PCR (Inpatient) - Swab, Nares [[id number]]  (Normal) Collected: 07/21/24 1252    Lab Status: Final result Specimen: Swab from Nares Updated: 07/21/24 1424     MRSA PCR No MRSA Detected    Narrative:      The negative predictive value of this diagnostic test is high and should only be used to consider de-escalating anti-MRSA therapy. A positive result may indicate colonization with MRSA and must be correlated clinically.    Respiratory Panel PCR w/COVID-19(SARS-CoV-2) RHONDA/HEATHER/TASNEEM/PAD/COR/CHRISTELLE In-House, NP Swab in UTM/VTM, 2 HR TAT - Swab, Nasopharynx [[id number]]  (Normal) Collected: 07/21/24 1247    Lab Status: Final result Specimen: Swab from Nasopharynx Updated: 07/21/24 1357     ADENOVIRUS, PCR Not Detected     Coronavirus 229E Not Detected     Coronavirus HKU1 Not Detected     Coronavirus NL63 Not Detected     Coronavirus OC43 Not Detected     COVID19 Not Detected     Human Metapneumovirus Not Detected     Human Rhinovirus/Enterovirus Not Detected     Influenza A PCR Not Detected     Influenza B PCR Not Detected     Parainfluenza Virus 1 Not Detected     Parainfluenza Virus 2 Not Detected     Parainfluenza Virus 3 Not Detected     Parainfluenza Virus 4 Not Detected     RSV, PCR Not Detected     Bordetella pertussis pcr Not Detected     Bordetella parapertussis PCR Not Detected     Chlamydophila pneumoniae PCR Not Detected     Mycoplasma pneumo by PCR Not  Detected    Narrative:      In the setting of a positive respiratory panel with a viral infection PLUS a negative procalcitonin without other underlying concern for bacterial infection, consider observing off antibiotics or discontinuation of antibiotics and continue supportive care. If the respiratory panel is positive for atypical bacterial infection (Bordetella pertussis, Chlamydophila pneumoniae, or Mycoplasma pneumoniae), consider antibiotic de-escalation to target atypical bacterial infection.    Respiratory Culture - Sputum, ET Suction [[id number]] Collected: 07/21/24 1152    Lab Status: Final result Specimen: Sputum from ET Suction Updated: 07/23/24 1053     Respiratory Culture Rare growth Normal respiratory ammon. No S. aureus or Pseudomonas aeruginosa detected. Final report.     Gram Stain Moderate (3+) WBCs seen      Rare (1+) Epithelial cells seen      No organisms seen    Blood Culture - Blood, Arm, Left [[id number]]  (Normal) Collected: 07/21/24 0450    Lab Status: Final result Specimen: Blood from Arm, Left Updated: 07/26/24 0515     Blood Culture No growth at 5 days    Blood Culture - Blood, Arm, Right [[id number]]  (Normal) Collected: 07/21/24 0448    Lab Status: Final result Specimen: Blood from Arm, Right Updated: 07/26/24 0515     Blood Culture No growth at 5 days           Imaging Results (Last 24 Hours)       Procedure Component Value Units Date/Time    XR Chest AP [[id number]] Collected: 07/30/24 1049     Updated: 07/30/24 1052    Narrative:      XR CHEST AP-     CLINICAL INDICATION: chest tube insertion; I21.4-Non-ST elevation  (NSTEMI) myocardial infarction; J93.9-Pneumothorax, unspecified        COMPARISON: 7/30/2024     TECHNIQUE: Single frontal view of the chest.     FINDINGS:     LUNGS: Second left-sided thoracostomy tube is been placed. No  pneumothorax seen on the presented study.     HEART AND MEDIASTINUM: Heart and mediastinal contours are unremarkable        SKELETON: Bony and soft  tissue structures are unremarkable.             Impression:      Second left-sided thoracostomy tube has been placed. No pneumothorax  seen on the presented image.           This report was finalized on 7/30/2024 10:49 AM by Dr. Devan Sahni MD.       XR Chest 1 View [[id number]] Collected: 07/30/24 0805     Updated: 07/30/24 0810    Narrative:      XR CHEST 1 VW-     CLINICAL INDICATION: ptx f/u; I21.4-Non-ST elevation (NSTEMI) myocardial  infarction; J93.9-Pneumothorax, unspecified        COMPARISON: 7/29/2024     TECHNIQUE: Single frontal view of the chest.     FINDINGS:     LUNGS: Left apical pneumothorax.  Diffuse subcutaneous air.  No interval line change     HEART AND MEDIASTINUM: Heart and mediastinal contours are unremarkable        SKELETON: Bony and soft tissue structures are unremarkable.             Impression:      Left apical pneumothorax.  Diffuse subcutaneous air.  No interval line change           This report was finalized on 7/30/2024 8:08 AM by Dr. Devan Sahni MD.       XR Chest 1 View [[id number]] Collected: 07/29/24 1726     Updated: 07/29/24 1729    Narrative:      XR CHEST 1 VW-     CLINICAL INDICATION: subQ air worsening; I21.4-Non-ST elevation (NSTEMI)  myocardial infarction; J93.9-Pneumothorax, unspecified        COMPARISON: 7/29/2024     TECHNIQUE: Single frontal view of the chest.     FINDINGS:     LUNGS: Left apical pneumothorax. Left-sided thoracostomy tube persists.  Right-sided airspace disease.     HEART AND MEDIASTINUM: Heart and mediastinal contours are unremarkable        SKELETON: Bony and soft tissue structures are unremarkable.             Impression:      Left-sided pneumothorax.           This report was finalized on 7/29/2024 5:27 PM by Dr. Devan Sahni MD.             ECHO:  Results for orders placed during the hospital encounter of 07/17/24    Adult Transthoracic Echo Complete w/ Color, Spectral and Contrast if necessary per protocol    Interpretation Summary    Normal  left ventricular cavity size and wall thickness noted.    The following left ventricular wall segments are hypokinetic: mid anterior, basal anterolateral, apical lateral, apical septal, mid anteroseptal and basal anterior. The following left ventricular wall segments are akinetic: apex.    Left ventricular systolic function is moderately decreased. Left ventricular ejection fraction appears to be 36 - 40%.    Left ventricular diastolic function is consistent with (grade I) impaired relaxation.    The aortic valve is structurally normal with no regurgitation or stenosis present.    The mitral valve is structurally normal with no significant stenosis present. Mild mitral valve regurgitation is present.    Mild tricuspid valve regurgitation is present. Estimated right ventricular systolic pressure from tricuspid regurgitation is moderately elevated (45-55 mmHg).    There is no evidence of pericardial effusion. .    Comments: Patient seem to have developed a new regional wall motion normalities with decreased LV systolic function as compared to the previous study in March 2023.      STRESS TEST:  Results for orders placed during the hospital encounter of 07/17/24    Stress Test With Myocardial Perfusion One Day    Interpretation Summary  Images from the original result were not included.      A pharmacological stress test was performed using regadenoson without low-level exercise.    Findings consistent with an indeterminate ECG stress test.    Myocardial perfusion imaging indicates a moderate-sized infarct located in the inferior wall, septal wall and apex with no significant ischemia noted.    Abnormal LV wall motion consistent with apical dyskinesis.    Left ventricular ejection fraction is moderately reduced (Calculated EF = 43%).    Impressions are consistent with an intermediate risk study.       HEART CATH:  No results found for this or any previous visit.      TELEMETRY:     SR 80s with a 4 beat run of wide  complex ventricular beats as seen in telemetry strips attached below.             I reviewed the patient's new clinical results.    ALLERGIES: Codeine and Sulfa antibiotics    Medication Review:   Current list of medications may not reflect those currently placed in orders that are not signed or are being held.     ALPRAZolam, 0.5 mg, Oral, TID  aspirin, 81 mg, Oral, Daily  Brexpiprazole, 0.5 mg, Oral, Daily  cefepime, 2,000 mg, Intravenous, Q8H  chlorhexidine, 15 mL, Mouth/Throat, Q12H  donepezil, 10 mg, Oral, Q PM  enoxaparin, 40 mg, Subcutaneous, Nightly  [Held by provider] hydroxychloroquine, 200 mg, Oral, BID  insulin regular, 2-7 Units, Subcutaneous, Q6H  ipratropium-albuterol, 3 mL, Nebulization, Q6H - RT  levothyroxine, 25 mcg, Oral, Daily With Breakfast  magic barrier cream, , Topical, BID  memantine, 5 mg, Oral, Q12H  methylPREDNISolone sodium succinate, 60 mg, Intravenous, Q12H  [Held by provider] metoprolol succinate XL, 25 mg, Oral, Q24H  mupirocin, 1 Application, Topical, Q12H  nystatin, 5 mL, Oral, 4x Daily  nystatin, 1 Application, Topical, Q12H  pantoprazole, 40 mg, Intravenous, Q AM  Phenylephrine HCl-NaCl, , ,   Phenylephrine HCl-NaCl, , ,   sodium chloride, 10 mL, Intravenous, Q12H  sodium chloride, 10 mL, Intravenous, Q12H  sodium chloride, 10 mL, Intravenous, Q12H  sodium chloride, 10 mL, Intravenous, Q12H  sodium chloride, 10 mL, Intravenous, Q12H  sodium chloride, 10 mL, Intravenous, Q12H      dexmedetomidine, 0.2-1.5 mcg/kg/hr, Last Rate: 1.1 mcg/kg/hr (07/30/24 0005)  fentanyl 10 mcg/mL,  mcg/hr, Last Rate: 150 mcg/hr (07/30/24 0954)  Pharmacy Consult,   phenylephrine, 0.5-3 mcg/kg/min (Dosing Weight), Last Rate: 0.5 mcg/kg/min (07/30/24 1045)  propofol, 5-50 mcg/kg/min (Dosing Weight), Last Rate: 30 mcg/kg/min (07/30/24 3232)        senna-docusate sodium **AND** polyethylene glycol **AND** bisacodyl **AND** bisacodyl    busPIRone    Calcium Replacement - Follow Nurse / BPA Driven  Protocol    dextrose    dextrose    glucagon (human recombinant)    guaifenesin    HYDROcodone-acetaminophen    hydrOXYzine    ipratropium    Magnesium Cardiology Dose Replacement - Follow Nurse / BPA Driven Protocol    midazolam    nitroglycerin    Pharmacy Consult    Phenylephrine HCl-NaCl    Phenylephrine HCl-NaCl    Phosphorus Replacement - Follow Nurse / BPA Driven Protocol    Potassium Replacement - Follow Nurse / BPA Driven Protocol    prochlorperazine    sodium chloride    sodium chloride    sodium chloride    sodium chloride    sodium chloride    sodium chloride    sodium chloride    sodium chloride    sodium chloride    sodium chloride    sodium chloride    vecuronium    Assessment    Acute non-ST elevation myocardial infarction likely type II due to respiratory failure with hypoxia  Advanced COPD/pulmonary fibrosis, remains intubated on the ventilator.  Acute on chronic respiratory failure requiring intubation mechanical ventilation.  HFrEF, secondary to ischemic cardiomyopathy, currently compensated  Essential hypertension  Mild hyponatremia  Pneumothorax requiring 2 chest tubes placement  Shock, multifactorial            Recommendations / Plan   1.  Patient now had 2 chest tubes with some better aeration in the left lung  2.  Continue with low-dose aspirin  3.  Unable to advance GDMT medications because of soft and low blood pressure  4.  Overall poor prognosis          I have discussed the patients findings and recommendations with the patient.    Thank you very much for asking us to be involved in this patient's care.  We will follow along with you.    Electronically signed by SG Chandler, 07/30/24, 1:12 PM EDT.   Electronically signed by Rinku Braxton MD, 07/30/24, 1:15 PM EDT.                      Please note that portions of this note were completed with a voice recognition program.    Please note that portions of this note were copied and has been reviewed and is accurate as of  7/30/2024 .

## 2024-07-31 NOTE — CASE MANAGEMENT/SOCIAL WORK
Continued Stay Note  NEIDA Alex     Patient Name: Lexie KEITH Valdez  MRN: [id number]  Today's Date: 7/31/2024    Admit Date: 7/17/2024     Discharge Plan       Row Name 07/31/24 1431       Plan    Plan Pt remains intubated. Pt not stable at this time. Palliative Care following for GOC. Pt lives with spouse. Patient does not utilize Home Health. Pt utilizes BP Cuff, SC, BSC, Nebulizer, O2 @ 3L, Portable Concentrator, Pulse Ox, Rollator, Shower Chair & WC from [facility]. SS to follow and assist with discharge planning once pt has been extubated and is medically stable. SS to follow.          ADIN Garcia

## 2024-07-31 NOTE — PLAN OF CARE
Goal Outcome Evaluation:  Plan of Care Reviewed With: family        Progress: no change  Outcome Evaluation: Pt remains intubated/sedated.  Propofol, fentanyl, precedex, and masha gtt infusing.  2 chest tubes remain in place with bloody drainage. Adequate UOP. VSS.

## 2024-07-31 NOTE — NURSING NOTE
Patient with MASD documented since admit to coccyx.  This has now opened and is nonblanchable.  Stage II PI noted.  See attached flow sheet documentation.  Magic Barrier Cream discontinued.  Order placed to treat with venelex BID and cover with silicone bordered dressing.    Patient with tape dermatitis (from chest tube) to left upper back.  This is not pressure related.  She also has a small open wound to her left anterior groin related to moister.  Order placed to treat both areas with venelex BID and leave open to air.       07/31/24 1424   Wound 07/31/24 1422 medial coccyx Pressure Injury   Placement Date/Time: 07/31/24 1422   Present on Original Admission: (c)   Orientation: medial  Location: coccyx  Primary Wound Type: Pressure Injury   Wound Image   (see photo under media tab for today)   Pressure Injury Stage 2   Dressing Appearance open to air   Closure None   Base moist;red;non-blanchable   Periwound intact   Periwound Temperature warm   Periwound Skin Turgor soft   Edges open   Wound Length (cm) 7 cm   Wound Width (cm) 0.5 cm   Wound Depth (cm) 0.1 cm   Wound Surface Area (cm^2) 3.5 cm^2   Wound Volume (cm^3) 0.35 cm^3   Drainage Characteristics/Odor serous   Drainage Amount scant   Wound 07/31/24 1423 Left upper back Other (comment)   Placement Date/Time: 07/31/24 1423   Present on Original Admission: No  Side: Left  Orientation: upper  Location: back  Primary Wound Type: (c) Other (comment)   Wound Image   (See photo under media tab)   Base red;moist   Periwound intact;pink;blanchable   Periwound Temperature warm   Periwound Skin Turgor soft   Edges open   Drainage Characteristics/Odor serous   Drainage Amount scant   Wound 07/31/24 1424 Left anterior groin MASD (Moisture associated skin damage)   Placement Date/Time: 07/31/24 1424   Present on Original Admission: No  Side: Left  Orientation: anterior  Location: groin  Primary Wound Type: MASD (Moisture associated skin damage)   Base red;clean    Periwound intact;pink;blanchable   Periwound Temperature warm   Periwound Skin Turgor soft   Edges irregular   Drainage Amount none

## 2024-07-31 NOTE — PLAN OF CARE
Goal Outcome Evaluation:  Plan of Care Reviewed With: ashley        Progress: no change  Outcome Evaluation: Pt remains intubated and sedated on 25%fio2 on vent.  Propofol, Precedex, Fentanyl and Yair gtts infusing as ordered.  Adequate UOP noted.  Subcutaneous emphysema unchanged this shift, chest tubes x2 intact with bloody drainage noted.  NSR with inverted t wave.

## 2024-07-31 NOTE — PROGRESS NOTES
Surgery follow-up for chest tube management.    Patient having interval development of large amount of subcu air.  Yesterday on examination was noted to have clot in left chest tube and a second 28 [place] chest tube was placed superior to the first.  Patient remains sedated on vent.    Exam: Patient remains sedated on vent.  There is still a significant amount of subcu air, on the left and extending up into the neck.    Coarse breath sounds bilaterally.  Chest tube appears to be functioning although there is little movement with respiration, likely due to the low  Tidal volume.      Left pneumothorax, status post second chest tube placement yesterday.  No new imaging to review today.    Awaiting family decision regarding conversion to comfort measures

## 2024-07-31 NOTE — PROGRESS NOTES
[facility]     Patient Identification:  Name:  Lexie KEITH Valdez  Age:  64 y.o.  Sex:  female  :  1960  MRN:  [id number]  Visit Number:  [id number]  ROOM: [address]     Primary Care Provider:  Violet Pop APRN     Date of Admission: 2024    Length of stay in inpatient status:  13    Subjective     Chief Compliant:    Chief Complaint   Patient presents with    Shortness of Breath       Patient placed back on propofol this am for comfort with BP lower as result. Chest tubes remain in place with no significant clinical change. Discussed update with son at bedside and with intensivist during rounds.         Objective       Vital Signs:  Temp:  [99 °F (37.2 °C)-99.9 °F (37.7 °C)] 99.5 °F (37.5 °C)  Heart Rate:  [54-73] 55  Resp:  [28-36] 30  BP: ()/(39-97) 147/85  FiO2 (%):  [25 %-100 %] 100 %  SpO2:  [94 %-100 %] 100 %  on   ;   Device (Oxygen Therapy): ventilator  Body mass index is 23.79 kg/m².      ----------------------------------------------------------------------------------------------------------------------  Physical Exam  Vitals and nursing note reviewed.   Constitutional:       Comments: Intubated/sedated   HENT:      Mouth/Throat:      Comments: ETT in place  Neck:      Comments: Significant diffuse subQ emphysema from jawline to mid sternum  Cardiovascular:      Rate and Rhythm: Normal rate and regular rhythm.   Pulmonary:      Comments: B/l mechanical breath sounds, left sided subQ emphysema  Abdominal:      General: There is no distension.      Palpations: Abdomen is soft.   Musculoskeletal:      Right lower leg: No edema.      Left lower leg: No edema.   Skin:     General: Skin is warm.      Coloration: Skin is pale.   Neurological:      Comments: Not withdrawing to pain or following commands       ----------------------------------------------------------------------------------------------------------------------          Assessment & Plan       -Acute NSTEMI, type 1 (positive stress test on 7/18/2024 without any reversible ischemia, that was present on admission  -History of IPF and COPD with chronic hypoxia (uses 3 L/min at home)  -Acute exacerbation of IPF/COPD causing acute hypoxic and hypercapnic respiratory failure on top of chronic hypoxic respiratory failure and sepsis (developed on 7/21/2024 due to a suspected aspiration episode) resulting in oral intubation and mechanical ventilation from aspiration pneumonitis  -Hypotension, septic vs sedation vs diuretic induced, developed during admission  -New onset heart failure with reduced EF, acute exacerbation that developed on 7/21/2024  -Hypocalcemia that developed during admission  -Prolonged QT that developed during the hospitalization  -History of essential hypertension  -History of hypothyroidism  -History of stress urinary incontinence with frequent UTI's, with an acute E coli UTI that was present on admission  -History of intermittent, diffuse body tremors    -Patient with ongoing significant subQ emphysema mildly improved from yesterday following placement of second chest tube with some tidaling noted this am. Labs reviewed with no significant deltas. 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 Check for cuff leak and extubate as mentation improves.  -Will cont sedation weaning daily until complication develops or evidence patient making no appreciable progress, at which point we will terminally extubate. Family wishes to cont current wean to allow patient any possible chance for return of spontaneous breathing post-extubation     I have spent 40 minutes of critical care time with > 50% of time spent in direct patient care, evaluating the patient at bedside, reviewing all labs and images, reviewing ABG and adjusting vent  settings, reviewing pain and sedation gtt's, communicating plan of care w/ nursing staff and consultants, and utilizing high complexity medical decision making to assess and treat vital organ system failure in an individual who has impairment of one or more vital organ systems such that there is a high probability of imminent or life threatening deterioration in the patient’s condition. Failure to initiate the above interventions on an urgent basis would likely result in sudden, clinically significant or life threatening deterioration in the patient's condition.        Code Status and Medical Interventions: No CPR (Do Not Attempt to Resuscitate); Limited Support; No intubation (DNI), No vasopressors, No antibiotics, No cardioversion, No antiarrhythmic drugs; ok for current vasopressor no escalation, no escalatio...   Ordered at: 07/30/24 1151     Medical Intervention Limits:    No intubation (DNI)    No vasopressors    No antibiotics    No cardioversion    No antiarrhythmic drugs     Level Of Support Discussed With:    Next of Kin (If No Surrogate)    Health Care Surrogate     Code Status (Patient has no pulse and is not breathing):    No CPR (Do Not Attempt to Resuscitate)     Medical Interventions (Patient has pulse or is breathing):    Limited Support     Comments:    ok for current vasopressor no escalation, no escalation of antibiotic, if ET tube dislodged do not replace, no additional antiarrhythmic drugs         Disposition: cont ccu management    I have reviewed any copied/forwarded text or data, verified its accuracy, and updated as necessary above.    Eduardo Freeman MD  [facility]  07/31/24  17:16 EDT

## 2024-07-31 NOTE — PROGRESS NOTES
PROGRESS NOTE         Patient Identification:  Name:  Lexie KEITH Valdez  Age:  64 y.o.  Sex:  female  :  1960  MRN:  [id number]  Visit Number:  [id number]  Primary Care Provider:  Violet Pop APRN         LOS: 13 days       ----------------------------------------------------------------------------------------------------------------------  Subjective       Chief Complaints:    Shortness of Breath        Interval History:      The patient remains intubated and sedated at 25% FiO2, which is stable.  Sedation and phenylephrine infusing.  Tmax 99.7 °F.  Rectal tube in place with small amount of output.  The patient continues with subcutaneous air to the anterior chest as well as the neck and face.  2 chest tubes to the left chest wall.  Lung exam is diminished to auscultation bilaterally.  Abdomen is soft with hypoactive bowel sounds.  Tube feed infusing.  Leukocytosis at 22.37.  Blood cultures from  preliminarily reporting no growth at 3 days.      Review of Systems:    Unable to obtain.  Intubated and sedated.    ----------------------------------------------------------------------------------------------------------------------      Objective       Current Hospital Meds:  ALPRAZolam, 0.5 mg, Oral, TID  aspirin, 81 mg, Oral, Daily  Brexpiprazole, 0.5 mg, Oral, Daily  castor oil-balsam peru, 1 Application, Topical, Q12H  cefepime, 2,000 mg, Intravenous, Q8H  chlorhexidine, 15 mL, Mouth/Throat, Q12H  donepezil, 10 mg, Oral, Q PM  enoxaparin, 40 mg, Subcutaneous, Nightly  [Held by provider] hydroxychloroquine, 200 mg, Oral, BID  insulin regular, 2-7 Units, Subcutaneous, Q6H  ipratropium-albuterol, 3 mL, Nebulization, Q6H - RT  levothyroxine, 25 mcg, Oral, Daily With Breakfast  memantine, 5 mg, Oral, Q12H  methylPREDNISolone sodium succinate, 60 mg, Intravenous, Q12H  [Held by provider] metoprolol succinate XL, 25 mg, Oral, Q24H  mupirocin, 1 Application, Topical, Q12H  nystatin, 5 mL, Oral, 4x  Daily  nystatin, 1 Application, Topical, Q12H  pantoprazole, 40 mg, Intravenous, Q AM  Phenylephrine HCl-NaCl, , ,   Phenylephrine HCl-NaCl, , ,   sodium chloride, 10 mL, Intravenous, Q12H  sodium chloride, 10 mL, Intravenous, Q12H  sodium chloride, 10 mL, Intravenous, Q12H  sodium chloride, 10 mL, Intravenous, Q12H  sodium chloride, 10 mL, Intravenous, Q12H  sodium chloride, 10 mL, Intravenous, Q12H      dexmedetomidine, 0.2-1.5 mcg/kg/hr, Last Rate: 1.1 mcg/kg/hr (07/31/24 0611)  fentanyl 10 mcg/mL,  mcg/hr, Last Rate: Stopped (07/31/24 0930)  Pharmacy Consult,   phenylephrine, 0.5-3 mcg/kg/min (Dosing Weight), Last Rate: 0.5 mcg/kg/min (07/31/24 1045)  propofol, 5-50 mcg/kg/min (Dosing Weight), Last Rate: Stopped (07/31/24 0930)      ----------------------------------------------------------------------------------------------------------------------    Vital Signs:  Temp:  [98.6 °F (37 °C)-99.9 °F (37.7 °C)] 99.7 °F (37.6 °C)  Heart Rate:  [56-71] 58  Resp:  [28-36] 28  BP: ()/(39-97) 122/78  FiO2 (%):  [25 %-100 %] 100 %  Mean Arterial Pressure (Non-Invasive) for the past 24 hrs (Last 3 readings):   Noninvasive MAP (mmHg)   07/31/24 1245 91   07/31/24 1230 93   07/31/24 1215 85     SpO2 Percentage    07/31/24 1230 07/31/24 1245 07/31/24 1404   SpO2: 100% 100% 100%     SpO2:  [94 %-100 %] 100 %  on   ;   Device (Oxygen Therapy): ventilator    Body mass index is 23.79 kg/m².  Wt Readings from Last 3 Encounters:   07/31/24 62.9 kg (138 lb 10.7 oz)   04/26/24 67.7 kg (149 lb 4 oz)   04/19/24 67.1 kg (148 lb)        Intake/Output Summary (Last 24 hours) at 7/31/2024 1420  Last data filed at 7/31/2024 0600  Gross per 24 hour   Intake 5337.46 ml   Output 2799 ml   Net 2538.46 ml     NPO Diet NPO Type: Tube Feeding  ----------------------------------------------------------------------------------------------------------------------      Physical Exam:    Constitutional: Thin, frail, chronically  ill-appearing  female.  Intubated and sedated at 25% FiO2.  Pressors infusing.  Members at the bedside.  HENT:  Head: Normocephalic and atraumatic.  Mouth:  Moist mucous membranes.    Eyes:  Conjunctivae and EOM are normal.  No scleral icterus.  Neck:  Neck supple.  No JVD present.    Cardiovascular:  Normal rate, regular rhythm and normal heart sounds with no murmur. No edema.  Pulmonary/Chest: Remains diminished bilaterally.  Left-sided chest tube in place x 2.  Abdominal:  Soft.  Bowel sounds are normal.  No distension and no tenderness.   Musculoskeletal:  No edema, no tenderness, and no deformity.  No swelling or redness of joints.  Neurological: Sedate.  Skin:  Skin is warm and dry.  No rash noted.  No pallor.  Subcutaneous emphysema to the anterior chest, neck, face  Psychiatric: Sedate.        ----------------------------------------------------------------------------------------------------------------------  Results from last 7 days   Lab Units 07/29/24  0156 07/29/24  0020   HSTROP T ng/L 31* 29*     Results from last 7 days   Lab Units 07/26/24  0441   PROBNP pg/mL 11,410.0*           Results from last 7 days   Lab Units 07/29/24  0431   PH, ARTERIAL pH units 7.409   PO2 ART mm Hg 105.0   PCO2, ARTERIAL mm Hg 51.8*   HCO3 ART mmol/L 32.8*     Results from last 7 days   Lab Units 07/31/24  0001 07/30/24  0005 07/29/24  0020 07/28/24  0053 07/27/24  1143 07/26/24  0441   LACTATE mmol/L  --   --  1.3  --   --   --    WBC 10*3/mm3 22.37* 20.31* 24.89*   < > 16.39* 17.54*   HEMOGLOBIN g/dL 10.8* 10.5* 10.5*   < > 10.0* 9.1*   HEMATOCRIT % 34.3 33.6* 33.2*   < > 31.9* 29.4*   MCV fL 101.2* 99.4* 100.3*   < > 101.3* 101.7*   MCHC g/dL 31.5 31.3* 31.6   < > 31.3* 31.0*   PLATELETS 10*3/mm3 196 206 207   < > 205 225   INR   --   --   --   --  1.00 1.09    < > = values in this interval not displayed.     Results from last 7 days   Lab Units 07/31/24  0001 07/30/24  0005 07/29/24  0020 07/28/24  0053  "07/27/24  1143   SODIUM mmol/L 141 137 141 141 142   POTASSIUM mmol/L 4.7 4.8 4.7 4.9 5.2   MAGNESIUM mg/dL  --   --  2.4 2.5* 2.4   CHLORIDE mmol/L 106 101 106 107 109*   CO2 mmol/L 29.7* 31.0* 28.2 24.2 26.1   BUN mg/dL 16 19 15 23 29*   CREATININE mg/dL 0.22* 0.23* 0.23* 0.26* 0.29*   CALCIUM mg/dL 8.4* 8.2* 8.4* 8.3* 8.1*   GLUCOSE mg/dL 192* 170* 163* 159* 161*   ALBUMIN g/dL  --  3.0* 3.0* 3.1* 3.1*   BILIRUBIN mg/dL  --  0.2 0.2 0.3 0.2   ALK PHOS U/L  --  94 103 83 78   AST (SGOT) U/L  --  24 52* 23 23   ALT (SGPT) U/L  --  45* 58* 24 26   Estimated Creatinine Clearance: 253.1 mL/min (A) (by C-G formula based on SCr of 0.22 mg/dL (L)).  No results found for: \"AMMONIA\"    Glucose   Date/Time Value Ref Range Status   07/31/2024 1209 172 (H) 70 - 130 mg/dL Final   07/31/2024 0555 113 70 - 130 mg/dL Final   07/31/2024 0015 189 (H) 70 - 130 mg/dL Final   07/30/2024 1757 152 (H) 70 - 130 mg/dL Final   07/30/2024 1216 162 (H) 70 - 130 mg/dL Final   07/30/2024 0555 108 70 - 130 mg/dL Final   07/30/2024 0002 162 (H) 70 - 130 mg/dL Final   07/29/2024 1821 108 70 - 130 mg/dL Final     Lab Results   Component Value Date    HGBA1C 4.60 (L) 07/18/2024     Lab Results   Component Value Date    TSH 1.140 07/17/2024    FREET4 1.83 (H) 04/11/2024       Blood Culture   Date Value Ref Range Status   07/21/2024 No growth at 2 days  Preliminary   07/21/2024 No growth at 2 days  Preliminary   07/17/2024 No growth at 5 days  Final   07/17/2024 No growth at 5 days  Final     Urine Culture   Date Value Ref Range Status   07/17/2024 >100,000 CFU/mL Escherichia coli (A)  Final     No results found for: \"WOUNDCX\"  No results found for: \"STOOLCX\"  Respiratory Culture   Date Value Ref Range Status   07/21/2024 No growth  Preliminary   07/21/2024   Final    Rare growth Normal respiratory ammon. No S. aureus or Pseudomonas aeruginosa detected. Final report.     Pain Management Panel           No data to display          "         ----------------------------------------------------------------------------------------------------------------------  Imaging Results (Last 24 Hours)       ** No results found for the last 24 hours. **            ----------------------------------------------------------------------------------------------------------------------    Pertinent Infectious Disease Results                Assessment/Plan       Assessment     Septic shock with acute hypoxic respiratory failure requiring mechanical ventilation and pressor support  Pneumonia        Plan      The patient remains intubated and sedated at 25% FiO2, which is stable.  Sedation and phenylephrine infusing.  Tmax 99.7 °F.  Rectal tube in place with small amount of output.  The patient continues with subcutaneous air to the anterior chest as well as the neck and face.  2 chest tubes to the left chest wall.  Lung exam is diminished to auscultation bilaterally.  Abdomen is soft with hypoactive bowel sounds.  Tube feed infusing.  Leukocytosis at 22.37.  Blood cultures from 7/28 preliminarily reporting no growth at 3 days.    Case discussed with RN.  The patient has been made a DNR with plans not to escalate current treatment.  Plan to monitor for 48 hours and transition to comfort measures thereafter.  We will continue with cefepime 2 g IV every 8 hours for now.  We will continue to monitor closely.      ANTIMICROBIAL THERAPY    cefepime 2000 mg IVPB in 100 mL NS (VTB)     Code Status:   Code Status and Medical Interventions: No CPR (Do Not Attempt to Resuscitate); Limited Support; No intubation (DNI), No vasopressors, No antibiotics, No cardioversion, No antiarrhythmic drugs; ok for current vasopressor no escalation, no escalatio...   Ordered at: 07/30/24 1151     Medical Intervention Limits:    No intubation (DNI)    No vasopressors    No antibiotics    No cardioversion    No antiarrhythmic drugs     Level Of Support Discussed With:    Next of Kin (If No  Surrogate)    Health Care Surrogate     Code Status (Patient has no pulse and is not breathing):    No CPR (Do Not Attempt to Resuscitate)     Medical Interventions (Patient has pulse or is breathing):    Limited Support     Comments:    ok for current vasopressor no escalation, no escalation of antibiotic, if ET tube dislodged do not replace, no additional antiarrhythmic drugs       Deirdre Davila, APRN  07/31/24  14:20 EDT

## 2024-07-31 NOTE — PROGRESS NOTES
"Palliative Care Daily Progress Note     S: Medical record reviewed, followed up with Primary RN Graciela, Dr Freeman and Dr Mcmahon regarding patient's condition. When I saw Lexie this morning she was sedated on propofol, fentanyl, Precedex and was on 25/4, she did not appear to have improvement in subcutaneous air, however had not worsened. She did not appear to be in distress at thsi time.      O:   Palliative Performance Scale Score:     /85   Pulse 55   Temp 99.5 °F (37.5 °C)   Resp (!) 30   Ht 162.6 cm (64.02\")   Wt 62.9 kg (138 lb 10.7 oz)   SpO2 100%   BMI 23.79 kg/m²     Intake/Output Summary (Last 24 hours) at 7/31/2024 1718  Last data filed at 7/31/2024 0600  Gross per 24 hour   Intake 5337.46 ml   Output 2799 ml   Net 2538.46 ml       PE:  General Appearance:    Chronically ill appearing, sedation on intubated on vent, neck is edema has decreased   HEENT:    NC/AT, without obvious abnormality, EOMI, anicteric    Neck:   Increased subcutaneous air noted on neck, trachea midline, no JVD   Lungs:     Sedated on vent @ 24% and 4  of peep, left CT in place, coarse lung sounds noted    Heart:    RRR, normal S1 and S2, no M/R/G   Abdomen:     Soft, NT, ND, NABS    Extremities:   Moves all extremities weakly(not to command) trace BLL extremity edema, RUE edema/rt hand decreasing   Pulses:   Pulses palpable and equal bilaterally   Skin:   Warm, dry, subcutaneous air noted on neck and chest   Neurologic:   Sedation on unable to assess    Psych:   Sedation on, calm         Meds: Reviewed and changes noted    Labs:   Results from last 7 days   Lab Units 07/31/24  0001   WBC 10*3/mm3 22.37*   HEMOGLOBIN g/dL 10.8*   HEMATOCRIT % 34.3   PLATELETS 10*3/mm3 196     Results from last 7 days   Lab Units 07/31/24  0001   SODIUM mmol/L 141   POTASSIUM mmol/L 4.7   CHLORIDE mmol/L 106   CO2 mmol/L 29.7*   BUN mg/dL 16   CREATININE mg/dL 0.22*   GLUCOSE mg/dL 192*   CALCIUM mg/dL 8.4*     Results from last 7 days   Lab " Units 07/31/24  0001 07/30/24  0005   SODIUM mmol/L 141 137   POTASSIUM mmol/L 4.7 4.8   CHLORIDE mmol/L 106 101   CO2 mmol/L 29.7* 31.0*   BUN mg/dL 16 19   CREATININE mg/dL 0.22* 0.23*   CALCIUM mg/dL 8.4* 8.2*   BILIRUBIN mg/dL  --  0.2   ALK PHOS U/L  --  94   ALT (SGPT) U/L  --  45*   AST (SGOT) U/L  --  24   GLUCOSE mg/dL 192* 170*     Imaging Results (Last 72 Hours)       Procedure Component Value Units Date/Time    XR Chest AP [[id number]] Collected: 07/30/24 1049     Updated: 07/30/24 1052    Narrative:      XR CHEST AP-     CLINICAL INDICATION: chest tube insertion; I21.4-Non-ST elevation  (NSTEMI) myocardial infarction; J93.9-Pneumothorax, unspecified        COMPARISON: 7/30/2024     TECHNIQUE: Single frontal view of the chest.     FINDINGS:     LUNGS: Second left-sided thoracostomy tube is been placed. No  pneumothorax seen on the presented study.     HEART AND MEDIASTINUM: Heart and mediastinal contours are unremarkable        SKELETON: Bony and soft tissue structures are unremarkable.             Impression:      Second left-sided thoracostomy tube has been placed. No pneumothorax  seen on the presented image.           This report was finalized on 7/30/2024 10:49 AM by Dr. Devan Sahni MD.       XR Chest 1 View [[id number]] Collected: 07/30/24 0805     Updated: 07/30/24 0810    Narrative:      XR CHEST 1 VW-     CLINICAL INDICATION: ptx f/u; I21.4-Non-ST elevation (NSTEMI) myocardial  infarction; J93.9-Pneumothorax, unspecified        COMPARISON: 7/29/2024     TECHNIQUE: Single frontal view of the chest.     FINDINGS:     LUNGS: Left apical pneumothorax.  Diffuse subcutaneous air.  No interval line change     HEART AND MEDIASTINUM: Heart and mediastinal contours are unremarkable        SKELETON: Bony and soft tissue structures are unremarkable.             Impression:      Left apical pneumothorax.  Diffuse subcutaneous air.  No interval line change           This report was finalized on 7/30/2024 8:08  AM by Dr. Devan Sahni MD.       XR Chest 1 View [[id number]] Collected: 07/29/24 1726     Updated: 07/29/24 1729    Narrative:      XR CHEST 1 VW-     CLINICAL INDICATION: subQ air worsening; I21.4-Non-ST elevation (NSTEMI)  myocardial infarction; J93.9-Pneumothorax, unspecified        COMPARISON: 7/29/2024     TECHNIQUE: Single frontal view of the chest.     FINDINGS:     LUNGS: Left apical pneumothorax. Left-sided thoracostomy tube persists.  Right-sided airspace disease.     HEART AND MEDIASTINUM: Heart and mediastinal contours are unremarkable        SKELETON: Bony and soft tissue structures are unremarkable.             Impression:      Left-sided pneumothorax.           This report was finalized on 7/29/2024 5:27 PM by Dr. Devan Sahni MD.       XR Chest 1 View [[id number]] Collected: 07/29/24 1027     Updated: 07/29/24 1031    Narrative:      XR CHEST 1 VW-     CLINICAL INDICATION: et tube position; I21.4-Non-ST elevation (NSTEMI)  myocardial infarction; J93.9-Pneumothorax, unspecified        COMPARISON: 7/29/2024     TECHNIQUE: Single frontal view of the chest.     FINDINGS:     LUNGS: Patchy bilateral airspace disease.  Equivocal small left apical pneumothorax. Left-sided chest tube is  present.  Endotracheal tube overlies the tracheal air column well above the  irene.     HEART AND MEDIASTINUM: Heart and mediastinal contours are unremarkable        SKELETON: Bony and soft tissue structures are unremarkable.             Impression:         1. Small left apical pneumothorax  2. Endotracheal tube appears to be well above the irene           This report was finalized on 7/29/2024 10:29 AM by Dr. Devan Sahni MD.       XR Chest 1 View [[id number]] Collected: 07/29/24 0631     Updated: 07/29/24 0635    Narrative:       VERIFICATION OBSERVER NAME: Kai Magana MD.     TECHNIQUE, ADDITIONAL HISTORY, FINDINGS: Single frontal view of the  chest was obtained.   Comparison study date : Prior day. Time : 4:26 AM.      HISTORY: Respiratory failure     Findings:      ET TUBE is located 1 cm above the irene.   NG TUBE is located in the abdomen.  RIGHT central line with the tip in the region of the atriocaval  junction.  LEFT chest tube in place.  Diffuse interstitial changes noted, diminished from prior.  Minimal cardiac enlargement.  Trace LEFT pleural effusion.       Impression:      ET tube is too low in position.  Diminishing interstitial changes.              [id number]     ZIP Code [id number].              This report was finalized on 7/29/2024 6:33 AM by Dr. Kai Magana MD.                 Diagnostics: Reviewed    A: Lexie Valdez is a 64 y.o. female  admitted on 7/17/2024 due to shortness of breath. Lexie has a medical history of  anxiety/depression, arthritis, HTN, hypothyroidism, stress urinary incontinence with frequent UTI's, COPD and pulmonary fibrosis on 3L NC at home, and resting tremors in face and extremities, who presents with worsening shortness of breath for the last couple days. Pt also at time of admission c/o chest pressure associated with dyspnea stating this was chronic however had worsened in the days leading up to her admission. She also endorsed diarrhea for the last couple of days as well as dysuria and stated that she has recurring UTI's. Labs in ER revealed Troponin of 229 with repeat 188, BNP was 3550, Na 131, Cr 0.49, glucose 177, CRP 1.65, lactate 2.0, procal and WBC normal but with 88.7% neutrophils. UA showed positive nitrite, trace leuk, 6-10 WBC, 4+ bacteria and 3-6 squamous epithelial cells. She was tachycardic and tachypneic with pH of 7.458, CO2 40, Po2 154, bicarb 28. She was admitted to PCU on admission and in the early am of 7/21 was transferred to CCU due to worsening dyspnea, bilateral infiltrates without effusion concerning for interstitial edbbi disease flare, was transferred to CCU. Initially she was placed on Bipap in CCU and at 7/21 6:20 am Lexie was sedated intubated and placed on the  ventilator. Palliative care consulted for GOC,ACP and for support of family.     Today 7/31/2024  T 99.5, /77, RR 24 HR 61 and was sedated on vent at 24/4 with propofol, fentanyl, precedex and on phenylephrine    P:  I was able to talk with patients son Faith in waiting area today to provide support and ensure he did not have questions or need anything at this time. I discussed with Dr Mcmahon pt and he said that he was going to change vent settings to Fio2 of 100% and 0 of Peep in effort to help with subcutaneous air collection. Pts family have decided that they would not want a trach and peg and are just waiting a day or two before making Lexie officially comfort measures.    We will continue to follow along. Please do not hesitate to contact us regarding further sx mgmt or GOC needs, including after hours or on weekends via our on call provider at [phone].     Linh Lou, APRN    7/31/2024

## 2024-07-31 NOTE — PROGRESS NOTES
[facility]  PROGRESS NOTE    Patient information:  Name: Lexie Valdez  Age/Sex: 64 y.o. female  :  1960        PCP: Violet Pop APRN  Attending: Estuardo Charles MD  MRN:  [id number]  Visit Number:  [id number]    LOS: 13  CODE STATUS:    Code Status and Medical Interventions: No CPR (Do Not Attempt to Resuscitate); Limited Support; No intubation (DNI), No vasopressors, No antibiotics, No cardioversion, No antiarrhythmic drugs; ok for current vasopressor no escalation, no escalatio...   Ordered at: 24 1151     Medical Intervention Limits:    No intubation (DNI)    No vasopressors    No antibiotics    No cardioversion    No antiarrhythmic drugs     Level Of Support Discussed With:    Next of Kin (If No Surrogate)    Health Care Surrogate     Code Status (Patient has no pulse and is not breathing):    No CPR (Do Not Attempt to Resuscitate)     Medical Interventions (Patient has pulse or is breathing):    Limited Support     Comments:    ok for current vasopressor no escalation, no escalation of antibiotic, if ET tube dislodged do not replace, no additional antiarrhythmic drugs       PROBLEM LIST:Principal Problem:    NSTEMI (non-ST elevated myocardial infarction)      Reason for Cardiology follow-up:  NSTEMI     Subjective   ADMISSION INFORMATION:  Chief Complaint   Patient presents with    Shortness of Breath       DATE:2024    HPI:  Lexie Valdez is a 64 y.o. 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     Patient presented to [facility] ([facility]) emergency room (ER) on 2024 with complaints of increased shortness of breath x 2 weeks has become progressively worse and exacerbated with minimal activity.     Interval  History:   Patient was in room CCU 10 when she was seen and examined.  Patient remains intubated. She is lying in bed with her eyes open. She does follow some simple commands. Her brother is at bedside.     EVENT TIMELINE:   7/18:ECHO w EF of 36 - 40%. Left ventricular diastolic function is consistent with (grade I) impaired relaxation, mild tricuspid valve regurgitation is present. Estimated right ventricular systolic pressure from tricuspid regurgitation is moderately elevated (45-55 mmHg). Patient seem to have developed a new regional wall motion normalities with decreased LV systolic function as compared to the previous study in March 2023.  Please see full report attached below.  7/18: Bess ST with MPI indicating moderate-sized infarct located in the inferior wall, septal wall and apex with no significant ischemia noted. Impressions are consistent with an intermediate risk study. EF = 43%.  Please see full report attached below.  7/21: Emergently intubated orotracheally with a #7.5 endotracheal tube at approximately 22:16 PM.   7/21: CXRs indicated multifocal and worsening pneumonia coupled with some concern as well for pulmonary edema.   7/23: Chest tube inserted 2/2 left sided pneumothorax.   7/29: Palliative Care involved and discussing GOC with family.   7/30: Second chest tube inserted superior to previous one today.     Objective     Vital Signs  Temp:  [98.6 °F (37 °C)-99.9 °F (37.7 °C)] 99.7 °F (37.6 °C)  Heart Rate:  [56-71] 58  Resp:  [28-36] 28  BP: ()/(39-97) 122/78  FiO2 (%):  [25 %-100 %] 100 %  Device (Oxygen Therapy): ventilator  Vital Signs (last 72 hrs)         07/28 0700  07/29 0659 07/29 0700  07/30 0659 07/30 0700  07/31 0659 07/31 0700  07/31 1003   Most Recent      Temp (°F) 99.3 -  100    99 -  99.9    98.6 -  99.9    99.7 -  99.9     99.7 (37.6) 07/31 0945    Heart Rate 48 -  66    53 -  78    56 -  77    59 -  69     63 07/31 0945    Resp 28 -  36    28 -  32    28 -  35      28      28 07/31 0811    BP 71/43 -  156/80    78/44 -  167/91    71/39 -  173/94    88/53 -  126/77     119/72 07/31 0945    SpO2 (%) 92 -  100    95 -  100    94 -  100    96 -  99     99 07/31 0945    Oxygen Concentration (%)   28 28 25 -  28 25 25 07/31 0811          BMI:Body mass index is 23.79 kg/m².    WEIGHT:      07/29/24  0443 07/30/24  0604 07/31/24  0605   Weight: 61.4 kg (135 lb 5.8 oz) 64.6 kg (142 lb 6.7 oz) 62.9 kg (138 lb 10.7 oz)       DIET:NPO Diet NPO Type: Tube Feeding    I&O:  Intake & Output (last 3 days)         07/28 0701 07/29 0700 07/29 0701 07/30 0700 07/30 0701 07/31 0700 07/31 0701 08/01 0700    I.V. (mL/kg) 1481.4 (25.9) 1155.8 (20.2) 1259.5 (22)     Other      NG/GT 9877 833 1879     IV Piggyback  100 100     Total Intake(mL/kg) 3281.4 (57.4) 2465.8 (43.1) 5337.5 (93.3)     Urine (mL/kg/hr) 2700 (2) 1675 (1.2) 2650 (1.9)     Stool 100 100 65     Chest Tube 3 4 94     Total Output 2803 1779 2809     Net +478.4 +686.8 +2528.5                      PHYSICAL EXAM:  Constitutional:       Appearance: Not in distress. Acutely ill-appearing.   Eyes:      Conjunctiva/sclera: Conjunctivae normal.      Pupils: Pupils are equal, round, and reactive to light.   HENT:         Comments: Orally intubated.  Neck:      Vascular: No JVD. JVD normal.   Pulmonary:      Comments: Intubated with mechanical ventilation essentially clear to auscultate bilaterally.   Cardiovascular:      Normal rate. Regular rhythm.      Murmurs: There is no murmur.   Edema:     Peripheral edema absent.   Abdominal:      General: Bowel sounds are normal. There is no distension.      Palpations: Abdomen is soft.      Comments: FC with BSD in use with dark clear yellow urine noted.     Musculoskeletal:      Cervical back: Neck supple.      Comments: SCUDS in use BLE.  Skin:     General: Skin is warm and dry.      Comments: Some bruising noted ot BUE, anterior chest and neck.    Neurological:       Comments: Intubated. Awake at this time following some simple commands.                   Results review   Results Review:    I have reviewed the patient's new clinical results. 07/31/24 15:42 EDT    Results from last 7 days   Lab Units 07/29/24  0156 07/29/24  0020   HSTROP T ng/L 31* 29*     Lab Results   Component Value Date    PROBNP 11,410.0 (H) 07/26/2024    PROBNP 14,737.0 (H) 07/22/2024    PROBNP 3,550.0 (H) 07/17/2024     Results from last 7 days   Lab Units 07/31/24  0001 07/30/24  0005 07/29/24  0020 07/28/24  0053 07/27/24  1143 07/26/24  0441 07/25/24  0006   WBC 10*3/mm3 22.37* 20.31* 24.89* 22.69* 16.39* 17.54* 21.27*   HEMOGLOBIN g/dL 10.8* 10.5* 10.5* 10.3* 10.0* 9.1* 9.6*   PLATELETS 10*3/mm3 196 206 207 224 205 225 303     Results from last 7 days   Lab Units 07/31/24  0001 07/30/24  0005 07/29/24  0020 07/28/24  0053 07/27/24  1143 07/26/24  0441 07/25/24  0006   SODIUM mmol/L 141 137 141 141 142 142 144   POTASSIUM mmol/L 4.7 4.8 4.7 4.9 5.2 4.7 5.1   CHLORIDE mmol/L 106 101 106 107 109* 112* 114*   CO2 mmol/L 29.7* 31.0* 28.2 24.2 26.1 25.6 24.9   BUN mg/dL 16 19 15 23 29* 29* 20   CREATININE mg/dL 0.22* 0.23* 0.23* 0.26* 0.29* 0.31* 0.33*   CALCIUM mg/dL 8.4* 8.2* 8.4* 8.3* 8.1* 8.0* 8.1*   GLUCOSE mg/dL 192* 170* 163* 159* 161* 115* 138*   ALT (SGPT) U/L  --  45* 58* 24 26 17  --    AST (SGOT) U/L  --  24 52* 23 23 21  --      Lab Results   Component Value Date    MG 2.4 07/29/2024    MG 2.5 (H) 07/28/2024    MG 2.4 07/27/2024     Estimated Creatinine Clearance: 253.1 mL/min (A) (by C-G formula based on SCr of 0.22 mg/dL (L)).    Lab Results   Component Value Date    HGBA1C 4.60 (L) 07/18/2024    HGBA1C 4.80 04/11/2024    HGBA1C 5.00 12/31/2023     Lab Results   Component Value Date    CHOL 136 07/18/2024    TRIG 61 07/18/2024    LDL 56 07/18/2024    HDL 67 (H) 07/18/2024        Lab Results   Component Value Date    INR 1.00 07/27/2024    INR 1.09 07/26/2024    INR 0.93 07/18/2024    INR 0.98  04/26/2024    INR 1.00 03/21/2023     Lab Results   Component Value Date    LABHEPA 0.27 (L) 07/20/2024    LABHEPA 0.36 07/19/2024    LABHEPA 0.33 07/19/2024    LABHEPA 0.11 (L) 07/18/2024       Lab Results   Component Value Date    TSH 1.140 07/17/2024      Pain Management Panel           No data to display              Microbiology Results (last 10 days)       Procedure Component Value - Date/Time    Blood Culture - Blood, Arm, Left [[id number]]  (Normal) Collected: 07/28/24 1040    Lab Status: Preliminary result Specimen: Blood from Arm, Left Updated: 07/31/24 1101     Blood Culture No growth at 3 days    Blood Culture - Blood, Arm, Right [[id number]]  (Normal) Collected: 07/28/24 1000    Lab Status: Preliminary result Specimen: Blood from Arm, Right Updated: 07/31/24 1101     Blood Culture No growth at 3 days    Aspergillus Galactomannan Antigen - Blood, Arm, Right [[id number]] Collected: 07/22/24 1410    Lab Status: Final result Specimen: Blood from Arm, Right Updated: 07/26/24 0254     Aspergillus Ag, BAL/Serum 0.04 Index     Narrative:      Performed at:  [address] [place], [place]  [id number]  : Lorraine Ruelas MD, Phone:  [phone]  Performed at:  02  [facility]  [address] [place], [place]  [id number]  : Haroon Walls [facility], Phone:  [phone]    Mycoplasma Pneumoniae Antibody, IgM - Blood, Arm, Left [[id number]]  (Normal) Collected: 07/22/24 0012    Lab Status: Final result Specimen: Blood from Arm, Left Updated: 07/22/24 0202     Mycoplasma pneumo IgM Negative    Legionella Antigen, Urine - Urine, Urine, Clean Catch [[id number]]  (Normal) Collected: 07/21/24 2242    Lab Status: Final result Specimen: Urine, Clean Catch Updated: 07/21/24 2308     LEGIONELLA ANTIGEN, URINE Negative    Narrative:      Presumptive negative for L. pneumophilia serogroup 1 antigen, suggesting no recent or current infection.    Respiratory Culture - Sputum, ET Suction  [[id number]] Collected: 07/21/24 2231    Lab Status: Final result Specimen: Sputum from ET Suction Updated: 07/24/24 1119     Respiratory Culture No growth     Gram Stain Rare (1+) Mixed ammon      No WBCs seen      No Epithelial cells seen           Imaging Results (Last 24 Hours)       ** No results found for the last 24 hours. **          ECHO:  Results for orders placed during the hospital encounter of 07/17/24    Adult Transthoracic Echo Complete w/ Color, Spectral and Contrast if necessary per protocol    Interpretation Summary    Normal left ventricular cavity size and wall thickness noted.    The following left ventricular wall segments are hypokinetic: mid anterior, basal anterolateral, apical lateral, apical septal, mid anteroseptal and basal anterior. The following left ventricular wall segments are akinetic: apex.    Left ventricular systolic function is moderately decreased. Left ventricular ejection fraction appears to be 36 - 40%.    Left ventricular diastolic function is consistent with (grade I) impaired relaxation.    The aortic valve is structurally normal with no regurgitation or stenosis present.    The mitral valve is structurally normal with no significant stenosis present. Mild mitral valve regurgitation is present.    Mild tricuspid valve regurgitation is present. Estimated right ventricular systolic pressure from tricuspid regurgitation is moderately elevated (45-55 mmHg).    There is no evidence of pericardial effusion. .    Comments: Patient seem to have developed a new regional wall motion normalities with decreased LV systolic function as compared to the previous study in March 2023.      STRESS TEST:  Results for orders placed during the hospital encounter of 07/17/24    Stress Test With Myocardial Perfusion One Day    Interpretation Summary  Images from the original result were not included.      A pharmacological stress test was performed using regadenoson without low-level  exercise.    Findings consistent with an indeterminate ECG stress test.    Myocardial perfusion imaging indicates a moderate-sized infarct located in the inferior wall, septal wall and apex with no significant ischemia noted.    Abnormal LV wall motion consistent with apical dyskinesis.    Left ventricular ejection fraction is moderately reduced (Calculated EF = 43%).    Impressions are consistent with an intermediate risk study.       HEART CATH:  No results found for this or any previous visit.      TELEMETRY:     SR 60s with PVCs and PACs with brief episode of ST as seen in telemetry strips attached below.               I reviewed the patient's new clinical results.    ALLERGIES: Codeine and Sulfa antibiotics    Medication Review:   Current list of medications may not reflect those currently placed in orders that are not signed or are being held.     ALPRAZolam, 0.5 mg, Oral, TID  aspirin, 81 mg, Oral, Daily  Brexpiprazole, 0.5 mg, Oral, Daily  castor oil-balsam peru, 1 Application, Topical, Q12H  cefepime, 2,000 mg, Intravenous, Q8H  chlorhexidine, 15 mL, Mouth/Throat, Q12H  donepezil, 10 mg, Oral, Q PM  enoxaparin, 40 mg, Subcutaneous, Nightly  [Held by provider] hydroxychloroquine, 200 mg, Oral, BID  insulin regular, 2-7 Units, Subcutaneous, Q6H  ipratropium-albuterol, 3 mL, Nebulization, Q6H - RT  levothyroxine, 25 mcg, Oral, Daily With Breakfast  memantine, 5 mg, Oral, Q12H  methylPREDNISolone sodium succinate, 60 mg, Intravenous, Q12H  [Held by provider] metoprolol succinate XL, 25 mg, Oral, Q24H  mupirocin, 1 Application, Topical, Q12H  nystatin, 5 mL, Oral, 4x Daily  nystatin, 1 Application, Topical, Q12H  pantoprazole, 40 mg, Intravenous, Q AM  Phenylephrine HCl-NaCl, , ,   Phenylephrine HCl-NaCl, , ,   sodium chloride, 10 mL, Intravenous, Q12H  sodium chloride, 10 mL, Intravenous, Q12H  sodium chloride, 10 mL, Intravenous, Q12H  sodium chloride, 10 mL, Intravenous, Q12H  sodium chloride, 10 mL,  Intravenous, Q12H  sodium chloride, 10 mL, Intravenous, Q12H      dexmedetomidine, 0.2-1.5 mcg/kg/hr, Last Rate: 1.1 mcg/kg/hr (07/31/24 0611)  fentanyl 10 mcg/mL,  mcg/hr, Last Rate: Stopped (07/31/24 0930)  Pharmacy Consult,   phenylephrine, 0.5-3 mcg/kg/min (Dosing Weight), Last Rate: 0.5 mcg/kg/min (07/31/24 1045)  propofol, 5-50 mcg/kg/min (Dosing Weight), Last Rate: Stopped (07/31/24 0930)        senna-docusate sodium **AND** polyethylene glycol **AND** bisacodyl **AND** bisacodyl    busPIRone    Calcium Replacement - Follow Nurse / BPA Driven Protocol    dextrose    dextrose    glucagon (human recombinant)    guaifenesin    HYDROcodone-acetaminophen    hydrOXYzine    ipratropium    Magnesium Cardiology Dose Replacement - Follow Nurse / BPA Driven Protocol    midazolam    nitroglycerin    Pharmacy Consult    Phenylephrine HCl-NaCl    Phenylephrine HCl-NaCl    Phosphorus Replacement - Follow Nurse / BPA Driven Protocol    Potassium Replacement - Follow Nurse / BPA Driven Protocol    prochlorperazine    sodium chloride    sodium chloride    sodium chloride    sodium chloride    sodium chloride    sodium chloride    sodium chloride    sodium chloride    sodium chloride    sodium chloride    sodium chloride    vecuronium    Assessment      Acute non-ST elevation myocardial infarction likely type II due to respiratory failure with hypoxia  Acute on chronic respiratory failure due to advanced COPD/pulmonary fibrosis bilateral pneumonia, pneumothorax, patient remains intubated on the ventilator.  Acute on chronic respiratory failure requiring intubation mechanical ventilation.  HFrEF, secondary to ischemic cardiomyopathy, currently compensated  Essential hypertension  Mild hyponatremia  Pneumothorax requiring 2 chest tubes placement  Shock, multifactorial, more stable now.    Recommendations / Plan     Will consider restarting GDMT medications for her cardiomyopathy as the blood pressure stabilizes  further.  Patient does not seem to have any evidence of acute decompensated heart failure.  Hence no diuretics at this time.        I have discussed the patients findings and recommendations with the patient.    Thank you very much for asking us to be involved in this patient's care.  We will follow along with you.    Electronically signed by SG Chandler, 07/31/24, 3:42 PM EDT.     Electronically signed by Ramo Acevedo MD, 07/31/24, 6:15 PM EDT.                    Please note that portions of this note were completed with a voice recognition program.    Please note that portions of this note were copied and has been reviewed and is accurate as of 7/31/2024 .

## 2024-07-31 NOTE — PLAN OF CARE
Problem: Communication Impairment (Mechanical Ventilation, Invasive)  Goal: Effective Communication  Outcome: Ongoing, Progressing  Goal: Effective Communication  Outcome: Ongoing, Progressing   Goal Outcome Evaluation:

## 2024-07-31 NOTE — PAYOR COMM NOTE
"CONTACT: DINORA HILL RN  UTILIZATION MANAGEMENT DEPT.  [facility]  [address]   [place] [address]  PHONE: [phone]  FAX: [phone]        CLINICAL UPDATE    REF#[id number]       Lexie Valdez (64 y.o. Female)       Date of Birth   1960    Social Security Number       Address   [address] [place] [address]    Home Phone       MRN   [id number]       [employer]   [facility]    Marital Status                               Admission Date   7/17/24    Admission Type   Emergency    Admitting Provider   Estuardo Charles MD    Attending Provider   Eduardo Freeman MD    Department, Room/Bed   [facility] CRITICAL CARE, 10/       Discharge Date       Discharge Disposition       Discharge Destination                                 Attending Provider: Eduardo Freeman MD    Allergies: Codeine, Sulfa Antibiotics    Isolation: None   Infection: None   Code Status: No CPR    Ht: 162.6 cm (64.02\")   Wt: 62.9 kg (138 lb 10.7 oz)    Admission Cmt: None   Principal Problem: NSTEMI (non-ST elevated myocardial infarction) [I21.4]                   Active Insurance as of 7/17/2024       Primary Coverage       Payor Plan Insurance Group Employer/Plan Group    ANTHEM MEDICARE REPLACEMENT ANTHEM MEDICARE ADVANTAGE [id number]       Payor Plan Address Payor Plan Phone Number Payor Plan Fax Number Effective Dates    [address] [phone]  1/1/2022 - None Entered    [facility] [id number]         Subscriber Name Subscriber Birth Date Member ID       LEXIE VALDEZ 1960 [id number]               Secondary Coverage       Payor Plan Insurance Group Employer/Plan Group    [place]        Payor Plan Address Payor Plan Phone Number Payor Plan Fax Number Effective Dates    [address] [phone]  5/1/2021 - None Entered    [facility] [id number]         Subscriber Name Subscriber Birth Date Member ID       LEXIE VALDEZ 1960 [id number]               "       Emergency Contacts        (Rel.) Home Phone Work Phone Mobile Phone    Valdez,Khris (Spouse) -- -- [phone]    Zeinab Davilady (Son) [phone] -- --    Rosa Kessler (Sister) -- -- [phone]              Current Facility-Administered Medications   Medication Dose Route Frequency Provider Last Rate Last Admin    ALPRAZolam (XANAX) tablet 0.5 mg  0.5 mg Oral TID John Mcmahon MD   0.5 mg at 07/31/24 0926    aspirin chewable tablet 81 mg  81 mg Oral Daily Estuardo Charles MD   81 mg at 07/31/24 0926    sennosides-docusate (PERICOLACE) 8.6-50 MG per tablet 2 tablet  2 tablet Oral BID PRN Estuardo Charles MD   2 tablet at 07/24/24 0809    And    polyethylene glycol (MIRALAX) packet 17 g  17 g Oral Daily PRN Estuardo Charles MD        And    bisacodyl (DULCOLAX) EC tablet 5 mg  5 mg Oral Daily PRN Estuardo Charles MD        And    bisacodyl (DULCOLAX) suppository 10 mg  10 mg Rectal Daily PRN Estuardo Charles MD        Brexpiprazole tablet 0.5 mg  0.5 mg Oral Daily Estuardo Charles MD   0.5 mg at 07/31/24 0925    busPIRone (BUSPAR) tablet 10 mg  10 mg Oral BID PRN Estuardo Charles MD   10 mg at 07/20/24 0946    Calcium Replacement - Follow Nurse / BPA Driven Protocol   Does not apply PRN Jessica Ospina MD        cefepime 2000 mg IVPB in 100 mL NS (VTB)  2,000 mg Intravenous Q8H Deirdre Davila, SG   2,000 mg at 07/31/24 0926    chlorhexidine (PERIDEX) 0.12 % solution 15 mL  15 mL Mouth/Throat Q12H Eduardo Freeman MD   15 mL at 07/31/24 0926    DEXMEDETOMIDINE 1000 MCG/250ML INFUSION infusion  0.2-1.5 mcg/kg/hr Intravenous Titrated Kurt White MD 17.3 mL/hr at 07/31/24 0611 1.1 mcg/kg/hr at 07/31/24 0611    dextrose (D50W) (25 g/50 mL) IV injection 25 g  25 g Intravenous Q15 Min PRN Jessica Ospina MD        dextrose (GLUTOSE) oral gel 15 g  15 g Oral Q15 Min PRN Jessica Ospina MD        donepezil (ARICEPT) tablet 10 mg  10 mg Oral Q PM  Estuardo Charles MD   10 mg at 07/30/24 1608    Enoxaparin Sodium (LOVENOX) syringe 40 mg  40 mg Subcutaneous Nightly Eduardo Freeman MD   40 mg at 07/30/24 2104    fentaNYL 2500 mcg/250 mL NS infusion   mcg/hr Intravenous Titrated Tanner Ron MD   Stopped at 07/31/24 0930    glucagon HCl (Diagnostic) injection 1 mg  1 mg Intramuscular Q15 Min PRN Jessica Ospina MD        guaifenesin (ROBITUSSIN) 100 MG/5ML liquid 200 mg  200 mg Oral Q4H PRN Estuardo Charles MD   200 mg at 07/20/24 2317    HYDROcodone-acetaminophen (NORCO) 5-325 MG per tablet 1 tablet  1 tablet Oral Q8H PRN Estuardo Charles MD        [Held by provider] hydroxychloroquine (PLAQUENIL) tablet 200 mg  200 mg Oral BID Estuardo Charles MD   200 mg at 07/28/24 0811    hydrOXYzine (ATARAX) tablet 25 mg  25 mg Oral Q6H PRN Estuardo Charles MD   25 mg at 07/20/24 2317    insulin regular (humuLIN R,novoLIN R) injection 2-7 Units  2-7 Units Subcutaneous Q6H Jessica Ospina MD   2 Units at 07/31/24 0021    ipratropium (ATROVENT) nebulizer solution 0.5 mg  0.5 mg Nebulization Q6H PRN Estuardo Charles MD        ipratropium-albuterol (DUO-NEB) nebulizer solution 3 mL  3 mL Nebulization Q6H - RT Jessica Ospina MD   3 mL at 07/31/24 0617    levothyroxine (SYNTHROID, LEVOTHROID) tablet 25 mcg  25 mcg Oral Daily With Breakfast Estuardo Charles MD   25 mcg at 07/31/24 0926    magic barrier cream   Topical BID Jessica Ospina MD   Given at 07/31/24 0927    Magnesium Cardiology Dose Replacement - Follow Nurse / BPA Driven Protocol   Does not apply PRN Jessica Ospina MD        memantine (NAMENDA) tablet 5 mg  5 mg Oral Q12H Estuardo Charles MD   5 mg at 07/31/24 0926    methylPREDNISolone sodium succinate (SOLU-Medrol) injection 60 mg  60 mg Intravenous Q12H Kurt White MD   60 mg at 07/31/24 0608    [Held by provider] metoprolol succinate XL (TOPROL-XL) 24 hr tablet 25 mg  25 mg Oral Q24H  Estuardo Charles MD   25 mg at 07/20/24 0946    midazolam (VERSED) injection 2 mg  2 mg Intravenous Q2H PRN Alisson Moreno MD   2 mg at 07/30/24 1545    mupirocin (BACTROBAN) 2 % ointment 1 Application  1 Application Topical Q12H Jessica Ospina MD   1 Application at 07/31/24 0927    nitroglycerin (NITROSTAT) SL tablet 0.4 mg  0.4 mg Sublingual Q5 Min PRN Estuardo Charles MD        nystatin (MYCOSTATIN) 100,000 unit/mL suspension 500,000 Units  5 mL Oral 4x Daily Jessica Ospina MD   500,000 Units at 07/31/24 0925    nystatin (MYCOSTATIN) 725755 UNIT/GM cream 1 Application  1 Application Topical Q12H Estuardo Charles MD   1 Application at 07/31/24 0928    pantoprazole (PROTONIX) injection 40 mg  40 mg Intravenous Q AM Sam Neves MD   40 mg at 07/31/24 0612    Pharmacy Consult   Does not apply Continuous PRN Estuardo Charles MD        phenylephrine (HAZEL-SYNEPHRINE) 50 mg in 250 mL NS infusion  0.5-3 mcg/kg/min (Dosing Weight) Intravenous Titrated Jessica Ospina MD 13.73 mL/hr at 07/31/24 0623 0.8 mcg/kg/min at 07/31/24 0623    Phenylephrine HCl-NaCl 1-0.9 MG/10ML-% 100 mcg/ml injection  - ADS Override Pull             Phenylephrine HCl-NaCl 1-0.9 MG/10ML-% 100 mcg/ml injection  - ADS Override Pull             Phosphorus Replacement - Follow Nurse / BPA Driven Protocol   Does not apply PRN Jessica Ospina MD        Potassium Replacement - Follow Nurse / BPA Driven Protocol   Does not apply PRN Jessica Ospina MD        prochlorperazine (COMPAZINE) injection 5 mg  5 mg Intravenous Q6H PRN Estuardo Charles MD   5 mg at 07/20/24 1138    propofol (DIPRIVAN) infusion 10 mg/mL 100 mL  5-50 mcg/kg/min (Dosing Weight) Intravenous Titrated Sam Neves MD   Stopped at 07/31/24 0930    sodium chloride 0.9 % flush 10 mL  10 mL Intravenous PRN Estuardo Charles MD        sodium chloride 0.9 % flush 10 mL  10 mL Intravenous PRN Estuardo Charles MD        sodium  chloride 0.9 % flush 10 mL  10 mL Intravenous Q12H Estuardo Charles MD   10 mL at 07/31/24 0928    sodium chloride 0.9 % flush 10 mL  10 mL Intravenous PRN Estuardo Charles MD        sodium chloride 0.9 % flush 10 mL  10 mL Intravenous Q12H Estuardo Charles MD   10 mL at 07/31/24 0929    sodium chloride 0.9 % flush 10 mL  10 mL Intravenous PRN Estuardo Charles MD        sodium chloride 0.9 % flush 10 mL  10 mL Intravenous Q12H Estuardo Charles MD   10 mL at 07/31/24 0928    sodium chloride 0.9 % flush 10 mL  10 mL Intravenous PRN Estuardo Charles MD        sodium chloride 0.9 % flush 10 mL  10 mL Intravenous Q12H Eduardo Freeman MD   10 mL at 07/31/24 0928    sodium chloride 0.9 % flush 10 mL  10 mL Intravenous Q12H Eduardo Freeman MD   10 mL at 07/31/24 0928    sodium chloride 0.9 % flush 10 mL  10 mL Intravenous Q12H Eduardo Freeman MD   10 mL at 07/31/24 0927    sodium chloride 0.9 % flush 10 mL  10 mL Intravenous PRN Eduardo Freeman MD        sodium chloride 0.9 % flush 20 mL  20 mL Intravenous PRN Eduardo Freeman MD        sodium chloride 0.9 % infusion 40 mL  40 mL Intravenous Estuardo Best MD        sodium chloride 0.9 % infusion 40 mL  40 mL Intravenous PREstuardo Porter MD        sodium chloride 0.9 % infusion 40 mL  40 mL Intravenous PRN Estuardo Charles MD        sodium chloride 0.9 % infusion 40 mL  40 mL Intravenous PRN Eduardo Freeman MD        vecuronium (NORCURON) injection 5 mg  5 mg Intravenous Q6H PRN John Mcmahon MD   5 mg at 07/29/24 0306     Orders (last 48 hrs)        Start     Ordered    08/02/24 0900  Drain Care  Daily       07/31/24 0927    07/31/24 0602  POC Glucose Once  PROCEDURE ONCE        Comments: Complete no more than 45 minutes prior to patient eating      07/31/24 0555    07/31/24 0600  CBC (No Diff)  Morning Draw         07/30/24 2101    07/31/24 0222  Follow Pressure Ulcer Prevention Measures Policy   Continuous        Comments: Implement Appropriate Pressure Ulcer Prevention Measures  - Open Order Report to View Full Instructions  Enter Wound LDA & Document Assessment  Add Wound Care Plan  Add Patient Education Per Policy    07/31/24 0222    07/31/24 0207  Wound Ostomy Eval & Treat  Once        Comments: Please evaluate area to left upper back above foam tape  Please also re-evaluate open area to coccyx    07/31/24 0208    07/31/24 0022  POC Glucose Once  PROCEDURE ONCE        Comments: Complete no more than 45 minutes prior to patient eating      07/31/24 0015    07/31/24 0000  Basic Metabolic Panel  Morning Draw         07/30/24 2101    07/31/24 0000  Stage II Pressure Ulcer Care Every Other Day  Every Other Day        Comments: - Gently Cleanse With Normal Saline  - Cover With Silicone Border Dressing (If Indicated)  - For Frequent Incontinence - Apply Skin Protective Barrier Cream Rather Than Silicone Border Dressing    07/31/24 0222    07/30/24 1804  POC Glucose Once  PROCEDURE ONCE        Comments: Complete no more than 45 minutes prior to patient eating      07/30/24 1757    07/30/24 1223  POC Glucose Once  PROCEDURE ONCE        Comments: Complete no more than 45 minutes prior to patient eating      07/30/24 1216    07/30/24 1149  Code Status and Medical Interventions: No CPR (Do Not Attempt to Resuscitate); Limited Support; No intubation (DNI), No vasopressors, No antibiotics, No cardioversion, No antiarrhythmic drugs; ok for current vasopressor no escalation, no escalatio...  Continuous         07/30/24 1151    07/30/24 1016  XR Chest AP  1 Time Imaging         07/30/24 1016    07/30/24 1007  XR Chest AP  1 Time Imaging,   Status:  Canceled         07/30/24 1006    07/30/24 0602  POC Glucose Once  PROCEDURE ONCE        Comments: Complete no more than 45 minutes prior to patient eating      07/30/24 0555    07/30/24 0600  Procalcitonin  Morning Draw         07/29/24 1356    07/30/24 0600  Blood Gas,  Venous -With Co-Ox Panel: Yes  Morning Draw        Comments: With morning labs      07/29/24 1358    07/30/24 0600  Comprehensive Metabolic Panel  Morning Draw         07/29/24 2129    07/30/24 0600  CBC (No Diff)  Morning Draw         07/29/24 2129    07/30/24 0018  POC Glucose Once  PROCEDURE ONCE        Comments: Complete no more than 45 minutes prior to patient eating      07/30/24 0002    07/30/24 0014  Blood Gas, Venous With Co-Ox  PROCEDURE ONCE         07/30/24 0011    07/30/24 0000  XR Chest 1 View  1 Time Imaging         07/29/24 1906    07/29/24 1830  POC Glucose Once  PROCEDURE ONCE        Comments: Complete no more than 45 minutes prior to patient eating      07/29/24 1821    07/29/24 1644  XR Chest 1 View  1 Time Imaging         07/29/24 1643    07/29/24 1618  XR Chest 1 View  1 Time Imaging,   Status:  Canceled         07/29/24 1618    07/29/24 1154  POC Glucose Once  PROCEDURE ONCE        Comments: Complete no more than 45 minutes prior to patient eating      07/29/24 1147    07/29/24 0945  ALPRAZolam (XANAX) tablet 0.5 mg  3 times daily         07/29/24 0939    07/29/24 0800  cefepime 2000 mg IVPB in 100 mL NS (VTB)  Every 8 Hours         07/29/24 0743    07/29/24 0605  midazolam (VERSED) injection 2 mg  Every 2 Hours PRN         07/29/24 0607    07/28/24 2345  mupirocin (BACTROBAN) 2 % ointment 1 Application  Every 12 Hours Scheduled         07/28/24 2255    07/28/24 0730  fentaNYL 2500 mcg/250 mL NS infusion  Titrated         07/28/24 0633    07/26/24 1900  ipratropium-albuterol (DUO-NEB) nebulizer solution 3 mL  Every 6 Hours - RT         07/26/24 1601    07/26/24 1100  DEXMEDETOMIDINE 1000 MCG/250ML INFUSION infusion  Titrated         07/26/24 1028    07/26/24 0900  Drain Care  Daily       07/23/24 1736    07/25/24 2100  nystatin (MYCOSTATIN) 100,000 unit/mL suspension 500,000 Units  4 Times Daily         07/25/24 1905    07/25/24 1500  magic barrier cream  2 Times Daily         07/25/24 1999     "07/24/24 1937  Potassium Replacement - Follow Nurse / BPA Driven Protocol  As Needed         07/24/24 1937 07/24/24 1937  Magnesium Cardiology Dose Replacement - Follow Nurse / BPA Driven Protocol  As Needed         07/24/24 1937 07/24/24 1937  Phosphorus Replacement - Follow Nurse / BPA Driven Protocol  As Needed         07/24/24 1937    07/24/24 1937  Calcium Replacement - Follow Nurse / BPA Driven Protocol  As Needed         07/24/24 1937 07/23/24 1700  methylPREDNISolone sodium succinate (SOLU-Medrol) injection 60 mg  Every 12 Hours         07/23/24 0918    07/23/24 0630  Weaning Sequence for Vasoactive Infusions  Every Shift Nursing      Comments: Weaning Sequence for Vasoactive Infusions    07/22/24 1925 07/22/24 2015  phenylephrine (HAZEL-SYNEPHRINE) 50 mg in 250 mL NS infusion  Titrated         07/22/24 1925 07/22/24 1800  No Carb Liquid Protein 15g/pkt  2 Times Daily      Comments: Prosource 1 packet bid    07/22/24 1331    07/22/24 1800  insulin regular (humuLIN R,novoLIN R) injection 2-7 Units  Every 6 Hours Scheduled         07/22/24 1735    07/22/24 1735  dextrose (GLUTOSE) oral gel 15 g  Every 15 Minutes PRN         07/22/24 1735    07/22/24 1735  dextrose (D50W) (25 g/50 mL) IV injection 25 g  Every 15 Minutes PRN         07/22/24 1735    07/22/24 1735  glucagon HCl (Diagnostic) injection 1 mg  Every 15 Minutes PRN         07/22/24 1735    07/22/24 0600  Daily CHG Bath While Central Line in Place  Daily       07/21/24 1108    07/21/24 2300  doxycycline (MONODOX) capsule 100 mg  Every 12 Hours Scheduled         07/21/24 2206    07/21/24 2100  Enoxaparin Sodium (LOVENOX) syringe 40 mg  Nightly         07/21/24 1702    07/21/24 1702  Daily Weights  Daily       07/21/24 1701    07/21/24 1701  Tube Feeding Assessment and I/O  Every Shift       07/21/24 1701    07/21/24 1417  Urinary Catheter Care  Every Shift      Placed in \"And\" Linked Group    07/21/24 1418    07/21/24 1231  vecuronium " (NORCURON) injection 5 mg  Every 6 Hours PRN         07/21/24 1235    07/21/24 1200  POC Glucose Finger Q6H  Every 6 Hours      Comments: Complete no more than 45 minutes prior to patient eating      07/21/24 0617    07/21/24 1200  chlorhexidine (PERIDEX) 0.12 % solution 15 mL  Every 12 Hours Scheduled         07/21/24 1103    07/21/24 1200  Oral Care & Teeth Brushing - Intubated Patient  Every 4 Hours      Comments: [place] Teeth at Least 2x/day    07/21/24 1103    07/21/24 1200  sodium chloride 0.9 % flush 10 mL  Every 12 Hours Scheduled         07/21/24 1108    07/21/24 1200  sodium chloride 0.9 % flush 10 mL  Every 12 Hours Scheduled         07/21/24 1108    07/21/24 1200  sodium chloride 0.9 % flush 10 mL  Every 12 Hours Scheduled         07/21/24 1108    07/21/24 1108  sodium chloride 0.9 % flush 10 mL  As Needed         07/21/24 1108    07/21/24 1108  sodium chloride 0.9 % flush 20 mL  As Needed         07/21/24 1108    07/21/24 1108  sodium chloride 0.9 % infusion 40 mL  As Needed         07/21/24 1108    07/21/24 0900  sodium chloride 0.9 % flush 10 mL  Every 12 Hours Scheduled         07/21/24 0514    07/21/24 0800  Intake & Output  Every 4 Hours       07/21/24 0514    07/21/24 0700  propofol (DIPRIVAN) infusion 10 mg/mL 100 mL  Titrated         07/21/24 0612    07/21/24 0700  pantoprazole (PROTONIX) injection 40 mg  Every Early Morning         07/21/24 0616    07/21/24 0600  Vital Signs Every Hour and Per Hospital Policy Based on Patient Condition  Every Hour       07/21/24 0514    07/21/24 0515  Oral Care - Patient Not on NPPV & Not Intubated  Every Shift       07/21/24 0514    07/21/24 0514  sodium chloride 0.9 % flush 10 mL  As Needed         07/21/24 0514    07/21/24 0514  sodium chloride 0.9 % infusion 40 mL  As Needed         07/21/24 0514    07/20/24 0800  levothyroxine (SYNTHROID, LEVOTHROID) tablet 25 mcg  Daily With Breakfast         07/19/24 1540    07/19/24 2130  sodium chloride 0.9 % flush 10  mL  Every 12 Hours Scheduled         07/19/24 2030 07/19/24 2100  memantine (NAMENDA) tablet 5 mg  Every 12 Hours Scheduled         07/19/24 1540    07/19/24 2100  [Held by provider]  hydroxychloroquine (PLAQUENIL) tablet 200 mg  2 Times Daily        (On hold since Sun 7/28/2024 at 1952 until manually unheld; held by Jessica Ospina MDHold Reason: Other (Comment Required))    07/19/24 1540 07/19/24 2100  nystatin (MYCOSTATIN) 242638 UNIT/GM cream 1 Application  Every 12 Hours Scheduled         07/19/24 1715 07/19/24 2030  sodium chloride 0.9 % flush 10 mL  As Needed         07/19/24 2030 07/19/24 2030  sodium chloride 0.9 % infusion 40 mL  As Needed         07/19/24 2030 07/19/24 1813  prochlorperazine (COMPAZINE) injection 5 mg  Every 6 Hours PRN         07/19/24 1813    07/19/24 1700  donepezil (ARICEPT) tablet 10 mg  Every Evening         07/19/24 1540    07/19/24 1630  Brexpiprazole tablet 0.5 mg  Daily         07/19/24 1540    07/19/24 1620  Pharmacy Consult  Continuous PRN         07/19/24 1622    07/19/24 1539  busPIRone (BUSPAR) tablet 10 mg  2 Times Daily PRN         07/19/24 1540    07/19/24 1539  hydrOXYzine (ATARAX) tablet 25 mg  Every 6 Hours PRN         07/19/24 1540    07/19/24 1539  HYDROcodone-acetaminophen (NORCO) 5-325 MG per tablet 1 tablet  Every 8 Hours PRN         07/19/24 1540    07/18/24 2119  guaifenesin (ROBITUSSIN) 100 MG/5ML liquid 200 mg  Every 4 Hours PRN         07/18/24 2120    07/18/24 2000  [Held by provider]  metoprolol succinate XL (TOPROL-XL) 24 hr tablet 25 mg  Every 24 Hours Scheduled        (On hold since Mon 7/22/2024 at 1923 until manually unheld; held by Jessica Ospina MDHold Reason: Abnormal Labs)    07/18/24 1913    07/18/24 1523  Wound Care Abrasion (Patient states her dog jumped up on her at home and scratched her leg.)  Daily       07/18/24 1522    07/18/24 0900  aspirin chewable tablet 81 mg  Daily         07/18/24 0156    07/18/24 0900  sodium  "chloride 0.9 % flush 10 mL  Every 12 Hours Scheduled         07/18/24 0342    07/18/24 0800  Oral Care  2 Times Daily       07/18/24 0342    07/18/24 0342  sennosides-docusate (PERICOLACE) 8.6-50 MG per tablet 2 tablet  2 Times Daily PRN        Placed in \"And\" Linked Group    07/18/24 0342    07/18/24 0342  polyethylene glycol (MIRALAX) packet 17 g  Daily PRN        Placed in \"And\" Linked Group    07/18/24 0342    07/18/24 0342  bisacodyl (DULCOLAX) EC tablet 5 mg  Daily PRN        Placed in \"And\" Linked Group    07/18/24 0342    07/18/24 0342  bisacodyl (DULCOLAX) suppository 10 mg  Daily PRN        Placed in \"And\" Linked Group    07/18/24 0342    07/18/24 0342  ipratropium (ATROVENT) nebulizer solution 0.5 mg  Every 6 Hours PRN         07/18/24 0342    07/18/24 0342  sodium chloride 0.9 % flush 10 mL  As Needed         07/18/24 0342 07/18/24 0342  sodium chloride 0.9 % infusion 40 mL  As Needed         07/18/24 0342 07/18/24 0342  nitroglycerin (NITROSTAT) SL tablet 0.4 mg  Every 5 Minutes PRN         07/18/24 0342 07/17/24 2044  sodium chloride 0.9 % flush 10 mL  As Needed         07/17/24 2044 07/17/24 2033  sodium chloride 0.9 % flush 10 mL  As Needed         07/17/24 2034    Unscheduled  Up With Assistance  As Needed       07/18/24 0342    Unscheduled  Change Dressing to IV Site As Needed When Damp, Loose or Soiled  As Needed       07/19/24 2030    Unscheduled  Change Needleless Connectors  As Needed      Comments: Change Needleless Connectors When:  - Administration Set Changed  - Dressing Changed  - Removed For Any Reason  - Residual Blood or Debris Within Connector  - Prior to Drawing Blood Cultures  - Contamination of Connector  - After Administration of Blood or Blood Components    07/19/24 2030    Unscheduled  Insert New Peripheral IV  As Needed      Comments: Frequency Per Facility Policy    07/19/24 2030    Unscheduled  Spontaneous Awakening Trial  Daily - SAT       07/21/24 1103    " Unscheduled  Spontaneous Breathing Trial  Daily - SBT       07/21/24 1103    Unscheduled  Change Dressing to IV Site As Needed When Damp, Loose or Soiled  As Needed       07/21/24 1108    Unscheduled  Change Needleless Connectors  As Needed      Comments: Change Needleless Connectors When:  - Administration Set Changed  - Dressing Changed  - Removed For Any Reason  - Residual Blood or Debris Within Connector  - Prior to Drawing Blood Cultures  - Contamination of Connector  - After Administration of Blood or Blood Components    07/21/24 1108    Unscheduled  Jay[place] & Document Feeding Tube Depth (in cm)  As Needed       07/21/24 1701    Unscheduled  Verify Feeding Tube Placement Upon Insertion & As Needed  As Needed       07/21/24 1701    Unscheduled  Flush Feeding Tube With 30-50mL Water As Needed  As Needed       07/21/24 1701    Unscheduled  Follow Hypoglycemia Standing Orders For Blood Glucose <70 & Notify Provider of Treatment  As Needed      Comments: Follow Hypoglycemia Orders As Outlined in Process Instructions (Open Order Report to View Full Instructions)  Notify Provider Any Time Hypoglycemia Treatment is Administered    07/22/24 1735    Unscheduled  Skin Tear Care - Minimal Drainage PRN  As Needed      Comments: - Realign Skin Flap (if Viable) Gently Ease Flap Into Place Using a Dampened Cotton-Tipped Applicator or Gloved Finger  - Gently Cleanse Area & Pat Dry  - Apply Hydrogel & Non-Adherent Layer (Silicone Sheet, Oil Emulsion Dressing or Vaseline Gauze)  - Secure With Silicone Border Dressing (Unless Skin Fragile)  - If Skin is Fragile Secure With Roll Gauze - Do NOT Put Tape Directly on Skin  - Change Every 3 Days & As Needed    07/25/24 1046    Unscheduled  Stage II Pressure Ulcer Care PRN  As Needed      Comments: - Gently Cleanse With Normal Saline  - Cover With Silicone Border Dressing (If Indicated)  - For Frequent Incontinence - Apply Skin Protective Barrier Cream Rather Than Silicone Border Dressing     24    --  FLUoxetine (PROzac) 40 MG capsule  Daily         24    --  hydrOXYzine (ATARAX) 25 MG tablet  Every 6 Hours PRN         24    --  ipratropium-albuterol (DUO-NEB) 0.5-2.5 mg/3 ml nebulizer  Every 4 Hours PRN         24    --  megestrol (MEGACE) 40 MG tablet  2 Times Daily         24    --  triamcinolone (KENALOG) 0.1 % cream  2 Times Daily         24    --  albuterol sulfate  (90 Base) MCG/ACT inhaler  Daily PRN         24    --  hydroCHLOROthiazide 12.5 MG tablet  Daily         24    --  hydroxychloroquine (PLAQUENIL) 200 MG tablet  2 Times Daily         24                     Physician Progress Notes (last 48 hours)        Eduardo Freeman MD at 24              [facility]     Patient Identification:  Name:  Lexie KEITH Valdez  Age:  64 y.o.  Sex:  female  :  1960  MRN:  [id number]  Visit Number:  [id number]  ROOM: [address]     Primary Care Provider:  Violet Pop APRN     Date of Admission: 2024    Length of stay in inpatient status:  12    Subjective     Chief Compliant:    Chief Complaint   Patient presents with    Shortness of Breath       Patient on stable vent settings. Unfortunately had significant progression of subQ emphysema overnight and into am. Surgery updated and new chest tube placed with some improvement. Family discussion again this am and they shared agreed decision for no trach or peg and to avoid escalation of care (no further procedures, no tube exchange, no additional vasopressor, no additional abx added).         Objective       Vital Signs:  Temp:  [98.6 °F (37 °C)-99.9 °F (37.7 °C)] 99.1 °F (37.3 °C)  Heart Rate:  [53-78] 56  Resp:  [28-35] 30  BP: ()/(44-93) 172/91  FiO2 (%):  [28 %] 28 %  SpO2:  [95 %-100 %] 99 %  on   ;   Device (Oxygen Therapy): ventilator  Body mass index is 24.43  kg/m².      ----------------------------------------------------------------------------------------------------------------------  Physical Exam  Vitals and nursing note reviewed.   Constitutional:       Comments: Intubated/sedated   HENT:      Mouth/Throat:      Comments: ETT in place  Neck:      Comments: Significant diffuse subQ emphysema from jawline to mid sternum  Cardiovascular:      Rate and Rhythm: Normal rate and regular rhythm.   Pulmonary:      Comments: B/l mechanical breath sounds, left sided subQ emphysema  Abdominal:      General: There is no distension.      Palpations: Abdomen is soft.   Musculoskeletal:      Right lower leg: No edema.      Left lower leg: No edema.   Skin:     General: Skin is warm.      Coloration: Skin is pale.   Neurological:      Comments: Not withdrawing to pain or following commands       ----------------------------------------------------------------------------------------------------------------------          Assessment & Plan      -Acute NSTEMI, type 1 (positive stress test on 7/18/2024 without any reversible ischemia, that was present on admission  -History of IPF and COPD with chronic hypoxia (uses 3 L/min at home)  -Acute exacerbation of IPF/COPD causing acute hypoxic and hypercapnic respiratory failure on top of chronic hypoxic respiratory failure and sepsis (developed on 7/21/2024 due to a suspected aspiration episode) resulting in oral intubation and mechanical ventilation from aspiration pneumonitis  -Hypotension, septic vs sedation vs diuretic induced, developed during admission  -New onset heart failure with reduced EF, acute exacerbation that developed on 7/21/2024  -Hypocalcemia that developed during admission  -Prolonged QT that developed during the hospitalization  -History of essential hypertension  -History of hypothyroidism  -History of stress urinary incontinence with frequent UTI's, with an acute E coli UTI that was present on admission  -History of  intermittent, diffuse body tremors    -Will cont current management after new chest tube placed with tubes removed as safely able. Once patient's subQ emphysema or overall condition no longer improving or should additional complication develop, at that time we will terminally extubate to comfort measures only. For now cont current abx coverage, regular labs, chest tube, MV and daily SAT/SBT in hopes of more controlled extubation in coming days. Once patient reaches maximal benefit will extubate to comfort measures with family at bedside    I have spent 50 minutes of critical care time with > 50% of time spent in direct patient care, evaluating the patient at bedside, reviewing all labs and images, reviewing ABG and adjusting vent settings, reviewing pain and sedation gtt's, communicating plan of care w/ nursing staff and consultants, and utilizing high complexity medical decision making to assess and treat vital organ system failure in an individual who has impairment of one or more vital organ systems such that there is a high probability of imminent or life threatening deterioration in the patient’s condition. Failure to initiate the above interventions on an urgent basis would likely result in sudden, clinically significant or life threatening deterioration in the patient's condition.        Code Status and Medical Interventions: No CPR (Do Not Attempt to Resuscitate); Limited Support; No intubation (DNI), No vasopressors, No antibiotics, No cardioversion, No antiarrhythmic drugs; ok for current vasopressor no escalation, no escalatio...   Ordered at: 07/30/24 1151     Medical Intervention Limits:    No intubation (DNI)    No vasopressors    No antibiotics    No cardioversion    No antiarrhythmic drugs     Level Of Support Discussed With:    Next of Kin (If No Surrogate)    Health Care Surrogate     Code Status (Patient has no pulse and is not breathing):    No CPR (Do Not Attempt to Resuscitate)     Medical  Interventions (Patient has pulse or is breathing):    Limited Support     Comments:    ok for current vasopressor no escalation, no escalation of antibiotic, if ET tube dislodged do not replace, no additional antiarrhythmic drugs         Disposition: cont ccu management    I have reviewed any copied/forwarded text or data, verified its accuracy, and updated as necessary above.    Eduardo Freeman MD  [facility]  24  19:56 EDT      Electronically signed by Eduardo Freeman MD at 24       Deirdre Davila APRN at 24 1109                     PROGRESS NOTE         Patient Identification:  Name:  Lexie KEITH Valdez  Age:  64 y.o.  Sex:  female  :  1960  MRN:  [id number]  Visit Number:  [id number]  Primary Care Provider:  Violet Pop APRN         LOS: 12 days       ----------------------------------------------------------------------------------------------------------------------  Subjective       Chief Complaints:    Shortness of Breath        Interval History:      The patient remains intubated and sedated at 28% FiO2.  Continues with sedation and phenylephrine infusing.  Tmax 99.7 °F.  Fecal management system in place with no output noted to collection container.  Staff reports development of subcutaneous air to the anterior chest.  Left chest tube in place.  Lung exam is diminished to auscultation bilaterally.  Abdomen is soft with hypoactive bowel sounds.  Tube feed infusing.  Right IJ triple-lumen CVL with no surrounding erythema or drainage.  Procalcitonin 0.07.  Leukocytosis improving and 20.31.  Blood cultures from  preliminarily reported growth at 2 days.  Chest x-ray from this morning reports left apical pneumothorax.  Diffuse subcutaneous air.  Repeat chest x-ray from this morning reported second left-sided thoracostomy tube has been placed.  No pneumothorax seen on the present image.      Review of Systems:    Unable to obtain.  Intubated and  sedated.    ----------------------------------------------------------------------------------------------------------------------      Objective       [facility] Meds:  ALPRAZolam, 0.5 mg, Oral, TID  aspirin, 81 mg, Oral, Daily  Brexpiprazole, 0.5 mg, Oral, Daily  cefepime, 2,000 mg, Intravenous, Q8H  chlorhexidine, 15 mL, Mouth/Throat, Q12H  donepezil, 10 mg, Oral, Q PM  enoxaparin, 40 mg, Subcutaneous, Nightly  [Held by provider] hydroxychloroquine, 200 mg, Oral, BID  insulin regular, 2-7 Units, Subcutaneous, Q6H  ipratropium-albuterol, 3 mL, Nebulization, Q6H - RT  levothyroxine, 25 mcg, Oral, Daily With Breakfast  magic barrier cream, , Topical, BID  memantine, 5 mg, Oral, Q12H  methylPREDNISolone sodium succinate, 60 mg, Intravenous, Q12H  [Held by provider] metoprolol succinate XL, 25 mg, Oral, Q24H  mupirocin, 1 Application, Topical, Q12H  nystatin, 5 mL, Oral, 4x Daily  nystatin, 1 Application, Topical, Q12H  pantoprazole, 40 mg, Intravenous, Q AM  Phenylephrine HCl-NaCl, , ,   Phenylephrine HCl-NaCl, , ,   sodium chloride, 10 mL, Intravenous, Q12H  sodium chloride, 10 mL, Intravenous, Q12H  sodium chloride, 10 mL, Intravenous, Q12H  sodium chloride, 10 mL, Intravenous, Q12H  sodium chloride, 10 mL, Intravenous, Q12H  sodium chloride, 10 mL, Intravenous, Q12H      dexmedetomidine, 0.2-1.5 mcg/kg/hr, Last Rate: 1.1 mcg/kg/hr (07/30/24 0005)  fentanyl 10 mcg/mL,  mcg/hr, Last Rate: 150 mcg/hr (07/30/24 0954)  Pharmacy Consult,   phenylephrine, 0.5-3 mcg/kg/min (Dosing Weight), Last Rate: 0.5 mcg/kg/min (07/30/24 1045)  propofol, 5-50 mcg/kg/min (Dosing Weight), Last Rate: 30 mcg/kg/min (07/30/24 1058)      ----------------------------------------------------------------------------------------------------------------------    Vital Signs:  Temp:  [99 °F (37.2 °C)-99.9 °F (37.7 °C)] 99.9 °F (37.7 °C)  Heart Rate:  [53-78] 66  Resp:  [28-35] 35  BP: ()/(44-93) 119/76  FiO2 (%):  [28 %] 28  %  Mean Arterial Pressure (Non-Invasive) for the past 24 hrs (Last 3 readings):   Noninvasive MAP (mmHg)   07/30/24 1045 84   07/30/24 0845 83   07/30/24 0830 99     SpO2 Percentage    07/30/24 0900 07/30/24 0915 07/30/24 1020   SpO2: 99% 98% 100%     SpO2:  [95 %-100 %] 100 %  on   ;   Device (Oxygen Therapy): ventilator    Body mass index is 24.43 kg/m².  Wt Readings from Last 3 Encounters:   07/30/24 64.6 kg (142 lb 6.7 oz)   04/26/24 67.7 kg (149 lb 4 oz)   04/19/24 67.1 kg (148 lb)        Intake/Output Summary (Last 24 hours) at 7/30/2024 1109  Last data filed at 7/30/2024 0604  Gross per 24 hour   Intake 2465.82 ml   Output 1779 ml   Net 686.82 ml     NPO Diet NPO Type: Tube Feeding  ----------------------------------------------------------------------------------------------------------------------      Physical Exam:    Constitutional: Thin, frail, chronically ill-appearing  female.  Intubated and sedated at 28% FiO2.  Pressors infusing.  HENT:  Head: Normocephalic and atraumatic.  Mouth:  Moist mucous membranes.    Eyes:  Conjunctivae and EOM are normal.  No scleral icterus.  Neck:  Neck supple.  No JVD present.    Cardiovascular:  Normal rate, regular rhythm and normal heart sounds with no murmur. No edema.  Pulmonary/Chest: Diminished bilaterally.  Left-sided chest tube in place.  Abdominal:  Soft.  Bowel sounds are normal.  No distension and no tenderness.   Musculoskeletal:  No edema, no tenderness, and no deformity.  No swelling or redness of joints.  Neurological: Sedate.  Skin:  Skin is warm and dry.  No rash noted.  No pallor.   Psychiatric: Sedate.        ----------------------------------------------------------------------------------------------------------------------  Results from last 7 days   Lab Units 07/29/24  0156 07/29/24  0020   HSTROP T ng/L 31* 29*     Results from last 7 days   Lab Units 07/26/24  0441   PROBNP pg/mL 11,410.0*           Results from last 7 days   Lab Units  "07/29/24  0431   PH, ARTERIAL pH units 7.409   PO2 ART mm Hg 105.0   PCO2, ARTERIAL mm Hg 51.8*   HCO3 ART mmol/L 32.8*     Results from last 7 days   Lab Units 07/30/24  0005 07/29/24  0020 07/28/24  0053 07/27/24  1143 07/26/24  0441 07/25/24  0006 07/24/24  0022   CRP mg/dL  --   --   --   --   --   --  3.89*   LACTATE mmol/L  --  1.3  --   --   --   --  1.5   WBC 10*3/mm3 20.31* 24.89* 22.69* 16.39* 17.54*   < > 22.14*   HEMOGLOBIN g/dL 10.5* 10.5* 10.3* 10.0* 9.1*   < > 9.6*   HEMATOCRIT % 33.6* 33.2* 32.8* 31.9* 29.4*   < > 30.0*   MCV fL 99.4* 100.3* 101.9* 101.3* 101.7*   < > 98.4*   MCHC g/dL 31.3* 31.6 31.4* 31.3* 31.0*   < > 32.0   PLATELETS 10*3/mm3 206 207 224 205 225   < > 287   INR   --   --   --  1.00 1.09  --   --     < > = values in this interval not displayed.     Results from last 7 days   Lab Units 07/30/24  0005 07/29/24  0020 07/28/24  0053 07/27/24  1143   SODIUM mmol/L 137 141 141 142   POTASSIUM mmol/L 4.8 4.7 4.9 5.2   MAGNESIUM mg/dL  --  2.4 2.5* 2.4   CHLORIDE mmol/L 101 106 107 109*   CO2 mmol/L 31.0* 28.2 24.2 26.1   BUN mg/dL 19 15 23 29*   CREATININE mg/dL 0.23* 0.23* 0.26* 0.29*   CALCIUM mg/dL 8.2* 8.4* 8.3* 8.1*   GLUCOSE mg/dL 170* 163* 159* 161*   ALBUMIN g/dL 3.0* 3.0* 3.1* 3.1*   BILIRUBIN mg/dL 0.2 0.2 0.3 0.2   ALK PHOS U/L 94 103 83 78   AST (SGOT) U/L 24 52* 23 23   ALT (SGPT) U/L 45* 58* 24 26   Estimated Creatinine Clearance: 248.7 mL/min (A) (by C-G formula based on SCr of 0.23 mg/dL (L)).  No results found for: \"AMMONIA\"    Glucose   Date/Time Value Ref Range Status   07/30/2024 0555 108 70 - 130 mg/dL Final   07/30/2024 0002 162 (H) 70 - 130 mg/dL Final   07/29/2024 1821 108 70 - 130 mg/dL Final   07/29/2024 1147 176 (H) 70 - 130 mg/dL Final   07/29/2024 0544 121 70 - 130 mg/dL Final   07/29/2024 0010 170 (H) 70 - 130 mg/dL Final   07/28/2024 1747 144 (H) 70 - 130 mg/dL Final   07/28/2024 1121 180 (H) 70 - 130 mg/dL Final     Lab Results   Component Value Date    " "HGBA1C 4.60 (L) 07/18/2024     Lab Results   Component Value Date    TSH 1.140 07/17/2024    FREET4 1.83 (H) 04/11/2024       Blood Culture   Date Value Ref Range Status   07/21/2024 No growth at 2 days  Preliminary   07/21/2024 No growth at 2 days  Preliminary   07/17/2024 No growth at 5 days  Final   07/17/2024 No growth at 5 days  Final     Urine Culture   Date Value Ref Range Status   07/17/2024 >100,000 CFU/mL Escherichia coli (A)  Final     No results found for: \"WOUNDCX\"  No results found for: \"STOOLCX\"  Respiratory Culture   Date Value Ref Range Status   07/21/2024 No growth  Preliminary   07/21/2024   Final    Rare growth Normal respiratory ammon. No S. aureus or Pseudomonas aeruginosa detected. Final report.     Pain Management Panel           No data to display                  ----------------------------------------------------------------------------------------------------------------------  Imaging Results (Last 24 Hours)       Procedure Component Value Units Date/Time    XR Chest AP [[id number]] Collected: 07/30/24 1049     Updated: 07/30/24 1052    Narrative:      XR CHEST AP-     CLINICAL INDICATION: chest tube insertion; I21.4-Non-ST elevation  (NSTEMI) myocardial infarction; J93.9-Pneumothorax, unspecified        COMPARISON: 7/30/2024     TECHNIQUE: Single frontal view of the chest.     FINDINGS:     LUNGS: Second left-sided thoracostomy tube is been placed. No  pneumothorax seen on the presented study.     HEART AND MEDIASTINUM: Heart and mediastinal contours are unremarkable        SKELETON: Bony and soft tissue structures are unremarkable.             Impression:      Second left-sided thoracostomy tube has been placed. No pneumothorax  seen on the presented image.           This report was finalized on 7/30/2024 10:49 AM by Dr. Devan Sahni MD.       XR Chest 1 View [[id number]] Collected: 07/30/24 0805     Updated: 07/30/24 0810    Narrative:      XR CHEST 1 VW-     CLINICAL INDICATION: ptx " f/u; I21.4-Non-ST elevation (NSTEMI) myocardial  infarction; J93.9-Pneumothorax, unspecified        COMPARISON: 7/29/2024     TECHNIQUE: Single frontal view of the chest.     FINDINGS:     LUNGS: Left apical pneumothorax.  Diffuse subcutaneous air.  No interval line change     HEART AND MEDIASTINUM: Heart and mediastinal contours are unremarkable        SKELETON: Bony and soft tissue structures are unremarkable.             Impression:      Left apical pneumothorax.  Diffuse subcutaneous air.  No interval line change           This report was finalized on 7/30/2024 8:08 AM by Dr. Devan Sahni MD.       XR Chest 1 View [[id number]] Collected: 07/29/24 1726     Updated: 07/29/24 1729    Narrative:      XR CHEST 1 VW-     CLINICAL INDICATION: subQ air worsening; I21.4-Non-ST elevation (NSTEMI)  myocardial infarction; J93.9-Pneumothorax, unspecified        COMPARISON: 7/29/2024     TECHNIQUE: Single frontal view of the chest.     FINDINGS:     LUNGS: Left apical pneumothorax. Left-sided thoracostomy tube persists.  Right-sided airspace disease.     HEART AND MEDIASTINUM: Heart and mediastinal contours are unremarkable        SKELETON: Bony and soft tissue structures are unremarkable.             Impression:      Left-sided pneumothorax.           This report was finalized on 7/29/2024 5:27 PM by Dr. Devan Sahni MD.               ----------------------------------------------------------------------------------------------------------------------    Pertinent Infectious Disease Results                Assessment/Plan       Assessment     Septic shock with acute hypoxic respiratory failure requiring mechanical ventilation and pressor support  Pneumonia        Plan      The patient remains intubated and sedated at 28% FiO2.  Continues with sedation and phenylephrine infusing.  Tmax 99.7 °F.  Fecal management system in place with no output noted to collection container.  Staff reports development of subcutaneous air to the  anterior chest.  Left chest tube in place.  Lung exam is diminished to auscultation bilaterally.  Abdomen is soft with hypoactive bowel sounds.  Tube feed infusing.  Right IJ triple-lumen CVL with no surrounding erythema or drainage.  Procalcitonin 0.07.  Leukocytosis improving and 20.31.  Blood cultures from 7/28 preliminarily reported growth at 2 days.  Chest x-ray from this morning reports left apical pneumothorax.  Diffuse subcutaneous air.  Repeat chest x-ray from this morning reported second left-sided thoracostomy tube has been placed.  No pneumothorax seen on the present image.    The patient does continue on steroid course, leukocytosis improving.  Continue with cefepime and doxycycline courses for now.  We will continue to monitor closely.      ANTIMICROBIAL THERAPY    cefepime 2000 mg IVPB in 100 mL NS (VTB)     Code Status:   Code Status and Medical Interventions: No CPR (Do Not Attempt to Resuscitate); Full Support   Ordered at: 07/25/24 1813     Level Of Support Discussed With:    Next of Kin (If No Surrogate)    Health Care Surrogate     Code Status (Patient has no pulse and is not breathing):    No CPR (Do Not Attempt to Resuscitate)     Medical Interventions (Patient has pulse or is breathing):    Full Support       SG Stoner  07/30/24  11:09 EDT    Electronically signed by Deirdre Davila APRN at 07/30/24 1229       Linh Lou APRN at 07/30/24 1105          Palliative Care Daily Progress Note     S: Medical record reviewed, followed up with JESSIKA Johns and Dr Freeman regarding patient's condition. Per RN overnight pt developed new pneumothorax d/y question of current CT out of place. Lexie had significant subcutaneous air on her neck and chest. She was sedated on propofol, fentanyl, Precedex, and on phenylephrine, she did not appear to be labored or in pain at this time. Dr Gil at bedside to try and unclog current CT however was unable so a second Left chest tube placed.      O:  "  Palliative Performance Scale Score:     /82   Pulse 62   Temp 98.8 °F (37.1 °C)   Resp (!) 32   Ht 162.6 cm (64.02\")   Wt 64.6 kg (142 lb 6.7 oz)   SpO2 99%   BMI 24.43 kg/m²     Intake/Output Summary (Last 24 hours) at 7/30/2024 1733  Last data filed at 7/30/2024 1400  Gross per 24 hour   Intake 2465.82 ml   Output 1789 ml   Net 676.82 ml       PE:  General Appearance:    Chronically ill appearing, sedation on intubated on vent, neck is edema has decreased   HEENT:    NC/AT, without obvious abnormality, EOMI, anicteric    Neck:   Increased subcutaneous air noted on neck, trachea midline, no JVD   Lungs:     Sedated on vent @ 24% and 4  of peep, left CT in place, coarse lung sounds noted    Heart:    RRR, normal S1 and S2, no M/R/G   Abdomen:     Soft, NT, ND, NABS    Extremities:   Moves all extremities weakly(not to command) trace BLL extremity edema, RUE edema/rt hand decreasing   Pulses:   Pulses palpable and equal bilaterally   Skin:   Warm, dry, subcutaneous air noted on neck and chest   Neurologic:   Sedation on unable to assess    Psych:   Sedation on, calm         Meds: Reviewed and changes noted    Labs:   Results from last 7 days   Lab Units 07/30/24  0005   WBC 10*3/mm3 20.31*   HEMOGLOBIN g/dL 10.5*   HEMATOCRIT % 33.6*   PLATELETS 10*3/mm3 206     Results from last 7 days   Lab Units 07/30/24  0005   SODIUM mmol/L 137   POTASSIUM mmol/L 4.8   CHLORIDE mmol/L 101   CO2 mmol/L 31.0*   BUN mg/dL 19   CREATININE mg/dL 0.23*   GLUCOSE mg/dL 170*   CALCIUM mg/dL 8.2*     Results from last 7 days   Lab Units 07/30/24  0005   SODIUM mmol/L 137   POTASSIUM mmol/L 4.8   CHLORIDE mmol/L 101   CO2 mmol/L 31.0*   BUN mg/dL 19   CREATININE mg/dL 0.23*   CALCIUM mg/dL 8.2*   BILIRUBIN mg/dL 0.2   ALK PHOS U/L 94   ALT (SGPT) U/L 45*   AST (SGOT) U/L 24   GLUCOSE mg/dL 170*     Imaging Results (Last 72 Hours)       Procedure Component Value Units Date/Time    XR Chest AP [[id number]] Collected: 07/30/24 " 1049     Updated: 07/30/24 1052    Narrative:      XR CHEST AP-     CLINICAL INDICATION: chest tube insertion; I21.4-Non-ST elevation  (NSTEMI) myocardial infarction; J93.9-Pneumothorax, unspecified        COMPARISON: 7/30/2024     TECHNIQUE: Single frontal view of the chest.     FINDINGS:     LUNGS: Second left-sided thoracostomy tube is been placed. No  pneumothorax seen on the presented study.     HEART AND MEDIASTINUM: Heart and mediastinal contours are unremarkable        SKELETON: Bony and soft tissue structures are unremarkable.             Impression:      Second left-sided thoracostomy tube has been placed. No pneumothorax  seen on the presented image.           This report was finalized on 7/30/2024 10:49 AM by Dr. Devan Sahni MD.       XR Chest 1 View [[id number]] Collected: 07/30/24 0805     Updated: 07/30/24 0810    Narrative:      XR CHEST 1 VW-     CLINICAL INDICATION: ptx f/u; I21.4-Non-ST elevation (NSTEMI) myocardial  infarction; J93.9-Pneumothorax, unspecified        COMPARISON: 7/29/2024     TECHNIQUE: Single frontal view of the chest.     FINDINGS:     LUNGS: Left apical pneumothorax.  Diffuse subcutaneous air.  No interval line change     HEART AND MEDIASTINUM: Heart and mediastinal contours are unremarkable        SKELETON: Bony and soft tissue structures are unremarkable.             Impression:      Left apical pneumothorax.  Diffuse subcutaneous air.  No interval line change           This report was finalized on 7/30/2024 8:08 AM by Dr. Devan Sahni MD.       XR Chest 1 View [[id number]] Collected: 07/29/24 1726     Updated: 07/29/24 1729    Narrative:      XR CHEST 1 VW-     CLINICAL INDICATION: subQ air worsening; I21.4-Non-ST elevation (NSTEMI)  myocardial infarction; J93.9-Pneumothorax, unspecified        COMPARISON: 7/29/2024     TECHNIQUE: Single frontal view of the chest.     FINDINGS:     LUNGS: Left apical pneumothorax. Left-sided thoracostomy tube persists.  Right-sided airspace  disease.     HEART AND MEDIASTINUM: Heart and mediastinal contours are unremarkable        SKELETON: Bony and soft tissue structures are unremarkable.             Impression:      Left-sided pneumothorax.           This report was finalized on 7/29/2024 5:27 PM by Dr. Devan Sahni MD.       XR Chest 1 View [[id number]] Collected: 07/29/24 1027     Updated: 07/29/24 1031    Narrative:      XR CHEST 1 VW-     CLINICAL INDICATION: et tube position; I21.4-Non-ST elevation (NSTEMI)  myocardial infarction; J93.9-Pneumothorax, unspecified        COMPARISON: 7/29/2024     TECHNIQUE: Single frontal view of the chest.     FINDINGS:     LUNGS: Patchy bilateral airspace disease.  Equivocal small left apical pneumothorax. Left-sided chest tube is  present.  Endotracheal tube overlies the tracheal air column well above the  irene.     HEART AND MEDIASTINUM: Heart and mediastinal contours are unremarkable        SKELETON: Bony and soft tissue structures are unremarkable.             Impression:         1. Small left apical pneumothorax  2. Endotracheal tube appears to be well above the irene           This report was finalized on 7/29/2024 10:29 AM by Dr. Devan Sahni MD.       XR Chest 1 View [[id number]] Collected: 07/29/24 0631     Updated: 07/29/24 0635    Narrative:       VERIFICATION OBSERVER NAME: Kai Magana MD.     TECHNIQUE, ADDITIONAL HISTORY, FINDINGS: Single frontal view of the  chest was obtained.   Comparison study date : Prior day. Time : 4:26 AM.     HISTORY: Respiratory failure     Findings:      ET TUBE is located 1 cm above the irene.   NG TUBE is located in the abdomen.  RIGHT central line with the tip in the region of the atriocaval  junction.  LEFT chest tube in place.  Diffuse interstitial changes noted, diminished from prior.  Minimal cardiac enlargement.  Trace LEFT pleural effusion.       Impression:      ET tube is too low in position.  Diminishing interstitial changes.              [id number]      ZIP Code [id number].              This report was finalized on 7/29/2024 6:33 AM by Dr. Kai Magana MD.       XR Chest 1 View [[id number]] Collected: 07/28/24 1346     Updated: 07/28/24 1348    Narrative:      TECHNIQUE: X-ray of the chest single view was performed per as low as  reasonably achievable (ALARA) protocols.     COMPARISON: 7/27/2024     FINDINGS:     There is an endotracheal tube with tip 3.7 cm above the irene. A  gastric drainage tube courses into the stomach, with tip not seen. There  is a left lung base chest tube and a gastric lap band device. No  pneumothorax. There is a right neck central venous catheter with tip  likely near the cavoatrial junction.     Subjectively, minimally more pronounced ARDS like lung  opacities/reticulation diffusely. No definite pleural effusion.     Redemonstrated subcutaneous emphysema in the supraclavicular region  bilaterally.     Nonenlarged cardiomediastinal silhouette.       Impression:         1. Subjectively minimally increased ARDS-like airspace  opacities/pulmonary edema compared to prior. No definite pleural  effusion or pneumothorax.     2. Stable lines and tubes.        To TALK to On Call [phone]     This report was finalized on 7/28/2024 1:46 PM by Andrea Medina MD.       XR Chest 1 View [[id number]] Collected: 07/27/24 1746     Updated: 07/27/24 1749    Narrative:      INDICATION: Difficulty breathing.     TECHNIQUE: Frontal radiograph of the chest.     COMPARISON: Radiograph from yesterday     FINDINGS:   Cardiomegaly. Multifocal infiltrates/edema, unchanged. No pleural  effusion or pneumothorax. Endotracheal tube, enteric tube, left-sided  chest tube and central venous catheter in similar position.       Impression:      No significant interval change allowing for differences in technique.        This report was finalized on 7/27/2024 5:47 PM by Alex Pallas, DO.                 Diagnostics: Reviewed    A: Lexie Valdez is a 64 y.o. female  admitted on 7/17/2024 due to shortness of breath. Lexie has a medical history of  anxiety/depression, arthritis, HTN, hypothyroidism, stress urinary incontinence with frequent UTI's, COPD and pulmonary fibrosis on 3L NC at home, and resting tremors in face and extremities, who presents with worsening shortness of breath for the last couple days. Pt also at time of admission c/o chest pressure associated with dyspnea stating this was chronic however had worsened in the days leading up to her admission. She also endorsed diarrhea for the last couple of days as well as dysuria and stated that she has recurring UTI's. Labs in ER revealed Troponin of 229 with repeat 188, BNP was 3550, Na 131, Cr 0.49, glucose 177, CRP 1.65, lactate 2.0, procal and WBC normal but with 88.7% neutrophils. UA showed positive nitrite, trace leuk, 6-10 WBC, 4+ bacteria and 3-6 squamous epithelial cells. She was tachycardic and tachypneic with pH of 7.458, CO2 40, Po2 154, bicarb 28. She was admitted to PCU on admission and in the early am of 7/21 was transferred to CCU due to worsening dyspnea, bilateral infiltrates without effusion concerning for interstitial debbi disease flare, was transferred to CCU. Initially she was placed on Bipap in CCU and at 7/21 6:20 am Lexie was sedated intubated and placed on the ventilator. Palliative care consulted for GOC,ACP and for support of family.   Today 7/30/24  T 98.6, HR 60, /65 sedated on vent at 24% and 4 of peep 2 left CT in place and on propofol fentanyl, precedex, and 1 vasopressor    P:  Palliative care was able to be present with Dr Freeman to discuss with pts family her condition, events from overnight and to discuss GOC.  Khris stated that they had talked and decided together that they did not want for Lexie to have a trach and peg. After discussion plan at current time is to give CT time to relieve subq air over the next day or so, to not escalate care in any way, maintain current  treatment plan until such time family/DR are ready to make Lexie full comfort and remove her from ventilator.    We will continue to follow along. Please do not hesitate to contact us regarding further sx mgmt or GOC needs, including after hours or on weekends via our on call provider at [phone].     SG Javier    2024    Electronically signed by Linh Lou APRN at [phone]       Nichole Gil MD at 24 [phone]          Surgery follow-up for chest tube management.    Patient having interval development of large amount of subcu air.    Exam: On exam of the left chest tube there does appear to be a clot right below the skin level which could not be dislodged.    Procedure Note    Procedure: Insertion 28 French chest tube    Location: Left lateral chest, superior to previous chest tube    Anesthesia: 1% lidocaine    Description:  The risks and benefits of the procedure were discussed with the family.  After prep with Betadine and infiltration with lidocaine a transverse incision was made approximately 3 cm superior to the prior.  Blunt dissection was carried out with a hemostat and after this was done 1 could see air escaping from the subcu and thoracic cavity.  A 28 French chest tube which was directed posteriorly was then placed.  This was sutured in with 0 Prolene.  motion of the waterseal chamber with respirations with the new chest tube.    A pullout stitch of horizontal mattress 0 Prolene was placed on the other 36 French chest tube.    Complications:  none    Follow-up: CXR pending    Electronically signed by Nichole Gil MD at 24 [phone]       Rinku Braxton MD at 24 [phone]              [facility]  PROGRESS NOTE    Patient information:  Name: [facility] Valdez  Age/Sex: 64 y.o. female  :  1960        PCP: Violet Pop APRN  Attending: Estuardo Charles MD  MRN:  [id number]  Visit Number:   [id number]    LOS: 12  CODE STATUS:    Code Status and Medical Interventions: No CPR (Do Not Attempt to Resuscitate); Limited Support; No intubation (DNI), No vasopressors, No antibiotics, No cardioversion, No antiarrhythmic drugs; ok for current vasopressor no escalation, no escalatio...   Ordered at: 07/30/24 1151     Medical Intervention Limits:    No intubation (DNI)    No vasopressors    No antibiotics    No cardioversion    No antiarrhythmic drugs     Level Of Support Discussed With:    Next of Kin (If No Surrogate)    Health Care Surrogate     Code Status (Patient has no pulse and is not breathing):    No CPR (Do Not Attempt to Resuscitate)     Medical Interventions (Patient has pulse or is breathing):    Limited Support     Comments:    ok for current vasopressor no escalation, no escalation of antibiotic, if ET tube dislodged do not replace, no additional antiarrhythmic drugs       PROBLEM LIST:Principal Problem:    NSTEMI (non-ST elevated myocardial infarction)      Reason for Cardiology follow-up: NSTEMI     Subjective   ADMISSION INFORMATION:  Chief Complaint   Patient presents with    Shortness of Breath       DATE:7/30/2024    HPI:  Lexie Valdez is a 64 y.o. 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     Patient presented to [facility] ([facility] emergency room (ER) on 7/17/2024 with complaints of increased shortness of breath x 2 weeks has become progressively worse and exacerbated with minimal activity.     Interval History:   Telemetry reveals SR 80s with a 4 beat run of wide complex ventricular beats as seen in telemetry strips attached below. According to patient's I & O flow chart  does not reflect a negative balance of diuresis overnight. AM labs reveal potassium 4.8,  creatinine 0.23, hemoglobin 10.5, platelet count 206, and WBC of 20.31 which has improved from 24.89.     Patient was in room CCU 10 when she was seen and examined.  Patient remains intubated and sedated. No family at bedside during her exam today.     EVENT TIMELINE:   7/18:ECHO w EF of 36 - 40%. Left ventricular diastolic function is consistent with (grade I) impaired relaxation, mild tricuspid valve regurgitation is present. Estimated right ventricular systolic pressure from tricuspid regurgitation is moderately elevated (45-55 mmHg). Patient seem to have developed a new regional wall motion normalities with decreased LV systolic function as compared to the previous study in March 2023.  Please see full report attached below.  7/18: Bess ST with MPI indicating moderate-sized infarct located in the inferior wall, septal wall and apex with no significant ischemia noted. Impressions are consistent with an intermediate risk study. EF = 43%.  Please see full report attached below.  7/21: Emergently intubated orotracheally with a #7.5 endotracheal tube at approximately 22:16 PM.   7/21: CXRs indicated multifocal and worsening pneumonia coupled with some concern as well for pulmonary edema.   7/23: Chest tube inserted 2/2 left sided pneumothorax.   7/29: Palliative Care involved and discussing GOC with family.   7/30: Second chest tube inserted superior to previous one today.     Objective     Vital Signs  Temp:  [98.8 °F (37.1 °C)-99.9 °F (37.7 °C)] 98.8 °F (37.1 °C)  Heart Rate:  [53-78] 61  Resp:  [28-35] 28  BP: ()/(44-93) 122/79  FiO2 (%):  [28 %] 28 %  Device (Oxygen Therapy): ventilator  Vital Signs (last 72 hrs)         07/27 0700  07/28 0659 07/28 0700 07/29 0659 07/29 0700 07/30 0659 07/30 0700 07/30 0920   Most Recent      Temp (°F) 96.8 -  99.9    99.3 -  100    99 -  99.9    99.5 -  99.9     99.9 (37.7) 07/30 0915    Heart Rate 52 -  104    48 -  66    53 -  78    57 -  68     62 07/30 0915    Resp  28 -  39    28 -  36    28 -  32      28     28 07/30 0824    BP 71/38 -  169/94    71/43 -  156/80    78/44 -  167/91    99/60 -  146/86     110/69 07/30 0845    SpO2 (%) 91 -  100    92 -  100    95 -  100    98 -  100     98 07/30 0915    Oxygen Concentration (%)   28 28 28      28     28 07/30 0824          BMI:Body mass index is 24.43 kg/m².    WEIGHT:      07/28/24  0440 07/29/24  0443 07/30/24  0604   Weight: 65.8 kg (145 lb 1 oz) 61.4 kg (135 lb 5.8 oz) 64.6 kg (142 lb 6.7 oz)       DIET:NPO Diet NPO Type: Tube Feeding    I&O:  Intake & Output (last 3 days)         07/27 0701  07/28 0700 07/28 0701 07/29 0700 07/29 0701 07/30 0700 07/30 0701 07/31 0700    I.V. (mL/kg) 1024.4 (17.9) 1481.4 (25.9) 1155.8 (20.2)     Other 241 767 505     NG/ 1033 705     IV Piggyback 200  100     Total Intake(mL/kg) 1792.4 (31.3) 3281.4 (57.4) 2465.8 (43.1)     Urine (mL/kg/hr) 1700 (1.2) 2700 (2) 1675 (1.2)     Stool 300 100 100     Chest Tube 13 3 4     Total Output 2013 2803 1779     Net -220.6 +478.4 +686.8             Stool Unmeasured Occurrence 4 x                PHYSICAL EXAM:  Constitutional:       Appearance: Not in distress. Acutely ill-appearing.   HENT:         Comments: Orally intubated.  Neck:      Vascular: No JVD. JVD normal.   Pulmonary:      Comments: Intubated with mechanical ventilation with decreased breath sounds noted to left side. Sub Q emphysema noted. X 2 left sided chest tubes in place as well.   Cardiovascular:      Normal rate. Regular rhythm.      Murmurs: There is no murmur.      Comments: Generalized edema note to BUE and BLE.   Edema:     Peripheral edema present.  Abdominal:      General: Bowel sounds are normal. There is no distension.      Palpations: Abdomen is soft.      Comments: FC with BSD in use.    Musculoskeletal:      Cervical back: Neck supple.      Comments: Sedated. SCUDS in use BLE.  Skin:     General: Skin is warm and dry.      Findings: Bruising present.       Comments: Diffuse bruising noted to BUE, anterior chest and neck.    Neurological:      Comments: Intubated and sedated.                   Results review   Results Review:    I have reviewed the patient's new clinical results. 07/30/24 13:11 EDT    Results from last 7 days   Lab Units 07/29/24  0156 07/29/24  0020   HSTROP T ng/L 31* 29*     Lab Results   Component Value Date    PROBNP 11,410.0 (H) 07/26/2024    PROBNP 14,737.0 (H) 07/22/2024    PROBNP 3,550.0 (H) 07/17/2024     Results from last 7 days   Lab Units 07/30/24  0005 07/29/24  0020 07/28/24  0053 07/27/24  1143 07/26/24  0441 07/25/24  0006 07/24/24  0022   WBC 10*3/mm3 20.31* 24.89* 22.69* 16.39* 17.54* 21.27* 22.14*   HEMOGLOBIN g/dL 10.5* 10.5* 10.3* 10.0* 9.1* 9.6* 9.6*   PLATELETS 10*3/mm3 206 207 224 205 225 303 287     Results from last 7 days   Lab Units 07/30/24  0005 07/29/24  0020 07/28/24  0053 07/27/24  1143 07/26/24  0441 07/25/24  0006 07/24/24  0022   SODIUM mmol/L 137 141 141 142 142 144 133*   POTASSIUM mmol/L 4.8 4.7 4.9 5.2 4.7 5.1 4.0   CHLORIDE mmol/L 101 106 107 109* 112* 114* 103   CO2 mmol/L 31.0* 28.2 24.2 26.1 25.6 24.9 25.0   BUN mg/dL 19 15 23 29* 29* 20 17   CREATININE mg/dL 0.23* 0.23* 0.26* 0.29* 0.31* 0.33* 0.38*   CALCIUM mg/dL 8.2* 8.4* 8.3* 8.1* 8.0* 8.1* 8.2*   GLUCOSE mg/dL 170* 163* 159* 161* 115* 138* 164*   ALT (SGPT) U/L 45* 58* 24 26 17  --  14   AST (SGOT) U/L 24 52* 23 23 21  --  22     Lab Results   Component Value Date    MG 2.4 07/29/2024    MG 2.5 (H) 07/28/2024    MG 2.4 07/27/2024     Estimated Creatinine Clearance: 248.7 mL/min (A) (by C-G formula based on SCr of 0.23 mg/dL (L)).    Lab Results   Component Value Date    HGBA1C 4.60 (L) 07/18/2024    HGBA1C 4.80 04/11/2024    HGBA1C 5.00 12/31/2023     Lab Results   Component Value Date    CHOL 136 07/18/2024    TRIG 61 07/18/2024    LDL 56 07/18/2024    HDL 67 (H) 07/18/2024        Lab Results   Component Value Date    INR 1.00 07/27/2024    INR 1.09  07/26/2024    INR 0.93 07/18/2024    INR 0.98 04/26/2024    INR 1.00 03/21/2023     Lab Results   Component Value Date    LABHEPA 0.27 (L) 07/20/2024    LABHEPA 0.36 07/19/2024    LABHEPA 0.33 07/19/2024    LABHEPA 0.11 (L) 07/18/2024       Lab Results   Component Value Date    TSH 1.140 07/17/2024      Pain Management Panel           No data to display              Microbiology Results (last 10 days)       Procedure Component Value - Date/Time    Blood Culture - Blood, Arm, Left [[id number]]  (Normal) Collected: 07/28/24 1040    Lab Status: Preliminary result Specimen: Blood from Arm, Left Updated: 07/30/24 1046     Blood Culture No growth at 2 days    Blood Culture - Blood, Arm, Right [[id number]]  (Normal) Collected: 07/28/24 1000    Lab Status: Preliminary result Specimen: Blood from Arm, Right Updated: 07/30/24 1102     Blood Culture No growth at 2 days    Aspergillus Galactomannan Antigen - Blood, Arm, Right [[id number]] Collected: 07/22/24 1410    Lab Status: Final result Specimen: Blood from Arm, Right Updated: 07/26/24 0254     Aspergillus Ag, BAL/Serum 0.04 Index     Narrative:      Performed at:  [address] [facility]  [address] [place], [place]  [id number]  : Lorraine Ruelas MD, Phone:  [phone]  Performed at:  [address] [facility]  [address] [place], [place]  [id number]  : Haroon Walls [facility], Phone:  [phone]    Mycoplasma Pneumoniae Antibody, IgM - Blood, Arm, Left [[id number]]  (Normal) Collected: 07/22/24 0012    Lab Status: Final result Specimen: Blood from Arm, Left Updated: 07/22/24 0202     Mycoplasma pneumo IgM Negative    Legionella Antigen, Urine - Urine, Urine, Clean Catch [[id number]]  (Normal) Collected: 07/21/24 2242    Lab Status: Final result Specimen: Urine, Clean Catch Updated: 07/21/24 2308     LEGIONELLA ANTIGEN, URINE Negative    Narrative:      Presumptive negative for L. pneumophilia serogroup 1 antigen, suggesting no recent or current infection.     Respiratory Culture - Sputum, ET Suction [[id number]] Collected: 07/21/24 2231    Lab Status: Final result Specimen: Sputum from ET Suction Updated: 07/24/24 1119     Respiratory Culture No growth     Gram Stain Rare (1+) Mixed ammon      No WBCs seen      No Epithelial cells seen    MRSA Screen, PCR (Inpatient) - Swab, Nares [[id number]]  (Normal) Collected: 07/21/24 1252    Lab Status: Final result Specimen: Swab from Nares Updated: 07/21/24 1424     MRSA PCR No MRSA Detected    Narrative:      The negative predictive value of this diagnostic test is high and should only be used to consider de-escalating anti-MRSA therapy. A positive result may indicate colonization with MRSA and must be correlated clinically.    Respiratory Panel PCR w/COVID-19(SARS-CoV-2) RHONDA/HEATHER/TASNEEM/PAD/COR/CHRISTELLE In-House, NP Swab in UTM/VTM, 2 HR TAT - Swab, Nasopharynx [[id number]]  (Normal) Collected: 07/21/24 1247    Lab Status: Final result Specimen: Swab from Nasopharynx Updated: 07/21/24 1357     ADENOVIRUS, PCR Not Detected     Coronavirus 229E Not Detected     Coronavirus HKU1 Not Detected     Coronavirus NL63 Not Detected     Coronavirus OC43 Not Detected     COVID19 Not Detected     Human Metapneumovirus Not Detected     Human Rhinovirus/Enterovirus Not Detected     Influenza A PCR Not Detected     Influenza B PCR Not Detected     Parainfluenza Virus 1 Not Detected     Parainfluenza Virus 2 Not Detected     Parainfluenza Virus 3 Not Detected     Parainfluenza Virus 4 Not Detected     RSV, PCR Not Detected     Bordetella pertussis pcr Not Detected     Bordetella parapertussis PCR Not Detected     Chlamydophila pneumoniae PCR Not Detected     Mycoplasma pneumo by PCR Not Detected    Narrative:      In the setting of a positive respiratory panel with a viral infection PLUS a negative procalcitonin without other underlying concern for bacterial infection, consider observing off antibiotics or discontinuation of antibiotics and continue  supportive care. If the respiratory panel is positive for atypical bacterial infection (Bordetella pertussis, Chlamydophila pneumoniae, or Mycoplasma pneumoniae), consider antibiotic de-escalation to target atypical bacterial infection.    Respiratory Culture - Sputum, ET Suction [[id number]] Collected: 07/21/24 1152    Lab Status: Final result Specimen: Sputum from ET Suction Updated: 07/23/24 1053     Respiratory Culture Rare growth Normal respiratory ammon. No S. aureus or Pseudomonas aeruginosa detected. Final report.     Gram Stain Moderate (3+) WBCs seen      Rare (1+) Epithelial cells seen      No organisms seen    Blood Culture - Blood, Arm, Left [[id number]]  (Normal) Collected: 07/21/24 0450    Lab Status: Final result Specimen: Blood from Arm, Left Updated: 07/26/24 0515     Blood Culture No growth at 5 days    Blood Culture - Blood, Arm, Right [[id number]]  (Normal) Collected: 07/21/24 0448    Lab Status: Final result Specimen: Blood from Arm, Right Updated: 07/26/24 0515     Blood Culture No growth at 5 days           Imaging Results (Last 24 Hours)       Procedure Component Value Units Date/Time    XR Chest AP [[id number]] Collected: 07/30/24 1049     Updated: 07/30/24 1052    Narrative:      XR CHEST AP-     CLINICAL INDICATION: chest tube insertion; I21.4-Non-ST elevation  (NSTEMI) myocardial infarction; J93.9-Pneumothorax, unspecified        COMPARISON: 7/30/2024     TECHNIQUE: Single frontal view of the chest.     FINDINGS:     LUNGS: Second left-sided thoracostomy tube is been placed. No  pneumothorax seen on the presented study.     HEART AND MEDIASTINUM: Heart and mediastinal contours are unremarkable        SKELETON: Bony and soft tissue structures are unremarkable.             Impression:      Second left-sided thoracostomy tube has been placed. No pneumothorax  seen on the presented image.           This report was finalized on 7/30/2024 10:49 AM by Dr. Devan Sahni MD.       XR Chest 1 View  [[id number]] Collected: 07/30/24 0805     Updated: 07/30/24 0810    Narrative:      XR CHEST 1 VW-     CLINICAL INDICATION: ptx f/u; I21.4-Non-ST elevation (NSTEMI) myocardial  infarction; J93.9-Pneumothorax, unspecified        COMPARISON: 7/29/2024     TECHNIQUE: Single frontal view of the chest.     FINDINGS:     LUNGS: Left apical pneumothorax.  Diffuse subcutaneous air.  No interval line change     HEART AND MEDIASTINUM: Heart and mediastinal contours are unremarkable        SKELETON: Bony and soft tissue structures are unremarkable.             Impression:      Left apical pneumothorax.  Diffuse subcutaneous air.  No interval line change           This report was finalized on 7/30/2024 8:08 AM by Dr. Devan Sahni MD.       XR Chest 1 View [[id number]] Collected: 07/29/24 1726     Updated: 07/29/24 1729    Narrative:      XR CHEST 1 VW-     CLINICAL INDICATION: subQ air worsening; I21.4-Non-ST elevation (NSTEMI)  myocardial infarction; J93.9-Pneumothorax, unspecified        COMPARISON: 7/29/2024     TECHNIQUE: Single frontal view of the chest.     FINDINGS:     LUNGS: Left apical pneumothorax. Left-sided thoracostomy tube persists.  Right-sided airspace disease.     HEART AND MEDIASTINUM: Heart and mediastinal contours are unremarkable        SKELETON: Bony and soft tissue structures are unremarkable.             Impression:      Left-sided pneumothorax.           This report was finalized on 7/29/2024 5:27 PM by Dr. Devan Sahni MD.             ECHO:  Results for orders placed during the hospital encounter of 07/17/24    Adult Transthoracic Echo Complete w/ Color, Spectral and Contrast if necessary per protocol    Interpretation Summary    Normal left ventricular cavity size and wall thickness noted.    The following left ventricular wall segments are hypokinetic: mid anterior, basal anterolateral, apical lateral, apical septal, mid anteroseptal and basal anterior. The following left ventricular wall segments  are akinetic: apex.    Left ventricular systolic function is moderately decreased. Left ventricular ejection fraction appears to be 36 - 40%.    Left ventricular diastolic function is consistent with (grade I) impaired relaxation.    The aortic valve is structurally normal with no regurgitation or stenosis present.    The mitral valve is structurally normal with no significant stenosis present. Mild mitral valve regurgitation is present.    Mild tricuspid valve regurgitation is present. Estimated right ventricular systolic pressure from tricuspid regurgitation is moderately elevated (45-55 mmHg).    There is no evidence of pericardial effusion. .    Comments: Patient seem to have developed a new regional wall motion normalities with decreased LV systolic function as compared to the previous study in March 2023.      STRESS TEST:  Results for orders placed during the hospital encounter of 07/17/24    Stress Test With Myocardial Perfusion One Day    Interpretation Summary  Images from the original result were not included.      A pharmacological stress test was performed using regadenoson without low-level exercise.    Findings consistent with an indeterminate ECG stress test.    Myocardial perfusion imaging indicates a moderate-sized infarct located in the inferior wall, septal wall and apex with no significant ischemia noted.    Abnormal LV wall motion consistent with apical dyskinesis.    Left ventricular ejection fraction is moderately reduced (Calculated EF = 43%).    Impressions are consistent with an intermediate risk study.       HEART CATH:  No results found for this or any previous visit.      TELEMETRY:     SR 80s with a 4 beat run of wide complex ventricular beats as seen in telemetry strips attached below.             I reviewed the patient's new clinical results.    ALLERGIES: Codeine and Sulfa antibiotics    Medication Review:   Current list of medications may not reflect those currently placed in  orders that are not signed or are being held.     ALPRAZolam, 0.5 mg, Oral, TID  aspirin, 81 mg, Oral, Daily  Brexpiprazole, 0.5 mg, Oral, Daily  cefepime, 2,000 mg, Intravenous, Q8H  chlorhexidine, 15 mL, Mouth/Throat, Q12H  donepezil, 10 mg, Oral, Q PM  enoxaparin, 40 mg, Subcutaneous, Nightly  [Held by provider] hydroxychloroquine, 200 mg, Oral, BID  insulin regular, 2-7 Units, Subcutaneous, Q6H  ipratropium-albuterol, 3 mL, Nebulization, Q6H - RT  levothyroxine, 25 mcg, Oral, Daily With Breakfast  magic barrier cream, , Topical, BID  memantine, 5 mg, Oral, Q12H  methylPREDNISolone sodium succinate, 60 mg, Intravenous, Q12H  [Held by provider] metoprolol succinate XL, 25 mg, Oral, Q24H  mupirocin, 1 Application, Topical, Q12H  nystatin, 5 mL, Oral, 4x Daily  nystatin, 1 Application, Topical, Q12H  pantoprazole, 40 mg, Intravenous, Q AM  Phenylephrine HCl-NaCl, , ,   Phenylephrine HCl-NaCl, , ,   sodium chloride, 10 mL, Intravenous, Q12H  sodium chloride, 10 mL, Intravenous, Q12H  sodium chloride, 10 mL, Intravenous, Q12H  sodium chloride, 10 mL, Intravenous, Q12H  sodium chloride, 10 mL, Intravenous, Q12H  sodium chloride, 10 mL, Intravenous, Q12H      dexmedetomidine, 0.2-1.5 mcg/kg/hr, Last Rate: 1.1 mcg/kg/hr (07/30/24 0005)  fentanyl 10 mcg/mL,  mcg/hr, Last Rate: 150 mcg/hr (07/30/24 0954)  Pharmacy Consult,   phenylephrine, 0.5-3 mcg/kg/min (Dosing Weight), Last Rate: 0.5 mcg/kg/min (07/30/24 1045)  propofol, 5-50 mcg/kg/min (Dosing Weight), Last Rate: 30 mcg/kg/min (07/30/24 1058)        senna-docusate sodium **AND** polyethylene glycol **AND** bisacodyl **AND** bisacodyl    busPIRone    Calcium Replacement - Follow Nurse / BPA Driven Protocol    dextrose    dextrose    glucagon (human recombinant)    guaifenesin    HYDROcodone-acetaminophen    hydrOXYzine    ipratropium    Magnesium Cardiology Dose Replacement - Follow Nurse / BPA Driven Protocol    midazolam    nitroglycerin    Pharmacy Consult     Phenylephrine HCl-NaCl    Phenylephrine HCl-NaCl    Phosphorus Replacement - Follow Nurse / BPA Driven Protocol    Potassium Replacement - Follow Nurse / BPA Driven Protocol    prochlorperazine    sodium chloride    sodium chloride    sodium chloride    sodium chloride    sodium chloride    sodium chloride    sodium chloride    sodium chloride    sodium chloride    sodium chloride    sodium chloride    vecuronium    Assessment    Acute non-ST elevation myocardial infarction likely type II due to respiratory failure with hypoxia  Advanced COPD/pulmonary fibrosis, remains intubated on the ventilator.  Acute on chronic respiratory failure requiring intubation mechanical ventilation.  HFrEF, secondary to ischemic cardiomyopathy, currently compensated  Essential hypertension  Mild hyponatremia  Pneumothorax requiring 2 chest tubes placement  Shock, multifactorial            Recommendations / Plan   1.  Patient now had 2 chest tubes with some better aeration in the left lung  2.  Continue with low-dose aspirin  3.  Unable to advance GDMT medications because of soft and low blood pressure  4.  Overall poor prognosis          I have discussed the patients findings and recommendations with the patient.    Thank you very much for asking us to be involved in this patient's care.  We will follow along with you.    Electronically signed by SG Chandler, 07/30/24, 1:12 PM EDT.   Electronically signed by Rinku Braxton MD, 07/30/24, 1:15 PM EDT.                      Please note that portions of this note were completed with a voice recognition program.    Please note that portions of this note were copied and has been reviewed and is accurate as of 7/30/2024 .       Electronically signed by Rinku Braxton MD at 07/30/24 1319       John Mcmahon MD at 07/30/24 0815          Progress Note Critical Care Pulmonary        Subjective      Overnight events reviewed.  All the labs medications ins and outs and vitals  reviewed.    Patient have subcutaneous emphysema.  Chest tube was adjusted and surgery on the case and they were notified.  All the labs medications ins and outs and vitals reviewed.  Goals of care discussion happened with family and they are likely going to withdraw care in coming days.  Drips reviewed.  Ventilator settings and graphics reviewed.  Drips and ventilator settings adjusted.        Review of Systems:    Could not be obtained as patient is sedated on ventilator.    Vital Signs  Temp:  [98.6 °F (37 °C)-99.9 °F (37.7 °C)] 98.8 °F (37.1 °C)  Heart Rate:  [53-78] 66  Resp:  [28-35] 28  BP: ()/(44-93) 84/53  FiO2 (%):  [28 %] 28 %  Body mass index is 24.43 kg/m².    Intake/Output Summary (Last 24 hours) at 7/30/2024 1555  Last data filed at 7/30/2024 1400  Gross per 24 hour   Intake 2465.82 ml   Output 1789 ml   Net 676.82 ml     I/O this shift:  In: -   Out: 10 [Chest Tube:10]    Physical Exam:  General-chronically ill appearance, not in any acute distress, sedated    HEENT-PERRLA s    Neck-supple    Respiratory-bilateral air entry, bibasilar crackles.    Chest tube in place  Cardiovascular-NSR  GI-nontender nondistended bowel sounds positive    CNS- grossly nonfocal    Extremities-no clubbing and edema        Results Review:      Results from last 7 days   Lab Units 07/30/24  0005 07/29/24  0020 07/28/24  0053   WBC 10*3/mm3 20.31* 24.89* 22.69*   HEMOGLOBIN g/dL 10.5* 10.5* 10.3*   PLATELETS 10*3/mm3 206 207 224     Results from last 7 days   Lab Units 07/30/24  0005 07/29/24  0020 07/28/24  0053 07/27/24  1143   SODIUM mmol/L 137 141 141 142   POTASSIUM mmol/L 4.8 4.7 4.9 5.2   CHLORIDE mmol/L 101 106 107 109*   CO2 mmol/L 31.0* 28.2 24.2 26.1   BUN mg/dL 19 15 23 29*   CREATININE mg/dL 0.23* 0.23* 0.26* 0.29*   CALCIUM mg/dL 8.2* 8.4* 8.3* 8.1*   GLUCOSE mg/dL 170* 163* 159* 161*   MAGNESIUM mg/dL  --  2.4 2.5* 2.4     Lab Results   Component Value Date    INR 1.00 07/27/2024    INR 1.09 07/26/2024     INR 0.93 07/18/2024    PROTIME 13.3 07/27/2024    PROTIME 14.2 07/26/2024    PROTIME 12.6 07/18/2024     Results from last 7 days   Lab Units 07/30/24  0005 07/29/24  0020 07/28/24  0053   ALK PHOS U/L 94 103 83   BILIRUBIN mg/dL 0.2 0.2 0.3   ALT (SGPT) U/L 45* 58* 24   AST (SGOT) U/L 24 52* 23     Results from last 7 days   Lab Units 07/29/24  0431   PH, ARTERIAL pH units 7.409   PO2 ART mm Hg 105.0   PCO2, ARTERIAL mm Hg 51.8*   HCO3 ART mmol/L 32.8*     Imaging Results (Last 24 Hours)       Procedure Component Value Units Date/Time    XR Chest AP [[id number]] Collected: 07/30/24 1049     Updated: 07/30/24 1052    Narrative:      XR CHEST AP-     CLINICAL INDICATION: chest tube insertion; I21.4-Non-ST elevation  (NSTEMI) myocardial infarction; J93.9-Pneumothorax, unspecified        COMPARISON: 7/30/2024     TECHNIQUE: Single frontal view of the chest.     FINDINGS:     LUNGS: Second left-sided thoracostomy tube is been placed. No  pneumothorax seen on the presented study.     HEART AND MEDIASTINUM: Heart and mediastinal contours are unremarkable        SKELETON: Bony and soft tissue structures are unremarkable.             Impression:      Second left-sided thoracostomy tube has been placed. No pneumothorax  seen on the presented image.           This report was finalized on 7/30/2024 10:49 AM by Dr. Devan Sahni MD.       XR Chest 1 View [[id number]] Collected: 07/30/24 0805     Updated: 07/30/24 0810    Narrative:      XR CHEST 1 VW-     CLINICAL INDICATION: ptx f/u; I21.4-Non-ST elevation (NSTEMI) myocardial  infarction; J93.9-Pneumothorax, unspecified        COMPARISON: 7/29/2024     TECHNIQUE: Single frontal view of the chest.     FINDINGS:     LUNGS: Left apical pneumothorax.  Diffuse subcutaneous air.  No interval line change     HEART AND MEDIASTINUM: Heart and mediastinal contours are unremarkable        SKELETON: Bony and soft tissue structures are unremarkable.             Impression:      Left  apical pneumothorax.  Diffuse subcutaneous air.  No interval line change           This report was finalized on 7/30/2024 8:08 AM by Dr. Devan Sahni MD.       XR Chest 1 View [[id number]] Collected: 07/29/24 1726     Updated: 07/29/24 1729    Narrative:      XR CHEST 1 VW-     CLINICAL INDICATION: subQ air worsening; I21.4-Non-ST elevation (NSTEMI)  myocardial infarction; J93.9-Pneumothorax, unspecified        COMPARISON: 7/29/2024     TECHNIQUE: Single frontal view of the chest.     FINDINGS:     LUNGS: Left apical pneumothorax. Left-sided thoracostomy tube persists.  Right-sided airspace disease.     HEART AND MEDIASTINUM: Heart and mediastinal contours are unremarkable        SKELETON: Bony and soft tissue structures are unremarkable.             Impression:      Left-sided pneumothorax.           This report was finalized on 7/29/2024 5:27 PM by Dr. Devan Sahni MD.                    ALPRAZolam, 0.5 mg, Oral, TID  aspirin, 81 mg, Oral, Daily  Brexpiprazole, 0.5 mg, Oral, Daily  cefepime, 2,000 mg, Intravenous, Q8H  chlorhexidine, 15 mL, Mouth/Throat, Q12H  donepezil, 10 mg, Oral, Q PM  enoxaparin, 40 mg, Subcutaneous, Nightly  [Held by provider] hydroxychloroquine, 200 mg, Oral, BID  insulin regular, 2-7 Units, Subcutaneous, Q6H  ipratropium-albuterol, 3 mL, Nebulization, Q6H - RT  levothyroxine, 25 mcg, Oral, Daily With Breakfast  magic barrier cream, , Topical, BID  memantine, 5 mg, Oral, Q12H  methylPREDNISolone sodium succinate, 60 mg, Intravenous, Q12H  [Held by provider] metoprolol succinate XL, 25 mg, Oral, Q24H  mupirocin, 1 Application, Topical, Q12H  nystatin, 5 mL, Oral, 4x Daily  nystatin, 1 Application, Topical, Q12H  pantoprazole, 40 mg, Intravenous, Q AM  Phenylephrine HCl-NaCl, , ,   Phenylephrine HCl-NaCl, , ,   sodium chloride, 10 mL, Intravenous, Q12H  sodium chloride, 10 mL, Intravenous, Q12H  sodium chloride, 10 mL, Intravenous, Q12H  sodium chloride, 10 mL, Intravenous, Q12H  sodium  chloride, 10 mL, Intravenous, Q12H  sodium chloride, 10 mL, Intravenous, Q12H      dexmedetomidine, 0.2-1.5 mcg/kg/hr, Last Rate: 1.1 mcg/kg/hr (07/30/24 0005)  fentanyl 10 mcg/mL,  mcg/hr, Last Rate: 150 mcg/hr (07/30/24 0954)  Pharmacy Consult,   phenylephrine, 0.5-3 mcg/kg/min (Dosing Weight), Last Rate: 0.8 mcg/kg/min (07/30/24 1530)  propofol, 5-50 mcg/kg/min (Dosing Weight), Last Rate: 30 mcg/kg/min (07/30/24 1058)        Medication Review:       Lab 07/30/24  0005   PROCALCITONIN 0.07       Latest Reference Range & Units 07/30/24 00:11   CO2 Content 22 - 33 mmol/L 36.3 (H)   Carboxyhemoglobin Venous 0.0 - 5.0 % 2.4   Methemoglobin Venous 0.0 - 3.0 % 0.4   Oxyhemoglobin Venous 45.0 - 75.0 % 66.9   Hemoglobin, Blood Gas 13.5 - 17.5 g/dL 10.6 (L)   Site  Nurse/Dr Draw   Modality  Ventilator   FIO2 % 28   Ventilator Mode  VC/AC   Set Tidal Volume  330.00   Set Mech Resp Rate  28.0   PEEP  4.0   Barometric Pressure for Blood Gas mmHg 726   pH, Venous 7.320 - 7.420 pH Units 7.402   pCO2, Venous 41.0 - 51.0 mm Hg 55.6 (H)   pO2, Venous 27.0 - 53.0 mm Hg 35.1   HCO3, Venous 22.0 - 28.0 mmol/L 34.6 (H)   Base Excess 0.0 - 2.0 mmol/L 8.4 (H)   O2 Saturation, Venous 45.0 - 75.0 % 68.8   Collected by  [id number]   (H): Data is abnormally high  (L): Data is abnormally low  Assessment & Plan     Neuro-- sedated drips reviewed.  Adjusted drip for a RASS of -1 to -2.    Continue Xanax.  Continue drips.    Cardiovascular-septic shock-continue vasopressors to maintain a MAP of 65 and above.  Vasopressor requirements reviewed and adjusted for a MAP goal of 65 and above.  Continue steroids.  Non-STEMI-likely due to ongoing sepsis and septic shock.  Continue current treatment.  Latest echo reviewed  Results for orders placed during the hospital encounter of 07/17/24    Adult Transthoracic Echo Complete w/ Color, Spectral and Contrast if necessary per protocol    Interpretation Summary    Normal left ventricular cavity size and  wall thickness noted.    The following left ventricular wall segments are hypokinetic: mid anterior, basal anterolateral, apical lateral, apical septal, mid anteroseptal and basal anterior. The following left ventricular wall segments are akinetic: apex.    Left ventricular systolic function is moderately decreased. Left ventricular ejection fraction appears to be 36 - 40%.    Left ventricular diastolic function is consistent with (grade I) impaired relaxation.    The aortic valve is structurally normal with no regurgitation or stenosis present.    The mitral valve is structurally normal with no significant stenosis present. Mild mitral valve regurgitation is present.    Mild tricuspid valve regurgitation is present. Estimated right ventricular systolic pressure from tricuspid regurgitation is moderately elevated (45-55 mmHg).    There is no evidence of pericardial effusion. .    Comments: Patient seem to have developed a new regional wall motion normalities with decreased LV systolic function as compared to the previous study in March 2023.     Acute on chronic hypoxic respiratory failure-likely due to interstitial lung disease flareup.  Continue steroids    Vent Settings          Resp Rate (Set): 28  Pressure Support (cm H2O): 10 cm H20  FiO2 (%): 28 %  PEEP/CPAP (cm H2O): 4 cm H20    Minute Ventilation (L/min) (Obs): 9.86 L/min  Resp Rate (Observed) Vent: 32     I:E Ratio (Obs): 1:2.6    PIP Observed (cm H2O): 31 cm H2O  Plateau Pressure (cm H2O): 41 cm H2O  Driving Pressure (cm H2O): 32.6 cm H2O   Chest x-ray reviewed.    Pneumothorax ex likely due to barotrauma from increased plateau pressures and ILD.  Latest chest x-ray reviewed.  Latest chest x-ray reviewed and discussed with team.]    Had subcutaneous emphysema.  Surgery on case.  Chest tube was inserted and subcutaneous emphysema better.  Goals of care discussion happened with family and family is to make the patient comfort measures.  Latest blood gas  reviewed    Continue nebs.  Goals of care discussion today.      Nephrology- Cr and BUN stable  I/O-reviewed     GI-continue current diet.  Continue aspiration precautions     Hematology- CBC  Hb  platelet  WBC-latest reviewed    ID  Culture-reviewed  And Antibiotics   Increasing white count-microbiology reviewed.  Will get procalcitonin level in the morning.  Endocrinology- Maintain Blood sugar 140 -180  Blood sugars reviewed     Electrolytes-   Mag and phos         DVT prophylaxis--continue    Currently patient is critically ill due to acute hypoxic respiratory failure, pneumothorax-chest tube in place and sedated drips.  I adjusted ventilator settings and drips  John Mcmahon MD  24  15:55 EDT    Electronically signed by John Mcmahon MD at 24 155       Eduardo Freeman MD at 24              [facility]     Patient Identification:  Name:  Lexie KEITH Valdez  Age:  64 y.o.  Sex:  female  :  1960  MRN:  [id number]  Visit Number:  [id number]  ROOM: [address]     Primary Care Provider:  Violet Pop APRN     Date of Admission: 2024    Length of stay in inpatient status:  11    Subjective     Chief Compliant:    Chief Complaint   Patient presents with    Shortness of Breath       Patient intubated/sedated with SAT weaned to precedex only today. Still on masha. Family discussion today with  and sons present, for now will cont current GO.        Objective       Vital Signs:  Temp:  [99.3 °F (37.4 °C)-99.9 °F (37.7 °C)] 99.3 °F (37.4 °C)  Heart Rate:  [48-69] 57  Resp:  [28-32] 28  BP: ()/(39-81) 124/70  FiO2 (%):  [28 %] 28 %  SpO2:  [95 %-100 %] 100 %  on   ;   Device (Oxygen Therapy): ventilator  Body mass index is 23.22 kg/m².      ----------------------------------------------------------------------------------------------------------------------  Physical Exam  Vitals and nursing note reviewed.   Constitutional:       Comments:  Intubated/sedated   HENT:      Mouth/Throat:      Comments: ETT in place  Cardiovascular:      Rate and Rhythm: Normal rate and regular rhythm.   Pulmonary:      Comments: B/l mechanical breath sounds, left sided subQ emphysema  Abdominal:      General: There is no distension.      Palpations: Abdomen is soft.   Musculoskeletal:      Right lower leg: No edema.      Left lower leg: No edema.   Skin:     General: Skin is warm.      Coloration: Skin is pale.   Neurological:      Comments: Not withdrawing to pain or following commands       ----------------------------------------------------------------------------------------------------------------------          Assessment & Plan      -Acute NSTEMI, type 1 (positive stress test on 7/18/2024 without any reversible ischemia, that was present on admission  -History of IPF and COPD with chronic hypoxia (uses 3 L/min at home)  -Acute exacerbation of IPF/COPD causing acute hypoxic and hypercapnic respiratory failure on top of chronic hypoxic respiratory failure and sepsis (developed on 7/21/2024 due to a suspected aspiration episode) resulting in oral intubation and mechanical ventilation from aspiration pneumonitis  -Hypotension, septic vs sedation vs diuretic induced, developed during admission  -New onset heart failure with reduced EF, acute exacerbation that developed on 7/21/2024  -Hypocalcemia that developed during admission  -Prolonged QT that developed during the hospitalization  -History of essential hypertension  -History of hypothyroidism  -History of stress urinary incontinence with frequent UTI's, with an acute E coli UTI that was present on admission  -History of intermittent, diffuse body tremors    Discussed GOC with family today, will cont current plan but will need trach/peg if not extubated within 48hrs most likely. Patient still with period of vent dyssynchrony, unable to safelty attempted extubation currently. Cont daily SAT and SBT as possible.  Cont  current abx coverage, afebrile  Left sided subQ emphysema slightly worse, chest tube in place in thoracic cavity without clear leak, will repeat CXR in am to monitor. Left PTX small and stable in size  Prognosis remains poor overall    I have spent 105 minutes of critical care time with > 50% of time spent in direct patient care, evaluating the patient at bedside, reviewing all labs and images, reviewing ABG and adjusting vent settings, reviewing pain and sedation gtt's, communicating plan of care w/ nursing staff and consultants, and utilizing high complexity medical decision making to assess and treat vital organ system failure in an individual who has impairment of one or more vital organ systems such that there is a high probability of imminent or life threatening deterioration in the patient’s condition. Failure to initiate the above interventions on an urgent basis would likely result in sudden, clinically significant or life threatening deterioration in the patient's condition.        Code Status and Medical Interventions: No CPR (Do Not Attempt to Resuscitate); Full Support   Ordered at: 24 1813     Level Of Support Discussed With:    Next of Kin (If No Surrogate)    Health Care Surrogate     Code Status (Patient has no pulse and is not breathing):    No CPR (Do Not Attempt to Resuscitate)     Medical Interventions (Patient has pulse or is breathing):    Full Support         Disposition: cont ccu management    I have reviewed any copied/forwarded text or data, verified its accuracy, and updated as necessary above.    Eduardo Freeman MD  [facility]  24  20:51 EDT      Electronically signed by Eduardo Freeman MD at 24 [phone]       Deirdre Davila APRN at 24 1401                     PROGRESS NOTE         Patient Identification:  Name:  Lexie KEITH Valdez  Age:  64 y.o.  Sex:  female  :  1960  MRN:  [id number]  Visit Number:  [id number]  Primary Care Provider:   Violet Pop APRN         LOS: 11 days       ----------------------------------------------------------------------------------------------------------------------  Subjective       Chief Complaints:    Shortness of Breath        Interval History:      The patient remains intubated and sedated 28% FiO2.  Sedation infusing.  Phenylephrine infusing.  Tmax 99.9 °F.  Fecal management system in place with decreased output.  RN reports possibly removing today.  Lung exam is remarkable for coarse breath sounds bilaterally with scattered wheezing.  Abdomen is soft with hypoactive bowel sounds.  Tube feed infusing.  CVL to the right IJ with no erythema or drainage.  Generalized edema.  Scattered bruising to the bilateral upper extremities.  Leukocytosis at 24.89.  Blood cultures from 7/28 preliminarily report no growth at 24 hours.      Review of Systems:    Unable to obtain.  Intubated and sedated.    ----------------------------------------------------------------------------------------------------------------------      Objective       Current Hospital Meds:  ALPRAZolam, 0.5 mg, Oral, TID  aspirin, 81 mg, Oral, Daily  Brexpiprazole, 0.5 mg, Oral, Daily  cefepime, 2,000 mg, Intravenous, Q8H  chlorhexidine, 15 mL, Mouth/Throat, Q12H  donepezil, 10 mg, Oral, Q PM  doxycycline, 100 mg, Oral, Q12H  enoxaparin, 40 mg, Subcutaneous, Nightly  [Held by provider] hydroxychloroquine, 200 mg, Oral, BID  insulin regular, 2-7 Units, Subcutaneous, Q6H  ipratropium-albuterol, 3 mL, Nebulization, Q6H - RT  levothyroxine, 25 mcg, Oral, Daily With Breakfast  magic barrier cream, , Topical, BID  memantine, 5 mg, Oral, Q12H  methylPREDNISolone sodium succinate, 60 mg, Intravenous, Q12H  [Held by provider] metoprolol succinate XL, 25 mg, Oral, Q24H  mupirocin, 1 Application, Topical, Q12H  nystatin, 5 mL, Oral, 4x Daily  nystatin, 1 Application, Topical, Q12H  pantoprazole, 40 mg, Intravenous, Q AM  Phenylephrine HCl-NaCl, , ,    Phenylephrine HCl-NaCl, , ,   sodium chloride, 10 mL, Intravenous, Q12H  sodium chloride, 10 mL, Intravenous, Q12H  sodium chloride, 10 mL, Intravenous, Q12H  sodium chloride, 10 mL, Intravenous, Q12H  sodium chloride, 10 mL, Intravenous, Q12H  sodium chloride, 10 mL, Intravenous, Q12H      dexmedetomidine, 0.2-1.5 mcg/kg/hr, Last Rate: 1.1 mcg/kg/hr (07/29/24 1024)  fentanyl 10 mcg/mL,  mcg/hr, Last Rate: Stopped (07/29/24 1024)  Pharmacy Consult,   phenylephrine, 0.5-3 mcg/kg/min (Dosing Weight), Last Rate: 0.8 mcg/kg/min (07/29/24 1359)  propofol, 5-50 mcg/kg/min (Dosing Weight), Last Rate: Stopped (07/29/24 1008)      ----------------------------------------------------------------------------------------------------------------------    Vital Signs:  Temp:  [99.3 °F (37.4 °C)-99.9 °F (37.7 °C)] 99.3 °F (37.4 °C)  Heart Rate:  [48-64] 64  Resp:  [28-36] 30  BP: ()/(39-81) 118/66  FiO2 (%):  [28 %] 28 %  Mean Arterial Pressure (Non-Invasive) for the past 24 hrs (Last 3 readings):   Noninvasive MAP (mmHg)   07/29/24 1359 85   07/29/24 1200 93   07/29/24 1145 102     SpO2 Percentage    07/29/24 1145 07/29/24 1200 07/29/24 1202   SpO2: 100% 99% 99%     SpO2:  [95 %-100 %] 99 %  on   ;   Device (Oxygen Therapy): ventilator    Body mass index is 23.22 kg/m².  Wt Readings from Last 3 Encounters:   07/29/24 61.4 kg (135 lb 5.8 oz)   04/26/24 67.7 kg (149 lb 4 oz)   04/19/24 67.1 kg (148 lb)        Intake/Output Summary (Last 24 hours) at 7/29/2024 1401  Last data filed at 7/29/2024 0443  Gross per 24 hour   Intake 3281.42 ml   Output 2803 ml   Net 478.42 ml     NPO Diet NPO Type: Tube Feeding  ----------------------------------------------------------------------------------------------------------------------      Physical Exam:    Constitutional: Thin, frail, chronically ill-appearing  female.  Remains intubated and sedated 28% FiO2.  Pressors infusing.  HENT:  Head: Normocephalic and  atraumatic.  Mouth:  Moist mucous membranes.    Eyes:  Conjunctivae and EOM are normal.  No scleral icterus.  Neck:  Neck supple.  No JVD present.    Cardiovascular:  Normal rate, regular rhythm and normal heart sounds with no murmur. No edema.  Pulmonary/Chest: Coarse with wheezing bilaterally.  Left-sided chest tube in place.  Abdominal:  Soft.  Bowel sounds are normal.  No distension and no tenderness.   Musculoskeletal:  No edema, no tenderness, and no deformity.  No swelling or redness of joints.  Neurological: Sedate.  Skin:  Skin is warm and dry.  No rash noted.  No pallor.   Psychiatric: Sedate.        ----------------------------------------------------------------------------------------------------------------------  Results from last 7 days   Lab Units 07/29/24  0156 07/29/24  0020 07/23/24  0258   HSTROP T ng/L 31* 29* 66*     Results from last 7 days   Lab Units 07/26/24  0441   PROBNP pg/mL 11,410.0*           Results from last 7 days   Lab Units 07/29/24  0431   PH, ARTERIAL pH units 7.409   PO2 ART mm Hg 105.0   PCO2, ARTERIAL mm Hg 51.8*   HCO3 ART mmol/L 32.8*     Results from last 7 days   Lab Units 07/29/24  0020 07/28/24  0053 07/27/24  1143 07/26/24  0441 07/25/24  0006 07/24/24  0022 07/23/24  0058   CRP mg/dL  --   --   --   --   --  3.89* 10.32*   LACTATE mmol/L 1.3  --   --   --   --  1.5 1.5   WBC 10*3/mm3 24.89* 22.69* 16.39* 17.54*   < > 22.14* 10.24   HEMOGLOBIN g/dL 10.5* 10.3* 10.0* 9.1*   < > 9.6* 9.5*   HEMATOCRIT % 33.2* 32.8* 31.9* 29.4*   < > 30.0* 29.6*   MCV fL 100.3* 101.9* 101.3* 101.7*   < > 98.4* 97.4*   MCHC g/dL 31.6 31.4* 31.3* 31.0*   < > 32.0 32.1   PLATELETS 10*3/mm3 207 224 205 225   < > 287 235   INR   --   --  1.00 1.09  --   --   --     < > = values in this interval not displayed.     Results from last 7 days   Lab Units 07/29/24  0020 07/28/24  0053 07/27/24  1143   SODIUM mmol/L 141 141 142   POTASSIUM mmol/L 4.7 4.9 5.2   MAGNESIUM mg/dL 2.4 2.5* 2.4  "  CHLORIDE mmol/L 106 107 109*   CO2 mmol/L 28.2 24.2 26.1   BUN mg/dL 15 23 29*   CREATININE mg/dL 0.23* 0.26* 0.29*   CALCIUM mg/dL 8.4* 8.3* 8.1*   GLUCOSE mg/dL 163* 159* 161*   ALBUMIN g/dL 3.0* 3.1* 3.1*   BILIRUBIN mg/dL 0.2 0.3 0.2   ALK PHOS U/L 103 83 78   AST (SGOT) U/L 52* 23 23   ALT (SGPT) U/L 58* 24 26   Estimated Creatinine Clearance: 236.4 mL/min (A) (by C-G formula based on SCr of 0.23 mg/dL (L)).  No results found for: \"AMMONIA\"    Glucose   Date/Time Value Ref Range Status   07/29/2024 1147 176 (H) 70 - 130 mg/dL Final   07/29/2024 0544 121 70 - 130 mg/dL Final   07/29/2024 0010 170 (H) 70 - 130 mg/dL Final   07/28/2024 1747 144 (H) 70 - 130 mg/dL Final   07/28/2024 1121 180 (H) 70 - 130 mg/dL Final   07/28/2024 0535 126 70 - 130 mg/dL Final   07/27/2024 2350 154 (H) 70 - 130 mg/dL Final   07/27/2024 1801 128 70 - 130 mg/dL Final     Lab Results   Component Value Date    HGBA1C 4.60 (L) 07/18/2024     Lab Results   Component Value Date    TSH 1.140 07/17/2024    FREET4 1.83 (H) 04/11/2024       Blood Culture   Date Value Ref Range Status   07/21/2024 No growth at 2 days  Preliminary   07/21/2024 No growth at 2 days  Preliminary   07/17/2024 No growth at 5 days  Final   07/17/2024 No growth at 5 days  Final     Urine Culture   Date Value Ref Range Status   07/17/2024 >100,000 CFU/mL Escherichia coli (A)  Final     No results found for: \"WOUNDCX\"  No results found for: \"STOOLCX\"  Respiratory Culture   Date Value Ref Range Status   07/21/2024 No growth  Preliminary   07/21/2024   Final    Rare growth Normal respiratory ammon. No S. aureus or Pseudomonas aeruginosa detected. Final report.     Pain Management Panel           No data to display                  ----------------------------------------------------------------------------------------------------------------------  Imaging Results (Last 24 Hours)       Procedure Component Value Units Date/Time    XR Chest 1 View [[id number]] Collected: " 07/29/24 1027     Updated: 07/29/24 1031    Narrative:      XR CHEST 1 VW-     CLINICAL INDICATION: et tube position; I21.4-Non-ST elevation (NSTEMI)  myocardial infarction; J93.9-Pneumothorax, unspecified        COMPARISON: 7/29/2024     TECHNIQUE: Single frontal view of the chest.     FINDINGS:     LUNGS: Patchy bilateral airspace disease.  Equivocal small left apical pneumothorax. Left-sided chest tube is  present.  Endotracheal tube overlies the tracheal air column well above the  irene.     HEART AND MEDIASTINUM: Heart and mediastinal contours are unremarkable        SKELETON: Bony and soft tissue structures are unremarkable.             Impression:         1. Small left apical pneumothorax  2. Endotracheal tube appears to be well above the irene           This report was finalized on 7/29/2024 10:29 AM by Dr. Devan Sahni MD.       XR Chest 1 View [[id number]] Collected: 07/29/24 0631     Updated: 07/29/24 0635    Narrative:       VERIFICATION OBSERVER NAME: Kai Magana MD.     TECHNIQUE, ADDITIONAL HISTORY, FINDINGS: Single frontal view of the  chest was obtained.   Comparison study date : Prior day. Time : 4:26 AM.     HISTORY: Respiratory failure     Findings:      ET TUBE is located 1 cm above the irene.   NG TUBE is located in the abdomen.  RIGHT central line with the tip in the region of the atriocaval  junction.  LEFT chest tube in place.  Diffuse interstitial changes noted, diminished from prior.  Minimal cardiac enlargement.  Trace LEFT pleural effusion.       Impression:      ET tube is too low in position.  Diminishing interstitial changes.              [id number]     ZIP Code [id number].              This report was finalized on 7/29/2024 6:33 AM by Dr. Kai Magana MD.               ----------------------------------------------------------------------------------------------------------------------    Pertinent Infectious Disease Results                Assessment/Plan       Assessment     Septic  shock with acute hypoxic respiratory failure requiring mechanical ventilation and pressor support  Pneumonia        Plan      The patient remains intubated and sedated 28% FiO2.  Sedation infusing.  Phenylephrine infusing.  Tmax 99.9 °F.  Fecal management system in place with decreased output.  RN reports possibly removing today.  Lung exam is remarkable for coarse breath sounds bilaterally with scattered wheezing.  Abdomen is soft with hypoactive bowel sounds.  Tube feed infusing.  CVL to the right IJ with no erythema or drainage.  Generalized edema.  Scattered bruising to the bilateral upper extremities.  Leukocytosis at 24.89.  Blood cultures from 7/28 preliminarily report no growth at 24 hours.    Although the patient continues with leukocytosis, she is receiving steroid course.  For now we will continue with cefepime and doxycycline courses but will continue with low threshold to escalate antibiotic coverage.  We will continue to monitor closely.      ANTIMICROBIAL THERAPY    cefepime 2000 mg IVPB in 100 mL NS (VTB)  doxycycline - 100 MG     Code Status:   Code Status and Medical Interventions: No CPR (Do Not Attempt to Resuscitate); Full Support   Ordered at: 07/25/24 1813     Level Of Support Discussed With:    Next of Kin (If No Surrogate)    Health Care Surrogate     Code Status (Patient has no pulse and is not breathing):    No CPR (Do Not Attempt to Resuscitate)     Medical Interventions (Patient has pulse or is breathing):    Full Support       SG Stoner  07/29/24  14:01 EDT    Electronically signed by Deirdre Davila APRN at 07/29/24 1404       Linh Lou APRN at 07/29/24 1400          Palliative Care Daily Progress Note     S: Medical record reviewed, followed up with Primary JESSIKA Johns and Dr Freeman regarding patient's condition. When I saw Lexie this morning she was sedated on the vent at 24% and 4 of peep.She was on propofol, fentanyl, precedex and phenylephrine as well as  "antibiotic. She appeared calm and did not appear to be in distress at this time.      O:   Palliative Performance Scale Score:     /78   Pulse 61   Temp 99.5 °F (37.5 °C)   Resp (!) 30   Ht 162.6 cm (64.02\")   Wt 61.4 kg (135 lb 5.8 oz)   SpO2 100%   BMI 23.22 kg/m²     Intake/Output Summary (Last 24 hours) at 7/29/2024 1703  Last data filed at 7/29/2024 0443  Gross per 24 hour   Intake 3281.42 ml   Output 2803 ml   Net 478.42 ml       PE:  General Appearance:    Chronically ill appearing, sedation on intubated on vent, neck is edema has decreased   HEENT:    NC/AT, without obvious abnormality, EOMI, anicteric    Neck:   Supple slight subcutaneous air noted on neck, trachea midline, no JVD   Lungs:     Sedated on vent @ 24% and 4  of peep, left CT in place, coarse lung sounds noted    Heart:    RRR, normal S1 and S2, no M/R/G   Abdomen:     Soft, NT, ND, NABS    Extremities:   Moves all extremities weakly(not to command) trace BLL extremity edema, RUE edema/rt hand decreasing   Pulses:   Pulses palpable and equal bilaterally   Skin:   Warm, dry   Neurologic:   Sedation on unable to assess    Psych:   Sedation on, calm          Meds: Reviewed and changes noted    Labs:   Results from last 7 days   Lab Units 07/29/24  0020   WBC 10*3/mm3 24.89*   HEMOGLOBIN g/dL 10.5*   HEMATOCRIT % 33.2*   PLATELETS 10*3/mm3 207     Results from last 7 days   Lab Units 07/29/24  0020   SODIUM mmol/L 141   POTASSIUM mmol/L 4.7   CHLORIDE mmol/L 106   CO2 mmol/L 28.2   BUN mg/dL 15   CREATININE mg/dL 0.23*   GLUCOSE mg/dL 163*   CALCIUM mg/dL 8.4*     Results from last 7 days   Lab Units 07/29/24  0020   SODIUM mmol/L 141   POTASSIUM mmol/L 4.7   CHLORIDE mmol/L 106   CO2 mmol/L 28.2   BUN mg/dL 15   CREATININE mg/dL 0.23*   CALCIUM mg/dL 8.4*   BILIRUBIN mg/dL 0.2   ALK PHOS U/L 103   ALT (SGPT) U/L 58*   AST (SGOT) U/L 52*   GLUCOSE mg/dL 163*     Imaging Results (Last 72 Hours)       Procedure Component Value Units " Date/Time    XR Chest 1 View [[id number]] Collected: 07/29/24 1027     Updated: 07/29/24 1031    Narrative:      XR CHEST 1 VW-     CLINICAL INDICATION: et tube position; I21.4-Non-ST elevation (NSTEMI)  myocardial infarction; J93.9-Pneumothorax, unspecified        COMPARISON: 7/29/2024     TECHNIQUE: Single frontal view of the chest.     FINDINGS:     LUNGS: Patchy bilateral airspace disease.  Equivocal small left apical pneumothorax. Left-sided chest tube is  present.  Endotracheal tube overlies the tracheal air column well above the  irene.     HEART AND MEDIASTINUM: Heart and mediastinal contours are unremarkable        SKELETON: Bony and soft tissue structures are unremarkable.             Impression:         1. Small left apical pneumothorax  2. Endotracheal tube appears to be well above the irene           This report was finalized on 7/29/2024 10:29 AM by Dr. Devan Sahni MD.       XR Chest 1 View [[id number]] Collected: 07/29/24 0631     Updated: 07/29/24 0635    Narrative:       VERIFICATION OBSERVER NAME: Kai Magana MD.     TECHNIQUE, ADDITIONAL HISTORY, FINDINGS: Single frontal view of the  chest was obtained.   Comparison study date : Prior day. Time : 4:26 AM.     HISTORY: Respiratory failure     Findings:      ET TUBE is located 1 cm above the irene.   NG TUBE is located in the abdomen.  RIGHT central line with the tip in the region of the atriocaval  junction.  LEFT chest tube in place.  Diffuse interstitial changes noted, diminished from prior.  Minimal cardiac enlargement.  Trace LEFT pleural effusion.       Impression:      ET tube is too low in position.  Diminishing interstitial changes.              [facility]     ZIP Code [id number].              This report was finalized on 7/29/2024 6:33 AM by Dr. Kai Magana MD.       XR Chest 1 View [[id number]] Collected: 07/28/24 1346     Updated: 07/28/24 1348    Narrative:      TECHNIQUE: X-ray of the chest single view was performed per as low  as  reasonably achievable (ALARA) protocols.     COMPARISON: 7/27/2024     FINDINGS:     There is an endotracheal tube with tip 3.7 cm above the irene. A  gastric drainage tube courses into the stomach, with tip not seen. There  is a left lung base chest tube and a gastric lap band device. No  pneumothorax. There is a right neck central venous catheter with tip  likely near the cavoatrial junction.     Subjectively, minimally more pronounced ARDS like lung  opacities/reticulation diffusely. No definite pleural effusion.     Redemonstrated subcutaneous emphysema in the supraclavicular region  bilaterally.     Nonenlarged cardiomediastinal silhouette.       Impression:         1. Subjectively minimally increased ARDS-like airspace  opacities/pulmonary edema compared to prior. No definite pleural  effusion or pneumothorax.     2. Stable lines and tubes.        To TALK to On Call [phone]     This report was finalized on 7/28/2024 1:46 PM by Andrea Medina MD.       XR Chest 1 View [[id number]] Collected: 07/27/24 1746     Updated: 07/27/24 1749    Narrative:      INDICATION: Difficulty breathing.     TECHNIQUE: Frontal radiograph of the chest.     COMPARISON: Radiograph from yesterday     FINDINGS:   Cardiomegaly. Multifocal infiltrates/edema, unchanged. No pleural  effusion or pneumothorax. Endotracheal tube, enteric tube, left-sided  chest tube and central venous catheter in similar position.       Impression:      No significant interval change allowing for differences in technique.        This report was finalized on 7/27/2024 5:47 PM by Alex Pallas, DO.                 Diagnostics: Reviewed    A: Lexie Valdez is a 64 y.o.  female admitted on 7/17/2024 due to shortness of breath. Lexie has a medical history of  anxiety/depression, arthritis, HTN, hypothyroidism, stress urinary incontinence with frequent UTI's, COPD and pulmonary fibrosis on 3L NC at home, and resting tremors in face and extremities,  who presents with worsening shortness of breath for the last couple days. Pt also at time of admission c/o chest pressure associated with dyspnea stating this was chronic however had worsened in the days leading up to her admission. She also endorsed diarrhea for the last couple of days as well as dysuria and stated that she has recurring UTI's. Labs in ER revealed Troponin of 229 with repeat 188, BNP was 3550, Na 131, Cr 0.49, glucose 177, CRP 1.65, lactate 2.0, procal and WBC normal but with 88.7% neutrophils. UA showed positive nitrite, trace leuk, 6-10 WBC, 4+ bacteria and 3-6 squamous epithelial cells. She was tachycardic and tachypneic with pH of 7.458, CO2 40, Po2 154, bicarb 28. She was admitted to PCU on admission and in the early am of 7/21 was transferred to CCU due to worsening dyspnea, bilateral infiltrates without effusion concerning for interstitial debbi disease flare, was transferred to CCU. Initially she was placed on Bipap in CCU and at 7/21 6:20 am Lexie was sedated intubated and placed on the ventilator. Palliative care consulted for GOC,ACP and for support of family.     Today 7/29/24  T 99.5, HR 61, RR 25, BP of 105/58 and was sedated with Precedex, Propofol, Fentanyl on vent at 28% FiO2 and 4 of peep and on phenylephrine.    P:  Palliative was able to be present with Dr Freeman to speak with Lexie's family,  Taqueria, son Faith, sister carl and brother Francis as well as other family to discuss Lexie's condition and what the options were at this point. He discussed if they would want her to have trach and peg and explained what that would look like, having to be at a LTACH with her T/P and also expressed that there was rally know way to definitively say how she would do with this, understanding her lung disease and that it would progress. We talked about what her quality of life would look like and if this is something they felt she would even want. We also discussed the comfort approach and  what that may look like, again could not say how she would do with extubation, however during that time we would keep her comfortable and have available whatever she may need. After lengthy conversation family has decided to talk about it amongst one another.    We will continue to follow along. Please do not hesitate to contact us regarding further sx mgmt or GOC needs, including after hours or on weekends via our on call provider at [phone].     SG Javier    7/29/2024    Electronically signed by Linh Lou APRN at 07/29/24 1713       Consult Notes (last 48 hours)  Notes from 07/29/24 1022 through 07/31/24 1022   No notes of this type exist for this encounter.

## 2024-07-31 NOTE — PROGRESS NOTES
Progress Note Critical Care Pulmonary        Subjective    Overnight events reviewed.  All the labs medications ins and outs and vitals reviewed.  None of the family members at bedside.  MDR done at bed side with RN, pharmacist, nutrition, , hospitalist manager  and RT  All the drips,other meds,  labs,ins and outs , abg, fio2 requirement reviewed and dicussed in rounds.           Review of Systems:    Could not be obtained as patient is sedated on ventilator.    Vital Signs  Temp:  [98.6 °F (37 °C)-99.9 °F (37.7 °C)] 99.7 °F (37.6 °C)  Heart Rate:  [56-71] 59  Resp:  [28-36] 35  BP: ()/(39-97) 122/78  FiO2 (%):  [25 %-100 %] 100 %  Body mass index is 23.79 kg/m².    Intake/Output Summary (Last 24 hours) at 7/31/2024 1333  Last data filed at 7/31/2024 0600  Gross per 24 hour   Intake 5337.46 ml   Output 2809 ml   Net 2528.46 ml     No intake/output data recorded.    Physical Exam:  General-chronically ill appearance, not in any acute distress, sedated    HEENT-PERRLA s    Neck-supple    Respiratory-bilateral air entry, bibasilar crackles.    Chest tube in place  Cardiovascular-NSR  GI-nontender nondistended bowel sounds positive    CNS- grossly nonfocal    Extremities-no clubbing and edema        Results Review:      Results from last 7 days   Lab Units 07/31/24  0001 07/30/24  0005 07/29/24  0020   WBC 10*3/mm3 22.37* 20.31* 24.89*   HEMOGLOBIN g/dL 10.8* 10.5* 10.5*   PLATELETS 10*3/mm3 196 206 207     Results from last 7 days   Lab Units 07/31/24  0001 07/30/24  0005 07/29/24  0020 07/28/24  0053 07/27/24  1143   SODIUM mmol/L 141 137 141 141 142   POTASSIUM mmol/L 4.7 4.8 4.7 4.9 5.2   CHLORIDE mmol/L 106 101 106 107 109*   CO2 mmol/L 29.7* 31.0* 28.2 24.2 26.1   BUN mg/dL 16 19 15 23 29*   CREATININE mg/dL 0.22* 0.23* 0.23* 0.26* 0.29*   CALCIUM mg/dL 8.4* 8.2* 8.4* 8.3* 8.1*   GLUCOSE mg/dL 192* 170* 163* 159* 161*   MAGNESIUM mg/dL  --   --  2.4 2.5* 2.4     Lab Results   Component Value Date     INR 1.00 07/27/2024    INR 1.09 07/26/2024    INR 0.93 07/18/2024    PROTIME 13.3 07/27/2024    PROTIME 14.2 07/26/2024    PROTIME 12.6 07/18/2024     Results from last 7 days   Lab Units 07/30/24  0005 07/29/24  0020 07/28/24  0053   ALK PHOS U/L 94 103 83   BILIRUBIN mg/dL 0.2 0.2 0.3   ALT (SGPT) U/L 45* 58* 24   AST (SGOT) U/L 24 52* 23     Results from last 7 days   Lab Units 07/29/24  0431   PH, ARTERIAL pH units 7.409   PO2 ART mm Hg 105.0   PCO2, ARTERIAL mm Hg 51.8*   HCO3 ART mmol/L 32.8*     Imaging Results (Last 24 Hours)       ** No results found for the last 24 hours. **                 ALPRAZolam, 0.5 mg, Oral, TID  aspirin, 81 mg, Oral, Daily  Brexpiprazole, 0.5 mg, Oral, Daily  cefepime, 2,000 mg, Intravenous, Q8H  chlorhexidine, 15 mL, Mouth/Throat, Q12H  donepezil, 10 mg, Oral, Q PM  enoxaparin, 40 mg, Subcutaneous, Nightly  [Held by provider] hydroxychloroquine, 200 mg, Oral, BID  insulin regular, 2-7 Units, Subcutaneous, Q6H  ipratropium-albuterol, 3 mL, Nebulization, Q6H - RT  levothyroxine, 25 mcg, Oral, Daily With Breakfast  magic barrier cream, , Topical, BID  memantine, 5 mg, Oral, Q12H  methylPREDNISolone sodium succinate, 60 mg, Intravenous, Q12H  [Held by provider] metoprolol succinate XL, 25 mg, Oral, Q24H  mupirocin, 1 Application, Topical, Q12H  nystatin, 5 mL, Oral, 4x Daily  nystatin, 1 Application, Topical, Q12H  pantoprazole, 40 mg, Intravenous, Q AM  Phenylephrine HCl-NaCl, , ,   Phenylephrine HCl-NaCl, , ,   sodium chloride, 10 mL, Intravenous, Q12H  sodium chloride, 10 mL, Intravenous, Q12H  sodium chloride, 10 mL, Intravenous, Q12H  sodium chloride, 10 mL, Intravenous, Q12H  sodium chloride, 10 mL, Intravenous, Q12H  sodium chloride, 10 mL, Intravenous, Q12H      dexmedetomidine, 0.2-1.5 mcg/kg/hr, Last Rate: 1.1 mcg/kg/hr (07/31/24 0611)  fentanyl 10 mcg/mL,  mcg/hr, Last Rate: Stopped (07/31/24 0930)  Pharmacy Consult,   phenylephrine, 0.5-3 mcg/kg/min (Dosing  Weight), Last Rate: 0.5 mcg/kg/min (07/31/24 1045)  propofol, 5-50 mcg/kg/min (Dosing Weight), Last Rate: Stopped (07/31/24 0930)        Medication Review:       Lab 07/30/24  0005   PROCALCITONIN 0.07       Latest Reference Range & Units 07/30/24 00:11   CO2 Content 22 - 33 mmol/L 36.3 (H)   Carboxyhemoglobin Venous 0.0 - 5.0 % 2.4   Methemoglobin Venous 0.0 - 3.0 % 0.4   Oxyhemoglobin Venous 45.0 - 75.0 % 66.9   Hemoglobin, Blood Gas 13.5 - 17.5 g/dL 10.6 (L)   Site  Nurse/Dr Draw   Modality  Ventilator   FIO2 % 28   Ventilator Mode  VC/AC   Set Tidal Volume  330.00   Set Mech Resp Rate  28.0   PEEP  4.0   Barometric Pressure for Blood Gas mmHg 726   pH, Venous 7.320 - 7.420 pH Units 7.402   pCO2, Venous 41.0 - 51.0 mm Hg 55.6 (H)   pO2, Venous 27.0 - 53.0 mm Hg 35.1   HCO3, Venous 22.0 - 28.0 mmol/L 34.6 (H)   Base Excess 0.0 - 2.0 mmol/L 8.4 (H)   O2 Saturation, Venous 45.0 - 75.0 % 68.8   Collected by  [id number]   (H): Data is abnormally high  (L): Data is abnormally low  Assessment & Plan     Neuro-- sedated drips reviewed.  Adjusted drip for a RASS of -1 to -2.  Patient synchronous with the ventilator.  Continue Xanax.  Continue drips.    Cardiovascular-septic shock-continue vasopressors to maintain a MAP of 65 and above.  Vasopressor requirements reviewed and adjusted for a MAP goal of 65 and above.  Continue steroids.  Non-STEMI-likely due to ongoing sepsis and septic shock.  Continue current treatment.  Latest echo reviewed  Results for orders placed during the hospital encounter of 07/17/24    Adult Transthoracic Echo Complete w/ Color, Spectral and Contrast if necessary per protocol    Interpretation Summary    Normal left ventricular cavity size and wall thickness noted.    The following left ventricular wall segments are hypokinetic: mid anterior, basal anterolateral, apical lateral, apical septal, mid anteroseptal and basal anterior. The following left ventricular wall segments are akinetic: apex.     Left ventricular systolic function is moderately decreased. Left ventricular ejection fraction appears to be 36 - 40%.    Left ventricular diastolic function is consistent with (grade I) impaired relaxation.    The aortic valve is structurally normal with no regurgitation or stenosis present.    The mitral valve is structurally normal with no significant stenosis present. Mild mitral valve regurgitation is present.    Mild tricuspid valve regurgitation is present. Estimated right ventricular systolic pressure from tricuspid regurgitation is moderately elevated (45-55 mmHg).    There is no evidence of pericardial effusion. .    Comments: Patient seem to have developed a new regional wall motion normalities with decreased LV systolic function as compared to the previous study in March 2023.     Acute on chronic hypoxic respiratory failure-likely due to interstitial lung disease flareup.  Continue steroids    Vent Settings          Resp Rate (Set): 28  Pressure Support (cm H2O): 10 cm H20  FiO2 (%): 100 %  PEEP/CPAP (cm H2O):  (Changed by DR Mcmahon)    Minute Ventilation (L/min) (Obs): 11.9 L/min  Resp Rate (Observed) Vent: 28     I:E Ratio (Obs): 1:2    PIP Observed (cm H2O): 16 cm H2O  Plateau Pressure (cm H2O): 41 cm H2O  Driving Pressure (cm H2O): 32.6 cm H2O   Chest x-ray reviewed.    Pneumothorax ex likely due to barotrauma from increased plateau pressures and ILD.  Latest chest x-ray reviewed.  Latest chest x-ray reviewed and discussed with team.      Had subcutaneous emphysema.  Surgery on case.  Chest tube was inserted and subcutaneous emphysema better.  The patient is DNR/DNI and will transition to comfort measures in coming 1 to 2 days.  Change patient's PEEP to 0 and changed FiO2 100% to help with the healing of bronchopleural fistula and absorption of the subcutaneous emphysema.  Latest blood gas reviewed    Continue nebs.  Goals of care discussion today.      Nephrology- Cr and BUN stable  I/O-reviewed      GI-continue current diet.  Continue aspiration precautions     Hematology- CBC  Hb  platelet  WBC-latest reviewed    ID  Culture-reviewed  And Antibiotics   Increasing white count-microbiology reviewed.  Will get procalcitonin level in the morning.  Endocrinology- Maintain Blood sugar 140 -180  Blood sugars reviewed     Electrolytes-   Mag and phos         DVT prophylaxis--continue  Currently patient is critically ill due to acute hypoxic respiratory failure, pneumothorax-chest tube in place and sedated drips.  I adjusted ventilator settings and drips cc31 mins  John Mcmahon MD  07/31/24  13:33 EDT

## 2024-08-01 NOTE — CASE MANAGEMENT/SOCIAL WORK
Discharge Planning Assessment   Alex     Patient Name: Lexie Valdez  MRN: [id number]  Today's Date: 8/1/2024    Admit Date: 7/17/2024       Discharge Plan       Row Name 08/01/24 1628       Plan    Plan Palliative Care continues to follow. Pt was made comfort measures today and extubated. SS to follow.              Expected Discharge Date and Time       Expected Discharge Date Expected Discharge Time    Aug 1, 2024         NICOLE De Anda

## 2024-08-01 NOTE — PROGRESS NOTES
PROGRESS NOTE         Patient Identification:  Name:  Lexie Valdez  Age:  64 y.o.  Sex:  female  :  1960  MRN:  [id number]  Visit Number:  [id number]  Primary Care Provider:  Violet Pop APRN         LOS: 14 days       ----------------------------------------------------------------------------------------------------------------------  Subjective       Chief Complaints:    Shortness of Breath        Interval History:      Patient remains intubated and sedated at 100% FiO2 this morning.  Persistent low-grade fever with Tmax of 99.3.  Continues on phenylephrine drip.  2 left-sided chest tubes in place.  Rectal tube in place.  Lung sounds diminished bilaterally.  Abdomen soft.  WBC elevated 22.53.    Review of Systems:    Unable to obtain.  Intubated and sedated.    ----------------------------------------------------------------------------------------------------------------------      Objective       Current Hospital Meds:  ALPRAZolam, 0.5 mg, Oral, TID  aspirin, 81 mg, Oral, Daily  Brexpiprazole, 0.5 mg, Oral, Daily  bumetanide, 1 mg, Intravenous, Once  castor oil-balsam peru, 1 Application, Topical, Q12H  cefepime, 2,000 mg, Intravenous, Q8H  chlorhexidine, 15 mL, Mouth/Throat, Q12H  donepezil, 10 mg, Oral, Q PM  enoxaparin, 40 mg, Subcutaneous, Nightly  [Held by provider] hydroxychloroquine, 200 mg, Oral, BID  insulin regular, 2-7 Units, Subcutaneous, Q6H  ipratropium-albuterol, 3 mL, Nebulization, Q6H - RT  levothyroxine, 25 mcg, Oral, Daily With Breakfast  memantine, 5 mg, Oral, Q12H  methylPREDNISolone sodium succinate, 60 mg, Intravenous, Q12H  [Held by provider] metoprolol succinate XL, 25 mg, Oral, Q24H  mupirocin, 1 Application, Topical, Q12H  nystatin, 5 mL, Oral, 4x Daily  nystatin, 1 Application, Topical, Q12H  pantoprazole, 40 mg, Intravenous, Q AM  Phenylephrine HCl-NaCl, , ,   Phenylephrine HCl-NaCl, , ,   sodium chloride, 10 mL, Intravenous, Q12H  sodium chloride, 10 mL,  Intravenous, Q12H  sodium chloride, 10 mL, Intravenous, Q12H  sodium chloride, 10 mL, Intravenous, Q12H  sodium chloride, 10 mL, Intravenous, Q12H  sodium chloride, 10 mL, Intravenous, Q12H      dexmedetomidine, 0.2-1.5 mcg/kg/hr, Last Rate: 0.9 mcg/kg/hr (08/01/24 0210)  fentanyl 10 mcg/mL,  mcg/hr, Last Rate: 50 mcg/hr (08/01/24 1043)  Pharmacy Consult,   phenylephrine, 0.5-3 mcg/kg/min (Dosing Weight), Last Rate: 0.5 mcg/kg/min (08/01/24 0934)  propofol, 5-50 mcg/kg/min (Dosing Weight), Last Rate: 20 mcg/kg/min (08/01/24 1044)      ----------------------------------------------------------------------------------------------------------------------    Vital Signs:  Temp:  [99 °F (37.2 °C)-99.9 °F (37.7 °C)] 99.5 °F (37.5 °C)  Heart Rate:  [49-73] 62  Resp:  [28-31] 31  BP: ()/(39-87) 99/59  FiO2 (%):  [100 %] 100 %  Mean Arterial Pressure (Non-Invasive) for the past 24 hrs (Last 3 readings):   Noninvasive MAP (mmHg)   08/01/24 1200 72   08/01/24 1145 78   08/01/24 1130 77     SpO2 Percentage    08/01/24 1130 08/01/24 1145 08/01/24 1200   SpO2: 100% 100% 100%     SpO2:  [100 %] 100 %  on   ;   Device (Oxygen Therapy): ventilator    Body mass index is 23.53 kg/m².  Wt Readings from Last 3 Encounters:   08/01/24 62.2 kg (137 lb 2 oz)   04/26/24 67.7 kg (149 lb 4 oz)   04/19/24 67.1 kg (148 lb)        Intake/Output Summary (Last 24 hours) at 8/1/2024 1235  Last data filed at 8/1/2024 0550  Gross per 24 hour   Intake 2319.84 ml   Output 2406 ml   Net -86.16 ml     NPO Diet NPO Type: Tube Feeding  ----------------------------------------------------------------------------------------------------------------------      Physical Exam:    Constitutional: Thin, frail, chronically ill-appearing  female.  Intubated and sedated at 100% FiO2.    HENT:  Head: Normocephalic and atraumatic.  Mouth:  Moist mucous membranes.    Eyes:  Conjunctivae and EOM are normal.  No scleral icterus.  Neck:  Neck supple.   No JVD present.    Cardiovascular:  Normal rate, regular rhythm and normal heart sounds with no murmur. No edema.  Pulmonary/Chest: Remains diminished bilaterally.  Left-sided chest tube in place x 2.  Abdominal:  Soft.  Bowel sounds are normal.  No distension and no tenderness.   Musculoskeletal:  No edema, no tenderness, and no deformity.  No swelling or redness of joints.  Neurological: Sedate.  Skin:  Skin is warm and dry.  No rash noted.  No pallor.  Subcutaneous emphysema to the anterior chest, neck, face  Psychiatric: Sedate.        ----------------------------------------------------------------------------------------------------------------------  Results from last 7 days   Lab Units 07/29/24  0156 07/29/24  0020   HSTROP T ng/L 31* 29*     Results from last 7 days   Lab Units 08/01/24  0010 07/26/24  0441   PROBNP pg/mL 16,493.0* 11,410.0*           Results from last 7 days   Lab Units 07/29/24  0431   PH, ARTERIAL pH units 7.409   PO2 ART mm Hg 105.0   PCO2, ARTERIAL mm Hg 51.8*   HCO3 ART mmol/L 32.8*     Results from last 7 days   Lab Units 08/01/24  0010 07/31/24  0001 07/30/24  0005 07/29/24  0020 07/28/24  0053 07/27/24  1143 07/26/24  0441   LACTATE mmol/L  --   --   --  1.3  --   --   --    WBC 10*3/mm3 22.53* 22.37* 20.31* 24.89*   < > 16.39* 17.54*   HEMOGLOBIN g/dL 11.1* 10.8* 10.5* 10.5*   < > 10.0* 9.1*   HEMATOCRIT % 34.8 34.3 33.6* 33.2*   < > 31.9* 29.4*   MCV fL 99.4* 101.2* 99.4* 100.3*   < > 101.3* 101.7*   MCHC g/dL 31.9 31.5 31.3* 31.6   < > 31.3* 31.0*   PLATELETS 10*3/mm3 168 196 206 207   < > 205 225   INR   --   --   --   --   --  1.00 1.09    < > = values in this interval not displayed.     Results from last 7 days   Lab Units 08/01/24  0010 07/31/24  0001 07/30/24  0005 07/29/24  0020 07/28/24  0053 07/27/24  1143   SODIUM mmol/L 140 141 137 141 141 142   POTASSIUM mmol/L 5.5* 4.7 4.8 4.7 4.9 5.2   MAGNESIUM mg/dL  --   --   --  2.4 2.5* 2.4   CHLORIDE mmol/L 103 106 101 106 107  "109*   CO2 mmol/L 27.9 29.7* 31.0* 28.2 24.2 26.1   BUN mg/dL 17 16 19 15 23 29*   CREATININE mg/dL 0.22* 0.22* 0.23* 0.23* 0.26* 0.29*   CALCIUM mg/dL 8.4* 8.4* 8.2* 8.4* 8.3* 8.1*   GLUCOSE mg/dL 169* 192* 170* 163* 159* 161*   ALBUMIN g/dL  --   --  3.0* 3.0* 3.1* 3.1*   BILIRUBIN mg/dL  --   --  0.2 0.2 0.3 0.2   ALK PHOS U/L  --   --  94 103 83 78   AST (SGOT) U/L  --   --  24 52* 23 23   ALT (SGPT) U/L  --   --  45* 58* 24 26   Estimated Creatinine Clearance: 250.3 mL/min (A) (by C-G formula based on SCr of 0.22 mg/dL (L)).  No results found for: \"AMMONIA\"    Glucose   Date/Time Value Ref Range Status   08/01/2024 0546 132 (H) 70 - 130 mg/dL Final   08/01/2024 0104 199 (H) 70 - 130 mg/dL Final   07/31/2024 1807 120 70 - 130 mg/dL Final   07/31/2024 1209 172 (H) 70 - 130 mg/dL Final   07/31/2024 0555 113 70 - 130 mg/dL Final   07/31/2024 0015 189 (H) 70 - 130 mg/dL Final   07/30/2024 1757 152 (H) 70 - 130 mg/dL Final   07/30/2024 1216 162 (H) 70 - 130 mg/dL Final     Lab Results   Component Value Date    HGBA1C 4.60 (L) 07/18/2024     Lab Results   Component Value Date    TSH 1.140 07/17/2024    FREET4 1.83 (H) 04/11/2024       Blood Culture   Date Value Ref Range Status   07/21/2024 No growth at 2 days  Preliminary   07/21/2024 No growth at 2 days  Preliminary   07/17/2024 No growth at 5 days  Final   07/17/2024 No growth at 5 days  Final     Urine Culture   Date Value Ref Range Status   07/17/2024 >100,000 CFU/mL Escherichia coli (A)  Final     No results found for: \"WOUNDCX\"  No results found for: \"STOOLCX\"  Respiratory Culture   Date Value Ref Range Status   07/21/2024 No growth  Preliminary   07/21/2024   Final    Rare growth Normal respiratory ammon. No S. aureus or Pseudomonas aeruginosa detected. Final report.     Pain Management Panel           No data to display          "         ----------------------------------------------------------------------------------------------------------------------  Imaging Results (Last 24 Hours)       ** No results found for the last 24 hours. **            ----------------------------------------------------------------------------------------------------------------------    Pertinent Infectious Disease Results                Assessment/Plan       Assessment     Septic shock with acute hypoxic respiratory failure requiring mechanical ventilation and pressor support  Pneumonia        Plan        Patient remains intubated and sedated at 100% FiO2 this morning.  Persistent low-grade fever with Tmax of 99.3.  Continues on phenylephrine drip.  2 left-sided chest tubes in place.  Rectal tube in place.  Lung sounds diminished bilaterally.  Abdomen soft.  WBC elevated 22.53.    Case discussed with RN.  The patient has been made a DNR with plans not to escalate current treatment.  Possible transition to comfort measures tomorrow.  We will continue with cefepime 2 g IV every 8 hours for now.  We will continue to monitor closely.      ANTIMICROBIAL THERAPY    cefepime 2000 mg IVPB in 100 mL NS (VTB)     Code Status:   Code Status and Medical Interventions: No CPR (Do Not Attempt to Resuscitate); Limited Support; No intubation (DNI), No vasopressors, No antibiotics, No cardioversion, No antiarrhythmic drugs; ok for current vasopressor no escalation, no escalatio...   Ordered at: 07/30/24 1151     Medical Intervention Limits:    No intubation (DNI)    No vasopressors    No antibiotics    No cardioversion    No antiarrhythmic drugs     Level Of Support Discussed With:    Next of Kin (If No Surrogate)    Health Care Surrogate     Code Status (Patient has no pulse and is not breathing):    No CPR (Do Not Attempt to Resuscitate)     Medical Interventions (Patient has pulse or is breathing):    Limited Support     Comments:    ok for current vasopressor no  escalation, no escalation of antibiotic, if ET tube dislodged do not replace, no additional antiarrhythmic drugs       SG Rao  08/01/24  12:35 EDT

## 2024-08-01 NOTE — PLAN OF CARE
Goal Outcome Evaluation:  Plan of Care Reviewed With: patient        Progress: no change         Problem: Adult Inpatient Plan of Care  Goal: Plan of Care Review  Outcome: Ongoing, Progressing  Flowsheets (Taken 8/1/2024 0410)  Progress: no change  Plan of Care Reviewed With: patient  Goal: Patient-Specific Goal (Individualized)  Outcome: Ongoing, Progressing  Goal: Absence of Hospital-Acquired Illness or Injury  Outcome: Ongoing, Progressing  Intervention: Identify and Manage Fall Risk  Recent Flowsheet Documentation  Taken 8/1/2024 0300 by Elma Ames RN  Safety Promotion/Fall Prevention:   safety round/check completed   fall prevention program maintained  Taken 8/1/2024 0200 by Elma Ames RN  Safety Promotion/Fall Prevention:   safety round/check completed   fall prevention program maintained  Taken 8/1/2024 0100 by Elma Ames RN  Safety Promotion/Fall Prevention:   safety round/check completed   fall prevention program maintained  Taken 8/1/2024 0000 by Elma Ames RN  Safety Promotion/Fall Prevention:   safety round/check completed   fall prevention program maintained  Taken 7/31/2024 2300 by Elma Ames RN  Safety Promotion/Fall Prevention:   safety round/check completed   fall prevention program maintained  Taken 7/31/2024 2200 by Elma Ames RN  Safety Promotion/Fall Prevention:   safety round/check completed   fall prevention program maintained  Taken 7/31/2024 2100 by Elma Ames RN  Safety Promotion/Fall Prevention:   safety round/check completed   fall prevention program maintained  Taken 7/31/2024 2000 by Elma Ames RN  Safety Promotion/Fall Prevention:   safety round/check completed   fall prevention program maintained  Taken 7/31/2024 1900 by Elma Ames RN  Safety Promotion/Fall Prevention:   safety round/check completed   fall prevention program maintained  Intervention: Prevent Skin Injury  Recent Flowsheet Documentation  Taken 8/1/2024 0200 by Elma Ames  RN  Body Position:   turned   left  Skin Protection:   tubing/devices free from skin contact   adhesive use limited  Taken 8/1/2024 0000 by Elma Ames RN  Body Position:   turned   right  Taken 7/31/2024 2200 by Elma Ames RN  Body Position:   turned   left  Taken 7/31/2024 2000 by Elma Ames RN  Body Position:   turned   right  Skin Protection:   tubing/devices free from skin contact   adhesive use limited  Intervention: Prevent and Manage VTE (Venous Thromboembolism) Risk  Recent Flowsheet Documentation  Taken 7/31/2024 2000 by Elma Ames RN  Activity Management: bedrest  VTE Prevention/Management: (see mar) other (see comments)  Goal: Optimal Comfort and Wellbeing  Outcome: Ongoing, Progressing  Intervention: Provide Person-Centered Care  Recent Flowsheet Documentation  Taken 8/1/2024 0200 by Elma Ames RN  Trust Relationship/Rapport:   care explained   reassurance provided  Taken 7/31/2024 2000 by Elma Ames RN  Trust Relationship/Rapport:   care explained   reassurance provided  Goal: Readiness for Transition of Care  Outcome: Ongoing, Progressing     Problem: Fall Injury Risk  Goal: Absence of Fall and Fall-Related Injury  Outcome: Ongoing, Progressing  Intervention: Identify and Manage Contributors  Recent Flowsheet Documentation  Taken 8/1/2024 0300 by Elma Ames RN  Medication Review/Management: medications reviewed  Taken 8/1/2024 0200 by Elma Ames RN  Medication Review/Management: medications reviewed  Taken 8/1/2024 0100 by Elma Ames RN  Medication Review/Management: medications reviewed  Taken 8/1/2024 0000 by Elma Ames RN  Medication Review/Management: medications reviewed  Taken 7/31/2024 2300 by Elma Ames RN  Medication Review/Management: medications reviewed  Taken 7/31/2024 2200 by Elma Ames RN  Medication Review/Management: medications reviewed  Taken 7/31/2024 2100 by Elma Ames RN  Medication Review/Management: medications  reviewed  Taken 7/31/2024 2000 by Elma Ames RN  Medication Review/Management: medications reviewed  Taken 7/31/2024 1900 by Elma Ames RN  Medication Review/Management: medications reviewed  Intervention: Promote Injury-Free Environment  Recent Flowsheet Documentation  Taken 8/1/2024 0300 by Elma Ames RN  Safety Promotion/Fall Prevention:   safety round/check completed   fall prevention program maintained  Taken 8/1/2024 0200 by Elma Ames RN  Safety Promotion/Fall Prevention:   safety round/check completed   fall prevention program maintained  Taken 8/1/2024 0100 by Elma Ames RN  Safety Promotion/Fall Prevention:   safety round/check completed   fall prevention program maintained  Taken 8/1/2024 0000 by Elma Ames RN  Safety Promotion/Fall Prevention:   safety round/check completed   fall prevention program maintained  Taken 7/31/2024 2300 by Elma Ames RN  Safety Promotion/Fall Prevention:   safety round/check completed   fall prevention program maintained  Taken 7/31/2024 2200 by Elma Ames RN  Safety Promotion/Fall Prevention:   safety round/check completed   fall prevention program maintained  Taken 7/31/2024 2100 by Elma Ames RN  Safety Promotion/Fall Prevention:   safety round/check completed   fall prevention program maintained  Taken 7/31/2024 2000 by Elma Ames RN  Safety Promotion/Fall Prevention:   safety round/check completed   fall prevention program maintained  Taken 7/31/2024 1900 by Elma Ames RN  Safety Promotion/Fall Prevention:   safety round/check completed   fall prevention program maintained     Problem: Skin Injury Risk Increased  Goal: Skin Health and Integrity  Outcome: Ongoing, Progressing  Intervention: Optimize Skin Protection  Recent Flowsheet Documentation  Taken 8/1/2024 0200 by Elma Ames RN  Pressure Reduction Techniques:   weight shift assistance provided   pressure points protected   positioned off wounds   heels  elevated off bed  Head of Bed (HOB) Positioning: HOB at 30-45 degrees  Pressure Reduction Devices:   pressure-redistributing mattress utilized   positioning supports utilized   heel offloading device utilized  Skin Protection:   tubing/devices free from skin contact   adhesive use limited  Taken 8/1/2024 0000 by Elma Ames RN  Head of Bed ([facility]) Positioning: HOB at 30-45 degrees  Taken 7/31/2024 2200 by Elma Ames RN  Head of Bed ([facility]) Positioning: HOB at 30-45 degrees  Taken 7/31/2024 2000 by Elma Ames RN  Pressure Reduction Techniques:   weight shift assistance provided   positioned off wounds   pressure points protected   heels elevated off bed  Head of Bed (HOB) Positioning: HOB at 30-45 degrees  Pressure Reduction Devices:   pressure-redistributing mattress utilized   positioning supports utilized   heel offloading device utilized  Skin Protection:   tubing/devices free from skin contact   adhesive use limited     Problem: Inability to Wean (Mechanical Ventilation, Invasive)  Goal: Mechanical Ventilation Liberation  Outcome: Ongoing, Progressing  Intervention: Promote Extubation and Mechanical Ventilation Liberation  Recent Flowsheet Documentation  Taken 8/1/2024 0300 by Elma Ames RN  Medication Review/Management: medications reviewed  Taken 8/1/2024 0200 by Elma Ames RN  Medication Review/Management: medications reviewed  Taken 8/1/2024 0100 by Elma Ames RN  Medication Review/Management: medications reviewed  Taken 8/1/2024 0000 by Elma Ames RN  Medication Review/Management: medications reviewed  Taken 7/31/2024 2300 by Elma Ames RN  Medication Review/Management: medications reviewed  Taken 7/31/2024 2200 by Elma Ames RN  Medication Review/Management: medications reviewed  Taken 7/31/2024 2100 by Elma Ames RN  Medication Review/Management: medications reviewed  Taken 7/31/2024 2000 by Elma Ames RN  Medication Review/Management: medications  reviewed  Taken 7/31/2024 1900 by Elma Ames RN  Medication Review/Management: medications reviewed  Goal: Mechanical Ventilation Liberation  Outcome: Ongoing, Progressing  Intervention: Promote Extubation and Mechanical Ventilation Liberation  Recent Flowsheet Documentation  Taken 8/1/2024 0300 by Elma Ames RN  Medication Review/Management: medications reviewed  Taken 8/1/2024 0200 by Elma Ames RN  Medication Review/Management: medications reviewed  Taken 8/1/2024 0100 by Elma Ames RN  Medication Review/Management: medications reviewed  Taken 8/1/2024 0000 by Elma Ames RN  Medication Review/Management: medications reviewed  Taken 7/31/2024 2300 by Elma Ames RN  Medication Review/Management: medications reviewed  Taken 7/31/2024 2200 by Elma Ames RN  Medication Review/Management: medications reviewed  Taken 7/31/2024 2100 by Elma Ames RN  Medication Review/Management: medications reviewed  Taken 7/31/2024 2000 by Elma Ames RN  Medication Review/Management: medications reviewed  Taken 7/31/2024 1900 by Elma Ames RN  Medication Review/Management: medications reviewed

## 2024-08-01 NOTE — PROGRESS NOTES
[facility]     Patient Identification:  Name:  Lexie Valdez  Age:  64 y.o.  Sex:  female  :  1960  MRN:  [id number]  Visit Number:  [id number]  Primary Care Provider:  Violet Pop APRN    Length of stay:  14    Subjective: Patient seen and examined, patient opens her eyes with tactile and verbal stimuli, very weak, currently on ventilator, chart reviewed, family decided no escalation of treatment.  Currently managed with Lantus to maximize chance of tolerating exacerbation.  Good output of urine.    Chief Complaint: Respiratory failure  ----------------------------------------------------------------------------------------------------------------------  Current Hospital Meds:  ALPRAZolam, 0.5 mg, Oral, TID  aspirin, 81 mg, Oral, Daily  Brexpiprazole, 0.5 mg, Oral, Daily  castor oil-balsam peru, 1 Application, Topical, Q12H  cefepime, 2,000 mg, Intravenous, Q8H  chlorhexidine, 15 mL, Mouth/Throat, Q12H  donepezil, 10 mg, Oral, Q PM  enoxaparin, 40 mg, Subcutaneous, Nightly  [Held by provider] hydroxychloroquine, 200 mg, Oral, BID  insulin regular, 2-7 Units, Subcutaneous, Q6H  ipratropium-albuterol, 3 mL, Nebulization, Q6H - RT  levothyroxine, 25 mcg, Oral, Daily With Breakfast  memantine, 5 mg, Oral, Q12H  methylPREDNISolone sodium succinate, 60 mg, Intravenous, Q12H  [Held by provider] metoprolol succinate XL, 25 mg, Oral, Q24H  mupirocin, 1 Application, Topical, Q12H  nystatin, 5 mL, Oral, 4x Daily  nystatin, 1 Application, Topical, Q12H  pantoprazole, 40 mg, Intravenous, Q AM  Phenylephrine HCl-NaCl, , ,   Phenylephrine HCl-NaCl, , ,   sodium chloride, 10 mL, Intravenous, Q12H  sodium chloride, 10 mL, Intravenous, Q12H  sodium chloride, 10 mL, Intravenous, Q12H  sodium chloride, 10 mL, Intravenous, Q12H  sodium chloride, 10 mL, Intravenous, Q12H  sodium chloride, 10 mL, Intravenous, Q12H      dexmedetomidine, 0.2-1.5 mcg/kg/hr, Last Rate: 0.9 mcg/kg/hr  (08/01/24 0210)  fentanyl 10 mcg/mL,  mcg/hr, Last Rate: 100 mcg/hr (07/31/24 1835)  Pharmacy Consult,   phenylephrine, 0.5-3 mcg/kg/min (Dosing Weight), Last Rate: 0.5 mcg/kg/min (07/31/24 1045)  propofol, 5-50 mcg/kg/min (Dosing Weight), Last Rate: 30 mcg/kg/min (07/31/24 2327)      ----------------------------------------------------------------------------------------------------------------------  Vital Signs:  Temp:  [99 °F (37.2 °C)-99.9 °F (37.7 °C)] 99.3 °F (37.4 °C)  Heart Rate:  [49-73] 63  Resp:  [28-36] 29  BP: ()/(48-96) 117/64  FiO2 (%):  [25 %-100 %] 100 %       Tele: Sinus rhythm 63 bpm      07/30/24  0604 07/31/24  0605 08/01/24  0550   Weight: 64.6 kg (142 lb 6.7 oz) 62.9 kg (138 lb 10.7 oz) 62.2 kg (137 lb 2 oz)     Body mass index is 23.53 kg/m².    Intake/Output Summary (Last 24 hours) at 8/1/2024 0847  Last data filed at 8/1/2024 0550  Gross per 24 hour   Intake 2319.84 ml   Output 2406 ml   Net -86.16 ml     NPO Diet NPO Type: Tube Feeding  ----------------------------------------------------------------------------------------------------------------------  Physical exam:  General: Currently on ventilator, likely arousable, patient breathing with vent setting.    Skin:  Skin is warm and dry. No rash noted. No pallor.    HENT:  Head:  Normocephalic and atraumatic.  Mouth:  Moist mucous membranes.  ET tube OG tube in place  Eyes:  Conjunctivae and EOM are normal.  Pupils are equal, round, and reactive to light.  No scleral icterus.    Neck:  Neck supple.  No JVD present.    Pulmonary/Chest: Rhonchi bilateral good air movement no wheezing, crepitus or crackles noted.  Cardiovascular:  Normal rate, regular rhythm and normal heart sounds with no murmur.  Abdominal:  Soft.  Bowel sounds are normal.  No distension and no tenderness.   Extremities: Trace edema upper and lower extremity no cyanosis or clubbing no symmetry of the legs.   Neurological: Slightly responds to tactile and  verbal stimuli   Genitourinary: Lin catheter in place  Back:  ----------------------------------------------------------------------------------------------------------------------  ----------------------------------------------------------------------------------------------------------------------  Results from last 7 days   Lab Units 07/29/24  0156 07/29/24  0020   HSTROP T ng/L 31* 29*     Results from last 7 days   Lab Units 08/01/24  0010 07/31/24  0001 07/30/24  0005 07/29/24  0020 07/28/24  0053 07/27/24  1143 07/26/24  0441   LACTATE mmol/L  --   --   --  1.3  --   --   --    WBC 10*3/mm3 22.53* 22.37* 20.31* 24.89*   < > 16.39* 17.54*   HEMOGLOBIN g/dL 11.1* 10.8* 10.5* 10.5*   < > 10.0* 9.1*   HEMATOCRIT % 34.8 34.3 33.6* 33.2*   < > 31.9* 29.4*   MCV fL 99.4* 101.2* 99.4* 100.3*   < > 101.3* 101.7*   MCHC g/dL 31.9 31.5 31.3* 31.6   < > 31.3* 31.0*   PLATELETS 10*3/mm3 168 196 206 207   < > 205 225   INR   --   --   --   --   --  1.00 1.09    < > = values in this interval not displayed.     Results from last 7 days   Lab Units 07/29/24  0431   PH, ARTERIAL pH units 7.409   PO2 ART mm Hg 105.0   PCO2, ARTERIAL mm Hg 51.8*   HCO3 ART mmol/L 32.8*     Results from last 7 days   Lab Units 08/01/24  0010 07/31/24  0001 07/30/24  0005 07/29/24  0020 07/28/24  0053 07/27/24  1143   SODIUM mmol/L 140 141 137 141 141 142   POTASSIUM mmol/L 5.5* 4.7 4.8 4.7 4.9 5.2   MAGNESIUM mg/dL  --   --   --  2.4 2.5* 2.4   CHLORIDE mmol/L 103 106 101 106 107 109*   CO2 mmol/L 27.9 29.7* 31.0* 28.2 24.2 26.1   BUN mg/dL 17 16 19 15 23 29*   CREATININE mg/dL 0.22* 0.22* 0.23* 0.23* 0.26* 0.29*   CALCIUM mg/dL 8.4* 8.4* 8.2* 8.4* 8.3* 8.1*   GLUCOSE mg/dL 169* 192* 170* 163* 159* 161*   ALBUMIN g/dL  --   --  3.0* 3.0* 3.1* 3.1*   BILIRUBIN mg/dL  --   --  0.2 0.2 0.3 0.2   ALK PHOS U/L  --   --  94 103 83 78   AST (SGOT) U/L  --   --  24 52* 23 23   ALT (SGPT) U/L  --   --  45* 58* 24 26   Estimated Creatinine Clearance:  "250.3 mL/min (A) (by C-G formula based on SCr of 0.22 mg/dL (L)).    No results found for: \"AMMONIA\"      Blood Culture   Date Value Ref Range Status   07/28/2024 No growth at 3 days  Preliminary   07/28/2024 No growth at 3 days  Preliminary     No results found for: \"URINECX\"  No results found for: \"WOUNDCX\"  No results found for: \"STOOLCX\"    I have personally looked at the labs and they are summarized above.  ----------------------------------------------------------------------------------------------------------------------  Imaging Results (Last 24 Hours)       ** No results found for the last 24 hours. **          ----------------------------------------------------------------------------------------------------------------------  Assessment and Plan:  -Acute non-STEMI likely type II per cardiology  -Acute on chronic hypoxic and hypercapnic respiratory failure  -IPF/COPD with exacerbation  -Left-sided pneumothorax  -New onset heart failure with reduced ejection fraction  -Subcutaneous emphysema secondary to pneumothorax  -Aspiration pneumonia  -Moderate pulmonary hypertension  -Acute hyperkalemia    Pulmonary critical care service help appreciated, aggressive potassium to achieve optimal condition prior to extubation.  Overall prognosis is poor.  Repeat potassium in 4 hours if still elevated will address.  Palliative care help appreciated.    Currently no family members available at bedside.    Total time spent on this encounter is 35 minutes.    Disposition pending      Ruthann Zee MD  08/01/24  08:47 EDT  "

## 2024-08-01 NOTE — PROGRESS NOTES
Progress Note Critical Care Pulmonary        Subjective      Overnight events reviewed.  All the labs medications ins and outs and vitals reviewed.    Ventilator settings graphics reviewed.  Had a goals of care discussion with patient's family.  Looking for comfort measures likely tomorrow morning.    Drips reviewed and adjusted    Review of Systems:    Could not be obtained as patient is sedated on ventilator.    Vital Signs  Temp:  [99 °F (37.2 °C)-99.9 °F (37.7 °C)] 99.5 °F (37.5 °C)  Heart Rate:  [49-73] 62  Resp:  [28-31] 31  BP: ()/(39-87) 93/53  FiO2 (%):  [100 %] 100 %  Body mass index is 23.53 kg/m².    Intake/Output Summary (Last 24 hours) at 8/1/2024 1340  Last data filed at 8/1/2024 0550  Gross per 24 hour   Intake 2319.84 ml   Output 2406 ml   Net -86.16 ml     No intake/output data recorded.    Physical Exam:  General-chronically ill appearance, not in any acute distress, sedated  Subcutaneous emphysema much better after I adjusted patient's ventilator settings yesterday    HEENT-PERRLA s    Neck-supple    Respiratory-decreased breath sounds otherwise clear to auscultation.  Synchronous with the ventilator    Chest tube in place  Cardiovascular-NSR  GI-nontender nondistended bowel sounds positive    CNS- grossly nonfocal    Extremities-no clubbing and edema        Results Review:      Results from last 7 days   Lab Units 08/01/24  0010 07/31/24  0001 07/30/24  0005   WBC 10*3/mm3 22.53* 22.37* 20.31*   HEMOGLOBIN g/dL 11.1* 10.8* 10.5*   PLATELETS 10*3/mm3 168 196 206     Results from last 7 days   Lab Units 08/01/24  1132 08/01/24  0010 07/31/24  0001 07/30/24  0005 07/29/24  0020 07/28/24  0053 07/27/24  1143   SODIUM mmol/L 140 140 141   < > 141 141 142   POTASSIUM mmol/L 4.9 5.5* 4.7   < > 4.7 4.9 5.2   CHLORIDE mmol/L 104 103 106   < > 106 107 109*   CO2 mmol/L 30.6* 27.9 29.7*   < > 28.2 24.2 26.1   BUN mg/dL 16 17 16   < > 15 23 29*   CREATININE mg/dL 0.19* 0.22* 0.22*   < > 0.23* 0.26*  0.29*   CALCIUM mg/dL 8.3* 8.4* 8.4*   < > 8.4* 8.3* 8.1*   GLUCOSE mg/dL 175* 169* 192*   < > 163* 159* 161*   MAGNESIUM mg/dL  --   --   --   --  2.4 2.5* 2.4    < > = values in this interval not displayed.     Lab Results   Component Value Date    INR 1.00 07/27/2024    INR 1.09 07/26/2024    INR 0.93 07/18/2024    PROTIME 13.3 07/27/2024    PROTIME 14.2 07/26/2024    PROTIME 12.6 07/18/2024     Results from last 7 days   Lab Units 07/30/24  0005 07/29/24  0020 07/28/24  0053   ALK PHOS U/L 94 103 83   BILIRUBIN mg/dL 0.2 0.2 0.3   ALT (SGPT) U/L 45* 58* 24   AST (SGOT) U/L 24 52* 23     Results from last 7 days   Lab Units 07/29/24  0431   PH, ARTERIAL pH units 7.409   PO2 ART mm Hg 105.0   PCO2, ARTERIAL mm Hg 51.8*   HCO3 ART mmol/L 32.8*     Imaging Results (Last 24 Hours)       ** No results found for the last 24 hours. **                 ALPRAZolam, 0.5 mg, Oral, TID  aspirin, 81 mg, Oral, Daily  Brexpiprazole, 0.5 mg, Oral, Daily  bumetanide, 1 mg, Intravenous, Once  castor oil-balsam peru, 1 Application, Topical, Q12H  cefepime, 2,000 mg, Intravenous, Q8H  chlorhexidine, 15 mL, Mouth/Throat, Q12H  donepezil, 10 mg, Oral, Q PM  enoxaparin, 40 mg, Subcutaneous, Nightly  [Held by provider] hydroxychloroquine, 200 mg, Oral, BID  insulin regular, 2-7 Units, Subcutaneous, Q6H  ipratropium-albuterol, 3 mL, Nebulization, Q6H - RT  levothyroxine, 25 mcg, Oral, Daily With Breakfast  memantine, 5 mg, Oral, Q12H  methylPREDNISolone sodium succinate, 60 mg, Intravenous, Q12H  [Held by provider] metoprolol succinate XL, 25 mg, Oral, Q24H  mupirocin, 1 Application, Topical, Q12H  nystatin, 5 mL, Oral, 4x Daily  nystatin, 1 Application, Topical, Q12H  pantoprazole, 40 mg, Intravenous, Q AM  Phenylephrine HCl-NaCl, , ,   Phenylephrine HCl-NaCl, , ,   sodium chloride, 10 mL, Intravenous, Q12H  sodium chloride, 10 mL, Intravenous, Q12H  sodium chloride, 10 mL, Intravenous, Q12H  sodium chloride, 10 mL, Intravenous,  Q12H  sodium chloride, 10 mL, Intravenous, Q12H  sodium chloride, 10 mL, Intravenous, Q12H      dexmedetomidine, 0.2-1.5 mcg/kg/hr, Last Rate: 0.9 mcg/kg/hr (08/01/24 0210)  fentanyl 10 mcg/mL,  mcg/hr, Last Rate: 50 mcg/hr (08/01/24 1043)  Pharmacy Consult,   phenylephrine, 0.5-3 mcg/kg/min (Dosing Weight), Last Rate: 0.5 mcg/kg/min (08/01/24 0934)  propofol, 5-50 mcg/kg/min (Dosing Weight), Last Rate: 20 mcg/kg/min (08/01/24 1044)        Medication Review:       Lab 07/30/24  0005   PROCALCITONIN 0.07       Latest Reference Range & Units 07/30/24 00:11   CO2 Content 22 - 33 mmol/L 36.3 (H)   Carboxyhemoglobin Venous 0.0 - 5.0 % 2.4   Methemoglobin Venous 0.0 - 3.0 % 0.4   Oxyhemoglobin Venous 45.0 - 75.0 % 66.9   Hemoglobin, Blood Gas 13.5 - 17.5 g/dL 10.6 (L)   Site  Nurse/Dr Draw   Modality  Ventilator   FIO2 % 28   Ventilator Mode  VC/AC   Set Tidal Volume  330.00   Set Mech Resp Rate  28.0   PEEP  4.0   Barometric Pressure for Blood Gas mmHg 726   pH, Venous 7.320 - 7.420 pH Units 7.402   pCO2, Venous 41.0 - 51.0 mm Hg 55.6 (H)   pO2, Venous 27.0 - 53.0 mm Hg 35.1   HCO3, Venous 22.0 - 28.0 mmol/L 34.6 (H)   Base Excess 0.0 - 2.0 mmol/L 8.4 (H)   O2 Saturation, Venous 45.0 - 75.0 % 68.8   Collected by  [id number]   (H): Data is abnormally high  (L): Data is abnormally low  Assessment & Plan   Had a family meeting today goals of care discussed.  Patient likely transition to comfort measures tomorrow.  More family members wants to come and see the patient.  Neuro-- sedated drips reviewed.  Adjusted drip for a RASS of -1 to -2.  Patient is synchronous with the ventilator.  Continue Xanax.      Cardiovascular-vitals reviewed.  Continue medications to maintain a MAP of 65 and above.  Continue steroids.  Non-STEMI-continue current treatment.  Results for orders placed during the hospital encounter of 07/17/24    Adult Transthoracic Echo Complete w/ Color, Spectral and Contrast if necessary per  protocol    Interpretation Summary    Normal left ventricular cavity size and wall thickness noted.    The following left ventricular wall segments are hypokinetic: mid anterior, basal anterolateral, apical lateral, apical septal, mid anteroseptal and basal anterior. The following left ventricular wall segments are akinetic: apex.    Left ventricular systolic function is moderately decreased. Left ventricular ejection fraction appears to be 36 - 40%.    Left ventricular diastolic function is consistent with (grade I) impaired relaxation.    The aortic valve is structurally normal with no regurgitation or stenosis present.    The mitral valve is structurally normal with no significant stenosis present. Mild mitral valve regurgitation is present.    Mild tricuspid valve regurgitation is present. Estimated right ventricular systolic pressure from tricuspid regurgitation is moderately elevated (45-55 mmHg).    There is no evidence of pericardial effusion. .    Comments: Patient seem to have developed a new regional wall motion normalities with decreased LV systolic function as compared to the previous study in March 2023.     Acute on chronic hypoxic respiratory failure-likely due to interstitial lung disease flareup.  Continue steroids.  Dose reviewed    Vent Settings          Resp Rate (Set): 28  Pressure Support (cm H2O): 10 cm H20  FiO2 (%): 100 %  PEEP/CPAP (cm H2O):  (Changed by DR Mcmahon)    Minute Ventilation (L/min) (Obs): 9.44 L/min  Resp Rate (Observed) Vent: 31     I:E Ratio (Obs): 1:2.6    PIP Observed (cm H2O): 26 cm H2O  Plateau Pressure (cm H2O): 41 cm H2O  Driving Pressure (cm H2O): 32.6 cm H2O   Chest x-ray reviewed.  Subcutaneous emphysema much better after adjusting the PEEP and FiO2.  Will decrease the FiO2 by today evening and if needed will provide a PEEP of 3.  Plan discussed with RT.  Pneumothorax ex likely due to barotrauma from increased plateau pressures and ILD.  Latest chest x-ray reviewed.   Latest chest x-ray reviewed and discussed with team.  Latest blood gas reviewed.        Nephrology- Cr and BUN stable  I/O-reviewed     GI-continue current diet.  Continue aspiration precautions     Hematology- CBC  Hb  platelet  WBC-latest reviewed    ID  Culture-reviewed  And Antibiotics   Increasing white count-microbiology reviewed.      Procalitonin Results:      Lab 07/30/24  0005   PROCALCITONIN 0.07      Endocrinology- Maintain Blood sugar 140 -180  Blood sugars reviewed     Electrolytes-   Mag and phos         DVT prophylaxis--continue  Currently patient is critically ill due to acute hypoxic respiratory failure, pneumothorax-chest tube in place and sedated drips.  I adjusted ventilator settings and drips cc31 mins  John Mcmahon MD  08/01/24  13:40 EDT

## 2024-08-01 NOTE — PLAN OF CARE
Goal Outcome Evaluation:              Outcome Evaluation: Pt made comfort measures this shift.

## 2024-08-01 NOTE — PROGRESS NOTES
"Palliative Care Daily Progress Note     S: Medical record reviewed, followed up with Primary RN Steph Santillan and Dr Spence regarding patient's condition. When I saw Lexie this morning she is sedated, intubated on vent.She was on propofol, fentanyl 50 mcg, one vasopressor and Precedex. She did not appear to be in distress at this time. Per RN Dr Spence had discussion with patients  Khris and son Faith to discuss time frame of palliative extubation. After palliative discussion with family, they state that they are wanting to move forward today as all of family is here.      O:   Palliative Performance Scale Score:     BP 93/53   Pulse 62   Temp 99.5 °F (37.5 °C)   Resp (!) 31   Ht 162.6 cm (64.02\")   Wt 62.2 kg (137 lb 2 oz)   SpO2 100%   BMI 23.53 kg/m²     Intake/Output Summary (Last 24 hours) at 8/1/2024 1358  Last data filed at 8/1/2024 0550  Gross per 24 hour   Intake 2319.84 ml   Output 2406 ml   Net -86.16 ml       PE:  General Appearance:    Chronically ill appearing, sedation on intubated on vent   HEENT:    NC/AT, without obvious abnormality, EOMI, anicteric    Neck:   Decreased subcutaneous air noted on neck, trachea midline, no JVD   Lungs:     Sedated on vent @ 24% and 4  of peep, left CT in place, coarse lung sounds noted    Heart:    RRR, normal S1 and S2, no M/R/G   Abdomen:     Soft, NT, ND, NABS    Extremities:   Moves all extremities weakly(not to command) trace BLL extremity edema, RUE edema/rt hand decreasing   Pulses:   Pulses palpable and equal bilaterally   Skin:   Warm, dry, subcutaneous air noted on neck and chest   Neurologic:   Sedation on unable to assess    Psych:   Sedation on, calm             Meds: Reviewed and changes noted    Labs:   Results from last 7 days   Lab Units 08/01/24  0010   WBC 10*3/mm3 22.53*   HEMOGLOBIN g/dL 11.1*   HEMATOCRIT % 34.8   PLATELETS 10*3/mm3 168     Results from last 7 days   Lab Units 08/01/24  1132   SODIUM mmol/L 140 "   POTASSIUM mmol/L 4.9   CHLORIDE mmol/L 104   CO2 mmol/L 30.6*   BUN mg/dL 16   CREATININE mg/dL 0.19*   GLUCOSE mg/dL 175*   CALCIUM mg/dL 8.3*     Results from last 7 days   Lab Units 08/01/24  1132 07/31/24  0001 07/30/24  0005   SODIUM mmol/L 140   < > 137   POTASSIUM mmol/L 4.9   < > 4.8   CHLORIDE mmol/L 104   < > 101   CO2 mmol/L 30.6*   < > 31.0*   BUN mg/dL 16   < > 19   CREATININE mg/dL 0.19*   < > 0.23*   CALCIUM mg/dL 8.3*   < > 8.2*   BILIRUBIN mg/dL  --   --  0.2   ALK PHOS U/L  --   --  94   ALT (SGPT) U/L  --   --  45*   AST (SGOT) U/L  --   --  24   GLUCOSE mg/dL 175*   < > 170*    < > = values in this interval not displayed.     Imaging Results (Last 72 Hours)       Procedure Component Value Units Date/Time    XR Chest AP [[id number]] Collected: 07/30/24 1049     Updated: 07/30/24 1052    Narrative:      XR CHEST AP-     CLINICAL INDICATION: chest tube insertion; I21.4-Non-ST elevation  (NSTEMI) myocardial infarction; J93.9-Pneumothorax, unspecified        COMPARISON: 7/30/2024     TECHNIQUE: Single frontal view of the chest.     FINDINGS:     LUNGS: Second left-sided thoracostomy tube is been placed. No  pneumothorax seen on the presented study.     HEART AND MEDIASTINUM: Heart and mediastinal contours are unremarkable        SKELETON: Bony and soft tissue structures are unremarkable.             Impression:      Second left-sided thoracostomy tube has been placed. No pneumothorax  seen on the presented image.           This report was finalized on 7/30/2024 10:49 AM by Dr. Devan Sahni MD.       XR Chest 1 View [[id number]] Collected: 07/30/24 0805     Updated: 07/30/24 0810    Narrative:      XR CHEST 1 VW-     CLINICAL INDICATION: ptx f/u; I21.4-Non-ST elevation (NSTEMI) myocardial  infarction; J93.9-Pneumothorax, unspecified        COMPARISON: 7/29/2024     TECHNIQUE: Single frontal view of the chest.     FINDINGS:     LUNGS: Left apical pneumothorax.  Diffuse subcutaneous air.  No interval  line change     HEART AND MEDIASTINUM: Heart and mediastinal contours are unremarkable        SKELETON: Bony and soft tissue structures are unremarkable.             Impression:      Left apical pneumothorax.  Diffuse subcutaneous air.  No interval line change           This report was finalized on 7/30/2024 8:08 AM by Dr. Devan Sahni MD.       XR Chest 1 View [[id number]] Collected: 07/29/24 1726     Updated: 07/29/24 1729    Narrative:      XR CHEST 1 VW-     CLINICAL INDICATION: subQ air worsening; I21.4-Non-ST elevation (NSTEMI)  myocardial infarction; J93.9-Pneumothorax, unspecified        COMPARISON: 7/29/2024     TECHNIQUE: Single frontal view of the chest.     FINDINGS:     LUNGS: Left apical pneumothorax. Left-sided thoracostomy tube persists.  Right-sided airspace disease.     HEART AND MEDIASTINUM: Heart and mediastinal contours are unremarkable        SKELETON: Bony and soft tissue structures are unremarkable.             Impression:      Left-sided pneumothorax.           This report was finalized on 7/29/2024 5:27 PM by Dr. Devan Sahni MD.                 Diagnostics: Reviewed    A: Lexie Valdez is a 64 y.o. female admitted on 7/17/2024 due to shortness of breath. Lexie has a medical history of  anxiety/depression, arthritis, HTN, hypothyroidism, stress urinary incontinence with frequent UTI's, COPD and pulmonary fibrosis on 3L NC at home, and resting tremors in face and extremities, who presents with worsening shortness of breath for the last couple days. Pt also at time of admission c/o chest pressure associated with dyspnea stating this was chronic however had worsened in the days leading up to her admission. She also endorsed diarrhea for the last couple of days as well as dysuria and stated that she has recurring UTI's. Labs in ER revealed Troponin of 229 with repeat 188, BNP was 3550, Na 131, Cr 0.49, glucose 177, CRP 1.65, lactate 2.0, procal and WBC normal but with 88.7% neutrophils. UA showed  positive nitrite, trace leuk, 6-10 WBC, 4+ bacteria and 3-6 squamous epithelial cells. She was tachycardic and tachypneic with pH of 7.458, CO2 40, Po2 154, bicarb 28. She was admitted to PCU on admission and in the early am of 7/21 was transferred to CCU due to worsening dyspnea, bilateral infiltrates without effusion concerning for interstitial debbi disease flare, was transferred to CCU. Initially she was placed on Bipap in CCU and at 7/21 6:20 am Lexie was sedated intubated and placed on the ventilator. Palliative care consulted for GOC,ACP and for support of family.     Today 8/1/2024  T 99.5, HR 62, RR 28, BP 93/53 and was saturating 100% on vent at 24/4 on Propofol 20mcg, fentanyl 50mcg, Precedex 0.9 and neosy      P:  Per RN Dr Lares and Lisandro had discussion with patients  Khris and son Faith to discuss time frame of palliative extubation. After palliative discussion with family, they state that they are wanting to move forward today as all of family is here. I discussed with family the process of extubation/comfort measures, that once we removed her ET tube vent that we would have as needed medications available if she were to be dyspneic,anxious, nauseated, excess secretions that we would have something available for her. After all family was brought back to see Lexie, RN, RT and palliative were present when Lexie was extubated at 14:57 and placed on 3L and family called back to be with Lexie. I told family that if they felt Lexie was uncomfortable in any way to please let RN know. At 15:16 Lexie was resting quietly with her  Khris, son Faith, sister Rosa and multiple other family members at bedside. I let Dr Zee know that Lexie had been  made comfort measures and extubated.    Palliative care will continue to be available for Lexie and her family.      We will continue to follow along. Please do not hesitate to contact us regarding further sx mgmt or GOC needs, including after hours or on  weekends via our on call provider at [phone].     Linh Lou, APRN    8/1/2024

## 2024-08-01 NOTE — PROGRESS NOTES
[facility]  PROGRESS NOTE    Patient information:  Name: Lexie Valdez  Age/Sex: 64 y.o. female  :  1960        PCP: Violet Pop APRN  Attending: Estuardo Charles MD  MRN:  [id number]  Visit Number:  [id number]    LOS: 14  CODE STATUS:    Code Status and Medical Interventions: No CPR (Do Not Attempt to Resuscitate); Limited Support; No intubation (DNI), No vasopressors, No antibiotics, No cardioversion, No antiarrhythmic drugs; ok for current vasopressor no escalation, no escalatio...   Ordered at: 24 1151     Medical Intervention Limits:    No intubation (DNI)    No vasopressors    No antibiotics    No cardioversion    No antiarrhythmic drugs     Level Of Support Discussed With:    Next of Kin (If No Surrogate)    Health Care Surrogate     Code Status (Patient has no pulse and is not breathing):    No CPR (Do Not Attempt to Resuscitate)     Medical Interventions (Patient has pulse or is breathing):    Limited Support     Comments:    ok for current vasopressor no escalation, no escalation of antibiotic, if ET tube dislodged do not replace, no additional antiarrhythmic drugs       PROBLEM LIST:Principal Problem:    NSTEMI (non-ST elevated myocardial infarction)      Reason for Cardiology follow-up: NSTEMI     Subjective   ADMISSION INFORMATION:  Chief Complaint   Patient presents with    Shortness of Breath       DATE:2024    HPI:  Lexie Valdez is a 64 y.o. 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     Patient presented to [facility] ([facility] [place]) emergency room (ER) on 2024 with complaints of increased shortness of breath x 2 weeks has become progressively worse and exacerbated with minimal activity.     Interval  History:   Telemetry reveals SR 60s. AM labs reveal potassium 5.5, creatinine 0.22, hemoglobin 11.1, WBC 22.53, and a platelet count of 168.     Patient was in room CCU 10 when she was seen and examined.  Patient is intubated and sedated.  Patient's son Faith is at bedside.    ORDERS:   Pro BNP   Bumex 1 mg IV x 1 dose     EVENT TIMELINE:   7/18:ECHO w EF of 36 - 40%. Left ventricular diastolic function is consistent with (grade I) impaired relaxation, mild tricuspid valve regurgitation is present. Estimated right ventricular systolic pressure from tricuspid regurgitation is moderately elevated (45-55 mmHg). Patient seem to have developed a new regional wall motion normalities with decreased LV systolic function as compared to the previous study in March 2023.  Please see full report attached below.  7/18: Bess ST with MPI indicating moderate-sized infarct located in the inferior wall, septal wall and apex with no significant ischemia noted. Impressions are consistent with an intermediate risk study. EF = 43%.  Please see full report attached below.  7/21: Emergently intubated orotracheally with a #7.5 endotracheal tube at approximately 22:16 PM.   7/21: CXRs indicated multifocal and worsening pneumonia coupled with some concern as well for pulmonary edema.   7/23: Chest tube inserted 2/2 left sided pneumothorax.   7/29: Palliative Care involved and discussing GOC with family.   7/30: Second chest tube inserted superior to previous one today.     Objective     Vital Signs  Temp:  [99 °F (37.2 °C)-99.9 °F (37.7 °C)] 99.5 °F (37.5 °C)  Heart Rate:  [49-73] 65  Resp:  [28-35] 31  BP: ()/(39-87) 104/60  FiO2 (%):  [100 %] 100 %  Device (Oxygen Therapy): ventilator  Vital Signs (last 72 hrs)         07/29 0700  07/30 0659 07/30 0700 07/31 0659 07/31 0700 08/01 0659 08/01 0700 08/01 1138   Most Recent      Temp (°F) 99 -  99.9    98.6 -  99.9    99 -  99.9    99.3 -  99.5     99.5 (37.5) 08/01 1115    Heart Rate  53 -  78    56 -  77    49 -  73    54 -  69     65 08/01 1115    Resp 28 -  32    28 -  35    28 -  36    28 -  31     31 08/01 1000    BP 78/44 -  167/91    71/39 -  173/94    88/53 -  155/96    73/39 -  137/74     104/60 08/01 1115    SpO2 (%) 95 -  100    94 -  100    96 -  100      100     100 08/01 1115    Oxygen Concentration (%)   28    25 -  28    25 -  100      100     100 08/01 0745          BMI:Body mass index is 23.53 kg/m².    WEIGHT:      07/30/24  0604 07/31/24  0605 08/01/24  0550   Weight: 64.6 kg (142 lb 6.7 oz) 62.9 kg (138 lb 10.7 oz) 62.2 kg (137 lb 2 oz)       DIET:NPO Diet NPO Type: Tube Feeding    I&O:  Intake & Output (last 3 days)         07/29 0701 07/30 0700 07/30 0701 07/31 0700 07/31 0701 08/01 0700 08/01 0701  08/02 0700    I.V. (mL/kg) 1155.8 (20.2) 1259.5 (22) 1046.8 (18.3)     Other 505 1644 496     NG/ 2334 677     IV Piggyback 100 100 100     Total Intake(mL/kg) 2465.8 (43.1) 5337.5 (93.3) 2319.8 (40.6)     Urine (mL/kg/hr) 1675 (1.2) 2650 (1.9) 2250 (1.6)     Stool 100 65 50     Chest Tube 4 94 106     Total Output 1779 2809 2406     Net +686.8 +2528.5 -86.2                      PHYSICAL EXAM:  Constitutional:       Appearance: Not in distress. Acutely ill-appearing.   HENT:         Comments: Orally intubated.  Neck:      Vascular: No JVD. JVD normal.   Pulmonary:      Comments: Chest tube x 2 intact.  and Intubated with mechanical ventilation with decreased breath sounds noted to left side and right side is clear.   Cardiovascular:      Normal rate. Regular rhythm.      Murmurs: There is no murmur.   Edema:     Peripheral edema absent.   Abdominal:      General: Bowel sounds are normal. There is no distension.      Palpations: Abdomen is soft.      Comments: FC with BSD in use with dark yellow urine noted.     Musculoskeletal:      Cervical back: Neck supple.      Comments: Sedated. SCUDS in use BLE.  Skin:     General: Skin is warm and dry.      Findings: Bruising  present.      Comments: Bruising noted to BUE, BLE, anterior chest and neck have improved.   Neurological:      Comments: Intubated and sedated.                   Results review   Results Review:    I have reviewed the patient's new clinical results. 08/01/24 11:43 EDT    Results from last 7 days   Lab Units 07/29/24  0156 07/29/24  0020   HSTROP T ng/L 31* 29*     Lab Results   Component Value Date    PROBNP 16,493.0 (H) 08/01/2024    PROBNP 11,410.0 (H) 07/26/2024    PROBNP 14,737.0 (H) 07/22/2024     Results from last 7 days   Lab Units 08/01/24  0010 07/31/24  0001 07/30/24  0005 07/29/24  0020 07/28/24  0053 07/27/24  1143 07/26/24  0441   WBC 10*3/mm3 22.53* 22.37* 20.31* 24.89* 22.69* 16.39* 17.54*   HEMOGLOBIN g/dL 11.1* 10.8* 10.5* 10.5* 10.3* 10.0* 9.1*   PLATELETS 10*3/mm3 168 196 206 207 224 205 225     Results from last 7 days   Lab Units 08/01/24  0010 07/31/24  0001 07/30/24  0005 07/29/24  0020 07/28/24  0053 07/27/24  1143 07/26/24  0441   SODIUM mmol/L 140 141 137 141 141 142 142   POTASSIUM mmol/L 5.5* 4.7 4.8 4.7 4.9 5.2 4.7   CHLORIDE mmol/L 103 106 101 106 107 109* 112*   CO2 mmol/L 27.9 29.7* 31.0* 28.2 24.2 26.1 25.6   BUN mg/dL 17 16 19 15 23 29* 29*   CREATININE mg/dL 0.22* 0.22* 0.23* 0.23* 0.26* 0.29* 0.31*   CALCIUM mg/dL 8.4* 8.4* 8.2* 8.4* 8.3* 8.1* 8.0*   GLUCOSE mg/dL 169* 192* 170* 163* 159* 161* 115*   ALT (SGPT) U/L  --   --  45* 58* 24 26 17   AST (SGOT) U/L  --   --  24 52* 23 23 21     Lab Results   Component Value Date    MG 2.4 07/29/2024    MG 2.5 (H) 07/28/2024    MG 2.4 07/27/2024     Estimated Creatinine Clearance: 250.3 mL/min (A) (by C-G formula based on SCr of 0.22 mg/dL (L)).    Lab Results   Component Value Date    HGBA1C 4.60 (L) 07/18/2024    HGBA1C 4.80 04/11/2024    HGBA1C 5.00 12/31/2023     Lab Results   Component Value Date    CHOL 136 07/18/2024    TRIG 61 07/18/2024    LDL 56 07/18/2024    HDL 67 (H) 07/18/2024        Lab Results   Component Value Date     INR 1.00 07/27/2024    INR 1.09 07/26/2024    INR 0.93 07/18/2024    INR 0.98 04/26/2024    INR 1.00 03/21/2023     Lab Results   Component Value Date    LABHEPA 0.27 (L) 07/20/2024    LABHEPA 0.36 07/19/2024    LABHEPA 0.33 07/19/2024    LABHEPA 0.11 (L) 07/18/2024       Lab Results   Component Value Date    TSH 1.140 07/17/2024             ECHO:  Results for orders placed during the hospital encounter of 07/17/24    Adult Transthoracic Echo Complete w/ Color, Spectral and Contrast if necessary per protocol    Interpretation Summary    Normal left ventricular cavity size and wall thickness noted.    The following left ventricular wall segments are hypokinetic: mid anterior, basal anterolateral, apical lateral, apical septal, mid anteroseptal and basal anterior. The following left ventricular wall segments are akinetic: apex.    Left ventricular systolic function is moderately decreased. Left ventricular ejection fraction appears to be 36 - 40%.    Left ventricular diastolic function is consistent with (grade I) impaired relaxation.    The aortic valve is structurally normal with no regurgitation or stenosis present.    The mitral valve is structurally normal with no significant stenosis present. Mild mitral valve regurgitation is present.    Mild tricuspid valve regurgitation is present. Estimated right ventricular systolic pressure from tricuspid regurgitation is moderately elevated (45-55 mmHg).    There is no evidence of pericardial effusion. .    Comments: Patient seem to have developed a new regional wall motion normalities with decreased LV systolic function as compared to the previous study in March 2023.      STRESS TEST:  Results for orders placed during the hospital encounter of 07/17/24    Stress Test With Myocardial Perfusion One Day    Interpretation Summary  Images from the original result were not included.      A pharmacological stress test was performed using regadenoson without low-level  exercise.    Findings consistent with an indeterminate ECG stress test.    Myocardial perfusion imaging indicates a moderate-sized infarct located in the inferior wall, septal wall and apex with no significant ischemia noted.    Abnormal LV wall motion consistent with apical dyskinesis.    Left ventricular ejection fraction is moderately reduced (Calculated EF = 43%).    Impressions are consistent with an intermediate risk study.       HEART CATH:  No results found for this or any previous visit.      TELEMETRY:     SR 60s            I reviewed the patient's new clinical results.    ALLERGIES: Codeine and Sulfa antibiotics    Medication Review:   Current list of medications may not reflect those currently placed in orders that are not signed or are being held.     ALPRAZolam, 0.5 mg, Oral, TID  aspirin, 81 mg, Oral, Daily  Brexpiprazole, 0.5 mg, Oral, Daily  castor oil-balsam peru, 1 Application, Topical, Q12H  cefepime, 2,000 mg, Intravenous, Q8H  chlorhexidine, 15 mL, Mouth/Throat, Q12H  donepezil, 10 mg, Oral, Q PM  enoxaparin, 40 mg, Subcutaneous, Nightly  [Held by provider] hydroxychloroquine, 200 mg, Oral, BID  insulin regular, 2-7 Units, Subcutaneous, Q6H  ipratropium-albuterol, 3 mL, Nebulization, Q6H - RT  levothyroxine, 25 mcg, Oral, Daily With Breakfast  memantine, 5 mg, Oral, Q12H  methylPREDNISolone sodium succinate, 60 mg, Intravenous, Q12H  [Held by provider] metoprolol succinate XL, 25 mg, Oral, Q24H  mupirocin, 1 Application, Topical, Q12H  nystatin, 5 mL, Oral, 4x Daily  nystatin, 1 Application, Topical, Q12H  pantoprazole, 40 mg, Intravenous, Q AM  Phenylephrine HCl-NaCl, , ,   Phenylephrine HCl-NaCl, , ,   sodium chloride, 10 mL, Intravenous, Q12H  sodium chloride, 10 mL, Intravenous, Q12H  sodium chloride, 10 mL, Intravenous, Q12H  sodium chloride, 10 mL, Intravenous, Q12H  sodium chloride, 10 mL, Intravenous, Q12H  sodium chloride, 10 mL, Intravenous, Q12H      dexmedetomidine, 0.2-1.5  mcg/kg/hr, Last Rate: 0.9 mcg/kg/hr (08/01/24 0210)  fentanyl 10 mcg/mL,  mcg/hr, Last Rate: 50 mcg/hr (08/01/24 1043)  Pharmacy Consult,   phenylephrine, 0.5-3 mcg/kg/min (Dosing Weight), Last Rate: 0.5 mcg/kg/min (08/01/24 0934)  propofol, 5-50 mcg/kg/min (Dosing Weight), Last Rate: 20 mcg/kg/min (08/01/24 1044)        senna-docusate sodium **AND** polyethylene glycol **AND** bisacodyl **AND** bisacodyl    busPIRone    Calcium Replacement - Follow Nurse / BPA Driven Protocol    dextrose    dextrose    glucagon (human recombinant)    guaifenesin    HYDROcodone-acetaminophen    hydrOXYzine    ipratropium    Magnesium Cardiology Dose Replacement - Follow Nurse / BPA Driven Protocol    midazolam    nitroglycerin    Pharmacy Consult    Phenylephrine HCl-NaCl    Phenylephrine HCl-NaCl    Phosphorus Replacement - Follow Nurse / BPA Driven Protocol    Potassium Replacement - Follow Nurse / BPA Driven Protocol    prochlorperazine    sodium chloride    sodium chloride    sodium chloride    sodium chloride    sodium chloride    sodium chloride    sodium chloride    sodium chloride    sodium chloride    sodium chloride    sodium chloride    vecuronium    Assessment      Acute non-ST elevation myocardial infarction likely type II due to respiratory failure with hypoxia  Acute on chronic respiratory failure due to advanced COPD/pulmonary fibrosis bilateral pneumonia, pneumothorax, patient remains intubated on the ventilator.  Acute on chronic respiratory failure requiring intubation mechanical ventilation.  Acute on chronic HFrEF, secondary to ischemic cardiomyopath.  Pneumothorax requiring 2 chest tubes placement  Shock, multifactorial, more stable now.    Recommendations / Plan     Will obtain proBNP and if this is elevated, will consider cautious diuresis and monitor the kidney function urine output as well as her blood pressure closely.  Will try to optimize GDMT for her cardiomyopathy as tolerated by her blood  pressure.  Patient currently is on phenylephrine at the low-dose for recent shock.      I have discussed the patients findings and recommendations with patient's son at bedside    Thank you very much for asking us to be involved in this patient's care.  We will follow along with you.    Electronically signed by SG Chandler, 08/01/24, 11:50 AM EDT.     Electronically signed by Ramo Acevedo MD, 08/01/24, 12:59 PM EDT.        Please note that portions of this note were completed with a voice recognition program.    Please note that portions of this note were copied and has been reviewed and is accurate as of 8/1/2024 .

## 2024-08-02 NOTE — PROGRESS NOTES
[facility] HOSPITALIST PROGRESS NOTE     Patient Identification:  Name:  Lexie Valdez  Age:  64 y.o.  Sex:  female  :  1960  MRN:  [id number]  Visit Number:  [id number]  Primary Care Provider:  Violet Pop APRN    Length of stay:  15    Subjective: Patient seen and examined, sister at bedside as well as .  Per family's decision yesterday patient was extubated yesterday, she required Precedex due to restlessness.  She appears comfortable.  Active care help appreciated.  Patient appears to start having some oropharyngeal secretions, discussed with palliative, will start with scopolamine patch.  Patient is hypotensive.    Chief Complaint: Short of breath  ----------------------------------------------------------------------------------------------------------------------  Current Hospital Meds:  [Held by provider] aspirin, 81 mg, Oral, Daily  [Held by provider] Brexpiprazole, 0.5 mg, Oral, Daily  castor oil-balsam peru, 1 Application, Topical, Q12H  [Held by provider] chlorhexidine, 15 mL, Mouth/Throat, Q12H  [Held by provider] donepezil, 10 mg, Oral, Q PM  [Held by provider] enoxaparin, 40 mg, Subcutaneous, Nightly  [Held by provider] hydroxychloroquine, 200 mg, Oral, BID  [Held by provider] insulin regular, 2-7 Units, Subcutaneous, Q6H  [Held by provider] ipratropium-albuterol, 3 mL, Nebulization, Q6H - RT  [Held by provider] levothyroxine, 25 mcg, Oral, Daily With Breakfast  [Held by provider] memantine, 5 mg, Oral, Q12H  [Held by provider] methylPREDNISolone sodium succinate, 60 mg, Intravenous, Q12H  [Held by provider] metoprolol succinate XL, 25 mg, Oral, Q24H  mupirocin, 1 Application, Topical, Q12H  [Held by provider] nystatin, 1 Application, Topical, Q12H  pantoprazole, 40 mg, Intravenous, Q AM  Phenylephrine HCl-NaCl, , ,   Phenylephrine HCl-NaCl, , ,   Scopolamine, 2 patch, Transdermal, Q72H  sodium chloride, 10 mL, Intravenous, Q12H  sodium chloride, 10 mL, Intravenous,  Q12H  sodium chloride, 10 mL, Intravenous, Q12H      dexmedetomidine, 0.2-1.5 mcg/kg/hr, Last Rate: Stopped (08/02/24 0906)      ----------------------------------------------------------------------------------------------------------------------  Vital Signs:  Temp:  [97 °F (36.1 °C)-99.9 °F (37.7 °C)] 97.3 °F (36.3 °C)  Heart Rate:  [59-90] 78  Resp:  [10] 10  BP: ()/(25-75) 112/58  FiO2 (%):  [100 %] 100 %       Tele: [place] rhythm 78 bpm      07/30/24  0604 07/31/24  0605 08/01/24  0550   Weight: 64.6 kg (142 lb 6.7 oz) 62.9 kg (138 lb 10.7 oz) 62.2 kg (137 lb 2 oz)     Body mass index is 23.53 kg/m².    Intake/Output Summary (Last 24 hours) at 8/2/2024 1024  Last data filed at 8/2/2024 0800  Gross per 24 hour   Intake 564.74 ml   Output 960 ml   Net -395.26 ml     No diet orders on file  ----------------------------------------------------------------------------------------------------------------------  Physical exam:  General: Unresponsive, shallow breathing, chronically ill-appearing, she appears comfortable.      Skin:  Skin is warm and dry. No rash noted. No pallor.    HENT:  Head:  Normocephalic and atraumatic.  Mouth:  Moist mucous membranes.    Eyes:    Pupils are equal, round, and reactive to light.  No scleral icterus.    Neck:  Neck supple.  No JVD present.  Audible rattling  Pulmonary/Chest: Scattered rhonchi no wheezing equal chest expansion no splinting, left sided chest tube x 2.    Cardiovascular:  Normal rate, regular rhythm and normal heart sounds with no murmur.  Abdominal:  Soft.  Bowel sounds are normal.  No distension and no tenderness.   Extremities: +1 edema on both lower extremity and upper extremity.  No cyanosis.    Neurological: Patient is stuporous.     Genitourinary: Lin catheter in  place  Back:  ----------------------------------------------------------------------------------------------------------------------  ----------------------------------------------------------------------------------------------------------------------  Results from last 7 days   Lab Units 07/29/24  0156 07/29/24  0020   HSTROP T ng/L 31* 29*     Results from last 7 days   Lab Units 08/01/24  0010 07/31/24  0001 07/30/24  0005 07/29/24  0020 07/28/24  0053 07/27/24  1143   LACTATE mmol/L  --   --   --  1.3  --   --    WBC 10*3/mm3 22.53* 22.37* 20.31* 24.89*   < > 16.39*   HEMOGLOBIN g/dL 11.1* 10.8* 10.5* 10.5*   < > 10.0*   HEMATOCRIT % 34.8 34.3 33.6* 33.2*   < > 31.9*   MCV fL 99.4* 101.2* 99.4* 100.3*   < > 101.3*   MCHC g/dL 31.9 31.5 31.3* 31.6   < > 31.3*   PLATELETS 10*3/mm3 168 196 206 207   < > 205   INR   --   --   --   --   --  1.00    < > = values in this interval not displayed.     Results from last 7 days   Lab Units 07/29/24  0431   PH, ARTERIAL pH units 7.409   PO2 ART mm Hg 105.0   PCO2, ARTERIAL mm Hg 51.8*   HCO3 ART mmol/L 32.8*     Results from last 7 days   Lab Units 08/01/24  1132 08/01/24  0010 07/31/24  0001 07/30/24  0005 07/29/24  0020 07/28/24  0053 07/27/24  1143   SODIUM mmol/L 140 140 141 137 141 141 142   POTASSIUM mmol/L 4.9 5.5* 4.7 4.8 4.7 4.9 5.2   MAGNESIUM mg/dL  --   --   --   --  2.4 2.5* 2.4   CHLORIDE mmol/L 104 103 106 101 106 107 109*   CO2 mmol/L 30.6* 27.9 29.7* 31.0* 28.2 24.2 26.1   BUN mg/dL 16 17 16 19 15 23 29*   CREATININE mg/dL 0.19* 0.22* 0.22* 0.23* 0.23* 0.26* 0.29*   CALCIUM mg/dL 8.3* 8.4* 8.4* 8.2* 8.4* 8.3* 8.1*   GLUCOSE mg/dL 175* 169* 192* 170* 163* 159* 161*   ALBUMIN g/dL  --   --   --  3.0* 3.0* 3.1* 3.1*   BILIRUBIN mg/dL  --   --   --  0.2 0.2 0.3 0.2   ALK PHOS U/L  --   --   --  94 103 83 78   AST (SGOT) U/L  --   --   --  24 52* 23 23   ALT (SGPT) U/L  --   --   --  45* 58* 24 26   Estimated Creatinine Clearance: 289.9 mL/min (A) (by C-G  "formula based on SCr of 0.19 mg/dL (L)).    No results found for: \"AMMONIA\"      Blood Culture   Date Value Ref Range Status   07/28/2024 No growth at 4 days  Preliminary   07/28/2024 No growth at 4 days  Preliminary     No results found for: \"URINECX\"  No results found for: \"WOUNDCX\"  No results found for: \"STOOLCX\"    I have personally looked at the labs and they are summarized above.  ----------------------------------------------------------------------------------------------------------------------  Imaging Results (Last 24 Hours)       ** No results found for the last 24 hours. **          ----------------------------------------------------------------------------------------------------------------------  Assessment and Plan:  -Acute on chronic hypoxic and hypercapnic respite failure  -Acute exacerbation of COPD  -History of hepatic pulmonary fibrosis  -Left-sided pneumothorax chest tube x 2  -Subcutaneous emphysema as a complication of pneumothorax  -Moderate pulmonary hypertension  -Aspiration pneumonia  -Acute non-STEMI likely type II  -Comfort measures    Continue comfort measures, palliative care help appreciated, oropharyngeal suctioning, scopolamine patch.      Overall prognosis is very poor, sister and  has no questions at this time.    Disposition comfort measures      Ruthann Zee MD  08/02/24  10:24 EDT  "

## 2024-08-02 NOTE — PROGRESS NOTES
"Palliative Care Daily Progress Note     S: Medical record reviewed, followed up with Primary RN Elsa CCU and JESSIKA De La Cruz PCU regarding patient's condition. When Jameaw Lexie initially this morning she appeared to be comfortable, her breathing pattern has begun to change,more shallow with tachypnea. She was also beginning to have audible secretions. Her sister Rosa was at bedside. In PCU she has more agonal breaths with further secretions noted.Discussed meds with RN's      O:   Palliative Performance Scale Score:     /77 (BP Location: Right arm, Patient Position: Lying)   Pulse 102   Temp 96.7 °F (35.9 °C) (Axillary)   Resp (!) 30   Ht 162.6 cm (64.02\")   Wt 62.2 kg (137 lb 2 oz)   SpO2 93%   BMI 23.53 kg/m²     Intake/Output Summary (Last 24 hours) at 8/2/2024 1430  Last data filed at 8/2/2024 0800  Gross per 24 hour   Intake 564.74 ml   Output 960 ml   Net -395.26 ml       PE:  Lexie is comfort measures, her breathing pattern has changed and is starting to develop audible secretions, she occasionally groans, discussed with RN prn symptom management medication.      Meds: Reviewed and changes noted    Labs:   Results from last 7 days   Lab Units 08/01/24  0010   WBC 10*3/mm3 22.53*   HEMOGLOBIN g/dL 11.1*   HEMATOCRIT % 34.8   PLATELETS 10*3/mm3 168     Results from last 7 days   Lab Units 08/01/24  1132   SODIUM mmol/L 140   POTASSIUM mmol/L 4.9   CHLORIDE mmol/L 104   CO2 mmol/L 30.6*   BUN mg/dL 16   CREATININE mg/dL 0.19*   GLUCOSE mg/dL 175*   CALCIUM mg/dL 8.3*     Results from last 7 days   Lab Units 08/01/24  1132 07/31/24  0001 07/30/24  0005   SODIUM mmol/L 140   < > 137   POTASSIUM mmol/L 4.9   < > 4.8   CHLORIDE mmol/L 104   < > 101   CO2 mmol/L 30.6*   < > 31.0*   BUN mg/dL 16   < > 19   CREATININE mg/dL 0.19*   < > 0.23*   CALCIUM mg/dL 8.3*   < > 8.2*   BILIRUBIN mg/dL  --   --  0.2   ALK PHOS U/L  --   --  94   ALT (SGPT) U/L  --   --  45*   AST (SGOT) U/L  --   --  24   GLUCOSE mg/dL " 175*   < > 170*    < > = values in this interval not displayed.     Imaging Results (Last 72 Hours)       ** No results found for the last 72 hours. **              Diagnostics: Reviewed    A: Lexie Valdez is a 64 y.o. female  admitted on 7/17/2024 due to shortness of breath. Lexie has a medical history of  anxiety/depression, arthritis, HTN, hypothyroidism, stress urinary incontinence with frequent UTI's, COPD and pulmonary fibrosis on 3L NC at home, and resting tremors in face and extremities, who presents with worsening shortness of breath for the last couple days. Pt also at time of admission c/o chest pressure associated with dyspnea stating this was chronic however had worsened in the days leading up to her admission. She also endorsed diarrhea for the last couple of days as well as dysuria and stated that she has recurring UTI's. Labs in ER revealed Troponin of 229 with repeat 188, BNP was 3550, Na 131, Cr 0.49, glucose 177, CRP 1.65, lactate 2.0, procal and WBC normal but with 88.7% neutrophils. UA showed positive nitrite, trace leuk, 6-10 WBC, 4+ bacteria and 3-6 squamous epithelial cells. She was tachycardic and tachypneic with pH of 7.458, CO2 40, Po2 154, bicarb 28. She was admitted to PCU on admission and in the early am of 7/21 was transferred to CCU due to worsening dyspnea, bilateral infiltrates without effusion concerning for interstitial debbi disease flare, was transferred to CCU. Initially she was placed on Bipap in CCU and at 7/21 6:20 am Lexie was sedated intubated and placed on the ventilator. Palliative care consulted for GOC,ACP and for support of family.   Today 8/2/2024  T 96.7, HR 78, RR 18, /58 and was saturating 97% on 3 L NC    P:  I was able to touch base and provide support to pts sister Rosa and her son Faith this morning. I discussed with them if they felt Lexie was uncomfortable in any way to please let the nurses know. I discussed Lexie with JESSIKA Hunter as well as JESSIKA De La Cruz and if  they needed anything for Lexie to please let me know.    We will continue to follow along. Please do not hesitate to contact us regarding further sx mgmt or GOC needs, including after hours or on weekends via our on call provider at [phone].     Linh Lou, APRN    8/2/2024

## 2024-08-02 NOTE — PLAN OF CARE
Goal Outcome Evaluation:           Progress: declining  Outcome Evaluation: Pt responding to pain only at this time. Comfort measures still in place. Morphine PCA added for more comfort. PRN ativan and robinul administered as needed. Chest tube dressings changed. Pt in bed resting with family at bedside.

## 2024-08-02 NOTE — PROGRESS NOTES
PROGRESS NOTE         Patient Identification:  Name:  Lexie Valdez  Age:  64 y.o.  Sex:  female  :  1960  MRN:  [id number]  Visit Number:  [id number]  Primary Care Provider:  Violet Pop APRN         LOS: 15 days       ----------------------------------------------------------------------------------------------------------------------  Subjective       Chief Complaints:    Shortness of Breath        Interval History:      Patient transitioned to comfort measures yesterday evening and was extubated.     Review of Systems:    Unable to obtain.      ----------------------------------------------------------------------------------------------------------------------      Objective       Current Hospital Meds:  [Held by provider] aspirin, 81 mg, Oral, Daily  [Held by provider] Brexpiprazole, 0.5 mg, Oral, Daily  castor oil-balsam peru, 1 Application, Topical, Q12H  [Held by provider] chlorhexidine, 15 mL, Mouth/Throat, Q12H  [Held by provider] donepezil, 10 mg, Oral, Q PM  [Held by provider] enoxaparin, 40 mg, Subcutaneous, Nightly  [Held by provider] hydroxychloroquine, 200 mg, Oral, BID  [Held by provider] insulin regular, 2-7 Units, Subcutaneous, Q6H  [Held by provider] ipratropium-albuterol, 3 mL, Nebulization, Q6H - RT  [Held by provider] levothyroxine, 25 mcg, Oral, Daily With Breakfast  [Held by provider] memantine, 5 mg, Oral, Q12H  [Held by provider] methylPREDNISolone sodium succinate, 60 mg, Intravenous, Q12H  [Held by provider] metoprolol succinate XL, 25 mg, Oral, Q24H  mupirocin, 1 Application, Topical, Q12H  [Held by provider] nystatin, 1 Application, Topical, Q12H  pantoprazole, 40 mg, Intravenous, Q AM  Phenylephrine HCl-NaCl, , ,   Phenylephrine HCl-NaCl, , ,   Scopolamine, 1 patch, Transdermal, Q72H  sodium chloride, 10 mL, Intravenous, Q12H  sodium chloride, 10 mL, Intravenous, Q12H  sodium chloride, 10 mL, Intravenous, Q12H      dexmedetomidine, 0.2-1.5 mcg/kg/hr, Last  Rate: 1.1 mcg/kg/hr (08/02/24 0117)      ----------------------------------------------------------------------------------------------------------------------    Vital Signs:  Temp:  [97.2 °F (36.2 °C)-99.9 °F (37.7 °C)] 97.2 °F (36.2 °C)  Heart Rate:  [57-90] 62  Resp:  [28-31] 31  BP: ()/(25-81) 70/38  FiO2 (%):  [100 %] 100 %  Mean Arterial Pressure (Non-Invasive) for the past 24 hrs (Last 3 readings):   Noninvasive MAP (mmHg)   08/02/24 0600 47   08/02/24 0500 52   08/02/24 0400 45     SpO2 Percentage    08/02/24 0400 08/02/24 0500 08/02/24 0600   SpO2: 100% 100% 100%     SpO2:  [99 %-100 %] 100 %  on  Flow (L/min):  [3] 3;   Device (Oxygen Therapy): nasal cannula    Body mass index is 23.53 kg/m².  Wt Readings from Last 3 Encounters:   08/01/24 62.2 kg (137 lb 2 oz)   04/26/24 67.7 kg (149 lb 4 oz)   04/19/24 67.1 kg (148 lb)        Intake/Output Summary (Last 24 hours) at 8/2/2024 0711  Last data filed at 8/2/2024 0544  Gross per 24 hour   Intake 564.74 ml   Output 960 ml   Net -395.26 ml     No diet orders on file  ----------------------------------------------------------------------------------------------------------------------          ----------------------------------------------------------------------------------------------------------------------  Results from last 7 days   Lab Units 07/29/24  0156 07/29/24  0020   HSTROP T ng/L 31* 29*     Results from last 7 days   Lab Units 08/01/24  0010   PROBNP pg/mL 16,493.0*           Results from last 7 days   Lab Units 07/29/24  0431   PH, ARTERIAL pH units 7.409   PO2 ART mm Hg 105.0   PCO2, ARTERIAL mm Hg 51.8*   HCO3 ART mmol/L 32.8*     Results from last 7 days   Lab Units 08/01/24  0010 07/31/24  0001 07/30/24  0005 07/29/24  0020 07/28/24  0053 07/27/24  1143   LACTATE mmol/L  --   --   --  1.3  --   --    WBC 10*3/mm3 22.53* 22.37* 20.31* 24.89*   < > 16.39*   HEMOGLOBIN g/dL 11.1* 10.8* 10.5* 10.5*   < > 10.0*   HEMATOCRIT % 34.8 34.3 33.6*  "33.2*   < > 31.9*   MCV fL 99.4* 101.2* 99.4* 100.3*   < > 101.3*   MCHC g/dL 31.9 31.5 31.3* 31.6   < > 31.3*   PLATELETS 10*3/mm3 168 196 206 207   < > 205   INR   --   --   --   --   --  1.00    < > = values in this interval not displayed.     Results from last 7 days   Lab Units 08/01/24  1132 08/01/24  0010 07/31/24  0001 07/30/24  0005 07/29/24  0020 07/28/24  0053 07/27/24  1143   SODIUM mmol/L 140 140 141 137 141 141 142   POTASSIUM mmol/L 4.9 5.5* 4.7 4.8 4.7 4.9 5.2   MAGNESIUM mg/dL  --   --   --   --  2.4 2.5* 2.4   CHLORIDE mmol/L 104 103 106 101 106 107 109*   CO2 mmol/L 30.6* 27.9 29.7* 31.0* 28.2 24.2 26.1   BUN mg/dL 16 17 16 19 15 23 29*   CREATININE mg/dL 0.19* 0.22* 0.22* 0.23* 0.23* 0.26* 0.29*   CALCIUM mg/dL 8.3* 8.4* 8.4* 8.2* 8.4* 8.3* 8.1*   GLUCOSE mg/dL 175* 169* 192* 170* 163* 159* 161*   ALBUMIN g/dL  --   --   --  3.0* 3.0* 3.1* 3.1*   BILIRUBIN mg/dL  --   --   --  0.2 0.2 0.3 0.2   ALK PHOS U/L  --   --   --  94 103 83 78   AST (SGOT) U/L  --   --   --  24 52* 23 23   ALT (SGPT) U/L  --   --   --  45* 58* 24 26   Estimated Creatinine Clearance: 289.9 mL/min (A) (by C-G formula based on SCr of 0.19 mg/dL (L)).  No results found for: \"AMMONIA\"    Glucose   Date/Time Value Ref Range Status   08/01/2024 0546 132 (H) 70 - 130 mg/dL Final   08/01/2024 0104 199 (H) 70 - 130 mg/dL Final   07/31/2024 1807 120 70 - 130 mg/dL Final   07/31/2024 1209 172 (H) 70 - 130 mg/dL Final   07/31/2024 0555 113 70 - 130 mg/dL Final   07/31/2024 0015 189 (H) 70 - 130 mg/dL Final   07/30/2024 1757 152 (H) 70 - 130 mg/dL Final   07/30/2024 1216 162 (H) 70 - 130 mg/dL Final     Lab Results   Component Value Date    HGBA1C 4.60 (L) 07/18/2024     Lab Results   Component Value Date    TSH 1.140 07/17/2024    FREET4 1.83 (H) 04/11/2024       Blood Culture   Date Value Ref Range Status   07/21/2024 No growth at 2 days  Preliminary   07/21/2024 No growth at 2 days  Preliminary   07/17/2024 No growth at 5 days  " "Final   07/17/2024 No growth at 5 days  Final     Urine Culture   Date Value Ref Range Status   07/17/2024 >100,000 CFU/mL Escherichia coli (A)  Final     No results found for: \"WOUNDCX\"  No results found for: \"STOOLCX\"  Respiratory Culture   Date Value Ref Range Status   07/21/2024 No growth  Preliminary   07/21/2024   Final    Rare growth Normal respiratory ammon. No S. aureus or Pseudomonas aeruginosa detected. Final report.     Pain Management Panel           No data to display                  ----------------------------------------------------------------------------------------------------------------------  Imaging Results (Last 24 Hours)       ** No results found for the last 24 hours. **            ----------------------------------------------------------------------------------------------------------------------    Pertinent Infectious Disease Results                Assessment/Plan       Assessment     Septic shock with acute hypoxic respiratory failure requiring mechanical ventilation and pressor support  Pneumonia        Plan      Patient transitioned to comfort measures yesterday evening and was extubated.     As patient was transitioned to comfort measures and antibiotic therapy was discontinued, ID will sign off for now. Please contact us if we can further assist in this case.    ANTIMICROBIAL THERAPY    This patient does not have an active medication from one of the medication groupers.     Code Status:   Code Status and Medical Interventions: No CPR (Do Not Attempt to Resuscitate); Comfort Measures; pt  Khris and son Faith   Ordered at: 08/01/24 2552     Level Of Support Discussed With:    Next of Kin (If No Surrogate)     Code Status (Patient has no pulse and is not breathing):    No CPR (Do Not Attempt to Resuscitate)     Medical Interventions (Patient has pulse or is breathing):    Comfort Measures     Comments:    pt  Khris and son Faith Macias, APRN  08/02/24  07:11 " EDT

## 2024-08-02 NOTE — PROGRESS NOTES
Progress Note Critical Care Pulmonary        Subjective      Overnight events reviewed.  Patient is in now comfort measures.  Meds reviewed.  Currently on Precedex.  Titrated to maintain comfortness   Discussed with patient's nurse.  Review of Systems: Could not be obtained due to patient's mental status.    Vital Signs  Temp:  [97.2 °F (36.2 °C)-99.9 °F (37.7 °C)] 97.2 °F (36.2 °C)  Heart Rate:  [59-90] 61  Resp:  [28-31] 31  BP: ()/(25-81) 72/43  FiO2 (%):  [100 %] 100 %  Body mass index is 23.53 kg/m².    Intake/Output Summary (Last 24 hours) at 8/2/2024 0850  Last data filed at 8/2/2024 0544  Gross per 24 hour   Intake 564.74 ml   Output 960 ml   Net -395.26 ml     No intake/output data recorded.    Physical Exam:  General-chronically ill appearance,   HEENT-PERRLA s    Neck-supple    Respiratory-decreased breath sounds otherwise clear to auscultation. Chest tube in place  Cardiovascular-NSR  GI-nontender nondistended bowel sounds positive    CNS- grossly nonfocal    Extremities-no clubbing and edema        Results Review:      Results from last 7 days   Lab Units 08/01/24  0010 07/31/24  0001 07/30/24  0005   WBC 10*3/mm3 22.53* 22.37* 20.31*   HEMOGLOBIN g/dL 11.1* 10.8* 10.5*   PLATELETS 10*3/mm3 168 196 206     Results from last 7 days   Lab Units 08/01/24  1132 08/01/24  0010 07/31/24  0001 07/30/24  0005 07/29/24  0020 07/28/24  0053 07/27/24  1143   SODIUM mmol/L 140 140 141   < > 141 141 142   POTASSIUM mmol/L 4.9 5.5* 4.7   < > 4.7 4.9 5.2   CHLORIDE mmol/L 104 103 106   < > 106 107 109*   CO2 mmol/L 30.6* 27.9 29.7*   < > 28.2 24.2 26.1   BUN mg/dL 16 17 16   < > 15 23 29*   CREATININE mg/dL 0.19* 0.22* 0.22*   < > 0.23* 0.26* 0.29*   CALCIUM mg/dL 8.3* 8.4* 8.4*   < > 8.4* 8.3* 8.1*   GLUCOSE mg/dL 175* 169* 192*   < > 163* 159* 161*   MAGNESIUM mg/dL  --   --   --   --  2.4 2.5* 2.4    < > = values in this interval not displayed.     Lab Results   Component Value Date    INR 1.00 07/27/2024     INR 1.09 07/26/2024    INR 0.93 07/18/2024    PROTIME 13.3 07/27/2024    PROTIME 14.2 07/26/2024    PROTIME 12.6 07/18/2024     Results from last 7 days   Lab Units 07/30/24  0005 07/29/24  0020 07/28/24  0053   ALK PHOS U/L 94 103 83   BILIRUBIN mg/dL 0.2 0.2 0.3   ALT (SGPT) U/L 45* 58* 24   AST (SGOT) U/L 24 52* 23     Results from last 7 days   Lab Units 07/29/24  0431   PH, ARTERIAL pH units 7.409   PO2 ART mm Hg 105.0   PCO2, ARTERIAL mm Hg 51.8*   HCO3 ART mmol/L 32.8*     Imaging Results (Last 24 Hours)       ** No results found for the last 24 hours. **                 [Held by provider] aspirin, 81 mg, Oral, Daily  [Held by provider] Brexpiprazole, 0.5 mg, Oral, Daily  castor oil-balsam peru, 1 Application, Topical, Q12H  [Held by provider] chlorhexidine, 15 mL, Mouth/Throat, Q12H  [Held by provider] donepezil, 10 mg, Oral, Q PM  [Held by provider] enoxaparin, 40 mg, Subcutaneous, Nightly  [Held by provider] hydroxychloroquine, 200 mg, Oral, BID  [Held by provider] insulin regular, 2-7 Units, Subcutaneous, Q6H  [Held by provider] ipratropium-albuterol, 3 mL, Nebulization, Q6H - RT  [Held by provider] levothyroxine, 25 mcg, Oral, Daily With Breakfast  [Held by provider] memantine, 5 mg, Oral, Q12H  [Held by provider] methylPREDNISolone sodium succinate, 60 mg, Intravenous, Q12H  [Held by provider] metoprolol succinate XL, 25 mg, Oral, Q24H  mupirocin, 1 Application, Topical, Q12H  [Held by provider] nystatin, 1 Application, Topical, Q12H  pantoprazole, 40 mg, Intravenous, Q AM  Phenylephrine HCl-NaCl, , ,   Phenylephrine HCl-NaCl, , ,   Scopolamine, 1 patch, Transdermal, Q72H  sodium chloride, 10 mL, Intravenous, Q12H  sodium chloride, 10 mL, Intravenous, Q12H  sodium chloride, 10 mL, Intravenous, Q12H      dexmedetomidine, 0.2-1.5 mcg/kg/hr, Last Rate: 0.7 mcg/kg/hr (08/02/24 0827)        Medication Review:       Lab 07/30/24  0005   PROCALCITONIN 0.07       Latest Reference Range & Units 07/30/24 00:11    CO2 Content 22 - 33 mmol/L 36.3 (H)   Carboxyhemoglobin Venous 0.0 - 5.0 % 2.4   Methemoglobin Venous 0.0 - 3.0 % 0.4   Oxyhemoglobin Venous 45.0 - 75.0 % 66.9   Hemoglobin, Blood Gas 13.5 - 17.5 g/dL 10.6 (L)   Site  Nurse/Dr Draw   Modality  Ventilator   FIO2 % 28   Ventilator Mode  VC/AC   Set Tidal Volume  330.00   Set Mech Resp Rate  28.0   PEEP  4.0   Barometric Pressure for Blood Gas mmHg 726   pH, Venous 7.320 - 7.420 pH Units 7.402   pCO2, Venous 41.0 - 51.0 mm Hg 55.6 (H)   pO2, Venous 27.0 - 53.0 mm Hg 35.1   HCO3, Venous 22.0 - 28.0 mmol/L 34.6 (H)   Base Excess 0.0 - 2.0 mmol/L 8.4 (H)   O2 Saturation, Venous 45.0 - 75.0 % 68.8   Collected by  [id number]   (H): Data is abnormally high  (L): Data is abnormally low  Assessment & Plan   Patient is comfort measures.  Medications reviewed and adjusted for patient's comfort.    Plan discussed with patient's nurse.    Pulmonary and critical care will sign off.  Please feel free to call us with any questions.  John Mcmahon MD  08/02/24  08:50 EDT

## 2024-08-02 NOTE — CASE MANAGEMENT/SOCIAL WORK
Discharge Planning Assessment   Alex     Patient Name: Lexie Valdez  MRN: [id number]  Today's Date: 8/2/2024    Admit Date: 7/17/2024     Discharge Plan       Row Name 08/02/24 1046       Plan    Plan Palliative Care continues to follow. Pt remains comfort measures on this date. SS to follow.               NEIL GarciaW

## 2024-08-02 NOTE — PLAN OF CARE
Goal Outcome Evaluation:  Plan of Care Reviewed With: patient        Progress: declining         Problem: Adult Inpatient Plan of Care  Goal: Plan of Care Review  Outcome: Ongoing, Progressing  Flowsheets (Taken 8/2/2024 0412)  Progress: declining  Plan of Care Reviewed With: patient  Goal: Patient-Specific Goal (Individualized)  Outcome: Ongoing, Progressing  Goal: Absence of Hospital-Acquired Illness or Injury  Outcome: Ongoing, Progressing  Intervention: Identify and Manage Fall Risk  Recent Flowsheet Documentation  Taken 8/2/2024 0400 by Elma Ames RN  Safety Promotion/Fall Prevention:   safety round/check completed   fall prevention program maintained  Taken 8/2/2024 0300 by Elma Ames RN  Safety Promotion/Fall Prevention:   safety round/check completed   fall prevention program maintained  Taken 8/2/2024 0200 by Elma Ames RN  Safety Promotion/Fall Prevention:   safety round/check completed   fall prevention program maintained  Taken 8/2/2024 0100 by Elma Ames RN  Safety Promotion/Fall Prevention:   safety round/check completed   fall prevention program maintained  Taken 8/2/2024 0000 by Elma Ames RN  Safety Promotion/Fall Prevention:   safety round/check completed   fall prevention program maintained  Taken 8/1/2024 2300 by Elma Ames RN  Safety Promotion/Fall Prevention:   safety round/check completed   fall prevention program maintained  Taken 8/1/2024 2200 by Elma Ames RN  Safety Promotion/Fall Prevention:   safety round/check completed   fall prevention program maintained  Taken 8/1/2024 2100 by Elma Ames RN  Safety Promotion/Fall Prevention:   safety round/check completed   fall prevention program maintained  Taken 8/1/2024 2000 by Elma Ames RN  Safety Promotion/Fall Prevention:   safety round/check completed   fall prevention program maintained  Taken 8/1/2024 1900 by Elma Ames RN  Safety Promotion/Fall Prevention:   safety round/check completed    fall prevention program maintained  Intervention: Prevent Skin Injury  Recent Flowsheet Documentation  Taken 8/2/2024 0400 by Elma Ames RN  Body Position: patient/family refused  Taken 8/2/2024 0200 by Elma Ames RN  Body Position: patient/family refused  Taken 8/2/2024 0000 by Elma Ames RN  Body Position: patient/family refused  Taken 8/1/2024 2200 by Elma Ames RN  Body Position: patient/family refused  Taken 8/1/2024 2000 by Elma Ames RN  Body Position:   turned   right  Taken 8/1/2024 1956 by Elma Ames RN  Skin Protection:   tubing/devices free from skin contact   adhesive use limited  Intervention: Prevent and Manage VTE (Venous Thromboembolism) Risk  Recent Flowsheet Documentation  Taken 8/1/2024 1956 by Elma Ames RN  Activity Management: bedrest  Goal: Optimal Comfort and Wellbeing  Outcome: Ongoing, Progressing  Intervention: Provide Person-Centered Care  Recent Flowsheet Documentation  Taken 8/1/2024 1956 by Elma Ames RN  Trust Relationship/Rapport:   care explained   reassurance provided  Goal: Readiness for Transition of Care  Outcome: Ongoing, Progressing     Problem: Fall Injury Risk  Goal: Absence of Fall and Fall-Related Injury  Outcome: Ongoing, Progressing  Intervention: Identify and Manage Contributors  Recent Flowsheet Documentation  Taken 8/2/2024 0400 by Elma Ames RN  Medication Review/Management: medications reviewed  Taken 8/2/2024 0300 by Elma Ames RN  Medication Review/Management: medications reviewed  Taken 8/2/2024 0200 by Elma Ames RN  Medication Review/Management: medications reviewed  Taken 8/2/2024 0100 by Elma Ames RN  Medication Review/Management: medications reviewed  Taken 8/2/2024 0000 by Elma Ames RN  Medication Review/Management: medications reviewed  Taken 8/1/2024 2300 by Elma Ames RN  Medication Review/Management: medications reviewed  Taken 8/1/2024 2200 by Elma Ames RN  Medication  Review/Management: medications reviewed  Taken 8/1/2024 2100 by Elma Ames RN  Medication Review/Management: medications reviewed  Taken 8/1/2024 2000 by Elma Ames RN  Medication Review/Management: medications reviewed  Taken 8/1/2024 1900 by Elma Ames RN  Medication Review/Management: medications reviewed  Intervention: Promote Injury-Free Environment  Recent Flowsheet Documentation  Taken 8/2/2024 0400 by Elma Ames RN  Safety Promotion/Fall Prevention:   safety round/check completed   fall prevention program maintained  Taken 8/2/2024 0300 by Elma Ames RN  Safety Promotion/Fall Prevention:   safety round/check completed   fall prevention program maintained  Taken 8/2/2024 0200 by Elma Ames RN  Safety Promotion/Fall Prevention:   safety round/check completed   fall prevention program maintained  Taken 8/2/2024 0100 by Elma Ames RN  Safety Promotion/Fall Prevention:   safety round/check completed   fall prevention program maintained  Taken 8/2/2024 0000 by Elma Ames RN  Safety Promotion/Fall Prevention:   safety round/check completed   fall prevention program maintained  Taken 8/1/2024 2300 by Elma Ames RN  Safety Promotion/Fall Prevention:   safety round/check completed   fall prevention program maintained  Taken 8/1/2024 2200 by Elma Ames RN  Safety Promotion/Fall Prevention:   safety round/check completed   fall prevention program maintained  Taken 8/1/2024 2100 by Elma Ames RN  Safety Promotion/Fall Prevention:   safety round/check completed   fall prevention program maintained  Taken 8/1/2024 2000 by Elma Ames RN  Safety Promotion/Fall Prevention:   safety round/check completed   fall prevention program maintained  Taken 8/1/2024 1900 by Elma Ames RN  Safety Promotion/Fall Prevention:   safety round/check completed   fall prevention program maintained     Problem: Skin Injury Risk Increased  Goal: Skin Health and Integrity  Outcome:  Ongoing, Progressing  Intervention: Optimize Skin Protection  Recent Flowsheet Documentation  Taken 8/2/2024 0400 by Elma Ames RN  Head of Bed (HOB) Positioning: HOB at 30-45 degrees  Taken 8/2/2024 0200 by Elma Ames RN  Head of Bed (HOB) Positioning: HOB at 30-45 degrees  Taken 8/2/2024 0000 by Elma Ames RN  Head of Bed (HOB) Positioning: HOB at 30-45 degrees  Taken 8/1/2024 2200 by Elma Ames RN  Head of Bed (HOB) Positioning: HOB at 30-45 degrees  Taken 8/1/2024 2000 by Elma Ames RN  Head of Bed (HOB) Positioning: HOB at 30-45 degrees  Taken 8/1/2024 1956 by Elma Ames RN  Pressure Reduction Techniques:   weight shift assistance provided   pressure points protected   positioned off wounds   heels elevated off bed  Pressure Reduction Devices:   pressure-redistributing mattress utilized   positioning supports utilized   heel offloading device utilized  Skin Protection:   tubing/devices free from skin contact   adhesive use limited

## 2024-08-03 NOTE — PLAN OF CARE
Goal Outcome Evaluation:  Plan of Care Reviewed With: son        Progress: no change  Outcome Evaluation: Pt unresponsive to any stimuli, no gag reflex at this time. Comfort measures still in place. Morphine PCA still in use. PRN ativan and robinul administered. Chest tubes still in place. Pt resting in bed with family at bedisde. Bed in lowest position, alarm on. Call light within reach. Son denies any requests at this time.

## 2024-08-03 NOTE — PLAN OF CARE
Goal Outcome Evaluation:  Plan of Care Reviewed With: patient        Progress: declining

## 2024-08-03 NOTE — PROGRESS NOTES
[facility]     Patient Identification:  Name:  Lexie Valdez  Age:  64 y.o.  Sex:  female  :  1960  MRN:  [id number]  Visit Number:  [id number]  Primary Care Provider:  Violet Pop APRN    Length of stay:  16    Subjective: Patient seen and examined, family member at bedside, patient is more tachypneic shallow breathing audible gargling rattling breath sounds from poor handling of oropharyngeal secretions.  Suctioning has been done as needed scopolamine patch.    Chief Complaint: Shortness of breath  ----------------------------------------------------------------------------------------------------------------------  Current Hospital Meds:  castor oil-balsam peru, 1 Application, Topical, Q12H  Phenylephrine HCl-NaCl, , ,   Phenylephrine HCl-NaCl, , ,   Scopolamine, 2 patch, Transdermal, Q72H  sodium chloride, 10 mL, Intravenous, Q12H  sodium chloride, 10 mL, Intravenous, Q12H  sodium chloride, 10 mL, Intravenous, Q12H      Morphine,   Pharmacy Consult,   sodium chloride, 10 mL/hr  sodium chloride, 10 mL/hr      ----------------------------------------------------------------------------------------------------------------------  Vital Signs:  Temp:  [96.7 °F (35.9 °C)-100.2 °F (37.9 °C)] 100.2 °F (37.9 °C)  Heart Rate:  [] 129  Resp:  [12-42] 26  BP: (102-134)/(48-77) 108/54       Tele: Sinus tachycardia 129 bpm      24  0604 24  0605 24  0550   Weight: 64.6 kg (142 lb 6.7 oz) 62.9 kg (138 lb 10.7 oz) 62.2 kg (137 lb 2 oz)     Body mass index is 23.53 kg/m².    Intake/Output Summary (Last 24 hours) at 8/3/2024 0920  Last data filed at 8/3/2024 0800  Gross per 24 hour   Intake 27.63 ml   Output 250 ml   Net -222.37 ml     No diet orders on file  ----------------------------------------------------------------------------------------------------------------------  Physical exam:  General: Agonal breathing tachycardic, shallow breathing.   Chronically ill.      Skin:  Skin is warm and dry. No rash noted. No pallor.    HENT:  Head:  Normocephalic and atraumatic.  Mouth:  Moist mucous membranes.    Eyes:  Conjunctivae and EOM are normal.  Pupils are equal, round, and reactive to light.  No scleral icterus.    Neck:  Neck supple.  No JVD present.  No guarding or rattling noted retrosternally.  Pulmonary/Chest: No wheezing noted occasional rhonchi noted chest tube in place  Cardiovascular: Tachycardic, regular rhythm and normal heart sounds with no murmur.  Abdominal:  Soft.  Bowel sounds are normal.  No distension and no tenderness.   Extremities:  No edema, no tenderness, and no deformity.  No red or swollen joints anywhere.  Strong pulses in all 4 extremities with no clubbing, no cyanosis, no edema.  Neurological:  Motor strength equal no obvious deficit, sensory grossly intact.   No cranial nerve deficit.  No tongue deviation.  No facial droop.  No slurred speech.    Genitourinary: Lin catheter in place  Back:  ----------------------------------------------------------------------------------------------------------------------  ----------------------------------------------------------------------------------------------------------------------  Results from last 7 days   Lab Units 07/29/24  0156 07/29/24  0020   HSTROP T ng/L 31* 29*     Results from last 7 days   Lab Units 08/01/24  0010 07/31/24  0001 07/30/24  0005 07/29/24  0020 07/28/24  0053 07/27/24  1143   LACTATE mmol/L  --   --   --  1.3  --   --    WBC 10*3/mm3 22.53* 22.37* 20.31* 24.89*   < > 16.39*   HEMOGLOBIN g/dL 11.1* 10.8* 10.5* 10.5*   < > 10.0*   HEMATOCRIT % 34.8 34.3 33.6* 33.2*   < > 31.9*   MCV fL 99.4* 101.2* 99.4* 100.3*   < > 101.3*   MCHC g/dL 31.9 31.5 31.3* 31.6   < > 31.3*   PLATELETS 10*3/mm3 168 196 206 207   < > 205   INR   --   --   --   --   --  1.00    < > = values in this interval not displayed.     Results from last 7 days   Lab Units 07/29/24  0431   PH,  "ARTERIAL pH units 7.409   PO2 ART mm Hg 105.0   PCO2, ARTERIAL mm Hg 51.8*   HCO3 ART mmol/L 32.8*     Results from last 7 days   Lab Units 08/01/24  1132 08/01/24  0010 07/31/24  0001 07/30/24  0005 07/29/24  0020 07/28/24  0053 07/27/24  1143   SODIUM mmol/L 140 140 141 137 141 141 142   POTASSIUM mmol/L 4.9 5.5* 4.7 4.8 4.7 4.9 5.2   MAGNESIUM mg/dL  --   --   --   --  2.4 2.5* 2.4   CHLORIDE mmol/L 104 103 106 101 106 107 109*   CO2 mmol/L 30.6* 27.9 29.7* 31.0* 28.2 24.2 26.1   BUN mg/dL 16 17 16 19 15 23 29*   CREATININE mg/dL 0.19* 0.22* 0.22* 0.23* 0.23* 0.26* 0.29*   CALCIUM mg/dL 8.3* 8.4* 8.4* 8.2* 8.4* 8.3* 8.1*   GLUCOSE mg/dL 175* 169* 192* 170* 163* 159* 161*   ALBUMIN g/dL  --   --   --  3.0* 3.0* 3.1* 3.1*   BILIRUBIN mg/dL  --   --   --  0.2 0.2 0.3 0.2   ALK PHOS U/L  --   --   --  94 103 83 78   AST (SGOT) U/L  --   --   --  24 52* 23 23   ALT (SGPT) U/L  --   --   --  45* 58* 24 26   Estimated Creatinine Clearance: 289.9 mL/min (A) (by C-G formula based on SCr of 0.19 mg/dL (L)).    No results found for: \"AMMONIA\"      Blood Culture   Date Value Ref Range Status   07/28/2024 No growth at 5 days  Final   07/28/2024 No growth at 5 days  Final     No results found for: \"URINECX\"  No results found for: \"WOUNDCX\"  No results found for: \"STOOLCX\"    I have personally looked at the labs and they are summarized above.  ----------------------------------------------------------------------------------------------------------------------  Imaging Results (Last 24 Hours)       ** No results found for the last 24 hours. 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  Comfort measures.    Disposition comfort measures      Ruthann Zee MD  08/03/24  09:20 EDT

## 2024-08-04 NOTE — PLAN OF CARE
Problem: COPD (Chronic Obstructive Pulmonary Disease) Comorbidity  Goal: Maintenance of COPD Symptom Control  Outcome: Ongoing, Progressing     Problem: Fall Injury Risk  Goal: Absence of Fall and Fall-Related Injury  Outcome: Ongoing, Progressing  Intervention: Promote Injury-Free Environment  Recent Flowsheet Documentation  Taken 8/4/2024 1300 by Carmen Huggins, RN  Safety Promotion/Fall Prevention:   activity supervised   assistive device/personal items within reach   clutter free environment maintained   fall prevention program maintained   nonskid shoes/slippers when out of bed   room organization consistent   safety round/check completed  Taken 8/4/2024 1100 by Carmen Huggins, RN  Safety Promotion/Fall Prevention:   activity supervised   assistive device/personal items within reach   clutter free environment maintained   fall prevention program maintained   nonskid shoes/slippers when out of bed   room organization consistent   safety round/check completed  Taken 8/4/2024 0900 by Carmen Huggins, RN  Safety Promotion/Fall Prevention:   activity supervised   assistive device/personal items within reach   clutter free environment maintained   fall prevention program maintained   nonskid shoes/slippers when out of bed   room organization consistent   safety round/check completed  Taken 8/4/2024 0700 by Carmen Huggins, RN  Safety Promotion/Fall Prevention:   activity supervised   assistive device/personal items within reach   clutter free environment maintained   fall prevention program maintained   nonskid shoes/slippers when out of bed   room organization consistent   safety round/check completed     Problem: Adjustment to Illness (Sepsis/Septic Shock)  Goal: Optimal Coping  Outcome: Ongoing, Progressing  Intervention: Optimize Psychosocial Adjustment to Illness  Recent Flowsheet Documentation  Taken 8/4/2024 0830 by Carmen Huggins, RN  Family/Support System Care: support provided     Problem:  Bleeding (Sepsis/Septic Shock)  Goal: Absence of Bleeding  Outcome: Ongoing, Progressing     Problem: Glycemic Control Impaired (Sepsis/Septic Shock)  Goal: Blood Glucose Level Within Desired Range  Outcome: Ongoing, Progressing  Intervention: Optimize Glycemic Control  Recent Flowsheet Documentation  Taken 8/4/2024 0830 by Carmen Huggins RN  Glycemic Management: blood glucose monitored     Problem: Infection Progression (Sepsis/Septic Shock)  Goal: Absence of Infection Signs and Symptoms  Outcome: Ongoing, Progressing  Intervention: Initiate Sepsis Management  Recent Flowsheet Documentation  Taken 8/4/2024 1300 by Carmen Huggins RN  Infection Prevention: rest/sleep promoted  Taken 8/4/2024 1100 by Carmen Huggins RN  Infection Prevention: rest/sleep promoted  Taken 8/4/2024 0900 by Carmen Huggins RN  Infection Prevention: rest/sleep promoted  Taken 8/4/2024 0700 by Carmen Huggins RN  Infection Prevention: rest/sleep promoted  Intervention: Promote Recovery  Recent Flowsheet Documentation  Taken 8/4/2024 0830 by Carmen Huggins RN  Sleep/Rest Enhancement:   awakenings minimized   consistent schedule promoted   relaxation techniques promoted  Intervention: Promote Stabilization  Recent Flowsheet Documentation  Taken 8/4/2024 0830 by Carmen Huggins RN  Fluid/Electrolyte Management: fluids provided     Problem: Skin Injury Risk Increased  Goal: Skin Health and Integrity  Outcome: Ongoing, Progressing  Intervention: Optimize Skin Protection  Recent Flowsheet Documentation  Taken 8/4/2024 0830 by Carmen Huggins RN  Pressure Reduction Techniques:   weight shift assistance provided   heels elevated off bed   positioned off wounds  Pressure Reduction Devices: pressure-redistributing mattress utilized  Skin Protection:   adhesive use limited   incontinence pads utilized   transparent dressing maintained   Goal Outcome Evaluation:  Plan of Care Reviewed With: family        Progress: no change

## 2024-08-04 NOTE — PLAN OF CARE
Goal Outcome Evaluation:           Progress: no change  Outcome Evaluation: Patient unresponsive. Morphine PCA in use. Comfort measures. Family at bedside. Bed low, locked, and call light in reach.

## 2024-08-04 NOTE — PROGRESS NOTES
[facility]     Patient Identification:  Name:  Lexie Valdez  Age:  64 y.o.  Sex:  female  :  1960  MRN:  [id number]  Visit Number:  [id number]  Primary Care Provider:  Violet Pop APRN    Length of stay:  17    Subjective: Patient seen and examined, very shallow agonal breathing, tachycardic, good urine output, appears comfortable.    Chief Complaint: Shortness of breath  ----------------------------------------------------------------------------------------------------------------------  Current Hospital Meds:  castor oil-balsam peru, 1 Application, Topical, Q12H  Phenylephrine HCl-NaCl, , ,   Phenylephrine HCl-NaCl, , ,   Scopolamine, 2 patch, Transdermal, Q72H  sodium chloride, 10 mL, Intravenous, Q12H  sodium chloride, 10 mL, Intravenous, Q12H  sodium chloride, 10 mL, Intravenous, Q12H      Morphine,   Pharmacy Consult,   sodium chloride, 10 mL/hr  sodium chloride, 10 mL/hr      ----------------------------------------------------------------------------------------------------------------------  Vital Signs:  Temp:  [98.4 °F (36.9 °C)-100.4 °F (38 °C)] 98.4 °F (36.9 °C)  Heart Rate:  [111-125] 113  Resp:  [24-38] 30  BP: (107-126)/(52-62) 119/57       Tele: Sinus tachycardia rate of 120 bpm      24  0605 24  0550 24  0400   Weight: 62.9 kg (138 lb 10.7 oz) 62.2 kg (137 lb 2 oz) 62.2 kg (137 lb 2 oz)     Body mass index is 23.53 kg/m².    Intake/Output Summary (Last 24 hours) at 2024 1155  Last data filed at 2024 0800  Gross per 24 hour   Intake 28.04 ml   Output 1965 ml   Net -1936.96 ml     No diet orders on file  ----------------------------------------------------------------------------------------------------------------------  Physical exam:  General: Unresponsive, shallow breathing, likely ill-appearing.   Skin:  Skin is warm and dry. No rash noted. No pallor.    HENT:  Head:  Normocephalic and atraumatic.  Mouth: Dry, .     Eyes: Pupils are 5 mm bilateral, sluggishly reactive to light.    Neck:  Neck supple.  No JVD present.    Pulmonary/Chest: Left chest tube, scattered crackles bilateral Cardiovascular: Tachycardic, regular rhythm and normal heart sounds with no murmur.  Abdominal:  Soft.  Bowel sounds are normal.  No distension and no tenderness.   Extremities: Edema noted +1 both lower and upper extremity.  Cold to touch no cyanosis.  Pedal pulses palpable thready.  Neurological: Unresponsive.   Genitourinary: Lin catheter in the  Back:  ----------------------------------------------------------------------------------------------------------------------  ----------------------------------------------------------------------------------------------------------------------  Results from last 7 days   Lab Units 07/29/24  0156 07/29/24  0020   HSTROP T ng/L 31* 29*     Results from last 7 days   Lab Units 08/01/24  0010 07/31/24  0001 07/30/24  0005 07/29/24  0020   LACTATE mmol/L  --   --   --  1.3   WBC 10*3/mm3 22.53* 22.37* 20.31* 24.89*   HEMOGLOBIN g/dL 11.1* 10.8* 10.5* 10.5*   HEMATOCRIT % 34.8 34.3 33.6* 33.2*   MCV fL 99.4* 101.2* 99.4* 100.3*   MCHC g/dL 31.9 31.5 31.3* 31.6   PLATELETS 10*3/mm3 168 196 206 207     Results from last 7 days   Lab Units 07/29/24  0431   PH, ARTERIAL pH units 7.409   PO2 ART mm Hg 105.0   PCO2, ARTERIAL mm Hg 51.8*   HCO3 ART mmol/L 32.8*     Results from last 7 days   Lab Units 08/01/24  1132 08/01/24  0010 07/31/24  0001 07/30/24  0005 07/29/24  0020   SODIUM mmol/L 140 140 141 137 141   POTASSIUM mmol/L 4.9 5.5* 4.7 4.8 4.7   MAGNESIUM mg/dL  --   --   --   --  2.4   CHLORIDE mmol/L 104 103 106 101 106   CO2 mmol/L 30.6* 27.9 29.7* 31.0* 28.2   BUN mg/dL 16 17 16 19 15   CREATININE mg/dL 0.19* 0.22* 0.22* 0.23* 0.23*   CALCIUM mg/dL 8.3* 8.4* 8.4* 8.2* 8.4*   GLUCOSE mg/dL 175* 169* 192* 170* 163*   ALBUMIN g/dL  --   --   --  3.0* 3.0*   BILIRUBIN mg/dL  --   --   --  0.2 0.2   ALK  "PHOS U/L  --   --   --  94 103   AST (SGOT) U/L  --   --   --  24 52*   ALT (SGPT) U/L  --   --   --  45* 58*   Estimated Creatinine Clearance: 289.9 mL/min (A) (by C-G formula based on SCr of 0.19 mg/dL (L)).    No results found for: \"AMMONIA\"      No results found for: \"BLOODCX\"  No results found for: \"URINECX\"  No results found for: \"WOUNDCX\"  No results found for: \"STOOLCX\"    I have personally looked at the labs and they are summarized above.  ----------------------------------------------------------------------------------------------------------------------  Imaging Results (Last 24 Hours)       ** No results found for the last 24 hours. **          ----------------------------------------------------------------------------------------------------------------------  Assessment and Plan:  -Acute on chronic hypoxic and hypercapnic respiratory failure  -COPD exacerbation  -Spontaneous secondary pneumothorax chest tube x 2 on the left  -Aspiration pneumonia  -COPD exacerbation  -Non-STEMI type II  -History of IPF    Comfort measure, patient is actively dying, discussed at length with patient's only son Faith, questions and concerns answered.  Patient appears to be comfortable at this time.    Disposition comfort measures      Ruthann Zee MD  08/04/24  11:55 EDT  "

## 2024-08-05 NOTE — PROGRESS NOTES
[facility] HOSPITALIST PROGRESS NOTE     Patient Identification:  Name:  Lexie KEITH Valdez  Age:  64 y.o.  Sex:  female  :  1960  MRN:  [id number]  Visit Number:  [id number]  ROOM: [place] [id number]     Primary Care Provider:  Violet Pop APRN    Length of stay in inpatient status:  18    Subjective     Chief Compliant:    Chief Complaint   Patient presents with    Shortness of Breath       History of Presenting Illness:    Family supportive bedside. Patient has hemodynamically stabilized. She is on 2 mg/hr morphine gtt and not interactive per family. Family considering being more aggressive in treatment but not sure what they would want. Agreed to give trial of less sedation to see if patient improves from mental status perspective.     ROS:  Otherwise 10 point ROS negative other than documented above in HPI.     Objective     Current Hospital Meds:castor oil-balsam peru, 1 Application, Topical, Q12H  Phenylephrine HCl-NaCl, , ,   Phenylephrine HCl-NaCl, , ,   Scopolamine, 2 patch, Transdermal, Q72H  sodium chloride, 10 mL, Intravenous, Q12H  sodium chloride, 10 mL, Intravenous, Q12H  sodium chloride, 10 mL, Intravenous, Q12H    Morphine, , Last Rate: Stopped (24 1653)  Pharmacy Consult,   sodium chloride, 10 mL/hr  sodium chloride, 10 mL/hr        Current Antimicrobial Therapy:  Anti-Infectives (From admission, onward)      Ordered     Dose/Rate Route Frequency Start Stop    24  doxycycline (MONODOX) capsule 100 mg        Ordering Provider: Jessica Ospina MD    100 mg Oral Every 12 Hours Scheduled 24 2300 24 0856    24 0424  cefepime 2000 mg IVPB in 100 mL NS (VTB)        Ordering Provider: Estuardo Charles MD    2,000 mg  over 4 Hours Intravenous Every 8 Hours 24 1400 24 0942    24 0856  vancomycin 1250 mg/250 mL 0.9% NS IVPB ([facility])        Ordering Provider: Eduardo Freeman MD    1,250 mg  over 75 Minutes Intravenous Once 24 1000  07/21/24 1114    07/21/24 0424  cefepime 2000 mg IVPB in 100 mL NS (VTB)        Ordering Provider: Estuardo Charles MD    2,000 mg  over 30 Minutes Intravenous Once 07/21/24 0600 07/21/24 0558    07/17/24 2218  cefTRIAXone (ROCEPHIN) 2,000 mg in sodium chloride 0.9 % 100 mL IVPB-VTB        Ordering Provider: Al Lewis MD    2,000 mg  200 mL/hr over 30 Minutes Intravenous Once 07/17/24 2234 07/18/24 0035          Current Diuretic Therapy:  Diuretics (From admission, onward)      Ordered     Dose/Rate Route Frequency Start Stop    07/26/24 1220  bumetanide (BUMEX) injection 1 mg        Ordering Provider: Yamel Lorenzo APRN    1 mg Intravenous Once 07/26/24 1245 07/26/24 1320    07/24/24 1151  furosemide (LASIX) injection 20 mg        Ordering Provider: Rinku Braxton MD    20 mg Intravenous Once 07/24/24 1245 07/24/24 1244    07/21/24 0637  furosemide (LASIX) injection 40 mg        Ordering Provider: Estuardo Charles MD    40 mg Intravenous Once 07/21/24 0730 07/21/24 0914    07/20/24 2323  furosemide (LASIX) injection 40 mg        Ordering Provider: Estuardo Charles MD    40 mg Intravenous Once 07/21/24 0015 07/21/24 0008          ----------------------------------------------------------------------------------------------------------------------  Vital Signs:  Temp:  [97.1 °F (36.2 °C)-105 °F (40.6 °C)] 97.8 °F (36.6 °C)  Heart Rate:  [101-118] 112  Resp:  [24-25] 24  BP: (106-113)/(52-63) 112/63  SpO2:  [100 %] 100 %  on  Flow (L/min):  [3] 3;   Device (Oxygen Therapy): nasal cannula  Body mass index is 23.53 kg/m².    Wt Readings from Last 3 Encounters:   08/04/24 62.2 kg (137 lb 2 oz)   04/26/24 67.7 kg (149 lb 4 oz)   04/19/24 67.1 kg (148 lb)     Intake & Output (last 3 days)         08/03 0701  08/04 0700 08/04 0701  08/05 0700 08/05 0701  08/06 0700    P.O. 0 0 0    I.V. (mL/kg) 47.5 (0.8) 48.5 (0.8) 17.7 (0.3)    Total Intake(mL/kg) 47.5 (0.8) 48.5 (0.8) 17.7 (0.3)    Urine  (mL/kg/hr) 1550 (1.1)      Stool 100 0     Chest Tube 315 70 100    Total Output 1965 70 100    Net -1917.5 -21.5 -82.4           Urine Unmeasured Occurrence 1 x      Stool Unmeasured Occurrence 0 x            No diet orders on file  ----------------------------------------------------------------------------------------------------------------------  Physical exam:  Constitutional:  Chronically ill appearing.   HENT:  Head:  Normocephalic and atraumatic.  Mouth:  Moist mucous membranes.    Eyes:  Conjunctivae and EOM are normal. No scleral icterus.    Neck:  Neck supple.  No JVD present.    Cardiovascular:  Normal rate, regular rhythm and normal heart sounds with no murmur.  Pulmonary/Chest:  No respiratory distress, no wheezes, no crackles, with normal breath sounds and good air movement.  Abdominal:  Soft.  Bowel sounds are normal.  No distension and no tenderness.   Musculoskeletal:  No edema, no tenderness, and no deformity.  No red or swollen joints anywhere.    Neurological:  Lethargic. Not oriented. Will open eyes at time to stimuli but otherwise no response. No gag reflex per nursing staff.    Skin:  Skin is warm and dry. No rash noted. No pallor.   Peripheral vascular:  Pulses in all 4 extremities with no clubbing, no cyanosis, no edema.  ----------------------------------------------------------------------------------------------------------------------  Tele:    ----------------------------------------------------------------------------------------------------------------------  Results from last 7 days   Lab Units 08/01/24  0010 07/31/24  0001 07/30/24  0005   WBC 10*3/mm3 22.53* 22.37* 20.31*   HEMOGLOBIN g/dL 11.1* 10.8* 10.5*   HEMATOCRIT % 34.8 34.3 33.6*   MCV fL 99.4* 101.2* 99.4*   MCHC g/dL 31.9 31.5 31.3*   PLATELETS 10*3/mm3 168 196 206         Results from last 7 days   Lab Units 08/01/24  1132 08/01/24  0010 07/31/24  0001 07/30/24  0005   SODIUM mmol/L 140 140 141 137   POTASSIUM mmol/L  "4.9 5.5* 4.7 4.8   CHLORIDE mmol/L 104 103 106 101   CO2 mmol/L 30.6* 27.9 29.7* 31.0*   BUN mg/dL 16 17 16 19   CREATININE mg/dL 0.19* 0.22* 0.22* 0.23*   CALCIUM mg/dL 8.3* 8.4* 8.4* 8.2*   GLUCOSE mg/dL 175* 169* 192* 170*   ALBUMIN g/dL  --   --   --  3.0*   BILIRUBIN mg/dL  --   --   --  0.2   ALK PHOS U/L  --   --   --  94   AST (SGOT) U/L  --   --   --  24   ALT (SGPT) U/L  --   --   --  45*   Estimated Creatinine Clearance: 289.9 mL/min (A) (by C-G formula based on SCr of 0.19 mg/dL (L)).  No results found for: \"AMMONIA\"      Results from last 7 days   Lab Units 08/01/24  0010   PROBNP pg/mL 16,493.0*         No results found for: \"HGBA1C\", \"POCGLU\"  Lab Results   Component Value Date    TSH 1.140 07/17/2024    FREET4 1.83 (H) 04/11/2024     No results found for: \"PREGTESTUR\", \"PREGSERUM\", \"HCG\", \"HCGQUANT\"  Pain Management Panel           No data to display              Brief Urine Lab Results  (Last result in the past 365 days)        Color   Clarity   Blood   Leuk Est   Nitrite   Protein   CREAT   Urine HCG        07/17/24 2145 Yellow   Cloudy   Negative   Trace   Positive   Negative                 No results found for: \"BLOODCX\"  No results found for: \"URINECX\"  No results found for: \"WOUNDCX\"  No results found for: \"STOOLCX\"  No results found for: \"RESPCX\"  No results found for: \"AFBCX\"  Results from last 7 days   Lab Units 07/30/24  0005   PROCALCITONIN ng/mL 0.07       I have personally looked at the labs and they are summarized above.  ----------------------------------------------------------------------------------------------------------------------  Detailed radiology reports for the last 24 hours:    Imaging Results (Last 24 Hours)       ** No results found for the last 24 hours. **          Assessment & Plan    -Acute on chronic hypoxic and hypercapnic respiratory failure  -COPD exacerbation  -Spontaneous secondary pneumothorax chest tube x 2 on the left  -Aspiration pneumonia  -COPD " exacerbation  -Non-STEMI type II  -History of IPF    Patient terminally extubated and made comfort measures on 8/1. Hemodynamically has stabilized with good SpO2. Family considering being more aggressive. Had long discussion with patient's son and will wean morphine gtt and see if she wakes up. Thus far patient has not been interactive or done purposeful movements.     Code status: Comfort, palliative care following.     Dispo: Pending [facility] discussions    Dean Pinzon MD  [facility] Hospitalist  08/05/24  19:16 EDT

## 2024-08-05 NOTE — PLAN OF CARE
Problem: Adult Inpatient Plan of Care  Goal: Plan of Care Review  Outcome: Ongoing, Progressing  Flowsheets  Taken 8/5/2024 0429 by Natasha Kessler, RN  Progress: no change  Taken 8/4/2024 1443 by Carmen Huggins RN  Plan of Care Reviewed With: family  Goal: Patient-Specific Goal (Individualized)  Outcome: Ongoing, Progressing  Goal: Absence of Hospital-Acquired Illness or Injury  Outcome: Ongoing, Progressing  Intervention: Identify and Manage Fall Risk  Recent Flowsheet Documentation  Taken 8/5/2024 1500 by Carmen Huggins RN  Safety Promotion/Fall Prevention:   activity supervised   assistive device/personal items within reach   clutter free environment maintained   fall prevention program maintained   nonskid shoes/slippers when out of bed   room organization consistent   safety round/check completed  Taken 8/5/2024 1300 by Carmen Huggins, RN  Safety Promotion/Fall Prevention:   activity supervised   assistive device/personal items within reach   clutter free environment maintained   fall prevention program maintained   nonskid shoes/slippers when out of bed   room organization consistent   safety round/check completed  Taken 8/5/2024 1100 by Carmen Huggins, RN  Safety Promotion/Fall Prevention:   activity supervised   assistive device/personal items within reach   clutter free environment maintained   fall prevention program maintained   nonskid shoes/slippers when out of bed   room organization consistent   safety round/check completed  Taken 8/5/2024 0900 by Carmen Huggins, RN  Safety Promotion/Fall Prevention:   activity supervised   assistive device/personal items within reach   clutter free environment maintained   fall prevention program maintained   nonskid shoes/slippers when out of bed   room organization consistent   safety round/check completed  Taken 8/5/2024 0700 by Carmen Huggins, RN  Safety Promotion/Fall Prevention:   activity supervised   assistive device/personal items  within reach   clutter free environment maintained   fall prevention program maintained   nonskid shoes/slippers when out of bed   room organization consistent   safety round/check completed  Intervention: Prevent Infection  Recent Flowsheet Documentation  Taken 8/5/2024 1500 by Carmen Huggins RN  Infection Prevention: rest/sleep promoted  Taken 8/5/2024 1300 by Carmen Huggins RN  Infection Prevention: rest/sleep promoted  Taken 8/5/2024 1100 by Carmen Huggins RN  Infection Prevention: rest/sleep promoted  Taken 8/5/2024 0900 by Carmen Huggins RN  Infection Prevention: rest/sleep promoted  Taken 8/5/2024 0700 by Carmen Huggins RN  Infection Prevention: rest/sleep promoted  Goal: Optimal Comfort and Wellbeing  Outcome: Ongoing, Progressing  Intervention: Provide Person-Centered Care  Recent Flowsheet Documentation  Taken 8/5/2024 0800 by Carmen Huggins RN  Trust Relationship/Rapport:   care explained   choices provided   thoughts/feelings acknowledged  Goal: Readiness for Transition of Care  Outcome: Ongoing, Progressing     Problem: COPD (Chronic Obstructive Pulmonary Disease) Comorbidity  Goal: Maintenance of COPD Symptom Control  Outcome: Ongoing, Progressing     Problem: Fall Injury Risk  Goal: Absence of Fall and Fall-Related Injury  Outcome: Ongoing, Progressing  Intervention: Promote Injury-Free Environment  Recent Flowsheet Documentation  Taken 8/5/2024 1500 by Carmen Huggins RN  Safety Promotion/Fall Prevention:   activity supervised   assistive device/personal items within reach   clutter free environment maintained   fall prevention program maintained   nonskid shoes/slippers when out of bed   room organization consistent   safety round/check completed  Taken 8/5/2024 1300 by Carmen Huggins RN  Safety Promotion/Fall Prevention:   activity supervised   assistive device/personal items within reach   clutter free environment maintained   fall prevention program maintained    nonskid shoes/slippers when out of bed   room organization consistent   safety round/check completed  Taken 8/5/2024 1100 by Carmen Huggins, RN  Safety Promotion/Fall Prevention:   activity supervised   assistive device/personal items within reach   clutter free environment maintained   fall prevention program maintained   nonskid shoes/slippers when out of bed   room organization consistent   safety round/check completed  Taken 8/5/2024 0900 by Carmen Huggins, RN  Safety Promotion/Fall Prevention:   activity supervised   assistive device/personal items within reach   clutter free environment maintained   fall prevention program maintained   nonskid shoes/slippers when out of bed   room organization consistent   safety round/check completed  Taken 8/5/2024 0700 by Carmen Huggins, RN  Safety Promotion/Fall Prevention:   activity supervised   assistive device/personal items within reach   clutter free environment maintained   fall prevention program maintained   nonskid shoes/slippers when out of bed   room organization consistent   safety round/check completed     Problem: Adjustment to Illness (Sepsis/Septic Shock)  Goal: Optimal Coping  Outcome: Ongoing, Progressing  Intervention: Optimize Psychosocial Adjustment to Illness  Recent Flowsheet Documentation  Taken 8/5/2024 0800 by Carmen Huggins, RN  Family/Support System Care: self-care encouraged     Problem: Bleeding (Sepsis/Septic Shock)  Goal: Absence of Bleeding  Outcome: Ongoing, Progressing     Problem: Glycemic Control Impaired (Sepsis/Septic Shock)  Goal: Blood Glucose Level Within Desired Range  Outcome: Ongoing, Progressing     Problem: Infection Progression (Sepsis/Septic Shock)  Goal: Absence of Infection Signs and Symptoms  Outcome: Ongoing, Progressing  Intervention: Initiate Sepsis Management  Recent Flowsheet Documentation  Taken 8/5/2024 1500 by Carmen Huggins, RN  Infection Prevention: rest/sleep promoted  Taken 8/5/2024 1300 by  Carmen Huggins, RN  Infection Prevention: rest/sleep promoted  Taken 8/5/2024 1100 by Carmen Huggins RN  Infection Prevention: rest/sleep promoted  Taken 8/5/2024 0900 by Carmen Huggins RN  Infection Prevention: rest/sleep promoted  Taken 8/5/2024 0700 by Carmen Huggins RN  Infection Prevention: rest/sleep promoted  Intervention: Promote Recovery  Recent Flowsheet Documentation  Taken 8/5/2024 0800 by Carmen Huggins RN  Sleep/Rest Enhancement:   awakenings minimized   consistent schedule promoted   relaxation techniques promoted     Problem: Nutrition Impaired (Sepsis/Septic Shock)  Goal: Optimal Nutrition Intake  Outcome: Ongoing, Progressing     Problem: Skin Injury Risk Increased  Goal: Skin Health and Integrity  Outcome: Ongoing, Progressing     Problem: Noninvasive Ventilation Acute  Goal: Effective Unassisted Ventilation and Oxygenation  Outcome: Ongoing, Progressing   Goal Outcome Evaluation:  Plan of Care Reviewed With: family        Progress: no change

## 2024-08-05 NOTE — PROGRESS NOTES
"Palliative Care Daily Progress Note     S: Medical record reviewed, followed up with Primary RN Carmen and Dr Pinzon regarding patient's condition. When I saw Lexie this morning she was shallow breathing and was having slight agonal breathing, however did not seem to be uncomfortable or in distress. Pts son Faith was at bedside and stated he felt she had been comfortable for the most part, we discussed that she had a temp of 105 overnight however is now back to normal range.      O:   Palliative Performance Scale Score:     /60   Pulse 101   Temp 97.4 °F (36.3 °C) (Axillary)   Resp 25   Ht 162.6 cm (64.02\")   Wt 62.2 kg (137 lb 2 oz)   SpO2 100%   BMI 23.53 kg/m²     Intake/Output Summary (Last 24 hours) at 8/5/2024 1236  Last data filed at 8/5/2024 0800  Gross per 24 hour   Intake 46.6 ml   Output 70 ml   Net -23.4 ml       PE:  Lexie is comfort measures, she was resting quietly with shallow agonal respirations. However did not appear to be uncomfortable at this time.      Meds: Reviewed and changes noted    Labs:   Results from last 7 days   Lab Units 08/01/24  0010   WBC 10*3/mm3 22.53*   HEMOGLOBIN g/dL 11.1*   HEMATOCRIT % 34.8   PLATELETS 10*3/mm3 168     Results from last 7 days   Lab Units 08/01/24  1132   SODIUM mmol/L 140   POTASSIUM mmol/L 4.9   CHLORIDE mmol/L 104   CO2 mmol/L 30.6*   BUN mg/dL 16   CREATININE mg/dL 0.19*   GLUCOSE mg/dL 175*   CALCIUM mg/dL 8.3*     Results from last 7 days   Lab Units 08/01/24  1132 07/31/24  0001 07/30/24  0005   SODIUM mmol/L 140   < > 137   POTASSIUM mmol/L 4.9   < > 4.8   CHLORIDE mmol/L 104   < > 101   CO2 mmol/L 30.6*   < > 31.0*   BUN mg/dL 16   < > 19   CREATININE mg/dL 0.19*   < > 0.23*   CALCIUM mg/dL 8.3*   < > 8.2*   BILIRUBIN mg/dL  --   --  0.2   ALK PHOS U/L  --   --  94   ALT (SGPT) U/L  --   --  45*   AST (SGOT) U/L  --   --  24   GLUCOSE mg/dL 175*   < > 170*    < > = values in this interval not displayed.     Imaging Results (Last 72 Hours) "       ** No results found for the last 72 hours. **              Diagnostics: Reviewed    A: Lexie Valdez is a 64 y.o. female admitted on 7/17/2024 due to shortness of breath. Lexie has a medical history of  anxiety/depression, arthritis, HTN, hypothyroidism, stress urinary incontinence with frequent UTI's, COPD and pulmonary fibrosis on 3L NC at home, and resting tremors in face and extremities, who presents with worsening shortness of breath for the last couple days. Pt also at time of admission c/o chest pressure associated with dyspnea stating this was chronic however had worsened in the days leading up to her admission. She also endorsed diarrhea for the last couple of days as well as dysuria and stated that she has recurring UTI's. Labs in ER revealed Troponin of 229 with repeat 188, BNP was 3550, Na 131, Cr 0.49, glucose 177, CRP 1.65, lactate 2.0, procal and WBC normal but with 88.7% neutrophils. UA showed positive nitrite, trace leuk, 6-10 WBC, 4+ bacteria and 3-6 squamous epithelial cells. She was tachycardic and tachypneic with pH of 7.458, CO2 40, Po2 154, bicarb 28. She was admitted to PCU on admission and in the early am of 7/21 was transferred to CCU due to worsening dyspnea, bilateral infiltrates without effusion concerning for interstitial debbi disease flare, was transferred to CCU. Initially she was placed on Bipap in CCU and at 7/21 6:20 am Lexie was sedated intubated and placed on the ventilator. Palliative care consulted for GOC,ACP and for support of family.   On Thursday 8/1/2024 after discussion with palliative, pulmonary and Dr Zee family has decided to make Lexie comfort measures as they know she would not want a trach and peg.          P:  I was able to speak with pts son Faith at bedside to answer questions and provide support. Lexie is comfort measures and was resting comfortably and plan is to ensure her comfort. I discussed pt with Dr Pinzon this am.      We will continue to follow along.  Please do not hesitate to contact us regarding further sx mgmt or GOC needs, including after hours or on weekends via our on call provider at [phone].     Linh Lou, APRN    8/5/2024

## 2024-08-05 NOTE — PLAN OF CARE
Goal Outcome Evaluation:           Progress: no change  Outcome Evaluation: Patient remains comfort measure. Family at bedside. Bed low, locked, and call light in reach.

## 2024-08-05 NOTE — CASE MANAGEMENT/SOCIAL WORK
Continued Stay Note   Alex     Patient Name: Lexie KEITH Valdez  MRN: [id number]  Today's Date: 8/5/2024    Admit Date: 7/17/2024     Discharge Plan       Row Name 08/05/24 1720       Plan    Plan Pt remains comfort measures on this date. Palliative Care continues to follow pt. SS to follow and assist with discharge planning.        ADIN Garcia

## 2024-08-06 NOTE — PLAN OF CARE
Goal Outcome Evaluation:  Plan of Care Reviewed With: family        Progress: declining  Outcome Evaluation: patient remains on comfort measures. She was in distress early in the shift but once PCA pump restarted she is more comfortable with work of breathing and tachycardia. Temp remains elevated, will try oral tylenol solution. family remains at bedside.

## 2024-08-06 NOTE — PAYOR COMM NOTE
"CONTACT: DINORA HILL RN  UTILIZATION MANAGEMENT DEPT.  [facility]  [address] [place] [address]  PHONE: [phone]  FAX: [phone]        CLINICAL UPDATE    PLEASE UPDATE WITH TOTAL APPROVED DAYS.  PT REMAINS IN-HOUSE.    REF#[id number]           Lexie Valdez (64 y.o. Female)       Date of Birth   1960    Social Security Number       Address   [address] [place] [address]    Home Phone       MRN   [id number]       [facility]    Marital Status                               Admission Date   7/17/24    Admission Type   Emergency    Admitting Provider   Estuardo Charles MD    Attending Provider   Dean Pinzon MD    Department, Room/Bed   [facility] PROGRESS CARE, P212/S2       Discharge Date       Discharge Disposition       Discharge Destination                                 Attending Provider: Dean Pinzon MD    Allergies: Codeine, Sulfa Antibiotics    Isolation: None   Infection: None   Code Status: No CPR    Ht: 162.6 cm (64.02\")   Wt: 62.2 kg (137 lb 2 oz)    Admission Cmt: None   Principal Problem: NSTEMI (non-ST elevated myocardial infarction) [I21.4]                   Active Insurance as of 7/17/2024       Primary Coverage       Payor Plan Insurance Group Employer/Plan Group    ANTHEM MEDICARE REPLACEMENT ANTHEM MEDICARE ADVANTAGE [id number]       Payor Plan Address Payor Plan Phone Number Payor Plan Fax Number Effective Dates    [address] [phone]  1/1/2022 - None Entered    [facility] [id number]         Subscriber Name Subscriber Birth Date Member ID       LEXIE VALDEZ 1960 [id number]               Secondary Coverage       Payor Plan Insurance Group Employer/Plan Group    [place]        Payor Plan Address Payor Plan Phone Number Payor Plan Fax Number Effective Dates    [address] [phone]  5/1/2021 - None Entered    [facility] [address]         Subscriber Name Subscriber Birth Date Member " ID       DAVE VALDEZ 1960 [id number]                     Emergency Contacts        (Rel.) Home Phone Work Phone Mobile Phone    ValdezKhris (Spouse) -- -- [phone]    Faith Davila (Son) [phone] -- --    Rosa Kessler (Sister) -- -- [phone]              Current Facility-Administered Medications   Medication Dose Route Frequency Provider Last Rate Last Admin    bisacodyl (DULCOLAX) suppository 10 mg  10 mg Rectal Daily PRN Estuardo Charles MD        castor oil-balsam peru (VENELEX) ointment 1 Application  1 Application Topical Q12H Estuardo Charles MD   1 Application at 08/06/24 1029    glycopyrrolate (ROBINUL) injection 0.4 mg  0.4 mg Intravenous Q4H PRN Estuardo Charles MD   0.4 mg at 08/04/24 1226    hyoscyamine (LEVSIN) SL tablet 125 mcg  125 mcg Sublingual Q4H PRN Linh Lou APRN        LORazepam (ATIVAN) injection 1 mg  1 mg Intravenous Q15 Min PRN Linh Lou APRN   1 mg at 08/06/24 0746    morphine injection 2 mg  2 mg Intravenous Q15 Min PRN Nilesh Back DO   2 mg at 08/06/24 0845    Morphine sulfate PCA 1 mg/mL   Intravenous Continuous Dean Pinzon MD   Currently Infusing at 08/06/24 1029    Pharmacy Consult   Does not apply Continuous PRN Linh Lou APRN        Phenylephrine HCl-NaCl 1-0.9 MG/10ML-% 100 mcg/ml injection  - ADS Override Pull             Phenylephrine HCl-NaCl 1-0.9 MG/10ML-% 100 mcg/ml injection  - ADS Override Pull             prochlorperazine (COMPAZINE) injection 5 mg  5 mg Intravenous Q6H PRN Estuardo Charles MD   5 mg at 07/20/24 1138    scopolamine patch 1 mg/72 hr  2 patch Transdermal Q72H Linh Lou APRN   2 patch at 08/05/24 1000    sodium chloride 0.9 % flush 10 mL  10 mL Intravenous PRN Estuardo Charles MD        sodium chloride 0.9 % flush 10 mL  10 mL Intravenous Q12H Estuardo Charles MD   10 mL at 08/06/24 1029    sodium chloride 0.9 % flush 10 mL  10 mL Intravenous Q12H  Estuardo Charles MD   10 mL at 08/06/24 1029    sodium chloride 0.9 % flush 10 mL  10 mL Intravenous Q12H Estuardo Charles MD   10 mL at 08/06/24 1029    sodium chloride 0.9 % infusion  10 mL/hr Intravenous Continuous PRN Linh Lou, APRN        sodium chloride 0.9 % infusion  10 mL/hr Intravenous Continuous PRN Linh Lou APRGHASSAN 10 mL/hr at 08/06/24 1028 10 mL/hr at 08/06/24 1028     Orders (last 72 hrs)        Start     Ordered    08/06/24 1030  Morphine sulfate PCA 1 mg/mL  Continuous         08/06/24 0939    08/06/24 0128  morphine injection 2 mg  Every 15 Minutes PRN         08/06/24 0128    08/05/24 1822  morphine injection 2 mg  Every 3 Hours PRN,   Status:  Discontinued         08/05/24 1822    08/05/24 1715  Morphine sulfate PCA 1 mg/mL  Continuous,   Status:  Discontinued         08/05/24 1623    08/05/24 1445  Morphine sulfate PCA 1 mg/mL  Continuous,   Status:  Discontinued         08/05/24 1358    08/05/24 0930  Morphine sulfate PCA 1 mg/mL  Continuous,   Status:  Discontinued         08/05/24 0831    08/02/24 1800  Clear & Record PCA Settings  2x Daily PCA       08/02/24 1459    08/02/24 1621  LORazepam (ATIVAN) injection 1 mg  Every 15 Minutes PRN         08/02/24 1622    08/02/24 1545  Morphine sulfate PCA 1 mg/mL  Continuous,   Status:  Discontinued         08/02/24 1459    08/02/24 1458  Pharmacy Consult  Continuous PRN         08/02/24 1459    08/02/24 1458  sodium chloride 0.9 % infusion  Continuous PRN         08/02/24 1459    08/02/24 1452  sodium chloride 0.9 % infusion  Continuous PRN         08/02/24 1457    08/02/24 1100  scopolamine patch 1 mg/72 hr  Every 72 Hours         08/02/24 0924    08/02/24 0925  hyoscyamine (LEVSIN) SL tablet 125 mcg  Every 4 Hours PRN         08/02/24 0925    08/02/24 0900  Drain Care  Daily       07/31/24 0927    08/01/24 1431  Vital Signs  Every Shift      Comments: Per per hospital policy    08/01/24 1432    08/01/24 1406  glycopyrrolate  "(ROBINUL) injection 0.4 mg  Every 4 Hours PRN         08/01/24 1407    07/31/24 1445  castor oil-balsam peru (VENELEX) ointment 1 Application  Every 12 Hours Scheduled         07/31/24 1417    07/26/24 0900  Drain Care  Daily       07/23/24 1736    07/21/24 1417  Urinary Catheter Care  Every Shift      Placed in \"And\" Linked Group    07/21/24 1418    07/21/24 1200  sodium chloride 0.9 % flush 10 mL  Every 12 Hours Scheduled         07/21/24 1108    07/21/24 1200  sodium chloride 0.9 % flush 10 mL  Every 12 Hours Scheduled         07/21/24 1108    07/21/24 1200  sodium chloride 0.9 % flush 10 mL  Every 12 Hours Scheduled         07/21/24 1108    07/19/24 2030  sodium chloride 0.9 % flush 10 mL  As Needed         07/19/24 2030 07/19/24 1813  prochlorperazine (COMPAZINE) injection 5 mg  Every 6 Hours PRN         07/19/24 1813    07/18/24 1523  Wound Care Abrasion (Patient states her dog jumped up on her at home and scratched her leg.)  Daily       07/18/24 1522    07/18/24 0800  Oral Care  2 Times Daily       07/18/24 0342    07/18/24 0342  bisacodyl (DULCOLAX) suppository 10 mg  Daily PRN        Placed in \"And\" Linked Group    07/18/24 0342    Unscheduled  Change Dressing to IV Site As Needed When Damp, Loose or Soiled  As Needed       07/19/24 2030    Unscheduled  Change Needleless Connectors  As Needed      Comments: Change Needleless Connectors When:  - Administration Set Changed  - Dressing Changed  - Removed For Any Reason  - Residual Blood or Debris Within Connector  - Prior to Drawing Blood Cultures  - Contamination of Connector  - After Administration of Blood or Blood Components    07/19/24 2030    Unscheduled  Insert New Peripheral IV  As Needed      Comments: Frequency Per Facility Policy    07/19/24 2030    Unscheduled  Change Dressing to IV Site As Needed When Damp, Loose or Soiled  As Needed       07/21/24 1108    Unscheduled  Change Needleless Connectors  As Needed      Comments: Change Needleless " Connectors When:  - Administration Set Changed  - Dressing Changed  - Removed For Any Reason  - Residual Blood or Debris Within Connector  - Prior to Drawing Blood Cultures  - Contamination of Connector  - After Administration of Blood or Blood Components    24 1108    Unscheduled  Skin Tear Care - Minimal Drainage PRN  As Needed      Comments: - Realign Skin Flap (if Viable) Gently Ease Flap Into Place Using a Dampened Cotton-Tipped Applicator or Gloved Finger  - Gently Cleanse Area & Pat Dry  - Apply Hydrogel & Non-Adherent Layer (Silicone Sheet, Oil Emulsion Dressing or Vaseline Gauze)  - Secure With Silicone Border Dressing (Unless Skin Fragile)  - If Skin is Fragile Secure With Roll Gauze - Do NOT Put Tape Directly on Skin  - Change Every 3 Days & As Needed    24 1046    --  FLUoxetine (PROzac) 40 MG capsule  Daily         24    --  hydrOXYzine (ATARAX) 25 MG tablet  Every 6 Hours PRN         24    --  ipratropium-albuterol (DUO-NEB) 0.5-2.5 mg/3 ml nebulizer  Every 4 Hours PRN         24    --  megestrol (MEGACE) 40 MG tablet  2 Times Daily         24    --  triamcinolone (KENALOG) 0.1 % cream  2 Times Daily         24    --  albuterol sulfate  (90 Base) MCG/ACT inhaler  Daily PRN         24 0957    --  hydroCHLOROthiazide 12.5 MG tablet  Daily         24 1027    --  hydroxychloroquine (PLAQUENIL) 200 MG tablet  2 Times Daily         24 1027                     Physician Progress Notes (last 72 hours)        Dean Pinzon MD at 24 0949              [facility]     Patient Identification:  Name:  Lexie KEITH Valdez  Age:  64 y.o.  Sex:  female  :  1960  MRN:  [id number]  Visit Number:  [id number]  ROOM: [place] [address]     Primary Care Provider:  Violet Pop APRN    Length of stay in inpatient status:  19    Subjective     Chief Compliant:    Chief Complaint   Patient presents  with    Shortness of Breath       History of Presenting Illness:    Patient febrile again overnight. Patient has been off morphine gtt but still not being interactive. Breathing more labored this morning. Patient tachycardic in 170's. Overall she appears uncomfortable. Patient's brother bedside. Discussed restarting morphine gtt. Palliative care bedside during exam and also clarified restarting morphine gtt with patient's son.     ROS:  Otherwise 10 point ROS negative other than documented above in HPI.     Objective     Current Hospital Meds:castor oil-balsam peru, 1 Application, Topical, Q12H  Phenylephrine HCl-NaCl, , ,   Phenylephrine HCl-NaCl, , ,   Scopolamine, 2 patch, Transdermal, Q72H  sodium chloride, 10 mL, Intravenous, Q12H  sodium chloride, 10 mL, Intravenous, Q12H  sodium chloride, 10 mL, Intravenous, Q12H    Morphine,   Pharmacy Consult,   sodium chloride, 10 mL/hr  sodium chloride, 10 mL/hr        Current Antimicrobial Therapy:  Anti-Infectives (From admission, onward)      Ordered     Dose/Rate Route Frequency Start Stop    07/21/24 2206  doxycycline (MONODOX) capsule 100 mg        Ordering Provider: Jessica Ospina MD    100 mg Oral Every 12 Hours Scheduled 07/21/24 2300 07/30/24 0856    07/21/24 0424  cefepime 2000 mg IVPB in 100 mL NS (VTB)        Ordering Provider: Estuardo Charles MD    2,000 mg  over 4 Hours Intravenous Every 8 Hours 07/21/24 1400 07/28/24 0942    07/21/24 0856  vancomycin 1250 mg/250 mL 0.9% NS IVPB ([facility])        Ordering Provider: Eduardo Freeman MD    1,250 mg  over 75 Minutes Intravenous Once 07/21/24 1000 07/21/24 1114    07/21/24 0424  cefepime 2000 mg IVPB in 100 mL NS (VTB)        Ordering Provider: Estuardo Charles MD    2,000 mg  over 30 Minutes Intravenous Once 07/21/24 0600 07/21/24 0558    07/17/24 2218  cefTRIAXone (ROCEPHIN) 2,000 mg in sodium chloride 0.9 % 100 mL IVPB-VTB        Ordering Provider: Al Lewis MD    2,000 mg  200 mL/hr  over 30 Minutes Intravenous Once 07/17/24 2234 07/18/24 0035          Current Diuretic Therapy:  Diuretics (From admission, onward)      Ordered     Dose/Rate Route Frequency Start Stop    07/26/24 1220  bumetanide (BUMEX) injection 1 mg        Ordering Provider: Yamel Lorenzo APRN    1 mg Intravenous Once 07/26/24 1245 07/26/24 1320    07/24/24 1151  furosemide (LASIX) injection 20 mg        Ordering Provider: Rinku Braxton MD    20 mg Intravenous Once 07/24/24 1245 07/24/24 1244    07/21/24 0637  furosemide (LASIX) injection 40 mg        Ordering Provider: Estuardo Charles MD    40 mg Intravenous Once 07/21/24 0730 07/21/24 0914    07/20/24 2323  furosemide (LASIX) injection 40 mg        Ordering Provider: Estuardo Charles MD    40 mg Intravenous Once 07/21/24 0015 07/21/24 0008          ----------------------------------------------------------------------------------------------------------------------  Vital Signs:  Temp:  [97.1 °F (36.2 °C)-103.3 °F (39.6 °C)] 103.3 °F (39.6 °C)  Heart Rate:  [101-192] 160  Resp:  [24-62] 62  BP: (112-127)/(60-85) 113/76  SpO2:  [100 %] 100 %  on  Flow (L/min):  [3] 3;      Body mass index is 23.53 kg/m².    Wt Readings from Last 3 Encounters:   08/04/24 62.2 kg (137 lb 2 oz)   04/26/24 67.7 kg (149 lb 4 oz)   04/19/24 67.1 kg (148 lb)     Intake & Output (last 3 days)         08/03 0701 08/04 0700 08/04 0701 08/05 0700 08/05 0701 08/06 0700 08/06 0701 08/07 0700    P.O. 0 0 0     I.V. (mL/kg) 47.5 (0.8) 48.5 (0.8) 17.7 (0.3)     Total Intake(mL/kg) 47.5 (0.8) 48.5 (0.8) 17.7 (0.3)     Urine (mL/kg/hr) 1550 (1.1)       Stool 100 0 100     Chest Tube 315 70 200 25    Total Output 1965 70 300 25    Net -1917.5 -21.5 -282.4 -25            Urine Unmeasured Occurrence 1 x       Stool Unmeasured Occurrence 0 x             No diet orders on  "file  ----------------------------------------------------------------------------------------------------------------------  Physical exam:  Constitutional:  Chronically ill appearing. Appears older than stated agte.   HENT:  Head:  Normocephalic and atraumatic.  Mouth:  Moist mucous membranes.    Eyes:  Conjunctivae and EOM are normal. No scleral icterus.    Neck:  Neck supple.  No JVD present.    Cardiovascular:  Normal rate, regular rhythm and normal heart sounds with no murmur.  Pulmonary/Chest:  Labored breathing, decreased breath sounds bilaterally.   Abdominal:  Soft.  Bowel sounds are normal.  No distension and no tenderness.   Musculoskeletal:  No edema, no tenderness, and no deformity.  No red or swollen joints anywhere.    Neurological:  Lethargic, not responsive.   Skin:  Skin is warm and dry. No rash noted. No pallor.   Peripheral vascular:  Pulses in all 4 extremities with no clubbing, no cyanosis, no edema.  ----------------------------------------------------------------------------------------------------------------------  Tele:    ----------------------------------------------------------------------------------------------------------------------  Results from last 7 days   Lab Units 08/01/24  0010 07/31/24  0001   WBC 10*3/mm3 22.53* 22.37*   HEMOGLOBIN g/dL 11.1* 10.8*   HEMATOCRIT % 34.8 34.3   MCV fL 99.4* 101.2*   MCHC g/dL 31.9 31.5   PLATELETS 10*3/mm3 168 196         Results from last 7 days   Lab Units 08/01/24  1132 08/01/24  0010 07/31/24  0001   SODIUM mmol/L 140 140 141   POTASSIUM mmol/L 4.9 5.5* 4.7   CHLORIDE mmol/L 104 103 106   CO2 mmol/L 30.6* 27.9 29.7*   BUN mg/dL 16 17 16   CREATININE mg/dL 0.19* 0.22* 0.22*   CALCIUM mg/dL 8.3* 8.4* 8.4*   GLUCOSE mg/dL 175* 169* 192*   Estimated Creatinine Clearance: 289.9 mL/min (A) (by C-G formula based on SCr of 0.19 mg/dL (L)).  No results found for: \"AMMONIA\"      Results from last 7 days   Lab Units 08/01/24  0010   PROBNP pg/mL " "16493.0*         No results found for: \"HGBA1C\", \"POCGLU\"  Lab Results   Component Value Date    TSH 1.140 07/17/2024    FREET4 1.83 (H) 04/11/2024     No results found for: \"PREGTESTUR\", \"PREGSERUM\", \"HCG\", \"HCGQUANT\"  Pain Management Panel           No data to display              Brief Urine Lab Results  (Last result in the past 365 days)        Color   Clarity   Blood   Leuk Est   Nitrite   Protein   CREAT   Urine HCG        07/17/24 2145 Yellow   Cloudy   Negative   Trace   Positive   Negative                 No results found for: \"BLOODCX\"  No results found for: \"URINECX\"  No results found for: \"WOUNDCX\"  No results found for: \"STOOLCX\"  No results found for: \"RESPCX\"  No results found for: \"AFBCX\"        I have personally looked at the labs and they are summarized above.  ----------------------------------------------------------------------------------------------------------------------  Detailed radiology reports for the last 24 hours:    Imaging Results (Last 24 Hours)       ** No results found for the last 24 hours. **          Assessment & Plan    -Acute on chronic hypoxic and hypercapnic respiratory failure  -COPD exacerbation  -Spontaneous secondary pneumothorax chest tube x 2 on the left  -Aspiration pneumonia  -COPD exacerbation  -Non-STEMI type II  -History of IPF     Patient terminally extubated and made comfort measures on 8/1. Hemodynamically has stabilized with good SpO2. Family considered being more aggressive and done trial off morphine gtt. Unfortunately patient not interactive and family reasonably decided to keep her comfort measures. Palliative care following, appreciate help. Patient has declined since yesterday with worsening dyspnea, tachycardia without improvement in mental status. Will restart morphine PCA and titrate as needed.      Code status: Comfort, palliative care following.      Dispo: Continue inpatient end of life care as patient has declined since yesterday.     Dean LLANES" MD [place]  [facility] Hospitalist  24  09:52 EDT    Electronically signed by Dean Pinzon MD at 24 0957       Dean Pinzon MD at 24 1916              [facility] HOSPITALIST PROGRESS NOTE     Patient Identification:  Name:  Lexie KEITH Valdez  Age:  64 y.o.  Sex:  female  :  1960  MRN:  [id number]  Visit Number:  [id number]  ROOM: [place] [address]     Primary Care Provider:  Violet Pop APRN    Length of stay in inpatient status:  18    Subjective     Chief Compliant:    Chief Complaint   Patient presents with    Shortness of Breath       History of Presenting Illness:    Family supportive bedside. Patient has hemodynamically stabilized. She is on 2 mg/hr morphine gtt and not interactive per family. Family considering being more aggressive in treatment but not sure what they would want. Agreed to give trial of less sedation to see if patient improves from mental status perspective.     ROS:  Otherwise 10 point ROS negative other than documented above in HPI.     Objective     Current Hospital Meds:castor oil-balsam peru, 1 Application, Topical, Q12H  Phenylephrine HCl-NaCl, , ,   Phenylephrine HCl-NaCl, , ,   Scopolamine, 2 patch, Transdermal, Q72H  sodium chloride, 10 mL, Intravenous, Q12H  sodium chloride, 10 mL, Intravenous, Q12H  sodium chloride, 10 mL, Intravenous, Q12H    Morphine, , Last Rate: Stopped (24 1653)  Pharmacy Consult,   sodium chloride, 10 mL/hr  sodium chloride, 10 mL/hr        Current Antimicrobial Therapy:  Anti-Infectives (From admission, onward)      Ordered     Dose/Rate Route Frequency Start Stop    24 2206  doxycycline (MONODOX) capsule 100 mg        Ordering Provider: Jessica Ospina MD    100 mg Oral Every 12 Hours Scheduled 24 2300 24 0856    24 0424  cefepime 2000 mg IVPB in 100 mL NS (VTB)        Ordering Provider: Estuardo Charles MD    2,000 mg  over 4 Hours Intravenous Every 8 Hours 24 1400  07/28/24 0942    07/21/24 0856  vancomycin 1250 mg/250 mL 0.9% NS IVPB ([facility])        Ordering Provider: Eduardo Freeman MD    1,250 mg  over 75 Minutes Intravenous Once 07/21/24 1000 07/21/24 1114    07/21/24 0424  cefepime 2000 mg IVPB in 100 mL NS (VTB)        Ordering Provider: Estuardo Charles MD    2,000 mg  over 30 Minutes Intravenous Once 07/21/24 0600 07/21/24 0558    07/17/24 2218  cefTRIAXone (ROCEPHIN) 2,000 mg in sodium chloride 0.9 % 100 mL IVPB-VTB        Ordering Provider: Al Lewis MD    2,000 mg  200 mL/hr over 30 Minutes Intravenous Once 07/17/24 2234 07/18/24 0035          Current Diuretic Therapy:  Diuretics (From admission, onward)      Ordered     Dose/Rate Route Frequency Start Stop    07/26/24 1220  bumetanide (BUMEX) injection 1 mg        Ordering Provider: Yamel Lorenzo APRN    1 mg Intravenous Once 07/26/24 1245 07/26/24 1320    07/24/24 1151  furosemide (LASIX) injection 20 mg        Ordering Provider: Rinku Braxton MD    20 mg Intravenous Once 07/24/24 1245 07/24/24 1244    07/21/24 0637  furosemide (LASIX) injection 40 mg        Ordering Provider: Estuardo Charles MD    40 mg Intravenous Once 07/21/24 0730 07/21/24 0914    07/20/24 2323  furosemide (LASIX) injection 40 mg        Ordering Provider: Estuardo Charles MD    40 mg Intravenous Once 07/21/24 0015 07/21/24 0008          ----------------------------------------------------------------------------------------------------------------------  Vital Signs:  Temp:  [97.1 °F (36.2 °C)-105 °F (40.6 °C)] 97.8 °F (36.6 °C)  Heart Rate:  [101-118] 112  Resp:  [24-25] 24  BP: (106-113)/(52-63) 112/63  SpO2:  [100 %] 100 %  on  Flow (L/min):  [3] 3;   Device (Oxygen Therapy): nasal cannula  Body mass index is 23.53 kg/m².    Wt Readings from Last 3 Encounters:   08/04/24 62.2 kg (137 lb 2 oz)   04/26/24 67.7 kg (149 lb 4 oz)   04/19/24 67.1 kg (148 lb)     Intake & Output (last 3 days)         08/03  0701 08/04 0700 08/04 0701 08/05 0700 08/05 0701 08/06 0700    P.O. 0 0 0    I.V. (mL/kg) 47.5 (0.8) 48.5 (0.8) 17.7 (0.3)    Total Intake(mL/kg) 47.5 (0.8) 48.5 (0.8) 17.7 (0.3)    Urine (mL/kg/hr) 1550 (1.1)      Stool 100 0     Chest Tube 315 70 100    Total Output 1965 70 100    Net -1917.5 -21.5 -82.4           Urine Unmeasured Occurrence 1 x      Stool Unmeasured Occurrence 0 x            No diet orders on file  ----------------------------------------------------------------------------------------------------------------------  Physical exam:  Constitutional:  Chronically ill appearing.   HENT:  Head:  Normocephalic and atraumatic.  Mouth:  Moist mucous membranes.    Eyes:  Conjunctivae and EOM are normal. No scleral icterus.    Neck:  Neck supple.  No JVD present.    Cardiovascular:  Normal rate, regular rhythm and normal heart sounds with no murmur.  Pulmonary/Chest:  No respiratory distress, no wheezes, no crackles, with normal breath sounds and good air movement.  Abdominal:  Soft.  Bowel sounds are normal.  No distension and no tenderness.   Musculoskeletal:  No edema, no tenderness, and no deformity.  No red or swollen joints anywhere.    Neurological:  Lethargic. Not oriented. Will open eyes at time to stimuli but otherwise no response. No gag reflex per nursing staff.    Skin:  Skin is warm and dry. No rash noted. No pallor.   Peripheral vascular:  Pulses in all 4 extremities with no clubbing, no cyanosis, no edema.  ----------------------------------------------------------------------------------------------------------------------  Tele:    ----------------------------------------------------------------------------------------------------------------------  Results from last 7 days   Lab Units 08/01/24  0010 07/31/24  0001 07/30/24  0005   WBC 10*3/mm3 22.53* 22.37* 20.31*   HEMOGLOBIN g/dL 11.1* 10.8* 10.5*   HEMATOCRIT % 34.8 34.3 33.6*   MCV fL 99.4* 101.2* 99.4*   MCHC g/dL 31.9 31.5 31.3*  "  PLATELETS 10*3/mm3 168 196 206         Results from last 7 days   Lab Units 08/01/24  1132 08/01/24  0010 07/31/24  0001 07/30/24  0005   SODIUM mmol/L 140 140 141 137   POTASSIUM mmol/L 4.9 5.5* 4.7 4.8   CHLORIDE mmol/L 104 103 106 101   CO2 mmol/L 30.6* 27.9 29.7* 31.0*   BUN mg/dL 16 17 16 19   CREATININE mg/dL 0.19* 0.22* 0.22* 0.23*   CALCIUM mg/dL 8.3* 8.4* 8.4* 8.2*   GLUCOSE mg/dL 175* 169* 192* 170*   ALBUMIN g/dL  --   --   --  3.0*   BILIRUBIN mg/dL  --   --   --  0.2   ALK PHOS U/L  --   --   --  94   AST (SGOT) U/L  --   --   --  24   ALT (SGPT) U/L  --   --   --  45*   Estimated Creatinine Clearance: 289.9 mL/min (A) (by C-G formula based on SCr of 0.19 mg/dL (L)).  No results found for: \"AMMONIA\"      Results from last 7 days   Lab Units 08/01/24  0010   PROBNP pg/mL 16,493.0*         No results found for: \"HGBA1C\", \"POCGLU\"  Lab Results   Component Value Date    TSH 1.140 07/17/2024    FREET4 1.83 (H) 04/11/2024     No results found for: \"PREGTESTUR\", \"PREGSERUM\", \"HCG\", \"HCGQUANT\"  Pain Management Panel           No data to display              Brief Urine Lab Results  (Last result in the past 365 days)        Color   Clarity   Blood   Leuk Est   Nitrite   Protein   CREAT   Urine HCG        07/17/24 2145 Yellow   Cloudy   Negative   Trace   Positive   Negative                 No results found for: \"BLOODCX\"  No results found for: \"URINECX\"  No results found for: \"WOUNDCX\"  No results found for: \"STOOLCX\"  No results found for: \"RESPCX\"  No results found for: \"AFBCX\"  Results from last 7 days   Lab Units 07/30/24  0005   PROCALCITONIN ng/mL 0.07       I have personally looked at the labs and they are summarized above.  ----------------------------------------------------------------------------------------------------------------------  Detailed radiology reports for the last 24 hours:    Imaging Results (Last 24 Hours)       ** No results found for the last 24 hours. **          Assessment & Plan " "   -Acute on chronic hypoxic and hypercapnic respiratory failure  -COPD exacerbation  -Spontaneous secondary pneumothorax chest tube x 2 on the left  -Aspiration pneumonia  -COPD exacerbation  -Non-STEMI type II  -History of IPF    Patient terminally extubated and made comfort measures on 8/1. Hemodynamically has stabilized with good SpO2. Family considering being more aggressive. Had long discussion with patient's son and will wean morphine gtt and see if she wakes up. Thus far patient has not been interactive or done purposeful movements.     Code status: Comfort, palliative care following.     Dispo: Pending [facility] discussions    Dean Pinzon MD  [facility]  08/05/24  19:16 EDT    Electronically signed by Dean Pinzon MD at 08/05/24 1922       Linh Lou APRN at 08/05/24 0933          Palliative Care Daily Progress Note     S: Medical record reviewed, followed up with Primary RN Carmen and Dr Pinzon regarding patient's condition. When I saw Lexie this morning she was shallow breathing and was having slight agonal breathing, however did not seem to be uncomfortable or in distress. Pts son Faith was at bedside and stated he felt she had been comfortable for the most part, we discussed that she had a temp of 105 overnight however is now back to normal range.      O:   Palliative Performance Scale Score:     /60   Pulse 101   Temp 97.4 °F (36.3 °C) (Axillary)   Resp 25   Ht 162.6 cm (64.02\")   Wt 62.2 kg (137 lb 2 oz)   SpO2 100%   BMI 23.53 kg/m²     Intake/Output Summary (Last 24 hours) at 8/5/2024 1236  Last data filed at 8/5/2024 0800  Gross per 24 hour   Intake 46.6 ml   Output 70 ml   Net -23.4 ml       PE:  Lexie is comfort measures, she was resting quietly with shallow agonal respirations. However did not appear to be uncomfortable at this time.      Meds: Reviewed and changes noted    Labs:   Results from last 7 days   Lab Units 08/01/24  0010   WBC 10*3/mm3 22.53* "   HEMOGLOBIN g/dL 11.1*   HEMATOCRIT % 34.8   PLATELETS 10*3/mm3 168     Results from last 7 days   Lab Units 08/01/24  1132   SODIUM mmol/L 140   POTASSIUM mmol/L 4.9   CHLORIDE mmol/L 104   CO2 mmol/L 30.6*   BUN mg/dL 16   CREATININE mg/dL 0.19*   GLUCOSE mg/dL 175*   CALCIUM mg/dL 8.3*     Results from last 7 days   Lab Units 08/01/24  1132 07/31/24  0001 07/30/24  0005   SODIUM mmol/L 140   < > 137   POTASSIUM mmol/L 4.9   < > 4.8   CHLORIDE mmol/L 104   < > 101   CO2 mmol/L 30.6*   < > 31.0*   BUN mg/dL 16   < > 19   CREATININE mg/dL 0.19*   < > 0.23*   CALCIUM mg/dL 8.3*   < > 8.2*   BILIRUBIN mg/dL  --   --  0.2   ALK PHOS U/L  --   --  94   ALT (SGPT) U/L  --   --  45*   AST (SGOT) U/L  --   --  24   GLUCOSE mg/dL 175*   < > 170*    < > = values in this interval not displayed.     Imaging Results (Last 72 Hours)       ** No results found for the last 72 hours. **              Diagnostics: Reviewed    A: Lexie Valdez is a 64 y.o. female admitted on 7/17/2024 due to shortness of breath. Lexie has a medical history of  anxiety/depression, arthritis, HTN, hypothyroidism, stress urinary incontinence with frequent UTI's, COPD and pulmonary fibrosis on 3L NC at home, and resting tremors in face and extremities, who presents with worsening shortness of breath for the last couple days. Pt also at time of admission c/o chest pressure associated with dyspnea stating this was chronic however had worsened in the days leading up to her admission. She also endorsed diarrhea for the last couple of days as well as dysuria and stated that she has recurring UTI's. Labs in ER revealed Troponin of 229 with repeat 188, BNP was 3550, Na 131, Cr 0.49, glucose 177, CRP 1.65, lactate 2.0, procal and WBC normal but with 88.7% neutrophils. UA showed positive nitrite, trace leuk, 6-10 WBC, 4+ bacteria and 3-6 squamous epithelial cells. She was tachycardic and tachypneic with pH of 7.458, CO2 40, Po2 154, bicarb 28. She was admitted to  PCU on admission and in the early am of  was transferred to CCU due to worsening dyspnea, bilateral infiltrates without effusion concerning for interstitial debbi disease flare, was transferred to CCU. Initially she was placed on Bipap in CCU and at  6:20 am Lexie was sedated intubated and placed on the ventilator. Palliative care consulted for GOC,ACP and for support of family.   On 2024 after discussion with palliative, pulmonary and Dr Zee family has decided to make Lexie comfort measures as they know she would not want a trach and peg.          P:  I was able to speak with pts son Faith at bedside to answer questions and provide support. Lexie is comfort measures and was resting comfortably and plan is to ensure her comfort. I discussed pt with Dr Pinzon this am.      We will continue to follow along. Please do not hesitate to contact us regarding further sx mgmt or GOC needs, including after hours or on weekends via our on call provider at [phone].     SG Javier    2024    Electronically signed by Linh Lou APRN at 24 1303       Ruthann Zee MD at 24 1155              [facility]     Patient Identification:  Name:  Lexie KEITH Valdez  Age:  64 y.o.  Sex:  female  :  1960  MRN:  [id number]  Visit Number:  [id number]  Primary Care Provider:  Violet Pop APRN    Length of stay:  17    Subjective: Patient seen and examined, very shallow agonal breathing, tachycardic, good urine output, appears comfortable.    Chief Complaint: Shortness of breath  ----------------------------------------------------------------------------------------------------------------------  Current Hospital Meds:  castor oil-balsam peru, 1 Application, Topical, Q12H  Phenylephrine HCl-NaCl, , ,   Phenylephrine HCl-NaCl, , ,   Scopolamine, 2 patch, Transdermal, Q72H  sodium chloride, 10 mL, Intravenous, Q12H  sodium chloride, 10 mL,  Intravenous, Q12H  sodium chloride, 10 mL, Intravenous, Q12H      Morphine,   Pharmacy Consult,   sodium chloride, 10 mL/hr  sodium chloride, 10 mL/hr      ----------------------------------------------------------------------------------------------------------------------  Vital Signs:  Temp:  [98.4 °F (36.9 °C)-100.4 °F (38 °C)] 98.4 °F (36.9 °C)  Heart Rate:  [111-125] 113  Resp:  [24-38] 30  BP: (107-126)/(52-62) 119/57       Tele: Sinus tachycardia rate of 120 bpm      07/31/24  0605 08/01/24  0550 08/04/24  0400   Weight: 62.9 kg (138 lb 10.7 oz) 62.2 kg (137 lb 2 oz) 62.2 kg (137 lb 2 oz)     Body mass index is 23.53 kg/m².    Intake/Output Summary (Last 24 hours) at 8/4/2024 1155  Last data filed at 8/4/2024 0800  Gross per 24 hour   Intake 28.04 ml   Output 1965 ml   Net -1936.96 ml     No diet orders on file  ----------------------------------------------------------------------------------------------------------------------  Physical exam:  General: Unresponsive, shallow breathing, likely ill-appearing.   Skin:  Skin is warm and dry. No rash noted. No pallor.    HENT:  Head:  Normocephalic and atraumatic.  Mouth: Dry, .    Eyes: Pupils are 5 mm bilateral, sluggishly reactive to light.    Neck:  Neck supple.  No JVD present.    Pulmonary/Chest: Left chest tube, scattered crackles bilateral Cardiovascular: Tachycardic, regular rhythm and normal heart sounds with no murmur.  Abdominal:  Soft.  Bowel sounds are normal.  No distension and no tenderness.   Extremities: Edema noted +1 both lower and upper extremity.  Cold to touch no cyanosis.  Pedal pulses palpable thready.  Neurological: Unresponsive.   Genitourinary: Lin catheter in the  Back:  ----------------------------------------------------------------------------------------------------------------------  ----------------------------------------------------------------------------------------------------------------------  Results from last 7 days  "  Lab Units 07/29/24  0156 07/29/24  0020   HSTROP T ng/L 31* 29*     Results from last 7 days   Lab Units 08/01/24  0010 07/31/24  0001 07/30/24  0005 07/29/24  0020   LACTATE mmol/L  --   --   --  1.3   WBC 10*3/mm3 22.53* 22.37* 20.31* 24.89*   HEMOGLOBIN g/dL 11.1* 10.8* 10.5* 10.5*   HEMATOCRIT % 34.8 34.3 33.6* 33.2*   MCV fL 99.4* 101.2* 99.4* 100.3*   MCHC g/dL 31.9 31.5 31.3* 31.6   PLATELETS 10*3/mm3 168 196 206 207     Results from last 7 days   Lab Units 07/29/24  0431   PH, ARTERIAL pH units 7.409   PO2 ART mm Hg 105.0   PCO2, ARTERIAL mm Hg 51.8*   HCO3 ART mmol/L 32.8*     Results from last 7 days   Lab Units 08/01/24  1132 08/01/24  0010 07/31/24  0001 07/30/24  0005 07/29/24  0020   SODIUM mmol/L 140 140 141 137 141   POTASSIUM mmol/L 4.9 5.5* 4.7 4.8 4.7   MAGNESIUM mg/dL  --   --   --   --  2.4   CHLORIDE mmol/L 104 103 106 101 106   CO2 mmol/L 30.6* 27.9 29.7* 31.0* 28.2   BUN mg/dL 16 17 16 19 15   CREATININE mg/dL 0.19* 0.22* 0.22* 0.23* 0.23*   CALCIUM mg/dL 8.3* 8.4* 8.4* 8.2* 8.4*   GLUCOSE mg/dL 175* 169* 192* 170* 163*   ALBUMIN g/dL  --   --   --  3.0* 3.0*   BILIRUBIN mg/dL  --   --   --  0.2 0.2   ALK PHOS U/L  --   --   --  94 103   AST (SGOT) U/L  --   --   --  24 52*   ALT (SGPT) U/L  --   --   --  45* 58*   Estimated Creatinine Clearance: 289.9 mL/min (A) (by C-G formula based on SCr of 0.19 mg/dL (L)).    No results found for: \"AMMONIA\"      No results found for: \"BLOODCX\"  No results found for: \"URINECX\"  No results found for: \"WOUNDCX\"  No results found for: \"STOOLCX\"    I have personally looked at the labs and they are summarized above.  ----------------------------------------------------------------------------------------------------------------------  Imaging Results (Last 24 Hours)       ** No results found for the last 24 hours. **          ----------------------------------------------------------------------------------------------------------------------  Assessment and " Plan:  -Acute on chronic hypoxic and hypercapnic respiratory failure  -COPD exacerbation  -Spontaneous secondary pneumothorax chest tube x 2 on the left  -Aspiration pneumonia  -COPD exacerbation  -Non-STEMI type II  -History of IPF    Comfort measure, patient is actively dying, discussed at length with patient's only son Faith, questions and concerns answered.  Patient appears to be comfortable at this time.    Disposition comfort measures      Ruthann Zee MD  08/04/24  11:55 EDT    Electronically signed by Ruthann Zee MD at 08/04/24 1158       Consult Notes (last 72 hours)  Notes from 08/03/24 1041 through 08/06/24 1041   No notes of this type exist for this encounter.

## 2024-08-06 NOTE — PLAN OF CARE
Goal Outcome Evaluation:                   Comfort measures in place at this time, PRN comfort/pain meds given see mar. Family at bedside.

## 2024-08-06 NOTE — PROGRESS NOTES
"Palliative Care Daily Progress Note     S: Medical record reviewed, followed up with Primary RN Fanny and Dr Pinzon regarding patient's condition.When I saw Lexie with Dr Pinzon this morning, Lexie appeared weak, pale, was not responsive and was having tachypnea and did not appear to be comfortable at this time.Her brother Joseluis was at bedside.      O:   Palliative Performance Scale Score:     BP (!) 80/51   Pulse 118   Temp (!) 103.3 °F (39.6 °C) (Axillary)   Resp (!) 34   Ht 162.6 cm (64.02\")   Wt 62.2 kg (137 lb 2 oz)   SpO2 100%   BMI 23.53 kg/m²     Intake/Output Summary (Last 24 hours) at 8/6/2024 1717  Last data filed at 8/6/2024 1600  Gross per 24 hour   Intake 0 ml   Output 350 ml   Net -350 ml       PE:  Lexie is comfort measures, she was pale and  not responsive, was having tachypnea.      Meds: Reviewed and changes noted    Labs:   Results from last 7 days   Lab Units 08/01/24  0010   WBC 10*3/mm3 22.53*   HEMOGLOBIN g/dL 11.1*   HEMATOCRIT % 34.8   PLATELETS 10*3/mm3 168     Results from last 7 days   Lab Units 08/01/24  1132   SODIUM mmol/L 140   POTASSIUM mmol/L 4.9   CHLORIDE mmol/L 104   CO2 mmol/L 30.6*   BUN mg/dL 16   CREATININE mg/dL 0.19*   GLUCOSE mg/dL 175*   CALCIUM mg/dL 8.3*     Results from last 7 days   Lab Units 08/01/24  1132   SODIUM mmol/L 140   POTASSIUM mmol/L 4.9   CHLORIDE mmol/L 104   CO2 mmol/L 30.6*   BUN mg/dL 16   CREATININE mg/dL 0.19*   CALCIUM mg/dL 8.3*   GLUCOSE mg/dL 175*     Imaging Results (Last 72 Hours)       ** No results found for the last 72 hours. **              Diagnostics: Reviewed    A: Lexie Valdez is a 64 y.o.  female admitted on 7/17/2024 due to shortness of breath. Lexie has a medical history of  anxiety/depression, arthritis, HTN, hypothyroidism, stress urinary incontinence with frequent UTI's, COPD and pulmonary fibrosis on 3L NC at home, and resting tremors in face and extremities, who presents with worsening shortness of breath for the last couple " days. Pt also at time of admission c/o chest pressure associated with dyspnea stating this was chronic however had worsened in the days leading up to her admission. She also endorsed diarrhea for the last couple of days as well as dysuria and stated that she has recurring UTI's. Labs in ER revealed Troponin of 229 with repeat 188, BNP was 3550, Na 131, Cr 0.49, glucose 177, CRP 1.65, lactate 2.0, procal and WBC normal but with 88.7% neutrophils. UA showed positive nitrite, trace leuk, 6-10 WBC, 4+ bacteria and 3-6 squamous epithelial cells. She was tachycardic and tachypneic with pH of 7.458, CO2 40, Po2 154, bicarb 28. She was admitted to PCU on admission and in the early am of 7/21 was transferred to CCU due to worsening dyspnea, bilateral infiltrates without effusion concerning for interstitial debbi disease flare, was transferred to CCU. Initially she was placed on Bipap in CCU and at 7/21 6:20 am Lexie was sedated intubated and placed on the ventilator. Palliative care consulted for GOC,ACP and for support of family. On Thursday 8/1/2024 after discussion with palliative, pulmonary and Dr Zee family has decided to make Lexie comfort measures as they know she would not want a trach and peg.     Today 8/6/2024  T 103.3, , BP of 107/60 with RR of 34 and was saturating 100% on 3 L NC, she was not responsive and per family had not been through the night.    P:  I was able to be present with Dr Pinzon at bedside when he spoke with pts brother Joseluis at bedside this morning. Dr Pinzon asked if Lexie had been able to wake up/communicate with Joseluis, and he said she had not and now she appeared to be tachypneic, pale and was not responsive. We discussed re starting her Morphine PCA and brother Joseluis thought this was a good idea. I was not able to get a hold of Khris, however I was able to speak with pts son Faith and he was agreeable to re-start PCA pain pump.Faith stated that he had told Joseluis to have PCA started at 2am, I did  tell him that Lexie had received several Prn doses of Morphine for her dyspnea. Lexie is comfort measures and plan is to ensure her comfort. I told Joseluis as well as Faith to let us know if they feel she is not comfortable. I discussed pt with JESSIKA Escobedo and Dr Pinzon.    We will continue to follow along. Please do not hesitate to contact us regarding further sx mgmt or GOC needs, including after hours or on weekends via our on call provider at [phone].     Linh Lou, APRN    8/6/2024

## 2024-08-06 NOTE — PROGRESS NOTES
[facility] HOSPITALIST PROGRESS NOTE     Patient Identification:  Name:  Lexie KEITH Valdez  Age:  64 y.o.  Sex:  female  :  1960  MRN:  [id number]  Visit Number:  [id number]  ROOM: [place] [address]     Primary Care Provider:  Violet Pop APRN    Length of stay in inpatient status:  19    Subjective     Chief Compliant:    Chief Complaint   Patient presents with    Shortness of Breath       History of Presenting Illness:    Patient febrile again overnight. Patient has been off morphine gtt but still not being interactive. Breathing more labored this morning. Patient tachycardic in 170's. Overall she appears uncomfortable. Patient's brother bedside. Discussed restarting morphine gtt. Palliative care bedside during exam and also clarified restarting morphine gtt with patient's son.     ROS:  Otherwise 10 point ROS negative other than documented above in HPI.     Objective     Current Hospital Meds:castor oil-balsam peru, 1 Application, Topical, Q12H  Phenylephrine HCl-NaCl, , ,   Phenylephrine HCl-NaCl, , ,   Scopolamine, 2 patch, Transdermal, Q72H  sodium chloride, 10 mL, Intravenous, Q12H  sodium chloride, 10 mL, Intravenous, Q12H  sodium chloride, 10 mL, Intravenous, Q12H    Morphine,   Pharmacy Consult,   sodium chloride, 10 mL/hr  sodium chloride, 10 mL/hr        Current Antimicrobial Therapy:  Anti-Infectives (From admission, onward)      Ordered     Dose/Rate Route Frequency Start Stop    24  doxycycline (MONODOX) capsule 100 mg        Ordering Provider: Jessica Ospina MD    100 mg Oral Every 12 Hours Scheduled 24 2300 24 0856    24 0424  cefepime 2000 mg IVPB in 100 mL NS (VTB)        Ordering Provider: Estuardo Charles MD    2,000 mg  over 4 Hours Intravenous Every 8 Hours 24 1400 24 0942    24 0856  vancomycin 1250 mg/250 mL 0.9% NS IVPB ([facility])        Ordering Provider: Eduardo Freeman MD    1,250 mg  over 75 Minutes Intravenous Once  07/21/24 1000 07/21/24 1114    07/21/24 0424  cefepime 2000 mg IVPB in 100 mL NS (VTB)        Ordering Provider: Estuardo Charles MD    2,000 mg  over 30 Minutes Intravenous Once 07/21/24 0600 07/21/24 0558    07/17/24 2218  cefTRIAXone (ROCEPHIN) 2,000 mg in sodium chloride 0.9 % 100 mL IVPB-VTB        Ordering Provider: Al Lewis MD    2,000 mg  200 mL/hr over 30 Minutes Intravenous Once 07/17/24 2234 07/18/24 0035          Current Diuretic Therapy:  Diuretics (From admission, onward)      Ordered     Dose/Rate Route Frequency Start Stop    07/26/24 1220  bumetanide (BUMEX) injection 1 mg        Ordering Provider: Yamel Lorenzo APRN    1 mg Intravenous Once 07/26/24 1245 07/26/24 1320    07/24/24 1151  furosemide (LASIX) injection 20 mg        Ordering Provider: Rinku Braxton MD    20 mg Intravenous Once 07/24/24 1245 07/24/24 1244    07/21/24 0637  furosemide (LASIX) injection 40 mg        Ordering Provider: Estuardo Charles MD    40 mg Intravenous Once 07/21/24 0730 07/21/24 0914    07/20/24 2323  furosemide (LASIX) injection 40 mg        Ordering Provider: Estuardo Charles MD    40 mg Intravenous Once 07/21/24 0015 07/21/24 0008          ----------------------------------------------------------------------------------------------------------------------  Vital Signs:  Temp:  [97.1 °F (36.2 °C)-103.3 °F (39.6 °C)] 103.3 °F (39.6 °C)  Heart Rate:  [101-192] 160  Resp:  [24-62] 62  BP: (112-127)/(60-85) 113/76  SpO2:  [100 %] 100 %  on  Flow (L/min):  [3] 3;      Body mass index is 23.53 kg/m².    Wt Readings from Last 3 Encounters:   08/04/24 62.2 kg (137 lb 2 oz)   04/26/24 67.7 kg (149 lb 4 oz)   04/19/24 67.1 kg (148 lb)     Intake & Output (last 3 days)         08/03 0701  08/04 0700 08/04 0701  08/05 0700 08/05 0701  08/06 0700 08/06 0701  08/07 0700    P.O. 0 0 0     I.V. (mL/kg) 47.5 (0.8) 48.5 (0.8) 17.7 (0.3)     Total Intake(mL/kg) 47.5 (0.8) 48.5 (0.8) 17.7 (0.3)      Urine (mL/kg/hr) 1550 (1.1)       Stool 100 0 100     Chest Tube 315 70 200 25    Total Output 1965 70 300 25    Net -1917.5 -21.5 -282.4 -25            Urine Unmeasured Occurrence 1 x       Stool Unmeasured Occurrence 0 x             No diet orders on file  ----------------------------------------------------------------------------------------------------------------------  Physical exam:  Constitutional:  Chronically ill appearing. Appears older than stated agte.   HENT:  Head:  Normocephalic and atraumatic.  Mouth:  Moist mucous membranes.    Eyes:  Conjunctivae and EOM are normal. No scleral icterus.    Neck:  Neck supple.  No JVD present.    Cardiovascular:  Normal rate, regular rhythm and normal heart sounds with no murmur.  Pulmonary/Chest:  Labored breathing, decreased breath sounds bilaterally.   Abdominal:  Soft.  Bowel sounds are normal.  No distension and no tenderness.   Musculoskeletal:  No edema, no tenderness, and no deformity.  No red or swollen joints anywhere.    Neurological:  Lethargic, not responsive.   Skin:  Skin is warm and dry. No rash noted. No pallor.   Peripheral vascular:  Pulses in all 4 extremities with no clubbing, no cyanosis, no edema.  ----------------------------------------------------------------------------------------------------------------------  Tele:    ----------------------------------------------------------------------------------------------------------------------  Results from last 7 days   Lab Units 08/01/24 0010 07/31/24  0001   WBC 10*3/mm3 22.53* 22.37*   HEMOGLOBIN g/dL 11.1* 10.8*   HEMATOCRIT % 34.8 34.3   MCV fL 99.4* 101.2*   MCHC g/dL 31.9 31.5   PLATELETS 10*3/mm3 168 196         Results from last 7 days   Lab Units 08/01/24  1132 08/01/24  0010 07/31/24  0001   SODIUM mmol/L 140 140 141   POTASSIUM mmol/L 4.9 5.5* 4.7   CHLORIDE mmol/L 104 103 106   CO2 mmol/L 30.6* 27.9 29.7*   BUN mg/dL 16 17 16   CREATININE mg/dL 0.19* 0.22* 0.22*   CALCIUM mg/dL  "8.3* 8.4* 8.4*   GLUCOSE mg/dL 175* 169* 192*   Estimated Creatinine Clearance: 289.9 mL/min (A) (by C-G formula based on SCr of 0.19 mg/dL (L)).  No results found for: \"AMMONIA\"      Results from last 7 days   Lab Units 08/01/24  0010   PROBNP pg/mL 16,493.0*         No results found for: \"HGBA1C\", \"POCGLU\"  Lab Results   Component Value Date    TSH 1.140 07/17/2024    FREET4 1.83 (H) 04/11/2024     No results found for: \"PREGTESTUR\", \"PREGSERUM\", \"HCG\", \"HCGQUANT\"  Pain Management Panel           No data to display              Brief Urine Lab Results  (Last result in the past 365 days)        Color   Clarity   Blood   Leuk Est   Nitrite   Protein   CREAT   Urine HCG        07/17/24 2145 Yellow   Cloudy   Negative   Trace   Positive   Negative                 No results found for: \"BLOODCX\"  No results found for: \"URINECX\"  No results found for: \"WOUNDCX\"  No results found for: \"STOOLCX\"  No results found for: \"RESPCX\"  No results found for: \"AFBCX\"        I have personally looked at the labs and they are summarized above.  ----------------------------------------------------------------------------------------------------------------------  Detailed radiology reports for the last 24 hours:    Imaging Results (Last 24 Hours)       ** No results found for the last 24 hours. **          Assessment & Plan    -Acute on chronic hypoxic and hypercapnic respiratory failure  -COPD exacerbation  -Spontaneous secondary pneumothorax chest tube x 2 on the left  -Aspiration pneumonia  -COPD exacerbation  -Non-STEMI type II  -History of IPF     Patient terminally extubated and made comfort measures on 8/1. Hemodynamically has stabilized with good SpO2. Family considered being more aggressive and done trial off morphine gtt. Unfortunately patient not interactive and family reasonably decided to keep her comfort measures. Palliative care following, appreciate help. Patient has declined since yesterday with worsening dyspnea, " tachycardia without improvement in mental status. Will restart morphine PCA and titrate as needed.      Code status: Comfort, palliative care following.      Dispo: Continue inpatient end of life care as patient has declined since yesterday.     Dean Pinzon MD  [facility] Hospitalist  08/06/24  09:52 EDT

## 2024-08-07 VITALS
RESPIRATION RATE: 34 BRPM | DIASTOLIC BLOOD PRESSURE: 33 MMHG | OXYGEN SATURATION: 75 % | HEIGHT: 64 IN | BODY MASS INDEX: 23.41 KG/M2 | TEMPERATURE: 103.1 F | SYSTOLIC BLOOD PRESSURE: 62 MMHG | WEIGHT: 137.13 LBS

## 2024-08-07 NOTE — SIGNIFICANT NOTE
Identity confirmed at bedside by wristband.  Patient in bed, eye's closed, no signs of life.  No respiratory effort noted  No response to verbal stimuli  No response to supraorbital pressure  No carotid pulse palpable  Pupils fixed and dilated bilaterally  No heart sounds noted during three minutes of auscultation  No breath sounds noted after three minutes of auscultation    Death confirmed on 08/06/2024 at 23:57.

## 2024-08-07 NOTE — NURSING NOTE
[facility] in [place], [place] contacted per family request.  Spoke with Leslee and she states someone will be here to   as soon as possible.  Family here and aware.

## 2024-08-07 NOTE — PAYOR COMM NOTE
"[facility]  NPI:[id number]    Utilization Review  Contact: Echo Hinton RN  Phone: [phone]  Fax:[phone]    DISCHARGE NOTIFICATION   [id number]       Lexie Valdez (Dcsd. Female)       Date of Birth   1960    Social Security Number       Address   [address] [place] [id number]    Home Phone       MRN   [id number]       [place]   M[employer]onism    Marital Status                               Admission Date   24    Admission Type   Emergency    Admitting Provider   Estuardo Charles MD    Attending Provider       Department, Room/Bed   [facility] PROGRESS CARE, P212/S2       Discharge Date   2024    Discharge Disposition       Discharge Destination   Other                              Attending Provider: (none)   Allergies: Codeine, Sulfa Antibiotics    Isolation: None   Infection: None   Code Status: Prior    Ht: 162.6 cm (64.02\")   Wt: 62.2 kg (137 lb 2 oz)    Admission Cmt: None   Principal Problem: NSTEMI (non-ST elevated myocardial infarction) [I21.4]                   Active Insurance as of 2024       Primary Coverage       Payor Plan Insurance Group Employer/Plan Group    ANTHEM MEDICARE REPLACEMENT ANTHEM MEDICARE ADVANTAGE [id number]       Payor Plan Address Payor Plan Phone Number Payor Plan Fax Number Effective Dates    [address] [phone]  2022 - None Entered    [facility] [address]         Subscriber Name Subscriber Birth Date Member ID       LEXIE VALDEZ 1960 [id number]               Secondary Coverage       Payor Plan Insurance Group Employer/Plan Group    [place]        Payor Plan Address Payor Plan Phone Number Payor Plan Fax Number Effective Dates    [address] [phone]  2021 - None Entered    [place] [address]         Subscriber Name Subscriber Birth Date Member ID       LEXIE VALDEZ 1960 [id number]                     Emergency Contacts        " (Rel.) Home Phone Work Phone Mobile Phone    Valdez,Khris (Spouse) -- -- [phone]    Faith Davila (Son) [phone] -- --    Rosa Kessler (Sister) -- -- [phone]          Inocente Keller, RN   Registered Nurse     Significant Note      Signed     Date of Service: 08/07/24 0010  Creation Time: 08/07/24 0010     Signed         Identity confirmed at bedside by wristband.  Patient in bed, eye's closed, no signs of life.  No respiratory effort noted  No response to verbal stimuli  No response to supraorbital pressure  No carotid pulse palpable  Pupils fixed and dilated bilaterally  No heart sounds noted during three minutes of auscultation  No breath sounds noted after three minutes of auscultation     Death confirmed on 08/06/2024 at 23:57.

## 2024-08-07 NOTE — NURSING NOTE
Patient's son, Faith at bedside and aware patient has passed away.  Son states family wishes for patient to be sent to [facility].  Family at bedside at this time and spending time with patient.  Will notify  Home when family is ready.

## 2024-08-07 NOTE — SIGNIFICANT NOTE
[facility] representative states family declined donation previously and family will not be re-approached about donation.

## 2024-08-19 NOTE — DISCHARGE SUMMARY
[facility] HOSPITALISTS DISCHARGE SUMMARY    Patient Identification:  Name:  Lexie KEITH Valdez  Age:  64 y.o.  Sex:  female  :  1960  MRN:  [id number]  Visit Number:  [id number]    Date of Admission: 2024  Date of Discharge:  2024    PCP: Violet Pop APRN    DISCHARGE DIAGNOSIS  #Acute on chronic hypoxic and hypercapnic respiratory failure  #COPD exacerbation  #Spontaneous secondary pneumothorax chest tube x 2 on the left  #Aspiration pneumonia  #COPD exacerbation  #Non-STEMI type II  #History of IPF    CONSULTS   Infectious disease   Palliative care   Surgery   Pulmonology   Cardiology     PROCEDURES PERFORMED  Endotracheal intubation     Central line placement     Chest tube placement     HOSPITAL COURSE  Patient is a 64 y.o. female presented on  to [facility] complaining of shortness of breath.  Please see the admitting history and physical for further details.      Ms. Valdez is our 63 yo F with hx of anxiety/depression, arthritis, HTN, hypothyroidism, stress urinary incontinence with frequent UTI's, COPD and pulmonary fibrosis on 3L NC at home, and resting tremors in face and extremities who presented with shortness of breath. Patient started on steroids, nebs, abx, diuresed but had continued worsening requiring intubation on  with some post intubation hypotension thought to be from sedation. Hospitalization again complicated by acute worsening 2/2 pneumothorax requiring chest tube X2. Patient unable to be weaned and family reasonably decided to make patient comfort measures and was terminally extubated. Patient minimally responsive but vitals did stabilized. Patient did not make meaningful recovery and was kept comfortable until she .     VITAL SIGNS:        on   ;        Body mass index is 23.53 kg/m².  Wt Readings from Last 3 Encounters:   24 62.2 kg (137 lb 2 oz)   24 67.7 kg (149 lb 4 oz)   24 67.1 kg (148 lb)        PHYSICAL EXAM:  Constitutional:  Well-developed and well-nourished.  No respiratory distress.      HENT:  Head:  Normocephalic and atraumatic.  Mouth:  Moist mucous membranes.    Eyes:  Conjunctivae and EOM are normal.  Pupils are equal, round, and reactive to light.  No scleral icterus.    Cardiovascular:  Normal rate, regular rhythm and normal heart sounds with no murmur.  Pulmonary/Chest:  No respiratory distress, no wheezes, no crackles, with normal breath sounds and good air movement.  Abdominal:  Soft.  Bowel sounds are normal.  No distension and no tenderness.   Musculoskeletal:  No edema, no tenderness, and no deformity.  No red or swollen joints anywhere.    Neurological:  Alert and oriented to person, place, and time.  No gross neurological deficit.   Skin:  Skin is warm and dry. No rash noted. No pallor.   Peripheral vascular:  Strong pulses in all 4 extremities with no clubbing, no cyanosis, no edema.    DISCHARGE DISPOSITION   Stable    DISCHARGE MEDICATIONS:     Discharge Medications        ASK your doctor about these medications        Instructions Start Date   albuterol sulfate  (90 Base) MCG/ACT inhaler  Commonly known as: PROVENTIL HFA;VENTOLIN HFA;PROAIR HFA  Ask about: Which instructions should I use?   2 puffs, Inhalation, Daily PRN      aspirin 81 MG EC tablet   81 mg, Oral, Daily      atorvastatin 40 MG tablet  Commonly known as: LIPITOR   40 mg, Oral, Daily      Brexpiprazole 0.5 MG tablet   1 tablet, Oral, Daily      busPIRone 10 MG tablet  Commonly known as: BUSPAR   10 mg, Oral, 2 Times Daily PRN      donepezil 10 MG tablet  Commonly known as: ARICEPT   10 mg, Oral, Every Evening      FLUoxetine 40 MG capsule  Commonly known as: PROzac  Ask about: Which instructions should I use?   40 mg, Oral, Daily      hydroCHLOROthiazide 12.5 MG tablet  Ask about: Which instructions should I use?   12.5 mg, Oral, Daily      HYDROcodone-acetaminophen 5-325 MG per tablet  Commonly known as:  NORCO   1 tablet, Oral, Every 8 Hours PRN      hydroxychloroquine 200 MG tablet  Commonly known as: PLAQUENIL  Ask about: Which instructions should I use?   200 mg, Oral, 2 Times Daily      hydrOXYzine 25 MG tablet  Commonly known as: ATARAX   25 mg, Oral, Every 6 Hours PRN      ipratropium-albuterol 0.5-2.5 mg/3 ml nebulizer  Commonly known as: DUO-NEB  Ask about: Which instructions should I use?   3 mL, Nebulization, Every 4 Hours PRN      levocetirizine 5 MG tablet  Commonly known as: XYZAL   5 mg, Oral, Every Evening      levothyroxine 25 MCG tablet  Commonly known as: SYNTHROID, LEVOTHROID   25 mcg, Oral, Every Early Morning      losartan 50 MG tablet  Commonly known as: COZAAR   50 mg, Oral, Daily      megestrol 40 MG tablet  Commonly known as: MEGACE   40 mg, Oral, 2 Times Daily      memantine 5 MG tablet  Commonly known as: NAMENDA   5 mg, Oral, 2 Times Daily      nabumetone 750 MG tablet  Commonly known as: RELAFEN   750 mg, Oral, 2 Times Daily PRN      predniSONE 10 MG tablet  Commonly known as: DELTASONE  Ask about: Should I take this medication?   20 mg, Oral, Daily      predniSONE 10 MG tablet  Commonly known as: DELTASONE  Ask about: Should I take this medication?   10 mg, Oral, Daily      primidone 50 MG tablet  Commonly known as: MYSOLINE  Ask about: Which instructions should I use?   Take 4 tablets by mouth 3 (Three) Times a Day.      Trelegy Ellipta 200-62.5-25 MCG/ACT inhaler  Generic drug: Fluticasone-Umeclidin-Vilant   1 puff, Inhalation, Daily      triamcinolone 0.1 % cream  Commonly known as: KENALOG   1 Application, Topical, 2 Times Daily                  Follow-up Information       Violet oPp APRN .    Specialty: Family Medicine  Contact information:  [address] [place] [address] [phone]                              TEST  RESULTS PENDING AT DISCHARGE       CODE STATUS  Code Status and Medical Interventions: No CPR (Do Not Attempt to Resuscitate); Comfort Measures; pt   Khris and son Faith   Ordered at: 08/01/24 1456     Level Of Support Discussed With:    Next of Kin (If No Surrogate)     Code Status (Patient has no pulse and is not breathing):    No CPR (Do Not Attempt to Resuscitate)     Medical Interventions (Patient has pulse or is breathing):    Comfort Measures     Comments:    pt  Khris and son Faith       Dean Pinzon MD  [facility]  08/19/24  19:41 EDT    Please note that this discharge summary required more than 30 minutes to complete.